# Patient Record
Sex: FEMALE | Race: WHITE | NOT HISPANIC OR LATINO | Employment: UNEMPLOYED | URBAN - METROPOLITAN AREA
[De-identification: names, ages, dates, MRNs, and addresses within clinical notes are randomized per-mention and may not be internally consistent; named-entity substitution may affect disease eponyms.]

---

## 2017-01-03 ENCOUNTER — PATIENT MESSAGE (OUTPATIENT)
Dept: FAMILY MEDICINE | Facility: CLINIC | Age: 72
End: 2017-01-03

## 2017-01-04 DIAGNOSIS — C50.919 MALIGNANT NEOPLASM OF BREAST: ICD-10-CM

## 2017-01-04 DIAGNOSIS — E03.9 ACQUIRED HYPOTHYROIDISM: ICD-10-CM

## 2017-01-04 RX ORDER — ANASTROZOLE 1 MG/1
TABLET ORAL
Qty: 90 TABLET | Refills: 5 | Status: SHIPPED | OUTPATIENT
Start: 2017-01-04 | End: 2018-04-07 | Stop reason: SDUPTHER

## 2017-01-04 RX ORDER — LEVOTHYROXINE SODIUM 75 UG/1
TABLET ORAL
Qty: 90 TABLET | Refills: 3 | Status: SHIPPED | OUTPATIENT
Start: 2017-01-04 | End: 2018-01-29 | Stop reason: SDUPTHER

## 2017-03-29 DIAGNOSIS — N32.81 OVERACTIVE BLADDER: ICD-10-CM

## 2017-03-29 RX ORDER — SOLIFENACIN SUCCINATE 5 MG/1
TABLET, FILM COATED ORAL
Qty: 90 TABLET | Refills: 2 | Status: SHIPPED | OUTPATIENT
Start: 2017-03-29 | End: 2017-11-28 | Stop reason: SDUPTHER

## 2017-04-07 ENCOUNTER — LAB VISIT (OUTPATIENT)
Dept: LAB | Facility: HOSPITAL | Age: 72
End: 2017-04-07
Attending: INTERNAL MEDICINE
Payer: MEDICARE

## 2017-04-07 DIAGNOSIS — D69.6 THROMBOCYTOPENIA: ICD-10-CM

## 2017-04-07 DIAGNOSIS — M81.0 OSTEOPOROSIS: ICD-10-CM

## 2017-04-07 LAB
ALBUMIN SERPL BCP-MCNC: 3.7 G/DL
ALP SERPL-CCNC: 43 U/L
ALT SERPL W/O P-5'-P-CCNC: 35 U/L
ANION GAP SERPL CALC-SCNC: 5 MMOL/L
AST SERPL-CCNC: 63 U/L
BASOPHILS # BLD AUTO: 0 K/UL
BASOPHILS NFR BLD: 0.6 %
BILIRUB SERPL-MCNC: 0.4 MG/DL
BUN SERPL-MCNC: 24 MG/DL
CALCIUM SERPL-MCNC: 9.3 MG/DL
CHLORIDE SERPL-SCNC: 101 MMOL/L
CO2 SERPL-SCNC: 32 MMOL/L
CREAT SERPL-MCNC: 0.9 MG/DL
DIFFERENTIAL METHOD: ABNORMAL
EOSINOPHIL # BLD AUTO: 0.4 K/UL
EOSINOPHIL NFR BLD: 9 %
ERYTHROCYTE [DISTWIDTH] IN BLOOD BY AUTOMATED COUNT: 14.6 %
EST. GFR  (AFRICAN AMERICAN): >60 ML/MIN/1.73 M^2
EST. GFR  (NON AFRICAN AMERICAN): >60 ML/MIN/1.73 M^2
GLUCOSE SERPL-MCNC: 77 MG/DL
HCT VFR BLD AUTO: 37.8 %
HGB BLD-MCNC: 12.7 G/DL
LYMPHOCYTES # BLD AUTO: 1.4 K/UL
LYMPHOCYTES NFR BLD: 33.2 %
MCH RBC QN AUTO: 31.5 PG
MCHC RBC AUTO-ENTMCNC: 33.7 %
MCV RBC AUTO: 93 FL
MONOCYTES # BLD AUTO: 0.4 K/UL
MONOCYTES NFR BLD: 9.5 %
NEUTROPHILS # BLD AUTO: 1.9 K/UL
NEUTROPHILS NFR BLD: 47.7 %
PLATELET # BLD AUTO: 126 K/UL
PMV BLD AUTO: 7.9 FL
POTASSIUM SERPL-SCNC: 4.8 MMOL/L
PROT SERPL-MCNC: 6.9 G/DL
RBC # BLD AUTO: 4.05 M/UL
SODIUM SERPL-SCNC: 138 MMOL/L
WBC # BLD AUTO: 4.1 K/UL

## 2017-04-07 PROCEDURE — 36415 COLL VENOUS BLD VENIPUNCTURE: CPT

## 2017-04-07 PROCEDURE — 85025 COMPLETE CBC W/AUTO DIFF WBC: CPT

## 2017-04-07 PROCEDURE — 80053 COMPREHEN METABOLIC PANEL: CPT

## 2017-04-10 ENCOUNTER — OFFICE VISIT (OUTPATIENT)
Dept: HEMATOLOGY/ONCOLOGY | Facility: CLINIC | Age: 72
End: 2017-04-10
Payer: MEDICARE

## 2017-04-10 VITALS
HEIGHT: 64 IN | SYSTOLIC BLOOD PRESSURE: 121 MMHG | OXYGEN SATURATION: 99 % | RESPIRATION RATE: 18 BRPM | BODY MASS INDEX: 24.5 KG/M2 | WEIGHT: 143.5 LBS | DIASTOLIC BLOOD PRESSURE: 58 MMHG | HEART RATE: 53 BPM | TEMPERATURE: 98 F

## 2017-04-10 DIAGNOSIS — T38.6X5A OSTEOPOROSIS DUE TO AROMATASE INHIBITOR: ICD-10-CM

## 2017-04-10 DIAGNOSIS — D69.6 THROMBOCYTOPENIA: ICD-10-CM

## 2017-04-10 DIAGNOSIS — C50.011 MALIGNANT NEOPLASM INVOLVING BOTH NIPPLE AND AREOLA OF RIGHT BREAST IN FEMALE: ICD-10-CM

## 2017-04-10 DIAGNOSIS — C50.011 MALIGNANT NEOPLASM INVOLVING BOTH NIPPLE AND AREOLA OF RIGHT BREAST IN FEMALE: Primary | ICD-10-CM

## 2017-04-10 DIAGNOSIS — M81.8 OSTEOPOROSIS DUE TO AROMATASE INHIBITOR: ICD-10-CM

## 2017-04-10 PROCEDURE — 99214 OFFICE O/P EST MOD 30 MIN: CPT | Mod: PBBFAC,PO | Performed by: INTERNAL MEDICINE

## 2017-04-10 PROCEDURE — 99214 OFFICE O/P EST MOD 30 MIN: CPT | Mod: S$PBB,,, | Performed by: INTERNAL MEDICINE

## 2017-04-10 PROCEDURE — 99999 PR PBB SHADOW E&M-EST. PATIENT-LVL IV: CPT | Mod: PBBFAC,,, | Performed by: INTERNAL MEDICINE

## 2017-04-10 RX ORDER — DORZOLAMIDE HYDROCHLORIDE AND TIMOLOL MALEATE 20; 5 MG/ML; MG/ML
1 SOLUTION/ DROPS OPHTHALMIC 2 TIMES DAILY
COMMUNITY
Start: 2017-03-31 | End: 2019-03-11 | Stop reason: ALTCHOICE

## 2017-04-10 RX ORDER — ZOLPIDEM TARTRATE 5 MG/1
5 TABLET ORAL NIGHTLY PRN
Qty: 30 TABLET | Refills: 0 | Status: SHIPPED | OUTPATIENT
Start: 2017-04-10 | End: 2018-01-24

## 2017-04-10 RX ORDER — TRAMADOL HYDROCHLORIDE 50 MG/1
50 TABLET ORAL EVERY 6 HOURS PRN
Qty: 90 TABLET | Refills: 0 | Status: SHIPPED | OUTPATIENT
Start: 2017-04-10 | End: 2017-05-01 | Stop reason: SDUPTHER

## 2017-04-10 RX ORDER — TRAMADOL HYDROCHLORIDE 50 MG/1
50 TABLET ORAL EVERY 6 HOURS PRN
Qty: 90 TABLET | Refills: 0 | Status: SHIPPED | OUTPATIENT
Start: 2017-04-10 | End: 2017-04-10 | Stop reason: SDUPTHER

## 2017-04-10 NOTE — MR AVS SNAPSHOT
[facility]  [address], [address]  [facility] [address]  Phone: [phone]                  Yuniel Gracia   4/10/2017 10:20 AM   Office Visit    Description:  Female : 1945   Provider:  Sherrie Almanza MD   Department:  [facility]           Reason for Visit     Follow-up           Diagnoses this Visit        Comments    Malignant neoplasm involving both nipple and areola of right breast in female    -  Primary            To Do List           Goals (5 Years of Data)     None       These Medications        Disp Refills Start End    tramadol (ULTRAM) 50 mg tablet 90 tablet 0 4/10/2017     Take 1 tablet (50 mg total) by mouth every 6 (six) hours as needed for Pain. - Oral    Pharmacy: [facility] [place] [address] Ph #: [phone]       zolpidem (AMBIEN) 5 MG Tab 30 tablet 0 4/10/2017 10/9/2017    Take 1 tablet (5 mg total) by mouth nightly as needed. - Oral    Pharmacy: [place] [id number] Ph #: [phone]         [facility]     [facility] On Call Nurse Care Line -  Assistance  Unless otherwise directed by your provider, please contact [facility], our nurse care line that is available for  assistance.     Registered nurses in the [facility] provide: appointment scheduling, clinical advisement, health education, and other advisory services.  Call: [phone] (toll free)               Medications           Message regarding Medications     Verify the changes and/or additions to your medication regime listed below are the same as discussed with your clinician today.  If any of these changes or additions are incorrect, please notify your healthcare provider.        START taking these NEW medications        Refills    zolpidem (AMBIEN) 5 MG Tab 0    Sig: Take 1 tablet (5 mg total) by mouth nightly as needed.    Class:  "Normal    Route: Oral      STOP taking these medications     latanoprost 0.005 % ophthalmic solution Place 1 drop into both eyes every evening.            Verify that the below list of medications is an accurate representation of the medications you are currently taking.  If none reported, the list may be blank. If incorrect, please contact your healthcare provider. Carry this list with you in case of emergency.           Current Medications     amlodipine (NORVASC) 2.5 MG tablet     anastrozole (ARIMIDEX) 1 mg Tab TAKE 1 TABLET DAILY    cholecalciferol, vitamin D3, (VITAMIN D-3) 400 unit Chew Take 1 mg by mouth once daily.     coenzyme Q10 100 mg capsule Take 100 mg by mouth once daily.    cyanocobalamin (VITAMIN B-12) 100 MCG tablet Take 100 mcg by mouth once daily.     dorzolamide-timolol 2-0.5% (COSOPT) 22.3-6.8 mg/mL ophthalmic solution     fish oil-omega-3 fatty acids 300-1,000 mg capsule Take 2 g by mouth once daily.     levothyroxine (SYNTHROID) 75 MCG tablet TAKE 1 TABLET DAILY    pravastatin (PRAVACHOL) 40 MG tablet Take 40 mg by mouth once daily.     sertraline (ZOLOFT) 100 MG tablet Take 1 tablet (100 mg total) by mouth once daily.    temazepam (RESTORIL) 15 mg Cap Take 1 capsule (15 mg total) by mouth nightly as needed (take 1-2 capsules by mouth 30-60 minutes prior to sleep as needed for insomnia).    tramadol (ULTRAM) 50 mg tablet Take 1 tablet (50 mg total) by mouth every 6 (six) hours as needed for Pain.    VESICARE 5 mg tablet TAKE 1 TABLET DAILY    zolpidem (AMBIEN) 5 MG Tab Take 1 tablet (5 mg total) by mouth nightly as needed.           Clinical Reference Information           Your Vitals Were     BP Pulse Temp Resp Height Weight    121/58 53 98.2 °F (36.8 °C) (Oral) 18 5' 4" (1.626 m) 65.1 kg (143 lb 8.3 oz)    Last Period SpO2 BMI          03/02/1998 99% 24.64 kg/m2        Blood Pressure          Most Recent Value    BP  (!)  121/58      Allergies as of 4/10/2017     Adhesive    Codeine    "   Immunizations Administered on Date of Encounter - 4/10/2017     None      Orders Placed During Today's Visit     Future Labs/Procedures Expected by Expires    CA 27.29  4/10/2017 6/9/2018    CBC w/ DIFF  4/10/2017 6/9/2018    CMP  4/10/2017 6/9/2018    MAMMOGRAM DX  4/10/2017 6/10/2018      Language Assistance Services     ATTENTION: Language assistance services are available, free of charge. Please call [phone].      ATENCIÓN: Si habla inocente, tiene a matta disposición servicios gratuitos de asistencia lingüística. Llame al [phone].     CHÚ Ý: N?u b?n nói Ti?ng Vi?t, có các d?ch v? h? tr? ngôn ng? mi?n phí dành cho b?n. G?i s? [phone].         [facility] complies with applicable Federal civil rights laws and does not discriminate on the basis of race, color, national origin, age, disability, or sex.

## 2017-04-10 NOTE — PROGRESS NOTES
Subjective:       Patient ID: Yuniel Gracia is a 71 y.o. female.    Chief Complaint: No chief complaint on file.    HPI:   Patient coming in for followup. She has a chronic ITP and hep C, history of    stage I breast cancer,rec score of 32 , so underwent TC  chemotherapy. She did notice a new mass in the rt breast that came back without issue, On arimidex which will complete 5 years in 9/2017/ Mild thrombocytopenia related to rx and hep c      REVIEW OF SYSTEMS:     CONSTITUTIONAL: The patient denies any weight change. There is no apparent    change in appetite, fever, night sweats, headaches, fatigue, dizziness, or    weakness.      SKIN: Denies rash, issues with nails, non-healing sores, bleeding, blotching    skin or abnormal bruising. Denies new moles or changes to existing moles.      BREASTS: There is no swelling around breasts or nipple discharge.    EYES: Denies eye pain, blurred vision, swelling, redness or discharge.      ENT AND MOUTH: Denies runny nose, stuffiness, sinus trouble or sores. Denies    nosebleeds. Denies, hoarseness, change in voice or swelling in front of the    neck.      CARDIOVASCULAR: Denies chest pain, discomfort or palpitations. Denies neck    swelling or episodes of passing out.      RESPIRATORY: Denies cough, sputum production, blood in sputum, and denies    shortness of breath.      GI: Denies trouble swallowing, indigestion, heartburn, abdominal pain, nausea,    vomiting, diarrhea, altered bowel habits, blood in stool, discoloration of    stools, change in nature of stool, bloating, increased abdominal girth.      GENITOURINARY: No discharge. No pelvic pain or lumps. No rash around groin or  lesions. No urinary frequency, hesitation, painful urination or blood in    urine. Denies incontinence. No problems with intercourse.      MUSCULOSKELETAL: Denies neck or back pain. Denies weakness in arms or legs,    joint problems or distended inflamed veins in legs. Denies swelling  or abnormal  glands.      NEUROLOGICAL: Denies tingling, numbness, altered mentation changes to nerve    function in the face, weakness to one or both of the body. Denies changes to    gait and denies multiple falls or accidents.      PSYCHIATRIC: Denies nervousness, anxiety, hallucinations, depression, suicidal    ideation, trouble sleeping or changes in behavior noticed by family.      The patient denies recent foreign travel or recent exposure to chemicals or    products of concern or infectious diseases.     PHYSICAL EXAM:     There were no vitals filed for this visit.    GENERAL: Comfortable looking patient. Patient is in no distress.  Awake, alert and oriented to time, person and place.  No anxiety, or agitation.      HEENT: Normal conjunctivae and eyelids. WNL.  PERRLA 3 to 4 mm. No icterus, no pallor, no congestion, and no discharge noted.     NECK:  Supple. Trachea is central.  No crepitus.  No JVD or masses.    RESPIRATORY:  No intercostal retractions.  No dullness to percussion.  Chest is clear to auscultation.  No rales, rhonchi or wheezes.  No crepitus.  Good air entry bilaterally.    CARDIOVASCULAR:  S1 and S2 are normally heard without murmurs or gallops.  All peripheral pulses are present.    ABDOMEN:  Normal abdomen.  No hepatosplenomegaly.  No free fluid.  Bowel sounds are present.  No hernia noted. No masses.  No rebound or tenderness.  No guarding or rigidity.  Umbilicus is midline.    LYMPHATICS:  No axillary, cervical, supraclavicular, submental, or inguinal lymphadenopathy.    SKIN/MUSCULOSKELETAL:  There is no evidence of excoriation marks or ecchmosis.  No rashes.  No cyanosis.  No clubbing.  No joint or skeletal deformities noted.  Normal range of motion.    NEUROLOGIC:  Higher functions are appropriate.  No cranial nerve deficits.  Normal ludmila.  Normal strength.  Motor and sensory functions are normal.  Deep tendon reflexes are normal.    GENITAL/RECTAL:  Exams are deferred.       Laboratory:     CBC:  Lab Results   Component Value Date    WBC 4.10 04/07/2017    RBC 4.05 04/07/2017    HGB 12.7 04/07/2017    HCT 37.8 04/07/2017    MCV 93 04/07/2017    MCH 31.5 (H) 04/07/2017    MCHC 33.7 04/07/2017    RDW 14.6 (H) 04/07/2017     (L) 04/07/2017    MPV 7.9 (L) 04/07/2017    GRAN 1.9 04/07/2017    GRAN 47.7 04/07/2017    LYMPH 1.4 04/07/2017    LYMPH 33.2 04/07/2017    MONO 0.4 04/07/2017    MONO 9.5 04/07/2017    EOS 0.4 04/07/2017    BASO 0.00 04/07/2017    EOSINOPHIL 9.0 (H) 04/07/2017    BASOPHIL 0.6 04/07/2017       BMP: BMP  Lab Results   Component Value Date     04/07/2017    K 4.8 04/07/2017     04/07/2017    CO2 32 (H) 04/07/2017    BUN 24 (H) 04/07/2017    CREATININE 0.9 04/07/2017    CALCIUM 9.3 04/07/2017    ANIONGAP 5 (L) 04/07/2017    ESTGFRAFRICA >60 04/07/2017    EGFRNONAA >60 04/07/2017       LFT:   Lab Results   Component Value Date    ALT 35 04/07/2017    AST 63 (H) 04/07/2017    ALKPHOS 43 (L) 04/07/2017    BILITOT 0.4 04/07/2017 9/2016 mammogram neg:   Most recent scan 2015 neg    Assessment/Plan:       Stage 1 breast ca  4. HTN, dyslipedemia, hypothyroidism, depression , contimue care with Dr. Dolan    Gave pt restoril rx for insomnia. generic  Breast ca: cont arimidex and prolia, calcium and dental precautons she completes 5 yrs this sept but we will cont for 10 based on new recs   thrombocytopenia cont to monitor,\   due for mammo in 9/2017

## 2017-04-11 DIAGNOSIS — C50.011 MALIGNANT NEOPLASM OF NIPPLE AND AREOLA OF FEMALE BREAST, RIGHT: Primary | ICD-10-CM

## 2017-04-17 DIAGNOSIS — M81.8 IDIOPATHIC OSTEOPOROSIS: Primary | ICD-10-CM

## 2017-04-17 DIAGNOSIS — M81.8 OSTEOPOROSIS DUE TO AROMATASE INHIBITOR: ICD-10-CM

## 2017-04-17 DIAGNOSIS — T38.6X5A OSTEOPOROSIS DUE TO AROMATASE INHIBITOR: ICD-10-CM

## 2017-05-01 DIAGNOSIS — C50.011 MALIGNANT NEOPLASM INVOLVING BOTH NIPPLE AND AREOLA OF RIGHT BREAST IN FEMALE: ICD-10-CM

## 2017-05-01 RX ORDER — TRAMADOL HYDROCHLORIDE 50 MG/1
50 TABLET ORAL EVERY 6 HOURS PRN
Qty: 60 TABLET | Refills: 0 | Status: ON HOLD | OUTPATIENT
Start: 2017-05-01 | End: 2018-02-06

## 2017-05-02 ENCOUNTER — DOCUMENTATION ONLY (OUTPATIENT)
Dept: HEMATOLOGY/ONCOLOGY | Facility: CLINIC | Age: 72
End: 2017-05-02

## 2017-05-08 ENCOUNTER — PATIENT MESSAGE (OUTPATIENT)
Dept: HEMATOLOGY/ONCOLOGY | Facility: CLINIC | Age: 72
End: 2017-05-08

## 2017-05-09 ENCOUNTER — TELEPHONE (OUTPATIENT)
Dept: GASTROENTEROLOGY | Facility: CLINIC | Age: 72
End: 2017-05-09

## 2017-05-09 ENCOUNTER — PATIENT MESSAGE (OUTPATIENT)
Dept: HEMATOLOGY/ONCOLOGY | Facility: CLINIC | Age: 72
End: 2017-05-09

## 2017-05-09 DIAGNOSIS — C50.011 MALIGNANT NEOPLASM OF NIPPLE AND AREOLA OF FEMALE BREAST, RIGHT: Primary | ICD-10-CM

## 2017-05-09 NOTE — TELEPHONE ENCOUNTER
----- Message from Amanda Diehl LPN sent at 5/9/2017  9:33 AM CDT -----  Hi.  We put a referral in for this pt.  She is requesting a colonoscopy.  Thanks, April

## 2017-05-16 ENCOUNTER — OFFICE VISIT (OUTPATIENT)
Dept: GASTROENTEROLOGY | Facility: CLINIC | Age: 72
End: 2017-05-16
Payer: MEDICARE

## 2017-05-16 VITALS
HEART RATE: 50 BPM | DIASTOLIC BLOOD PRESSURE: 61 MMHG | RESPIRATION RATE: 18 BRPM | HEIGHT: 65 IN | BODY MASS INDEX: 24.46 KG/M2 | SYSTOLIC BLOOD PRESSURE: 108 MMHG | WEIGHT: 146.81 LBS

## 2017-05-16 DIAGNOSIS — Z86.010 HISTORY OF COLON POLYPS: ICD-10-CM

## 2017-05-16 DIAGNOSIS — R19.4 CHANGE IN BOWEL HABITS: ICD-10-CM

## 2017-05-16 DIAGNOSIS — R00.1 BRADYCARDIA: ICD-10-CM

## 2017-05-16 DIAGNOSIS — K59.09 CHRONIC CONSTIPATION: Primary | ICD-10-CM

## 2017-05-16 DIAGNOSIS — Z86.19 HISTORY OF HEPATITIS: ICD-10-CM

## 2017-05-16 DIAGNOSIS — Z85.3 HISTORY OF BREAST CANCER: ICD-10-CM

## 2017-05-16 PROCEDURE — 99214 OFFICE O/P EST MOD 30 MIN: CPT | Mod: S$PBB,,, | Performed by: NURSE PRACTITIONER

## 2017-05-16 PROCEDURE — 99999 PR PBB SHADOW E&M-EST. PATIENT-LVL IV: CPT | Mod: PBBFAC,,, | Performed by: NURSE PRACTITIONER

## 2017-05-16 PROCEDURE — 99214 OFFICE O/P EST MOD 30 MIN: CPT | Mod: PBBFAC,PO | Performed by: NURSE PRACTITIONER

## 2017-05-16 NOTE — PATIENT INSTRUCTIONS
Constipation (Adult)  Constipation means that you have bowel movements that are less frequent than usual. Stools often become very hard and difficult to pass.  Constipation is very common. At some point in life it affects almost everyone. Since everyone's bowel habits are different, what is constipation to one person may not be to another. Your healthcare provider may do tests to diagnose constipation. It depends on what he or she finds when evaluating you.    Symptoms of constipation include:  · Abdominal pain  · Bloating  · Vomiting  · Painful bowel movements  · Itching, swelling, bleeding, or pain around the anus  Causes  Constipation can have many causes. These include:  · Diet low in fiber  · Too much dairy  · Not drinking enough liquids  · Lack of exercise or physical activity. This is especially true for older adults.  · Changes in lifestyle or daily routine, including pregnancy, aging, work, and travel  · Frequent use or misuse of laxatives  · Ignoring the urge to have a bowel movement or delaying it until later  · Medicines, such as certain prescription pain medicines, iron supplements, antacids, certain antidepressants, and calcium supplements  · Diseases like irritable bowel syndrome, bowel obstructions, stroke, diabetes, thyroid disease, Parkinson disease, hemorrhoids, and colon cancer  Complications  Potential complications of constipation can include:  · Hemorrhoids  · Rectal bleeding from hemorrhoids or anal fissures (skin tears)  · Hernias  · Dependency on laxatives  · Chronic constipation  · Fecal impaction  · Bowel obstruction or perforation  Home care  All treatment should be done after talking with your healthcare provider. This is especially true if you have another medical problems, are taking prescription medicines, or are an older adult. Treatment most often involves lifestyle changes. You may also need medicines. Your healthcare provider will tell you which will work best for you. Follow  the advice below to help avoid this problem in the future.  Lifestyle changes  These lifestyle changes can help prevent constipation:  · Diet. Eat a high-fiber diet, with fresh fruit and vegetables, and reduce dairy intake, meats, and processed foods  · Fluids. It's important to get enough fluids each day. Drink plenty of water when you eat more fiber. If you are on diet that limits the amount of fluid you can have, talk about this with your healthcare provider.  · Regular exercise. Check with your healthcare provider first.  Medications  Take any medicines as directed. Some laxatives are safe to use only every now and then. Others can be taken on a regular basis. Talk with your doctor or pharmacist if you have questions.  Prescription pain medicines can cause constipation. If you are taking this kind of medicine, ask your healthcare provider if you should also take a stool softener.  Medicines you may take to treat constipation include:  · Fiber supplements  · Stool softeners  · Laxatives  · Enemas  · Rectal suppositories  Follow-up care  Follow up with your healthcare provider if symptoms don't get better in the next few days. You may need to have more tests or see a specialist.  Call 911  Call 911 if any of these occur:  · Trouble breathing  · Stiff, rigid abdomen that is severely painful to touch  · Confusion  · Fainting or loss of consciousness  · Rapid heart rate  · Chest pain  When to seek medical advice  Call your healthcare provider right away if any of these occur:  · Fever over 100.4°F (38°C)  · Failure to resume normal bowel movements  · Pain in your abdomen or back gets worse  · Nausea or vomiting  · Swelling in your abdomen  · Blood in the stool  · Black, tarry stool  · Involuntary weight loss  · Weakness  Date Last Reviewed: 12/30/2015  © 3246-1496 [email or web address]. [address] [place] [address], [place] [address]. All rights reserved. This information is not intended as a substitute for  professional medical care. Always follow your healthcare professional's instructions.

## 2017-05-16 NOTE — PROGRESS NOTES
Subjective:       Patient ID: Yuniel Gracia is a 71 y.o. female Body mass index is 24.62 kg/(m^2).    Chief Complaint: Constipation    This patient is new to me.  Referring Provider:  Dr. Almanza    Constipation   This is a chronic problem. Episode onset: started after she started chemo which was 5 years ago. The problem has been gradually worsening (over the past month) since onset. Her stool frequency is 1 time per week or less (if not taking anything once a week; if taking a laxative/fiber has 2-3 times a week). The stool is described as pellet like (unless taking laxative/fiber, then soft pasty stool). The patient is not on a high fiber diet. She exercises regularly. There has been adequate water intake. Associated symptoms include back pain (not a new symptom, seeing another provider for this, symptom unchanged), bloating (occasional) and flatus (increased recently). Pertinent negatives include no abdominal pain, diarrhea, difficulty urinating, fecal incontinence, fever, hematochezia, hemorrhoids, melena, nausea, rectal pain, vomiting or weight loss. Associated symptoms comments: Straining with bowel movements. She has tried fiber (fiber supplement daily started 2 weeks ago; miralax once daily- no relief) for the symptoms. Her past medical history is significant for abdominal surgery (bladder sling in 2011), endocrine disease (on synthroid) and radiation treatment (history of breast cancer). There is no history of inflammatory bowel disease or irritable bowel syndrome.     Review of Systems   Constitutional: Negative for appetite change, chills, fatigue, fever, unexpected weight change and weight loss.   HENT: Negative for sore throat and trouble swallowing.    Eyes: Negative for visual disturbance.   Respiratory: Negative for cough, choking and shortness of breath.    Cardiovascular: Negative for chest pain and palpitations.        Reports history of low heart rate   Gastrointestinal: Positive for  "bloating (occasional), constipation and flatus (increased recently). Negative for abdominal pain, anal bleeding, blood in stool, diarrhea, hematochezia, hemorrhoids, melena, nausea, rectal pain and vomiting.   Genitourinary: Negative for difficulty urinating, dysuria, flank pain, frequency, pelvic pain and urgency.   Musculoskeletal: Positive for back pain (not a new symptom, seeing another provider for this, symptom unchanged).   Neurological: Negative for weakness and light-headedness.       Past Medical History:   Diagnosis Date    Anemia     Arthritis     Blood transfusion     Breast cancer     Cancer RIGHT BREAST    Chronic constipation     Clotting disorder     Colon polyp     Diverticulosis     Glaucoma     Hepatitis C 2000    pt states history of hepatits c-"cured by Dr. Lynch"; TREATED 2 YEARS - 2000   OK NOW    Hypothyroid     Insomnia     Osteoarthritis     Panic attacks     Ulcer     Vertigo      Past Surgical History:   Procedure Laterality Date    BLADDER SURGERY  2011    sling - Dr Giles  - [facility]    BRAIN SURGERY  2007    temporal neuralgia - [facility]    BREAST LUMPECTOMY  3/12    malignant - had chemo and radiotherapy    COLONOSCOPY  01/03/2011    Dr Lynch, in media section, diverticulosis, hemorrhoids, otherwise normal findings; normal findings on random biopsies    HYSTERECTOMY      removal of port a cath      right lymph node removed from breast area      TUNNELED VENOUS PORT PLACEMENT  04/2012    left chest    UPPER GASTROINTESTINAL ENDOSCOPY  prior to 2011    small hiatal hernia     Family History   Problem Relation Age of Onset    Cancer Mother      breast    Breast cancer Mother     Diabetes Father     Heart disease Father     Cancer Sister      breast    Breast cancer Sister     Diabetes Paternal Aunt     Cancer Other      breast    Breast cancer Other     Drug abuse Son     Obesity Son     Diabetes Maternal Aunt     Heart disease Maternal " Uncle     Tuberculosis Paternal Uncle      x2    Diabetes Paternal Uncle     Early death Maternal Grandmother      tonsils    Heart disease Maternal Grandfather     Alcohol abuse Maternal Grandfather     No Known Problems Paternal Grandmother     Tuberculosis Paternal Grandfather     Ovarian cancer Neg Hx     Colon cancer Neg Hx     Colon polyps Neg Hx     Crohn's disease Neg Hx     Ulcerative colitis Neg Hx      Wt Readings from Last 10 Encounters:   05/16/17 66.6 kg (146 lb 13.2 oz)   04/10/17 65.1 kg (143 lb 8.3 oz)   12/27/16 67.2 kg (148 lb 2.4 oz)   11/16/16 68.4 kg (150 lb 12.7 oz)   09/26/16 67.9 kg (149 lb 11.1 oz)   03/21/16 66.8 kg (147 lb 4.3 oz)   11/16/15 65 kg (143 lb 5.1 oz)   11/04/15 64.4 kg (141 lb 15.6 oz)   09/21/15 66.2 kg (146 lb)   07/20/15 66.9 kg (147 lb 8 oz)     Lab Results   Component Value Date    WBC 4.10 04/07/2017    HGB 12.7 04/07/2017    HCT 37.8 04/07/2017    MCV 93 04/07/2017     (L) 04/07/2017     CMP  Sodium   Date Value Ref Range Status   04/07/2017 138 136 - 145 mmol/L Final     Potassium   Date Value Ref Range Status   04/07/2017 4.8 3.5 - 5.1 mmol/L Final     Chloride   Date Value Ref Range Status   04/07/2017 101 95 - 110 mmol/L Final     CO2   Date Value Ref Range Status   04/07/2017 32 (H) 23 - 29 mmol/L Final     Glucose   Date Value Ref Range Status   04/07/2017 77 70 - 110 mg/dL Final     BUN, Bld   Date Value Ref Range Status   04/07/2017 24 (H) 8 - 23 mg/dL Final     Creatinine   Date Value Ref Range Status   04/07/2017 0.9 0.5 - 1.4 mg/dL Final   07/27/2012 0.8 0.2 - 1.4 mg/dl Final     Calcium   Date Value Ref Range Status   04/07/2017 9.3 8.7 - 10.5 mg/dL Final   07/27/2012 8.4 (L) 8.6 - 10.2 mg/dl Final     Total Protein   Date Value Ref Range Status   04/07/2017 6.9 6.0 - 8.4 g/dL Final     Albumin   Date Value Ref Range Status   04/07/2017 3.7 3.5 - 5.2 g/dL Final     Total Bilirubin   Date Value Ref Range Status   04/07/2017 0.4 0.1 - 1.0  mg/dL Final     Comment:     For infants and newborns, interpretation of results should be based  on gestational age, weight and in agreement with clinical  observations.  Premature Infant recommended reference ranges:  Up to 24 hours.............<8.0 mg/dL  Up to 48 hours............<12.0 mg/dL  3-5 days..................<15.0 mg/dL  6-29 days.................<15.0 mg/dL       Alkaline Phosphatase   Date Value Ref Range Status   04/07/2017 43 (L) 55 - 135 U/L Final   07/25/2012 64 23 - 119 UNIT/L Final     AST   Date Value Ref Range Status   04/07/2017 63 (H) 10 - 40 U/L Final   07/25/2012 19 10 - 30 UNIT/L Final     ALT   Date Value Ref Range Status   04/07/2017 35 10 - 44 U/L Final     Anion Gap   Date Value Ref Range Status   04/07/2017 5 (L) 8 - 16 mmol/L Final   07/27/2012 7 5 - 15 meq/L Final     eGFR if    Date Value Ref Range Status   04/07/2017 >60 >60 mL/min/1.73 m^2 Final     eGFR if non    Date Value Ref Range Status   04/07/2017 >60 >60 mL/min/1.73 m^2 Final     Comment:     Calculation used to obtain the estimated glomerular filtration  rate (eGFR) is the CKD-EPI equation. Since race is unknown   in our information system, the eGFR values for   -American and Non--American patients are given   for each creatinine result.       Lab Results   Component Value Date    TSH 1.567 12/30/2016     Reviewed prior medical records including radiology report of 10/3/16 ct abdomen pelvis chest & endoscopy history (see surgical history).    Objective:      Physical Exam   Constitutional: She is oriented to person, place, and time. She appears well-developed and well-nourished. No distress.   HENT:   Mouth/Throat: Oropharynx is clear and moist and mucous membranes are normal. No oral lesions. No oropharyngeal exudate.   Eyes: Conjunctivae are normal. Pupils are equal, round, and reactive to light. No scleral icterus.   Neck: Neck supple. No tracheal deviation present.    Pulmonary/Chest: Effort normal and breath sounds normal. No respiratory distress. She has no wheezes.   Abdominal: Soft. Normal appearance and bowel sounds are normal. She exhibits no distension, no abdominal bruit and no mass. There is no hepatosplenomegaly. There is no tenderness. There is no rigidity, no rebound, no guarding, no tenderness at McBurney's point and negative West's sign. No hernia.   Lymphadenopathy:     She has no cervical adenopathy.   Neurological: She is alert and oriented to person, place, and time.   Skin: Skin is warm and dry. No rash noted. She is not diaphoretic. No erythema. No pallor.   Non-jaundiced   Psychiatric: She has a normal mood and affect. Her behavior is normal.   Nursing note and vitals reviewed.      Assessment:       1. Chronic constipation    2. Change in bowel habits    3. History of colon polyps    4. History of hepatitis    5. History of breast cancer    6. Bradycardia        Plan:       Chronic constipation  -  START   linaclotide (LINZESS) 145 mcg Cap capsule; Take 1 capsule (145 mcg total) by mouth once daily. Take >30 minutes before 1st meal of the day.  Dispense: 30 capsule; Refill: 2, discussed medication with patient & that it can cause diarrhea for the first week or so, but once your body adjusts to the medication, the diarrhea should resolve; if severe diarrhea occurs or if it persist, please notify our office, patient verbalized understanding  -     Case request GI: COLONOSCOPY, - schedule Colonoscopy, discussed procedure with the patient, verbalized understanding  Recommended daily exercise as tolerated, adequate water intake (six 8-oz glasses of water daily), and high fiber diet. Can continue OTC fiber supplements as directed, separate from other oral medications by >2 hours.  - can continue OTC MiraLax once daily (17g PO) as directed PRN  -If still no improvement with these measures, call/follow-up    Change in bowel habits  -  START   linaclotide  (LINZESS) 145 mcg Cap capsule; Take 1 capsule (145 mcg total) by mouth once daily. Take >30 minutes before 1st meal of the day.  Dispense: 30 capsule; Refill: 2  -     Case request GI: COLONOSCOPY, - schedule Colonoscopy, discussed procedure with the patient, verbalized understanding    History of colon polyps  -     Case request GI: COLONOSCOPY, - schedule Colonoscopy, discussed procedure with the patient, verbalized understanding    History of hepatitis  - follow-up with hepatology for continued evaluation and management    History of breast cancer  - follow-up with provider who manages this condition for continued evaluation and management    Bradycardia  - instructed patient to monitor blood pressure and heart rate at home and follow-up with PCP &/or cardiologist  - patient denies headache, chest pain, shortness of breath, or blurred vision, educated patient that if blood pressure remains elevated or symptoms occur to go to ER.    Return in about 2 months (around 7/16/2017), or if symptoms worsen or fail to improve.    If no improvement in symptoms or symptoms worsen, call/follow-up at clinic or go to ER.

## 2017-05-16 NOTE — LETTER
May 16, 2017      Sherrie Almanza MD  [address] [facility] [address]  [facility] [address]           [facility]  [address]  [facility] [address]  Phone: [phone]          Patient: Yuniel Gracia   MR Number: [id number]   YOB: 1945   Date of Visit: 5/16/2017       Dear Dr. Sherrie Almanza:    Thank you for referring Yuniel Gracia to me for evaluation. Attached you will find relevant portions of my assessment and plan of care.    If you have questions, please do not hesitate to call me. I look forward to following Yuniel Gracia along with you.    Sincerely,    Cande Pulliam, [facility]    Enclosure  CC:  No Recipients    If you would like to receive this communication electronically, please contact [email or web address] or [phone] to request more information on [employer] Link access.    For providers and/or their staff who would like to refer a patient to [facility], please contact us through our one-stop-shop provider referral line, [facility], at [phone].    If you feel you have received this communication in error or would no longer like to receive these types of communications, please e-mail [email or web address]

## 2017-05-16 NOTE — MR AVS SNAPSHOT
[facility] - Gastroenter[facility]  [address]  [facility] [address]  Phone: [phone]                  Yuniel Gracia   2017 9:00 AM   Office Visit    Description:  Female : 1945   Provider:  ALAN De Luna   Department:  [facility] - G[facility]enterology           Reason for Visit     Constipation           Diagnoses this Visit        Comments    Chronic constipation    -  Primary     Change in bowel habits         History of colon polyps         History of hepatitis         History of breast cancer                To Do List           Future Appointments        Provider Department Dept Phone    10/25/2017 11:00 AM LAB, [facility] [phone]    10/25/2017 11:15 AM [facility] MAMMO1 [facility] [phone]    10/25/2017 1:45 PM [facility] US2 [facility] [phone]    2017 11:20 AM Sherrie Almanza MD [facility] - Hematology Oncology [phone]      Goals (5 Years of Data)     None      Follow-Up and Disposition     Return in about 2 months (around 2017), or if symptoms worsen or fail to improve.       These Medications        Disp Refills Start End    linaclotide (LINZESS) 145 mcg Cap capsule 30 capsule 2 2017     Take 1 capsule (145 mcg total) by mouth once daily. Take >30 minutes before 1st meal of the day. - Oral    Pharmacy: [facility] - [address] #: [phone]         [facility]     [facility] Nurse Care Line -  Assistance  Unless otherwise directed by your provider, please contact [facility], our nurse care line that is available for  assistance.     Registered nurses in the [facility] provide: appointment scheduling, clinical advisement, health education, and other advisory services.  Call: [phone] (toll free)               Medications           Message regarding Medications      Verify the changes and/or additions to your medication regime listed below are the same as discussed with your clinician today.  If any of these changes or additions are incorrect, please notify your healthcare provider.        START taking these NEW medications        Refills    linaclotide (LINZESS) 145 mcg Cap capsule 2    Sig: Take 1 capsule (145 mcg total) by mouth once daily. Take >30 minutes before 1st meal of the day.    Class: Normal    Route: Oral           Verify that the below list of medications is an accurate representation of the medications you are currently taking.  If none reported, the list may be blank. If incorrect, please contact your healthcare provider. Carry this list with you in case of emergency.           Current Medications     amlodipine (NORVASC) 2.5 MG tablet     anastrozole (ARIMIDEX) 1 mg Tab TAKE 1 TABLET DAILY    cholecalciferol, vitamin D3, (VITAMIN D-3) 400 unit Chew Take 1 mg by mouth once daily.     coenzyme Q10 100 mg capsule Take 100 mg by mouth once daily.    cyanocobalamin (VITAMIN B-12) 100 MCG tablet Take 100 mcg by mouth once daily.     dorzolamide-timolol 2-0.5% (COSOPT) 22.3-6.8 mg/mL ophthalmic solution     fish oil-omega-3 fatty acids 300-1,000 mg capsule Take 2 g by mouth once daily.     levothyroxine (SYNTHROID) 75 MCG tablet TAKE 1 TABLET DAILY    pravastatin (PRAVACHOL) 40 MG tablet Take 40 mg by mouth once daily.     sertraline (ZOLOFT) 100 MG tablet Take 1 tablet (100 mg total) by mouth once daily.    temazepam (RESTORIL) 15 mg Cap Take 1 capsule (15 mg total) by mouth nightly as needed (take 1-2 capsules by mouth 30-60 minutes prior to sleep as needed for insomnia).    tramadol (ULTRAM) 50 mg tablet Take 1 tablet (50 mg total) by mouth every 6 (six) hours as needed for Pain.    VESICARE 5 mg tablet TAKE 1 TABLET DAILY    zolpidem (AMBIEN) 5 MG Tab Take 1 tablet (5 mg total) by mouth nightly as needed.    linaclotide (LINZESS) 145 mcg Cap capsule Take 1  "capsule (145 mcg total) by mouth once daily. Take >30 minutes before 1st meal of the day.           Clinical Reference Information           Your Vitals Were     BP Pulse Resp Height Weight Last Period    108/61 50 18 5' 4.75" (1.645 m) 66.6 kg (146 lb 13.2 oz) 03/02/1998    BMI                24.62 kg/m2          Blood Pressure          Most Recent Value    BP  108/61      Allergies as of 5/16/2017     Adhesive    Codeine      Immunizations Administered on Date of Encounter - 5/16/2017     None      Instructions      Constipation (Adult)  Constipation means that you have bowel movements that are less frequent than usual. Stools often become very hard and difficult to pass.  Constipation is very common. At some point in life it affects almost everyone. Since everyone's bowel habits are different, what is constipation to one person may not be to another. Your healthcare provider may do tests to diagnose constipation. It depends on what he or she finds when evaluating you.    Symptoms of constipation include:  · Abdominal pain  · Bloating  · Vomiting  · Painful bowel movements  · Itching, swelling, bleeding, or pain around the anus  Causes  Constipation can have many causes. These include:  · Diet low in fiber  · Too much dairy  · Not drinking enough liquids  · Lack of exercise or physical activity. This is especially true for older adults.  · Changes in lifestyle or daily routine, including pregnancy, aging, work, and travel  · Frequent use or misuse of laxatives  · Ignoring the urge to have a bowel movement or delaying it until later  · Medicines, such as certain prescription pain medicines, iron supplements, antacids, certain antidepressants, and calcium supplements  · Diseases like irritable bowel syndrome, bowel obstructions, stroke, diabetes, thyroid disease, Parkinson disease, hemorrhoids, and colon cancer  Complications  Potential complications of constipation can include:  · Hemorrhoids  · Rectal bleeding " from hemorrhoids or anal fissures (skin tears)  · Hernias  · Dependency on laxatives  · Chronic constipation  · Fecal impaction  · Bowel obstruction or perforation  Home care  All treatment should be done after talking with your healthcare provider. This is especially true if you have another medical problems, are taking prescription medicines, or are an older adult. Treatment most often involves lifestyle changes. You may also need medicines. Your healthcare provider will tell you which will work best for you. Follow the advice below to help avoid this problem in the future.  Lifestyle changes  These lifestyle changes can help prevent constipation:  · Diet. Eat a high-fiber diet, with fresh fruit and vegetables, and reduce dairy intake, meats, and processed foods  · Fluids. It's important to get enough fluids each day. Drink plenty of water when you eat more fiber. If you are on diet that limits the amount of fluid you can have, talk about this with your healthcare provider.  · Regular exercise. Check with your healthcare provider first.  Medications  Take any medicines as directed. Some laxatives are safe to use only every now and then. Others can be taken on a regular basis. Talk with your doctor or pharmacist if you have questions.  Prescription pain medicines can cause constipation. If you are taking this kind of medicine, ask your healthcare provider if you should also take a stool softener.  Medicines you may take to treat constipation include:  · Fiber supplements  · Stool softeners  · Laxatives  · Enemas  · Rectal suppositories  Follow-up care  Follow up with your healthcare provider if symptoms don't get better in the next few days. You may need to have more tests or see a specialist.  Call 911  Call 911 if any of these occur:  · Trouble breathing  · Stiff, rigid abdomen that is severely painful to touch  · Confusion  · Fainting or loss of consciousness  · Rapid heart rate  · Chest pain  When to seek  medical advice  Call your healthcare provider right away if any of these occur:  · Fever over 100.4°F (38°C)  · Failure to resume normal bowel movements  · Pain in your abdomen or back gets worse  · Nausea or vomiting  · Swelling in your abdomen  · Blood in the stool  · Black, tarry stool  · Involuntary weight loss  · Weakness  Date Last Reviewed: 12/30/2015  © 2223-5361 [employer]. [address] [place] [address], [place] [address]. All rights reserved. This information is not intended as a substitute for professional medical care. Always follow your healthcare professional's instructions.             Language Assistance Services     ATTENTION: Language assistance services are available, free of charge. Please call [phone].      ATENCIÓN: Si habla inocente, tiene a matta disposición servicios gratuitos de asistencia lingüística. Llame al [phone].     ASHER Ý: N?u b?n nói Ti?ng Vi?t, có các d?ch v? h? tr? ngôn ng? mi?n phí dành cho b?n. G?i s? [phone].         [facility] - Gastro[facility] complies with applicable Federal civil rights laws and does not discriminate on the basis of race, color, national origin, age, disability, or sex.

## 2017-05-30 ENCOUNTER — PATIENT MESSAGE (OUTPATIENT)
Dept: ENDOSCOPY | Facility: HOSPITAL | Age: 72
End: 2017-05-30

## 2017-06-01 ENCOUNTER — ANESTHESIA EVENT (OUTPATIENT)
Dept: ENDOSCOPY | Facility: HOSPITAL | Age: 72
End: 2017-06-01
Payer: MEDICARE

## 2017-06-01 ENCOUNTER — HOSPITAL ENCOUNTER (OUTPATIENT)
Facility: HOSPITAL | Age: 72
Discharge: HOME | End: 2017-06-01
Attending: INTERNAL MEDICINE | Admitting: INTERNAL MEDICINE
Payer: MEDICARE

## 2017-06-01 ENCOUNTER — SURGERY (OUTPATIENT)
Age: 72
End: 2017-06-01

## 2017-06-01 ENCOUNTER — ANESTHESIA (OUTPATIENT)
Dept: ENDOSCOPY | Facility: HOSPITAL | Age: 72
End: 2017-06-01
Payer: MEDICARE

## 2017-06-01 ENCOUNTER — PATIENT MESSAGE (OUTPATIENT)
Dept: GASTROENTEROLOGY | Facility: CLINIC | Age: 72
End: 2017-06-01

## 2017-06-01 VITALS — RESPIRATION RATE: 18 BRPM

## 2017-06-01 DIAGNOSIS — R19.4 CHANGE IN BOWEL HABITS: ICD-10-CM

## 2017-06-01 DIAGNOSIS — K64.8 INTERNAL HEMORRHOIDS: ICD-10-CM

## 2017-06-01 DIAGNOSIS — K59.09 CHRONIC CONSTIPATION: ICD-10-CM

## 2017-06-01 DIAGNOSIS — K57.30 DIVERTICULOSIS OF LARGE INTESTINE WITHOUT HEMORRHAGE: ICD-10-CM

## 2017-06-01 DIAGNOSIS — K63.5 POLYP OF COLON, UNSPECIFIED PART OF COLON, UNSPECIFIED TYPE: Primary | ICD-10-CM

## 2017-06-01 PROCEDURE — 63600175 PHARM REV CODE 636 W HCPCS: Performed by: NURSE ANESTHETIST, CERTIFIED REGISTERED

## 2017-06-01 PROCEDURE — 37000009 HC ANESTHESIA EA ADD 15 MINS: Performed by: INTERNAL MEDICINE

## 2017-06-01 PROCEDURE — 27201089 HC SNARE, DISP (ANY): Performed by: INTERNAL MEDICINE

## 2017-06-01 PROCEDURE — 63600175 PHARM REV CODE 636 W HCPCS: Performed by: INTERNAL MEDICINE

## 2017-06-01 PROCEDURE — 45385 COLONOSCOPY W/LESION REMOVAL: CPT | Mod: ,,, | Performed by: INTERNAL MEDICINE

## 2017-06-01 PROCEDURE — 45385 COLONOSCOPY W/LESION REMOVAL: CPT | Performed by: INTERNAL MEDICINE

## 2017-06-01 PROCEDURE — 25000003 PHARM REV CODE 250: Performed by: INTERNAL MEDICINE

## 2017-06-01 PROCEDURE — 88305 TISSUE EXAM BY PATHOLOGIST: CPT | Mod: 59 | Performed by: PATHOLOGY

## 2017-06-01 PROCEDURE — D9220A PRA ANESTHESIA: Mod: ANES,,, | Performed by: ANESTHESIOLOGY

## 2017-06-01 PROCEDURE — 37000008 HC ANESTHESIA 1ST 15 MINUTES: Performed by: INTERNAL MEDICINE

## 2017-06-01 PROCEDURE — 27201012 HC FORCEPS, HOT/COLD, DISP: Performed by: INTERNAL MEDICINE

## 2017-06-01 PROCEDURE — 45380 COLONOSCOPY AND BIOPSY: CPT | Mod: 59,,, | Performed by: INTERNAL MEDICINE

## 2017-06-01 PROCEDURE — D9220A PRA ANESTHESIA: Mod: CRNA,,, | Performed by: NURSE ANESTHETIST, CERTIFIED REGISTERED

## 2017-06-01 PROCEDURE — 45380 COLONOSCOPY AND BIOPSY: CPT | Performed by: INTERNAL MEDICINE

## 2017-06-01 PROCEDURE — 25000003 PHARM REV CODE 250: Performed by: NURSE ANESTHETIST, CERTIFIED REGISTERED

## 2017-06-01 RX ORDER — PROPOFOL 10 MG/ML
VIAL (ML) INTRAVENOUS
Status: DISCONTINUED | OUTPATIENT
Start: 2017-06-01 | End: 2017-06-01

## 2017-06-01 RX ORDER — SODIUM CHLORIDE 9 MG/ML
INJECTION, SOLUTION INTRAVENOUS CONTINUOUS
Status: DISCONTINUED | OUTPATIENT
Start: 2017-06-01 | End: 2017-06-01 | Stop reason: HOSPADM

## 2017-06-01 RX ORDER — ONDANSETRON 2 MG/ML
INJECTION INTRAMUSCULAR; INTRAVENOUS
Status: DISCONTINUED
Start: 2017-06-01 | End: 2017-06-01 | Stop reason: HOSPADM

## 2017-06-01 RX ORDER — LIDOCAINE HYDROCHLORIDE 10 MG/ML
INJECTION, SOLUTION INTRAVENOUS
Status: DISCONTINUED | OUTPATIENT
Start: 2017-06-01 | End: 2017-06-01

## 2017-06-01 RX ORDER — ONDANSETRON 2 MG/ML
4 INJECTION INTRAMUSCULAR; INTRAVENOUS ONCE
Status: COMPLETED | OUTPATIENT
Start: 2017-06-01 | End: 2017-06-01

## 2017-06-01 RX ADMIN — PROPOFOL 10 MG: 10 INJECTION, EMULSION INTRAVENOUS at 11:06

## 2017-06-01 RX ADMIN — LIDOCAINE HYDROCHLORIDE 50 MG: 10 INJECTION, SOLUTION INTRAVENOUS at 11:06

## 2017-06-01 RX ADMIN — PROPOFOL 20 MG: 10 INJECTION, EMULSION INTRAVENOUS at 11:06

## 2017-06-01 RX ADMIN — ONDANSETRON 4 MG: 2 INJECTION INTRAMUSCULAR; INTRAVENOUS at 10:06

## 2017-06-01 RX ADMIN — SODIUM CHLORIDE 1000 ML: 0.9 INJECTION, SOLUTION INTRAVENOUS at 10:06

## 2017-06-01 RX ADMIN — PROPOFOL 100 MG: 10 INJECTION, EMULSION INTRAVENOUS at 11:06

## 2017-06-01 NOTE — OR NURSING
Have you had a colonoscopy LESS THAN 3 years ago?   * If YES, answer these questions*:   NO    1. Did patient have a prior colonic polyp in a previous surveillance/diagnostic colonoscopy and is 18 years or older on date of encounter?   yes    2. Documentation of < 3 year interval since the patients last colonoscopy due to medical reasons (eg., last colonoscopy incomplete, last colonoscopy had inadequate prep, piecemeal removal of adenomas, or last colonoscopy found > 10 adenomas) ?       Last colonoscopy 5 years ago

## 2017-06-01 NOTE — H&P (VIEW-ONLY)
Subjective:       Patient ID: Yuniel Gracia is a 71 y.o. female Body mass index is 24.62 kg/(m^2).    Chief Complaint: Constipation    This patient is new to me.  Referring Provider:  Dr. Almanza    Constipation   This is a chronic problem. Episode onset: started after she started chemo which was 5 years ago. The problem has been gradually worsening (over the past month) since onset. Her stool frequency is 1 time per week or less (if not taking anything once a week; if taking a laxative/fiber has 2-3 times a week). The stool is described as pellet like (unless taking laxative/fiber, then soft pasty stool). The patient is not on a high fiber diet. She exercises regularly. There has been adequate water intake. Associated symptoms include back pain (not a new symptom, seeing another provider for this, symptom unchanged), bloating (occasional) and flatus (increased recently). Pertinent negatives include no abdominal pain, diarrhea, difficulty urinating, fecal incontinence, fever, hematochezia, hemorrhoids, melena, nausea, rectal pain, vomiting or weight loss. Associated symptoms comments: Straining with bowel movements. She has tried fiber (fiber supplement daily started 2 weeks ago; miralax once daily- no relief) for the symptoms. Her past medical history is significant for abdominal surgery (bladder sling in 2011), endocrine disease (on synthroid) and radiation treatment (history of breast cancer). There is no history of inflammatory bowel disease or irritable bowel syndrome.     Review of Systems   Constitutional: Negative for appetite change, chills, fatigue, fever, unexpected weight change and weight loss.   HENT: Negative for sore throat and trouble swallowing.    Eyes: Negative for visual disturbance.   Respiratory: Negative for cough, choking and shortness of breath.    Cardiovascular: Negative for chest pain and palpitations.        Reports history of low heart rate   Gastrointestinal: Positive for  "bloating (occasional), constipation and flatus (increased recently). Negative for abdominal pain, anal bleeding, blood in stool, diarrhea, hematochezia, hemorrhoids, melena, nausea, rectal pain and vomiting.   Genitourinary: Negative for difficulty urinating, dysuria, flank pain, frequency, pelvic pain and urgency.   Musculoskeletal: Positive for back pain (not a new symptom, seeing another provider for this, symptom unchanged).   Neurological: Negative for weakness and light-headedness.       Past Medical History:   Diagnosis Date    Anemia     Arthritis     Blood transfusion     Breast cancer     Cancer RIGHT BREAST    Chronic constipation     Clotting disorder     Colon polyp     Diverticulosis     Glaucoma     Hepatitis C 2000    pt states history of hepatits c-"cured by Dr. Lynch"; TREATED 2 YEARS - 2000   OK NOW    Hypothyroid     Insomnia     Osteoarthritis     Panic attacks     Ulcer     Vertigo      Past Surgical History:   Procedure Laterality Date    BLADDER SURGERY  2011    sling - Dr Giles  - [facility]    BRAIN SURGERY  2007    temporal neuralgia - [place]    BREAST LUMPECTOMY  3/12    malignant - had chemo and radiotherapy    COLONOSCOPY  01/03/2011    Dr Lynch, in media section, diverticulosis, hemorrhoids, otherwise normal findings; normal findings on random biopsies    HYSTERECTOMY      removal of port a cath      right lymph node removed from breast area      TUNNELED VENOUS PORT PLACEMENT  04/2012    left chest    UPPER GASTROINTESTINAL ENDOSCOPY  prior to 2011    small hiatal hernia     Family History   Problem Relation Age of Onset    Cancer Mother      breast    Breast cancer Mother     Diabetes Father     Heart disease Father     Cancer Sister      breast    Breast cancer Sister     Diabetes Paternal Aunt     Cancer Other      breast    Breast cancer Other     Drug abuse Son     Obesity Son     Diabetes Maternal Aunt     Heart disease Maternal " Uncle     Tuberculosis Paternal Uncle      x2    Diabetes Paternal Uncle     Early death Maternal Grandmother      tonsils    Heart disease Maternal Grandfather     Alcohol abuse Maternal Grandfather     No Known Problems Paternal Grandmother     Tuberculosis Paternal Grandfather     Ovarian cancer Neg Hx     Colon cancer Neg Hx     Colon polyps Neg Hx     Crohn's disease Neg Hx     Ulcerative colitis Neg Hx      Wt Readings from Last 10 Encounters:   05/16/17 66.6 kg (146 lb 13.2 oz)   04/10/17 65.1 kg (143 lb 8.3 oz)   12/27/16 67.2 kg (148 lb 2.4 oz)   11/16/16 68.4 kg (150 lb 12.7 oz)   09/26/16 67.9 kg (149 lb 11.1 oz)   03/21/16 66.8 kg (147 lb 4.3 oz)   11/16/15 65 kg (143 lb 5.1 oz)   11/04/15 64.4 kg (141 lb 15.6 oz)   09/21/15 66.2 kg (146 lb)   07/20/15 66.9 kg (147 lb 8 oz)     Lab Results   Component Value Date    WBC 4.10 04/07/2017    HGB 12.7 04/07/2017    HCT 37.8 04/07/2017    MCV 93 04/07/2017     (L) 04/07/2017     CMP  Sodium   Date Value Ref Range Status   04/07/2017 138 136 - 145 mmol/L Final     Potassium   Date Value Ref Range Status   04/07/2017 4.8 3.5 - 5.1 mmol/L Final     Chloride   Date Value Ref Range Status   04/07/2017 101 95 - 110 mmol/L Final     CO2   Date Value Ref Range Status   04/07/2017 32 (H) 23 - 29 mmol/L Final     Glucose   Date Value Ref Range Status   04/07/2017 77 70 - 110 mg/dL Final     BUN, Bld   Date Value Ref Range Status   04/07/2017 24 (H) 8 - 23 mg/dL Final     Creatinine   Date Value Ref Range Status   04/07/2017 0.9 0.5 - 1.4 mg/dL Final   07/27/2012 0.8 0.2 - 1.4 mg/dl Final     Calcium   Date Value Ref Range Status   04/07/2017 9.3 8.7 - 10.5 mg/dL Final   07/27/2012 8.4 (L) 8.6 - 10.2 mg/dl Final     Total Protein   Date Value Ref Range Status   04/07/2017 6.9 6.0 - 8.4 g/dL Final     Albumin   Date Value Ref Range Status   04/07/2017 3.7 3.5 - 5.2 g/dL Final     Total Bilirubin   Date Value Ref Range Status   04/07/2017 0.4 0.1 - 1.0  mg/dL Final     Comment:     For infants and newborns, interpretation of results should be based  on gestational age, weight and in agreement with clinical  observations.  Premature Infant recommended reference ranges:  Up to 24 hours.............<8.0 mg/dL  Up to 48 hours............<12.0 mg/dL  3-5 days..................<15.0 mg/dL  6-29 days.................<15.0 mg/dL       Alkaline Phosphatase   Date Value Ref Range Status   04/07/2017 43 (L) 55 - 135 U/L Final   07/25/2012 64 23 - 119 UNIT/L Final     AST   Date Value Ref Range Status   04/07/2017 63 (H) 10 - 40 U/L Final   07/25/2012 19 10 - 30 UNIT/L Final     ALT   Date Value Ref Range Status   04/07/2017 35 10 - 44 U/L Final     Anion Gap   Date Value Ref Range Status   04/07/2017 5 (L) 8 - 16 mmol/L Final   07/27/2012 7 5 - 15 meq/L Final     eGFR if    Date Value Ref Range Status   04/07/2017 >60 >60 mL/min/1.73 m^2 Final     eGFR if non    Date Value Ref Range Status   04/07/2017 >60 >60 mL/min/1.73 m^2 Final     Comment:     Calculation used to obtain the estimated glomerular filtration  rate (eGFR) is the CKD-EPI equation. Since race is unknown   in our information system, the eGFR values for   -American and Non--American patients are given   for each creatinine result.       Lab Results   Component Value Date    TSH 1.567 12/30/2016     Reviewed prior medical records including radiology report of 10/3/16 ct abdomen pelvis chest & endoscopy history (see surgical history).    Objective:      Physical Exam   Constitutional: She is oriented to person, place, and time. She appears well-developed and well-nourished. No distress.   HENT:   Mouth/Throat: Oropharynx is clear and moist and mucous membranes are normal. No oral lesions. No oropharyngeal exudate.   Eyes: Conjunctivae are normal. Pupils are equal, round, and reactive to light. No scleral icterus.   Neck: Neck supple. No tracheal deviation present.    Pulmonary/Chest: Effort normal and breath sounds normal. No respiratory distress. She has no wheezes.   Abdominal: Soft. Normal appearance and bowel sounds are normal. She exhibits no distension, no abdominal bruit and no mass. There is no hepatosplenomegaly. There is no tenderness. There is no rigidity, no rebound, no guarding, no tenderness at McBurney's point and negative West's sign. No hernia.   Lymphadenopathy:     She has no cervical adenopathy.   Neurological: She is alert and oriented to person, place, and time.   Skin: Skin is warm and dry. No rash noted. She is not diaphoretic. No erythema. No pallor.   Non-jaundiced   Psychiatric: She has a normal mood and affect. Her behavior is normal.   Nursing note and vitals reviewed.      Assessment:       1. Chronic constipation    2. Change in bowel habits    3. History of colon polyps    4. History of hepatitis    5. History of breast cancer    6. Bradycardia        Plan:       Chronic constipation  -  START   linaclotide (LINZESS) 145 mcg Cap capsule; Take 1 capsule (145 mcg total) by mouth once daily. Take >30 minutes before 1st meal of the day.  Dispense: 30 capsule; Refill: 2, discussed medication with patient & that it can cause diarrhea for the first week or so, but once your body adjusts to the medication, the diarrhea should resolve; if severe diarrhea occurs or if it persist, please notify our office, patient verbalized understanding  -     Case request GI: COLONOSCOPY, - schedule Colonoscopy, discussed procedure with the patient, verbalized understanding  Recommended daily exercise as tolerated, adequate water intake (six 8-oz glasses of water daily), and high fiber diet. Can continue OTC fiber supplements as directed, separate from other oral medications by >2 hours.  - can continue OTC MiraLax once daily (17g PO) as directed PRN  -If still no improvement with these measures, call/follow-up    Change in bowel habits  -  START   linaclotide  (LINZESS) 145 mcg Cap capsule; Take 1 capsule (145 mcg total) by mouth once daily. Take >30 minutes before 1st meal of the day.  Dispense: 30 capsule; Refill: 2  -     Case request GI: COLONOSCOPY, - schedule Colonoscopy, discussed procedure with the patient, verbalized understanding    History of colon polyps  -     Case request GI: COLONOSCOPY, - schedule Colonoscopy, discussed procedure with the patient, verbalized understanding    History of hepatitis  - follow-up with hepatology for continued evaluation and management    History of breast cancer  - follow-up with provider who manages this condition for continued evaluation and management    Bradycardia  - instructed patient to monitor blood pressure and heart rate at home and follow-up with PCP &/or cardiologist  - patient denies headache, chest pain, shortness of breath, or blurred vision, educated patient that if blood pressure remains elevated or symptoms occur to go to ER.    Return in about 2 months (around 7/16/2017), or if symptoms worsen or fail to improve.    If no improvement in symptoms or symptoms worsen, call/follow-up at clinic or go to ER.

## 2017-06-01 NOTE — TRANSFER OF CARE
"Anesthesia Transfer of Care Note    Patient: Yuniel Gracia    Procedure(s) Performed: Procedure(s) (LRB):  COLONOSCOPY (N/A)    Patient location: PACU    Anesthesia Type: general    Transport from OR: Transported from OR on 2-3 L/min O2 by NC with adequate spontaneous ventilation    Post pain: adequate analgesia    Post assessment: no apparent anesthetic complications and tolerated procedure well    Post vital signs: stable    Level of consciousness: awake, alert and oriented    Nausea/Vomiting: no nausea/vomiting    Complications: none          Last vitals:   Visit Vitals  /61 (BP Location: Left arm, Patient Position: Lying, BP Method: Automatic)   Pulse (!) 59   Temp 36.1 °C (97 °F) (Oral)   Resp 20   Ht 5' 4" (1.626 m)   Wt 65.8 kg (145 lb)   LMP 03/02/1998   SpO2 99%   Breastfeeding? No   BMI 24.89 kg/m²     "

## 2017-06-01 NOTE — ANESTHESIA PREPROCEDURE EVALUATION
06/01/2017  Yuniel Gracia is a 71 y.o., female.    Anesthesia Evaluation    I have reviewed the Patient Summary Reports.    I have reviewed the Nursing Notes.   I have reviewed the Medications.     Review of Systems  Anesthesia Hx:  No problems with previous Anesthesia    Social:  Non-Smoker    Hematology/Oncology:  Hematology Normal       -- Cancer in past history:  Breast right   EENT/Dental:EENT/Dental Normal   Cardiovascular:  Cardiovascular Normal     Pulmonary:  Pulmonary Normal    Renal/:  Renal/ Normal     Hepatic/GI:   Liver Disease, Hepatitis, C    Musculoskeletal:   Arthritis     Neurological:  Neurology Normal    Endocrine:   Hypothyroidism    Dermatological:  Skin Normal    Psych:  Psychiatric Normal           Physical Exam  General:  Well nourished    Airway/Jaw/Neck:  Airway Findings: Mouth Opening: Normal Tongue: Normal  General Airway Assessment: Adult, Good        Eyes/Ears/Nose:  EYES/EARS/NOSE FINDINGS: Normal   Dental:  DENTAL FINDINGS: Normal   Chest/Lungs:  Chest/Lungs Findings: Clear to auscultation, Normal Respiratory Rate     Heart/Vascular:  Heart Findings: Rate: Normal  Rhythm: Regular Rhythm  Sounds: Normal  Heart murmur: negative Vascular Findings: Normal    Abdomen:  Abdomen Findings: Normal    Musculoskeletal:  Musculoskeletal Findings: Normal   Skin:  Skin Findings: Normal    Mental Status:  Mental Status Findings: Normal        Anesthesia Plan  Type of Anesthesia, risks & benefits discussed:  Anesthesia Type:  general  Patient's Preference:   Intra-op Monitoring Plan:   Intra-op Monitoring Plan Comments:   Post Op Pain Control Plan: per primary service following discharge from PACU  Post Op Pain Control Plan Comments:   Induction:   IV  Beta Blocker:  Patient is not currently on a Beta-Blocker (No further documentation required).       Informed Consent: Patient  understands risks and agrees with Anesthesia plan.  Questions answered. Anesthesia consent signed with patient.  ASA Score: 3     Day of Surgery Review of History & Physical:    H&P update referred to the provider.         Ready For Surgery From Anesthesia Perspective.

## 2017-06-01 NOTE — PLAN OF CARE
Patient awake, alert, and oriented.  No complaints of pain or discomfort at present time.  Patient refuses po fluids at present time.  Abdomen soft and nontender;    Dr. Vines spoke with patient prior to discharge;  Ambulates without difficulty;  All instructions given and reviewed with patient and family;  Stable for discharge to home accompanied by

## 2017-06-01 NOTE — DISCHARGE INSTRUCTIONS
Procedural Sedation (Adult)  You have been given medicine by vein to make you sleep during your surgery. This may have included both a pain medicine and sleeping medicine. Most of the effects have worn off. But you may still have some drowsiness for the next 6 to 8 hours.  Home care  Follow these guidelines when you get home:  · For the next 8 hours, you should be watched by a responsible adult. This person should make sure your condition is not getting worse.  · Don't take any medicine by mouth for pain or for sleep during the next 4 hours. These might react with the medicines you were given in the hospital. This could cause a much stronger response than usual.  · Don't drink any alcohol for the next 24 hours.  · Don't drive, operate dangerous machinery, or make important business or personal decisions during the next 24 hours.  Follow-up care  Follow up with your healthcare provider if you are not alert and back to your usual level of activity within 12 hours.  When to seek medical advice  Call your healthcare provider right away if any of these occur:  · Drowsiness gets worse  · Weakness or dizziness gets worse  · Repeated vomiting  · You cannot be awakened   Date Last Reviewed: 10/18/2016  © 2311-7404 [employer]. [address] [place] [address]. All rights reserved. This information is not intended as a substitute for professional medical care. Always follow your healthcare professional's instructions.        Discharge Instructions: After Your Surgery  Youve just had surgery. During surgery you were given medicine called anesthesia to keep you relaxed and free of pain. After surgery you may have some pain or nausea. This is common. Here are some tips for feeling better and getting well after surgery.     Stay on schedule with your medication.   Going home  Your doctor or nurse will show you how to take care of yourself when you go home. He or she will also answer your questions. Have  an adult family member or friend drive you home. For the first 24 hours after your surgery:  · Do not drive or use heavy equipment.  · Do not make important decisions or sign legal papers.  · Do not drink alcohol.  · Have someone stay with you, if needed. He or she can watch for problems and help keep you safe.  Be sure to go to all follow-up visits with your doctor. And rest after your surgery for as long as your doctor tells you to.  Coping with pain  If you have pain after surgery, pain medicine will help you feel better. Take it as told, before pain becomes severe. Also, ask your doctor or pharmacist about other ways to control pain. This might be with heat, ice, or relaxation. And follow any other instructions your surgeon or nurse gives you.  Tips for taking pain medicine  To get the best relief possible, remember these points:  · Pain medicines can upset your stomach. Taking them with a little food may help.  · Most pain relievers taken by mouth need at least 20 to 30 minutes to start to work.  · Taking medicine on a schedule can help you remember to take it. Try to time your medicine so that you can take it before starting an activity. This might be before you get dressed, go for a walk, or sit down for dinner.  · Constipation is a common side effect of pain medicines. Call your doctor before taking any medicines such as laxatives or stool softeners to help ease constipation. Also ask if you should skip any foods. Drinking lots of fluids and eating foods such as fruits and vegetables that are high in fiber can also help. Remember, do not take laxatives unless your surgeon has prescribed them.  · Drinking alcohol and taking pain medicine can cause dizziness and slow your breathing. It can even be deadly. Do not drink alcohol while taking pain medicine.  · Pain medicine can make you react more slowly to things. Do not drive or run machinery while taking pain medicine.  Your health care provider may tell you to  take acetaminophen to help ease your pain. Ask him or her how much you are supposed to take each day. Acetaminophen or other pain relievers may interact with your prescription medicines or other over-the-counter (OTC) drugs. Some prescription medicines have acetaminophen and other ingredients. Using both prescription and OTC acetaminophen for pain can cause you to overdose. Read the labels on your OTC medicines with care. This will help you to clearly know the list of ingredients, how much to take, and any warnings. It may also help you not take too much acetaminophen. If you have questions or do not understand the information, ask your pharmacist or health care provider to explain it to you before you take the OTC medicine.  Managing nausea  Some people have an upset stomach after surgery. This is often because of anesthesia, pain, or pain medicine, or the stress of surgery. These tips will help you handle nausea and eat healthy foods as you get better. If you were on a special food plan before surgery, ask your doctor if you should follow it while you get better. These tips may help:  · Do not push yourself to eat. Your body will tell you when to eat and how much.  · Start off with clear liquids and soup. They are easier to digest.  · Next try semi-solid foods, such as mashed potatoes, applesauce, and gelatin, as you feel ready.  · Slowly move to solid foods. Dont eat fatty, rich, or spicy foods at first.  · Do not force yourself to have 3 large meals a day. Instead eat smaller amounts more often.  · Take pain medicines with a small amount of solid food, such as crackers or toast, to avoid nausea.     Call your surgeon if  · You still have pain an hour after taking medicine. The medicine may not be strong enough.  · You feel too sleepy, dizzy, or groggy. The medicine may be too strong.  · You have side effects like nausea, vomiting, or skin changes, such as rash, itching, or hives.       If you have obstructive  sleep apnea  You were given anesthesia medicine during surgery to keep you comfortable and free of pain. After surgery, you may have more apnea spells because of this medicine and other medicines you were given. The spells may last longer than usual.   At home:  · Keep using the continuous positive airway pressure (CPAP) device when you sleep. Unless your health care provider tells you not to, use it when you sleep, day or night. CPAP is a common device used to treat obstructive sleep apnea.  · Talk with your provider before taking any pain medicine, muscle relaxants, or sedatives. Your provider will tell you about the possible dangers of taking these medicines.  Date Last Reviewed: 10/16/2014  © 3147-1991 [employer]. [address] [place] [address], [place] [address]. All rights reserved. This information is not intended as a substitute for professional medical care. Always follow your healthcare professional's instructions.        Eating a High-Fiber Diet  Fiber is what gives strength and structure to plants. Most grains, beans, vegetables, and fruits contain fiber. Foods rich in fiber are often low in calories and fat, and they fill you up more. They may also reduce your risks for certain health problems. To find out the amount of fiber in canned, packaged, or frozen foods, read the Nutrition Facts label. It tells you how much fiber is in a serving.    Types of fiber and their benefits  There are two types of fiber: insoluble and soluble. They both aid digestion and help you maintain a healthy weight.  · Insoluble fiber. This is found in whole grains, cereals, certain fruits and vegetables such as apple skin, corn, and carrots. Insoluble fiber may prevent constipation and reduce the risk for certain types of cancer.  · Soluble fiber. This type of fiber is in oats, beans, and certain fruits and vegetables such as strawberries and peas. Soluble fiber can reduce cholesterol, which may help lower the risk for  heart disease. It also helps control blood sugar levels.  Look for high-fiber foods  Try these foods to add fiber to your diet:  · Whole-grain breads and cereals. Try to eat 6 to 8 ounces a day. Include wheat and oat bran cereals, whole-wheat muffins or toast, and corn tortillas in your meals.  · Fruits. Try to eat 2 cups a day. Apples, oranges, strawberries, pears, and bananas are good sources. (Note: Fruit juice is low in fiber.)  · Vegetables. Try to eat at least 2.5 cups a day. Add asparagus, carrots, broccoli, peas, and corn to your meals.  · Beans. One cup of cooked lentils gives you over 15 grams of fiber. Try navy beans, lentils, and chickpeas.  · Seeds. A small handful of seeds gives you about 3 grams of fiber. Try sunflower seeds.  Keep track of your fiber  Keep track of how much fiber you eat. Start by reading food labels. Then eat a variety of foods high in fiber. As you begin to eat more fiber, ask your healthcare provider how much water you should be drinking to keep your digestive system working smoothly.  You should aim for a certain amount of fiber in your diet each day. If you are a woman, that amount is between 25 and 28 grams per day. Men should aim for 30 to 33 grams per day. After age 50, your daily fiber needs drop to 22 grams for women and 28 grams for men.  Before you reach for the fiber supplements, think about this. Fiber is found naturally in healthy whole foods. It gives you that feeling of fullness after you eat. Taking fiber supplements or eating fiber-enriched foods will not give you this full feeling.  Your fiber intake is a good measure for the quality of your overall diet. If you are missing out on your daily amount of fiber, you may be lacking other important nutrients as well.  Date Last Reviewed: 5/11/2015  © 8842-9310 [employer]. [address] [place] [address], [place] [address]. All rights reserved. This information is not intended as a substitute for professional  medical care. Always follow your healthcare professional's instructions.        Diverticulosis    Diverticulosis means that small pouches have formed in the wall of your large intestine (colon). Most often, this problem causes no symptoms and is common as people age. But the pouches in the colon are at risk of becoming infected. When this happens, the condition is called diverticulitis. Although most people with diverticulosis never develop diverticulitis, it is still not uncommon. Rectal bleeding can also occur and in less common situations, a type of colon inflammation called colitis.  While most people do not have symptoms, some people with diverticulosis may have:  · Abdominal cramps and pain  · Bloating  · Constipation  · Change in bowel habits  Causes  The exact cause of diverticulosis (and diverticulitis) has not been proved, but a few things are associated with the condition:  · Low-fiber diet  · Constipation  · Lack of exercise  Your healthcare provider will talk with you about how to manage your condition. Diet changes may be all that are needed to help control diverticulosis and prevent progression to diverticulitis. If you develop diverticulitis, you will likely need other treatments.  Home care  You may be told to take fiber supplements daily. Fiber adds bulk to the stool so that it passes through the colon more easily. Stool softeners may be recommended. You may also be given medications for pain relief. Be sure to take all medications as directed.  In the past, people were told to avoid corn, nuts, and seeds. This is no longer necessary.  Follow these guidelines when caring for yourself at home:  · Eat unprocessed foods that are high in fiber. Whole grains, fruits, and vegetables are good choices.  · Drink 6 to 8 glasses of water every day unless your healthcare provider has you limit how much fluid you should have.  · Watch for changes in your bowel movements. Tell your provider if you notice any  changes.  · Begin an exercise program. Ask your provider how to get started. Generally, walking is the best.  · Get plenty of rest and sleep.  Follow-up care  Follow up with your healthcare provider, or as advised. Regular visits may be needed to check on your health. Sometimes special procedures such as colonoscopy, are needed after an episode of diverticulitis or blooding. Be sure to keep all your appointments.  If a stool sample was taken, or cultures were done, you should be told if they are positive, or if your treatment needs to be changed. You can call as directed for the results.  If X-rays were done, a radiologist will look at them. You will be told if there is a change in your treatment.  If antibiotics were prescribed, be sure to finish them all.  When to seek medical advice  Call your healthcare provider right away if any of these occur:  · Fever of 100.4°F (38°C) or higher, or as directed by your healthcare provider  · Severe cramps in the lower left side of the abdomen or pain that is getting worse  · Tenderness in the lower left side of the abdomen or worsening pain throughout the abdomen  · Diarrhea or constipation that doesn't get better within 24 hours  · Nausea and vomiting  · Bleeding from the rectum  Call 911  Call emergency services if any of the following occur:  · Trouble breathing  · Confusion  · Very drowsy or trouble awakening  · Fainting or loss of consciousness  · Rapid heart rate  · Chest pain  Date Last Reviewed: 12/30/2015  © 3305-7237 [employer]. [address] [place] [address]. All rights reserved. This information is not intended as a substitute for professional medical care. Always follow your healthcare professional's instructions.        Understanding Colon and Rectal Polyps    The colon (also called the large intestine) is a muscular tube that forms the last part of the digestive tract. It absorbs water and stores food waste. The colon is about 4 to 6  feet long. The rectum is the last 6 inches of the colon. The colon and rectum have a smooth lining composed of millions of cells. Changes in these cells can lead to growths in the colon that can become cancerous and should be removed. Multiple tests are available to screen for colon cancer, but the colonoscopy is the most recommended test. During colonoscopy, these polyps can be removed. How often you need this test depends on many things including your condition, your family history, symptoms, and what the findings were at the previous colonoscopy.   When the colon lining changes  Changes that happen in the cells that line the colon or rectum can lead to growths called polyps. Over a period of years, polyps can turn cancerous. Removing polyps early may prevent cancer from ever forming.  Polyps  Polyps are fleshy clumps of tissue that form on the lining of the colon or rectum. Small polyps are usually benign (not cancerous). However, over time, cells in a polyp can change and become cancerous. Certain types of polyps known as adenomatous polyps are premalignant. The risk for invasive cancer increases with the size of the polyp and certain cell and gene features. This means that they can become cancerous if they're not removed. Hyperplastic polyps are benign. They can grow quite large and not turn cancerous.   Cancer  Almost all colorectal cancers start when polyp cells begin growing abnormally. As a cancerous tumor grows, it may involve more and more of the colon or rectum. In time, cancer can also grow beyond the colon or rectum and spread to nearby organs or to glands called lymph nodes. The cells can also travel to other parts of the body. This is known as metastasis. The earlier a cancerous tumor is removed, the better the chance of preventing its spread.    Date Last Reviewed: 8/1/2016  © 8502-1005 [employer]. [address] [place] [address], [place] [address]. All rights reserved. This information is not  intended as a substitute for professional medical care. Always follow your healthcare professional's instructions.

## 2017-06-01 NOTE — ANESTHESIA POSTPROCEDURE EVALUATION
"Anesthesia Post Evaluation    Patient: Yuniel Gracia    Procedure(s) Performed: Procedure(s) (LRB):  COLONOSCOPY (N/A)    Final Anesthesia Type: general  Patient location during evaluation: PACU  Patient participation: Yes- Able to Participate  Level of consciousness: awake and alert  Post-procedure vital signs: reviewed and stable  Pain management: adequate  Airway patency: patent  PONV status at discharge: No PONV  Anesthetic complications: no      Cardiovascular status: hemodynamically stable  Respiratory status: unassisted and room air  Hydration status: euvolemic  Follow-up not needed.        Visit Vitals  BP (!) 83/52   Pulse (!) 54   Temp 36.1 °C (97 °F) (Oral)   Resp 10   Ht 5' 4" (1.626 m)   Wt 65.8 kg (145 lb)   LMP 03/02/1998   SpO2 100%   Breastfeeding? No   BMI 24.89 kg/m²       Pain/Eduar Score: Pain Assessment Performed: Yes (6/1/2017 11:57 AM)      "

## 2017-06-02 VITALS
DIASTOLIC BLOOD PRESSURE: 57 MMHG | OXYGEN SATURATION: 100 % | RESPIRATION RATE: 18 BRPM | HEART RATE: 54 BPM | HEIGHT: 64 IN | WEIGHT: 145 LBS | TEMPERATURE: 98 F | BODY MASS INDEX: 24.75 KG/M2 | SYSTOLIC BLOOD PRESSURE: 116 MMHG

## 2017-06-06 ENCOUNTER — TELEPHONE (OUTPATIENT)
Dept: GASTROENTEROLOGY | Facility: CLINIC | Age: 72
End: 2017-06-06

## 2017-06-09 RX ORDER — SERTRALINE HYDROCHLORIDE 100 MG/1
TABLET, FILM COATED ORAL
Qty: 90 TABLET | Refills: 1 | Status: SHIPPED | OUTPATIENT
Start: 2017-06-09 | End: 2018-04-07 | Stop reason: SDUPTHER

## 2017-06-16 DIAGNOSIS — F19.982 DRUG INDUCED INSOMNIA: Primary | ICD-10-CM

## 2017-06-16 RX ORDER — TEMAZEPAM 15 MG/1
CAPSULE ORAL
Qty: 60 CAPSULE | Refills: 0 | Status: SHIPPED | OUTPATIENT
Start: 2017-06-16 | End: 2018-01-16 | Stop reason: SDUPTHER

## 2017-08-17 DIAGNOSIS — M25.562 PAIN IN LEFT KNEE: Primary | ICD-10-CM

## 2017-08-17 DIAGNOSIS — M17.12 UNILATERAL PRIMARY OSTEOARTHRITIS, LEFT KNEE: ICD-10-CM

## 2017-08-17 DIAGNOSIS — S83.272A COMPLEX TEAR OF LATERAL MENISCUS, CURRENT INJURY, LEFT KNEE, INITIAL ENCOUNTER: ICD-10-CM

## 2017-08-19 ENCOUNTER — HOSPITAL ENCOUNTER (OUTPATIENT)
Dept: RADIOLOGY | Facility: HOSPITAL | Age: 72
Discharge: HOME | End: 2017-08-19
Payer: MEDICARE

## 2017-08-19 DIAGNOSIS — M25.562 PAIN IN LEFT KNEE: ICD-10-CM

## 2017-08-19 DIAGNOSIS — S83.272A COMPLEX TEAR OF LATERAL MENISCUS, CURRENT INJURY, LEFT KNEE, INITIAL ENCOUNTER: ICD-10-CM

## 2017-08-19 DIAGNOSIS — M17.12 UNILATERAL PRIMARY OSTEOARTHRITIS, LEFT KNEE: ICD-10-CM

## 2017-08-19 PROCEDURE — 73721 MRI JNT OF LWR EXTRE W/O DYE: CPT | Mod: TC,LT

## 2017-08-19 PROCEDURE — 73721 MRI JNT OF LWR EXTRE W/O DYE: CPT | Mod: 26,LT,, | Performed by: RADIOLOGY

## 2017-08-31 NOTE — NURSING
Total Joint Replacement Questionnaire     Yuniel Gracia participated in Joint Class April 13th.    1. Have you ever had a Joint Replacement?   []Yes  [x] No    2. How many stairs/steps do you have to enter your home? Curb step  When going up, on what side is the railing?  [] Left  [] Right  [] Bilateral  [x] None    3. Do you own any Durable Medical Equipment?   [x] Rolling Walker  [] Standard Walker  [] Rollator  [] Cane  [] Crutches  [x] Bed Side Commode  [] Hip Kit  [] Tub Transfer Bench  [] Shower Chair     4. Have you used a Home Health Company before?   [] Yes  [x] No  If Yes, Name of Company: [employer]  Would you like to use this company again?   [] Yes  [] No  [x] Would you like to use your physician's preference?  [x] Yes  [] No    5. Have you arranged for someone to help you, for at least for 3 days, when you are discharged from the hospital?  [x] Yes  [] No  If Yes, Name(s) of person assisting: spouse,Lul Flowersannetta Pabon  8/31/2017

## 2017-09-15 ENCOUNTER — HOSPITAL ENCOUNTER (OUTPATIENT)
Dept: PREADMISSION TESTING | Facility: OTHER | Age: 72
Discharge: HOME | End: 2017-09-15
Attending: ORTHOPAEDIC SURGERY
Payer: MEDICARE

## 2017-09-15 ENCOUNTER — ANESTHESIA EVENT (OUTPATIENT)
Dept: SURGERY | Facility: OTHER | Age: 72
DRG: 470 | End: 2017-09-15
Payer: MEDICARE

## 2017-09-15 VITALS
SYSTOLIC BLOOD PRESSURE: 88 MMHG | WEIGHT: 150 LBS | HEIGHT: 64 IN | OXYGEN SATURATION: 98 % | BODY MASS INDEX: 25.61 KG/M2 | TEMPERATURE: 99 F | HEART RATE: 52 BPM | DIASTOLIC BLOOD PRESSURE: 47 MMHG

## 2017-09-15 DIAGNOSIS — M17.12 PRIMARY OSTEOARTHRITIS OF LEFT KNEE: Primary | ICD-10-CM

## 2017-09-15 DIAGNOSIS — M17.12 OSTEOARTHRITIS OF LEFT KNEE, UNSPECIFIED OSTEOARTHRITIS TYPE: ICD-10-CM

## 2017-09-15 DIAGNOSIS — Z01.818 PREOPERATIVE TESTING: ICD-10-CM

## 2017-09-15 PROCEDURE — 93010 ELECTROCARDIOGRAM REPORT: CPT | Mod: ,,, | Performed by: INTERNAL MEDICINE

## 2017-09-15 PROCEDURE — 36415 COLL VENOUS BLD VENIPUNCTURE: CPT

## 2017-09-15 PROCEDURE — 93005 ELECTROCARDIOGRAM TRACING: CPT

## 2017-09-15 RX ORDER — MIDAZOLAM HYDROCHLORIDE 5 MG/ML
4 INJECTION INTRAMUSCULAR; INTRAVENOUS
Status: CANCELLED | OUTPATIENT
Start: 2017-09-15

## 2017-09-15 RX ORDER — LIDOCAINE HYDROCHLORIDE 10 MG/ML
1 INJECTION, SOLUTION EPIDURAL; INFILTRATION; INTRACAUDAL; PERINEURAL ONCE
Status: CANCELLED | OUTPATIENT
Start: 2017-09-15 | End: 2017-09-15

## 2017-09-15 RX ORDER — SODIUM CHLORIDE, SODIUM LACTATE, POTASSIUM CHLORIDE, CALCIUM CHLORIDE 600; 310; 30; 20 MG/100ML; MG/100ML; MG/100ML; MG/100ML
INJECTION, SOLUTION INTRAVENOUS CONTINUOUS
Status: CANCELLED | OUTPATIENT
Start: 2017-09-15

## 2017-09-15 RX ORDER — OXYCODONE HYDROCHLORIDE 5 MG/1
5 TABLET ORAL ONCE AS NEEDED
Status: CANCELLED | OUTPATIENT
Start: 2017-09-15 | End: 2017-09-15

## 2017-09-15 RX ORDER — TRAZODONE HYDROCHLORIDE 50 MG/1
50 TABLET ORAL NIGHTLY
COMMUNITY
End: 2017-12-12 | Stop reason: ALTCHOICE

## 2017-09-15 RX ORDER — FAMOTIDINE 20 MG/1
20 TABLET, FILM COATED ORAL
Status: CANCELLED | OUTPATIENT
Start: 2017-09-15 | End: 2017-09-15

## 2017-09-15 NOTE — ANESTHESIA PREPROCEDURE EVALUATION
09/15/2017  Yuniel Gracia is a 72 y.o., female.    Anesthesia Evaluation    I have reviewed the Patient Summary Reports.    I have reviewed the Nursing Notes.   I have reviewed the Medications.     Review of Systems  Anesthesia Hx:  No problems with previous Anesthesia    Social:  Non-Smoker, Social Alcohol Use    Hematology/Oncology:  Hematology Normal       -- Cancer in past history:  Breast right axillary node dissection no lymphedema surgery    EENT/Dental:EENT/Dental Normal   Cardiovascular:  Cardiovascular Normal Exercise tolerance: good     Pulmonary:  Pulmonary Normal    Renal/:  Renal/ Normal     Hepatic/GI:   Liver Disease, Hepatitis, C    Musculoskeletal:   Arthritis     Neurological:  Neurology Normal    Endocrine:   Hypothyroidism    Dermatological:  Skin Normal    Psych:   anxiety Panic attacks.         Physical Exam  General:  Well nourished    Airway/Jaw/Neck:  Airway Findings: Mouth Opening: Normal Tongue: Normal  General Airway Assessment: Adult, Good  Mallampati: II  TM Distance: Normal, at least 6 cm        Eyes/Ears/Nose:  EYES/EARS/NOSE FINDINGS: Normal   Dental:  DENTAL FINDINGS: Normal   Chest/Lungs:  Chest/Lungs Findings: Clear to auscultation, Normal Respiratory Rate     Heart/Vascular:  Heart Findings: Rate: Normal  Rhythm: Regular Rhythm  Sounds: Normal  Heart murmur: negative Vascular Findings: Normal    Abdomen:  Abdomen Findings: Normal    Musculoskeletal:  Musculoskeletal Findings: Normal   Skin:  Skin Findings: Normal    Mental Status:  Mental Status Findings: Normal        Anesthesia Plan  Type of Anesthesia, risks & benefits discussed:  Anesthesia Type:  spinal  Patient's Preference:   Intra-op Monitoring Plan: standard ASA monitors  Intra-op Monitoring Plan Comments:   Post Op Pain Control Plan:   Post Op Pain Control Plan Comments:   Induction:    Beta Blocker:   Patient is not currently on a Beta-Blocker (No further documentation required).       Informed Consent: Patient understands risks and agrees with Anesthesia plan.  Questions answered. Anesthesia consent signed with patient.  ASA Score: 3     Day of Surgery Review of History & Physical:    H&P update referred to the provider.     Anesthesia Plan Notes: Cleared by . Labs ordered.        Ready For Surgery From Anesthesia Perspective.

## 2017-09-15 NOTE — DISCHARGE INSTRUCTIONS
PRE-ADMIT TESTING -  [phone]    [address]  [facility]        OUTPATIENT SURGERY UNIT - [phone]    Your surgery has been scheduled at [facility]. We are pleased to have the opportunity to serve you. For Further Information please call [phone].    On the day of surgery please report to the Information Desk on the 1st floor.    · CONTACT YOUR PHYSICIAN'S OFFICE THE DAY PRIOR TO YOUR SURGERY TO OBTAIN YOUR ARRIVAL TIME.     · The evening before surgery do not eat anything after 9 p.m. ( this includes hard candy, chewing gum and mints).  You may only have GATORADE, POWERADE AND WATER  from 9 p.m. until you leave your home.   DO NOT DRINK ANY LIQUIDS ON THE WAY TO THE HOSPITAL.      SPECIAL MEDICATION INSTRUCTIONS: TAKE medications checked off by the Anesthesiologist on your Medication List.    Angiogram Patients: Take medications as instructed by your physician, including aspirin.     Surgery Patients:    If you take ASPIRIN - Your PHYSICIAN/SURGEON will need to inform you IF/OR when you need to stop taking aspirin prior to your surgery.     Do Not take any medications containing IBUPROFEN.  Do Not Wear any make-up or dark nail polish   (especially eye make-up) to surgery. If you come to surgery with makeup on you will be required to remove the makeup or nail polish.    Do not shave your surgical area at least 5 days prior to your surgery. The surgical prep will be performed at the hospital according to Infection Control regulations.    Leave all valuables at home.   Do Not wear any jewelry or watches, including any metal in body piercings.  Contact Lens must be removed before surgery. Either do not wear the contact lens or bring a case and solution for storage.  Please bring a container for eyeglasses or dentures as required.  Bring any paperwork your physician has provided, such as consent forms,  history and physicals, doctor's orders, etc.   Bring comfortable clothes  that are loose fitting to wear upon discharge. Take into consideration the type of surgery being performed.  Maintain your diet as advised per your physician the day prior to surgery.      Adequate rest the night before surgery is advised.   Park in the Parking lot behind the hospital or in the [facility] across the street from the parking lot. Parking is complimentary.  If you will be discharged the same day as your procedure, please arrange for a responsible adult to drive you home or to accompany you if traveling by taxi.   YOU WILL NOT BE PERMITTED TO DRIVE OR TO LEAVE THE HOSPITAL ALONE AFTER SURGERY.   It is strongly recommended that you arrange for someone to remain with you for the first 24 hrs following your surgery.       Thank you for your cooperation.  The Staff of [facility].        Bathing Instructions                                                                 Please shower the evening before and morning of your procedure with    ANTIBACTERIAL SOAP. ( DIAL, etc )  Concentrate on the surgical area   for at least 3 minutes and rinse completely. Dry off as usual.   Do not use any deodorant, powder, body lotions, perfume, after shave or    cologne.

## 2017-09-18 ENCOUNTER — HOSPITAL ENCOUNTER (OUTPATIENT)
Dept: PREADMISSION TESTING | Facility: OTHER | Age: 72
Discharge: HOME | End: 2017-09-18
Attending: ORTHOPAEDIC SURGERY
Payer: MEDICARE

## 2017-09-18 DIAGNOSIS — M17.12 OSTEOARTHRITIS OF LEFT KNEE, UNSPECIFIED OSTEOARTHRITIS TYPE: ICD-10-CM

## 2017-09-18 LAB
ABO + RH BLD: NORMAL
ANION GAP SERPL CALC-SCNC: 7 MMOL/L
BACTERIA #/AREA URNS HPF: ABNORMAL /HPF
BILIRUB UR QL STRIP: NEGATIVE
BLD GP AB SCN CELLS X3 SERPL QL: NORMAL
BUN SERPL-MCNC: 20 MG/DL
CALCIUM SERPL-MCNC: 9.3 MG/DL
CHLORIDE SERPL-SCNC: 105 MMOL/L
CLARITY UR: CLEAR
CO2 SERPL-SCNC: 28 MMOL/L
COLOR UR: YELLOW
CREAT SERPL-MCNC: 0.8 MG/DL
EST. GFR  (AFRICAN AMERICAN): >60 ML/MIN/1.73 M^2
EST. GFR  (NON AFRICAN AMERICAN): >60 ML/MIN/1.73 M^2
GLUCOSE SERPL-MCNC: 97 MG/DL
GLUCOSE UR QL STRIP: NEGATIVE
HCT VFR BLD AUTO: 38.5 %
HGB BLD-MCNC: 12.9 G/DL
HGB UR QL STRIP: ABNORMAL
KETONES UR QL STRIP: NEGATIVE
LEUKOCYTE ESTERASE UR QL STRIP: ABNORMAL
MICROSCOPIC COMMENT: ABNORMAL
NITRITE UR QL STRIP: NEGATIVE
PH UR STRIP: 8 [PH] (ref 5–8)
POTASSIUM SERPL-SCNC: 4.1 MMOL/L
PROT UR QL STRIP: NEGATIVE
RBC #/AREA URNS HPF: 3 /HPF (ref 0–4)
SODIUM SERPL-SCNC: 140 MMOL/L
SP GR UR STRIP: 1.01 (ref 1–1.03)
SQUAMOUS #/AREA URNS HPF: 1 /HPF
URN SPEC COLLECT METH UR: ABNORMAL
UROBILINOGEN UR STRIP-ACNC: NEGATIVE EU/DL
WBC #/AREA URNS HPF: 37 /HPF (ref 0–5)
WBC CLUMPS URNS QL MICRO: ABNORMAL

## 2017-09-18 PROCEDURE — 36415 COLL VENOUS BLD VENIPUNCTURE: CPT

## 2017-09-18 PROCEDURE — 81000 URINALYSIS NONAUTO W/SCOPE: CPT

## 2017-09-18 PROCEDURE — 86900 BLOOD TYPING SEROLOGIC ABO: CPT

## 2017-09-18 PROCEDURE — 80048 BASIC METABOLIC PNL TOTAL CA: CPT

## 2017-09-18 PROCEDURE — 85014 HEMATOCRIT: CPT

## 2017-09-18 PROCEDURE — 87086 URINE CULTURE/COLONY COUNT: CPT

## 2017-09-18 PROCEDURE — 86901 BLOOD TYPING SEROLOGIC RH(D): CPT

## 2017-09-18 PROCEDURE — 85018 HEMOGLOBIN: CPT

## 2017-09-18 NOTE — PLAN OF CARE
09/18/17 1654   ED Admissions Case Approval   ED Admissions Case Approval CM Approved  (IP )

## 2017-09-19 LAB
BACTERIA UR CULT: NORMAL
BACTERIA UR CULT: NORMAL

## 2017-09-19 NOTE — PRE ADMISSION SCREENING
Message left for dr.millet naomi's office regarding u/a elevated urine wbc.,u/a culture report faxed to ,message left for naomi.

## 2017-09-25 ENCOUNTER — ANESTHESIA (OUTPATIENT)
Dept: SURGERY | Facility: OTHER | Age: 72
DRG: 470 | End: 2017-09-25
Payer: MEDICARE

## 2017-09-25 ENCOUNTER — HOSPITAL ENCOUNTER (INPATIENT)
Facility: OTHER | Age: 72
LOS: 1 days | Discharge: HOME HEALTH | DRG: 470 | End: 2017-09-26
Attending: ORTHOPAEDIC SURGERY | Admitting: ORTHOPAEDIC SURGERY
Payer: MEDICARE

## 2017-09-25 DIAGNOSIS — M17.12 PRIMARY OSTEOARTHRITIS OF LEFT KNEE: Primary | ICD-10-CM

## 2017-09-25 DIAGNOSIS — M17.12 OSTEOARTHRITIS OF LEFT KNEE, UNSPECIFIED OSTEOARTHRITIS TYPE: ICD-10-CM

## 2017-09-25 PROCEDURE — 63600175 PHARM REV CODE 636 W HCPCS

## 2017-09-25 PROCEDURE — 97161 PT EVAL LOW COMPLEX 20 MIN: CPT

## 2017-09-25 PROCEDURE — C9290 INJ, BUPIVACAINE LIPOSOME: HCPCS | Performed by: ORTHOPAEDIC SURGERY

## 2017-09-25 PROCEDURE — 94799 UNLISTED PULMONARY SVC/PX: CPT

## 2017-09-25 PROCEDURE — 63600175 PHARM REV CODE 636 W HCPCS: Performed by: ORTHOPAEDIC SURGERY

## 2017-09-25 PROCEDURE — 71000033 HC RECOVERY, INTIAL HOUR: Performed by: ORTHOPAEDIC SURGERY

## 2017-09-25 PROCEDURE — 25000003 PHARM REV CODE 250

## 2017-09-25 PROCEDURE — 27201423 OPTIME MED/SURG SUP & DEVICES STERILE SUPPLY: Performed by: ORTHOPAEDIC SURGERY

## 2017-09-25 PROCEDURE — 37000008 HC ANESTHESIA 1ST 15 MINUTES: Performed by: ORTHOPAEDIC SURGERY

## 2017-09-25 PROCEDURE — C1729 CATH, DRAINAGE: HCPCS | Performed by: ORTHOPAEDIC SURGERY

## 2017-09-25 PROCEDURE — 63600175 PHARM REV CODE 636 W HCPCS: Performed by: SPECIALIST

## 2017-09-25 PROCEDURE — 63600175 PHARM REV CODE 636 W HCPCS: Performed by: ANESTHESIOLOGY

## 2017-09-25 PROCEDURE — 36000711: Performed by: ORTHOPAEDIC SURGERY

## 2017-09-25 PROCEDURE — 25000003 PHARM REV CODE 250: Performed by: SPECIALIST

## 2017-09-25 PROCEDURE — 97116 GAIT TRAINING THERAPY: CPT

## 2017-09-25 PROCEDURE — 63600175 PHARM REV CODE 636 W HCPCS: Performed by: NURSE ANESTHETIST, CERTIFIED REGISTERED

## 2017-09-25 PROCEDURE — C1713 ANCHOR/SCREW BN/BN,TIS/BN: HCPCS | Performed by: ORTHOPAEDIC SURGERY

## 2017-09-25 PROCEDURE — C1776 JOINT DEVICE (IMPLANTABLE): HCPCS | Performed by: ORTHOPAEDIC SURGERY

## 2017-09-25 PROCEDURE — 25000003 PHARM REV CODE 250: Performed by: NURSE ANESTHETIST, CERTIFIED REGISTERED

## 2017-09-25 PROCEDURE — 36000710: Performed by: ORTHOPAEDIC SURGERY

## 2017-09-25 PROCEDURE — 37000009 HC ANESTHESIA EA ADD 15 MINS: Performed by: ORTHOPAEDIC SURGERY

## 2017-09-25 PROCEDURE — 97110 THERAPEUTIC EXERCISES: CPT

## 2017-09-25 PROCEDURE — 25000003 PHARM REV CODE 250: Performed by: NURSE PRACTITIONER

## 2017-09-25 PROCEDURE — 71000039 HC RECOVERY, EACH ADD'L HOUR: Performed by: ORTHOPAEDIC SURGERY

## 2017-09-25 PROCEDURE — 11000001 HC ACUTE MED/SURG PRIVATE ROOM

## 2017-09-25 PROCEDURE — 0SRD0J9 REPLACEMENT OF LEFT KNEE JOINT WITH SYNTHETIC SUBSTITUTE, CEMENTED, OPEN APPROACH: ICD-10-PCS | Performed by: ORTHOPAEDIC SURGERY

## 2017-09-25 PROCEDURE — 25000003 PHARM REV CODE 250: Performed by: ORTHOPAEDIC SURGERY

## 2017-09-25 DEVICE — CEMENT BONE RDPQ 40G PDR 20ML: Type: IMPLANTABLE DEVICE | Site: KNEE | Status: FUNCTIONAL

## 2017-09-25 DEVICE — IMPLANTABLE DEVICE: Type: IMPLANTABLE DEVICE | Site: KNEE | Status: FUNCTIONAL

## 2017-09-25 DEVICE — PATELLA XPE LARGE 35MM: Type: IMPLANTABLE DEVICE | Site: KNEE | Status: FUNCTIONAL

## 2017-09-25 DEVICE — TIBIAL BASEPLATE CEMENTED #3: Type: IMPLANTABLE DEVICE | Site: KNEE | Status: FUNCTIONAL

## 2017-09-25 RX ORDER — ONDANSETRON 2 MG/ML
4 INJECTION INTRAMUSCULAR; INTRAVENOUS EVERY 12 HOURS PRN
Status: DISCONTINUED | OUTPATIENT
Start: 2017-09-25 | End: 2017-09-26 | Stop reason: HOSPADM

## 2017-09-25 RX ORDER — ASPIRIN 325 MG
325 TABLET ORAL EVERY 12 HOURS
Status: DISCONTINUED | OUTPATIENT
Start: 2017-09-26 | End: 2017-09-26 | Stop reason: HOSPADM

## 2017-09-25 RX ORDER — DEXTROSE MONOHYDRATE AND SODIUM CHLORIDE 5; .9 G/100ML; G/100ML
INJECTION, SOLUTION INTRAVENOUS CONTINUOUS
Status: DISCONTINUED | OUTPATIENT
Start: 2017-09-25 | End: 2017-09-26 | Stop reason: HOSPADM

## 2017-09-25 RX ORDER — MIDAZOLAM HYDROCHLORIDE 5 MG/ML
4 INJECTION INTRAMUSCULAR; INTRAVENOUS
Status: DISCONTINUED | OUTPATIENT
Start: 2017-09-25 | End: 2017-09-25 | Stop reason: HOSPADM

## 2017-09-25 RX ORDER — SERTRALINE HYDROCHLORIDE 50 MG/1
100 TABLET, FILM COATED ORAL DAILY
Status: DISCONTINUED | OUTPATIENT
Start: 2017-09-26 | End: 2017-09-26 | Stop reason: HOSPADM

## 2017-09-25 RX ORDER — POLYETHYLENE GLYCOL 3350 17 G/17G
17 POWDER, FOR SOLUTION ORAL DAILY
Status: DISCONTINUED | OUTPATIENT
Start: 2017-09-26 | End: 2017-09-26 | Stop reason: HOSPADM

## 2017-09-25 RX ORDER — CEFAZOLIN SODIUM 2 G/50ML
2 SOLUTION INTRAVENOUS
Status: COMPLETED | OUTPATIENT
Start: 2017-09-25 | End: 2017-09-26

## 2017-09-25 RX ORDER — MUPIROCIN 20 MG/G
1 OINTMENT TOPICAL 2 TIMES DAILY
Status: DISCONTINUED | OUTPATIENT
Start: 2017-09-25 | End: 2017-09-26 | Stop reason: HOSPADM

## 2017-09-25 RX ORDER — ROPIVACAINE HYDROCHLORIDE 5 MG/ML
INJECTION, SOLUTION EPIDURAL; INFILTRATION; PERINEURAL
Status: DISCONTINUED | OUTPATIENT
Start: 2017-09-25 | End: 2017-09-25

## 2017-09-25 RX ORDER — DIPHENHYDRAMINE HYDROCHLORIDE 50 MG/ML
25 INJECTION INTRAMUSCULAR; INTRAVENOUS EVERY 8 HOURS PRN
Status: DISCONTINUED | OUTPATIENT
Start: 2017-09-25 | End: 2017-09-26 | Stop reason: HOSPADM

## 2017-09-25 RX ORDER — HYDROMORPHONE HYDROCHLORIDE 2 MG/ML
0.4 INJECTION, SOLUTION INTRAMUSCULAR; INTRAVENOUS; SUBCUTANEOUS EVERY 5 MIN PRN
Status: DISCONTINUED | OUTPATIENT
Start: 2017-09-25 | End: 2017-09-25 | Stop reason: HOSPADM

## 2017-09-25 RX ORDER — OXYCODONE HYDROCHLORIDE 5 MG/1
5 TABLET ORAL
Status: DISCONTINUED | OUTPATIENT
Start: 2017-09-25 | End: 2017-09-25 | Stop reason: HOSPADM

## 2017-09-25 RX ORDER — LIDOCAINE HCL/PF 100 MG/5ML
SYRINGE (ML) INTRAVENOUS
Status: DISCONTINUED | OUTPATIENT
Start: 2017-09-25 | End: 2017-09-25

## 2017-09-25 RX ORDER — GLYCOPYRROLATE 0.2 MG/ML
INJECTION INTRAMUSCULAR; INTRAVENOUS
Status: DISCONTINUED | OUTPATIENT
Start: 2017-09-25 | End: 2017-09-25

## 2017-09-25 RX ORDER — BUPIVACAINE HYDROCHLORIDE 2.5 MG/ML
INJECTION, SOLUTION EPIDURAL; INFILTRATION; INTRACAUDAL
Status: DISCONTINUED | OUTPATIENT
Start: 2017-09-25 | End: 2017-09-25 | Stop reason: HOSPADM

## 2017-09-25 RX ORDER — ANASTROZOLE 1 MG/1
1 TABLET ORAL DAILY
Status: DISCONTINUED | OUTPATIENT
Start: 2017-09-26 | End: 2017-09-26 | Stop reason: HOSPADM

## 2017-09-25 RX ORDER — DORZOLAMIDE HYDROCHLORIDE AND TIMOLOL MALEATE 20; 5 MG/ML; MG/ML
1 SOLUTION/ DROPS OPHTHALMIC 2 TIMES DAILY
Status: DISCONTINUED | OUTPATIENT
Start: 2017-09-25 | End: 2017-09-26 | Stop reason: HOSPADM

## 2017-09-25 RX ORDER — LIDOCAINE HYDROCHLORIDE 10 MG/ML
1 INJECTION, SOLUTION EPIDURAL; INFILTRATION; INTRACAUDAL; PERINEURAL ONCE
Status: DISCONTINUED | OUTPATIENT
Start: 2017-09-25 | End: 2017-09-25 | Stop reason: HOSPADM

## 2017-09-25 RX ORDER — OXYCODONE HYDROCHLORIDE 5 MG/1
5 TABLET ORAL EVERY 4 HOURS PRN
Status: DISCONTINUED | OUTPATIENT
Start: 2017-09-25 | End: 2017-09-26 | Stop reason: HOSPADM

## 2017-09-25 RX ORDER — OXYCODONE HYDROCHLORIDE 5 MG/1
10 TABLET ORAL EVERY 4 HOURS PRN
Status: DISCONTINUED | OUTPATIENT
Start: 2017-09-25 | End: 2017-09-26 | Stop reason: HOSPADM

## 2017-09-25 RX ORDER — FAMOTIDINE 20 MG/1
20 TABLET, FILM COATED ORAL 2 TIMES DAILY
Status: DISCONTINUED | OUTPATIENT
Start: 2017-09-25 | End: 2017-09-26 | Stop reason: HOSPADM

## 2017-09-25 RX ORDER — MEPERIDINE HYDROCHLORIDE 50 MG/ML
12.5 INJECTION INTRAMUSCULAR; INTRAVENOUS; SUBCUTANEOUS ONCE AS NEEDED
Status: DISCONTINUED | OUTPATIENT
Start: 2017-09-25 | End: 2017-09-25 | Stop reason: HOSPADM

## 2017-09-25 RX ORDER — KETOROLAC TROMETHAMINE 30 MG/ML
INJECTION, SOLUTION INTRAMUSCULAR; INTRAVENOUS
Status: DISCONTINUED | OUTPATIENT
Start: 2017-09-25 | End: 2017-09-25 | Stop reason: HOSPADM

## 2017-09-25 RX ORDER — PRAVASTATIN SODIUM 20 MG/1
40 TABLET ORAL DAILY
Status: DISCONTINUED | OUTPATIENT
Start: 2017-09-25 | End: 2017-09-26 | Stop reason: HOSPADM

## 2017-09-25 RX ORDER — OXYCODONE HYDROCHLORIDE 5 MG/1
5 TABLET ORAL ONCE AS NEEDED
Status: DISCONTINUED | OUTPATIENT
Start: 2017-09-25 | End: 2017-09-25 | Stop reason: SDUPTHER

## 2017-09-25 RX ORDER — ONDANSETRON 2 MG/ML
4 INJECTION INTRAMUSCULAR; INTRAVENOUS DAILY PRN
Status: DISCONTINUED | OUTPATIENT
Start: 2017-09-25 | End: 2017-09-25 | Stop reason: HOSPADM

## 2017-09-25 RX ORDER — ACETAMINOPHEN 10 MG/ML
1000 INJECTION, SOLUTION INTRAVENOUS EVERY 8 HOURS
Status: DISCONTINUED | OUTPATIENT
Start: 2017-09-25 | End: 2017-09-26 | Stop reason: HOSPADM

## 2017-09-25 RX ORDER — FAMOTIDINE 20 MG/1
20 TABLET, FILM COATED ORAL
Status: COMPLETED | OUTPATIENT
Start: 2017-09-25 | End: 2017-09-25

## 2017-09-25 RX ORDER — PROPOFOL 10 MG/ML
VIAL (ML) INTRAVENOUS CONTINUOUS PRN
Status: DISCONTINUED | OUTPATIENT
Start: 2017-09-25 | End: 2017-09-25

## 2017-09-25 RX ORDER — PROPOFOL 10 MG/ML
VIAL (ML) INTRAVENOUS
Status: DISCONTINUED | OUTPATIENT
Start: 2017-09-25 | End: 2017-09-25

## 2017-09-25 RX ORDER — CEFAZOLIN SODIUM 2 G/50ML
2 SOLUTION INTRAVENOUS
Status: COMPLETED | OUTPATIENT
Start: 2017-09-25 | End: 2017-09-25

## 2017-09-25 RX ORDER — FENTANYL CITRATE 50 UG/ML
25 INJECTION, SOLUTION INTRAMUSCULAR; INTRAVENOUS EVERY 5 MIN PRN
Status: DISCONTINUED | OUTPATIENT
Start: 2017-09-25 | End: 2017-09-25 | Stop reason: HOSPADM

## 2017-09-25 RX ORDER — HYDROMORPHONE HYDROCHLORIDE 1 MG/ML
0.5 INJECTION, SOLUTION INTRAMUSCULAR; INTRAVENOUS; SUBCUTANEOUS EVERY 4 HOURS PRN
Status: DISCONTINUED | OUTPATIENT
Start: 2017-09-25 | End: 2017-09-26 | Stop reason: HOSPADM

## 2017-09-25 RX ORDER — MIDAZOLAM HYDROCHLORIDE 1 MG/ML
INJECTION INTRAMUSCULAR; INTRAVENOUS
Status: DISCONTINUED | OUTPATIENT
Start: 2017-09-25 | End: 2017-09-25

## 2017-09-25 RX ORDER — TRAZODONE HYDROCHLORIDE 50 MG/1
50 TABLET ORAL NIGHTLY
Status: DISCONTINUED | OUTPATIENT
Start: 2017-09-25 | End: 2017-09-26 | Stop reason: HOSPADM

## 2017-09-25 RX ORDER — DOCUSATE SODIUM 100 MG/1
100 CAPSULE, LIQUID FILLED ORAL EVERY 12 HOURS
Status: DISCONTINUED | OUTPATIENT
Start: 2017-09-25 | End: 2017-09-26 | Stop reason: HOSPADM

## 2017-09-25 RX ORDER — BACITRACIN 50000 [IU]/1
INJECTION, POWDER, FOR SOLUTION INTRAMUSCULAR
Status: DISCONTINUED | OUTPATIENT
Start: 2017-09-25 | End: 2017-09-25 | Stop reason: HOSPADM

## 2017-09-25 RX ORDER — SOLIFENACIN SUCCINATE 5 MG/1
5 TABLET, FILM COATED ORAL DAILY
Status: DISCONTINUED | OUTPATIENT
Start: 2017-09-26 | End: 2017-09-26 | Stop reason: HOSPADM

## 2017-09-25 RX ORDER — ACETAMINOPHEN 10 MG/ML
1000 INJECTION, SOLUTION INTRAVENOUS
Status: COMPLETED | OUTPATIENT
Start: 2017-09-25 | End: 2017-09-25

## 2017-09-25 RX ORDER — TRANEXAMIC ACID 100 MG/ML
INJECTION, SOLUTION INTRAVENOUS
Status: DISCONTINUED | OUTPATIENT
Start: 2017-09-25 | End: 2017-09-25

## 2017-09-25 RX ORDER — SODIUM CHLORIDE, SODIUM LACTATE, POTASSIUM CHLORIDE, CALCIUM CHLORIDE 600; 310; 30; 20 MG/100ML; MG/100ML; MG/100ML; MG/100ML
INJECTION, SOLUTION INTRAVENOUS CONTINUOUS
Status: DISCONTINUED | OUTPATIENT
Start: 2017-09-25 | End: 2017-09-25

## 2017-09-25 RX ORDER — CELECOXIB 200 MG/1
200 CAPSULE ORAL 2 TIMES DAILY
Status: DISCONTINUED | OUTPATIENT
Start: 2017-09-25 | End: 2017-09-26 | Stop reason: HOSPADM

## 2017-09-25 RX ORDER — SODIUM CHLORIDE 0.9 % (FLUSH) 0.9 %
3 SYRINGE (ML) INJECTION
Status: DISCONTINUED | OUTPATIENT
Start: 2017-09-25 | End: 2017-09-26 | Stop reason: HOSPADM

## 2017-09-25 RX ADMIN — VANCOMYCIN HYDROCHLORIDE 1000 MG: 1 INJECTION, POWDER, LYOPHILIZED, FOR SOLUTION INTRAVENOUS at 08:09

## 2017-09-25 RX ADMIN — FAMOTIDINE 20 MG: 20 TABLET, FILM COATED ORAL at 07:09

## 2017-09-25 RX ADMIN — SODIUM CHLORIDE, SODIUM LACTATE, POTASSIUM CHLORIDE, AND CALCIUM CHLORIDE: 600; 310; 30; 20 INJECTION, SOLUTION INTRAVENOUS at 07:09

## 2017-09-25 RX ADMIN — TRAZODONE HYDROCHLORIDE 50 MG: 50 TABLET ORAL at 08:09

## 2017-09-25 RX ADMIN — PROPOFOL 50 MCG/KG/MIN: 10 INJECTION, EMULSION INTRAVENOUS at 09:09

## 2017-09-25 RX ADMIN — PROPOFOL 30 MG: 10 INJECTION, EMULSION INTRAVENOUS at 09:09

## 2017-09-25 RX ADMIN — DOCUSATE SODIUM 100 MG: 100 CAPSULE, LIQUID FILLED ORAL at 08:09

## 2017-09-25 RX ADMIN — ACETAMINOPHEN 1000 MG: 10 INJECTION, SOLUTION INTRAVENOUS at 02:09

## 2017-09-25 RX ADMIN — OXYCODONE HYDROCHLORIDE 5 MG: 5 TABLET ORAL at 02:09

## 2017-09-25 RX ADMIN — TRANEXAMIC ACID 700 MG: 100 INJECTION, SOLUTION INTRAVENOUS at 09:09

## 2017-09-25 RX ADMIN — MIDAZOLAM HYDROCHLORIDE 2 MG: 1 INJECTION, SOLUTION INTRAMUSCULAR; INTRAVENOUS at 08:09

## 2017-09-25 RX ADMIN — SODIUM CHLORIDE, SODIUM LACTATE, POTASSIUM CHLORIDE, AND CALCIUM CHLORIDE: 600; 310; 30; 20 INJECTION, SOLUTION INTRAVENOUS at 09:09

## 2017-09-25 RX ADMIN — GLYCOPYRROLATE 0.2 MG: 0.2 INJECTION, SOLUTION INTRAMUSCULAR; INTRAVENOUS at 09:09

## 2017-09-25 RX ADMIN — OXYCODONE HYDROCHLORIDE 5 MG: 5 TABLET ORAL at 08:09

## 2017-09-25 RX ADMIN — SODIUM CHLORIDE, SODIUM LACTATE, POTASSIUM CHLORIDE, AND CALCIUM CHLORIDE: 600; 310; 30; 20 INJECTION, SOLUTION INTRAVENOUS at 08:09

## 2017-09-25 RX ADMIN — ACETAMINOPHEN 1000 MG: 10 INJECTION, SOLUTION INTRAVENOUS at 08:09

## 2017-09-25 RX ADMIN — CEFAZOLIN SODIUM 2 G: 2 SOLUTION INTRAVENOUS at 08:09

## 2017-09-25 RX ADMIN — DEXTROSE MONOHYDRATE AND SODIUM CHLORIDE: 5; .9 INJECTION, SOLUTION INTRAVENOUS at 11:09

## 2017-09-25 RX ADMIN — FAMOTIDINE 20 MG: 20 TABLET, FILM COATED ORAL at 08:09

## 2017-09-25 RX ADMIN — MIDAZOLAM HYDROCHLORIDE 4 MG: 5 INJECTION, SOLUTION INTRAMUSCULAR; INTRAVENOUS at 07:09

## 2017-09-25 RX ADMIN — CEFAZOLIN SODIUM 2 G: 2 SOLUTION INTRAVENOUS at 04:09

## 2017-09-25 RX ADMIN — CELECOXIB 200 MG: 200 CAPSULE ORAL at 08:09

## 2017-09-25 RX ADMIN — VANCOMYCIN HYDROCHLORIDE 1000 MG: 1 INJECTION, POWDER, LYOPHILIZED, FOR SOLUTION INTRAVENOUS at 07:09

## 2017-09-25 RX ADMIN — EPHEDRINE SULFATE 5 MG: 50 INJECTION INTRAMUSCULAR; INTRAVENOUS; SUBCUTANEOUS at 09:09

## 2017-09-25 RX ADMIN — ACETAMINOPHEN 1000 MG: 10 INJECTION, SOLUTION INTRAVENOUS at 10:09

## 2017-09-25 RX ADMIN — ROPIVACAINE HYDROCHLORIDE 3 ML: 5 INJECTION, SOLUTION EPIDURAL; INFILTRATION; PERINEURAL at 08:09

## 2017-09-25 RX ADMIN — EPHEDRINE SULFATE 5 MG: 50 INJECTION INTRAMUSCULAR; INTRAVENOUS; SUBCUTANEOUS at 10:09

## 2017-09-25 RX ADMIN — MUPIROCIN 1 G: 20 OINTMENT TOPICAL at 08:09

## 2017-09-25 RX ADMIN — ONDANSETRON 4 MG: 2 INJECTION INTRAMUSCULAR; INTRAVENOUS at 02:09

## 2017-09-25 RX ADMIN — LIDOCAINE HYDROCHLORIDE 75 MG: 20 INJECTION, SOLUTION INTRAVENOUS at 09:09

## 2017-09-25 NOTE — PLAN OF CARE
Problem: Physical Therapy Goal  Goal: Physical Therapy Goal  Goals to be met by: 17     Patient will increase functional independence with mobility by performin. Supine to sit with supervision.   2. Sit to supine with supervision.   3. Sit<>stand transfer with supervision using rolling walker.   4. Gait > 150 feet with SBA using rolling walker.   5. Ascend/descend curb step with CGA with or without AD.    Outcome: Ongoing (interventions implemented as appropriate)  PT evaluation completed. Pt had fair tolerance for gait x 124 ft with rolling walker with c/o lightheadedness.     BID session: Gait x 300 ft with rolling walker and CGA. Will continue to follow and progress as tolerated. Please see progress note for detailed plan of care and recommendations.

## 2017-09-25 NOTE — CONSULTS
"Consult Note  Nephrology    Consult Requested By: Julián Mcneill MD  Reason for Consult: Lipids/hypothyroid/Raynaud/Breast Ca/anxiety    SUBJECTIVE:     History of Present Illness:  Patient is a 72 y.o. female presents with scheduled left knee repair.  Seen in PACU.  VSS.  Extensive medical hx as outlined below.  Pre-op/EPIC reviewed.  Denies CP/SOB/N/V/D/F/C.       Past Medical History:   Diagnosis Date    Anemia     Arthritis     Blood transfusion     Breast cancer     Cancer RIGHT BREAST    Chronic constipation     Clotting disorder     Colon polyp     Diverticulosis     Glaucoma     Hepatitis C 2000    pt states history of hepatits c-"cured by Dr. Lynch"; TREATED 2 YEARS - 2000   OK NOW    Hypothyroid     Insomnia     Memory loss     reports some memory loss since chemo    Osteoarthritis     Panic attacks     Raynaud's disease     takes amlodipine for this    Ulcer     Vertigo      Past Surgical History:   Procedure Laterality Date    BLADDER SURGERY  2011    sling - Dr Giles  - [facility]    BRAIN SURGERY  2007    [place] neural[place]    BREAST LUMPECTOMY  3/12    malignant - had chemo and radiotherapy    COLONOSCOPY  01/03/2011    Dr Lynch, in media section, diverticulosis, hemorrhoids, otherwise normal findings; normal findings on random biopsies    COLONOSCOPY N/A 6/1/2017    Procedure: COLONOSCOPY;  Surgeon: Nate Lamb MD;  Location: [facility];  Service: Endoscopy;  Laterality: N/A;    EYE SURGERY      cataracts bilateral    HYSTERECTOMY      removal of port a cath      right lymph node removed from breast area      TUNNELED VENOUS PORT PLACEMENT  04/2012    left chest    UPPER GASTROINTESTINAL ENDOSCOPY  prior to 2011    small hiatal hernia     Family History   Problem Relation Age of Onset    Cancer Mother      breast    Breast cancer Mother     Diabetes Father     Heart disease Father     Cancer Sister      breast    Breast cancer Sister     " Diabetes Paternal Aunt     Cancer Other      breast    Breast cancer Other     Drug abuse Son     Obesity Son     Diabetes Maternal Aunt     Heart disease Maternal Uncle     Tuberculosis Paternal Uncle      x2    Diabetes Paternal Uncle     Early death Maternal Grandmother      tonsils    Heart disease Maternal Grandfather     Alcohol abuse Maternal Grandfather     No Known Problems Paternal Grandmother     Tuberculosis Paternal Grandfather     Ovarian cancer Neg Hx     Colon cancer Neg Hx     Colon polyps Neg Hx     Crohn's disease Neg Hx     Ulcerative colitis Neg Hx      Social History   Substance Use Topics    Smoking status: Never Smoker    Smokeless tobacco: Never Used    Alcohol use 1.0 oz/week     2 Standard drinks or equivalent per week      Comment: 2 drinks per week       Review of patient's allergies indicates:   Allergen Reactions    Adhesive Dermatitis and Rash    Codeine Nausea Only        Review of Systems:  Constitutional: No fever or chills  Respiratory: No cough or shortness of breath  Cardiovascular: No chest pain or palpitations  Gastrointestinal: No nausea or vomiting  Neurological: No confusion or weakness    OBJECTIVE:     Vital Signs (Most Recent)  Temp: 97.6 °F (36.4 °C) (09/25/17 1036)  Pulse: (!) 52 (09/25/17 1055)  Resp: 16 (09/25/17 1055)  BP: (!) 106/51 (09/25/17 1055)  SpO2: 100 % (09/25/17 1055)    Vital Signs Range (Last 24H):  Temp:  [97.6 °F (36.4 °C)-97.9 °F (36.6 °C)]   Pulse:  [52-62]   Resp:  [16-18]   BP: (106-134)/(51-70)   SpO2:  [97 %-100 %]       Intake/Output Summary (Last 24 hours) at 09/25/17 1116  Last data filed at 09/25/17 1038   Gross per 24 hour   Intake             1400 ml   Output             1250 ml   Net              150 ml       Physical Exam:  General appearance: Well developed, well nourished  Eyes:  Conjunctivae/corneas clear. PERRL.  Lungs: Normal respiratory effort,   clear to auscultation bilaterally   Heart: Regular rate and  rhythm, S1, S2 normal, no murmur, rub or rosa.  Abdomen: Soft, non-tender non-distended; bowel sounds normal; no masses,  no organomegaly  Extremities: No cyanosis or clubbing. No edema.    Skin: Skin color, texture, turgor normal. No rashes or lesions  Neurologic: Normal strength and tone. No focal numbness or weakness  Left Knee:  CDI  Cheema      Laboratory:    reviewed    Diagnostic Results:      ASSESSMENT/PLAN:     1. S/P Left Knee (M17.12):  Per ortho/PT/OT  2. Breast Ca (C50.919):  Continue Arimidex  3. Lipids (E78.5):  Statin  4. Raynaud disease (I73.00):  Hold CCB for now.   5. Hypothyroid (E03.9):  Synthroid as home.   6. Anxiety/Panic (F41.0):  Zoloft/trazadone  7. OAB (N32.81):  vesicare as home.  8. DVT Prophy:  ASA BID      Thanks for consult  See above  Will follow.

## 2017-09-25 NOTE — DISCHARGE INSTRUCTIONS
Knee Athroscopy Discharge Instructions    1) Pain: After surgery your knee will be sore. The knee will likely have been injected with a numbing medicine (Exparel) prior to completion of surgery for pain control. This is indicated on a green bracelet that you will continue to wear for 4 days after surgery. You will   also get a prescription for pain control before you leave the hospital. Ice and elevation will assist with pain control.    a) Apply ice as much as possible for the first 72 hours. After 72 hours, apply ice for 20minutes after therapy,after exercising or whenever experiencing pain. Avoid direct skin contact with ice to prevent frostbit.          b) Elevate the affected leg with the pillow the length of the leg higher than your heart to assist with swelling and pain.  2) Incision Care:  a) Some drainage from the incision in the first 72 hours is normal. If drainage is excessive,remove bandage, clean with mild soap and water, pat dry, cover with sterile gauze and secure with tape. Notify physician about excessive drainage. Staples will be removed 14-21 days after surgery   3) Activity:  a) Perform exercises 2-3 x day.  b) You may shower 48 hours after surgery providing the dressing is waterproof. No tub or hot tub usage. Support help is mandatory during showering. If the dressing becomes wet, replace with a new dressing.   c) Wear thigh high reginald hose stockings for 3 weeks after surgery .You may remove stockings for 1- 2 hours during the day only.  d) If your physician orders the CPM machine you are to use it for 2 hours in the am and 2 hours in the pm.Increase the flexion 5 degrees each session if tolerated. This is not to replace your exercise program.  4) For lifetime after your replacement surgery, you may need antibiotic coverage before dental or minor surgical procedures.  5) Possible Complications: Call Surgeon  a) Infection: Report these signs and symptoms to your surgeon.  i) Unexpected redness  around incision   ii) Persistent drainage from wound after 72 hours.  iii) Temperature greater than 101 degrees F: Can be treated with Tylenol. Do not go to the emergency room or urgent care center, call your surgeon.   iv) Additional swelling  v) Pain not controlled with current pain medication  b) Blood Clot: Report theses signs and symptoms to your surgeon  i) Unusual pain  ii) Red or discolored skin  iii) Swelling in the leg  iv) Unusual warm skin

## 2017-09-25 NOTE — PROGRESS NOTES
SW acknowledged HH order and pt would like to be placed with [facility] and signed choice form.  SW sent HH order to [facility] via [facility].

## 2017-09-25 NOTE — PLAN OF CARE
[facility]       [address]       [place] [address]       [phone]               [facility] Orthopedic Discharge Orders    Home [place]           Expected Discharge Date: 9/26    Diagnoses:  Post-op  knee replacement    Patient is homebound due to:   Pt requires home health services due to taxing effort to leave the home as a result of immobility from Post-op knee replacement      Weight Bearing Status:   full weight bearing: left leg    CPM: for 3 weeks (2 hours in the morning, 2 hours in the evening); increase by 5 degrees each day until maximum amount then continue to use at the maximum degrees.    TKR:  CPM use should total at least 4-6 hours daily. Start CPM setting around the PROM measurement at Eval.  Progress 5 degrees daily as tolerated until max setting is achieved.  Continue CPM use for 3 weeks post-op then CPM provider [facility] will contact you for CPM pickup.     Physical Therapy   3 times a week for 2 weeks (Call if further orders are needed)  - Ambulate with a rolling walker  - Progress to cane  - Instruct on ROM and strengthening of knee    Aide to provide assistance with personal care and  ADLs  3 times a week    Wound Care:   If patient is discharged with aqua leonidas/silver dressing, leave on for 5 days unless saturated border to border, then follow instructions below:  Cleanse with wound cleanser or normal saline and apply Mepore Pro dressing.  If Mepore pro not available apply gauze and tegaderm.  Change 3 times a week or PRN if dry.  Teach patient to change daily if draining.        Contact:  Please contact the nurse practitionerCorrie at [phone] at Ext [phone]. with concerns.  She is in surgery M,W,F so if urgent and needs to be addressed prior to the end of the day call the  and they with contact her in the OR or Clinic.       BLOOD THINNER:    If sent home on Xarelto         -14 days post-op for TKR       -30 days post-op for THR     If sent home home on  ASA    325mg   BID x 4 weeks     Once finished with prescribed blood thinner, patients can return to pre-surgical ASA dosage if they took ASA before surgery.     Home Health Nurse for Wound Checks and to remove staples on POD #  14  PT/SN to remove staples 14 days Post-op and apply skin prep and steri-strips.    On dressing change, apply new dressing while knee in flexed position.    Pt may shower if incision dressing has waterproof dressing in place. Removal and replacement of dressing after shower only needed if incision is suspected to have gotten wet during shower.  Otherwise change as previously described depending on dressing/drainage    No soaking in the tub or hot tub use. Cold therapy/Ice encouraged at least 20 minutes 2-3 times daily or more if desired.  Incision must be kept waterproof while icing.      FWB unless otherwise indicated.  Progress to cane as able.  Set up for outpatient PT as soon as able after staple removal once patient is MOD I with cane.    Outpatient Therapy: [facility]    [address] [address]   or  [address] [place] [address]    Call [phone] to schedule appointment  Fax [phone]    If need orders: Call Maty at Ext [phone]      Wear TEDS Bilateral Thigh High Stockings for 3 weeks  Shay hose x 3 weeks. Ok to remove shay hose 1-2 hours/day max if desired.       DME:  - rolling Walker  - 3 in 1 commode  - tub bench / shower chair  - Per PT/OT recommendation          Corrie Spencer

## 2017-09-25 NOTE — PLAN OF CARE
Problem: Patient Care Overview  Goal: Plan of Care Review  NADN, Resp even and unlabored. VSS.  and family at bedside. Walked with PT/OT, tolerated well. Dressing noted to lle with cast padding and ace wrap intact, also has polar ice in use. Denies pain or discomfort. BETTY/SCD's on. Safety measures ongoing. Bed in lowest position, sr up x2, cb in reach. Will report to oncoming shift.

## 2017-09-25 NOTE — PT/OT/SLP PROGRESS
"Physical Therapy  Treatment    Yuniel Gracia   MRN: [id number]   Admitting Diagnosis: Pre-op Diagnosis: Osteoarthritis of left knee, unspecified osteoarthritis type [M17.12] s/p Procedure(s):  REPLACEMENT-KNEE WITH NAVIGATION       PT Received On: 09/25/17  PT Start Time: 1622     PT Stop Time: 1650    PT Total Time (min): 28 min       Billable Minutes:  Gait [id number] and Therapeutic Exercise 14    Treatment Type: Treatment  PT/PTA: PT     PTA Visit Number: 0       General Precautions: Standard,  (fall risk)  Orthopedic Precautions: Full weight bearing   Braces: N/A    Do you have any cultural, spiritual, Anabaptist conflicts, given your current situation?: none specified    Subjective:  Communicated with nurse prior to session.  Pt stated, "I'm tired."    Pain/Comfort  Pain Rating 1: 2/10  Location - Side 1: Left  Location - Orientation 1: generalized  Location 1: knee  Pain Addressed 1: Nurse notified  Pain Rating Post-Intervention 1: 2/10    Objective:    Pt found up in chair with polar ice, and peripheral iv, spouse present.     Functional Mobility:  Bed Mobility:   Supine to Sit: Contact Guard Assistance  Sit to Supine: Stand by Assistance    Transfers:  Sit <> Stand Assistance: Contact Guard Assistance  Sit <> Stand Assistive Device: Rolling Walker    Gait:   Gait Distance: x 300 feet of level floor with RW and verbal cues for appropriate foot clearance, upright posture and not pivoting on surgical leg.  Assistance 1: Contact Guard Assistance  Gait Assistive Device: Rolling walker  Gait Pattern: reciprocal  Gait Deviation(s): decreased velocity of limb motion, decreased step length, decreased stride length, decreased toe-to-floor clearance      Balance:   Static Sit: NORMAL: No deviations seen in posture held statically  Dynamic Sit: GOOD: Maintains balance through MODERATE excursions of active trunk movement  Static Stand: GOOD: Takes MODERATE challenges from all directions  Dynamic stand: FAIR: Needs " CONTACT GUARD during gait     Therapeutic Activities and Exercises:  Pt performed supine therapeutic exercises bilaterally including ankle pumps, quad sets, glute sets x 15 reps with verbal and tactile cues.     AM-PAC 6 CLICK MOBILITY  How much help from another person does this patient currently need?   1 = Unable, Total/Dependent Assistance  2 = A lot, Maximum/Moderate Assistance  3 = A little, Minimum/Contact Guard/Supervision  4 = None, Modified [place]/Independent    Turning over in bed (including adjusting bedclothes, sheets and blankets)?: 4  Sitting down on and standing up from a chair with arms (e.g., wheelchair, bedside commode, etc.): 3  Moving from lying on back to sitting on the side of the bed?: 3  Moving to and from a bed to a chair (including a wheelchair)?: 3  Need to walk in hospital room?: 3  Climbing 3-5 steps with a railing?: 3  Total Score: 19    AM-PAC Raw Score CMS G-Code Modifier Level of Impairment Assistance   6 % Total / Unable   7 - 9 CM 80 - 100% Maximal Assist   10 - 14 CL 60 - 80% Moderate Assist   15 - 19 CK 40 - 60% Moderate Assist   20 - 22 CJ 20 - 40% Minimal Assist   23 CI 1-20% SBA / CGA   24 CH 0% Independent/ Mod I     Patient left supine with call button in reach.    Assessment:  Yuniel Gracia is a 72 y.o. female with a medical diagnosis of Pre-op Diagnosis: Osteoarthritis of left knee, unspecified osteoarthritis type [M17.12] s/p Procedure(s):  REPLACEMENT-KNEE WITH NAVIGATION.  Pt is progressing towards goals well. Performed gait increased distance of 300 feet compared to earlier treatment today. PT recommends home health with PT/OT services and pt will have support at home from spouse. Pt would benefit from continued skilled PT to maximize independence and safety with functional mobility.    Rehab identified problem list/impairments: Rehab identified problem list/impairments: weakness, impaired self care skills, decreased ROM, pain, decreased lower  extremity function, gait instability, impaired balance, impaired functional mobilty    Rehab potential is excellent.    Activity tolerance: Good     Discharge recommendations: Discharge Facility/Level Of Care Needs: home with home health, home health PT, home health OT      Barriers to discharge: Barriers to Discharge: None     Equipment recommendations: Equipment Needed After Discharge: cane, straight (can borrow spouse's rolling walker and 3-1 commode)         GOALS:    Physical Therapy Goals        Problem: Physical Therapy Goal    Goal Priority Disciplines Outcome Goal Variances Interventions   Physical Therapy Goal     PT/OT, PT Ongoing (interventions implemented as appropriate)     Description:  Goals to be met by: 17     Patient will increase functional independence with mobility by performin. Supine to sit with supervision.   2. Sit to supine with supervision.   3. Sit<>stand transfer with supervision using rolling walker.   4. Gait > 150 feet with SBA using rolling walker.   5. Ascend/descend curb step with CGA with or without AD.                      PLAN:    Patient to be seen BID  to address the above listed problems via gait training, therapeutic activities, therapeutic exercises, neuromuscular re-education  Plan of Care expires: 10/25/17  Plan of Care reviewed with: patient, spouse         Josue Blunt, SPT  2017     I certify that I was present in the room directing the student in service delivery and guiding them using my skilled judgment. As the co-signing therapist I have reviewed the students documentation and am responsible for the treatment, assessment, and plan.     Lianet Sidhu, PT 2017

## 2017-09-25 NOTE — PLAN OF CARE
SW met with pt at bedside to complete discharge assessment, spouse, Lul present.  Pt has rolling walker, BSC and shower chair and will discharge with HH and straight cane.     09/25/17 7112   Discharge Assessment   Assessment Type Discharge Planning Assessment   Confirmed/corrected address and phone number on facesheet? Yes   Assessment information obtained from? Patient   Communicated expected length of stay with patient/caregiver no   Prior to hospitilization cognitive status: Alert/Oriented   Prior to hospitalization functional status: Independent   Current Functional Status: Assistive Equipment;Needs Assistance   Lives With spouse   Able to Return to Prior Arrangements yes   Is patient able to care for self after discharge? Unable to determine at this time (comments)   Who are your caregiver(s) and their phone number(s)? (Ray, spouse, [phone])   Readmission Within The Last 30 Days no previous admission in last 30 days   Patient currently being followed by outpatient case management? No   Patient currently receives any other outside agency services? No   Equipment Currently Used at Home bedside commode;walker, rolling   Do you have any problems affording any of your prescribed medications? No   Is the patient taking medications as prescribed? yes   Does the patient have transportation home? Yes   Transportation Available family or friend will provide   Does the patient receive services at the [facility]madin Clinic? No   Discharge Plan A Home Health   Patient/Family In Agreement With Plan yes

## 2017-09-25 NOTE — ANESTHESIA POSTPROCEDURE EVALUATION
"Anesthesia Post Evaluation    Patient: Yuniel Gracia    Procedure(s) Performed: Procedure(s) (LRB):  REPLACEMENT-KNEE WITH NAVIGATION (Left)    Final Anesthesia Type: spinal  Patient location during evaluation: PACU  Patient participation: Yes- Able to Participate  Level of consciousness: awake and alert  Post-procedure vital signs: reviewed and stable  Pain management: adequate  Airway patency: patent  PONV status at discharge: No PONV  Anesthetic complications: no      Cardiovascular status: blood pressure returned to baseline  Respiratory status: unassisted  Hydration status: euvolemic  Follow-up not needed.        Visit Vitals  BP (!) 112/58   Pulse (!) 59   Temp 36.4 °C (97.6 °F)   Resp 16   Ht 5' 4" (1.626 m)   Wt 68 kg (150 lb)   LMP 03/02/1998   SpO2 100%   BMI 25.75 kg/m²       Pain/Eduar Score: Pain Assessment Performed: Yes (9/25/2017 11:15 AM)  Presence of Pain: denies (9/25/2017 11:15 AM)  Eduar Score: 9 (9/25/2017 11:15 AM)      "

## 2017-09-25 NOTE — ANESTHESIA PROCEDURE NOTES
Spinal    Diagnosis: DJD left knee  Patient location during procedure: holding area  Timeout: 9/25/2017 8:25 AM  Staffing  Anesthesiologist: SANTA COLEMAN  Preanesthetic Checklist  Completed: patient identified, site marked, surgical consent, pre-op evaluation, timeout performed, IV checked, risks and benefits discussed and monitors and equipment checked  Spinal Block  Patient position: sitting  Prep: ChloraPrep  Patient monitoring: heart rate, cardiac monitor and continuous pulse ox  Location: L3-4  Injection technique: single shot  CSF Fluid: clear free-flowing CSF  Needle  Needle gauge: 25 G  Needle length: 3.5 in  Additional Documentation: incremental injection, negative aspiration for heme and no paresthesia on injection  Needle localization: anatomical landmarks  Assessment  Ease of block: easy  Patient's tolerance of the procedure: comfortable throughout block and no complaints  Medications:  Bolus administered: 3 mL of 0.5 ropivacaine  Epinephrine added: none

## 2017-09-25 NOTE — TRANSFER OF CARE
"Anesthesia Transfer of Care Note    Patient: Yuniel Gracia    Procedure(s) Performed: Procedure(s) (LRB):  REPLACEMENT-KNEE WITH NAVIGATION (Left)    Patient location: PACU    Anesthesia Type: general and spinal    Transport from OR: Transported from OR on room air with adequate spontaneous ventilation    Post pain: adequate analgesia    Post assessment: no apparent anesthetic complications    Post vital signs: stable    Level of consciousness: awake and alert    Nausea/Vomiting: no nausea/vomiting    Complications: none    Transfer of care protocol was followed      Last vitals:   Visit Vitals  /70 (BP Location: Left arm, Patient Position: Lying)   Pulse (!) 59   Temp 36.6 °C (97.9 °F) (Oral)   Resp 18   Ht 5' 4" (1.626 m)   Wt 68 kg (150 lb)   LMP 03/02/1998   SpO2 97%   BMI 25.75 kg/m²     "

## 2017-09-25 NOTE — OR NURSING
Awake and alert, denies pain, movement and sensation to feet. Waiting for transport to go to room.  updated.

## 2017-09-25 NOTE — CARE UPDATE
09/25/2017  01:35    Received from PACU, NADN. Resp even and unlabored on room air. AAOx4. Left knee with coban and ace wrap, with polar care. SCD's on, no numbness or tingling noted. Cheema patent and draining clear yellow urine. Started D5NS@75 CC/HR. Oriented to room and call system. Safety teaching done with patient and family at bedside.

## 2017-09-25 NOTE — PT/OT/SLP EVAL
"Physical Therapy  Evaluation and Treatment    Yuniel Gracia   MRN: [id number]   Admitting Diagnosis: <principal problem not specified>  Pre-op Diagnosis: Osteoarthritis of left knee, unspecified osteoarthritis type [M17.12] s/p Procedure(s):  REPLACEMENT-KNEE WITH NAVIGATION     PT Received On: 09/25/17  PT Start Time: 1440     PT Stop Time: 1516    PT Total Time (min): 36 min       Billable Minutes:  Evaluation 12, Gait Training 13 and Therapeutic Exercise 10\    Diagnosis: <principal problem not specified>  Pre-op Diagnosis: Osteoarthritis of left knee, unspecified osteoarthritis type [M17.12] s/p Procedure(s):  REPLACEMENT-KNEE WITH NAVIGATION       Past Medical History:   Diagnosis Date    Anemia     Arthritis     Blood transfusion     Breast cancer     Cancer RIGHT BREAST    Chronic constipation     Clotting disorder     Colon polyp     Diverticulosis     Glaucoma     Hepatitis C 2000    pt states history of hepatits c-"cured by Dr. Lynch"; TREATED 2 YEARS - 2000   OK NOW    Hypothyroid     Insomnia     Memory loss     reports some memory loss since chemo    Osteoarthritis     Panic attacks     Raynaud's disease     takes amlodipine for this    Ulcer     Vertigo       Past Surgical History:   Procedure Laterality Date    BLADDER SURGERY  2011    sling - Dr Giles  - [facility]    BRAIN SURGERY  2007    temporal neuralgia - [place]    BREAST LUMPECTOMY  3/12    malignant - had chemo and radiotherapy    COLONOSCOPY  01/03/2011    Dr Lynch, in media section, diverticulosis, hemorrhoids, otherwise normal findings; normal findings on random biopsies    COLONOSCOPY N/A 6/1/2017    Procedure: COLONOSCOPY;  Surgeon: Nate Lamb MD;  Location: [facility];  Service: Endoscopy;  Laterality: N/A;    EYE SURGERY      cataracts bilateral    HYSTERECTOMY      removal of port a cath      right lymph node removed from breast area      TUNNELED VENOUS PORT PLACEMENT  04/2012    left " "chest    UPPER GASTROINTESTINAL ENDOSCOPY  prior to 2011    small hiatal hernia       Referring physician: HUSSEIN Spencer  Date referred to PT: 9/25/17    General Precautions: Standard,  (fall risk)  Orthopedic Precautions: Full weight bearing   Braces: N/A       Do you have any cultural, spiritual, Anglican conflicts, given your current situation?: none specified    Patient History:  Living Environment Comment: Pt reports she lives with spouse in 2 story house with 4" step to enter. She has elevator access to the second story bedroom and bathroom with options for both a tub/shower with tub transfer bench or a walk-in shower. She was occasionally using a single point cane (now lost) prn for ambulation due to knee pain. She was able to cook and clean, but with difficulty. Spouse mainly drives.   Equipment Currently Used at Home: cane, straight (although now lost; can borrow spouse's 3-1 commode and rolling walker he used after his previous knee surgery, now fully recovered)  DME owned (not currently used): rolling walker and bedside commode    Previous Level of Function:  Ambulation Skills: independent  Transfer Skills: independent  ADL Skills: independent  Work/Leisure Activity: independent    Subjective:  Communicated with nurse prior to session.  Pt stated, "I hope it lasts" re: progressing with PT  Chief Complaint: pain, lightheadedness  Patient goals: walk    Pain/Comfort  Pain Rating 1: 2/10  Location - Side 1: Left  Location 1: knee  Pain Addressed 1: Pre-medicate for activity, Reposition, Cessation of Activity  Pain Rating Post-Intervention 1: 3/10      Objective:   Patient found with: Polar ice, peripheral IV supine in bed with HOB elevated, spouse present.    Cognitive Exam:  Oriented to: Person, Place, Time and Situation    Follows Commands/attention: Follows multistep  commands  Communication: able to express wants and needs  Safety awareness/insight to disability: intact    Physical Exam:  Postural " examination/scapula alignment: Rounded shoulder and protective guarding L LE    Skin integrity: L knee dressing clean and dry  Edema: Mild L LE    Sensation:   Denied paresthesias    Lower Extremity Range of Motion:  Right Lower Extremity: WFL  Left Lower Extremity: WFL except AROM knee flexion and extension with dressing and pain    Lower Extremity Strength:  Right Lower Extremity: WFL  Left Lower Extremity: WFL except 3-/5 knee flexion and extension     No coordination or tone impairments identified.    Supine blood pressure: 94/45 mmHg  Sittin/58 mmHg  (Pt reports this is normal blood pressure for her.)  Functional Mobility:  Bed Mobility:  Supine to Sit: Contact Guard Assistance    Transfers:  Sit <> Stand Assistance: Contact Guard Assistance (verbal cues for technique)  Sit <> Stand Assistive Device: Rolling Walker    Gait:   Gait Distance: x 124 ft on level tile, verbal cues for navigation, avoiding pivot turns, and upright posture  Assistance 1: Contact Guard Assistance (2nd person for chair follow due to c/o lightheadedness)  Gait Assistive Device: Rolling walker  Gait Pattern: reciprocal    Stairs:  Deferred    Balance:   Static Sit: NORMAL: No deviations seen in posture held statically  Dynamic Sit: NORMAL: No deviations seen in posture held dynamically  Static Stand: GOOD: Takes MODERATE challenges from all directions  Dynamic stand: FAIR+: Needs CGA only during gait and able to self right with moderate     Therapeutic Activities and Exercises:  Pt performed sitting therapeutic exercises L knee knee flexion, L long arc quads, bilateral ankle pumps x 15 reps with verbal and visual cues.       AM-PAC 6 CLICK MOBILITY  How much help from another person does this patient currently need?   1 = Unable, Total/Dependent Assistance  2 = A lot, Maximum/Moderate Assistance  3 = A little, Minimum/Contact Guard/Supervision  4 = None, Modified Wood/Independent    Turning over in bed (including adjusting  bedclothes, sheets and blankets)?: 4  Sitting down on and standing up from a chair with arms (e.g., wheelchair, bedside commode, etc.): 3  Moving from lying on back to sitting on the side of the bed?: 3  Moving to and from a bed to a chair (including a wheelchair)?: 3  Need to walk in hospital room?: 3  Climbing 3-5 steps with a railing?: 3  Total Score: 19     AM-PAC Raw Score CMS G-Code Modifier Level of Impairment Assistance   6 % Total / Unable   7 - 9 CM 80 - 100% Maximal Assist   10 - 14 CL 60 - 80% Moderate Assist   15 - 19 CK 40 - 60% Moderate Assist   20 - 22 CJ 20 - 40% Minimal Assist   23 CI 1-20% SBA / CGA   24 CH 0% Independent/ Mod I     Patient left up in chair with all lines intact, call button in reach, nurse notified and spouse present.    Assessment:   Yuniel Gracia is a 72 y.o. female with a medical diagnosis of <principal problem not specified> Pre-op Diagnosis: Osteoarthritis of left knee, unspecified osteoarthritis type [M17.12] s/p Procedure(s):  REPLACEMENT-KNEE WITH NAVIGATION   and presents with Rehab identified problem list/impairments: Rehab identified problem list/impairments: weakness, impaired self care skills, impaired functional mobilty, gait instability, impaired balance, pain, edema, impaired skin, decreased lower extremity function, decreased ROM. PT evaluation completed. Pt had fair tolerance for gait x 124 ft with rolling walker with c/o lightheadedness. Will continue to follow and progress as tolerated.    Rehab potential is excellent.    Activity tolerance: Good    Discharge recommendations: Discharge Facility/Level Of Care Needs: home with home health, home health PT, home health OT     Barriers to discharge: Barriers to Discharge: None    Equipment recommendations: Equipment Needed After Discharge: cane, straight (can borrow spouse's rolling walker and 3-1 commode)     GOALS:    Physical Therapy Goals        Problem: Physical Therapy Goal    Goal Priority  Disciplines Outcome Goal Variances Interventions   Physical Therapy Goal     PT/OT, PT Ongoing (interventions implemented as appropriate)     Description:  Goals to be met by: 17     Patient will increase functional independence with mobility by performin. Supine to sit with supervision.   2. Sit to supine with supervision.   3. Sit<>stand transfer with supervision using rolling walker.   4. Gait > 150 feet with SBA using rolling walker.   5. Ascend/descend curb step with CGA with or without AD.                      PLAN:    Patient to be seen BID to address the above listed problems via gait training, therapeutic activities, therapeutic exercises, neuromuscular re-education  Plan of Care expires: 10/25/17  Plan of Care reviewed with: patient, spouse          Lianet Thea, PT  2017

## 2017-09-25 NOTE — OP NOTE
[facility]  Operative Report    SUMMARY     Surgery Date: 9/25/2017     Surgeon(s) and Role:     * Julián Mcneill MD - Primary    Assistant: MILE Paredes FA    Pre-op Diagnosis:  Osteoarthritis of left knee, unspecified osteoarthritis type [M17.12]    Post-op Diagnosis:  Post-Op Diagnosis Codes:     * Osteoarthritis of left knee, unspecified osteoarthritis type [M17.12]    Procedure(s) (LRB):  REPLACEMENT-KNEE WITH NAVIGATION (Left) ([facility] )    Anesthesia: Spinal    Description of Procedure: The appropriate consent was signed. The patient understood and except all risks and complications. The patient was brought to the Operating Room after undergoing spinal anesthetic. Tourniquet was applied to the proximal operative leg. The lower extremity was then prepped and draped in a sterile manner. After exsanguination of Esmarch bandage, tourniquet was inflated to 300 mmHg. An anterior incision with a medial parapatellar arthrotomy was performed. The menisci, anterior cruciate ligament and patellar fat pad were excised. The patella was resurfaced and sized to a 35 mm patella.   Three holes were then drilled for the lugs and this was trialed. A hole was then  drilled in the distal femur, aditi was placed down the femoral canal and the   distal femur cut in 6 degrees of valgus. The tibia was then cut utilizing the   [employer] navigation system in 0 degree varus valgus with 5 degrees in posterior   slope. Extension block was placed and full extension was noted. The femur was   then sized to a #4 and #4 cutting block was placed and the anterior and   posterior cuts as well as chamfer cuts were performed. The tibia was sized to a  size 3 and a trial was performed.Trials were performed and a 11 mm UC spacer was felt to be appropriate.The tibia was then prepared with the drill and the punch, and trials were   removed and the knee washed out. Aquamantys was used to paint the posterior   capsule and  corners. Palacos cement with gentamicin was then mixed and   pressurized sequentially in tibia, femur and patella. Components were impacted   and excess cement was removed. A trial 11 mm UCspacer was placed and knee   brought out to full extension. Once the cement was allowed to harden, the 11 mm UC  spacer was felt to be appropriate and this was locked into the tibial   baseplate. Tourniquet was deflated and hemostasis was obtained. Good patellar   tracking was noted. Exparel cocktail was injected into the fascia and   subcutaneous tissue. The fascial closure was obtained with a running #2 Quill suture. Subcutaneous closure was obtained with #1 and 2-0 Vicryl. Skin was then closed with the staples and a sterile compressive dressing was applied. The patient was then brought to the Recovery Room in a good condition.        Estimated Blood Loss: 100cc         Specimens:   Specimen (12h ago through future)    None

## 2017-09-26 VITALS
TEMPERATURE: 98 F | BODY MASS INDEX: 25.67 KG/M2 | DIASTOLIC BLOOD PRESSURE: 57 MMHG | SYSTOLIC BLOOD PRESSURE: 106 MMHG | HEIGHT: 64 IN | OXYGEN SATURATION: 92 % | WEIGHT: 150.38 LBS | HEART RATE: 98 BPM | RESPIRATION RATE: 18 BRPM

## 2017-09-26 PROBLEM — M17.12 PRIMARY OSTEOARTHRITIS OF LEFT KNEE: Status: RESOLVED | Noted: 2017-09-25 | Resolved: 2017-09-26

## 2017-09-26 LAB
ERYTHROCYTE [DISTWIDTH] IN BLOOD BY AUTOMATED COUNT: 13.6 %
HCT VFR BLD AUTO: 32.4 %
HGB BLD-MCNC: 10.8 G/DL
MCH RBC QN AUTO: 32 PG
MCHC RBC AUTO-ENTMCNC: 33.3 G/DL
MCV RBC AUTO: 96 FL
PLATELET # BLD AUTO: 93 K/UL
PMV BLD AUTO: 10.2 FL
RBC # BLD AUTO: 3.38 M/UL
WBC # BLD AUTO: 4.65 K/UL

## 2017-09-26 PROCEDURE — 25000003 PHARM REV CODE 250

## 2017-09-26 PROCEDURE — 25000003 PHARM REV CODE 250: Performed by: NURSE PRACTITIONER

## 2017-09-26 PROCEDURE — 97116 GAIT TRAINING THERAPY: CPT

## 2017-09-26 PROCEDURE — 97165 OT EVAL LOW COMPLEX 30 MIN: CPT

## 2017-09-26 PROCEDURE — 85027 COMPLETE CBC AUTOMATED: CPT

## 2017-09-26 PROCEDURE — 97530 THERAPEUTIC ACTIVITIES: CPT

## 2017-09-26 PROCEDURE — 63600175 PHARM REV CODE 636 W HCPCS

## 2017-09-26 PROCEDURE — 36415 COLL VENOUS BLD VENIPUNCTURE: CPT

## 2017-09-26 PROCEDURE — 97110 THERAPEUTIC EXERCISES: CPT

## 2017-09-26 RX ORDER — ASPIRIN 325 MG
325 TABLET ORAL EVERY 12 HOURS
Refills: 0 | COMMUNITY
Start: 2017-09-26 | End: 2017-12-12

## 2017-09-26 RX ORDER — OXYCODONE AND ACETAMINOPHEN 5; 325 MG/1; MG/1
TABLET ORAL
Qty: 90 TABLET | Refills: 0 | Status: SHIPPED | OUTPATIENT
Start: 2017-09-26 | End: 2017-12-12 | Stop reason: ALTCHOICE

## 2017-09-26 RX ADMIN — CEFAZOLIN SODIUM 2 G: 2 SOLUTION INTRAVENOUS at 01:09

## 2017-09-26 RX ADMIN — ASPIRIN 325 MG ORAL TABLET 325 MG: 325 PILL ORAL at 10:09

## 2017-09-26 RX ADMIN — CELECOXIB 200 MG: 200 CAPSULE ORAL at 10:09

## 2017-09-26 RX ADMIN — SODIUM CHLORIDE 250 ML: 0.9 INJECTION, SOLUTION INTRAVENOUS at 10:09

## 2017-09-26 RX ADMIN — FAMOTIDINE 20 MG: 20 TABLET, FILM COATED ORAL at 10:09

## 2017-09-26 RX ADMIN — LEVOTHYROXINE SODIUM 75 MCG: 25 TABLET ORAL at 10:09

## 2017-09-26 RX ADMIN — MUPIROCIN 1 G: 20 OINTMENT TOPICAL at 10:09

## 2017-09-26 RX ADMIN — DOCUSATE SODIUM 100 MG: 100 CAPSULE, LIQUID FILLED ORAL at 10:09

## 2017-09-26 NOTE — PROGRESS NOTES
"Nephrology  Progress Note    Admit Date: 9/25/2017   LOS: 1 day     SUBJECTIVE:     Follow-up For:  Primary osteoarthritis of left knee    Interval History:     Uneventful night but having some weakness/hypotension this am.  Denies CP/SOB/Calf pain.  Discussed with ortho team.     Review of Systems:  Constitutional: No fever or chills  Respiratory: No cough or shortness of breath  Cardiovascular: No chest pain or palpitations  Gastrointestinal: No nausea or vomiting  Neurological: No confusion or weakness    OBJECTIVE:     Vital Signs Range (Last 24H):  BP (!) 100/50 (BP Location: Left arm, Patient Position: Lying)   Pulse (!) 56   Temp 97.6 °F (36.4 °C) (Oral)   Resp 16   Ht 5' 4" (1.626 m)   Wt 68.2 kg (150 lb 5.7 oz)   LMP 03/02/1998   SpO2 98%   Breastfeeding? No   BMI 25.81 kg/m²     Temp:  [97.6 °F (36.4 °C)-98.3 °F (36.8 °C)]   Pulse:  [50-65]   Resp:  [16]   BP: ()/(50-59)   SpO2:  [96 %-100 %]     I & O (Last 24H):  Intake/Output Summary (Last 24 hours) at 09/26/17 1001  Last data filed at 09/26/17 0955   Gross per 24 hour   Intake          2398.75 ml   Output             2625 ml   Net          -226.25 ml       Physical Exam:  General appearance: Well developed, well nourished  Eyes:  Conjunctivae/corneas clear. PERRL.  Lungs: Normal respiratory effort,   clear to auscultation bilaterally   Heart: Regular rate and rhythm, S1, S2 normal, no murmur, rub or rosa.  Abdomen: Soft, non-tender non-distended; bowel sounds normal; no masses,  no organomegaly  Extremities: No cyanosis or clubbing. No edema.    Skin: Skin color, texture, turgor normal. No rashes or lesions  Neurologic: Normal strength and tone. No focal numbness or weakness   Left knee:  CDI    Laboratory Data:    Recent Labs  Lab 09/26/17  0435   WBC 4.65   RBC 3.38*   HGB 10.8*   HCT 32.4*   PLT 93*   MCV 96   MCH 32.0*   MCHC 33.3       BMP: No results for input(s): GLU, NA, K, CL, CO2, BUN, CREATININE, CALCIUM, MG in the last 168 " hours.    Invalid input(s):  PHOS  Lab Results   Component Value Date    CALCIUM 9.3 09/18/2017    PHOS 3.6 07/14/2015               Medications:  Medication list was reviewed and changes noted under Assessment/Plan.    Diagnostic Results:        ASSESSMENT/PLAN:        1. S/P Left Knee (M17.12):  Per ortho/PT/OT  2. Breast Ca (C50.919):  Continue Arimidex  3. Lipids (E78.5):  Statin  4. Raynaud disease (I73.00):  Hold CCB for now.   5. Hypothyroid (E03.9):  Synthroid as home.   6. Anxiety/Panic (F41.0):  Zoloft/trazadone  7. OAB (N32.81):  vesicare as home.  8. DVT Prophy:  ASA BID  9. Hypotension (I95.9):  Saline bolus 250 cc and encourage oral intake.         See above  Cleared if BP improves later this am.

## 2017-09-26 NOTE — PROGRESS NOTES
"Progress Note  Orthopedics    Admit Date: 9/25/2017   Patient ID: Yuniel Gracia is a 72 y.o. female.  POD#1 L TKR.  Doing very well. Dressing dry, NV intact.  Amb 300 ft.  OK for Dc this pm.            Julián Mcneill      Vital Sign (recent):  BP (!) 100/50 (BP Location: Left arm, Patient Position: Lying)   Pulse (!) 56   Temp 97.6 °F (36.4 °C) (Oral)   Resp 16   Ht 5' 4" (1.626 m)   Wt 68.2 kg (150 lb 5.7 oz)   LMP 03/02/1998   SpO2 98%   Breastfeeding? No   BMI 25.81 kg/m²       Laboratory:    CBC:   Recent Labs  Lab 09/26/17  0435   WBC 4.65   RBC 3.38*   HGB 10.8*   HCT 32.4*   PLT 93*   MCV 96   MCH 32.0*   MCHC 33.3       CMP: No results for input(s): GLU, CALCIUM, ALBUMIN, PROT, NA, K, CO2, CL, BUN, CREATININE, ALKPHOS, ALT, AST, BILITOT in the last 168 hours.        Intake/Output Summary (Last 24 hours) at 09/26/17 0829  Last data filed at 09/26/17 0600   Gross per 24 hour   Intake          2678.75 ml   Output             3500 ml   Net          -821.25 ml         Current Medications:   acetaminophen  1,000 mg Intravenous Q8H    anastrozole  1 mg Oral Daily    aspirin  325 mg Oral Q12H    celecoxib  200 mg Oral BID    docusate sodium  100 mg Oral Q12H    dorzolamide-timolol 2-0.5%  1 drop Both Eyes BID    famotidine  20 mg Oral BID    levothyroxine  75 mcg Oral Daily    mupirocin  1 g Nasal BID    polyethylene glycol  17 g Oral Daily    pravastatin  40 mg Oral Daily    sertraline  100 mg Oral Daily    solifenacin  5 mg Oral Daily    trazodone  50 mg Oral QHS       Continuous Infusions:   dextrose 5 % and 0.9 % NaCl 75 mL/hr at 09/25/17 1145     PRN Meds:.diphenhydrAMINE, HYDROmorphone, ondansetron, oxycodone, oxycodone, promethazine (PHENERGAN) IVPB, sodium chloride 0.9%  "

## 2017-09-26 NOTE — PLAN OF CARE
DC Dispo:    Patient to D/C today, patient owns RW which is necessary for D/C. [facility] accepted. Writer sent straight cane order to [facility] who will assign DME company for delivery. Cm to remain supportive.    1018-Writer spoke with Shelley at [facility] who authorized straight cane to be pulled from depot.     09/26/17 1011   Final Note   Assessment Type Final Discharge Note   Discharge Disposition Home-Health  ([facility])   Hospital Follow Up  Appt(s) scheduled? Yes   Discharge plans and expectations educations in teach back method with documentation complete? Yes

## 2017-09-26 NOTE — PT/OT/SLP PROGRESS
"Physical Therapy  Treatment    Yuniel Gracia   MRN: [id number]   Admitting Diagnosis: Primary osteoarthritis of left knee    PT Received On: 09/26/17  PT Start Time: 0820     PT Stop Time: 0922    PT Total Time (min): 62 min       Billable Minutes:  Gait [id number], Therapeutic Activity 12 and Therapeutic Exercise 20    Treatment Type: Treatment  PT/PTA: PTA     PTA Visit Number: 1       General Precautions: Standard, fall  Orthopedic Precautions: Full weight bearing   Braces: N/A    Do you have any cultural, spiritual, Episcopal conflicts, given your current situation?: none specified    Subjective:  Communicated with ns prior to session.  Pt agreeable to tx., no c/o's dizziness now.     Pain/Comfort  Pain Rating 1: 3/10  Location - Side 1: Left  Location - Orientation 1: generalized  Location 1: knee  Pain Addressed 1: Pre-medicate for activity, Reposition, Distraction  Pain Rating Post-Intervention 1: 4/10  Pain Rating 2: 2/10  Location - Side 2: Right  Location - Orientation 2: generalized  Location 2: knee  Pain Addressed 2: Pre-medicate for activity, Reposition, Distraction  Pain Rating Post-Intervention 2: 2/10    Objective:   Patient found with: Polar ice, SCD, peripheral IV    Functional Mobility:  Bed Mobility:   Supine to Sit: Stand by Assistance    Transfers:  Sit <> Stand Assistance: Contact Guard Assistance, Stand By Assistance (CGA from commode in bathroom, SBA from EOB and chair)  Sit <> Stand Assistive Device: Rolling Walker    Gait:   Gait Distance: 200' and 150' w/RW and CGA/SBA  Assistance 1: Contact Guard Assistance, Stand by Assistance  Gait Assistive Device: Rolling walker  Gait Pattern: reciprocal  Gait Deviation(s): decreased ish, decreased step length, decreased stride length, decreased weight-shifting ability    Stairs:  Pt ascended/descend 4" curb step with Rolling Walker with no with Contact Guard Assistance.     Balance:   Static Sit: GOOD: Takes MODERATE challenges from all " directions  Dynamic Sit: GOOD: Maintains balance through MODERATE excursions of active trunk movement  Static Stand: FAIR+: Takes MINIMAL challenges from all directions  Dynamic stand: FAIR+: Needs CLOSE SUPERVISION during gait and is able to right self with minor LOB     Therapeutic Activities and Exercises:  Issued HEP and reviewed w/ pt and , pt perf'd supine  ex's of AP's, QS,GS, hip abd/add, SLR's, heelsides, SAQ's x 10 ea. Seated ex's of LAQ's and AAROM heelslides x 10 ea.  Pt perf'd commode t/f's w/ CGA and use of railing in bathroom, perf'd ADL's at sink w/ SBA.    AM-PAC 6 CLICK MOBILITY  How much help from another person does this patient currently need?   1 = Unable, Total/Dependent Assistance  2 = A lot, Maximum/Moderate Assistance  3 = A little, Minimum/Contact Guard/Supervision  4 = None, Modified White/Independent    Turning over in bed (including adjusting bedclothes, sheets and blankets)?: 4  Sitting down on and standing up from a chair with arms (e.g., wheelchair, bedside commode, etc.): 3  Moving from lying on back to sitting on the side of the bed?: 3  Moving to and from a bed to a chair (including a wheelchair)?: 3  Need to walk in hospital room?: 3  Climbing 3-5 steps with a railing?: 3  Total Score: 19    AM-PAC Raw Score CMS G-Code Modifier Level of Impairment Assistance   6 % Total / Unable   7 - 9 CM 80 - 100% Maximal Assist   10 - 14 CL 60 - 80% Moderate Assist   15 - 19 CK 40 - 60% Moderate Assist   20 - 22 CJ 20 - 40% Minimal Assist   23 CI 1-20% SBA / CGA   24 CH 0% Independent/ Mod I     Patient left up in chair with all lines intact, call button in reach, ns notified and  present.    Assessment:  Yuniel Gracia is a 72 y.o. female with a medical diagnosis of Primary osteoarthritis of left knee and presents with good mobility today, low BP earlier this AM, though fluids given and pt now 114/75 seated. NO LOB or SOB noted w/ amb., good ROM noted (~90*  flexion seated).    Rehab identified problem list/impairments: Rehab identified problem list/impairments: weakness, impaired self care skills, impaired functional mobilty, gait instability, impaired balance, decreased lower extremity function, pain, decreased ROM, impaired skin, edema    Rehab potential is excellent    Activity tolerance: Excellent    Discharge recommendations: Discharge Facility/Level Of Care Needs: home with home health, home health PT, home health OT     Barriers to discharge: Barriers to Discharge: None    Equipment recommendations: Equipment Needed After Discharge: none     GOALS:    Physical Therapy Goals        Problem: Physical Therapy Goal    Goal Priority Disciplines Outcome Goal Variances Interventions   Physical Therapy Goal     PT/OT, PT Ongoing (interventions implemented as appropriate)     Description:  Goals to be met by: 17     Patient will increase functional independence with mobility by performin. Supine to sit with supervision.   2. Sit to supine with supervision.   3. Sit<>stand transfer with supervision using rolling walker.   4. Gait > 150 feet with SBA using rolling walker.   5. Ascend/descend curb step with CGA with or without AD.                      PLAN:    Patient to be seen BID  to address the above listed problems via gait training, therapeutic activities, therapeutic exercises, neuromuscular re-education  Plan of Care expires: 10/25/17  Plan of Care reviewed with: patient, spouse         Lorri Alban, PTA  2017

## 2017-09-26 NOTE — PLAN OF CARE
6048-Writer faxed straight cane order to [facility]. No other DME needs for D/C. Writer to follow closely.

## 2017-09-26 NOTE — PLAN OF CARE
Problem: Occupational Therapy Goal  Goal: Occupational Therapy Goal  Initial OT evaluation completed.  Patient is at a SBA to Supervision level wit, using RW, for basic self care tasks and states spouse is able to assist at present level.  Will discharge from inpatient OT services.    Comments: ALEX Gregorio, 9/26/2017

## 2017-09-26 NOTE — PLAN OF CARE
Problem: Patient Care Overview  Goal: Plan of Care Review  Outcome: Ongoing (interventions implemented as appropriate)  Pt free of trauma, falls, and injury. Pt VSS and afebrile throughout shift. Pt free of skin breakdown. Pt neurovascular checks are intact. Pt dressing is clean, dry, and intact. Pt pain has been moderately controlled by PO/IV pain meds and tolerated well. Pt has been eating and voiding adequately throughout shift. Purposeful rounding done. Pt has call light in reach, bed alarm on, bed brakes on, side rails up x2, bed in low position, TEDs/SCDs/FCDs on, IS at bedside, and nonskid socks on. Pt lying in bed in no distress.

## 2017-09-26 NOTE — PT/OT/SLP EVAL
"Occupational Therapy  Evaluation/Discharge    Yuniel Gracia   MRN: [id number]   Admitting Diagnosis: Primary osteoarthritis of left knee, s/p (L) TKR    OT Date of Treatment: 09/26/17   OT Start Time: 0943  OT Stop Time: 1004  OT Total Time (min): 21 min    Billable Minutes:  Evaluation 21    Diagnosis: Primary osteoarthritis of left knee, s/p (L) TKR     Past Medical History:   Diagnosis Date    Anemia     Arthritis     Blood transfusion     Breast cancer     Cancer RIGHT BREAST    Chronic constipation     Clotting disorder     Colon polyp     Diverticulosis     Glaucoma     Hepatitis C 2000    pt states history of hepatits c-"cured by Dr. Lynch"; TREATED 2 YEARS - 2000   OK NOW    Hypothyroid     Insomnia     Memory loss     reports some memory loss since chemo    Osteoarthritis     Panic attacks     Raynaud's disease     takes amlodipine for this    Ulcer     Vertigo       Past Surgical History:   Procedure Laterality Date    BLADDER SURGERY  2011    sling - Dr Giles  - [place] [facility]    BRAIN SURGERY  2007    temporal neuralgia - [place]    BREAST LUMPECTOMY  3/12    malignant - had chemo and radiotherapy    COLONOSCOPY  01/03/2011    Dr Lynch, in media section, diverticulosis, hemorrhoids, otherwise normal findings; normal findings on random biopsies    COLONOSCOPY N/A 6/1/2017    Procedure: COLONOSCOPY;  Surgeon: Nate Lamb MD;  Location: [facility];  Service: Endoscopy;  Laterality: N/A;    EYE SURGERY      cataracts bilateral    HYSTERECTOMY      removal of port a cath      right lymph node removed from breast area      TUNNELED VENOUS PORT PLACEMENT  04/2012    left chest    UPPER GASTROINTESTINAL ENDOSCOPY  prior to 2011    small hiatal hernia       Referring physician: JULIAN Spencer (NP)  Date referred to OT: 09/26/17    General Precautions: Standard, fall  Orthopedic Precautions: Full weight bearing  Braces: N/A          Patient History:  Living " "Environment  Lives With: spouse  Living Arrangements: house  Home Accessibility: stairs to enter home (has elevator to second level, both tub shower and walk in shower)  Home Layout: Bathroom on 2nd floor, Bedroom on 2nd floor (elevator access)  Number of Stairs to Enter Home: 1  Stair Railings at Home: none  Transportation Available: family or friend will provide  Equipment Currently Used at Home:  (owns, not using, 3-in-1 and RW (from spouse's previous TKR surgeries))    Prior level of function:   Bed Mobility/Transfers: needs device (SPC, but has since been misplaced)  Grooming: independent  Bathing: independent  Upper Body Dressing: independent  Lower Body Dressing: independent  Toileting: independent  Home Management Skills: independent  Driving License: Yes  Mode of Transportation: Car  Occupation: Retired  Leisure and Hobbies: Playing cards, going out to eat, exercising 3 x week     Dominant hand: right    Subjective:  Communicated with nsg, PTA prior to session.  "I don't know why I'm so sleepy"  Chief Complaint: Fatigue  Patient/Family stated goals: Home    Pain/Comfort  Pain Rating 1: 2/10  Location - Side 1: Left  Location - Orientation 1: generalized  Location 1: knee  Pain Addressed 1: Reposition, Distraction  Pain Rating Post-Intervention 1: 4/10    Objective:  Patient found with: Polar ice, peripheral IV    Cognitive Exam:  Oriented to: Person, Place and Situation  Follows Commands/attention: Follows two-step commands  Communication: clear/fluent  Memory:  No Deficits noted  Safety awareness/insight to disability: intact  Coping skills/emotional control: Appropriate to situation    Visual/perceptual:  Intact    Physical Exam:  Postural examination/scapula alignment: Rounded shoulder and Head forward  Skin integrity: Visible skin intact  Edema: Moderate (L) LE    Sensation:   Intact  light/touch (B) UEs    Upper Extremity Range of Motion:  Right Upper Extremity: WFL  Left Upper Extremity: WFL    Upper " Extremity Strength:  Right Upper Extremity: WFL  Left Upper Extremity: WFL   Strength: Good    Fine motor coordination:   Intact    Gross motor coordination: WFL    Functional Mobility:  Bed Mobility:       Transfers:  Sit <> Stand Assistance: Stand By Assistance  Sit <> Stand Assistive Device: Rolling Walker  Bed <> Chair Technique: Stand Pivot  Bed <> Chair Transfer Assistance: Stand By Assistance  Bed <> Chair Assistive Device: Rolling Walker  Toilet Transfer Assistance: Stand By Assistance  Toilet Transfer Assistive Device: Rolling Walker    Functional Ambulation: SBA with RW within room    Activities of Daily Living:  Feeding Level of Assistance: Modified independent  UE Dressing Level of Assistance: Set-up Assistance  LE Dressing Level of Assistance: Stand by assistance (with RW)  Grooming Position: Standing at sink  Grooming Level of Assistance: Stand by assistance  Toileting Where Assessed: Toilet  Toileting Level of Assistance: Supervision     Balance:   Static Sit: GOOD: Takes MODERATE challenges from all directions  Dynamic Sit: GOOD: Maintains balance through MODERATE excursions of active trunk movement  Static Stand: FAIR+: Takes MINIMAL challenges from all directions  Dynamic stand: FAIR+: Needs CLOSE SUPERVISION during gait and is able to right self with minor LOB    Therapeutic Activities and Exercises:  Educ to role of OT, LBD technique, safe footwear, initiated home safety discussion to be continued with HH services.  Verbalized understanding.    AM-PAC 6 CLICK ADL  How much help from another person does this patient currently need?  1 = Unable, Total/Dependent Assistance  2 = A lot, Maximum/Moderate Assistance  3 = A little, Minimum/Contact Guard/Supervision  4 = None, Modified [place]/Independent    Putting on and taking off regular lower body clothing? : 3  Bathing (including washing, rinsing, drying)?: 3  Toileting, which includes using toilet, bedpan, or urinal? : 3  Putting on and  "taking off regular upper body clothing?: 3  Taking care of personal grooming such as brushing teeth?: 3  Eating meals?: 4  Total Score: 19    AM-PAC Raw Score CMS "G-Code Modifier Level of Impairment Assistance   6 % Total / Unable   7 - 9 CM 80 - 100% Maximal Assist   10-14 CL 60 - 80% Moderate Assist   15 - 19 CK 40 - 60% Moderate Assist   20 - 22 CJ 20 - 40% Minimal Assist   23 CI 1-20% SBA / CGA   24 CH 0% Independent/ Mod I       Patient left up in chair with all lines intact, call button in reach, nsg notified and spouse present    Assessment:  Yuniel Gracia is a 72 y.o. female with a medical diagnosis of Primary osteoarthritis of left knee and presents with weakness, impaired endurance, impaired self care skills, impaired functional mobilty, gait instability, impaired balance, decreased lower extremity function, pain, decreased ROM, decreased safety awareness, edema, orthopedic precautions. Patient is at a SBA to Supervision level wit, using RW, for basic self care tasks and states spouse is able to assist at present level.  Will discharge from inpatient OT services.     Rehab identified problem list/impairments: Rehab identified problem list/impairments: weakness, impaired endurance, impaired self care skills, impaired functional mobilty, gait instability, impaired balance, decreased lower extremity function, pain, decreased ROM, decreased safety awareness, edema, orthopedic precautions    Rehab potential is excellent.    Activity tolerance: Good    Discharge recommendations: Discharge Facility/Level Of Care Needs: home with home health, home health PT, home health OT     Barriers to discharge: Barriers to Discharge: None    Equipment recommendations: none (appears to own all appropriate DME)     GOALS:    Occupational Therapy Goals        Problem: Occupational Therapy Goal    Goal Priority Disciplines Outcome Interventions   Occupational Therapy Goal     OT, PT/OT                     PLAN:  " Patient to be seen  (Discharge from inpatient OT services) to address the above listed problems via    Plan of Care expires:    Plan of Care reviewed with: patient, spouse         ALEX Gregorio  09/26/2017

## 2017-09-26 NOTE — PT/OT/SLP PROGRESS
Occupational Therapy  Not Seen    Yuniel Gracia   MRN: [id number]     Patient not seen for Occupational Therapy today due to (Other (Comment)) departmental protocol for elective surgery patients.    Patient with Osteoarthritis of left knee, unspecified osteoarthritis type [M17.12], s/p Procedure(s):  REPLACEMENT-KNEE WITH NAVIGATION 9/25/2017 who will be seen for Occupational Therapy evaluation POD#1.    ALEX Gregorio   9/25/2017

## 2017-09-26 NOTE — PLAN OF CARE
"Problem: Physical Therapy Goal  Goal: Physical Therapy Goal  Goals to be met by: 17     Patient will increase functional independence with mobility by performin. Supine to sit with supervision.   2. Sit to supine with supervision.   3. Sit<>stand transfer with supervision using rolling walker.   4. Gait > 150 feet with SBA using rolling walker.   5. Ascend/descend curb step with CGA with or without AD.     Pt progressing towards goals, sup to sit SBA, sit to stand CGA/SBA, amb'd w/RW and CGA/SBA ~200', and 150'. Asc/desc 4" curb step w/RW and CGA. BP:114/75 seated      "

## 2017-09-26 NOTE — PLAN OF CARE
Problem: Patient Care Overview  Goal: Plan of Care Review  Outcome: Outcome(s) achieved Date Met: 09/26/17  AAOX3. Ambulated with assist to bathroom.Voided spontaneously clear yellow urine. Low BP noted; Bolus given per Billy NP. Otherwise stable on RA and afebrile. Positions self independently. Up in chair at this shift. Minimal pain reported. Neuro checks WDL. TEDs/SCDs maintained. Dressing to L knee, CDI. Polar care in place. CPM at the bedside. Tolerating ordered diet. IV site WNL. Plan of care reviewed with patient and all questions answered. Bed low, locked w/ bed alarm on. Call light within reach. Purposeful rounding performed. No other complaints at this time.    Eager & in agreement w/ DC. VU of DC instructions--paperwork & prescriptions passed & explained. IV removed w/ cath tip intact, WNL. Verified HH & DME set up via [facility] & walker has arrived to pt room. Polar care & CPM packed. To be DCd home w/ family-- will be escorted downstairs via  transport team once dressed, ready & ride arrives. Free from falls, injury, or skin breakdown this hospital admission.

## 2017-09-27 NOTE — PROGRESS NOTES
Physical Therapy Discharge Summary    Yuniel Gracia  MRN: [id number]   Primary osteoarthritis of left knee   Patient Discharged from acute Physical Therapy on 17.  Please refer to prior PT noted date on 17 for functional status.     Assessment:   Patient has not met goals.  GOALS:    Physical Therapy Goals        Problem: Physical Therapy Goal    Goal Priority Disciplines Outcome Goal Variances Interventions   Physical Therapy Goal     PT/OT, PT Ongoing (interventions implemented as appropriate)     Description:  Goals to be met by: 17     Patient will increase functional independence with mobility by performin. Supine to sit with supervision.   2. Sit to supine with supervision.   3. Sit<>stand transfer with supervision using rolling walker.   4. Gait > 150 feet with SBA using rolling walker.   5. Ascend/descend curb step with CGA with or without AD.                    Reasons for Discontinuation of Therapy Services  Transfer to alternate level of care.      Plan:  Patient Discharged to: Home with Home Health Service.  PT of record not available for documentation.

## 2017-10-02 NOTE — DISCHARGE SUMMARY
[facility]  Short Stay  Discharge Summary  TOTAL KNEE REPLACEMENT    Admit Date: 9/25/2017    Discharge Date and Time: 9/26/2017 11:45 AM      Discharge Attending Physician: Julián Mcneill MD    Hospital Course:  Yuinel Gracia,is a 72 y.o. female with severe osteoarthritis L knee, unrelieved with the conservative measures. The patient was admitted on  9/25/2017 and underwent L total knee replacement with computer-assisted navigation. Postoperatively, weightbearing as tolerated with a walker and CPM machine was initiated on postop day #1. Yuniel Gracia did quite well and was discharged on 9/26/2017  with home health physical therapy and nursing. The patient will be on Percocet for pain and aspirin 325 mg p.o. b.i.d. with meals x4 weeks.  A CPM machine will will be sent home with the patient. Postoperative follow up will be in the office in 4 weeks.       Final Diagnoses:    Principal Problem: Primary osteoarthritis of left knee   Secondary Diagnoses:   Active Hospital Problems    Diagnosis  POA   No active problems to display.      Resolved Hospital Problems    Diagnosis Date Resolved POA    *Primary osteoarthritis of left knee [M17.12] 09/26/2017 Yes       Discharged Condition: good    Disposition: [place]Health [place] [facility] [place]    Follow up/Patient Instructions:    Medications:  Reconciled Home Medications:   Discharge Medication List as of 9/26/2017 10:53 AM      START taking these medications    Details   aspirin 325 MG tablet Take 1 tablet (325 mg total) by mouth every 12 (twelve) hours. For 30 days post-op, Starting Tue 9/26/2017, Until Thu 10/26/2017, OTC      oxycodone-acetaminophen (PERCOCET) 5-325 mg per tablet 1 tab every 4 hours PRN pain or 2 tablets every 6 hours PRN pain, Print         CONTINUE these medications which have NOT CHANGED    Details   amlodipine (NORVASC) 2.5 MG tablet Starting 12/1/2015, Until Discontinued, Historical Med      anastrozole (ARIMIDEX) 1 mg Tab TAKE 1 TABLET  "DAILY, Normal      dorzolamide-timolol 2-0.5% (COSOPT) 22.3-6.8 mg/mL ophthalmic solution Starting 3/31/2017, Until Discontinued, Historical Med      levothyroxine (SYNTHROID) 75 MCG tablet TAKE 1 TABLET DAILY, Normal      POT GLUCONATE/POTASSIUM CIT (POT CIT-POT GLUCONATE ORAL) Take by mouth., Historical Med      pravastatin (PRAVACHOL) 40 MG tablet Take 40 mg by mouth once daily. , Starting 10/11/2015, Until Discontinued, Historical Med      sertraline (ZOLOFT) 100 MG tablet TAKE 1 TABLET DAILY, Normal      temazepam (RESTORIL) 15 mg Cap TAKE 1-2 CAPSULES BY MOUTH 30-60 MINUTES PRIOR TO SLEEP AS NEEDED FOR INSOMNIA, Normal      tramadol (ULTRAM) 50 mg tablet Take 1 tablet (50 mg total) by mouth every 6 (six) hours as needed for Pain., Starting 5/1/2017, Until Discontinued, Print      trazodone (DESYREL) 50 MG tablet Take 50 mg by mouth every evening., Historical Med      UNABLE TO FIND prevagen extra strength, Historical Med      VESICARE 5 mg tablet TAKE 1 TABLET DAILY, Normal      zolpidem (AMBIEN) 5 MG Tab Take 1 tablet (5 mg total) by mouth nightly as needed., Starting 4/10/2017, Until Mon 10/9/17, Normal         STOP taking these medications       cholecalciferol, vitamin D3, (VITAMIN D-3) 400 unit Chew Comments:   Reason for Stopping:         coenzyme Q10 100 mg capsule Comments:   Reason for Stopping:         fish oil-omega-3 fatty acids 300-1,000 mg capsule Comments:   Reason for Stopping:               Discharge Procedure Orders  WALKER FOR HOME USE   Order Specific Question Answer Comments   Type of Walker: Adult (5'4"-6'6")    With wheels? Yes    Height: 5' 4" (1.626 m)    Weight: 68.2 kg (150 lb 5.7 oz)    Does patient have medical equipment at home? bedside commode    Does patient have medical equipment at home? walker, rolling    Length of need (1-99 months): 99      3 IN 1 COMMODE FOR HOME USE   Order Specific Question Answer Comments   Type: Standard    Height: 5' 4" (1.626 m)    Weight: 68.2 kg " "(150 lb 5.7 oz)    Does patient have medical equipment at home? bedside commode    Does patient have medical equipment at home? walker, rolling    Length of need (1-99 months): 99      CANE FOR HOME USE   Order Specific Question Answer Comments   Type of Cane: Straight    Height: 5' 4" (1.626 m)    Weight: 68.2 kg (150 lb 5.7 oz)    Does patient have medical equipment at home? bedside commode    Does patient have medical equipment at home? walker, rolling    Length of need (1-99 months): 99      Diet general     Weight bearing restrictions (specify)       Follow-up Information     [facility].    Specialties:  DME Provider, Home Health Services  Why:  Home Health  Contact information:  [address] [place] [address]  [phone]             Julián Mcneill MD In 4 weeks.    Specialty:  Orthopedic Surgery  Contact information:  [address]  [facility]  [facility] [address]  [phone]             [facility].    Specialties:  Home Health Services, DME Provider  Why:  RN Eval to follow  Contact information:  [address] [facility] [address]  [phone]                     "

## 2017-11-03 ENCOUNTER — HOSPITAL ENCOUNTER (OUTPATIENT)
Facility: HOSPITAL | Age: 72
Discharge: HOME | End: 2017-11-04
Attending: EMERGENCY MEDICINE | Admitting: FAMILY MEDICINE
Payer: MEDICARE

## 2017-11-03 ENCOUNTER — PATIENT MESSAGE (OUTPATIENT)
Dept: HEMATOLOGY/ONCOLOGY | Facility: CLINIC | Age: 72
End: 2017-11-03

## 2017-11-03 DIAGNOSIS — L03.116 CELLULITIS OF LEFT KNEE: Primary | ICD-10-CM

## 2017-11-03 PROBLEM — N30.00 ACUTE CYSTITIS: Status: ACTIVE | Noted: 2017-11-03

## 2017-11-03 LAB
ALBUMIN SERPL BCP-MCNC: 3.7 G/DL
ALP SERPL-CCNC: 62 U/L
ALT SERPL W/O P-5'-P-CCNC: 18 U/L
ANION GAP SERPL CALC-SCNC: 10 MMOL/L
APTT BLDCRRT: 24.6 SEC
AST SERPL-CCNC: 32 U/L
BACTERIA #/AREA URNS HPF: ABNORMAL /HPF
BASOPHILS # BLD AUTO: 0 K/UL
BASOPHILS NFR BLD: 0.3 %
BILIRUB SERPL-MCNC: 0.4 MG/DL
BILIRUB UR QL STRIP: NEGATIVE
BUN SERPL-MCNC: 20 MG/DL
CALCIUM SERPL-MCNC: 9.3 MG/DL
CHLORIDE SERPL-SCNC: 103 MMOL/L
CLARITY UR: ABNORMAL
CO2 SERPL-SCNC: 25 MMOL/L
COLOR UR: YELLOW
CREAT SERPL-MCNC: 0.8 MG/DL
CRP SERPL-MCNC: 3 MG/L
DIFFERENTIAL METHOD: ABNORMAL
EOSINOPHIL # BLD AUTO: 0.2 K/UL
EOSINOPHIL NFR BLD: 4 %
ERYTHROCYTE [DISTWIDTH] IN BLOOD BY AUTOMATED COUNT: 15 %
ERYTHROCYTE [SEDIMENTATION RATE] IN BLOOD BY WESTERGREN METHOD: 24 MM/HR
EST. GFR  (AFRICAN AMERICAN): >60 ML/MIN/1.73 M^2
EST. GFR  (NON AFRICAN AMERICAN): >60 ML/MIN/1.73 M^2
GLUCOSE SERPL-MCNC: 85 MG/DL
GLUCOSE UR QL STRIP: NEGATIVE
HCT VFR BLD AUTO: 34.8 %
HGB BLD-MCNC: 11.7 G/DL
HGB UR QL STRIP: ABNORMAL
KETONES UR QL STRIP: NEGATIVE
LACTATE SERPL-SCNC: 0.4 MMOL/L
LACTATE SERPL-SCNC: 0.4 MMOL/L
LACTATE SERPL-SCNC: 0.9 MMOL/L
LEUKOCYTE ESTERASE UR QL STRIP: ABNORMAL
LYMPHOCYTES # BLD AUTO: 1.3 K/UL
LYMPHOCYTES NFR BLD: 21.1 %
MCH RBC QN AUTO: 31.1 PG
MCHC RBC AUTO-ENTMCNC: 33.6 G/DL
MCV RBC AUTO: 93 FL
MICROSCOPIC COMMENT: ABNORMAL
MONOCYTES # BLD AUTO: 0.4 K/UL
MONOCYTES NFR BLD: 6.1 %
NEUTROPHILS # BLD AUTO: 4.2 K/UL
NEUTROPHILS NFR BLD: 68.5 %
NITRITE UR QL STRIP: POSITIVE
PH UR STRIP: 7 [PH] (ref 5–8)
PLATELET # BLD AUTO: 153 K/UL
PMV BLD AUTO: 7.8 FL
POTASSIUM SERPL-SCNC: 3.9 MMOL/L
PROT SERPL-MCNC: 7.2 G/DL
PROT UR QL STRIP: NEGATIVE
RBC # BLD AUTO: 3.76 M/UL
RBC #/AREA URNS HPF: 1 /HPF (ref 0–4)
SODIUM SERPL-SCNC: 138 MMOL/L
SP GR UR STRIP: 1.01 (ref 1–1.03)
SQUAMOUS #/AREA URNS HPF: 4 /HPF
TROPONIN I SERPL DL<=0.01 NG/ML-MCNC: 0.01 NG/ML
TSH SERPL DL<=0.005 MIU/L-ACNC: 2.5 UIU/ML
URN SPEC COLLECT METH UR: ABNORMAL
UROBILINOGEN UR STRIP-ACNC: NEGATIVE EU/DL
WBC # BLD AUTO: 6.2 K/UL
WBC #/AREA URNS HPF: 6 /HPF (ref 0–5)

## 2017-11-03 PROCEDURE — G0378 HOSPITAL OBSERVATION PER HR: HCPCS

## 2017-11-03 PROCEDURE — 87186 SC STD MICRODIL/AGAR DIL: CPT

## 2017-11-03 PROCEDURE — 84443 ASSAY THYROID STIM HORMONE: CPT

## 2017-11-03 PROCEDURE — 96365 THER/PROPH/DIAG IV INF INIT: CPT

## 2017-11-03 PROCEDURE — 96361 HYDRATE IV INFUSION ADD-ON: CPT

## 2017-11-03 PROCEDURE — 86140 C-REACTIVE PROTEIN: CPT

## 2017-11-03 PROCEDURE — 25000003 PHARM REV CODE 250: Performed by: NURSE PRACTITIONER

## 2017-11-03 PROCEDURE — 93005 ELECTROCARDIOGRAM TRACING: CPT

## 2017-11-03 PROCEDURE — 87077 CULTURE AEROBIC IDENTIFY: CPT

## 2017-11-03 PROCEDURE — 81000 URINALYSIS NONAUTO W/SCOPE: CPT

## 2017-11-03 PROCEDURE — 96368 THER/DIAG CONCURRENT INF: CPT

## 2017-11-03 PROCEDURE — 85025 COMPLETE CBC W/AUTO DIFF WBC: CPT

## 2017-11-03 PROCEDURE — 96366 THER/PROPH/DIAG IV INF ADDON: CPT

## 2017-11-03 PROCEDURE — 87086 URINE CULTURE/COLONY COUNT: CPT

## 2017-11-03 PROCEDURE — 63600175 PHARM REV CODE 636 W HCPCS: Performed by: NURSE PRACTITIONER

## 2017-11-03 PROCEDURE — 83605 ASSAY OF LACTIC ACID: CPT

## 2017-11-03 PROCEDURE — S0077 INJECTION, CLINDAMYCIN PHOSP: HCPCS | Performed by: NURSE PRACTITIONER

## 2017-11-03 PROCEDURE — 93010 ELECTROCARDIOGRAM REPORT: CPT | Mod: ,,, | Performed by: INTERNAL MEDICINE

## 2017-11-03 PROCEDURE — 80053 COMPREHEN METABOLIC PANEL: CPT

## 2017-11-03 PROCEDURE — 96360 HYDRATION IV INFUSION INIT: CPT

## 2017-11-03 PROCEDURE — 87088 URINE BACTERIA CULTURE: CPT

## 2017-11-03 PROCEDURE — 87040 BLOOD CULTURE FOR BACTERIA: CPT

## 2017-11-03 PROCEDURE — 85730 THROMBOPLASTIN TIME PARTIAL: CPT

## 2017-11-03 PROCEDURE — 96367 TX/PROPH/DG ADDL SEQ IV INF: CPT

## 2017-11-03 PROCEDURE — 84484 ASSAY OF TROPONIN QUANT: CPT

## 2017-11-03 PROCEDURE — 36415 COLL VENOUS BLD VENIPUNCTURE: CPT

## 2017-11-03 PROCEDURE — 85651 RBC SED RATE NONAUTOMATED: CPT

## 2017-11-03 PROCEDURE — 99284 EMERGENCY DEPT VISIT MOD MDM: CPT | Mod: 25

## 2017-11-03 RX ORDER — PRAVASTATIN SODIUM 40 MG/1
40 TABLET ORAL DAILY
Status: DISCONTINUED | OUTPATIENT
Start: 2017-11-04 | End: 2017-11-04 | Stop reason: HOSPADM

## 2017-11-03 RX ORDER — ONDANSETRON 2 MG/ML
4 INJECTION INTRAMUSCULAR; INTRAVENOUS EVERY 6 HOURS PRN
Status: DISCONTINUED | OUTPATIENT
Start: 2017-11-03 | End: 2017-11-04 | Stop reason: HOSPADM

## 2017-11-03 RX ORDER — TRAZODONE HYDROCHLORIDE 50 MG/1
50 TABLET ORAL NIGHTLY PRN
Status: DISCONTINUED | OUTPATIENT
Start: 2017-11-03 | End: 2017-11-04 | Stop reason: HOSPADM

## 2017-11-03 RX ORDER — TRAMADOL HYDROCHLORIDE 50 MG/1
50 TABLET ORAL
Status: COMPLETED | OUTPATIENT
Start: 2017-11-03 | End: 2017-11-03

## 2017-11-03 RX ORDER — SERTRALINE HYDROCHLORIDE 50 MG/1
100 TABLET, FILM COATED ORAL DAILY
Status: DISCONTINUED | OUTPATIENT
Start: 2017-11-04 | End: 2017-11-04 | Stop reason: HOSPADM

## 2017-11-03 RX ORDER — SODIUM CHLORIDE 0.9 % (FLUSH) 0.9 %
5 SYRINGE (ML) INJECTION
Status: DISCONTINUED | OUTPATIENT
Start: 2017-11-03 | End: 2017-11-04 | Stop reason: HOSPADM

## 2017-11-03 RX ORDER — TRAMADOL HYDROCHLORIDE 50 MG/1
50 TABLET ORAL EVERY 6 HOURS PRN
Status: DISCONTINUED | OUTPATIENT
Start: 2017-11-03 | End: 2017-11-04 | Stop reason: HOSPADM

## 2017-11-03 RX ORDER — OXYCODONE AND ACETAMINOPHEN 5; 325 MG/1; MG/1
1 TABLET ORAL EVERY 6 HOURS PRN
Status: DISCONTINUED | OUTPATIENT
Start: 2017-11-03 | End: 2017-11-03

## 2017-11-03 RX ORDER — ZOLPIDEM TARTRATE 5 MG/1
5 TABLET ORAL NIGHTLY PRN
Status: DISCONTINUED | OUTPATIENT
Start: 2017-11-03 | End: 2017-11-04 | Stop reason: HOSPADM

## 2017-11-03 RX ORDER — ENOXAPARIN SODIUM 100 MG/ML
40 INJECTION SUBCUTANEOUS EVERY 24 HOURS
Status: DISCONTINUED | OUTPATIENT
Start: 2017-11-04 | End: 2017-11-04 | Stop reason: HOSPADM

## 2017-11-03 RX ORDER — SODIUM CHLORIDE 9 MG/ML
INJECTION, SOLUTION INTRAVENOUS CONTINUOUS
Status: DISCONTINUED | OUTPATIENT
Start: 2017-11-03 | End: 2017-11-04 | Stop reason: HOSPADM

## 2017-11-03 RX ORDER — TRAZODONE HYDROCHLORIDE 50 MG/1
50 TABLET ORAL NIGHTLY
Status: DISCONTINUED | OUTPATIENT
Start: 2017-11-03 | End: 2017-11-03

## 2017-11-03 RX ORDER — SENNOSIDES 8.6 MG/1
8.6 TABLET ORAL DAILY
Status: DISCONTINUED | OUTPATIENT
Start: 2017-11-04 | End: 2017-11-04 | Stop reason: HOSPADM

## 2017-11-03 RX ORDER — ANASTROZOLE 1 MG/1
1 TABLET ORAL DAILY
Status: DISCONTINUED | OUTPATIENT
Start: 2017-11-04 | End: 2017-11-03

## 2017-11-03 RX ORDER — CLINDAMYCIN PHOSPHATE 600 MG/50ML
600 INJECTION, SOLUTION INTRAVENOUS
Status: DISCONTINUED | OUTPATIENT
Start: 2017-11-03 | End: 2017-11-04

## 2017-11-03 RX ORDER — ACETAMINOPHEN 500 MG
1000 TABLET ORAL EVERY 8 HOURS PRN
Status: DISCONTINUED | OUTPATIENT
Start: 2017-11-03 | End: 2017-11-04 | Stop reason: HOSPADM

## 2017-11-03 RX ORDER — SODIUM CHLORIDE 9 MG/ML
1000 INJECTION, SOLUTION INTRAVENOUS
Status: COMPLETED | OUTPATIENT
Start: 2017-11-03 | End: 2017-11-03

## 2017-11-03 RX ADMIN — ZOLPIDEM TARTRATE 5 MG: 5 TABLET, FILM COATED ORAL at 10:11

## 2017-11-03 RX ADMIN — VANCOMYCIN HYDROCHLORIDE 1750 MG: 1 INJECTION, POWDER, LYOPHILIZED, FOR SOLUTION INTRAVENOUS at 06:11

## 2017-11-03 RX ADMIN — CEFTRIAXONE SODIUM 1 G: 1 INJECTION, POWDER, FOR SOLUTION INTRAMUSCULAR; INTRAVENOUS at 10:11

## 2017-11-03 RX ADMIN — TRAMADOL HYDROCHLORIDE 50 MG: 50 TABLET, FILM COATED ORAL at 06:11

## 2017-11-03 RX ADMIN — CLINDAMYCIN IN 5 PERCENT DEXTROSE 600 MG: 12 INJECTION, SOLUTION INTRAVENOUS at 10:11

## 2017-11-03 RX ADMIN — SODIUM CHLORIDE: 0.9 INJECTION, SOLUTION INTRAVENOUS at 10:11

## 2017-11-03 RX ADMIN — SODIUM CHLORIDE 1000 ML: 0.9 INJECTION, SOLUTION INTRAVENOUS at 06:11

## 2017-11-03 NOTE — ED PROVIDER NOTES
"Encounter Date: 11/3/2017    SCRIBE #1 NOTE: I, Melissa Cohn , am scribing for, and in the presence of,  Elizabeth Barnett NP. I have scribed the entire note.       History     Chief Complaint   Patient presents with    Leg Pain     L leg pain x 2 days. L knee replacement 9/25/17.     11/03/2017  5:01 PM     Chief Complaint: L knee pain        The patient is a 72 y.o. female who is presenting for evaluation of L knee erythema x 2 days s/p L knee replacement on 9/25/2017 by Dr. Mcneill at [facility]. The pt denies pain; she does state she has has had swelling in the left knee since surgery that is no worse than usual but the erythema and warmth is new for 2 days. No exacerbating or mitigating factors. . The pt denies recent fevers, nausea, or vomiting. Pertinent PMHx includes anemia, breast CA, Hep C, chronic UTI's and osteoarthritis. The pt reports that is currently in remission and is no longer undergoing treatment for her breast CA; her prolia injections have been on hold since her surgery.  No other pertinent past surgical hx.               The history is provided by the patient, the spouse and medical records.     Review of patient's allergies indicates:   Allergen Reactions    Adhesive Dermatitis and Rash    Codeine Nausea Only     Past Medical History:   Diagnosis Date    Anemia     Arthritis     Blood transfusion     Breast cancer     Cancer RIGHT BREAST    Chronic constipation     Clotting disorder     Colon polyp     Diverticulosis     Glaucoma     Hepatitis C 2000    pt states history of hepatits c-"cured by Dr. Lynch"; TREATED 2 YEARS - 2000   OK NOW    Hypothyroid     Insomnia     Memory loss     reports some memory loss since chemo    Osteoarthritis     Panic attacks     Raynaud's disease     takes amlodipine for this    Ulcer     Vertigo      Past Surgical History:   Procedure Laterality Date    BLADDER SURGERY  2011    sling - Dr Giles  - [facility]    BRAIN " SURGERY  2007    temporal neuralgia - [place]    BREAST LUMPECTOMY  3/12    malignant - had chemo and radiotherapy    COLONOSCOPY  01/03/2011    Dr Lynch, in media section, diverticulosis, hemorrhoids, otherwise normal findings; normal findings on random biopsies    COLONOSCOPY N/A 6/1/2017    Procedure: COLONOSCOPY;  Surgeon: Nate Lamb MD;  Location: [facility];  Service: Endoscopy;  Laterality: N/A;    EYE SURGERY      cataracts bilateral    HYSTERECTOMY      removal of port a cath      right lymph node removed from breast area      TUNNELED VENOUS PORT PLACEMENT  04/2012    left chest    UPPER GASTROINTESTINAL ENDOSCOPY  prior to 2011    small hiatal hernia     Family History   Problem Relation Age of Onset    Cancer Mother      breast    Breast cancer Mother     Diabetes Father     Heart disease Father     Cancer Sister      breast    Breast cancer Sister     Diabetes Paternal Aunt     Cancer Other      breast    Breast cancer Other     Drug abuse Son     Obesity Son     Diabetes Maternal Aunt     Heart disease Maternal Uncle     Tuberculosis Paternal Uncle      x2    Diabetes Paternal Uncle     Early death Maternal Grandmother      tonsils    Heart disease Maternal Grandfather     Alcohol abuse Maternal Grandfather     No Known Problems Paternal Grandmother     Tuberculosis Paternal Grandfather     Ovarian cancer Neg Hx     Colon cancer Neg Hx     Colon polyps Neg Hx     Crohn's disease Neg Hx     Ulcerative colitis Neg Hx      Social History   Substance Use Topics    Smoking status: Never Smoker    Smokeless tobacco: Never Used    Alcohol use 1.0 oz/week     2 Standard drinks or equivalent per week      Comment: 2 drinks per week     Review of Systems   Constitutional: Negative for activity change, appetite change, chills and fever.   Respiratory: Negative for shortness of breath.    Cardiovascular: Negative for chest pain.   Gastrointestinal: Negative for nausea  and vomiting.   Genitourinary: Negative for difficulty urinating, dysuria and hematuria.   Musculoskeletal: Positive for arthralgias (L knee ) and joint swelling. Negative for back pain and gait problem.   Skin: Positive for color change and wound. Negative for rash.        Swelling and redness L knee    Allergic/Immunologic: Negative for immunocompromised state.   Neurological: Negative for syncope and weakness.   Hematological: Does not bruise/bleed easily.       Physical Exam     Initial Vitals [11/03/17 1644]   BP Pulse Resp Temp SpO2   130/61 65 12 98.7 °F (37.1 °C) 100 %      MAP       84         Physical Exam    Nursing note and vitals reviewed.  Constitutional: Vital signs are normal. She appears well-developed and well-nourished.   HENT:   Head: Normocephalic and atraumatic.   Mouth/Throat: Oropharynx is clear and moist.   Eyes: Pupils are equal, round, and reactive to light.   Neck: Normal range of motion. Neck supple.   Cardiovascular: Normal rate, regular rhythm, normal heart sounds and intact distal pulses.   No murmur heard.  Pulmonary/Chest: Breath sounds normal. She exhibits no tenderness.   Abdominal: Soft. Normal appearance and bowel sounds are normal.   Musculoskeletal: Normal range of motion. She exhibits edema and tenderness.        Left knee: She exhibits swelling and erythema. She exhibits normal range of motion, no effusion, no ecchymosis, no deformity, no laceration, normal alignment, no LCL laxity, normal patellar mobility, no bony tenderness, normal meniscus and no MCL laxity. No tenderness found.   Neurological: She is alert and oriented to person, place, and time. She has normal strength.   Skin: Skin is warm, dry and intact. Capillary refill takes less than 2 seconds. There is erythema.        Psychiatric: She has a normal mood and affect. Her speech is normal and behavior is normal.         ED Course   Procedures  Labs Reviewed   CULTURE, BLOOD   CULTURE, BLOOD   CULTURE, URINE    LACTIC ACID, PLASMA   CBC W/ AUTO DIFFERENTIAL   COMPREHENSIVE METABOLIC PANEL   LACTIC ACID, PLASMA   TROPONIN I   TSH   URINALYSIS   APTT   LACTIC ACID, PLASMA             Medical Decision Making:   Differential Diagnosis:   Cellulitis  Septic joint/sepsis  Fracture  dislocation         APC / Resident Notes:   Patient is a 72 y.o. female who presents to the ED 11/03/2017 with a chief complaint of left knee erythema s/p replacement. Patient has no palpable effusion; she states the swelling is no worse than it has been since surgery; she denies worsening pain or decreased ROM. She tolerated PT today without difficulty.  She is able to bare weight on her LLE. She is afebrile. No leukocytosis on CBC, CRP normal; sed rate slightly elevated likely secondary to recent surgery; lactate normal; patient normotensive. Doubt sepsis; no effusion; doubt septic joint/sepsis. Discussed case with orthopedics at [facility] who recommends PO antibiotics and outpatient follow up; refuses transfer x 2 attempts. Discussed with orthopedic here Dr. Cloud who does not recommend joint aspiration or admission here.  Due to patient's history of breast CA and recent treatment and recent joint replacement; will admit patient for observation for IV antibiotics for cellulitis.  Discussed case with Elizabeth Juan who is accepting of this admission on behalf of hospitals medicine.             Scribe Attestation:   Scribe #1: I performed the above scribed service and the documentation accurately describes the services I performed. I attest to the accuracy of the note.    Attending Attestation:           Physician Attestation for Scribe:  Physician Attestation Statement for Scribe #1: I, Elizabeth Barnett, reviewed documentation, as scribed by in my presence, and it is both accurate and complete.     Comments: IElizabeth, NP-C, personally performed the services described in this documentation. All medical record entries made by the scribe were  at my direction and in my presence.  I have reviewed the chart and agree that the record reflects my personal performance and is accurate and complete. ISABEL Ludwig.  6:09 PM 11/03/2017              ED Course      Clinical Impression:   There were no encounter diagnoses.                           Elizabeth Barnett NP  11/04/17 0011

## 2017-11-04 VITALS
DIASTOLIC BLOOD PRESSURE: 50 MMHG | BODY MASS INDEX: 25.67 KG/M2 | TEMPERATURE: 98 F | WEIGHT: 150.38 LBS | HEIGHT: 64 IN | SYSTOLIC BLOOD PRESSURE: 101 MMHG | HEART RATE: 61 BPM | RESPIRATION RATE: 18 BRPM | OXYGEN SATURATION: 98 %

## 2017-11-04 LAB
ALBUMIN SERPL BCP-MCNC: 3.5 G/DL
ALP SERPL-CCNC: 56 U/L
ALT SERPL W/O P-5'-P-CCNC: 17 U/L
ANION GAP SERPL CALC-SCNC: 10 MMOL/L
AST SERPL-CCNC: 28 U/L
BASOPHILS # BLD AUTO: 0 K/UL
BASOPHILS NFR BLD: 0.3 %
BILIRUB SERPL-MCNC: 0.6 MG/DL
BUN SERPL-MCNC: 12 MG/DL
CALCIUM SERPL-MCNC: 9 MG/DL
CHLORIDE SERPL-SCNC: 105 MMOL/L
CO2 SERPL-SCNC: 25 MMOL/L
CREAT SERPL-MCNC: 0.7 MG/DL
DIFFERENTIAL METHOD: ABNORMAL
EOSINOPHIL # BLD AUTO: 0.4 K/UL
EOSINOPHIL NFR BLD: 6.8 %
ERYTHROCYTE [DISTWIDTH] IN BLOOD BY AUTOMATED COUNT: 14.6 %
EST. GFR  (AFRICAN AMERICAN): >60 ML/MIN/1.73 M^2
EST. GFR  (NON AFRICAN AMERICAN): >60 ML/MIN/1.73 M^2
GLUCOSE SERPL-MCNC: 101 MG/DL
HCT VFR BLD AUTO: 35.9 %
HGB BLD-MCNC: 12.1 G/DL
LYMPHOCYTES # BLD AUTO: 1.1 K/UL
LYMPHOCYTES NFR BLD: 20.1 %
MAGNESIUM SERPL-MCNC: 1.9 MG/DL
MCH RBC QN AUTO: 31.2 PG
MCHC RBC AUTO-ENTMCNC: 33.8 G/DL
MCV RBC AUTO: 92 FL
MONOCYTES # BLD AUTO: 0.3 K/UL
MONOCYTES NFR BLD: 6.3 %
NEUTROPHILS # BLD AUTO: 3.6 K/UL
NEUTROPHILS NFR BLD: 66.5 %
PLATELET # BLD AUTO: 129 K/UL
PMV BLD AUTO: 7.9 FL
POTASSIUM SERPL-SCNC: 4.7 MMOL/L
PROT SERPL-MCNC: 7 G/DL
RBC # BLD AUTO: 3.89 M/UL
SODIUM SERPL-SCNC: 140 MMOL/L
WBC # BLD AUTO: 5.4 K/UL

## 2017-11-04 PROCEDURE — 83735 ASSAY OF MAGNESIUM: CPT

## 2017-11-04 PROCEDURE — S0077 INJECTION, CLINDAMYCIN PHOSP: HCPCS | Performed by: NURSE PRACTITIONER

## 2017-11-04 PROCEDURE — G0378 HOSPITAL OBSERVATION PER HR: HCPCS

## 2017-11-04 PROCEDURE — 96365 THER/PROPH/DIAG IV INF INIT: CPT

## 2017-11-04 PROCEDURE — 96360 HYDRATION IV INFUSION INIT: CPT

## 2017-11-04 PROCEDURE — 94760 N-INVAS EAR/PLS OXIMETRY 1: CPT

## 2017-11-04 PROCEDURE — 36415 COLL VENOUS BLD VENIPUNCTURE: CPT

## 2017-11-04 PROCEDURE — 80053 COMPREHEN METABOLIC PANEL: CPT

## 2017-11-04 PROCEDURE — 96361 HYDRATE IV INFUSION ADD-ON: CPT

## 2017-11-04 PROCEDURE — 96368 THER/DIAG CONCURRENT INF: CPT

## 2017-11-04 PROCEDURE — 25000003 PHARM REV CODE 250: Performed by: NURSE PRACTITIONER

## 2017-11-04 PROCEDURE — 94761 N-INVAS EAR/PLS OXIMETRY MLT: CPT

## 2017-11-04 PROCEDURE — 85025 COMPLETE CBC W/AUTO DIFF WBC: CPT

## 2017-11-04 PROCEDURE — 96367 TX/PROPH/DG ADDL SEQ IV INF: CPT

## 2017-11-04 RX ORDER — SULFAMETHOXAZOLE AND TRIMETHOPRIM 800; 160 MG/1; MG/1
1 TABLET ORAL 2 TIMES DAILY
Qty: 20 TABLET | Refills: 0 | Status: SHIPPED | OUTPATIENT
Start: 2017-11-04 | End: 2017-12-12 | Stop reason: ALTCHOICE

## 2017-11-04 RX ORDER — SULFAMETHOXAZOLE AND TRIMETHOPRIM 800; 160 MG/1; MG/1
1 TABLET ORAL 2 TIMES DAILY
Status: DISCONTINUED | OUTPATIENT
Start: 2017-11-04 | End: 2017-11-04 | Stop reason: HOSPADM

## 2017-11-04 RX ORDER — DIPHENHYDRAMINE HCL 25 MG
25 CAPSULE ORAL EVERY 6 HOURS PRN
Status: DISCONTINUED | OUTPATIENT
Start: 2017-11-04 | End: 2017-11-04 | Stop reason: HOSPADM

## 2017-11-04 RX ADMIN — DIPHENHYDRAMINE HYDROCHLORIDE 25 MG: 25 CAPSULE ORAL at 10:11

## 2017-11-04 RX ADMIN — TRAMADOL HYDROCHLORIDE 50 MG: 50 TABLET, FILM COATED ORAL at 08:11

## 2017-11-04 RX ADMIN — LEVOTHYROXINE SODIUM 75 MCG: 50 TABLET ORAL at 05:11

## 2017-11-04 RX ADMIN — CLINDAMYCIN IN 5 PERCENT DEXTROSE 600 MG: 12 INJECTION, SOLUTION INTRAVENOUS at 05:11

## 2017-11-04 RX ADMIN — DIPHENHYDRAMINE HYDROCHLORIDE 25 MG: 25 CAPSULE ORAL at 04:11

## 2017-11-04 NOTE — ASSESSMENT & PLAN NOTE
Superficial cellulitis, low suspicion for joint involvement with normal inflammatory markers.  Good response to IV Vanc; cellulitis is mild-- will continue IV abx with clindamycin for now.  Can likely D/C home tomorrow with oral abx and outpatient f/u with orthopedist.  No evidence of sepsis, non-toxic appearance.

## 2017-11-04 NOTE — NURSING
Pt arrived to unit via wheelchair. NAD noted.  at bedside. Pt ambulated to BR with minimal assist. Will continue to monitor.

## 2017-11-04 NOTE — HPI
"Yuniel Gracia is a 71 yo female with PMHx significant for arthritis s/p LEFT TKA ~ 1 mo ago, hypothyroidism, breast CA, and constipation.  She was admitted to the service of hospital medicine for observation with cellulitis of the LEFT knee.  She reported to the ED at the recommendation of her physical therapist with c/o LEFT knee redness and warmth x 2 days.  She denies any increase in pain but does endorse some "stiffness" to the joint.  Able to bear weight without issue.  Denies any drainage from incision and prior to 2 days ago no wound complications.  No fever, chills, nausea/vomiting.  Her surgery was done at [facility] [place] with Dr. Mcneill.  Orthopedist on call was contacted in ED-- did not feel their was concern for septic joint and recommended d/c home with oral abx therapy and f/u with Dr. Mcneill next week.  ED providers felt cellulitis was significant enough to warrant IV antibiotic therapy.  She was admitted after receiving 1 dose of vancomycin.  She reports significant improvement since that time.  "

## 2017-11-04 NOTE — PLAN OF CARE
11/04/17 1322   Final Note   Assessment Type Final Discharge Note   Discharge Disposition Home

## 2017-11-04 NOTE — SUBJECTIVE & OBJECTIVE
"Past Medical History:   Diagnosis Date    Anemia     Arthritis     Blood transfusion     Breast cancer     Cancer RIGHT BREAST    Chronic constipation     Clotting disorder     Colon polyp     Diverticulosis     Glaucoma     Hepatitis C 2000    pt states history of hepatits c-"cured by Dr. Lynch"; TREATED 2 YEARS - 2000   OK NOW    Hypothyroid     Insomnia     Memory loss     reports some memory loss since chemo    Osteoarthritis     Panic attacks     Raynaud's disease     takes amlodipine for this    Ulcer     Vertigo        Past Surgical History:   Procedure Laterality Date    BLADDER SURGERY  2011    sling - Dr Giles  - [facility]    BRAIN SURGERY  2007    temporal neuralgia - [place]    BREAST LUMPECTOMY  3/12    malignant - had chemo and radiotherapy    COLONOSCOPY  01/03/2011    Dr Lynch, in media section, diverticulosis, hemorrhoids, otherwise normal findings; normal findings on random biopsies    COLONOSCOPY N/A 6/1/2017    Procedure: COLONOSCOPY;  Surgeon: Nate Lamb MD;  Location: [facility];  Service: Endoscopy;  Laterality: N/A;    EYE SURGERY      cataracts bilateral    HYSTERECTOMY      removal of port a cath      right lymph node removed from breast area      TUNNELED VENOUS PORT PLACEMENT  04/2012    left chest    UPPER GASTROINTESTINAL ENDOSCOPY  prior to 2011    small hiatal hernia       Review of patient's allergies indicates:   Allergen Reactions    Adhesive Dermatitis and Rash    Codeine Nausea Only       No current facility-administered medications on file prior to encounter.      Current Outpatient Prescriptions on File Prior to Encounter   Medication Sig    amlodipine (NORVASC) 2.5 MG tablet     anastrozole (ARIMIDEX) 1 mg Tab TAKE 1 TABLET DAILY    dorzolamide-timolol 2-0.5% (COSOPT) 22.3-6.8 mg/mL ophthalmic solution     levothyroxine (SYNTHROID) 75 MCG tablet TAKE 1 TABLET DAILY    POT GLUCONATE/POTASSIUM CIT (POT CIT-POT GLUCONATE ORAL) " Take by mouth.    pravastatin (PRAVACHOL) 40 MG tablet Take 40 mg by mouth once daily.     sertraline (ZOLOFT) 100 MG tablet TAKE 1 TABLET DAILY    temazepam (RESTORIL) 15 mg Cap TAKE 1-2 CAPSULES BY MOUTH 30-60 MINUTES PRIOR TO SLEEP AS NEEDED FOR INSOMNIA    tramadol (ULTRAM) 50 mg tablet Take 1 tablet (50 mg total) by mouth every 6 (six) hours as needed for Pain.    trazodone (DESYREL) 50 MG tablet Take 50 mg by mouth every evening.    VESICARE 5 mg tablet TAKE 1 TABLET DAILY    zolpidem (AMBIEN) 5 MG Tab Take 1 tablet (5 mg total) by mouth nightly as needed.    aspirin 325 MG tablet Take 1 tablet (325 mg total) by mouth every 12 (twelve) hours. For 30 days post-op    oxycodone-acetaminophen (PERCOCET) 5-325 mg per tablet 1 tab every 4 hours PRN pain or 2 tablets every 6 hours PRN pain    UNABLE TO FIND prevagen extra strength     Family History     Problem Relation (Age of Onset)    Alcohol abuse Maternal Grandfather    Breast cancer Mother, Sister, Other    Cancer Mother, Sister, Other    Diabetes Father, Paternal Aunt, Maternal Aunt, Paternal Uncle    Drug abuse Son    Early death Maternal Grandmother    Heart disease Father, Maternal Uncle, Maternal Grandfather    No Known Problems Paternal Grandmother    Obesity Son    Tuberculosis Paternal Uncle, Paternal Grandfather        Social History Main Topics    Smoking status: Never Smoker    Smokeless tobacco: Never Used    Alcohol use 1.0 oz/week     2 Standard drinks or equivalent per week      Comment: 2 drinks per week    Drug use: No    Sexual activity: Yes     Partners: Male     Birth control/ protection: None     Review of Systems   Constitutional: Negative for activity change, appetite change, chills and fever.   HENT: Negative for congestion, postnasal drip, sore throat and trouble swallowing.    Eyes: Negative for photophobia and visual disturbance.   Respiratory: Negative for cough, shortness of breath and wheezing.    Cardiovascular:  Negative for chest pain, palpitations and leg swelling.   Gastrointestinal: Negative for abdominal pain, blood in stool, constipation, diarrhea, nausea and vomiting.   Genitourinary: Negative for dysuria, hematuria and urgency.   Musculoskeletal: Positive for arthralgias (not increased from baseline). Negative for gait problem, joint swelling and myalgias.   Skin: Positive for color change.   Neurological: Negative for dizziness, weakness, light-headedness and headaches.   Psychiatric/Behavioral: Negative for confusion. The patient is not nervous/anxious.      Objective:     Vital Signs (Most Recent):  Temp: 97.3 °F (36.3 °C) (11/03/17 2002)  Pulse: (!) 52 (11/03/17 2002)  Resp: 16 (11/03/17 2002)  BP: 122/61 (11/03/17 2002)  SpO2: 100 % (11/03/17 2002) Vital Signs (24h Range):  Temp:  [97.3 °F (36.3 °C)-98.7 °F (37.1 °C)] 97.3 °F (36.3 °C)  Pulse:  [52-65] 52  Resp:  [12-16] 16  SpO2:  [96 %-100 %] 100 %  BP: (120-130)/(57-61) 122/61     Weight: 68.2 kg (150 lb 5.7 oz)  Body mass index is 25.81 kg/m².    Physical Exam   Constitutional: She is oriented to person, place, and time. She appears well-developed and well-nourished. No distress.   HENT:   Head: Normocephalic and atraumatic.   Eyes: Conjunctivae and EOM are normal. Pupils are equal, round, and reactive to light.   Neck: Normal range of motion. Neck supple. No thyromegaly present.   Cardiovascular: Normal rate, regular rhythm, normal heart sounds and intact distal pulses.    Pulmonary/Chest: Effort normal and breath sounds normal. No respiratory distress.   Abdominal: Soft. Bowel sounds are normal. She exhibits no distension. There is no tenderness.   Musculoskeletal: Normal range of motion. She exhibits edema (1+ non-pitting edema to LLE (reportedly consistent with baseline since surgery)). She exhibits no tenderness.   Neurological: She is alert and oriented to person, place, and time. No cranial nerve deficit.   Skin: Skin is warm and dry. Capillary  refill takes less than 2 seconds. There is erythema (slight erythema surrounding LEFT knee incision, warmt to touch).   No area of fluctuance.   Psychiatric: She has a normal mood and affect. Her behavior is normal. Judgment and thought content normal.    (On presentation to ED)         Significant Labs:   CBC:   Recent Labs  Lab 11/03/17  1748   WBC 6.20   HGB 11.7*   HCT 34.8*        CMP:   Recent Labs  Lab 11/03/17  1748      K 3.9      CO2 25   GLU 85   BUN 20   CREATININE 0.8   CALCIUM 9.3   PROT 7.2   ALBUMIN 3.7   BILITOT 0.4   ALKPHOS 62   AST 32   ALT 18   ANIONGAP 10   EGFRNONAA >60     Coagulation:   Recent Labs  Lab 11/03/17  1748   APTT 24.6     Lactic Acid:   Recent Labs  Lab 11/03/17  1748   LACTATE 0.4*  0.4*     Urine Studies:   Recent Labs  Lab 11/03/17  1733   COLORU Yellow   APPEARANCEUA Hazy*   PHUR 7.0   SPECGRAV 1.010   PROTEINUA Negative   GLUCUA Negative   KETONESU Negative   BILIRUBINUA Negative   OCCULTUA Trace*   NITRITE Positive*   UROBILINOGEN Negative   LEUKOCYTESUR 2+*   RBCUA 1   WBCUA 6*   BACTERIA Many*   SQUAMEPITHEL 4     CRP: 3  Sed Rate: 24

## 2017-11-04 NOTE — PLAN OF CARE
11/04/17 0752   Patient Assessment/Suction   Level of Consciousness (AVPU) alert   Respiratory Effort Normal;Unlabored   Expansion/Accessory Muscles/Retractions no retractions;no use of accessory muscles   All Lung Fields Breath Sounds clear   Rhythm/Pattern, Respiratory depth regular;pattern regular;unlabored   Cough Frequency infrequent   Cough Type good;nonproductive   PRE-TX-O2-ETCO2   O2 Device (Oxygen Therapy) room air   SpO2 98 %   Pulse Oximetry Type Intermittent   $ Pulse Oximetry - Multiple Charge Pulse Oximetry - Multiple

## 2017-11-04 NOTE — PLAN OF CARE
CM met patient at bedside, verified insurance information, address and instructed patient on discharge planning folder and left folder in room. CM wrote name and contact number on whiteboard. Patient lives with spouse, Lul- [phone], denies POA, living will or HH. Patient reports independence at home, has a cane but does not use or need it. PCP is Dr. Dolan. [facility]acy [facility]. Patient plans to dc home with no needs.      11/04/17 1000   Discharge Assessment   Assessment Type Discharge Planning Assessment   Confirmed/corrected address and phone number on facesheet? Yes   Assessment information obtained from? Patient;Caregiver   Communicated expected length of stay with patient/caregiver yes   Prior to hospitilization cognitive status: Alert/Oriented   Prior to hospitalization functional status: Independent   Current cognitive status: Alert/Oriented   Current Functional Status: Independent   Lives With spouse   Able to Return to Prior Arrangements yes   Is patient able to care for self after discharge? Yes   Patient's perception of discharge disposition home or selfcare   Readmission Within The Last 30 Days no previous admission in last 30 days   Patient currently being followed by outpatient case management? No   Patient currently receives any other outside agency services? No   Equipment Currently Used at Home none   Do you have any problems affording any of your prescribed medications? No   Is the patient taking medications as prescribed? yes   Does the patient have transportation home? Yes   Transportation Available family or friend will provide;car   Does the patient receive services at the [facility]? No   Discharge Plan A Home   Patient/Family In Agreement With Plan yes   Does the patient have transportation to healthcare appointments? Yes   Readmission Questionnaire   Have you felt down, depressed, or hopeless? 1

## 2017-11-04 NOTE — UM SECONDARY REVIEW
Physician Advisor External    Level of Care Issue    Per Dr. Underwood at [facility], pt is OP appropriate for 11/3/2017

## 2017-11-04 NOTE — PLAN OF CARE
Problem: Patient Care Overview  Goal: Plan of Care Review  Outcome: Ongoing (interventions implemented as appropriate)  Alert and oriented 4. NAD noted. VSS. Abx infused as ordered. NS @ 75ml/hr. Spouse at bedside during admission. Denies sob, pain. Up with 1 person assist to BR, tolerated well. IV site CDI, no redness or swelling. Hourly rounding performed. Free of fall or injury this shift. Bed in lowest position, wheels locked. Call light in reach. Will continue to monitor.

## 2017-11-04 NOTE — ASSESSMENT & PLAN NOTE
In remission x 5 years.  Will continue oral antineoplastic agent as per home dosing upon discharge.

## 2017-11-04 NOTE — H&P
"[facility] Hospital Medicine  History & Physical    Patient Name: Yuniel Gracia  MRN: [id number]  Admission Date: 11/3/2017  Attending Physician: Carolyn Berrios MD   Primary Care Provider: Jae Dolan MD         Patient information was obtained from patient and ER records.     Subjective:     Principal Problem:Cellulitis of left knee    Chief Complaint:   Chief Complaint   Patient presents with    Leg Pain     L leg pain x 2 days. L knee replacement 9/25/17.        HPI: Yuniel Gracia is a 73 yo female with PMHx significant for arthritis s/p LEFT TKA ~ 1 mo ago, hypothyroidism, breast CA, and constipation.  She was admitted to the service of hospital medicine for observation with cellulitis of the LEFT knee.  She reported to the ED at the recommendation of her physical therapist with c/o LEFT knee redness and warmth x 2 days.  She denies any increase in pain but does endorse some "stiffness" to the joint.  Able to bear weight without issue.  Denies any drainage from incision and prior to 2 days ago no wound complications.  No fever, chills, nausea/vomiting.  Her surgery was done at [facility] with Dr. Mcneill.  Orthopedist on call was contacted in ED-- did not feel their was concern for septic joint and recommended d/c home with oral abx therapy and f/u with Dr. Mcneill next week.  ED providers felt cellulitis was significant enough to warrant IV antibiotic therapy.  She was admitted after receiving 1 dose of vancomycin.  She reports significant improvement since that time. Other pertinent medical history as below:    Past Medical History:   Diagnosis Date    Anemia     Arthritis     Blood transfusion     Breast cancer     Cancer RIGHT BREAST    Chronic constipation     Clotting disorder     Colon polyp     Diverticulosis     Glaucoma     Hepatitis C 2000    pt states history of hepatits c-"cured by Dr. Lynch"; TREATED 2 YEARS - 2000   OK NOW    Hypothyroid  "    Insomnia     Memory loss     reports some memory loss since chemo    Osteoarthritis     Panic attacks     Raynaud's disease     takes amlodipine for this    Ulcer     Vertigo        Past Surgical History:   Procedure Laterality Date    BLADDER SURGERY  2011    sling - Dr Giles  - [facility]    BRAIN SURGERY  2007    temporal neuralgia - [facility]    BREAST LUMPECTOMY  3/12    malignant - had chemo and radiotherapy    COLONOSCOPY  01/03/2011    Dr Lynch, in media section, diverticulosis, hemorrhoids, otherwise normal findings; normal findings on random biopsies    COLONOSCOPY N/A 6/1/2017    Procedure: COLONOSCOPY;  Surgeon: Nate Lamb MD;  Location: [facility];  Service: Endoscopy;  Laterality: N/A;    EYE SURGERY      cataracts bilateral    HYSTERECTOMY      removal of port a cath      right lymph node removed from breast area      TUNNELED VENOUS PORT PLACEMENT  04/2012    left chest    UPPER GASTROINTESTINAL ENDOSCOPY  prior to 2011    small hiatal hernia       Review of patient's allergies indicates:   Allergen Reactions    Adhesive Dermatitis and Rash    Codeine Nausea Only       No current facility-administered medications on file prior to encounter.      Current Outpatient Prescriptions on File Prior to Encounter   Medication Sig    amlodipine (NORVASC) 2.5 MG tablet     anastrozole (ARIMIDEX) 1 mg Tab TAKE 1 TABLET DAILY    dorzolamide-timolol 2-0.5% (COSOPT) 22.3-6.8 mg/mL ophthalmic solution     levothyroxine (SYNTHROID) 75 MCG tablet TAKE 1 TABLET DAILY    POT GLUCONATE/POTASSIUM CIT (POT CIT-POT GLUCONATE ORAL) Take by mouth.    pravastatin (PRAVACHOL) 40 MG tablet Take 40 mg by mouth once daily.     sertraline (ZOLOFT) 100 MG tablet TAKE 1 TABLET DAILY    temazepam (RESTORIL) 15 mg Cap TAKE 1-2 CAPSULES BY MOUTH 30-60 MINUTES PRIOR TO SLEEP AS NEEDED FOR INSOMNIA    tramadol (ULTRAM) 50 mg tablet Take 1 tablet (50 mg total) by mouth every 6 (six) hours as needed  for Pain.    trazodone (DESYREL) 50 MG tablet Take 50 mg by mouth every evening.    VESICARE 5 mg tablet TAKE 1 TABLET DAILY    zolpidem (AMBIEN) 5 MG Tab Take 1 tablet (5 mg total) by mouth nightly as needed.    aspirin 325 MG tablet Take 1 tablet (325 mg total) by mouth every 12 (twelve) hours. For 30 days post-op    oxycodone-acetaminophen (PERCOCET) 5-325 mg per tablet 1 tab every 4 hours PRN pain or 2 tablets every 6 hours PRN pain    UNABLE TO FIND prevagen extra strength     Family History     Problem Relation (Age of Onset)    Alcohol abuse Maternal Grandfather    Breast cancer Mother, Sister, Other    Cancer Mother, Sister, Other    Diabetes Father, Paternal Aunt, Maternal Aunt, Paternal Uncle    Drug abuse Son    Early death Maternal Grandmother    Heart disease Father, Maternal Uncle, Maternal Grandfather    No Known Problems Paternal Grandmother    Obesity Son    Tuberculosis Paternal Uncle, Paternal Grandfather        Social History Main Topics    Smoking status: Never Smoker    Smokeless tobacco: Never Used    Alcohol use 1.0 oz/week     2 Standard drinks or equivalent per week      Comment: 2 drinks per week    Drug use: No    Sexual activity: Yes     Partners: Male     Birth control/ protection: None     Review of Systems   Constitutional: Negative for activity change, appetite change, chills and fever.   HENT: Negative for congestion, postnasal drip, sore throat and trouble swallowing.    Eyes: Negative for photophobia and visual disturbance.   Respiratory: Negative for cough, shortness of breath and wheezing.    Cardiovascular: Negative for chest pain, palpitations and leg swelling.   Gastrointestinal: Negative for abdominal pain, blood in stool, constipation, diarrhea, nausea and vomiting.   Genitourinary: Negative for dysuria, hematuria and urgency.   Musculoskeletal: Positive for arthralgias (not increased from baseline). Negative for gait problem, joint swelling and myalgias.   Skin:  Positive for color change.   Neurological: Negative for dizziness, weakness, light-headedness and headaches.   Psychiatric/Behavioral: Negative for confusion. The patient is not nervous/anxious.      Objective:     Vital Signs (Most Recent):  Temp: 97.3 °F (36.3 °C) (11/03/17 2002)  Pulse: (!) 52 (11/03/17 2002)  Resp: 16 (11/03/17 2002)  BP: 122/61 (11/03/17 2002)  SpO2: 100 % (11/03/17 2002) Vital Signs (24h Range):  Temp:  [97.3 °F (36.3 °C)-98.7 °F (37.1 °C)] 97.3 °F (36.3 °C)  Pulse:  [52-65] 52  Resp:  [12-16] 16  SpO2:  [96 %-100 %] 100 %  BP: (120-130)/(57-61) 122/61     Weight: 68.2 kg (150 lb 5.7 oz)  Body mass index is 25.81 kg/m².    Physical Exam   Constitutional: She is oriented to person, place, and time. She appears well-developed and well-nourished. No distress.   HENT:   Head: Normocephalic and atraumatic.   Eyes: Conjunctivae and EOM are normal. Pupils are equal, round, and reactive to light.   Neck: Normal range of motion. Neck supple. No thyromegaly present.   Cardiovascular: Normal rate, regular rhythm, normal heart sounds and intact distal pulses.    Pulmonary/Chest: Effort normal and breath sounds normal. No respiratory distress.   Abdominal: Soft. Bowel sounds are normal. She exhibits no distension. There is no tenderness.   Musculoskeletal: Normal range of motion. She exhibits edema (1+ non-pitting edema to LLE (reportedly consistent with baseline since surgery)). She exhibits no tenderness.   Neurological: She is alert and oriented to person, place, and time. No cranial nerve deficit.   Skin: Skin is warm and dry. Capillary refill takes less than 2 seconds. There is erythema (slight erythema surrounding LEFT knee incision, warmt to touch).   No area of fluctuance.   Psychiatric: She has a normal mood and affect. Her behavior is normal. Judgment and thought content normal.    (On presentation to ED)             Significant Labs:   CBC:   Recent Labs  Lab 11/03/17  1748   WBC 6.20   HGB  11.7*   HCT 34.8*        CMP:   Recent Labs  Lab 11/03/17  1748      K 3.9      CO2 25   GLU 85   BUN 20   CREATININE 0.8   CALCIUM 9.3   PROT 7.2   ALBUMIN 3.7   BILITOT 0.4   ALKPHOS 62   AST 32   ALT 18   ANIONGAP 10   EGFRNONAA >60     Coagulation:   Recent Labs  Lab 11/03/17  1748   APTT 24.6     Lactic Acid:   Recent Labs  Lab 11/03/17  1748   LACTATE 0.4*  0.4*     Urine Studies:   Recent Labs  Lab 11/03/17  1733   COLORU Yellow   APPEARANCEUA Hazy*   PHUR 7.0   SPECGRAV 1.010   PROTEINUA Negative   GLUCUA Negative   KETONESU Negative   BILIRUBINUA Negative   OCCULTUA Trace*   NITRITE Positive*   UROBILINOGEN Negative   LEUKOCYTESUR 2+*   RBCUA 1   WBCUA 6*   BACTERIA Many*   SQUAMEPITHEL 4     CRP: 3  Sed Rate: 24      Assessment/Plan:     * Cellulitis of left knee    Superficial cellulitis, low suspicion for joint involvement with normal inflammatory markers.  Good response to IV Vanc; cellulitis is mild-- will continue IV abx with clindamycin for now.  Can likely D/C home tomorrow with oral abx and outpatient f/u with orthopedist.  No evidence of sepsis, non-toxic appearance.          Acute cystitis    Patient reports recurrent UTIs s/p bladder sling.  IV rocephin.  Follow urine culture.          Insomnia    Utilize PRN sleep agents.          Hypothyroidism    Chronic, reviewed TSH-- euthyroid.  Continue levothyroxine as per home dosing.          Chronic constipation    Add daily senna.  Monitor clinically.          Malignant neoplasm of breast    In remission x 5 years.  Will continue oral antineoplastic agent as per home dosing upon discharge.            VTE Risk Mitigation         Ordered     enoxaparin injection 40 mg  Daily     Route:  Subcutaneous        11/03/17 2234     Medium Risk of VTE  Once      11/03/17 2234             Elizabeth Juan NP  Department of Hospital Medicine   [facility]

## 2017-11-04 NOTE — PROGRESS NOTES
"Pt cleared for d/c.  IV and tele removed.  Education provided to pt and spouse regarding home medication regimen, follow up appts, when to seek medical attention, Bactrim side effects and correct antibiotic use.  Verbalized understanding.  Pt currently gathering belongings.  Will call when ready for w/c transport.  Pt complimentary of care received.  "Thank you for everything.  Y'all have taken such great care of me."    "

## 2017-11-05 LAB — BACTERIA UR CULT: NORMAL

## 2017-11-07 ENCOUNTER — DOCUMENTATION ONLY (OUTPATIENT)
Dept: FAMILY MEDICINE | Facility: CLINIC | Age: 72
End: 2017-11-07

## 2017-11-07 NOTE — HOSPITAL COURSE
She received Vancomycin in the ED for the left leg cellulitis. Following the administration she developed a rash on her back and Vancomycin is now listed as an allergy for the patient. Her cellulitis improved significantly after the administration of Vancomycin. She had no active drainage, no skin breakdown or leucocytosis over evidence of severe sepsis. The case was discussed with the physician covering for Dr Mcneill and he recommended discharge with outpatient follow up with Dr Mcneill in 2 days. The patient was seen and examined on the day of discharge and was deemed safe for discharge. She was given oral Bactrim and no other changes were made to the regimen. The plan was discussed in detail with the patient and her spouse and they were in agreement.

## 2017-11-07 NOTE — DISCHARGE SUMMARY
"[facility]  Hospital Medicine  Discharge Summary    Patient Name: Yuniel Gracia  MRN: [id number]  Admission Date: 11/3/2017  Hospital Length of Stay: 0 days  Discharge Date and Time: 11/4/2017  1:47 PM  Attending Physician: No att. providers found   Discharging Provider: Josiah Unger MD  Primary Care Provider: Jae Dolan MD    HPI:   Yuniel Gracia is a 71 yo female with PMHx significant for arthritis s/p LEFT TKA ~ 1 mo ago, hypothyroidism, breast CA, and constipation.  She was admitted to the service of hospital medicine for observation with cellulitis of the LEFT knee.  She reported to the ED at the recommendation of her physical therapist with c/o LEFT knee redness and warmth x 2 days.  She denies any increase in pain but does endorse some "stiffness" to the joint.  Able to bear weight without issue.  Denies any drainage from incision and prior to 2 days ago no wound complications.  No fever, chills, nausea/vomiting.  Her surgery was done at [facility] with Dr. Mcneill.  Orthopedist on call was contacted in ED-- did not feel their was concern for septic joint and recommended d/c home with oral abx therapy and f/u with Dr. Mcneill next week.  ED providers felt cellulitis was significant enough to warrant IV antibiotic therapy.  She was admitted after receiving 1 dose of vancomycin.  She reports significant improvement since that time.    * No surgery found *      Hospital Course:   She received Vancomycin in the ED for the left leg cellulitis. Following the administration she developed a rash on her back and Vancomycin is now listed as an allergy for the patient. Her cellulitis improved significantly after the administration of Vancomycin. She had no active drainage, no skin breakdown or leucocytosis over evidence of severe sepsis. The case was discussed with the physician covering for Dr Mcneill and he recommended discharge with outpatient follow up with Dr Mcneill in 2 days. " The patient was seen and examined on the day of discharge and was deemed safe for discharge. She was given oral Bactrim and no other changes were made to the regimen. The plan was discussed in detail with the patient and her spouse and they were in agreement.     Consults:     No new Assessment & Plan notes have been filed under this hospital service since the last note was generated.  Service: Hospital Medicine    Final Active Diagnoses:    Diagnosis Date Noted POA    PRINCIPAL PROBLEM:  Cellulitis of left knee [L03.116] 11/03/2017 Yes    Acute cystitis [N30.00] 11/03/2017 Yes    Chronic constipation [K59.09] 07/30/2012 Yes    Insomnia [G47.00] 07/23/2012 Yes    Hypothyroidism [E03.9] 07/23/2012 Yes    Malignant neoplasm of breast [C50.919] 04/12/2012 Yes      Problems Resolved During this Admission:    Diagnosis Date Noted Date Resolved POA       Discharged Condition: good    Disposition: Home or Self Care    Follow Up:  Follow-up Information     Jae Dolan MD In 1 week.    Specialty:  Family Medicine  Contact information:  [address] [place] [address] [phone]             Julián Mcneill MD In 2 days.    Specialty:  Orthopedic Surgery  Why:  Please schedule appointment at [place] office for 11/7/17  Contact information:  [address]  [facility]  [facility] [address] [phone]                 Patient Instructions:     Diet general     Activity as tolerated     Call MD for:  temperature >100.4     Call MD for:  severe uncontrolled pain     Call MD for:  redness, tenderness, or signs of infection (pain, swelling, redness, odor or green/yellow discharge around incision site)     Call MD for:  severe persistent headache     Call MD for:  worsening rash     Call MD for:  persistent dizziness, light-headedness, or visual disturbances     Call MD for:  increased confusion or weakness         Significant Diagnostic Studies: Labs:   BMP: No results for input(s): GLU,  NA, K, CL, CO2, BUN, CREATININE, CALCIUM, MG in the last 48 hours., CMP No results for input(s): NA, K, CL, CO2, GLU, BUN, CREATININE, CALCIUM, PROT, ALBUMIN, BILITOT, ALKPHOS, AST, ALT, ANIONGAP, ESTGFRAFRICA, EGFRNONAA in the last 48 hours., CBC No results for input(s): WBC, HGB, HCT, PLT in the last 48 hours., INR No results found for: INR, PROTIME, Lipid Panel   Lab Results   Component Value Date    CHOL 182 12/30/2016    HDL 62 12/30/2016    LDLCALC 104.6 12/30/2016    TRIG 77 12/30/2016    CHOLHDL 34.1 12/30/2016    and Troponin   Recent Labs  Lab 11/03/17  1748   TROPONINI 0.015     Microbiology:   Blood Culture   Lab Results   Component Value Date    LABBLOO No Growth to date 11/03/2017    LABBLOO No Growth to date 11/03/2017    LABBLOO No Growth to date 11/03/2017    LABBLOO No Growth to date 11/03/2017     Radiology: X-Ray: CXR: X-Ray Chest 1 View (CXR): No results found for this visit on 11/03/17.    Pending Diagnostic Studies:     None         Medications:  Reconciled Home Medications:   Discharge Medication List as of 11/4/2017  1:21 PM      START taking these medications    Details   sulfamethoxazole-trimethoprim 800-160mg (BACTRIM DS) 800-160 mg Tab Take 1 tablet by mouth 2 (two) times daily., Starting Sat 11/4/2017, Normal         CONTINUE these medications which have NOT CHANGED    Details   amlodipine (NORVASC) 2.5 MG tablet Starting 12/1/2015, Until Discontinued, Historical Med      anastrozole (ARIMIDEX) 1 mg Tab TAKE 1 TABLET DAILY, Normal      dorzolamide-timolol 2-0.5% (COSOPT) 22.3-6.8 mg/mL ophthalmic solution Starting 3/31/2017, Until Discontinued, Historical Med      levothyroxine (SYNTHROID) 75 MCG tablet TAKE 1 TABLET DAILY, Normal      POT GLUCONATE/POTASSIUM CIT (POT CIT-POT GLUCONATE ORAL) Take by mouth., Historical Med      pravastatin (PRAVACHOL) 40 MG tablet Take 40 mg by mouth once daily. , Starting 10/11/2015, Until Discontinued, Historical Med      sertraline (ZOLOFT) 100 MG  tablet TAKE 1 TABLET DAILY, Normal      temazepam (RESTORIL) 15 mg Cap TAKE 1-2 CAPSULES BY MOUTH 30-60 MINUTES PRIOR TO SLEEP AS NEEDED FOR INSOMNIA, Normal      tramadol (ULTRAM) 50 mg tablet Take 1 tablet (50 mg total) by mouth every 6 (six) hours as needed for Pain., Starting 5/1/2017, Until Discontinued, Print      trazodone (DESYREL) 50 MG tablet Take 50 mg by mouth every evening., Historical Med      VESICARE 5 mg tablet TAKE 1 TABLET DAILY, Normal      zolpidem (AMBIEN) 5 MG Tab Take 1 tablet (5 mg total) by mouth nightly as needed., Starting Mon 4/10/2017, Until Fri 11/3/2017, Normal      aspirin 325 MG tablet Take 1 tablet (325 mg total) by mouth every 12 (twelve) hours. For 30 days post-op, Starting Tue 9/26/2017, Until Thu 10/26/2017, OTC      oxycodone-acetaminophen (PERCOCET) 5-325 mg per tablet 1 tab every 4 hours PRN pain or 2 tablets every 6 hours PRN pain, Print      UNABLE TO FIND prevagen extra strength, Historical Med             Indwelling Lines/Drains at time of discharge:   Lines/Drains/Airways          No matching active lines, drains, or airways          Time spent on the discharge of patient: 35 minutes  Patient was seen and examined on the date of discharge and determined to be suitable for discharge.    Josiah Unger MD  Department of Hospital Medicine  [facility]

## 2017-11-07 NOTE — PROGRESS NOTES
Pre-Visit Chart Review  For Appointment Scheduled on 11-14-17    Health Maintenance Due   Topic Date Due    TETANUS VACCINE  08/03/1963    Influenza Vaccine  08/01/2017

## 2017-11-08 LAB
BACTERIA BLD CULT: NORMAL
BACTERIA BLD CULT: NORMAL

## 2017-11-10 ENCOUNTER — TELEPHONE (OUTPATIENT)
Dept: HEMATOLOGY/ONCOLOGY | Facility: CLINIC | Age: 72
End: 2017-11-10

## 2017-11-14 ENCOUNTER — DOCUMENTATION ONLY (OUTPATIENT)
Dept: FAMILY MEDICINE | Facility: CLINIC | Age: 72
End: 2017-11-14

## 2017-11-14 NOTE — PROGRESS NOTES
Pre-Visit Chart Review  For Appointment Scheduled on 11-21-17    Health Maintenance Due   Topic Date Due    TETANUS VACCINE  08[birth year]03/1963    Influenza Vaccine  08/01/2017

## 2017-11-20 ENCOUNTER — DOCUMENTATION ONLY (OUTPATIENT)
Dept: FAMILY MEDICINE | Facility: CLINIC | Age: 72
End: 2017-11-20

## 2017-11-20 NOTE — PROGRESS NOTES
Pre-Visit Chart Review  For Appointment Scheduled on 12-4-17    Health Maintenance Due   Topic Date Due    TETANUS VACCINE  08[birth year]03/1963    Influenza Vaccine  08/01/2017

## 2017-11-28 DIAGNOSIS — N32.81 OVERACTIVE BLADDER: ICD-10-CM

## 2017-11-28 RX ORDER — PRAVASTATIN SODIUM 40 MG/1
TABLET ORAL
Qty: 90 TABLET | Refills: 0 | Status: SHIPPED | OUTPATIENT
Start: 2017-11-28 | End: 2018-04-07 | Stop reason: SDUPTHER

## 2017-11-28 RX ORDER — SOLIFENACIN SUCCINATE 5 MG/1
TABLET, FILM COATED ORAL
Qty: 90 TABLET | Refills: 2 | Status: SHIPPED | OUTPATIENT
Start: 2017-11-28 | End: 2018-09-13 | Stop reason: SDUPTHER

## 2017-12-04 ENCOUNTER — PATIENT MESSAGE (OUTPATIENT)
Dept: FAMILY MEDICINE | Facility: CLINIC | Age: 72
End: 2017-12-04

## 2017-12-06 ENCOUNTER — DOCUMENTATION ONLY (OUTPATIENT)
Dept: FAMILY MEDICINE | Facility: CLINIC | Age: 72
End: 2017-12-06

## 2017-12-06 NOTE — PROGRESS NOTES
Pre-Visit Chart Review  For Appointment Scheduled on 12-12-17    Health Maintenance Due   Topic Date Due    TETANUS VACCINE  08/03/1963    Influenza Vaccine  08/01/2017

## 2017-12-12 ENCOUNTER — OFFICE VISIT (OUTPATIENT)
Dept: FAMILY MEDICINE | Facility: CLINIC | Age: 72
End: 2017-12-12
Attending: FAMILY MEDICINE
Payer: MEDICARE

## 2017-12-12 ENCOUNTER — TELEPHONE (OUTPATIENT)
Dept: FAMILY MEDICINE | Facility: CLINIC | Age: 72
End: 2017-12-12

## 2017-12-12 VITALS
HEART RATE: 55 BPM | TEMPERATURE: 98 F | RESPIRATION RATE: 12 BRPM | SYSTOLIC BLOOD PRESSURE: 130 MMHG | WEIGHT: 145.5 LBS | OXYGEN SATURATION: 98 % | HEIGHT: 64 IN | BODY MASS INDEX: 24.84 KG/M2 | DIASTOLIC BLOOD PRESSURE: 80 MMHG

## 2017-12-12 DIAGNOSIS — N39.0 E. COLI UTI: ICD-10-CM

## 2017-12-12 DIAGNOSIS — Z96.652 S/P TOTAL KNEE ARTHROPLASTY, LEFT: ICD-10-CM

## 2017-12-12 DIAGNOSIS — B96.20 E. COLI UTI: ICD-10-CM

## 2017-12-12 DIAGNOSIS — L03.116 CELLULITIS OF LEFT KNEE: Primary | ICD-10-CM

## 2017-12-12 DIAGNOSIS — G47.00 INSOMNIA, UNSPECIFIED TYPE: ICD-10-CM

## 2017-12-12 PROCEDURE — 99214 OFFICE O/P EST MOD 30 MIN: CPT | Mod: S$PBB,,, | Performed by: FAMILY MEDICINE

## 2017-12-12 PROCEDURE — 99214 OFFICE O/P EST MOD 30 MIN: CPT | Mod: PBBFAC,PO | Performed by: FAMILY MEDICINE

## 2017-12-12 PROCEDURE — 99999 PR PBB SHADOW E&M-EST. PATIENT-LVL IV: CPT | Mod: PBBFAC,,, | Performed by: FAMILY MEDICINE

## 2017-12-12 RX ORDER — LATANOPROST 50 UG/ML
1 SOLUTION/ DROPS OPHTHALMIC 2 TIMES DAILY
COMMUNITY
End: 2018-01-24

## 2017-12-12 RX ORDER — TRAZODONE HYDROCHLORIDE 50 MG/1
50 TABLET ORAL NIGHTLY PRN
Qty: 90 TABLET | Refills: 1 | Status: SHIPPED | OUTPATIENT
Start: 2017-12-12 | End: 2017-12-12 | Stop reason: SDUPTHER

## 2017-12-12 RX ORDER — TRAZODONE HYDROCHLORIDE 50 MG/1
50 TABLET ORAL NIGHTLY PRN
Qty: 30 TABLET | Refills: 1 | Status: SHIPPED | OUTPATIENT
Start: 2017-12-12 | End: 2019-03-11 | Stop reason: ALTCHOICE

## 2017-12-12 RX ORDER — GLUCOSAM/CHONDRO/HERB 149/HYAL 750-100 MG
1 TABLET ORAL DAILY
Status: ON HOLD | COMMUNITY
End: 2018-02-06 | Stop reason: HOSPADM

## 2017-12-12 NOTE — PROGRESS NOTES
Subjective:       Patient ID: Yuniel Gracia is a 72 y.o. female.    Chief Complaint: Hospital Follow Up    72-year-old female who underwent left knee arthroplasty by Dr. Veronica September 25 went to the emergency room at [facility] on November 3 at the urging of her physical therapist who had noted to her left knee becoming more red and warm.  She was felt to have cellulitis and was given 1 dose of vancomycin in the emergency room which resulted in a rash.  She was subsequently admitted after blood and urine cultures were taken and after initial lab work was done in the ER.  The following day on consult with her orthopedist she was discharged on Bactrim and saw Dr. Ko a few days later.  He diagnosed her as normal healing with no sign of cellulitis.  Her blood cultures were negative after 5 days but she did grow an Escherichia coli in her urine which was sensitive to the Bactrim she had been discharged on.  She completed that course of antibiotics.  She is doing fine with the knee with no problems therapy is going well she has a little stiffness secondary to residual swelling that is amazingly free of pain.  She wants a refill on her trazodone to help her sleep.  Otherwise she is taking no pain medications.  She is looking forward to a repeat performance with the right knee after the first of the year.  The left was done under spinal anesthesia and she expects to do the same on the right.  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  Her last colonoscopy was done by Dr. Long earlier this year with a single adenomatous polyp removed along with some nonbleeding hemorrhoids and diverticular disease.  A repeat in 5 years is recommended.    Past Medical History:  No date: Anemia  No date: Arthritis  No date: Blood transfusion  No date:  "Breast cancer  RIGHT BREAST: Cancer  No date: Chronic constipation  No date: Clotting disorder  No date: Colon polyp  No date: Diverticulosis  No date: Glaucoma  2000: Hepatitis C      Comment: pt states history of hepatits c-"cured by Dr. Lynch"; TREATED 2 YEARS - 2000   OK NOW  No date: Hypothyroid  No date: Insomnia  No date: Memory loss      Comment: reports some memory loss since chemo  No date: Osteoarthritis  No date: Panic attacks  No date: Raynaud's disease      Comment: takes amlodipine for this  No date: Ulcer  No date: Vertigo    Past Surgical History:  2011: BLADDER SURGERY      Comment: sling - Dr Giles  - [facility]  2007: BRAIN SURGERY      Comment: temporal neuralgia - [place]  3/12: BREAST LUMPECTOMY      Comment: malignant - had chemo and radiotherapy  01/03/2011: COLONOSCOPY      Comment: Dr Lynch, in media section, diverticulosis,               hemorrhoids, otherwise normal findings; normal                findings on random biopsies  6/1/2017: COLONOSCOPY N/A      Comment: Procedure: COLONOSCOPY;  Surgeon: Nate Lamb MD;  Location: [facility];  Service:                Endoscopy;  Laterality: N/A;  06/01/2017: COLONOSCOPY W/ POLYPECTOMY      Comment: Dr. Lamb, 1 adenomatous polyp, recheck 5                years  No date: EYE SURGERY      Comment: cataracts bilateral  No date: HYSTERECTOMY  09/25/2017: JOINT REPLACEMENT      [facility]   No date: removal of port a cath  No date: right lymph node removed from breast area  04/2012: TUNNELED VENOUS PORT PLACEMENT      Comment: left chest  prior to 2011: UPPER GASTROINTESTINAL ENDOSCOPY      Comment: small hiatal hernia    Current Outpatient Prescriptions:     amlodipine (NORVASC) 2.5 MG tablet, Take 2.5 mg by mouth once daily. , Disp: , Rfl:     anastrozole (ARIMIDEX) 1 mg Tab, TAKE 1 TABLET DAILY, Disp: 90 tablet, Rfl: 5    CALCIUM CARBONATE (CALCIUM 600 ORAL), Take " 1 tablet by mouth once daily., Disp: , Rfl:     CALCIUM CARBONATE/VITAMIN D3 (VITAMIN D-3 ORAL), Take 400 Units by mouth daily as needed., Disp: , Rfl:     latanoprost 0.005 % ophthalmic solution, Place 1 drop into both eyes 2 (two) times daily., Disp: , Rfl:     levothyroxine (SYNTHROID) 75 MCG tablet, TAKE 1 TABLET DAILY, Disp: 90 tablet, Rfl: 3    multivitamin (THERAGRAN) tablet, Take 1 tablet by mouth once daily., Disp: , Rfl:     omega 3-dha-epa-fish oil (FISH OIL) 1,000 mg (120 mg-180 mg) Cap, Take 1 capsule by mouth once daily., Disp: , Rfl:     pravastatin (PRAVACHOL) 40 MG tablet, TAKE 1 TABLET AT BEDTIME, Disp: 90 tablet, Rfl: 0    sertraline (ZOLOFT) 100 MG tablet, TAKE 1 TABLET DAILY (Patient taking differently: TAKE 1/2 TABLET DAILY), Disp: 90 tablet, Rfl: 1    temazepam (RESTORIL) 15 mg Cap, TAKE 1-2 CAPSULES BY MOUTH 30-60 MINUTES PRIOR TO SLEEP AS NEEDED FOR INSOMNIA, Disp: 60 capsule, Rfl: 0    UBIDECARENONE/VITAMIN E MIXED (COQ10  ORAL), Take 1 capsule by mouth once daily., Disp: , Rfl:     UNABLE TO FIND, Take 1 capsule by mouth once daily. prevagen extra strength , Disp: , Rfl:     VESICARE 5 mg tablet, TAKE 1 TABLET DAILY, Disp: 90 tablet, Rfl: 2    dorzolamide-timolol 2-0.5% (COSOPT) 22.3-6.8 mg/mL ophthalmic solution, , Disp: , Rfl:     tramadol (ULTRAM) 50 mg tablet, Take 1 tablet (50 mg total) by mouth every 6 (six) hours as needed for Pain., Disp: 60 tablet, Rfl: 0    traZODone (DESYREL) 50 MG tablet, Take 1 tablet (50 mg total) by mouth nightly as needed for Insomnia., Disp: 30 tablet, Rfl: 1    zolpidem (AMBIEN) 5 MG Tab, Take 1 tablet (5 mg total) by mouth nightly as needed., Disp: 30 tablet, Rfl: 0        Review of Systems   Constitutional: Negative for activity change and unexpected weight change.   HENT: Negative for hearing loss, rhinorrhea and trouble swallowing.    Eyes: Negative for discharge and visual disturbance.   Respiratory: Negative for wheezing.     Cardiovascular: Negative for chest pain and palpitations.   Gastrointestinal: Negative for blood in stool, constipation, diarrhea and vomiting.   Endocrine: Negative for polydipsia and polyuria.   Genitourinary: Negative for difficulty urinating, dysuria, hematuria and menstrual problem.   Musculoskeletal: Positive for arthralgias. Negative for joint swelling and neck pain.   Neurological: Negative for weakness and headaches.   Psychiatric/Behavioral: Negative for confusion and dysphoric mood.       Objective:      Physical Exam   Constitutional: She is oriented to person, place, and time. She appears well-developed and well-nourished. No distress.   Good blood pressure control  Normal weight with BMI 24.9.  She is up 3.5 pounds from her last visit with me November 4, 2015   Musculoskeletal:        Left knee: She exhibits decreased range of motion, swelling and effusion. She exhibits no bony tenderness. No tenderness found.   Left knee mild to moderately warm and mildly erythematous   Neurological: She is alert and oriented to person, place, and time.   Skin: She is not diaphoretic.   Psychiatric: She has a normal mood and affect. Her behavior is normal. Judgment and thought content normal.   Nursing note and vitals reviewed.      Assessment:       1. Cellulitis of left knee    2. S/P total knee arthroplasty, left    3. Insomnia, unspecified type        Plan:       1. Cellulitis of left knee  No sign of infection.  Blood cultures negative.  White blood cell count normal on CBC    2. S/P total knee arthroplasty, left  Doing well.  Expecting to do the right knee after the first of the year    3. E. coli UTI  Sensitive to the Bactrim with which she was treated    4. Insomnia, unspecified type  - traZODone (DESYREL) 50 MG tablet; Take 1 tablet (50 mg total) by mouth nightly as needed for Insomnia.  Dispense: 30 tablet; Refill: 1

## 2017-12-14 ENCOUNTER — TELEPHONE (OUTPATIENT)
Dept: HEMATOLOGY/ONCOLOGY | Facility: CLINIC | Age: 72
End: 2017-12-14

## 2017-12-14 NOTE — TELEPHONE ENCOUNTER
Called pt and she wanted to schedule labs, mammo, and Dr. Almanza appt prior to her upcoming knee surgery.  Explained to pt that I would schedule and call her right back with appt dates/times.  Scheduled appts and called pt with no answer.  Left her a message that her mammo is scheduled for 12/26/17 @ 0945 and labs for 12/26/17 @ 1100 with a Dr. Almanza appt for 1/3/18 @ 0920.  Also, explained in my message that her prolia will have to wait 3 months after her knee replacement.  Asked pt to please return my call at [phone].

## 2017-12-14 NOTE — TELEPHONE ENCOUNTER
----- Message from Sherrie Almanza MD sent at 12/13/2017  1:22 PM CST -----  Please call her she has some questions about knee sx and prolia

## 2017-12-26 ENCOUNTER — HOSPITAL ENCOUNTER (OUTPATIENT)
Dept: RADIOLOGY | Facility: HOSPITAL | Age: 72
Discharge: HOME | End: 2017-12-26
Attending: INTERNAL MEDICINE
Payer: MEDICARE

## 2017-12-26 DIAGNOSIS — C50.011 MALIGNANT NEOPLASM OF NIPPLE AND AREOLA OF FEMALE BREAST, RIGHT: ICD-10-CM

## 2017-12-26 PROCEDURE — 77062 BREAST TOMOSYNTHESIS BI: CPT | Mod: 26,,, | Performed by: RADIOLOGY

## 2017-12-26 PROCEDURE — 77066 DX MAMMO INCL CAD BI: CPT | Mod: TC

## 2017-12-26 PROCEDURE — 77066 DX MAMMO INCL CAD BI: CPT | Mod: 26,,, | Performed by: RADIOLOGY

## 2018-01-03 ENCOUNTER — TELEPHONE (OUTPATIENT)
Dept: HEMATOLOGY/ONCOLOGY | Facility: CLINIC | Age: 73
End: 2018-01-03

## 2018-01-03 ENCOUNTER — OFFICE VISIT (OUTPATIENT)
Dept: HEMATOLOGY/ONCOLOGY | Facility: CLINIC | Age: 73
End: 2018-01-03
Payer: MEDICARE

## 2018-01-03 VITALS
WEIGHT: 146.19 LBS | BODY MASS INDEX: 24.96 KG/M2 | SYSTOLIC BLOOD PRESSURE: 119 MMHG | HEART RATE: 54 BPM | RESPIRATION RATE: 20 BRPM | TEMPERATURE: 98 F | HEIGHT: 64 IN | DIASTOLIC BLOOD PRESSURE: 58 MMHG

## 2018-01-03 DIAGNOSIS — C50.011 MALIGNANT NEOPLASM OF NIPPLE OF RIGHT BREAST IN FEMALE, UNSPECIFIED ESTROGEN RECEPTOR STATUS: Primary | ICD-10-CM

## 2018-01-03 DIAGNOSIS — D69.6 THROMBOCYTOPENIA: ICD-10-CM

## 2018-01-03 DIAGNOSIS — M81.8 OSTEOPOROSIS DUE TO AROMATASE INHIBITOR: ICD-10-CM

## 2018-01-03 DIAGNOSIS — T38.6X5A OSTEOPOROSIS DUE TO AROMATASE INHIBITOR: ICD-10-CM

## 2018-01-03 PROCEDURE — 99213 OFFICE O/P EST LOW 20 MIN: CPT | Mod: PBBFAC,PO | Performed by: INTERNAL MEDICINE

## 2018-01-03 PROCEDURE — 99214 OFFICE O/P EST MOD 30 MIN: CPT | Mod: S$PBB,,, | Performed by: INTERNAL MEDICINE

## 2018-01-03 PROCEDURE — 99999 PR PBB SHADOW E&M-EST. PATIENT-LVL III: CPT | Mod: PBBFAC,,, | Performed by: INTERNAL MEDICINE

## 2018-01-03 NOTE — PROGRESS NOTES
Subjective:       Patient ID: Yuniel Gracia is a 72 y.o. female.    Chief Complaint: Follow-up    HPI:   Patient coming in for followup. She has a chronic ITP and hep C, history of    stage I breast cancer,rec score of 32 , so underwent TC  chemotherapy. She did notice a new mass in the rt breast that came back without issue, On arimidex which will complete 5 years in 9/2017/ Mild thrombocytopenia related to rx and hep c      REVIEW OF SYSTEMS:     CONSTITUTIONAL: The patient denies any weight change. There is no apparent    change in appetite, fever, night sweats, headaches, fatigue, dizziness, or    weakness.      SKIN: Denies rash, issues with nails, non-healing sores, bleeding, blotching    skin or abnormal bruising. Denies new moles or changes to existing moles.      BREASTS: There is no swelling around breasts or nipple discharge.    EYES: Denies eye pain, blurred vision, swelling, redness or discharge.      ENT AND MOUTH: Denies runny nose, stuffiness, sinus trouble or sores. Denies    nosebleeds. Denies, hoarseness, change in voice or swelling in front of the    neck.      CARDIOVASCULAR: Denies chest pain, discomfort or palpitations. Denies neck    swelling or episodes of passing out.      RESPIRATORY: Denies cough, sputum production, blood in sputum, and denies    shortness of breath.      GI: Denies trouble swallowing, indigestion, heartburn, abdominal pain, nausea,    vomiting, diarrhea, altered bowel habits, blood in stool, discoloration of    stools, change in nature of stool, bloating, increased abdominal girth.      GENITOURINARY: No discharge. No pelvic pain or lumps. No rash around groin or  lesions. No urinary frequency, hesitation, painful urination or blood in    urine. Denies incontinence. No problems with intercourse.      MUSCULOSKELETAL: Denies neck or back pain. Denies weakness in arms or legs,    joint problems or distended inflamed veins in legs. Denies swelling or  abnormal  glands.      NEUROLOGICAL: Denies tingling, numbness, altered mentation changes to nerve    function in the face, weakness to one or both of the body. Denies changes to    gait and denies multiple falls or accidents.      PSYCHIATRIC: Denies nervousness, anxiety, hallucinations, depression, suicidal    ideation, trouble sleeping or changes in behavior noticed by family.      The patient denies recent foreign travel or recent exposure to chemicals or    products of concern or infectious diseases.     PHYSICAL EXAM:     Vitals:    01/03/18 1000   BP: (!) 119/58   Pulse: (!) 54   Resp: 20   Temp: 97.7 °F (36.5 °C)       GENERAL: Comfortable looking patient. Patient is in no distress.  Awake, alert and oriented to time, person and place.  No anxiety, or agitation.      HEENT: Normal conjunctivae and eyelids. WNL.  PERRLA 3 to 4 mm. No icterus, no pallor, no congestion, and no discharge noted.     NECK:  Supple. Trachea is central.  No crepitus.  No JVD or masses.    RESPIRATORY:  No intercostal retractions.  No dullness to percussion.  Chest is clear to auscultation.  No rales, rhonchi or wheezes.  No crepitus.  Good air entry bilaterally.    CARDIOVASCULAR:  S1 and S2 are normally heard without murmurs or gallops.  All peripheral pulses are present.    ABDOMEN:  Normal abdomen.  No hepatosplenomegaly.  No free fluid.  Bowel sounds are present.  No hernia noted. No masses.  No rebound or tenderness.  No guarding or rigidity.  Umbilicus is midline.    LYMPHATICS:  No axillary, cervical, supraclavicular, submental, or inguinal lymphadenopathy.    SKIN/MUSCULOSKELETAL:  There is no evidence of excoriation marks or ecchmosis.  No rashes.  No cyanosis.  No clubbing.  No joint or skeletal deformities noted.  Normal range of motion.    NEUROLOGIC:  Higher functions are appropriate.  No cranial nerve deficits.  Normal ludmila.  Normal strength.  Motor and sensory functions are normal.  Deep tendon reflexes are  normal.    GENITAL/RECTAL:  Exams are deferred.      Laboratory:     CBC:  Lab Results   Component Value Date    WBC 3.50 (L) 12/26/2017    RBC 4.10 12/26/2017    HGB 12.5 12/26/2017    HCT 36.4 (L) 12/26/2017    MCV 89 12/26/2017    MCH 30.4 12/26/2017    MCHC 34.3 12/26/2017    RDW 14.9 (H) 12/26/2017     (L) 12/26/2017    MPV 7.5 (L) 12/26/2017    GRAN 1.7 (L) 12/26/2017    GRAN 47.8 12/26/2017    LYMPH 1.1 12/26/2017    LYMPH 32.6 12/26/2017    MONO 0.3 12/26/2017    MONO 10.0 12/26/2017    EOS 0.3 12/26/2017    BASO 0.00 12/26/2017    EOSINOPHIL 9.1 (H) 12/26/2017    BASOPHIL 0.5 12/26/2017       BMP: BMP  Lab Results   Component Value Date     12/26/2017    K 4.0 12/26/2017     12/26/2017    CO2 29 12/26/2017    BUN 14 12/26/2017    CREATININE 0.8 12/26/2017    CALCIUM 10.0 12/26/2017    ANIONGAP 9 12/26/2017    ESTGFRAFRICA >60 12/26/2017    EGFRNONAA >60 12/26/2017       LFT:   Lab Results   Component Value Date    ALT 14 12/26/2017    AST 25 12/26/2017    ALKPHOS 67 12/26/2017    BILITOT 0.5 12/26/2017 12/2017mammogram neg:   Most recent scan 2015 neg    Assessment/Plan:       Stage 1 breast ca  4. HTN, dyslipedemia, hypothyroidism, depression , contimue care with Dr. Dolan    Gave pt restoril rx for insomnia. generic  Breast ca: cont arimidex and   prolia,has been on hold due to knee sx. One done one is to be done  she completes 10 years of arimidex in 9/2022   pt will let us know about 2 months after the knee sx to resume prolia   thrombocytopenia cont to monitor,\   due for mammo in 12/18

## 2018-01-03 NOTE — TELEPHONE ENCOUNTER
Pt here seeing Dr. Almanza.  Went to check pt out but pt politely declined stating she will call to schedule.  She is to RTC in 1 year with CBC, CMP, CA27.29, and mammogram.  Pt also to call and schedule her prolia, but needs to see Dr. Almanza first with labs to be cleared.

## 2018-01-16 DIAGNOSIS — F19.982 DRUG INDUCED INSOMNIA: ICD-10-CM

## 2018-01-16 RX ORDER — TEMAZEPAM 15 MG/1
CAPSULE ORAL
Qty: 60 CAPSULE | Refills: 0 | Status: CANCELLED | OUTPATIENT
Start: 2018-01-16

## 2018-01-16 RX ORDER — TEMAZEPAM 15 MG/1
15 CAPSULE ORAL NIGHTLY
Qty: 60 CAPSULE | Refills: 0 | Status: SHIPPED | OUTPATIENT
Start: 2018-01-16 | End: 2018-04-09 | Stop reason: SDUPTHER

## 2018-01-24 ENCOUNTER — ANESTHESIA EVENT (OUTPATIENT)
Dept: SURGERY | Facility: OTHER | Age: 73
DRG: 470 | End: 2018-01-24
Payer: MEDICARE

## 2018-01-24 ENCOUNTER — HOSPITAL ENCOUNTER (OUTPATIENT)
Dept: PREADMISSION TESTING | Facility: OTHER | Age: 73
Discharge: HOME | End: 2018-01-24
Attending: ORTHOPAEDIC SURGERY
Payer: MEDICARE

## 2018-01-24 VITALS
HEART RATE: 56 BPM | OXYGEN SATURATION: 99 % | DIASTOLIC BLOOD PRESSURE: 53 MMHG | HEIGHT: 65 IN | TEMPERATURE: 98 F | SYSTOLIC BLOOD PRESSURE: 129 MMHG | BODY MASS INDEX: 24.32 KG/M2 | WEIGHT: 146 LBS

## 2018-01-24 DIAGNOSIS — M17.11 PRIMARY OSTEOARTHRITIS OF RIGHT KNEE: Primary | ICD-10-CM

## 2018-01-24 LAB
ABO + RH BLD: NORMAL
ALBUMIN SERPL BCP-MCNC: 4 G/DL
ALP SERPL-CCNC: 68 U/L
ALT SERPL W/O P-5'-P-CCNC: 18 U/L
ANION GAP SERPL CALC-SCNC: 8 MMOL/L
AST SERPL-CCNC: 28 U/L
BASOPHILS # BLD AUTO: 0.02 K/UL
BASOPHILS NFR BLD: 0.4 %
BILIRUB SERPL-MCNC: 0.6 MG/DL
BILIRUB UR QL STRIP: NEGATIVE
BLD GP AB SCN CELLS X3 SERPL QL: NORMAL
BUN SERPL-MCNC: 14 MG/DL
CALCIUM SERPL-MCNC: 10.5 MG/DL
CHLORIDE SERPL-SCNC: 102 MMOL/L
CLARITY UR: CLEAR
CO2 SERPL-SCNC: 30 MMOL/L
COLOR UR: YELLOW
CREAT SERPL-MCNC: 0.8 MG/DL
DIFFERENTIAL METHOD: ABNORMAL
EOSINOPHIL # BLD AUTO: 0.2 K/UL
EOSINOPHIL NFR BLD: 4.5 %
ERYTHROCYTE [DISTWIDTH] IN BLOOD BY AUTOMATED COUNT: 14.9 %
EST. GFR  (AFRICAN AMERICAN): >60 ML/MIN/1.73 M^2
EST. GFR  (NON AFRICAN AMERICAN): >60 ML/MIN/1.73 M^2
GLUCOSE SERPL-MCNC: 101 MG/DL
GLUCOSE UR QL STRIP: NEGATIVE
HCT VFR BLD AUTO: 39.1 %
HGB BLD-MCNC: 12.8 G/DL
HGB UR QL STRIP: NEGATIVE
KETONES UR QL STRIP: NEGATIVE
LEUKOCYTE ESTERASE UR QL STRIP: NEGATIVE
LYMPHOCYTES # BLD AUTO: 1.4 K/UL
LYMPHOCYTES NFR BLD: 30.4 %
MCH RBC QN AUTO: 29.7 PG
MCHC RBC AUTO-ENTMCNC: 32.7 G/DL
MCV RBC AUTO: 91 FL
MONOCYTES # BLD AUTO: 0.3 K/UL
MONOCYTES NFR BLD: 6.6 %
NEUTROPHILS # BLD AUTO: 2.7 K/UL
NEUTROPHILS NFR BLD: 57.5 %
NITRITE UR QL STRIP: NEGATIVE
PH UR STRIP: 8 [PH] (ref 5–8)
PLATELET # BLD AUTO: 145 K/UL
PMV BLD AUTO: 10.7 FL
POTASSIUM SERPL-SCNC: 3.7 MMOL/L
PROT SERPL-MCNC: 7.9 G/DL
PROT UR QL STRIP: NEGATIVE
RBC # BLD AUTO: 4.31 M/UL
SODIUM SERPL-SCNC: 140 MMOL/L
SP GR UR STRIP: <=1.005 (ref 1–1.03)
URN SPEC COLLECT METH UR: ABNORMAL
UROBILINOGEN UR STRIP-ACNC: NEGATIVE EU/DL
WBC # BLD AUTO: 4.67 K/UL

## 2018-01-24 PROCEDURE — 81003 URINALYSIS AUTO W/O SCOPE: CPT

## 2018-01-24 PROCEDURE — 85025 COMPLETE CBC W/AUTO DIFF WBC: CPT

## 2018-01-24 PROCEDURE — 36415 COLL VENOUS BLD VENIPUNCTURE: CPT

## 2018-01-24 PROCEDURE — 87086 URINE CULTURE/COLONY COUNT: CPT

## 2018-01-24 PROCEDURE — 80053 COMPREHEN METABOLIC PANEL: CPT

## 2018-01-24 PROCEDURE — 86850 RBC ANTIBODY SCREEN: CPT

## 2018-01-24 RX ORDER — SODIUM CHLORIDE, SODIUM LACTATE, POTASSIUM CHLORIDE, CALCIUM CHLORIDE 600; 310; 30; 20 MG/100ML; MG/100ML; MG/100ML; MG/100ML
INJECTION, SOLUTION INTRAVENOUS CONTINUOUS
Status: CANCELLED | OUTPATIENT
Start: 2018-01-24

## 2018-01-24 RX ORDER — LINACLOTIDE 145 UG/1
145 CAPSULE, GELATIN COATED ORAL DAILY
COMMUNITY
End: 2018-05-08

## 2018-01-24 RX ORDER — FAMOTIDINE 20 MG/1
20 TABLET, FILM COATED ORAL
Status: CANCELLED | OUTPATIENT
Start: 2018-01-24 | End: 2018-01-24

## 2018-01-24 RX ORDER — TETRACYCLINE HCL 500 MG
1 CAPSULE ORAL DAILY
Status: ON HOLD | COMMUNITY
End: 2018-02-06 | Stop reason: HOSPADM

## 2018-01-24 RX ORDER — LIDOCAINE HYDROCHLORIDE 10 MG/ML
0.5 INJECTION, SOLUTION EPIDURAL; INFILTRATION; INTRACAUDAL; PERINEURAL ONCE
Status: CANCELLED | OUTPATIENT
Start: 2018-01-24 | End: 2018-01-24

## 2018-01-24 NOTE — DISCHARGE INSTRUCTIONS
PRE-ADMIT TESTING -  [phone]    [address]  [place] [facility]          Your surgery has been scheduled at [facility]. We are pleased to have the opportunity to serve you. For Further Information please call [phone].    On the day of surgery please report to the Information Desk on the 1st floor.    · CONTACT YOUR PHYSICIAN'S OFFICE THE DAY PRIOR TO YOUR SURGERY TO OBTAIN YOUR ARRIVAL TIME.     · The evening before surgery do not eat anything after 9 p.m. ( this includes hard candy, chewing gum and mints).  You may only have GATORADE, POWERADE AND WATER  from 9 p.m. until you leave your home.   DO NOT DRINK ANY LIQUIDS ON THE WAY TO THE HOSPITAL.      SPECIAL MEDICATION INSTRUCTIONS: TAKE medications checked off by the Anesthesiologist on your Medication List.    Angiogram Patients: Take medications as instructed by your physician, including aspirin.     Surgery Patients:    If you take ASPIRIN - Your PHYSICIAN/SURGEON will need to inform you IF/OR when you need to stop taking aspirin prior to your surgery.     Do Not take any medications containing IBUPROFEN.  Do Not Wear any make-up or dark nail polish   (especially eye make-up) to surgery. If you come to surgery with makeup on you will be required to remove the makeup or nail polish.    Do not shave your surgical area at least 5 days prior to your surgery. The surgical prep will be performed at the hospital according to Infection Control regulations.    Leave all valuables at home.   Do Not wear any jewelry or watches, including any metal in body piercings.  Contact Lens must be removed before surgery. Either do not wear the contact lens or bring a case and solution for storage.  Please bring a container for eyeglasses or dentures as required.  Bring any paperwork your physician has provided, such as consent forms,  history and physicals, doctor's orders, etc.   Bring comfortable clothes that are loose fitting to wear upon  discharge. Take into consideration the type of surgery being performed.  Maintain your diet as advised per your physician the day prior to surgery.      Adequate rest the night before surgery is advised.   Park in the Parking lot behind the hospital or in the [facility] across the street from the parking lot. Parking is complimentary.  If you will be discharged the same day as your procedure, please arrange for a responsible adult to drive you home or to accompany you if traveling by taxi.   YOU WILL NOT BE PERMITTED TO DRIVE OR TO LEAVE THE HOSPITAL ALONE AFTER SURGERY.   It is strongly recommended that you arrange for someone to remain with you for the first 24 hrs following your surgery.       Thank you for your cooperation.  The Staff of [facility].        Bathing Instructions                                                                 Please shower the evening before and morning of your procedure with    ANTIBACTERIAL SOAP. ( DIAL, etc )  Concentrate on the surgical area   for at least 3 minutes and rinse completely. Dry off as usual.   Do not use any deodorant, powder, body lotions, perfume, after shave or    cologne.

## 2018-01-24 NOTE — ANESTHESIA PREPROCEDURE EVALUATION
01/24/2018  Yuniel Gracia is a 72 y.o., female.    Anesthesia Evaluation    I have reviewed the Patient Summary Reports.    I have reviewed the Nursing Notes.   I have reviewed the Medications.     Review of Systems  Anesthesia Hx:  No problems with previous Anesthesia  History of prior surgery of interest to airway management or planning: Previous anesthesia: Spinal  9/17 TKA with spinal.    Social:  Non-Smoker, Social Alcohol Use    Hematology/Oncology:  Hematology Normal       -- Cancer in past history:  Breast right axillary node dissection no lymphedema surgery    EENT/Dental:EENT/Dental Normal   Cardiovascular:  Cardiovascular Normal Exercise tolerance: good     Pulmonary:  Pulmonary Normal    Renal/:  Renal/ Normal     Hepatic/GI:   Liver Disease, Hepatitis, C    Musculoskeletal:   Arthritis     Neurological:  Neurology Normal    Endocrine:   Hypothyroidism    Dermatological:  Skin Normal    Psych:   anxiety Panic attacks.         Physical Exam  General:  Well nourished    Airway/Jaw/Neck:  Airway Findings: Mouth Opening: Normal Tongue: Normal  General Airway Assessment: Adult, Good  Mallampati: II  TM Distance: Normal, at least 6 cm         Dental:  DENTAL FINDINGS: Normal       Skin:  Skin Findings: Normal    Mental Status:  Mental Status Findings:  Cooperative, Alert and Oriented         Anesthesia Plan  Type of Anesthesia, risks & benefits discussed:  Anesthesia Type:  spinal  Patient's Preference:   Intra-op Monitoring Plan: standard ASA monitors  Intra-op Monitoring Plan Comments:   Post Op Pain Control Plan:   Post Op Pain Control Plan Comments:   Induction:   IV  Beta Blocker:  Patient is not currently on a Beta-Blocker (No further documentation required).       Informed Consent: Patient understands risks and agrees with Anesthesia plan.  Questions answered. Anesthesia consent  signed with patient.  ASA Score: 3     Day of Surgery Review of History & Physical:    H&P update referred to the surgeon.     Anesthesia Plan Notes: Cleared by  for last TKA, pt went back last week, clearance pending    Labs in [facility], reviewed WNL        Ready For Surgery From Anesthesia Perspective.

## 2018-01-25 LAB — BACTERIA UR CULT: NORMAL

## 2018-01-29 DIAGNOSIS — E03.9 ACQUIRED HYPOTHYROIDISM: ICD-10-CM

## 2018-01-29 RX ORDER — LEVOTHYROXINE SODIUM 75 UG/1
TABLET ORAL
Qty: 90 TABLET | Refills: 2 | Status: SHIPPED | OUTPATIENT
Start: 2018-01-29 | End: 2018-09-13 | Stop reason: SDUPTHER

## 2018-02-05 ENCOUNTER — ANESTHESIA (OUTPATIENT)
Dept: SURGERY | Facility: OTHER | Age: 73
DRG: 470 | End: 2018-02-05
Payer: MEDICARE

## 2018-02-05 ENCOUNTER — HOSPITAL ENCOUNTER (INPATIENT)
Facility: OTHER | Age: 73
LOS: 1 days | Discharge: HOME HEALTH | DRG: 470 | End: 2018-02-06
Attending: ORTHOPAEDIC SURGERY | Admitting: ORTHOPAEDIC SURGERY
Payer: MEDICARE

## 2018-02-05 DIAGNOSIS — C50.011 MALIGNANT NEOPLASM INVOLVING BOTH NIPPLE AND AREOLA OF RIGHT BREAST IN FEMALE: ICD-10-CM

## 2018-02-05 DIAGNOSIS — M17.11 PRIMARY OSTEOARTHRITIS OF RIGHT KNEE: Primary | ICD-10-CM

## 2018-02-05 PROCEDURE — 25000003 PHARM REV CODE 250: Performed by: ORTHOPAEDIC SURGERY

## 2018-02-05 PROCEDURE — 71000039 HC RECOVERY, EACH ADD'L HOUR: Performed by: ORTHOPAEDIC SURGERY

## 2018-02-05 PROCEDURE — 63600175 PHARM REV CODE 636 W HCPCS: Performed by: ORTHOPAEDIC SURGERY

## 2018-02-05 PROCEDURE — 25000003 PHARM REV CODE 250: Performed by: ANESTHESIOLOGY

## 2018-02-05 PROCEDURE — 94799 UNLISTED PULMONARY SVC/PX: CPT

## 2018-02-05 PROCEDURE — 63600175 PHARM REV CODE 636 W HCPCS: Performed by: SPECIALIST

## 2018-02-05 PROCEDURE — 97116 GAIT TRAINING THERAPY: CPT

## 2018-02-05 PROCEDURE — 27201423 OPTIME MED/SURG SUP & DEVICES STERILE SUPPLY: Performed by: ORTHOPAEDIC SURGERY

## 2018-02-05 PROCEDURE — 37000009 HC ANESTHESIA EA ADD 15 MINS: Performed by: ORTHOPAEDIC SURGERY

## 2018-02-05 PROCEDURE — 71000033 HC RECOVERY, INTIAL HOUR: Performed by: ORTHOPAEDIC SURGERY

## 2018-02-05 PROCEDURE — 11000001 HC ACUTE MED/SURG PRIVATE ROOM

## 2018-02-05 PROCEDURE — 37000008 HC ANESTHESIA 1ST 15 MINUTES: Performed by: ORTHOPAEDIC SURGERY

## 2018-02-05 PROCEDURE — G8980 MOBILITY D/C STATUS: HCPCS | Mod: CK

## 2018-02-05 PROCEDURE — 8E0YXBZ COMPUTER ASSISTED PROCEDURE OF LOWER EXTREMITY: ICD-10-PCS | Performed by: ORTHOPAEDIC SURGERY

## 2018-02-05 PROCEDURE — 36000711: Performed by: ORTHOPAEDIC SURGERY

## 2018-02-05 PROCEDURE — C1776 JOINT DEVICE (IMPLANTABLE): HCPCS | Performed by: ORTHOPAEDIC SURGERY

## 2018-02-05 PROCEDURE — G8979 MOBILITY GOAL STATUS: HCPCS | Mod: CJ

## 2018-02-05 PROCEDURE — 97161 PT EVAL LOW COMPLEX 20 MIN: CPT

## 2018-02-05 PROCEDURE — 25000003 PHARM REV CODE 250: Performed by: NURSE PRACTITIONER

## 2018-02-05 PROCEDURE — 25000003 PHARM REV CODE 250: Performed by: NURSE ANESTHETIST, CERTIFIED REGISTERED

## 2018-02-05 PROCEDURE — 25000003 PHARM REV CODE 250: Performed by: INTERNAL MEDICINE

## 2018-02-05 PROCEDURE — C1729 CATH, DRAINAGE: HCPCS | Performed by: ORTHOPAEDIC SURGERY

## 2018-02-05 PROCEDURE — G8978 MOBILITY CURRENT STATUS: HCPCS | Mod: CK

## 2018-02-05 PROCEDURE — C1713 ANCHOR/SCREW BN/BN,TIS/BN: HCPCS | Performed by: ORTHOPAEDIC SURGERY

## 2018-02-05 PROCEDURE — 94761 N-INVAS EAR/PLS OXIMETRY MLT: CPT

## 2018-02-05 PROCEDURE — 0SRC0J9 REPLACEMENT OF RIGHT KNEE JOINT WITH SYNTHETIC SUBSTITUTE, CEMENTED, OPEN APPROACH: ICD-10-PCS | Performed by: ORTHOPAEDIC SURGERY

## 2018-02-05 PROCEDURE — 63600175 PHARM REV CODE 636 W HCPCS

## 2018-02-05 PROCEDURE — 25000003 PHARM REV CODE 250

## 2018-02-05 PROCEDURE — 63600175 PHARM REV CODE 636 W HCPCS: Performed by: NURSE ANESTHETIST, CERTIFIED REGISTERED

## 2018-02-05 PROCEDURE — 36000710: Performed by: ORTHOPAEDIC SURGERY

## 2018-02-05 PROCEDURE — C9290 INJ, BUPIVACAINE LIPOSOME: HCPCS | Performed by: ORTHOPAEDIC SURGERY

## 2018-02-05 PROCEDURE — 63600175 PHARM REV CODE 636 W HCPCS: Performed by: INTERNAL MEDICINE

## 2018-02-05 DEVICE — CEMENT BONE RDPQ 40G PDR 20ML: Type: IMPLANTABLE DEVICE | Site: KNEE | Status: FUNCTIONAL

## 2018-02-05 DEVICE — TIBIAL BASEPLATE CEMENTED #4: Type: IMPLANTABLE DEVICE | Site: KNEE | Status: FUNCTIONAL

## 2018-02-05 DEVICE — IMPLANTABLE DEVICE: Type: IMPLANTABLE DEVICE | Site: KNEE | Status: FUNCTIONAL

## 2018-02-05 DEVICE — PATELLA XPE LARGE 35MM: Type: IMPLANTABLE DEVICE | Site: KNEE | Status: FUNCTIONAL

## 2018-02-05 RX ORDER — MIDAZOLAM HYDROCHLORIDE 1 MG/ML
INJECTION INTRAMUSCULAR; INTRAVENOUS
Status: DISCONTINUED | OUTPATIENT
Start: 2018-02-05 | End: 2018-02-05

## 2018-02-05 RX ORDER — SODIUM CHLORIDE, SODIUM LACTATE, POTASSIUM CHLORIDE, CALCIUM CHLORIDE 600; 310; 30; 20 MG/100ML; MG/100ML; MG/100ML; MG/100ML
INJECTION, SOLUTION INTRAVENOUS CONTINUOUS
Status: DISCONTINUED | OUTPATIENT
Start: 2018-02-05 | End: 2018-02-05

## 2018-02-05 RX ORDER — FENTANYL CITRATE 50 UG/ML
25 INJECTION, SOLUTION INTRAMUSCULAR; INTRAVENOUS EVERY 5 MIN PRN
Status: DISCONTINUED | OUTPATIENT
Start: 2018-02-05 | End: 2018-02-05 | Stop reason: HOSPADM

## 2018-02-05 RX ORDER — ANASTROZOLE 1 MG/1
1 TABLET ORAL DAILY
Status: DISCONTINUED | OUTPATIENT
Start: 2018-02-05 | End: 2018-02-06 | Stop reason: HOSPADM

## 2018-02-05 RX ORDER — LEVOTHYROXINE SODIUM 75 UG/1
75 TABLET ORAL
Status: DISCONTINUED | OUTPATIENT
Start: 2018-02-05 | End: 2018-02-06 | Stop reason: HOSPADM

## 2018-02-05 RX ORDER — CELECOXIB 200 MG/1
200 CAPSULE ORAL 2 TIMES DAILY
Status: DISCONTINUED | OUTPATIENT
Start: 2018-02-05 | End: 2018-02-06 | Stop reason: HOSPADM

## 2018-02-05 RX ORDER — DOCUSATE SODIUM 100 MG/1
100 CAPSULE, LIQUID FILLED ORAL EVERY 12 HOURS
Status: DISCONTINUED | OUTPATIENT
Start: 2018-02-05 | End: 2018-02-06 | Stop reason: HOSPADM

## 2018-02-05 RX ORDER — TRANEXAMIC ACID 100 MG/ML
INJECTION, SOLUTION INTRAVENOUS
Status: DISCONTINUED | OUTPATIENT
Start: 2018-02-05 | End: 2018-02-05

## 2018-02-05 RX ORDER — BACITRACIN 50000 [IU]/1
INJECTION, POWDER, FOR SOLUTION INTRAMUSCULAR
Status: DISCONTINUED | OUTPATIENT
Start: 2018-02-05 | End: 2018-02-05 | Stop reason: HOSPADM

## 2018-02-05 RX ORDER — DIPHENHYDRAMINE HYDROCHLORIDE 50 MG/ML
25 INJECTION INTRAMUSCULAR; INTRAVENOUS EVERY 6 HOURS PRN
Status: DISCONTINUED | OUTPATIENT
Start: 2018-02-05 | End: 2018-02-05 | Stop reason: HOSPADM

## 2018-02-05 RX ORDER — HYDROMORPHONE HYDROCHLORIDE 2 MG/ML
0.4 INJECTION, SOLUTION INTRAMUSCULAR; INTRAVENOUS; SUBCUTANEOUS EVERY 5 MIN PRN
Status: DISCONTINUED | OUTPATIENT
Start: 2018-02-05 | End: 2018-02-05 | Stop reason: HOSPADM

## 2018-02-05 RX ORDER — OXYCODONE HYDROCHLORIDE 5 MG/1
5 TABLET ORAL EVERY 4 HOURS PRN
Status: DISCONTINUED | OUTPATIENT
Start: 2018-02-05 | End: 2018-02-05

## 2018-02-05 RX ORDER — FAMOTIDINE 20 MG/1
20 TABLET, FILM COATED ORAL
Status: COMPLETED | OUTPATIENT
Start: 2018-02-05 | End: 2018-02-05

## 2018-02-05 RX ORDER — SODIUM CHLORIDE 0.9 % (FLUSH) 0.9 %
3 SYRINGE (ML) INJECTION
Status: DISCONTINUED | OUTPATIENT
Start: 2018-02-05 | End: 2018-02-06 | Stop reason: HOSPADM

## 2018-02-05 RX ORDER — KETOROLAC TROMETHAMINE 30 MG/ML
INJECTION, SOLUTION INTRAMUSCULAR; INTRAVENOUS
Status: DISCONTINUED | OUTPATIENT
Start: 2018-02-05 | End: 2018-02-05 | Stop reason: HOSPADM

## 2018-02-05 RX ORDER — MEPERIDINE HYDROCHLORIDE 50 MG/ML
12.5 INJECTION INTRAMUSCULAR; INTRAVENOUS; SUBCUTANEOUS ONCE AS NEEDED
Status: COMPLETED | OUTPATIENT
Start: 2018-02-05 | End: 2018-02-05

## 2018-02-05 RX ORDER — PROPOFOL 10 MG/ML
VIAL (ML) INTRAVENOUS
Status: DISCONTINUED | OUTPATIENT
Start: 2018-02-05 | End: 2018-02-05

## 2018-02-05 RX ORDER — ONDANSETRON 2 MG/ML
4 INJECTION INTRAMUSCULAR; INTRAVENOUS EVERY 12 HOURS PRN
Status: DISCONTINUED | OUTPATIENT
Start: 2018-02-05 | End: 2018-02-06 | Stop reason: HOSPADM

## 2018-02-05 RX ORDER — ACETAMINOPHEN 10 MG/ML
1000 INJECTION, SOLUTION INTRAVENOUS
Status: COMPLETED | OUTPATIENT
Start: 2018-02-05 | End: 2018-02-05

## 2018-02-05 RX ORDER — DIPHENHYDRAMINE HYDROCHLORIDE 50 MG/ML
25 INJECTION INTRAMUSCULAR; INTRAVENOUS EVERY 8 HOURS PRN
Status: DISCONTINUED | OUTPATIENT
Start: 2018-02-05 | End: 2018-02-06 | Stop reason: HOSPADM

## 2018-02-05 RX ORDER — FENTANYL CITRATE 50 UG/ML
INJECTION, SOLUTION INTRAMUSCULAR; INTRAVENOUS
Status: DISCONTINUED | OUTPATIENT
Start: 2018-02-05 | End: 2018-02-05

## 2018-02-05 RX ORDER — ASPIRIN 325 MG
325 TABLET ORAL EVERY 12 HOURS
Status: DISCONTINUED | OUTPATIENT
Start: 2018-02-06 | End: 2018-02-06 | Stop reason: HOSPADM

## 2018-02-05 RX ORDER — DIPHENHYDRAMINE HYDROCHLORIDE 50 MG/ML
INJECTION INTRAMUSCULAR; INTRAVENOUS
Status: DISCONTINUED | OUTPATIENT
Start: 2018-02-05 | End: 2018-02-05

## 2018-02-05 RX ORDER — ROPIVACAINE HYDROCHLORIDE 5 MG/ML
INJECTION, SOLUTION EPIDURAL; INFILTRATION; PERINEURAL
Status: DISCONTINUED | OUTPATIENT
Start: 2018-02-05 | End: 2018-02-05

## 2018-02-05 RX ORDER — POLYETHYLENE GLYCOL 3350 17 G/17G
17 POWDER, FOR SOLUTION ORAL DAILY
Status: DISCONTINUED | OUTPATIENT
Start: 2018-02-06 | End: 2018-02-06 | Stop reason: HOSPADM

## 2018-02-05 RX ORDER — TRAMADOL HYDROCHLORIDE 50 MG/1
100 TABLET ORAL EVERY 6 HOURS PRN
Status: DISCONTINUED | OUTPATIENT
Start: 2018-02-05 | End: 2018-02-06 | Stop reason: HOSPADM

## 2018-02-05 RX ORDER — CEFAZOLIN SODIUM 2 G/50ML
2 SOLUTION INTRAVENOUS
Status: COMPLETED | OUTPATIENT
Start: 2018-02-05 | End: 2018-02-06

## 2018-02-05 RX ORDER — CEFAZOLIN SODIUM 2 G/50ML
2 SOLUTION INTRAVENOUS
Status: COMPLETED | OUTPATIENT
Start: 2018-02-05 | End: 2018-02-05

## 2018-02-05 RX ORDER — MUPIROCIN 20 MG/G
1 OINTMENT TOPICAL 2 TIMES DAILY
Status: DISCONTINUED | OUTPATIENT
Start: 2018-02-05 | End: 2018-02-06 | Stop reason: HOSPADM

## 2018-02-05 RX ORDER — TRAMADOL HYDROCHLORIDE 50 MG/1
50 TABLET ORAL EVERY 6 HOURS PRN
Status: DISCONTINUED | OUTPATIENT
Start: 2018-02-05 | End: 2018-02-06 | Stop reason: HOSPADM

## 2018-02-05 RX ORDER — ACETAMINOPHEN 10 MG/ML
1000 INJECTION, SOLUTION INTRAVENOUS EVERY 8 HOURS
Status: DISCONTINUED | OUTPATIENT
Start: 2018-02-05 | End: 2018-02-06 | Stop reason: HOSPADM

## 2018-02-05 RX ORDER — PROPOFOL 10 MG/ML
VIAL (ML) INTRAVENOUS CONTINUOUS PRN
Status: DISCONTINUED | OUTPATIENT
Start: 2018-02-05 | End: 2018-02-05

## 2018-02-05 RX ORDER — SOLIFENACIN SUCCINATE 5 MG/1
5 TABLET, FILM COATED ORAL DAILY
Status: DISCONTINUED | OUTPATIENT
Start: 2018-02-05 | End: 2018-02-06 | Stop reason: HOSPADM

## 2018-02-05 RX ORDER — BUPIVACAINE HCL/EPINEPHRINE 0.25-.0005
VIAL (ML) INJECTION
Status: DISCONTINUED | OUTPATIENT
Start: 2018-02-05 | End: 2018-02-05 | Stop reason: HOSPADM

## 2018-02-05 RX ORDER — SERTRALINE HYDROCHLORIDE 50 MG/1
50 TABLET, FILM COATED ORAL DAILY
Status: DISCONTINUED | OUTPATIENT
Start: 2018-02-05 | End: 2018-02-06 | Stop reason: HOSPADM

## 2018-02-05 RX ORDER — LIDOCAINE HYDROCHLORIDE 10 MG/ML
0.5 INJECTION, SOLUTION EPIDURAL; INFILTRATION; INTRACAUDAL; PERINEURAL ONCE
Status: DISCONTINUED | OUTPATIENT
Start: 2018-02-05 | End: 2018-02-05 | Stop reason: HOSPADM

## 2018-02-05 RX ORDER — FAMOTIDINE 20 MG/1
20 TABLET, FILM COATED ORAL 2 TIMES DAILY
Status: DISCONTINUED | OUTPATIENT
Start: 2018-02-05 | End: 2018-02-06 | Stop reason: HOSPADM

## 2018-02-05 RX ORDER — TRAMADOL HYDROCHLORIDE 50 MG/1
50 TABLET ORAL EVERY 6 HOURS PRN
Status: DISCONTINUED | OUTPATIENT
Start: 2018-02-05 | End: 2018-02-05 | Stop reason: SDUPTHER

## 2018-02-05 RX ORDER — OXYCODONE HYDROCHLORIDE 5 MG/1
5 TABLET ORAL
Status: DISCONTINUED | OUTPATIENT
Start: 2018-02-05 | End: 2018-02-05 | Stop reason: HOSPADM

## 2018-02-05 RX ORDER — DEXTROSE MONOHYDRATE AND SODIUM CHLORIDE 5; .9 G/100ML; G/100ML
INJECTION, SOLUTION INTRAVENOUS CONTINUOUS
Status: DISCONTINUED | OUTPATIENT
Start: 2018-02-05 | End: 2018-02-05

## 2018-02-05 RX ORDER — HYDROMORPHONE HYDROCHLORIDE 1 MG/ML
0.5 INJECTION, SOLUTION INTRAMUSCULAR; INTRAVENOUS; SUBCUTANEOUS EVERY 4 HOURS PRN
Status: DISCONTINUED | OUTPATIENT
Start: 2018-02-05 | End: 2018-02-05

## 2018-02-05 RX ORDER — AMLODIPINE BESYLATE 2.5 MG/1
2.5 TABLET ORAL DAILY
Status: DISCONTINUED | OUTPATIENT
Start: 2018-02-05 | End: 2018-02-06 | Stop reason: HOSPADM

## 2018-02-05 RX ORDER — SODIUM CHLORIDE 0.9 % (FLUSH) 0.9 %
5 SYRINGE (ML) INJECTION
Status: DISCONTINUED | OUTPATIENT
Start: 2018-02-05 | End: 2018-02-06 | Stop reason: HOSPADM

## 2018-02-05 RX ORDER — DEXTROSE MONOHYDRATE AND SODIUM CHLORIDE 5; .9 G/100ML; G/100ML
INJECTION, SOLUTION INTRAVENOUS CONTINUOUS
Status: DISCONTINUED | OUTPATIENT
Start: 2018-02-05 | End: 2018-02-06 | Stop reason: HOSPADM

## 2018-02-05 RX ORDER — PRAVASTATIN SODIUM 20 MG/1
40 TABLET ORAL NIGHTLY
Status: DISCONTINUED | OUTPATIENT
Start: 2018-02-05 | End: 2018-02-06 | Stop reason: HOSPADM

## 2018-02-05 RX ORDER — ONDANSETRON 2 MG/ML
4 INJECTION INTRAMUSCULAR; INTRAVENOUS DAILY PRN
Status: DISCONTINUED | OUTPATIENT
Start: 2018-02-05 | End: 2018-02-05 | Stop reason: HOSPADM

## 2018-02-05 RX ORDER — OXYCODONE HYDROCHLORIDE 5 MG/1
10 TABLET ORAL EVERY 4 HOURS PRN
Status: DISCONTINUED | OUTPATIENT
Start: 2018-02-05 | End: 2018-02-05

## 2018-02-05 RX ADMIN — PROPOFOL 40 MG: 10 INJECTION, EMULSION INTRAVENOUS at 10:02

## 2018-02-05 RX ADMIN — MEPERIDINE HYDROCHLORIDE 12.5 MG: 50 INJECTION INTRAMUSCULAR; INTRAVENOUS; SUBCUTANEOUS at 11:02

## 2018-02-05 RX ADMIN — DEXTROSE AND SODIUM CHLORIDE 250 ML: 5; .9 INJECTION, SOLUTION INTRAVENOUS at 09:02

## 2018-02-05 RX ADMIN — PROPOFOL 50 MG/KG/MIN: 10 INJECTION, EMULSION INTRAVENOUS at 10:02

## 2018-02-05 RX ADMIN — MIDAZOLAM HYDROCHLORIDE 2 MG: 1 INJECTION, SOLUTION INTRAMUSCULAR; INTRAVENOUS at 09:02

## 2018-02-05 RX ADMIN — FAMOTIDINE 20 MG: 20 TABLET, FILM COATED ORAL at 10:02

## 2018-02-05 RX ADMIN — TRAMADOL HYDROCHLORIDE 100 MG: 50 TABLET, FILM COATED ORAL at 10:02

## 2018-02-05 RX ADMIN — ROPIVACAINE HYDROCHLORIDE 3 ML: 5 INJECTION, SOLUTION EPIDURAL; INFILTRATION; PERINEURAL at 09:02

## 2018-02-05 RX ADMIN — FENTANYL CITRATE 100 MCG: 50 INJECTION, SOLUTION INTRAMUSCULAR; INTRAVENOUS at 10:02

## 2018-02-05 RX ADMIN — DIPHENHYDRAMINE HYDROCHLORIDE 50 MG: 50 INJECTION, SOLUTION INTRAMUSCULAR; INTRAVENOUS at 10:02

## 2018-02-05 RX ADMIN — EPHEDRINE SULFATE 15 MG: 50 INJECTION INTRAMUSCULAR; INTRAVENOUS; SUBCUTANEOUS at 10:02

## 2018-02-05 RX ADMIN — MUPIROCIN 1 G: 20 OINTMENT TOPICAL at 10:02

## 2018-02-05 RX ADMIN — SODIUM CHLORIDE, SODIUM LACTATE, POTASSIUM CHLORIDE, AND CALCIUM CHLORIDE: 600; 310; 30; 20 INJECTION, SOLUTION INTRAVENOUS at 09:02

## 2018-02-05 RX ADMIN — EPHEDRINE SULFATE 10 MG: 50 INJECTION INTRAMUSCULAR; INTRAVENOUS; SUBCUTANEOUS at 10:02

## 2018-02-05 RX ADMIN — CEFAZOLIN SODIUM 2 G: 2 SOLUTION INTRAVENOUS at 10:02

## 2018-02-05 RX ADMIN — CEFAZOLIN SODIUM 2 G: 2 SOLUTION INTRAVENOUS at 06:02

## 2018-02-05 RX ADMIN — ACETAMINOPHEN 1000 MG: 10 INJECTION, SOLUTION INTRAVENOUS at 06:02

## 2018-02-05 RX ADMIN — PRAVASTATIN SODIUM 40 MG: 20 TABLET ORAL at 10:02

## 2018-02-05 RX ADMIN — TRANEXAMIC ACID 665 MG: 100 INJECTION, SOLUTION INTRAVENOUS at 10:02

## 2018-02-05 RX ADMIN — FAMOTIDINE 20 MG: 20 TABLET, FILM COATED ORAL at 08:02

## 2018-02-05 RX ADMIN — EPHEDRINE SULFATE 5 MG: 50 INJECTION INTRAMUSCULAR; INTRAVENOUS; SUBCUTANEOUS at 10:02

## 2018-02-05 RX ADMIN — ACETAMINOPHEN 1000 MG: 10 INJECTION, SOLUTION INTRAVENOUS at 10:02

## 2018-02-05 RX ADMIN — CELECOXIB 200 MG: 200 CAPSULE ORAL at 10:02

## 2018-02-05 RX ADMIN — TRAMADOL HYDROCHLORIDE 50 MG: 50 TABLET, FILM COATED ORAL at 04:02

## 2018-02-05 RX ADMIN — SODIUM CHLORIDE, SODIUM LACTATE, POTASSIUM CHLORIDE, AND CALCIUM CHLORIDE: 600; 310; 30; 20 INJECTION, SOLUTION INTRAVENOUS at 10:02

## 2018-02-05 RX ADMIN — DOCUSATE SODIUM 100 MG: 100 CAPSULE, LIQUID FILLED ORAL at 10:02

## 2018-02-05 RX ADMIN — VANCOMYCIN HYDROCHLORIDE 1 G: 1 INJECTION, POWDER, LYOPHILIZED, FOR SOLUTION INTRAVENOUS at 09:02

## 2018-02-05 RX ADMIN — DEXTROSE AND SODIUM CHLORIDE: 5; .9 INJECTION, SOLUTION INTRAVENOUS at 10:02

## 2018-02-05 RX ADMIN — DEXTROSE AND SODIUM CHLORIDE: 5; .9 INJECTION, SOLUTION INTRAVENOUS at 01:02

## 2018-02-05 NOTE — ANESTHESIA POSTPROCEDURE EVALUATION
"Anesthesia Post Evaluation    Patient: Yuniel Gracia    Procedure(s) Performed: Procedure(s) (LRB):  REPLACEMENT-KNEE WITH NAVIGATION-SIGHT ASSISTED (Right)    Final Anesthesia Type: spinal  Patient location during evaluation: PACU  Patient participation: Yes- Able to Participate  Level of consciousness: awake and alert and oriented  Post-procedure vital signs: reviewed and stable  Pain management: adequate  Airway patency: patent  PONV status at discharge: No PONV  Anesthetic complications: no      Cardiovascular status: blood pressure returned to baseline and hemodynamically stable  Respiratory status: unassisted, spontaneous ventilation and nasal cannula  Hydration status: euvolemic  Follow-up not needed.        Visit Vitals  BP 99/63 (Patient Position: Lying)   Pulse 61   Temp 36.2 °C (97.1 °F) (Oral)   Resp 18   Ht 5' 5" (1.651 m)   Wt 69 kg (152 lb 1.9 oz)   LMP 03/02/1998   SpO2 100%   Breastfeeding? No   BMI 25.31 kg/m²       Pain/Eduar Score: Pain Assessment Performed: Yes (2/5/2018  1:00 PM)  Presence of Pain: denies (2/5/2018  1:00 PM)  Eduar Score: 10 (2/5/2018  1:00 PM)  Modified Eduar Score: 20 (2/5/2018  1:00 PM)      "

## 2018-02-05 NOTE — PT/OT/SLP PROGRESS
Occupational Therapy  Not Seen    Yuniel Gracia   MRN: [id number]     Patient not seen for Occupational Therapy today due to ( ) departmental protocol for elective surgery patients.    Patient with Primary osteoarthritis of right knee [M17.11], s/p Procedure(s):  REPLACEMENT-KNEE WITH NAVIGATION-SIGHT ASSISTED 2/5/2018 who will be seen for Occupational Therapy evaluation POD#1.    Gabe Abbasi, OT   2/5/2018

## 2018-02-05 NOTE — PLAN OF CARE
"Problem: Physical Therapy Goal  Goal: Physical Therapy Goal  Goals to be met by: 18     Patient will increase functional independence with mobility by performin. Supine to sit with supervision.   2. Sit to supine with supervision.   3. Sit<>stand transfer with supervision using rolling walker.   4. Gait > 150 feet with SBA using rolling walker.   5. Ascend/descend 6" curb step with RW and SBA  Outcome: Ongoing (interventions implemented as appropriate)  PT evaluation completed. Please see progress note for details, POC, and recommendations.       "

## 2018-02-05 NOTE — OR NURSING
Pt states she is not allergic to vancomycin.  Also states she does not remember anyone telling her she is allergic to vanco.  Dr Mcneill aware of this and wants her to have the drug

## 2018-02-05 NOTE — PLAN OF CARE
02/05/18 1421   Final Note   Assessment Type Final Discharge Note   Discharge Disposition Home-Health   What phone number can be called within the next 1-3 days to see how you are doing after discharge? [phone]   Discharge plans and expectations educations in teach back method with documentation complete? Yes   Right Care Referral Info   Post Acute Recommendation Home-care   Facility Name [facility]

## 2018-02-05 NOTE — DISCHARGE INSTRUCTIONS
Knee Athroscopy Discharge Instructions    1) Pain: After surgery your knee will be sore. The knee will likely have been injected with a numbing medicine (Exparel) prior to completion of surgery for pain control. This is indicated on a green bracelet that you will continue to wear for 4 days after surgery. You will   also get a prescription for pain control before you leave the hospital. Ice and elevation will assist with pain control.    a) Apply ice as much as possible for the first 72 hours. After 72 hours, apply ice for 20minutes after therapy,after exercising or whenever experiencing pain. Avoid direct skin contact with ice to prevent frostbit.          b) Elevate the affected leg with the pillow the length of the leg higher than your heart to assist with swelling and pain.  2) Incision Care:  a) Some drainage from the incision in the first 72 hours is normal. If drainage is excessive,remove bandage, clean with mild soap and water, pat dry, cover with sterile gauze and secure with tape. Notify physician about excessive drainage. Staples will be removed 14-21 days after surgery   3) Activity:  a) Perform exercises 2-3 x day.  b) You may shower 48 hours after surgery providing the dressing is waterproof. No tub or hot tub usage. Support help is mandatory during showering. If the dressing becomes wet, replace with a new dressing.   c) Wear thigh high reginald hose stockings for 3 weeks after surgery .You may remove stockings for 1- 2 hours during the day only.  d) If your physician orders the CPM machine you are to use it for 2 hours in the am and 2 hours in the pm.Increase the flexion 5 degrees each session if tolerated. This is not to replace your exercise program.  4) For lifetime after your replacement surgery, you may need antibiotic coverage before dental or minor surgical procedures.  5) Possible Complications: Call Surgeon  a) Infection: Report these signs and symptoms to your surgeon.  i) Unexpected redness  around incision   ii) Persistent drainage from wound after 72 hours.  iii) Temperature ,can be treated with Tylenol. Do not go to the emergency room or urgent care center, call your surgeon.   iv) Additional swelling  v) Pain not controlled with current pain medication  b) Blood Clot: Report theses signs and symptoms to your surgeon  i) Unusual pain  ii) Red or discolored skin  iii) Swelling in the leg  iv) Unusual warm skin

## 2018-02-05 NOTE — PLAN OF CARE
[facility]       [address]       [place] [address]       [phone]               [facility] Orthopedic Discharge Orders    Home [place]           Expected Discharge Date: 2/6    Diagnoses:  Post-op  knee replacement    Patient is homebound due to:   Pt requires home health services due to taxing effort to leave the home as a result of immobility from Post-op knee replacement      Weight Bearing Status:   full weight bearing: right leg    CPM: for 3 weeks (2 hours in the morning, 2 hours in the evening); increase by 5 degrees each day until maximum amount then continue to use at the maximum degrees.    TKR:  CPM use should total at least 4-6 hours daily. Start CPM setting around the PROM measurement at Eval.  Progress 5 degrees daily as tolerated until max setting is achieved.  Continue CPM use for 3 weeks post-op then CPM provider [facility] will contact you for CPM pickup.     Physical Therapy   3 times a week   - Ambulate with a rolling walker  - Progress to cane  - Instruct on ROM and strengthening of knee    Aide to provide assistance with personal care and  ADLs  3 times a week    Wound Care:   If patient is discharged with aqua leonidas/silver dressing, leave on for 5 days unless saturated border to border, then follow instructions below:  Cleanse with wound cleanser or normal saline and apply Mepore Pro dressing.  If Mepore pro not available apply gauze and tegaderm.  Change 3 times a week or PRN if dry.  Teach patient to change daily if draining.        Contact:  Please contact the nurse practitionerCorrie at [phone] at Ext [phone]. with concerns.  She is in surgery M,W,F so if urgent and needs to be addressed prior to the end of the day call the  and they with contact her in the OR or Clinic.         BLOOD THINNER:    If sent home on Xarelto         -14 days post-op for TKR       -30 days post-op for THR     If sent home home on ASA    325mg   BID x 4 weeks     Once  finished with prescribed blood thinner, patients can return to pre-surgical ASA dosage if they took ASA before surgery.     Home Health Nurse for Wound Checks and to remove staples on POD #  14  PT/SN to remove staples 14 days Post-op and apply skin prep and steri-strips.    On dressing change, apply new dressing while knee in flexed position.    Pt may shower if incision dressing has waterproof dressing in place. Removal and replacement of dressing after shower only needed if incision is suspected to have gotten wet during shower.  Otherwise change as previously described depending on dressing/drainage    No soaking in the tub or hot tub use. Cold therapy/Ice encouraged at least 20 minutes 2-3 times daily or more if desired.  Incision must be kept waterproof while icing.      FWB unless otherwise indicated.  Progress to cane as able.  Set up for outpatient PT as soon as able after staple removal once patient is MOD I with cane.    Outpatient Therapy: [facility]    [address] [address]   or  [address]  [place] [address]    Call [phone] to schedule appointment  Fax [phone]    If need orders: Call Maty at Ext [phone]      Wear TEDS Bilateral Thigh High Stockings for 3 weeks  Shay hose x 3 weeks. Ok to remove shay hose 1-2 hours/day max if desired.       DME:  - rolling Walker  - 3 in 1 commode  - tub bench / shower chair  - Per PT/OT recommendation          Corrie Spencer

## 2018-02-05 NOTE — PT/OT/SLP PROGRESS
Physical Therapy Treatment    Patient Name:  Yuniel Gracia   MRN:  [id number]    Recommendations:     Discharge Recommendations:  home with home health, home health PT, home health OT   Discharge Equipment Recommendations: none   Barriers to discharge: Inaccessible home and Decreased caregiver support    Assessment:     Yuniel Gracia is a 72 y.o. female admitted with a medical diagnosis of <principal problem not specified>.  She presents with the following impairments/functional limitations:  weakness, impaired functional mobilty, pain, decreased safety awareness, decreased lower extremity function, impaired balance, gait instability, decreased ROM. Pt tolerated BID session well.    Rehab Prognosis:  Good; patient would benefit from acute skilled PT services to address these deficits and reach maximum level of function.      Recent Surgery: Procedure(s) (LRB):  REPLACEMENT-KNEE WITH NAVIGATION-SIGHT ASSISTED (Right) Day of Surgery    Plan:     During this hospitalization, patient to be seen BID to address the above listed problems via gait training, therapeutic activities, therapeutic exercises, neuromuscular re-education  · Plan of Care Expires:  03/07/18   Plan of Care Reviewed with: patient, spouse    Subjective     Communicated with RN prior to session.  Patient found Supine upon PT entry to room, agreeable to treatment.      Chief Complaint: increasing knee pain  Patient comments/goals: To return home  Pain/Comfort:  · Pain Rating 1: 3/10  · Location - Side 1: Right  · Location - Orientation 1: generalized  · Location 1: knee  · Pain Addressed 1: Nurse notified  · Pain Rating Post-Intervention 1: 4/10    Patients cultural, spiritual, Mormon conflicts given the current situation: Voodoo    Objective:     Patient found with: peripheral IV, mccurdy catheter     General Precautions: Standard, fall   Orthopedic Precautions:Full weight bearing   Braces: N/A     Functional Mobility:  · Bed Mobility:    "  · Supine to Sit: stand by assistance  · Sit to Supine: stand by assistance  · Transfers:     · Sit to Stand:  stand by assistance with rolling walker  · Gait: x 250' with RW and SBA. pt required intermittent verbal cues for increased step length and heel-toe gait pattern    AM-PAC 6 CLICK MOBILITY  Turning over in bed (including adjusting bedclothes, sheets and blankets)?: 4  Sitting down on and standing up from a chair with arms (e.g., wheelchair, bedside commode, etc.): 3  Moving from lying on back to sitting on the side of the bed?: 3  Moving to and from a bed to a chair (including a wheelchair)?: 3  Need to walk in hospital room?: 3  Climbing 3-5 steps with a railing?: 3  Total Score: 19     Therapeutic Activities and Exercises:   Pt performed 1 set of 10 reps of RLE  1. Glut sets  2. Quad sets  3. Ankle pumps  5. SLR  6. Knee flexion (heel slides)  Pt required verbal cues, tactile cues and visual cues for technique in order to complete.     Patient left supine with all lines intact, call button in reach, RN notified and  present..    GOALS:    Physical Therapy Goals        Problem: Physical Therapy Goal    Goal Priority Disciplines Outcome Goal Variances Interventions   Physical Therapy Goal     PT/OT, PT Ongoing (interventions implemented as appropriate)     Description:  Goals to be met by: 18     Patient will increase functional independence with mobility by performin. Supine to sit with supervision.   2. Sit to supine with supervision.   3. Sit<>stand transfer with supervision using rolling walker.   4. Gait > 150 feet with SBA using rolling walker.   5. Ascend/descend 6" curb step with RW and SBA                    Time Tracking:     PT Received On: 18  PT Start Time: 1557     PT Stop Time: 1614  PT Total Time (min): 17 min     Billable Minutes: Gait Training 17    Treatment Type: Treatment  PT/PTA: PT         Chaya Edwards, PT  2018  "

## 2018-02-05 NOTE — OP NOTE
[facility]  Operative Report    SUMMARY     Surgery Date: 2/5/2018     Surgeon(s) and Role:     * Julián Mcneill MD - Primary    Assistant: MILE Paredes FA    Pre-op Diagnosis:  Primary osteoarthritis of right knee [M17.11]    Post-op Diagnosis:  Post-Op Diagnosis Codes:     * Primary osteoarthritis of right knee [M17.11]    Procedure(s) (LRB):  REPLACEMENT-KNEE WITH NAVIGATION-SIGHT ASSISTED (Right) ([facility])    Anesthesia: Spinal    Description of Procedure: The appropriate consent was signed. The patient understood and except all risks and complications. The patient was brought to the Operating Room after undergoing spinal anesthetic. Tourniquet was applied to the proximal operative leg. The lower extremity was then prepped and draped in a sterile manner. After exsanguination of Esmarch bandage, tourniquet was inflated to 300 mmHg. An anterior incision with a medial parapatellar arthrotomy was performed. The menisci, anterior cruciate ligament and patellar fat pad were excised. The patella was resurfaced and sized to a 35 mm patella.   Three holes were then drilled for the lugs and this was trialed. A hole was then  drilled in the distal femur, aditi was placed down the femoral canal and the   distal femur cut in 6 degrees of valgus. The tibia was then cut utilizing the   Qwilr navigation system in 0 degree varus valgus with 5 degrees in posterior   slope. Extension block was placed and full extension was noted. The femur was   then sized to a #4 and #4 cutting block was placed and the anterior and   posterior cuts as well as chamfer cuts were performed. The tibia was sized to a  size 4 and a trial was performed.Trials were performed and a 9 mm UC spacer was felt to be appropriate.The tibia was then prepared with the drill and the punch, and trials were   removed and the knee washed out. Aquamantys was used to paint the posterior   capsule and corners. Palacos cement with gentamicin  was then mixed and   pressurized sequentially in tibia, femur and patella. Components were impacted   and excess cement was removed. A trial 9 mm UC spacer was placed and knee   brought out to full extension. Once the cement was allowed to harden, the 9 mm UC  spacer was felt to be appropriate and this was locked into the tibial   baseplate. Tourniquet was deflated and hemostasis was obtained. Good patellar   tracking was noted. Exparel cocktail was injected into the fascia and   subcutaneous tissue. The fascial closure was obtained with a running #2 Quill suture. Subcutaneous closure was obtained with #1 and 2-0 Vicryl. Skin was then closed with the staples and a sterile compressive dressing was applied. The patient was then brought to the Recovery Room in a good condition.        Estimated Blood Loss: 100cc         Specimens:   Specimen (12h ago through future)    None

## 2018-02-05 NOTE — PLAN OF CARE
Met with patient at bedside to complete discharge planning assessment. Patient lives at home with her  & before today's surgery was independent of ADLs.  will be available to assist as needed during recovery. Patient has all the necessary DME at home from previous surgeries. Patient has used [facility] in the past & would like to return to their care. Referral sent via [facility].      02/05/18 5150   Discharge Assessment   Assessment Type Discharge Planning Assessment   Confirmed/corrected address and phone number on facesheet? Yes   Assessment information obtained from? Patient;Caregiver   Expected Length of Stay (days) 1   Communicated expected length of stay with patient/caregiver yes   Prior to hospitilization cognitive status: Alert/Oriented   Prior to hospitalization functional status: Independent   Current cognitive status: Alert/Oriented   Current Functional Status: Needs Assistance;Assistive Equipment   Lives With spouse   Able to Return to Prior Arrangements yes   Is patient able to care for self after discharge? Yes   Patient's perception of discharge disposition home health   Readmission Within The Last 30 Days no previous admission in last 30 days   Patient currently being followed by outpatient case management? No   Patient currently receives any other outside agency services? No   Equipment Currently Used at Home walker, rolling;bedside commode;shower chair;bath bench   Do you have any problems affording any of your prescribed medications? No   Is the patient taking medications as prescribed? yes   Does the patient have transportation home? Yes   Transportation Available family or friend will provide   Does the patient receive services at the [facility]madin Clinic? No   Discharge Plan A Home Health   Patient/Family In Agreement With Plan yes

## 2018-02-05 NOTE — ANESTHESIA PROCEDURE NOTES
Spinal    Diagnosis: OA R KNEE  Start time: 2/5/2018 9:28 AM  Timeout: 2/5/2018 9:28 AM  End time: 2/5/2018 9:40 AM  Staffing  Anesthesiologist: CHLOÉ ARZO  Performed: anesthesiologist   Preanesthetic Checklist  Completed: patient identified, site marked, surgical consent, pre-op evaluation, timeout performed, IV checked, risks and benefits discussed and monitors and equipment checked  Spinal Block  Patient position: sitting  Prep: ChloraPrep  Patient monitoring: heart rate, cardiac monitor and continuous pulse ox  Approach: right paramedian  Location: L4-5  Injection technique: single shot  CSF Fluid: clear free-flowing CSF  Needle  Needle type: pencil-tip   Needle gauge: 25 G  Needle length: 3.5 in  Additional Documentation: negative aspiration for heme, no paresthesia on injection and incremental injection  Needle localization: anatomical landmarks  Assessment  Sensory level: T5   Dermatomal levels determined by alcohol wipe and pinch or prick  Ease of block: difficult  Patient's tolerance of the procedure: comfortable throughout block and no complaints  Medications:  Bolus administered: 3 mL of 0.5 ropivacaine  Epinephrine added: none  Additional Notes  Unable to place at L3-4 from left paramedian approach. Placed easily at L4-5 from the midline.

## 2018-02-05 NOTE — TRANSFER OF CARE
"Anesthesia Transfer of Care Note    Patient: Yuniel Gracia    Procedure(s) Performed: Procedure(s) (LRB):  REPLACEMENT-KNEE WITH NAVIGATION-SIGHT ASSISTED (Right)    Patient location: PACU    Anesthesia Type: spinal    Transport from OR: Transported from OR on room air with adequate spontaneous ventilation    Post pain: adequate analgesia    Post assessment: no apparent anesthetic complications    Post vital signs: stable    Level of consciousness: awake    Nausea/Vomiting: no nausea/vomiting    Complications: none    Transfer of care protocol was followed      Last vitals:   Visit Vitals  /62 (BP Location: Left arm, Patient Position: Lying)   Pulse (!) 50   Temp 36.4 °C (97.6 °F) (Oral)   Resp 18   Ht 5' 4.75" (1.645 m)   Wt 66.2 kg (146 lb)   LMP 03/02/1998   SpO2 100%   Breastfeeding? No   BMI 24.48 kg/m²     "

## 2018-02-05 NOTE — CONSULTS
"Consult Note  Nephrology    Consult Requested By: Julián Mcneill MD    Reason for Consult:     SUBJECTIVE:     History of Present Illness: 82 y/o s/p right total knee. No complains. Ambulating in hallway.    Past Medical History:   Diagnosis Date    Anemia     Arthritis     Blood transfusion     Breast cancer     Cancer RIGHT BREAST    Chronic constipation     Clotting disorder     Colon polyp     Diverticulosis     Glaucoma     Hepatitis C 2000    pt states history of hepatits c-"cured by Dr. Lynch"; TREATED 2 YEARS - 2000   OK NOW    Hypothyroid     Insomnia     Memory loss     reports some memory loss since chemo    Osteoarthritis     Panic attacks     Raynaud's disease     takes amlodipine for this    Ulcer     Vertigo      Past Surgical History:   Procedure Laterality Date    BLADDER SURGERY  2011    sling - Dr Giles  [facility]    BRAIN SURGERY  2007    temporal neuralgia - [place]    BREAST BIOPSY      BREAST LUMPECTOMY  3/12    malignant - had chemo and radiotherapy    COLONOSCOPY  01/03/2011    Dr Lynch, in media section, diverticulosis, hemorrhoids, otherwise normal findings; normal findings on random biopsies    COLONOSCOPY N/A 6/1/2017    Procedure: COLONOSCOPY;  Surgeon: Nate Lamb MD;  Location: [facility];  Service: Endoscopy;  Laterality: N/A;    COLONOSCOPY W/ POLYPECTOMY  06/01/2017    Dr. Lamb, 1 adenomatous polyp, recheck 5 years    EYE SURGERY      cataracts bilateral    HYSTERECTOMY      JOINT REPLACEMENT  09/25/2017    left Knee [facility]     removal of port a cath      right lymph node removed from breast area      TUNNELED VENOUS PORT PLACEMENT  04/2012    left chest    UPPER GASTROINTESTINAL ENDOSCOPY  prior to 2011    small hiatal hernia     Family History   Problem Relation Age of Onset    Cancer Mother      breast    Breast cancer Mother     Diabetes Father     Heart disease Father     Cancer Sister      breast "    Breast cancer Sister     Diabetes Paternal Aunt     Cancer Other      breast    Breast cancer Other     Drug abuse Son     Obesity Son     Diabetes Maternal Aunt     Heart disease Maternal Uncle     Tuberculosis Paternal Uncle      x2    Diabetes Paternal Uncle     Early death Maternal Grandmother      tonsils    Heart disease Maternal Grandfather     Alcohol abuse Maternal Grandfather     Tuberculosis Paternal Grandfather     Cancer Sister      breast    Breast cancer Sister     Ovarian cancer Neg Hx     Colon cancer Neg Hx     Colon polyps Neg Hx     Crohn's disease Neg Hx     Ulcerative colitis Neg Hx      Social History   Substance Use Topics    Smoking status: Never Smoker    Smokeless tobacco: Never Used    Alcohol use 4.8 oz/week     2 Standard drinks or equivalent, 6 Glasses of wine per week      Comment: wine most days - 1 glass       Review of patient's allergies indicates:   Allergen Reactions    Adhesive Dermatitis and Rash    Codeine Nausea Only        Review of Systems:  General ROS: negative for - fever or night sweats  Psychological ROS: negative for - behavioral disorder or depression  ENT ROS: negative for - headaches or visual changes  Hematological and Lymphatic ROS: negative for - bleeding problems or bruising  Endocrine ROS: negative for - temperature intolerance or unexpected weight changes  Respiratory ROS: no cough, shortness of breath, or wheezing  Cardiovascular ROS: no chest pain or dyspnea on exertion  Gastrointestinal ROS: no abdominal pain, change in bowel habits, or black or bloody stools  Genito-Urinary ROS: no dysuria, trouble voiding, or hematuria  Musculoskeletal ROS: negative for - joint pain or joint swelling  Neurological ROS: no TIA or stroke symptoms  Dermatological ROS: negative for rash and skin lesion changes    OBJECTIVE:     Vital Signs Range (Last 24H):  Temp:  [97.1 °F (36.2 °C)-97.7 °F (36.5 °C)]   Pulse:  [50-66]   Resp:  [16-18]   BP:  ()/(51-81)   SpO2:  [100 %]     Physical Exam:  General- Patient alert and oriented x3 in NAD  HEENT- PERRLA, EOMI, OP clear, MMM  Neck- No JVD, Lymphadenopathy, Thyromegaly  CV- Regular rate and rhythm, No Murmur/rosa/rubs  Resp- Lungs CTA Bilaterally, No increased WOB  GI- Non tender/non-distended, BS normoactive x4 quads, no HSM  Extrem- No cyanosis, clubbing, edema. Pulses 2+ and symmetric      Body mass index is 25.31 kg/m².    Laboratory:  Reviewed    Diagnostic Results:  Reviewed      ASSESSMENT/PLAN:     Active Hospital Problems    Diagnosis  POA    Primary osteoarthritis of right knee [M17.11]  Yes      Resolved Hospital Problems    Diagnosis Date Resolved POA   No resolved problems to display.     HTN  -continue home meds with hold parameters.    Chronic constipation  -take linzess with meal at home for best results        Thank you for allowing us to participate in the care of your patient. We will follow the patient and provide recommendations as needed.      Time spent seeing patient( greater than 1/2 spent in direct contact) : 30

## 2018-02-05 NOTE — PT/OT/SLP EVAL
"Physical Therapy Evaluation and Treatment    Patient Name:  Yuniel Gracia   MRN:  [id number]    Recommendations:     Discharge Recommendations:  home with home health, home health PT, home health OT   Discharge Equipment Recommendations: none   Barriers to discharge: None    Assessment:     Yuniel Gracia is a 72 y.o. female admitted with a medical diagnosis of <principal problem not specified>.  She presents with the following impairments/functional limitations:  weakness, impaired functional mobilty, pain, decreased safety awareness, decreased lower extremity function, impaired balance, gait instability, decreased ROM. Pt tolerated initial evaluation well. Mildly impulsive and quick with movements.     Rehab Prognosis:  Good; patient would benefit from acute skilled PT services to address these deficits and reach maximum level of function.      Recent Surgery: Procedure(s) (LRB):  REPLACEMENT-KNEE WITH NAVIGATION-SIGHT ASSISTED (Right) Day of Surgery    Plan:     During this hospitalization, patient to be seen BID to address the above listed problems via gait training, therapeutic activities, therapeutic exercises, neuromuscular re-education  · Plan of Care Expires:  03/07/18   Plan of Care Reviewed with: patient, spouse    Subjective     Communicated with RN prior to session.  Patient found Supine upon PT entry to room, agreeable to evaluation.      Chief Complaint: None stated  Patient comments/goals: To return to PLOF  Pain/Comfort:  · Pain Rating 1: 0/10  · Location - Side 1: Right  · Location - Orientation 1: generalized  · Location 1: knee  · Pain Addressed 1: Reposition, Distraction, Cessation of Activity, Nurse notified  · Pain Rating Post-Intervention 1: 2/10    Patients cultural, spiritual, Zoroastrian conflicts given the current situation: Rastafari    Living Environment:  Living Environment Comment: Pt reports she lives with spouse in 2 story house with 4" step to enter. She has elevator " access to the second story bedroom and bathroom with options for both a tub/shower with tub transfer bench or a walk-in shower. She was occasionally using a single point cane (now lost) prn for ambulation due to knee pain. She was able to cook and clean, but with difficulty. Spouse mainly drives.   Equipment Currently Used at Home: cane, straight (although now lost; can borrow spouse's 3-1 commode and rolling walker he used after his previous knee surgery, now fully recovered)  DME owned (not currently used): rolling walker and bedside commode    Objective:     Patient found with: peripheral IV, mccurdy catheter     General Precautions: Standard, fall   Orthopedic Precautions:Full weight bearing   Braces: N/A     Exams:  · Cognition: Patient is oriented to Person, Place, Time and Situation and follows approximately 100% of one step simple commands.    · Posture:    · -       Rounded shoulders  · -       Forward head  · -       Kyphosis  · Sensation: Intact to light touch bilateral LEs.   · Skin Integrity: Visible skin intact  · Edema: None noted   · Coordination: No coordination impairments identified with functional mobility. No formal testing performed.   · LE ROM/Strength: RLE: ankle DF; 4, good quad contraction     Functional Mobility:  · Bed Mobility:     · Supine to Sit: stand by assistance with HOB elevated  · Transfers:     · Sit to Stand:  stand by assistance with rolling walker x 1 from EOB, pt required verbal cues for safety  · Gait: 1 x 200' with RW and CGA. pt mildly impulsive with turning requiring verbal cues for safety and technique to correct    AM-PAC 6 CLICK MOBILITY  Total Score:19     Patient left up in chair with all lines intact, call button in reach, RN notified and  present.    GOALS:    Physical Therapy Goals        Problem: Physical Therapy Goal    Goal Priority Disciplines Outcome Goal Variances Interventions   Physical Therapy Goal     PT/OT, PT Ongoing (interventions implemented as  "appropriate)     Description:  Goals to be met by: 18     Patient will increase functional independence with mobility by performin. Supine to sit with supervision.   2. Sit to supine with supervision.   3. Sit<>stand transfer with supervision using rolling walker.   4. Gait > 150 feet with SBA using rolling walker.   5. Ascend/descend 6" curb step with RW and SBA                    History:     Past Medical History:   Diagnosis Date    Anemia     Arthritis     Blood transfusion     Breast cancer     Cancer RIGHT BREAST    Chronic constipation     Clotting disorder     Colon polyp     Diverticulosis     Glaucoma     Hepatitis C     pt states history of hepatits c-"cured by Dr. Lynch"; TREATED 2 YEARS -    OK NOW    Hypothyroid     Insomnia     Memory loss     reports some memory loss since chemo    Osteoarthritis     Panic attacks     Raynaud's disease     takes amlodipine for this    Ulcer     Vertigo        Past Surgical History:   Procedure Laterality Date    BLADDER SURGERY      sling - Dr Giles  [facility]    BRAIN SURGERY      temporal neuralgia - [place]    BREAST BIOPSY      BREAST LUMPECTOMY  3/12    malignant - had chemo and radiotherapy    COLONOSCOPY  2011    Dr Lynch, in media section, diverticulosis, hemorrhoids, otherwise normal findings; normal findings on random biopsies    COLONOSCOPY N/A 2017    Procedure: COLONOSCOPY;  Surgeon: Nate Lamb MD;  Location: [facility];  Service: Endoscopy;  Laterality: N/A;    COLONOSCOPY W/ POLYPECTOMY  2017    Dr. Lamb, 1 adenomatous polyp, recheck 5 years    EYE SURGERY      cataracts bilateral    HYSTERECTOMY      JOINT REPLACEMENT  2017    left Knee [facility]     removal of port a cath      right lymph node removed from breast area      TUNNELED VENOUS PORT PLACEMENT  2012    left chest    UPPER GASTROINTESTINAL ENDOSCOPY  prior to     " small hiatal hernia       Clinical Decision Making:     History  Co-morbidities and personal factors that may impact the plan of care Examination  Body Structures and Functions, activity limitations and participation restrictions that may impact the plan of care Clinical Presentation   Decision Making/ Complexity Score   Co-morbidities:   [] Time since onset of injury / illness / exacerbation  [] Status of current condition  []Patient's cognitive status and safety concerns    [] Multiple Medical Problems (see med hx)  Personal Factors:   [] Patient's age  [] Prior Level of function   [] Patient's home situation (environment and family support)  [] Patient's level of motivation  [] Expected progression of patient      HISTORY:(criteria)    [] 93697 - no personal factors/history    [] 38863 - has 1-2 personal factor/comorbidity     [] 44672 - has >3 personal factor/comorbidity     Body Regions:  [] Objective examination findings  [] Head     []  Neck  [] Trunk   [] Upper Extremity  [] Lower Extremity    Body Systems:  [] For communication ability, affect, cognition, language, and learning style: the assessment of the ability to make needs known, consciousness, orientation (person, place, and time), expected emotional /behavioral responses, and learning preferences (eg, learning barriers, education  needs)  [] For the neuromuscular system: a general assessment of gross coordinated movement (eg, balance, gait, locomotion, transfers, and transitions) and motor function  (motor control and motor learning)  [] For the musculoskeletal system: the assessment of gross symmetry, gross range of motion, gross strength, height, and weight  [] For the integumentary system: the assessment of pliability(texture), presence of scar formation, skin color, and skin integrity  [] For cardiovascular/pulmonary system: the assessment of heart rate, respiratory rate, blood pressure, and edema     Activity limitations:    [] Patient's  cognitive status and saf ety concerns          [] Status of current condition      [] Weight bearing restriction  [] Cardiopulmunary Restriction    Participation Restrictions:   [] Goals and goal agreement with the patient     [] Rehab potential (prognosis) and probable outcome      Examination of Body System: (criteria)    [] 87502 - addressing 1-2 elements    [] 48947 - addressing a total of 3 or more elements     [] 77267 -  Addressing a total of 4 or more elements         Clinical Presentation: (criteria)  Choose one     On examination of body system using standardized tests and measures patient presents with (CHOOSE ONE) elements from any of the following: body structures and functions, activity limitations, and/or participation restrictions.  Leading to a clinical presentation that is considered (CHOOSE ONE)                              Clinical Decision Making  (Eval Complexity):  Choose One     Time Tracking:     PT Received On: 02/05/18  PT Start Time: 1401     PT Stop Time: 1419  PT Total Time (min): 18 min     Billable Minutes: Evaluation 10 and Gait Training 8    Chaya Edwards, PT, DPT  02/05/2018

## 2018-02-06 VITALS
HEART RATE: 61 BPM | RESPIRATION RATE: 18 BRPM | OXYGEN SATURATION: 95 % | HEIGHT: 65 IN | DIASTOLIC BLOOD PRESSURE: 58 MMHG | BODY MASS INDEX: 25.34 KG/M2 | TEMPERATURE: 98 F | SYSTOLIC BLOOD PRESSURE: 106 MMHG | WEIGHT: 152.13 LBS

## 2018-02-06 PROBLEM — M17.11 PRIMARY OSTEOARTHRITIS OF RIGHT KNEE: Status: RESOLVED | Noted: 2018-02-05 | Resolved: 2018-02-06

## 2018-02-06 LAB
ERYTHROCYTE [DISTWIDTH] IN BLOOD BY AUTOMATED COUNT: 15.4 %
HCT VFR BLD AUTO: 30.6 %
HGB BLD-MCNC: 10.1 G/DL
MCH RBC QN AUTO: 30.1 PG
MCHC RBC AUTO-ENTMCNC: 33 G/DL
MCV RBC AUTO: 91 FL
PLATELET # BLD AUTO: 92 K/UL
PMV BLD AUTO: 10.4 FL
RBC # BLD AUTO: 3.35 M/UL
WBC # BLD AUTO: 4.32 K/UL

## 2018-02-06 PROCEDURE — 85027 COMPLETE CBC AUTOMATED: CPT

## 2018-02-06 PROCEDURE — 25000003 PHARM REV CODE 250: Performed by: NURSE PRACTITIONER

## 2018-02-06 PROCEDURE — 97110 THERAPEUTIC EXERCISES: CPT

## 2018-02-06 PROCEDURE — G8987 SELF CARE CURRENT STATUS: HCPCS | Mod: CJ

## 2018-02-06 PROCEDURE — G8989 SELF CARE D/C STATUS: HCPCS | Mod: CJ

## 2018-02-06 PROCEDURE — 36415 COLL VENOUS BLD VENIPUNCTURE: CPT

## 2018-02-06 PROCEDURE — G8988 SELF CARE GOAL STATUS: HCPCS | Mod: CJ

## 2018-02-06 PROCEDURE — 63600175 PHARM REV CODE 636 W HCPCS

## 2018-02-06 PROCEDURE — 25000003 PHARM REV CODE 250: Performed by: INTERNAL MEDICINE

## 2018-02-06 PROCEDURE — 97165 OT EVAL LOW COMPLEX 30 MIN: CPT

## 2018-02-06 PROCEDURE — 97116 GAIT TRAINING THERAPY: CPT

## 2018-02-06 PROCEDURE — G8979 MOBILITY GOAL STATUS: HCPCS | Mod: CJ

## 2018-02-06 PROCEDURE — G8980 MOBILITY D/C STATUS: HCPCS | Mod: CK

## 2018-02-06 RX ORDER — TRAMADOL HYDROCHLORIDE 50 MG/1
TABLET ORAL
Qty: 60 TABLET | Refills: 2 | Status: ON HOLD | OUTPATIENT
Start: 2018-02-06 | End: 2018-04-22

## 2018-02-06 RX ORDER — ASPIRIN 325 MG
325 TABLET ORAL EVERY 12 HOURS
Refills: 0 | COMMUNITY
Start: 2018-02-06 | End: 2018-05-08

## 2018-02-06 RX ADMIN — TRAMADOL HYDROCHLORIDE 50 MG: 50 TABLET, FILM COATED ORAL at 06:02

## 2018-02-06 RX ADMIN — ACETAMINOPHEN 1000 MG: 10 INJECTION, SOLUTION INTRAVENOUS at 02:02

## 2018-02-06 RX ADMIN — DEXTROSE AND SODIUM CHLORIDE: 5; .9 INJECTION, SOLUTION INTRAVENOUS at 06:02

## 2018-02-06 RX ADMIN — LEVOTHYROXINE SODIUM 75 MCG: 75 TABLET ORAL at 06:02

## 2018-02-06 RX ADMIN — VANCOMYCIN HYDROCHLORIDE 1000 MG: 1 INJECTION, POWDER, LYOPHILIZED, FOR SOLUTION INTRAVENOUS at 01:02

## 2018-02-06 RX ADMIN — CEFAZOLIN SODIUM 2 G: 2 SOLUTION INTRAVENOUS at 03:02

## 2018-02-06 RX ADMIN — ONDANSETRON 4 MG: 2 INJECTION INTRAMUSCULAR; INTRAVENOUS at 06:02

## 2018-02-06 NOTE — PLAN OF CARE
Problem: Patient Care Overview  Goal: Plan of Care Review  Outcome: Ongoing (interventions implemented as appropriate)  PLAN OF CARE REVIEWED WITH PT AND PT'S SPOUSE.  PT'S SPOUSE AT BEDSIDE.  PT AA+OX4.  ABLE TO MAKE NEEDS KNOWN.  DOES NOT APPEAR TO BE IN ANY DISTRESS.  C/O PAIN.  PAIN MEDICATION GIVEN AS ORDERED.  REQUIRES MODERATE ASSIST WITH  ADLS.  GANT CATHTER INTACT AND PATENT DRAINING CLEAR YELLOW URINE TO GRAVITY.  CATHETER CARE PROVIDED.  ABT THERAPY GIVEN AS ORDERED.  IV FLUIDS INFUSING AS ORDERED.  PT REMAINS FREE FROM FALLS, INJURY, AND TRAUMA.  FALL PRECAUTIONS IN PLACE.  BED IN LOWEST POSITION WITH WHEELS LOCKED.  CALL LIGHT WITHIN REACH.  WILL CONTINUE TO MONITOR.

## 2018-02-06 NOTE — PLAN OF CARE
Problem: Patient Care Overview  Goal: Plan of Care Review  Outcome: Ongoing (interventions implemented as appropriate)  Patient on RA. Sats 98 %. IS done. Pt. in no distress, will continue to monitor.

## 2018-02-06 NOTE — NURSING
GANT CATHETER REMOVED BY THIS NURSE (RM) AS ORDERED BY PT'S PHYSICIAN WITH GANT CATHETER TIP INTACT.  PT TOLERATED GANT CATHETER REMOVAL WELL.  WILL CONTINUE TO MONITOR.

## 2018-02-06 NOTE — PLAN OF CARE
Problem: Patient Care Overview  Goal: Plan of Care Review  Outcome: Ongoing (interventions implemented as appropriate)  AAOX4.VSS. Pt free of trauma, falls, injury and skin breakdown.  Pt denies pain at this time.Pt has been eating adequately throughout shift. Cheema to gravaity; draining clear yellow urine.Pt bilateral lower extremities pulses intact. Dressing to R knee; CDI.Fluids/IS encouraged throughout shift. Pt encouraged to reposition self frequently while in bed. PT this shift (see note). Treated with IV abx.Purposeful hourly rounding.Pt has call light in reach, side rails up X2, bed in low position, TEDs/SCDs, polar care,  and nonskid socks on. Pt lying in bed in no distress with spouse at the bedside.

## 2018-02-06 NOTE — PT/OT/SLP EVAL
"Occupational Therapy   Evaluation and Discharge Note    Name: Yuniel Gracia  MRN: [id number]  Admitting Diagnosis:  Primary osteoarthritis of right knee 1 Day Post-Op    Recommendations:     Discharge Recommendations: home with home health, home health PT  Discharge Equipment Recommendations:  none  Barriers to discharge:  None    History:     Occupational Profile:  Living Environment: Resides with spouse in 2 story home with one step to enter and elevator access to second level.  Tub/shower with TTB or walk in shower available.  Previous level of function: Independent  Roles and Routines: Wife; cooks, cleans, spouse primarily drives; enjoys playing cards.  Equipment Owned:   (owns 3-in-1, RW, TTB)  Assistance upon Discharge: Spouse    Past Medical History:   Diagnosis Date    Anemia     Arthritis     Blood transfusion     Breast cancer     Cancer RIGHT BREAST    Chronic constipation     Clotting disorder     Colon polyp     Diverticulosis     Glaucoma     Hepatitis C 2000    pt states history of hepatits c-"cured by Dr. Lynch"; TREATED 2 YEARS - 2000   OK NOW    Hypothyroid     Insomnia     Memory loss     reports some memory loss since chemo    Osteoarthritis     Panic attacks     Raynaud's disease     takes amlodipine for this    Ulcer     Vertigo        Past Surgical History:   Procedure Laterality Date    BLADDER SURGERY  2011    [facility]ling - Dr Giles  [facility]    BRAIN SURGERY  2007    temporal neuralgia - [place]    BREAST BIOPSY      BREAST LUMPECTOMY  3/12    malignant - had chemo and radiotherapy    COLONOSCOPY  01/03/2011    Dr Lynch, in media section, diverticulosis, hemorrhoids, otherwise normal findings; normal findings on random biopsies    COLONOSCOPY N/A 6/1/2017    Procedure: COLONOSCOPY;  Surgeon: Nate Lamb MD;  [facility];  Service: Endoscopy;  Laterality: N/A;    COLONOSCOPY W/ POLYPECTOMY  06/01/2017    Dr. Lamb, 1 adenomatous polyp, " recheck 5 years    EYE SURGERY      cataracts bilateral    HYSTERECTOMY      JOINT REPLACEMENT  09/25/2017    left Knee [facility]     removal of port a cath      right lymph node removed from breast area      TUNNELED VENOUS PORT PLACEMENT  04/2012    left chest    UPPER GASTROINTESTINAL ENDOSCOPY  prior to 2011    small hiatal hernia       Subjective     Chief Complaint: Pain in (R) lateral thigh  Patient/Family stated goals: Home  Communicated with: RN prior to session.  Pain/Comfort:  · Pain Rating 1: 3/10  · Location - Side 1: Right  · Location - Orientation 1: lateral  · Location 1: thigh  · Pain Addressed 1: Reposition, Distraction  · Pain Rating Post-Intervention 1: 3/10  · Pain Rating 2: 3/10    Patients cultural, spiritual, Druze conflicts given the current situation:      Objective:     Patient found with: peripheral IV    General Precautions: Standard, fall   Orthopedic Precautions:Full weight bearing   Braces: N/A     Occupational Performance:    Bed Mobility:    · NT, found [facility]    Functional Mobility/Transfers:  · Patient completed Sit <> Stand Transfer with stand by assistance  with  rolling walker   · Patient completed Bed <> Chair Transfer using Stand Pivot technique with stand by assistance with rolling walker  · Patient completed Toilet Transfer Stand Pivot technique with supervision with  bedside commode and rolling walker  · Functional Mobility: Supervision with RW within room    Activities of Daily Living:  · Grooming: supervision in standing at sink washing hands, with RW  · UB Dressing: modified independence , including retrieval  · LB Dressing: stand by assistance with RW  · Toileting: modified independence with 3-in-1 over toilet, with RW    Cognitive/Visual Perceptual:  Cognitive/Psychosocial Skills:     -       Oriented to: Person, Place, Time and Situation   -       Follows Commands/attention:Follows two-step commands  -       Communication: clear/fluent  -      "  Memory: No Deficits noted  -       Safety awareness/insight to disability: intact   -       Mood/Affect/Coping skills/emotional control: Appropriate to situation  Visual/Perceptual:      -Intact    Physical Exam:  Postural examination/scapula alignment:    -       Rounded shoulders  Edema:  Moderate (R) LE  Sensation:    -       Intact  light/touch (B) UEs  Upper Extremity Range of Motion:     -       Right Upper Extremity: WFL  -       Left Upper Extremity: WFL  Upper Extremity Strength:    -       Right Upper Extremity: WFL  -       Left Upper Extremity: WFL   Strength:    -       Right Upper Extremity: WFL  -       Left Upper Extremity: WFL  Gross motor coordination:      Patient left sitting EOB with all lines intact, call button in reach, RN notified and spouse present    [facility] 6 Click:  [facility] Total Score: 22    Treatment & Education:  Re-educated to role of OT, with patient and spouse demonstrating understanding.  Education:    Assessment:     Yuniel Gracia is a 72 y.o. female with a medical diagnosis of Primary osteoarthritis of right knee. At this time, patient is functioning at SBA to modified independent with DME (spouse able to assist at present level) and does not require further acute OT services.     Clinical Decision Makin.  OT Low:  "Pt evaluation falls under low complexity for evaluation coding due to performance deficits noted in 1-3 areas as stated above and 0 co-morbities affecting current functional status. Data obtained from problem focused assessments. No modifications or assistance was required for completion of evaluation. Only brief occupational profile and history review completed."     Plan:     During this hospitalization, patient does not require further acute OT services.  Please re-consult if situation changes.    · Plan of Care Reviewed with: patient, spouse    This Plan of care has been discussed with the patient who was involved in its development and " understands and is in agreement with the identified goals and treatment plan    GOALS:    Occupational Therapy Goals     Not on file          Multidisciplinary Problems (Resolved)        Problem: Occupational Therapy Goal    Goal Priority Disciplines Outcome Interventions   Occupational Therapy Goal   (Resolved)     OT, PT/OT Outcome(s) achieved                    Time Tracking:     OT Date of Treatment: 02/06/18  OT Start Time: 0920  OT Stop Time: 0942  OT Total Time (min): 22 min    Billable Minutes:Evaluation 22    ALEX Gregorio  2/6/2018

## 2018-02-06 NOTE — PROGRESS NOTES
"Internal Medicine  Progress Note    Admit Date: 2/5/2018   LOS: 1 day     SUBJECTIVE:     Follow-up For:  Primary osteoarthritis of right knee    Interval History:     POD #1, right knee repair. Patient in bed for eval,  at bedside. Denies CP,SOB,F,C or calf tenderness. Had episode of hypotension that was relieved by a small fluid bolus. Patient states that BP is low at baseline. Good PO intake, due to void.    Review of Systems:  Constitutional: No fever or chills  Respiratory: No cough or shortness of breath  Cardiovascular: No chest pain or palpitations  Gastrointestinal: No nausea or vomiting  Neurological: No confusion or weakness    OBJECTIVE:     Vital Signs Range (Last 24H):  BP (!) 106/58 (BP Location: Left arm, Patient Position: Lying)   Pulse 61   Temp 98.4 °F (36.9 °C) (Oral)   Resp 18   Ht 5' 5" (1.651 m)   Wt 69 kg (152 lb 1.9 oz)   LMP 03/02/1998   SpO2 95%   Breastfeeding? No   BMI 25.31 kg/m²     Temp:  [97.1 °F (36.2 °C)-98.4 °F (36.9 °C)]   Pulse:  [56-66]   Resp:  [16-18]   BP: ()/(44-81)   SpO2:  [95 %-100 %]     I & O (Last 24H):  Intake/Output Summary (Last 24 hours) at 02/06/18 0947  Last data filed at 02/06/18 0820   Gross per 24 hour   Intake             4184 ml   Output             4300 ml   Net             -116 ml       Physical Exam:  General appearance: Well developed, well nourished  Eyes:  Conjunctivae/corneas clear. PERRL.  Lungs: Normal respiratory effort,   clear to auscultation bilaterally   Heart: Regular rate and rhythm, S1, S2 normal, no murmur, rub or rosa.  Abdomen: Soft, non-tender non-distended; bowel sounds normal; no masses,  no organomegaly  Extremities: No cyanosis or clubbing. No edema.  ACE wrap to right knee CDI.  Skin: Skin color, texture, turgor normal. No rashes or lesions  Neurologic: Normal strength and tone. No focal numbness or weakness     Laboratory Data:    Recent Labs  Lab 02/06/18  0420   WBC 4.32   RBC 3.35*   HGB 10.1*   HCT " 30.6*   PLT 92*   MCV 91   MCH 30.1   MCHC 33.0       BMP: No results for input(s): GLU, NA, K, CL, CO2, BUN, CREATININE, CALCIUM, MG in the last 168 hours.    Invalid input(s):  PHOS  Lab Results   Component Value Date    CALCIUM 10.5 01/24/2018    PHOS 3.6 07/14/2015               Medications:  Medication list was reviewed and changes noted under Assessment/Plan.    Diagnostic Results:    Reviewed      ASSESSMENT/PLAN:     1. Right knee repair (M17.11): POD #1 per therapy and ortho teams  2. Hypotension (I95.9): relieved by fluid bolus.  3. Chronic constipation (K59.09): as now  4. Anemia (D64.9):Dilutional vs expected surgical loss, asymptomatic      Medically stable for discharge at this time

## 2018-02-06 NOTE — PT/OT/SLP PROGRESS
Physical Therapy Treatment    Patient Name:  Yuniel Gracia   MRN:  [id number]    Recommendations:     Discharge Recommendations:     HHPT   Discharge Equipment Recommendations:    NONE already owned  Barriers to discharge: None    Assessment:     Yuniel Gracia is a 72 y.o. female admitted with a medical diagnosis of Primary osteoarthritis of right knee.  She presents with the following impairments/functional limitations:    .patient tolerated treatment session without difficulty and highly motivated. Patient will cont. To benefit from skilled PT services to increase strength, endurance and functional mobility.     Rehab Prognosis:  good; patient would benefit from acute skilled PT services to address these deficits and reach maximum level of function.      Recent Surgery: Procedure(s) (LRB):  REPLACEMENT-KNEE WITH NAVIGATION-SIGHT ASSISTED (Right) 1 Day Post-Op    Plan:     During this hospitalization, patient to be seen BID to address the above listed problems via gait training, therapeutic activities, therapeutic exercises, neuromuscular re-education  · Plan of Care Expires:  03/07/18   Plan of Care Reviewed with: patient, spouse    Subjective     Communicated with nurse prior to session.  Patient found supine upon PT entry to room, agreeable to treatment.      Chief Complaint: Patient reported I have been moving pretty good  Patient comments/goals: We are going home   Pain/Comfort:  · Pain Rating 1: 2/10  · Location - Side 1: Right  · Location - Orientation 1: lateral  · Location 1: knee  · Pain Addressed 1: Pre-medicate for activity  · Pain Rating Post-Intervention 1: 3/10    Patients cultural, spiritual, Oriental orthodox conflicts given the current situation: Pentecostal    Objective:     Patient found with:    Supine in bed with spouse and niece present     General Precautions: Standard, fall   Orthopedic Precautions:Full weight bearing     Functional Mobility:  · Supine to sit Modified Independent   · Sit  "to stand from bed with Rolling walker with Supervision   · Patient gait trained 250 feet with rolling walker with stand by assistance patient demo decrease step length, reciprocal gait pattern. Verbal cues for upright posture.     AM-PAC 6 CLICK MOBILITY  Turning over in bed (including adjusting bedclothes, sheets and blankets)?: 4  Sitting down on and standing up from a chair with arms (e.g., wheelchair, bedside commode, etc.): 3  Moving from lying on back to sitting on the side of the bed?: 3  Moving to and from a bed to a chair (including a wheelchair)?: 3  Need to walk in hospital room?: 3  Climbing 3-5 steps with a railing?: 3  Total Score: 19       Therapeutic Activities and Exercises:  Patient performed therex X 15 reps R LE per TKA protocol AROM     Patient left up in chair with all lines intact, call button in reach, nurse notified and spouse and niece present..    GOALS:    Physical Therapy Goals        Problem: Physical Therapy Goal    Goal Priority Disciplines Outcome Goal Variances Interventions   Physical Therapy Goal     PT/OT, PT Ongoing (interventions implemented as appropriate)     Description:  Goals to be met by: 18     Patient will increase functional independence with mobility by performin. Supine to sit with supervision. MET 18  2. Sit to supine with supervision.   3. Sit<>stand transfer with supervision using rolling walker. MET 18  4. Gait > 150 feet with SBA using rolling walker.  MET 18  5. Ascend/descend 6" curb step with RW and SBA                      Time Tracking:     PT Received On: 18  PT Start Time:     9:26  PT Stop Time:      9:46  PT Total Time (min):    20 Minutes     Billable Minutes: Gait Training 10 and Therapeutic Exercise 10    Treatment Type: Treatment  PT/PTA: PTA     PTA Visit Number: 1     Agustina Bryant, PTA  2018  "

## 2018-02-06 NOTE — PLAN OF CARE
"Problem: Physical Therapy Goal  Goal: Physical Therapy Goal  Goals to be met by: 18     Patient will increase functional independence with mobility by performin. Supine to sit with supervision. MET 18  2. Sit to supine with supervision.   3. Sit<>stand transfer with supervision using rolling walker. MET 18  4. Gait > 150 feet with SBA using rolling walker.  MET 18  5. Ascend/descend 6" curb step with RW and SBA    Outcome: Ongoing (interventions implemented as appropriate)    Progressing well towards goals      "

## 2018-02-06 NOTE — NURSING
ON CALL PHYSICIAN (FC) NOTIFIED BY THIS NURSE (RM) OF PT'S BP 95/49 HR 65.  250 ML BOLUS OF D5W NS AND AFTER BOLUS IS COMPLETED INCREASE FLUIDS  ML/HR.  WILL CONTINUE TO MONITOR.

## 2018-02-06 NOTE — PLAN OF CARE
Problem: Occupational Therapy Goal  Goal: Occupational Therapy Goal  Outcome: Outcome(s) achieved Date Met: 02/06/18  Initial OT evaluation completed.  Patient is SBA to modified independent with basic ADL tasks and functional transfers with RW and 3-in-1.  Spouse able to assist at present level.  No further acute OT required.    Comments: ALEX Gregorio, 2/6/2018

## 2018-02-07 NOTE — PT/OT/SLP DISCHARGE
"Physical Therapy Discharge Summary    Name: Yuniel Gracia  MRN: [id number]   Principal Problem: Primary osteoarthritis of right knee     Patient Discharged from acute Physical Therapy on 18.  Please refer to prior PT noted date on 18 for functional status.     Assessment:     Goals partially met.    Objective:     GOALS:    Physical Therapy Goals        Problem: Physical Therapy Goal    Goal Priority Disciplines Outcome Goal Variances Interventions   Physical Therapy Goal     PT/OT, PT Ongoing (interventions implemented as appropriate)     Description:  Goals to be met by: 18     Patient will increase functional independence with mobility by performin. Supine to sit with supervision. MET 18  2. Sit to supine with supervision.   3. Sit<>stand transfer with supervision using rolling walker. MET 18  4. Gait > 150 feet with SBA using rolling walker.  MET 18  5. Ascend/descend 6" curb step with RW and SBA                      Reasons for Discontinuation of Therapy Services  Transfer to alternate level of care.      Plan:     Patient Discharged to: Home with Home Health Service.    Chaya Edwards, PT, DPT  2018  "

## 2018-02-09 NOTE — DISCHARGE SUMMARY
[facility]  Short Stay  Discharge Summary  TOTAL KNEE REPLACEMENT    Admit Date: 2/5/2018    Discharge Date and Time: 2/6/2018 11:43 AM      Discharge Attending Physician: Julián Mcneill MD    Hospital Course:  Yuniel Gracia,is a 72 y.o. female with severe osteoarthritis R knee, unrelieved with the conservative measures. The patient was admitted on  2/5/2018 and underwent R total knee replacement with computer-assisted navigation. Postoperatively, weightbearing as tolerated with a walker and CPM machine was initiated on postop day #1. Yuniel Gracia did quite well and was discharged on 2/6/2018  with home health physical therapy and nursing. The patient will be on Percocet for pain and aspirin 325 mg p.o. b.i.d. with meals x4 weeks.  A CPM machine will will be sent home with the patient. Postoperative follow up will be in the office in 4 weeks.       Final Diagnoses:    Principal Problem: Primary osteoarthritis of right knee   Secondary Diagnoses:   Active Hospital Problems    Diagnosis  POA   No active problems to display.      Resolved Hospital Problems    Diagnosis Date Resolved POA    *Primary osteoarthritis of right knee [M17.11] 02/06/2018 Yes       Discharged Condition: good    Disposition: Home-Health Care [facility]    Follow up/Patient Instructions:    Medications:  Reconciled Home Medications:   Discharge Medication List as of 2/6/2018 10:38 AM      START taking these medications    Details   aspirin 325 MG tablet Take 1 tablet (325 mg total) by mouth every 12 (twelve) hours. For 4 weeks post op, Starting Tue 2/6/2018, Until Thu 3/8/2018, OTC         CONTINUE these medications which have CHANGED    Details   traMADol (ULTRAM) 50 mg tablet 1-2 tablets every 8 hours as needed for pain, Print         CONTINUE these medications which have NOT CHANGED    Details   amlodipine (NORVASC) 2.5 MG tablet Take 2.5 mg by mouth once daily. , Starting Tue 12/1/2015, Historical Med      anastrozole  "(ARIMIDEX) 1 mg Tab TAKE 1 TABLET DAILY, Normal      CALCIUM CARBONATE (CALCIUM 600 ORAL) Take 1 tablet by mouth once daily., Historical Med      CALCIUM CARBONATE/VITAMIN D3 (VITAMIN D-3 ORAL) Take 400 Units by mouth daily as needed., Historical Med      dorzolamide-timolol 2-0.5% (COSOPT) 22.3-6.8 mg/mL ophthalmic solution Place 1 drop into both eyes 2 (two) times daily. , Starting Fri 3/31/2017, Historical Med      levothyroxine (SYNTHROID) 75 MCG tablet TAKE 1 TABLET DAILY, Normal      linaclotide (LINZESS) 145 mcg Cap capsule Take 145 mcg by mouth once daily., Historical Med      pravastatin (PRAVACHOL) 40 MG tablet TAKE 1 TABLET AT BEDTIME, Normal      sertraline (ZOLOFT) 100 MG tablet TAKE 1 TABLET DAILY, Normal      temazepam (RESTORIL) 15 mg Cap Take 1 capsule (15 mg total) by mouth every evening., Starting Tue 1/16/2018, Normal      traZODone (DESYREL) 50 MG tablet Take 1 tablet (50 mg total) by mouth nightly as needed for Insomnia., Starting Tue 12/12/2017, Print      UNABLE TO FIND Take 1 capsule by mouth once daily. prevagen extra strength , Historical Med      VESICARE 5 mg tablet TAKE 1 TABLET DAILY, Normal         STOP taking these medications       apple cider vinegar 500 mg Tab Comments:   Reason for Stopping:         multivitamin (THERAGRAN) tablet Comments:   Reason for Stopping:         omega 3-dha-epa-fish oil (FISH OIL) 1,000 mg (120 mg-180 mg) Cap Comments:   Reason for Stopping:         UBIDECARENONE/VITAMIN E MIXED (COQ10  ORAL) Comments:   Reason for Stopping:               Discharge Procedure Orders  WALKER FOR HOME USE   Order Specific Question Answer Comments   Type of Walker: Adult (5'4"-6'6")    With wheels? Yes    Height: 5' 5" (1.651 m)    Weight: 69 kg (152 lb 1.9 oz)    Does patient have medical equipment at home? walker, rolling    Does patient have medical equipment at home? bedside commode    Does patient have medical equipment at home? shower chair    Does patient have " "medical equipment at home? bath bench    Length of need (1-99 months): 99      3 IN 1 COMMODE FOR HOME USE   Order Specific Question Answer Comments   Type: Standard    Height: 5' 5" (1.651 m)    Weight: 69 kg (152 lb 1.9 oz)    Does patient have medical equipment at home? walker, rolling    Does patient have medical equipment at home? bedside commode    Does patient have medical equipment at home? shower chair    Does patient have medical equipment at home? bath bench    Length of need (1-99 months): 99      CANE FOR HOME USE   Order Specific Question Answer Comments   Type of Cane: Straight    Height: 5' 5" (1.651 m)    Weight: 69 kg (152 lb 1.9 oz)    Does patient have medical equipment at home? walker, rolling    Does patient have medical equipment at home? bedside commode    Does patient have medical equipment at home? shower chair    Does patient have medical equipment at home? bath bench    Length of need (1-99 months): 99        Follow-up Information     [facility].    Specialties:  DME Provider, Home Health Services  Why:  Home Health  Contact information:  [address] [address]  [phone]             Julián Mcneill MD In 4 weeks.    Specialty:  Orthopedic Surgery  Contact information:  [address]  [facility]  [facility] [address]  [phone]                     "

## 2018-02-15 ENCOUNTER — HOSPITAL ENCOUNTER (OUTPATIENT)
Dept: RADIOLOGY | Facility: OTHER | Age: 73
Discharge: HOME | End: 2018-02-15
Payer: MEDICARE

## 2018-02-15 DIAGNOSIS — M25.561 RIGHT KNEE PAIN: ICD-10-CM

## 2018-02-15 DIAGNOSIS — Z96.651 PRESENCE OF RIGHT ARTIFICIAL KNEE JOINT: ICD-10-CM

## 2018-02-15 DIAGNOSIS — M79.661 PAIN OF RIGHT LOWER LEG: ICD-10-CM

## 2018-02-15 DIAGNOSIS — M79.661 PAIN OF RIGHT LOWER LEG: Primary | ICD-10-CM

## 2018-02-15 DIAGNOSIS — M17.11 OSTEOARTHRITIS OF RIGHT KNEE: ICD-10-CM

## 2018-02-15 PROCEDURE — 93971 EXTREMITY STUDY: CPT | Mod: 26,,, | Performed by: RADIOLOGY

## 2018-02-15 PROCEDURE — 93971 EXTREMITY STUDY: CPT | Mod: TC

## 2018-03-28 ENCOUNTER — PATIENT MESSAGE (OUTPATIENT)
Dept: ADMINISTRATIVE | Facility: OTHER | Age: 73
End: 2018-03-28

## 2018-04-07 DIAGNOSIS — C50.919 MALIGNANT NEOPLASM OF BREAST: ICD-10-CM

## 2018-04-09 DIAGNOSIS — F19.982 DRUG INDUCED INSOMNIA: ICD-10-CM

## 2018-04-09 RX ORDER — SERTRALINE HYDROCHLORIDE 100 MG/1
TABLET, FILM COATED ORAL
Qty: 90 TABLET | Refills: 1 | Status: SHIPPED | OUTPATIENT
Start: 2018-04-09 | End: 2018-09-13 | Stop reason: SDUPTHER

## 2018-04-09 RX ORDER — ANASTROZOLE 1 MG/1
TABLET ORAL
Qty: 90 TABLET | Refills: 5 | Status: SHIPPED | OUTPATIENT
Start: 2018-04-09 | End: 2018-06-20 | Stop reason: SDUPTHER

## 2018-04-09 RX ORDER — TEMAZEPAM 15 MG/1
15 CAPSULE ORAL NIGHTLY
Qty: 60 CAPSULE | Refills: 0 | Status: SHIPPED | OUTPATIENT
Start: 2018-04-09 | End: 2018-06-28 | Stop reason: SDUPTHER

## 2018-04-09 RX ORDER — PRAVASTATIN SODIUM 40 MG/1
TABLET ORAL
Qty: 90 TABLET | Refills: 3 | Status: SHIPPED | OUTPATIENT
Start: 2018-04-09 | End: 2019-02-21 | Stop reason: SDUPTHER

## 2018-04-13 ENCOUNTER — PATIENT MESSAGE (OUTPATIENT)
Dept: HEMATOLOGY/ONCOLOGY | Facility: CLINIC | Age: 73
End: 2018-04-13

## 2018-04-16 ENCOUNTER — LAB VISIT (OUTPATIENT)
Dept: LAB | Facility: HOSPITAL | Age: 73
End: 2018-04-16
Attending: INTERNAL MEDICINE
Payer: MEDICARE

## 2018-04-16 DIAGNOSIS — C50.011 MALIGNANT NEOPLASM OF NIPPLE OF RIGHT BREAST IN FEMALE, UNSPECIFIED ESTROGEN RECEPTOR STATUS: ICD-10-CM

## 2018-04-16 LAB
ALBUMIN SERPL BCP-MCNC: 3.4 G/DL
ALP SERPL-CCNC: 84 U/L
ALT SERPL W/O P-5'-P-CCNC: 9 U/L
ANION GAP SERPL CALC-SCNC: 9 MMOL/L
AST SERPL-CCNC: 20 U/L
BASOPHILS # BLD AUTO: 0 K/UL
BASOPHILS NFR BLD: 0.5 %
BILIRUB SERPL-MCNC: 0.4 MG/DL
BUN SERPL-MCNC: 14 MG/DL
CALCIUM SERPL-MCNC: 10 MG/DL
CHLORIDE SERPL-SCNC: 106 MMOL/L
CO2 SERPL-SCNC: 28 MMOL/L
CREAT SERPL-MCNC: 0.9 MG/DL
DIFFERENTIAL METHOD: ABNORMAL
EOSINOPHIL # BLD AUTO: 0.2 K/UL
EOSINOPHIL NFR BLD: 4.2 %
ERYTHROCYTE [DISTWIDTH] IN BLOOD BY AUTOMATED COUNT: 15.9 %
EST. GFR  (AFRICAN AMERICAN): >60 ML/MIN/1.73 M^2
EST. GFR  (NON AFRICAN AMERICAN): >60 ML/MIN/1.73 M^2
GLUCOSE SERPL-MCNC: 104 MG/DL
HCT VFR BLD AUTO: 34 %
HGB BLD-MCNC: 11.2 G/DL
LYMPHOCYTES # BLD AUTO: 1.1 K/UL
LYMPHOCYTES NFR BLD: 27.2 %
MCH RBC QN AUTO: 28.8 PG
MCHC RBC AUTO-ENTMCNC: 32.9 G/DL
MCV RBC AUTO: 88 FL
MONOCYTES # BLD AUTO: 0.4 K/UL
MONOCYTES NFR BLD: 8.8 %
NEUTROPHILS # BLD AUTO: 2.5 K/UL
NEUTROPHILS NFR BLD: 59.3 %
PLATELET # BLD AUTO: 172 K/UL
PMV BLD AUTO: 8.3 FL
POTASSIUM SERPL-SCNC: 3.8 MMOL/L
PROT SERPL-MCNC: 7.4 G/DL
RBC # BLD AUTO: 3.88 M/UL
SODIUM SERPL-SCNC: 143 MMOL/L
WBC # BLD AUTO: 4.1 K/UL

## 2018-04-16 PROCEDURE — 36415 COLL VENOUS BLD VENIPUNCTURE: CPT

## 2018-04-16 PROCEDURE — 85025 COMPLETE CBC W/AUTO DIFF WBC: CPT

## 2018-04-16 PROCEDURE — 80053 COMPREHEN METABOLIC PANEL: CPT

## 2018-04-17 LAB — CANCER AG27-29 SERPL-ACNC: 26.2 U/ML

## 2018-04-18 ENCOUNTER — OFFICE VISIT (OUTPATIENT)
Dept: HEMATOLOGY/ONCOLOGY | Facility: CLINIC | Age: 73
End: 2018-04-18
Payer: MEDICARE

## 2018-04-18 VITALS
DIASTOLIC BLOOD PRESSURE: 60 MMHG | TEMPERATURE: 99 F | WEIGHT: 141.13 LBS | BODY MASS INDEX: 23.52 KG/M2 | SYSTOLIC BLOOD PRESSURE: 131 MMHG | HEIGHT: 65 IN | HEART RATE: 57 BPM | RESPIRATION RATE: 20 BRPM

## 2018-04-18 DIAGNOSIS — T38.6X5A OSTEOPOROSIS DUE TO AROMATASE INHIBITOR: ICD-10-CM

## 2018-04-18 DIAGNOSIS — Z85.3 HISTORY OF BREAST CANCER: ICD-10-CM

## 2018-04-18 DIAGNOSIS — M81.8 OSTEOPOROSIS DUE TO AROMATASE INHIBITOR: ICD-10-CM

## 2018-04-18 DIAGNOSIS — M85.811 OSTEOPENIA OF RIGHT SHOULDER: Primary | ICD-10-CM

## 2018-04-18 DIAGNOSIS — D69.3 CHRONIC ITP (IDIOPATHIC THROMBOCYTOPENIA): Primary | ICD-10-CM

## 2018-04-18 DIAGNOSIS — M81.0 OSTEOPOROSIS, UNSPECIFIED OSTEOPOROSIS TYPE, UNSPECIFIED PATHOLOGICAL FRACTURE PRESENCE: ICD-10-CM

## 2018-04-18 PROCEDURE — 99214 OFFICE O/P EST MOD 30 MIN: CPT | Mod: S$PBB,,, | Performed by: INTERNAL MEDICINE

## 2018-04-18 PROCEDURE — 99213 OFFICE O/P EST LOW 20 MIN: CPT | Mod: PBBFAC,PO | Performed by: INTERNAL MEDICINE

## 2018-04-18 PROCEDURE — 99999 PR PBB SHADOW E&M-EST. PATIENT-LVL III: CPT | Mod: PBBFAC,,, | Performed by: INTERNAL MEDICINE

## 2018-04-18 NOTE — PROGRESS NOTES
Subjective:       Patient ID: Yuniel Gracia is a 72 y.o. female.    Chief Complaint: Follow-up    HPI:   Patient coming in for followup. She has a chronic ITP and hep C, history of    stage I breast cancer,rec score of 32 , so underwent TC  chemotherapy. She did notice a new mass in the rt breast that came back without issue, On arimidex which will complete 5 years in 9/2017/ Mild thrombocytopenia related to rx and hep c   doesn't want prolia has been off for about 1 year now , doesn't want it anymore, worried about side effects      REVIEW OF SYSTEMS:     CONSTITUTIONAL: The patient denies any weight change. There is no apparent    change in appetite, fever, night sweats, headaches, fatigue, dizziness, or    weakness.      SKIN: Denies rash, issues with nails, non-healing sores, bleeding, blotching    skin or abnormal bruising. Denies new moles or changes to existing moles.      BREASTS: There is no swelling around breasts or nipple discharge.    EYES: Denies eye pain, blurred vision, swelling, redness or discharge.      ENT AND MOUTH: Denies runny nose, stuffiness, sinus trouble or sores. Denies    nosebleeds. Denies, hoarseness, change in voice or swelling in front of the    neck.      CARDIOVASCULAR: Denies chest pain, discomfort or palpitations. Denies neck    swelling or episodes of passing out.      RESPIRATORY: Denies cough, sputum production, blood in sputum, and denies    shortness of breath.      GI: Denies trouble swallowing, indigestion, heartburn, abdominal pain, nausea,    vomiting, diarrhea, altered bowel habits, blood in stool, discoloration of    stools, change in nature of stool, bloating, increased abdominal girth.      GENITOURINARY: No discharge. No pelvic pain or lumps. No rash around groin or  lesions. No urinary frequency, hesitation, painful urination or blood in    urine. Denies incontinence. No problems with intercourse.      MUSCULOSKELETAL: Denies neck or back pain. Denies  weakness in arms or legs,    joint problems or distended inflamed veins in legs. Denies swelling or abnormal  glands.      NEUROLOGICAL: Denies tingling, numbness, altered mentation changes to nerve    function in the face, weakness to one or both of the body. Denies changes to    gait and denies multiple falls or accidents.      PSYCHIATRIC: Denies nervousness, anxiety, hallucinations, depression, suicidal    ideation, trouble sleeping or changes in behavior noticed by family.      The patient denies recent foreign travel or recent exposure to chemicals or    products of concern or infectious diseases.     PHYSICAL EXAM:     Vitals:    04/18/18 1428   BP: 131/60   Pulse: (!) 57   Resp: 20   Temp: 98.5 °F (36.9 °C)       GENERAL: Comfortable looking patient. Patient is in no distress.  Awake, alert and oriented to time, person and place.  No anxiety, or agitation.      HEENT: Normal conjunctivae and eyelids. WNL.  PERRLA 3 to 4 mm. No icterus, no pallor, no congestion, and no discharge noted.     NECK:  Supple. Trachea is central.  No crepitus.  No JVD or masses.    RESPIRATORY:  No intercostal retractions.  No dullness to percussion.  Chest is clear to auscultation.  No rales, rhonchi or wheezes.  No crepitus.  Good air entry bilaterally.    CARDIOVASCULAR:  S1 and S2 are normally heard without murmurs or gallops.  All peripheral pulses are present.    ABDOMEN:  Normal abdomen.  No hepatosplenomegaly.  No free fluid.  Bowel sounds are present.  No hernia noted. No masses.  No rebound or tenderness.  No guarding or rigidity.  Umbilicus is midline.    LYMPHATICS:  No axillary, cervical, supraclavicular, submental, or inguinal lymphadenopathy.    SKIN/MUSCULOSKELETAL:  There is no evidence of excoriation marks or ecchmosis.  No rashes.  No cyanosis.  No clubbing.  No joint or skeletal deformities noted.  Normal range of motion.    NEUROLOGIC:  Higher functions are appropriate.  No cranial nerve deficits.  Normal ludmila.   Normal strength.  Motor and sensory functions are normal.  Deep tendon reflexes are normal.    GENITAL/RECTAL:  Exams are deferred.      Laboratory:     CBC:  Lab Results   Component Value Date    WBC 4.10 04/16/2018    RBC 3.88 (L) 04/16/2018    HGB 11.2 (L) 04/16/2018    HCT 34.0 (L) 04/16/2018    MCV 88 04/16/2018    MCH 28.8 04/16/2018    MCHC 32.9 04/16/2018    RDW 15.9 (H) 04/16/2018     04/16/2018    MPV 8.3 (L) 04/16/2018    GRAN 2.5 04/16/2018    GRAN 59.3 04/16/2018    LYMPH 1.1 04/16/2018    LYMPH 27.2 04/16/2018    MONO 0.4 04/16/2018    MONO 8.8 04/16/2018    EOS 0.2 04/16/2018    BASO 0.00 04/16/2018    EOSINOPHIL 4.2 04/16/2018    BASOPHIL 0.5 04/16/2018       BMP: BMP  Lab Results   Component Value Date     04/16/2018    K 3.8 04/16/2018     04/16/2018    CO2 28 04/16/2018    BUN 14 04/16/2018    CREATININE 0.9 04/16/2018    CALCIUM 10.0 04/16/2018    ANIONGAP 9 04/16/2018    ESTGFRAFRICA >60 04/16/2018    EGFRNONAA >60 04/16/2018       LFT:   Lab Results   Component Value Date    ALT 9 (L) 04/16/2018    AST 20 04/16/2018    ALKPHOS 84 04/16/2018    BILITOT 0.4 04/16/2018 12/2017mammogram neg:   Most recent scan 2015 neg    Assessment/Plan:       Stage 1 breast ca  4. HTN, dyslipedemia, hypothyroidism, depression , contimue care with Dr. Dolan    Gave pt restoril rx for insomnia. generic  Breast ca: cont arimidex and   prolia,has been on hold due to knee sx.  Now she would like to dc prolia, will get bone density and discuss  she completes 10 years of arimidex in 9/2022     thrombocytopenia cont to monitor,   due for mammo in 12/18

## 2018-04-21 ENCOUNTER — SURGERY (OUTPATIENT)
Age: 73
End: 2018-04-21

## 2018-04-21 ENCOUNTER — HOSPITAL ENCOUNTER (OUTPATIENT)
Facility: HOSPITAL | Age: 73
Discharge: HOME | End: 2018-04-22
Attending: EMERGENCY MEDICINE | Admitting: INTERNAL MEDICINE
Payer: MEDICARE

## 2018-04-21 ENCOUNTER — ANESTHESIA EVENT (OUTPATIENT)
Dept: SURGERY | Facility: HOSPITAL | Age: 73
End: 2018-04-21
Payer: MEDICARE

## 2018-04-21 ENCOUNTER — ANESTHESIA (OUTPATIENT)
Dept: SURGERY | Facility: HOSPITAL | Age: 73
End: 2018-04-21
Payer: MEDICARE

## 2018-04-21 DIAGNOSIS — N39.0 ACUTE UTI: ICD-10-CM

## 2018-04-21 DIAGNOSIS — K35.80 ACUTE APPENDICITIS: Primary | ICD-10-CM

## 2018-04-21 DIAGNOSIS — Z01.818 PREOPERATIVE TESTING: ICD-10-CM

## 2018-04-21 DIAGNOSIS — K35.30 ACUTE APPENDICITIS WITH LOCALIZED PERITONITIS: ICD-10-CM

## 2018-04-21 DIAGNOSIS — C50.011 MALIGNANT NEOPLASM INVOLVING BOTH NIPPLE AND AREOLA OF RIGHT BREAST IN FEMALE: ICD-10-CM

## 2018-04-21 LAB
ALBUMIN SERPL BCP-MCNC: 3.8 G/DL
ALP SERPL-CCNC: 97 U/L
ALT SERPL W/O P-5'-P-CCNC: 13 U/L
ANION GAP SERPL CALC-SCNC: 13 MMOL/L
AST SERPL-CCNC: 29 U/L
BACTERIA #/AREA URNS HPF: ABNORMAL /HPF
BASOPHILS # BLD AUTO: 0 K/UL
BASOPHILS NFR BLD: 0.1 %
BILIRUB SERPL-MCNC: 0.5 MG/DL
BILIRUB UR QL STRIP: NEGATIVE
BUN SERPL-MCNC: 16 MG/DL
CALCIUM SERPL-MCNC: 10.6 MG/DL
CHLORIDE SERPL-SCNC: 100 MMOL/L
CLARITY UR: ABNORMAL
CO2 SERPL-SCNC: 26 MMOL/L
COLOR UR: YELLOW
CREAT SERPL-MCNC: 0.8 MG/DL
CRP SERPL-MCNC: 5.2 MG/L
DIFFERENTIAL METHOD: ABNORMAL
EOSINOPHIL # BLD AUTO: 0 K/UL
EOSINOPHIL NFR BLD: 0.7 %
ERYTHROCYTE [DISTWIDTH] IN BLOOD BY AUTOMATED COUNT: 15.8 %
EST. GFR  (AFRICAN AMERICAN): >60 ML/MIN/1.73 M^2
EST. GFR  (NON AFRICAN AMERICAN): >60 ML/MIN/1.73 M^2
GLUCOSE SERPL-MCNC: 87 MG/DL
GLUCOSE UR QL STRIP: NEGATIVE
HCT VFR BLD AUTO: 38.3 %
HGB BLD-MCNC: 12.4 G/DL
HGB UR QL STRIP: ABNORMAL
KETONES UR QL STRIP: NEGATIVE
LEUKOCYTE ESTERASE UR QL STRIP: ABNORMAL
LIPASE SERPL-CCNC: 6 U/L
LYMPHOCYTES # BLD AUTO: 1 K/UL
LYMPHOCYTES NFR BLD: 13.5 %
MCH RBC QN AUTO: 28.2 PG
MCHC RBC AUTO-ENTMCNC: 32.3 G/DL
MCV RBC AUTO: 87 FL
MICROSCOPIC COMMENT: ABNORMAL
MONOCYTES # BLD AUTO: 0.4 K/UL
MONOCYTES NFR BLD: 4.9 %
NEUTROPHILS # BLD AUTO: 6 K/UL
NEUTROPHILS NFR BLD: 80.8 %
NITRITE UR QL STRIP: POSITIVE
PH UR STRIP: 8 [PH] (ref 5–8)
PLATELET # BLD AUTO: 182 K/UL
PMV BLD AUTO: 8 FL
POTASSIUM SERPL-SCNC: 3.8 MMOL/L
PROT SERPL-MCNC: 8.2 G/DL
PROT UR QL STRIP: NEGATIVE
RBC # BLD AUTO: 4.4 M/UL
RBC #/AREA URNS HPF: 2 /HPF (ref 0–4)
SODIUM SERPL-SCNC: 139 MMOL/L
SP GR UR STRIP: 1.01 (ref 1–1.03)
SQUAMOUS #/AREA URNS HPF: 12 /HPF
URN SPEC COLLECT METH UR: ABNORMAL
UROBILINOGEN UR STRIP-ACNC: NEGATIVE EU/DL
WBC # BLD AUTO: 7.5 K/UL
WBC #/AREA URNS HPF: >100 /HPF (ref 0–5)

## 2018-04-21 PROCEDURE — 86140 C-REACTIVE PROTEIN: CPT

## 2018-04-21 PROCEDURE — 80053 COMPREHEN METABOLIC PANEL: CPT

## 2018-04-21 PROCEDURE — 36000708 HC OR TIME LEV III 1ST 15 MIN: Performed by: SURGERY

## 2018-04-21 PROCEDURE — 83690 ASSAY OF LIPASE: CPT

## 2018-04-21 PROCEDURE — 63600175 PHARM REV CODE 636 W HCPCS: Performed by: NURSE ANESTHETIST, CERTIFIED REGISTERED

## 2018-04-21 PROCEDURE — 63600175 PHARM REV CODE 636 W HCPCS: Performed by: EMERGENCY MEDICINE

## 2018-04-21 PROCEDURE — 71000033 HC RECOVERY, INTIAL HOUR: Performed by: SURGERY

## 2018-04-21 PROCEDURE — 96365 THER/PROPH/DIAG IV INF INIT: CPT | Mod: 59

## 2018-04-21 PROCEDURE — 85025 COMPLETE CBC W/AUTO DIFF WBC: CPT

## 2018-04-21 PROCEDURE — G0378 HOSPITAL OBSERVATION PER HR: HCPCS

## 2018-04-21 PROCEDURE — 25500020 PHARM REV CODE 255

## 2018-04-21 PROCEDURE — 37000008 HC ANESTHESIA 1ST 15 MINUTES: Performed by: SURGERY

## 2018-04-21 PROCEDURE — 99285 EMERGENCY DEPT VISIT HI MDM: CPT | Mod: 25

## 2018-04-21 PROCEDURE — 71000039 HC RECOVERY, EACH ADD'L HOUR: Performed by: SURGERY

## 2018-04-21 PROCEDURE — 87086 URINE CULTURE/COLONY COUNT: CPT

## 2018-04-21 PROCEDURE — 37000009 HC ANESTHESIA EA ADD 15 MINS: Performed by: SURGERY

## 2018-04-21 PROCEDURE — D9220A PRA ANESTHESIA: Mod: CRNA,,, | Performed by: NURSE ANESTHETIST, CERTIFIED REGISTERED

## 2018-04-21 PROCEDURE — 36000709 HC OR TIME LEV III EA ADD 15 MIN: Performed by: SURGERY

## 2018-04-21 PROCEDURE — 87077 CULTURE AEROBIC IDENTIFY: CPT

## 2018-04-21 PROCEDURE — 99214 OFFICE O/P EST MOD 30 MIN: CPT | Mod: 57,,, | Performed by: SURGERY

## 2018-04-21 PROCEDURE — D9220A PRA ANESTHESIA: Mod: ANES,,, | Performed by: ANESTHESIOLOGY

## 2018-04-21 PROCEDURE — 27201423 OPTIME MED/SURG SUP & DEVICES STERILE SUPPLY: Performed by: SURGERY

## 2018-04-21 PROCEDURE — 87186 SC STD MICRODIL/AGAR DIL: CPT

## 2018-04-21 PROCEDURE — 87088 URINE BACTERIA CULTURE: CPT

## 2018-04-21 PROCEDURE — 81000 URINALYSIS NONAUTO W/SCOPE: CPT

## 2018-04-21 PROCEDURE — 96375 TX/PRO/DX INJ NEW DRUG ADDON: CPT

## 2018-04-21 RX ORDER — CEFAZOLIN SODIUM 1 G/3ML
INJECTION, POWDER, FOR SOLUTION INTRAMUSCULAR; INTRAVENOUS
Status: DISCONTINUED | OUTPATIENT
Start: 2018-04-21 | End: 2018-04-22

## 2018-04-21 RX ORDER — LIDOCAINE HCL/PF 100 MG/5ML
SYRINGE (ML) INTRAVENOUS
Status: DISCONTINUED | OUTPATIENT
Start: 2018-04-21 | End: 2018-04-22

## 2018-04-21 RX ORDER — PROPOFOL 10 MG/ML
VIAL (ML) INTRAVENOUS
Status: DISCONTINUED | OUTPATIENT
Start: 2018-04-21 | End: 2018-04-22

## 2018-04-21 RX ORDER — MIDAZOLAM HYDROCHLORIDE 1 MG/ML
INJECTION, SOLUTION INTRAMUSCULAR; INTRAVENOUS
Status: DISCONTINUED | OUTPATIENT
Start: 2018-04-21 | End: 2018-04-22

## 2018-04-21 RX ORDER — SODIUM CHLORIDE 9 MG/ML
INJECTION, SOLUTION INTRAVENOUS
Status: DISPENSED
Start: 2018-04-21 | End: 2018-04-22

## 2018-04-21 RX ORDER — GLYCOPYRROLATE 0.2 MG/ML
INJECTION INTRAMUSCULAR; INTRAVENOUS
Status: DISCONTINUED | OUTPATIENT
Start: 2018-04-21 | End: 2018-04-22

## 2018-04-21 RX ORDER — FENTANYL CITRATE 50 UG/ML
INJECTION, SOLUTION INTRAMUSCULAR; INTRAVENOUS
Status: DISCONTINUED | OUTPATIENT
Start: 2018-04-21 | End: 2018-04-22

## 2018-04-21 RX ORDER — PHENYLEPHRINE HYDROCHLORIDE 10 MG/ML
INJECTION INTRAVENOUS
Status: DISCONTINUED | OUTPATIENT
Start: 2018-04-21 | End: 2018-04-22

## 2018-04-21 RX ORDER — SUCCINYLCHOLINE CHLORIDE 20 MG/ML
INJECTION INTRAMUSCULAR; INTRAVENOUS
Status: DISCONTINUED | OUTPATIENT
Start: 2018-04-21 | End: 2018-04-22

## 2018-04-21 RX ORDER — ROCURONIUM BROMIDE 10 MG/ML
INJECTION, SOLUTION INTRAVENOUS
Status: DISCONTINUED | OUTPATIENT
Start: 2018-04-21 | End: 2018-04-22

## 2018-04-21 RX ADMIN — CEFAZOLIN 2 G: 1 INJECTION, POWDER, FOR SOLUTION INTRAVENOUS at 11:04

## 2018-04-21 RX ADMIN — IOHEXOL 75 ML: 350 INJECTION, SOLUTION INTRAVENOUS at 08:04

## 2018-04-21 RX ADMIN — ROCURONIUM BROMIDE 5 MG: 10 INJECTION, SOLUTION INTRAVENOUS at 11:04

## 2018-04-21 RX ADMIN — ROCURONIUM BROMIDE 15 MG: 10 INJECTION, SOLUTION INTRAVENOUS at 11:04

## 2018-04-21 RX ADMIN — FENTANYL CITRATE 50 MCG: 50 INJECTION, SOLUTION INTRAMUSCULAR; INTRAVENOUS at 11:04

## 2018-04-21 RX ADMIN — PROPOFOL 150 MG: 10 INJECTION, EMULSION INTRAVENOUS at 11:04

## 2018-04-21 RX ADMIN — GLYCOPYRROLATE 0.2 MG: 0.2 INJECTION, SOLUTION INTRAMUSCULAR; INTRAVENOUS at 11:04

## 2018-04-21 RX ADMIN — PIPERACILLIN AND TAZOBACTAM 3.38 G: 3; .375 INJECTION, POWDER, LYOPHILIZED, FOR SOLUTION INTRAVENOUS; PARENTERAL at 10:04

## 2018-04-21 RX ADMIN — MIDAZOLAM 2 MG: 1 INJECTION INTRAMUSCULAR; INTRAVENOUS at 11:04

## 2018-04-21 RX ADMIN — PHENYLEPHRINE HYDROCHLORIDE 200 MCG: 10 INJECTION INTRAVENOUS at 11:04

## 2018-04-21 RX ADMIN — LIDOCAINE HYDROCHLORIDE 75 MG: 20 INJECTION, SOLUTION INTRAVENOUS at 11:04

## 2018-04-21 RX ADMIN — SODIUM CHLORIDE, SODIUM GLUCONATE, SODIUM ACETATE, POTASSIUM CHLORIDE, MAGNESIUM CHLORIDE, SODIUM PHOSPHATE, DIBASIC, AND POTASSIUM PHOSPHATE: .53; .5; .37; .037; .03; .012; .00082 INJECTION, SOLUTION INTRAVENOUS at 11:04

## 2018-04-21 RX ADMIN — SUCCINYLCHOLINE CHLORIDE 150 MG: 20 INJECTION, SOLUTION INTRAMUSCULAR; INTRAVENOUS at 11:04

## 2018-04-22 VITALS
TEMPERATURE: 98 F | OXYGEN SATURATION: 98 % | HEART RATE: 61 BPM | BODY MASS INDEX: 23.36 KG/M2 | HEIGHT: 65 IN | WEIGHT: 140.19 LBS | RESPIRATION RATE: 18 BRPM | DIASTOLIC BLOOD PRESSURE: 50 MMHG | SYSTOLIC BLOOD PRESSURE: 98 MMHG

## 2018-04-22 PROBLEM — N39.0 ACUTE UTI: Status: ACTIVE | Noted: 2018-04-22

## 2018-04-22 LAB
ALBUMIN SERPL BCP-MCNC: 3.1 G/DL
ALP SERPL-CCNC: 80 U/L
ALT SERPL W/O P-5'-P-CCNC: 11 U/L
ANION GAP SERPL CALC-SCNC: 12 MMOL/L
AST SERPL-CCNC: 21 U/L
BASOPHILS # BLD AUTO: 0 K/UL
BASOPHILS NFR BLD: 0 %
BILIRUB SERPL-MCNC: 0.4 MG/DL
BUN SERPL-MCNC: 12 MG/DL
CALCIUM SERPL-MCNC: 9.2 MG/DL
CHLORIDE SERPL-SCNC: 102 MMOL/L
CO2 SERPL-SCNC: 23 MMOL/L
CREAT SERPL-MCNC: 0.8 MG/DL
DIFFERENTIAL METHOD: ABNORMAL
EOSINOPHIL # BLD AUTO: 0 K/UL
EOSINOPHIL NFR BLD: 0.5 %
ERYTHROCYTE [DISTWIDTH] IN BLOOD BY AUTOMATED COUNT: 16 %
EST. GFR  (AFRICAN AMERICAN): >60 ML/MIN/1.73 M^2
EST. GFR  (NON AFRICAN AMERICAN): >60 ML/MIN/1.73 M^2
GLUCOSE SERPL-MCNC: 110 MG/DL
HCT VFR BLD AUTO: 35.3 %
HGB BLD-MCNC: 11.4 G/DL
INR PPP: 1.1
LYMPHOCYTES # BLD AUTO: 0.9 K/UL
LYMPHOCYTES NFR BLD: 11.7 %
MAGNESIUM SERPL-MCNC: 1.8 MG/DL
MCH RBC QN AUTO: 28.4 PG
MCHC RBC AUTO-ENTMCNC: 32.3 G/DL
MCV RBC AUTO: 88 FL
MONOCYTES # BLD AUTO: 0.4 K/UL
MONOCYTES NFR BLD: 5.6 %
NEUTROPHILS # BLD AUTO: 6.4 K/UL
NEUTROPHILS NFR BLD: 82.2 %
PHOSPHATE SERPL-MCNC: 4.2 MG/DL
PLATELET # BLD AUTO: 134 K/UL
PMV BLD AUTO: 7.7 FL
POTASSIUM SERPL-SCNC: 3.8 MMOL/L
PROT SERPL-MCNC: 6.5 G/DL
PROTHROMBIN TIME: 10.8 SEC
RBC # BLD AUTO: 4.01 M/UL
SODIUM SERPL-SCNC: 137 MMOL/L
TSH SERPL DL<=0.005 MIU/L-ACNC: 1.86 UIU/ML
WBC # BLD AUTO: 7.8 K/UL

## 2018-04-22 PROCEDURE — C9290 INJ, BUPIVACAINE LIPOSOME: HCPCS | Performed by: SURGERY

## 2018-04-22 PROCEDURE — 25000003 PHARM REV CODE 250: Performed by: ANESTHESIOLOGY

## 2018-04-22 PROCEDURE — 25000003 PHARM REV CODE 250: Performed by: NURSE PRACTITIONER

## 2018-04-22 PROCEDURE — G0378 HOSPITAL OBSERVATION PER HR: HCPCS

## 2018-04-22 PROCEDURE — 25000003 PHARM REV CODE 250: Performed by: NURSE ANESTHETIST, CERTIFIED REGISTERED

## 2018-04-22 PROCEDURE — 96374 THER/PROPH/DIAG INJ IV PUSH: CPT

## 2018-04-22 PROCEDURE — 96361 HYDRATE IV INFUSION ADD-ON: CPT

## 2018-04-22 PROCEDURE — 63600175 PHARM REV CODE 636 W HCPCS: Performed by: NURSE PRACTITIONER

## 2018-04-22 PROCEDURE — 63600175 PHARM REV CODE 636 W HCPCS: Performed by: SURGERY

## 2018-04-22 PROCEDURE — 80053 COMPREHEN METABOLIC PANEL: CPT

## 2018-04-22 PROCEDURE — 63600175 PHARM REV CODE 636 W HCPCS: Performed by: NURSE ANESTHETIST, CERTIFIED REGISTERED

## 2018-04-22 PROCEDURE — 63600175 PHARM REV CODE 636 W HCPCS: Performed by: ANESTHESIOLOGY

## 2018-04-22 PROCEDURE — 88304 TISSUE EXAM BY PATHOLOGIST: CPT | Performed by: PATHOLOGY

## 2018-04-22 PROCEDURE — 85025 COMPLETE CBC W/AUTO DIFF WBC: CPT

## 2018-04-22 PROCEDURE — 63600175 PHARM REV CODE 636 W HCPCS: Performed by: INTERNAL MEDICINE

## 2018-04-22 PROCEDURE — 83735 ASSAY OF MAGNESIUM: CPT

## 2018-04-22 PROCEDURE — 96375 TX/PRO/DX INJ NEW DRUG ADDON: CPT

## 2018-04-22 PROCEDURE — 84443 ASSAY THYROID STIM HORMONE: CPT

## 2018-04-22 PROCEDURE — 96360 HYDRATION IV INFUSION INIT: CPT

## 2018-04-22 PROCEDURE — 36415 COLL VENOUS BLD VENIPUNCTURE: CPT

## 2018-04-22 PROCEDURE — 84100 ASSAY OF PHOSPHORUS: CPT

## 2018-04-22 PROCEDURE — 44970 LAPAROSCOPY APPENDECTOMY: CPT | Mod: ,,, | Performed by: SURGERY

## 2018-04-22 PROCEDURE — 25000003 PHARM REV CODE 250: Performed by: INTERNAL MEDICINE

## 2018-04-22 PROCEDURE — 85610 PROTHROMBIN TIME: CPT

## 2018-04-22 PROCEDURE — 96376 TX/PRO/DX INJ SAME DRUG ADON: CPT

## 2018-04-22 RX ORDER — MEPERIDINE HYDROCHLORIDE 25 MG/ML
12.5 INJECTION INTRAMUSCULAR; INTRAVENOUS; SUBCUTANEOUS ONCE AS NEEDED
Status: DISCONTINUED | OUTPATIENT
Start: 2018-04-22 | End: 2018-04-22 | Stop reason: HOSPADM

## 2018-04-22 RX ORDER — ACETAMINOPHEN 325 MG/1
650 TABLET ORAL EVERY 4 HOURS PRN
Status: DISCONTINUED | OUTPATIENT
Start: 2018-04-22 | End: 2018-04-22 | Stop reason: HOSPADM

## 2018-04-22 RX ORDER — AMOXICILLIN AND CLAVULANATE POTASSIUM 875; 125 MG/1; MG/1
1 TABLET, FILM COATED ORAL EVERY 12 HOURS
Qty: 14 TABLET | Refills: 0 | Status: SHIPPED | OUTPATIENT
Start: 2018-04-22 | End: 2018-05-08

## 2018-04-22 RX ORDER — ONDANSETRON HYDROCHLORIDE 2 MG/ML
INJECTION, SOLUTION INTRAMUSCULAR; INTRAVENOUS
Status: DISCONTINUED | OUTPATIENT
Start: 2018-04-22 | End: 2018-04-22

## 2018-04-22 RX ORDER — IBUPROFEN 200 MG
16 TABLET ORAL
Status: DISCONTINUED | OUTPATIENT
Start: 2018-04-22 | End: 2018-04-22 | Stop reason: HOSPADM

## 2018-04-22 RX ORDER — POTASSIUM CHLORIDE 20 MEQ/15ML
60 SOLUTION ORAL
Status: DISCONTINUED | OUTPATIENT
Start: 2018-04-22 | End: 2018-04-22 | Stop reason: HOSPADM

## 2018-04-22 RX ORDER — AMOXICILLIN 250 MG
1 CAPSULE ORAL 2 TIMES DAILY
Status: DISCONTINUED | OUTPATIENT
Start: 2018-04-22 | End: 2018-04-22 | Stop reason: HOSPADM

## 2018-04-22 RX ORDER — HYDROCODONE BITARTRATE AND ACETAMINOPHEN 5; 325 MG/1; MG/1
1 TABLET ORAL EVERY 6 HOURS PRN
Status: DISCONTINUED | OUTPATIENT
Start: 2018-04-22 | End: 2018-04-22

## 2018-04-22 RX ORDER — HYDROMORPHONE HYDROCHLORIDE 2 MG/ML
0.2 INJECTION, SOLUTION INTRAMUSCULAR; INTRAVENOUS; SUBCUTANEOUS EVERY 5 MIN PRN
Status: DISCONTINUED | OUTPATIENT
Start: 2018-04-22 | End: 2018-04-22 | Stop reason: HOSPADM

## 2018-04-22 RX ORDER — ONDANSETRON 2 MG/ML
8 INJECTION INTRAMUSCULAR; INTRAVENOUS EVERY 8 HOURS PRN
Status: DISCONTINUED | OUTPATIENT
Start: 2018-04-22 | End: 2018-04-22 | Stop reason: HOSPADM

## 2018-04-22 RX ORDER — IBUPROFEN 200 MG
24 TABLET ORAL
Status: DISCONTINUED | OUTPATIENT
Start: 2018-04-22 | End: 2018-04-22 | Stop reason: HOSPADM

## 2018-04-22 RX ORDER — SERTRALINE HYDROCHLORIDE 50 MG/1
100 TABLET, FILM COATED ORAL DAILY
Status: DISCONTINUED | OUTPATIENT
Start: 2018-04-22 | End: 2018-04-22 | Stop reason: HOSPADM

## 2018-04-22 RX ORDER — SODIUM CHLORIDE 0.9 % (FLUSH) 0.9 %
3 SYRINGE (ML) INJECTION
Status: DISCONTINUED | OUTPATIENT
Start: 2018-04-23 | End: 2018-04-22 | Stop reason: HOSPADM

## 2018-04-22 RX ORDER — ANASTROZOLE 1 MG/1
1 TABLET ORAL DAILY
Status: DISCONTINUED | OUTPATIENT
Start: 2018-04-22 | End: 2018-04-22 | Stop reason: HOSPADM

## 2018-04-22 RX ORDER — SODIUM CHLORIDE 9 MG/ML
INJECTION, SOLUTION INTRAVENOUS CONTINUOUS
Status: DISCONTINUED | OUTPATIENT
Start: 2018-04-22 | End: 2018-04-22 | Stop reason: HOSPADM

## 2018-04-22 RX ORDER — FENTANYL CITRATE 50 UG/ML
25 INJECTION, SOLUTION INTRAMUSCULAR; INTRAVENOUS EVERY 5 MIN PRN
Status: COMPLETED | OUTPATIENT
Start: 2018-04-22 | End: 2018-04-22

## 2018-04-22 RX ORDER — SODIUM CHLORIDE 0.9 % (FLUSH) 0.9 %
5 SYRINGE (ML) INJECTION
Status: DISCONTINUED | OUTPATIENT
Start: 2018-04-22 | End: 2018-04-22 | Stop reason: HOSPADM

## 2018-04-22 RX ORDER — KETOROLAC TROMETHAMINE 30 MG/ML
15 INJECTION, SOLUTION INTRAMUSCULAR; INTRAVENOUS ONCE
Status: COMPLETED | OUTPATIENT
Start: 2018-04-22 | End: 2018-04-22

## 2018-04-22 RX ORDER — TRAZODONE HYDROCHLORIDE 50 MG/1
50 TABLET ORAL NIGHTLY PRN
Status: DISCONTINUED | OUTPATIENT
Start: 2018-04-22 | End: 2018-04-22 | Stop reason: HOSPADM

## 2018-04-22 RX ORDER — HYDROCODONE BITARTRATE AND ACETAMINOPHEN 10; 325 MG/1; MG/1
1 TABLET ORAL EVERY 6 HOURS PRN
Status: DISCONTINUED | OUTPATIENT
Start: 2018-04-22 | End: 2018-04-22

## 2018-04-22 RX ORDER — GLUCAGON 1 MG
1 KIT INJECTION
Status: DISCONTINUED | OUTPATIENT
Start: 2018-04-22 | End: 2018-04-22 | Stop reason: HOSPADM

## 2018-04-22 RX ORDER — POTASSIUM CHLORIDE 20 MEQ/15ML
40 SOLUTION ORAL
Status: DISCONTINUED | OUTPATIENT
Start: 2018-04-22 | End: 2018-04-22 | Stop reason: HOSPADM

## 2018-04-22 RX ORDER — RAMELTEON 8 MG/1
8 TABLET ORAL NIGHTLY PRN
Status: DISCONTINUED | OUTPATIENT
Start: 2018-04-22 | End: 2018-04-22 | Stop reason: HOSPADM

## 2018-04-22 RX ORDER — DIPHENHYDRAMINE HYDROCHLORIDE 50 MG/ML
25 INJECTION INTRAMUSCULAR; INTRAVENOUS EVERY 6 HOURS PRN
Status: DISCONTINUED | OUTPATIENT
Start: 2018-04-22 | End: 2018-04-22 | Stop reason: HOSPADM

## 2018-04-22 RX ORDER — LANOLIN ALCOHOL/MO/W.PET/CERES
800 CREAM (GRAM) TOPICAL
Status: DISCONTINUED | OUTPATIENT
Start: 2018-04-22 | End: 2018-04-22 | Stop reason: HOSPADM

## 2018-04-22 RX ORDER — ONDANSETRON 2 MG/ML
4 INJECTION INTRAMUSCULAR; INTRAVENOUS DAILY PRN
Status: DISCONTINUED | OUTPATIENT
Start: 2018-04-22 | End: 2018-04-22 | Stop reason: HOSPADM

## 2018-04-22 RX ORDER — PRAVASTATIN SODIUM 40 MG/1
40 TABLET ORAL NIGHTLY
Status: DISCONTINUED | OUTPATIENT
Start: 2018-04-22 | End: 2018-04-22 | Stop reason: HOSPADM

## 2018-04-22 RX ORDER — TRAMADOL HYDROCHLORIDE 50 MG/1
TABLET ORAL
Qty: 60 TABLET | Refills: 2 | Status: SHIPPED | OUTPATIENT
Start: 2018-04-22 | End: 2019-03-11 | Stop reason: ALTCHOICE

## 2018-04-22 RX ORDER — NEOSTIGMINE METHYLSULFATE 1 MG/ML
INJECTION, SOLUTION INTRAVENOUS
Status: DISCONTINUED | OUTPATIENT
Start: 2018-04-22 | End: 2018-04-22

## 2018-04-22 RX ORDER — OXYCODONE HYDROCHLORIDE 5 MG/1
5 TABLET ORAL
Status: DISCONTINUED | OUTPATIENT
Start: 2018-04-22 | End: 2018-04-22 | Stop reason: HOSPADM

## 2018-04-22 RX ORDER — SODIUM CHLORIDE 0.9 % (FLUSH) 0.9 %
3 SYRINGE (ML) INJECTION EVERY 8 HOURS
Status: DISCONTINUED | OUTPATIENT
Start: 2018-04-22 | End: 2018-04-22 | Stop reason: HOSPADM

## 2018-04-22 RX ADMIN — FENTANYL CITRATE 25 MCG: 50 INJECTION, SOLUTION INTRAMUSCULAR; INTRAVENOUS at 01:04

## 2018-04-22 RX ADMIN — SODIUM CHLORIDE: 0.9 INJECTION, SOLUTION INTRAVENOUS at 02:04

## 2018-04-22 RX ADMIN — FENTANYL CITRATE 100 MCG: 50 INJECTION, SOLUTION INTRAMUSCULAR; INTRAVENOUS at 01:04

## 2018-04-22 RX ADMIN — LEVOTHYROXINE SODIUM 75 MCG: 25 TABLET ORAL at 05:04

## 2018-04-22 RX ADMIN — ROCURONIUM BROMIDE 5 MG: 10 INJECTION, SOLUTION INTRAVENOUS at 12:04

## 2018-04-22 RX ADMIN — HYDROMORPHONE HYDROCHLORIDE 0.2 MG: 2 INJECTION INTRAMUSCULAR; INTRAVENOUS; SUBCUTANEOUS at 01:04

## 2018-04-22 RX ADMIN — PRAVASTATIN SODIUM 40 MG: 40 TABLET ORAL at 02:04

## 2018-04-22 RX ADMIN — KETOROLAC TROMETHAMINE 15 MG: 30 INJECTION, SOLUTION INTRAMUSCULAR at 10:04

## 2018-04-22 RX ADMIN — BUPIVACAINE 20 ML: 13.3 INJECTION, SUSPENSION, LIPOSOMAL INFILTRATION at 12:04

## 2018-04-22 RX ADMIN — OXYCODONE HYDROCHLORIDE 5 MG: 5 TABLET ORAL at 01:04

## 2018-04-22 RX ADMIN — PIPERACILLIN SODIUM AND TAZOBACTAM SODIUM 4.5 G: 4; .5 INJECTION, POWDER, FOR SOLUTION INTRAVENOUS at 05:04

## 2018-04-22 RX ADMIN — PHENYLEPHRINE HYDROCHLORIDE 100 MCG: 10 INJECTION INTRAVENOUS at 12:04

## 2018-04-22 RX ADMIN — ACETAMINOPHEN 650 MG: 325 TABLET ORAL at 09:04

## 2018-04-22 RX ADMIN — GLYCOPYRROLATE 0.4 MG: 0.2 INJECTION, SOLUTION INTRAMUSCULAR; INTRAVENOUS at 12:04

## 2018-04-22 RX ADMIN — SODIUM CHLORIDE, SODIUM GLUCONATE, SODIUM ACETATE, POTASSIUM CHLORIDE, MAGNESIUM CHLORIDE, SODIUM PHOSPHATE, DIBASIC, AND POTASSIUM PHOSPHATE: .53; .5; .37; .037; .03; .012; .00082 INJECTION, SOLUTION INTRAVENOUS at 12:04

## 2018-04-22 RX ADMIN — STANDARDIZED SENNA CONCENTRATE AND DOCUSATE SODIUM 1 TABLET: 8.6; 5 TABLET, FILM COATED ORAL at 09:04

## 2018-04-22 RX ADMIN — ONDANSETRON 4 MG: 2 INJECTION, SOLUTION INTRAMUSCULAR; INTRAVENOUS at 12:04

## 2018-04-22 RX ADMIN — NEOSTIGMINE METHYLSULFATE 4 MG: 1 INJECTION INTRAVENOUS at 12:04

## 2018-04-22 NOTE — TRANSFER OF CARE
"Anesthesia Transfer of Care Note    Patient: Yuniel Gracia    Procedure(s) Performed: Procedure(s) (LRB):  APPENDECTOMY-LAPAROSCOPIC (Right)    Patient location: PACU    Anesthesia Type: general    Transport from OR: Transported from OR on 2-3 L/min O2 by NC with adequate spontaneous ventilation    Post pain: adequate analgesia    Post assessment: no apparent anesthetic complications    Post vital signs: stable    Level of consciousness: awake    Nausea/Vomiting: no nausea/vomiting    Complications: none    Transfer of care protocol was followed      Last vitals:   Visit Vitals  /68   Pulse 61   Temp 36.5 °C (97.7 °F) (Oral)   Resp 16   Ht 5' 5" (1.651 m)   Wt 64 kg (141 lb)   LMP 03/02/1998   SpO2 100%   BMI 23.46 kg/m²     "

## 2018-04-22 NOTE — NURSING
Discharge instructions went over with pt and pt's . Both verbalize understanding and have no new questions at this time. VSS. IV removed, catheter intact. AVS printed and given to pt. Pt left via wheelchair with staff.

## 2018-04-22 NOTE — ASSESSMENT & PLAN NOTE
Chronic-- reviewed TSH from 11/2017-- euthyroid.  Continue levothyroxine and check TSH in AM.  Adjust therapy as clinically appropriate.

## 2018-04-22 NOTE — PLAN OF CARE
SW met w/ pt and spouse for assessment and MIRAMONTES.  Pt lives w/ , denies current HH (has used [facility] previously), uses DME as needed.  Pt can drive (minimal), has support from spouse and adult son.  Pt does not anticipate d/c needs.       04/22/18 1123   Discharge Assessment   Assessment Type Discharge Planning Assessment   Confirmed/corrected address and phone number on facesheet? Yes   Assessment information obtained from? Patient   Prior to hospitilization cognitive status: Alert/Oriented   Prior to hospitalization functional status: Independent   Current cognitive status: Alert/Oriented   Current Functional Status: Independent;Assistive Equipment   Facility Arrived From: home   Lives With spouse   Able to Return to Prior Arrangements yes   Is patient able to care for self after discharge? No   Who are your caregiver(s) and their phone number(s)? spouse:  Lul Gracia  [phone]   Patient's perception of discharge disposition home or selfcare   Readmission Within The Last 30 Days no previous admission in last 30 days   Patient currently being followed by outpatient case management? No   Patient currently receives any other outside agency services? No   Equipment Currently Used at Home cane, straight;rollator;bedside commode   Do you have any problems affording any of your prescribed medications? No   Is the patient taking medications as prescribed? yes   Does the patient have transportation home? Yes   Transportation Available family or friend will provide   Does the patient receive services at the [facility]? No   Discharge Plan A Home with family   Discharge Plan B Home with family   Patient/Family In Agreement With Plan yes

## 2018-04-22 NOTE — ANESTHESIA PREPROCEDURE EVALUATION
04/21/2018  Yuniel Gracia is a 72 y.o., female.    Anesthesia Evaluation    I have reviewed the Patient Summary Reports.    I have reviewed the Nursing Notes.   I have reviewed the Medications.     Review of Systems  Anesthesia Hx:  No problems with previous Anesthesia    Social:  Non-Smoker    Hematology/Oncology:         -- Anemia: Hematology Comments: Clotting Disorder  --  Cancer in past history:  Breast right   Cardiovascular:  Cardiovascular Normal  Reynaud's Dz   Pulmonary:  Pulmonary Normal    Renal/:  Renal/ Normal     Hepatic/GI:   Liver Disease, Hepatitis, C    Musculoskeletal:   Arthritis     Neurological:  Neurology Normal    Endocrine:   Hypothyroidism    Psych:   anxiety (Panic Attacks)          Physical Exam  General:  Well nourished    Airway/Jaw/Neck:  Airway Findings: Mouth Opening: Normal Tongue: Normal  General Airway Assessment: Adult  Oropharynx Findings:  Mallampati: II  TM Distance: Normal, at least 6 cm  Jaw/Neck Findings:  Neck ROM: Normal ROM     Eyes/Ears/Nose:  Eyes/Ears/Nose Findings:    Dental:  Dental Findings: In tact   Chest/Lungs:  Chest/Lungs Findings: Normal Respiratory Rate     Heart/Vascular:  Heart Findings: Rate: Normal  Rhythm: Regular Rhythm        Mental Status:  Mental Status Findings:  Cooperative, Alert and Oriented         Anesthesia Plan  Type of Anesthesia, risks & benefits discussed:  Anesthesia Type:  general  Patient's Preference:   Intra-op Monitoring Plan:   Intra-op Monitoring Plan Comments:   Post Op Pain Control Plan: multimodal analgesia  Post Op Pain Control Plan Comments:   Induction:   IV  Beta Blocker:  Patient is on a Beta-Blocker and has received one dose within the past 24 hours (No further documentation required).       Informed Consent: Patient understands risks and agrees with Anesthesia plan.  Questions answered. Anesthesia  consent signed with patient.  ASA Score: 3  emergent   Day of Surgery Review of History & Physical:  There are no significant changes.   H&P completed by Anesthesiologist.       Ready For Surgery From Anesthesia Perspective.

## 2018-04-22 NOTE — NURSING
Pt transferred to the floor at 0220 from ED for post appendix removal surgery. Alert and oriented. Able to make needs known. Denies pain at this time. Spouse at bedside. SCDs and reginald hose noted. Bed lowered and call light in reach. Will monitor

## 2018-04-22 NOTE — ED PROVIDER NOTES
"Encounter Date: 4/21/2018    SCRIBE #1 NOTE: I, Dillon Leigh, am scribing for, and in the presence of, Dr. Ho.       History     Chief Complaint   Patient presents with    Abdominal Pain     started 1330 / large BM today , 1 hour after pain started        04/21/2018 7:29 PM     Chief complaint: Abdominal Pain      Yuniel Gracia is a 72 y.o. Female with a past medical history of constipation and UTI presenting to the ED with a sudden onset of acute abdominal pain beginning 6 hrs ago. The pt noted current symptoms are worst than previous constipation episodes. She stated her pain is limited to her lower abdomen. The pt described her stool as large and solid and her last bowel movement was 9 hrs ago with no initial pain. Associated symptom of constipation. She denied abdominal distension, fever, dysuria, nausea, and vomiting. The pt noted tramadol did not alleviate current symptoms. The pt denied history of diverticulitis.        The history is provided by the patient.     Review of patient's allergies indicates:   Allergen Reactions    Adhesive Dermatitis and Rash    Codeine Nausea Only     Past Medical History:   Diagnosis Date    Anemia     Arthritis     Blood transfusion     Breast cancer     Cancer RIGHT BREAST    Chronic constipation     Clotting disorder     Colon polyp     Diverticulosis     Glaucoma     Hepatitis C 2000    pt states history of hepatits c-"cured by Dr. Lynch"; TREATED 2 YEARS - 2000   OK NOW    Hypothyroid     Insomnia     Memory loss     reports some memory loss since chemo    Osteoarthritis     Panic attacks     Raynaud's disease     takes amlodipine for this    Ulcer     Vertigo      Past Surgical History:   Procedure Laterality Date    BLADDER SURGERY  2011    sling [facility] Dr Giles  [facility]    BRAIN SURGERY  2007    temporal neuralgia - [place]    BREAST BIOPSY      BREAST LUMPECTOMY  3/12    malignant - had chemo and radiotherapy    COLONOSCOPY  " 01/03/2011    Dr Lynch, in media section, diverticulosis, hemorrhoids, otherwise normal findings; normal findings on random biopsies    COLONOSCOPY N/A 6/1/2017    Procedure: COLONOSCOPY;  Surgeon: Nate Lamb MD;  Location: [facility];  Service: Endoscopy;  Laterality: N/A;    COLONOSCOPY W/ POLYPECTOMY  06/01/2017    Dr. Lamb, 1 adenomatous polyp, recheck 5 years    EYE SURGERY      cataracts bilateral    HYSTERECTOMY      JOINT REPLACEMENT  09/25/2017    left [facility]nee [facility]     removal of port a cath      right lymph node removed from breast area      TUNNELED VENOUS PORT PLACEMENT  04/2012    left chest    UPPER GASTROINTESTINAL ENDOSCOPY  prior to 2011    small hiatal hernia     Family History   Problem Relation Age of Onset    Cancer Mother      breast    Breast cancer Mother     Diabetes Father     Heart disease Father     Cancer Sister      breast    Breast cancer Sister     Diabetes Paternal Aunt     Cancer Other      breast    Breast cancer Other     Drug abuse Son     Obesity Son     Diabetes Maternal Aunt     Heart disease Maternal Uncle     Tuberculosis Paternal Uncle      x2    Diabetes Paternal Uncle     Early death Maternal Grandmother      tonsils    Heart disease Maternal Grandfather     Alcohol abuse Maternal Grandfather     Tuberculosis Paternal Grandfather     Cancer Sister      breast    Breast cancer Sister     Ovarian cancer Neg Hx     Colon cancer Neg Hx     Colon polyps Neg Hx     Crohn's disease Neg Hx     Ulcerative colitis Neg Hx      Social History   Substance Use Topics    Smoking status: Never Smoker    Smokeless tobacco: Never Used    Alcohol use 4.8 oz/week     2 Standard drinks or equivalent, 6 Glasses of wine per week      Comment: wine most days - 1 glass     Review of Systems   Constitutional: Negative for fever.   HENT: Negative for congestion.    Eyes: Negative for visual disturbance.   Respiratory: Negative for  wheezing.    Cardiovascular: Negative for chest pain.   Gastrointestinal: Positive for abdominal pain (lower) and constipation. Negative for abdominal distention, nausea and vomiting.   Genitourinary: Negative for dysuria.   Musculoskeletal: Negative for back pain and joint swelling.   Skin: Negative for rash.   Neurological: Negative for syncope.   Hematological: Does not bruise/bleed easily.   Psychiatric/Behavioral: Negative for confusion.       Physical Exam     Initial Vitals [04/21/18 1834]   BP Pulse Resp Temp SpO2   134/79 67 16 97.7 °F (36.5 °C) 98 %      MAP       97.33         Physical Exam    Nursing note and vitals reviewed.  Constitutional: She appears well-nourished.   HENT:   Head: Normocephalic and atraumatic.   Eyes: Conjunctivae and EOM are normal.   Neck: Normal range of motion. Neck supple. No thyroid mass present.   Cardiovascular: Normal rate, regular rhythm and normal heart sounds. Exam reveals no gallop and no friction rub.    No murmur heard.  Pulmonary/Chest: Breath sounds normal. She has no wheezes. She has no rhonchi. She has no rales.   Abdominal: Soft. Normal appearance. She exhibits no distension. Bowel sounds are increased. There is tenderness in the left lower quadrant. There is no rigidity.   Neurological: She is alert and oriented to person, place, and time. She has normal strength. No cranial nerve deficit or sensory deficit.   Skin: Skin is warm and dry. No rash noted. No erythema.   Psychiatric: She has a normal mood and affect. Her speech is normal. Cognition and memory are normal.         ED Course   Procedures  Labs Reviewed - No data to display  EKG Readings: (Independently Interpreted)   Normal sinus rhythm, 61 beats were minute, normal axis, normal intervals, no acute ischemic wave segments, no STEMI          Medical Decision Making:   History:   Old Medical Records: I decided to obtain old medical records.  Initial Assessment:   Patient was interviewed and examined  emergently.  Initial vital signs are stable.  Workup significant for evidence of a nitrite positive urinary tract infection.  Additionally the patient has a pelvic appendix which appears inflamed without overt evidence of phlegmon or perforation.  Patient's current labs are not concerning for progressing sepsis, septic shock.  The on-call general surgeon, Dr. Ruano, was contacted regarding these findings and he requested to take the patient to surgery tonight.  He presented to evaluate the patient in the emergency department and she was transferred to the OR in guarded condition after receiving Zosyn.  Urine culture pending.  Clinical Tests:   Lab Tests: Ordered and Reviewed  Radiological Study: Ordered and Reviewed  Medical Tests: Ordered and Reviewed    Imaging Results          CT Abdomen Pelvis With Contrast (In process)                       Scribe Attestation:   Scribe #1: I performed the above scribed service and the documentation accurately describes the services I performed. I attest to the accuracy of the note.    I, Dr. Gamaliel Ho, personally performed the services described in this documentation. All medical record entries made by the scribe were at my direction and in my presence.  I have reviewed the chart and agree that the record reflects my personal performance and is accurate and complete. Gamaliel Ho MD.  4:42 AM 04/22/2018             Clinical Impression:   The primary encounter diagnosis was Acute UTI. Diagnoses of Acute appendicitis, Preoperative testing, and Acute appendicitis with localized peritonitis were also pertinent to this visit.    Disposition:   Disposition: Placed in Observation  Condition: Glen Ho MD  04/22/18 0442

## 2018-04-22 NOTE — DISCHARGE SUMMARY
[facility]  Hospital Medicine  Discharge Summary      Patient Name: Yuniel Gracia  MRN: [id number]  Admission Date: 4/21/2018  Hospital Length of Stay: 0 days  Discharge Date and Time: 4/22/2018 12:21 PM  Attending Physician: No att. providers found   Discharging Provider: Jessica Hubbard NP  Primary Care Provider: Jae Dolan MD      HPI:   Yuniel Gracia is a 73 yo female with PMHx significant for hypothyroidism, breast CA (in remission), and frequent UTIs.  She was admitted to the service of hospital medicine with acute appendicitis.  She presented to the ED with sudden onset lower abdominal pain at ~ 130 pm.  She states the pain started ~ 3 hours after very large BM (she suffers with chronic constipation).  The pain was intermittent most of the afternoon.  She states she eventually took a tramadol and tried to lay down but pain intensified to 8/10 prompting ED visit.  Associated symptoms include nausea.  She denies any fever, chills, emesis, chest pain, SOB, or changes in urination.  She reports multiple surgical procedures in the past without complication.  Other pertinent medical history as below:    Procedure(s) (LRB):  APPENDECTOMY-LAPAROSCOPIC (Right)      Hospital Course:   General surgery consulted. Patient underwent laparoscopic appendectomy. She was monitored overnight and received pain control and IV fluids. She was noted to have an UTI and initiated on IV zosyn (pre and post op abx). Urine culture obtained. Diet was advance. She cleared for DC from surgical standpoint.     Reviewed previous urine cx +ecoli sensitive to Augmentin. Will Dc home on Augmentin.   PE; abd: surgical site looks good +bowel sounds.     Consults:     No new Assessment & Plan notes have been filed under this hospital service since the last note was generated.  Service: Hospital Medicine    Final Active Diagnoses:    Diagnosis Date Noted POA    PRINCIPAL PROBLEM:  Acute appendicitis  [K35.80] 04/21/2018 Yes    Acute UTI [N39.0] 04/22/2018 Yes    Acute cystitis [N30.00] 11/03/2017 Yes    Chronic constipation [K59.09] 07/30/2012 Yes    Hypothyroidism [E03.9] 07/23/2012 Yes      Problems Resolved During this Admission:    Diagnosis Date Noted Date Resolved POA       Discharged Condition: stable    Disposition: Home or Self Care    Follow Up:  Follow-up Information     Demario Ruano MD. Call in 2 weeks.    Specialties:  General Surgery, Bariatrics, Surgery  Contact information:  [address]  [address]  [place] [id number] [phone]             Jae Dolan MD In 1 week.    Specialty:  Family Medicine  Why:  hospital follow up  Contact information:  [address]  [place] [address] [phone]                 Patient Instructions:     Diet Cardiac     Activity as tolerated     Notify your health care provider if you experience any of the following:  severe uncontrolled pain     Notify your health care provider if you experience any of the following:  redness, tenderness, or signs of infection (pain, swelling, redness, odor or green/yellow discharge around incision site)     Notify your health care provider if you experience any of the following:  persistent dizziness, light-headedness, or visual disturbances     Notify your health care provider if you experience any of the following:  worsening rash         Significant Diagnostic Studies: Labs:   BMP:   Recent Labs  Lab 04/21/18 1945 04/22/18  0451   GLU 87 110    137   K 3.8 3.8    102   CO2 26 23   BUN 16 12   CREATININE 0.8 0.8   CALCIUM 10.6* 9.2   MG  --  1.8    and CMP   Recent Labs  Lab 04/21/18 1945 04/22/18  0451    137   K 3.8 3.8    102   CO2 26 23   GLU 87 110   BUN 16 12   CREATININE 0.8 0.8   CALCIUM 10.6* 9.2   PROT 8.2 6.5   ALBUMIN 3.8 3.1*   BILITOT 0.5 0.4   ALKPHOS 97 80   AST 29 21   ALT 13 11   ANIONGAP 13 12   ESTGFRAFRICA >60 >60   EGFRNONAA >60 >60     Radiology: CT scan: CT  ABDOMEN PELVIS WITH CONTRAST:   Results for orders placed or performed during the hospital encounter of 04/21/18   CT Abdomen Pelvis With Contrast    Narrative    EXAMINATION:  CT ABDOMEN PELVIS WITH CONTRAST    CLINICAL HISTORY:  LLQ pain, suspect diverticulitis;    TECHNIQUE:  Low dose axial images, sagittal and coronal reformations were obtained from the lung bases to the pubic symphysis following the IV administration of 75 mL of Omnipaque 350 .  Oral contrast was not given.    COMPARISON:  10/03/2016    FINDINGS:  There is a subcentimeter hypodensity of the right hepatic lobe too small to characterize the favoring a small cyst.  The pancreas, spleen, adrenal glands, and left kidney are unremarkable.    There is a nonspecific 10 mm hypodensity of the lower pole of the right kidney demonstrating no significant change.    The cecum is located within the low right hemipelvis.  The appendix it extends cephalad and rightward within the right hemipelvis and is enlarged to 9 mm and exhibits moderate periappendiceal fat stranding, compatible with acute appendicitis.  No periappendiceal fluid collection to indicate abscess formation is seen.  There is no free air to indicate perforation.  Large stool volume noted.  The small bowel is normal caliber.    There has been hysterectomy.  There is moderate calcification of the aorta without aneurysm.  There is moderate dextroscoliosis of the lumbar spine accompanied by moderate multilevel degenerative spondylosis.      Impression    Acute appendicitis without evidence for perforation or abscess.    Large stool volume suggesting constipation.    10 mm nonspecific hypodensity lower pole right kidney however unchanged since 2016 and likely hyperdense cyst.    The preliminary and final reports are concordant.      Electronically signed by: Jcarlos Harry MD  Date:    04/22/2018  Time:    10:26    and CT ABDOMEN PELVIS WITHOUT CONTRAST: No results found for this visit on  04/21/18.    Pending Diagnostic Studies:     None         Medications:  Reconciled Home Medications:      Medication List      START taking these medications    amoxicillin-clavulanate 875-125mg 875-125 mg per tablet  Commonly known as:  AUGMENTIN  Take 1 tablet by mouth every 12 (twelve) hours.        CONTINUE taking these medications    amLODIPine 2.5 MG tablet  Commonly known as:  NORVASC  Take 2.5 mg by mouth once daily.     anastrozole 1 mg Tab  Commonly known as:  ARIMIDEX  TAKE 1 TABLET DAILY     aspirin 325 MG tablet  Take 1 tablet (325 mg total) by mouth every 12 (twelve) hours. For 4 weeks post op     CALCIUM 600 ORAL  Take 1 tablet by mouth once daily.     dorzolamide-timolol 2-0.5% 22.3-6.8 mg/mL ophthalmic solution  Commonly known as:  COSOPT  Place 1 drop into both eyes 2 (two) times daily.     levothyroxine 75 MCG tablet  Commonly known as:  SYNTHROID  TAKE 1 TABLET DAILY     LINZESS 145 mcg Cap capsule  Generic drug:  linaclotide  Take 145 mcg by mouth once daily.     pravastatin 40 MG tablet  Commonly known as:  PRAVACHOL  TAKE 1 TABLET AT BEDTIME     sertraline 100 MG tablet  Commonly known as:  ZOLOFT  TAKE 1 TABLET DAILY     temazepam 15 mg Cap  Commonly known as:  RESTORIL  Take 1 capsule (15 mg total) by mouth every evening.     traMADol 50 mg tablet  Commonly known as:  ULTRAM  1-2 tablets every 8 hours as needed for pain     traZODone 50 MG tablet  Commonly known as:  DESYREL  Take 1 tablet (50 mg total) by mouth nightly as needed for Insomnia.     UNABLE TO FIND  Take 1 capsule by mouth once daily. prevagen extra strength     VESICARE 5 MG tablet  Generic drug:  solifenacin  TAKE 1 TABLET DAILY     VITAMIN D-3 ORAL  Take 400 Units by mouth daily as needed.            Indwelling Lines/Drains at time of discharge:   Lines/Drains/Airways          No matching active lines, drains, or airways          Time spent on the discharge of patient: 35 minutes  Patient was seen and examined on the date of  discharge and determined to be suitable for discharge.    Discussed with DR. Alden Hubbard, NP  Department of Hospital Medicine  [facility]

## 2018-04-22 NOTE — PLAN OF CARE
04/22/18 1127   Final Note   Assessment Type Final Discharge Note   Discharge Disposition Home

## 2018-04-22 NOTE — ED NOTES
Physician at bedside discussing plan of care, verbalizes understanding. Pt reported last drink of water was at 5:30 and last time she ate was this morning around 8 am.

## 2018-04-22 NOTE — ASSESSMENT & PLAN NOTE
NPO.  IV zosyn. IV hydration. Pain control.  Surgeon contacted by ED physician-- Dr. Ruano to see and take to OR tonight.  Pre-op eval in progress without overt contraindications to surgery noted thus far.

## 2018-04-22 NOTE — UM SECONDARY REVIEW
Physician Advisor External    Level of Care Issue    Approved Observation for admit 4/21/18 per ehr review, dr polo

## 2018-04-22 NOTE — HOSPITAL COURSE
General surgery consulted. Patient underwent laparoscopic appendectomy. She was monitored overnight and received pain control and IV fluids. She was noted to have an UTI and initiated on IV zosyn (pre and post op abx). Urine culture obtained. Diet was advance. She cleared for DC from surgical standpoint.     Reviewed previous urine cx +ecoli sensitive to Augmentin. Will Dc home on Augmentin.   PE; abd: surgical site looks good +bowel sounds.

## 2018-04-22 NOTE — SUBJECTIVE & OBJECTIVE
Interval History: Doing well, pain controlled    Medications:  Continuous Infusions:   sodium chloride 0.9% 125 mL/hr at 04/22/18 0250     Scheduled Meds:   anastrozole  1 mg Oral Daily    levothyroxine  75 mcg Oral Before breakfast    piperacillin-tazobactam 4.5 g in dextrose 5 % 100 mL IVPB (ready to mix system)  4.5 g Intravenous Q8H    pravastatin  40 mg Oral Nightly    senna-docusate 8.6-50 mg  1 tablet Oral BID    sertraline  100 mg Oral Daily     PRN Meds:acetaminophen, dextrose 50%, dextrose 50%, glucagon (human recombinant), glucose, glucose, hydrocodone-acetaminophen 10-325mg, hydrocodone-acetaminophen 5-325mg, magnesium oxide, magnesium oxide, ondansetron, potassium chloride 10%, potassium chloride 10%, promethazine (PHENERGAN) IVPB, ramelteon, sodium chloride 0.9%, traZODone     Review of patient's allergies indicates:   Allergen Reactions    Adhesive Dermatitis and Rash    Codeine Nausea Only     Objective:     Vital Signs (Most Recent):  Temp: 98.1 °F (36.7 °C) (04/22/18 0801)  Pulse: 61 (04/22/18 0801)  Resp: 18 (04/22/18 0801)  BP: (!) 92/51 (04/22/18 0801)  SpO2: 98 % (04/22/18 0801) Vital Signs (24h Range):  Temp:  [97.7 °F (36.5 °C)-98.2 °F (36.8 °C)] 98.1 °F (36.7 °C)  Pulse:  [59-68] 61  Resp:  [12-18] 18  SpO2:  [98 %-100 %] 98 %  BP: ()/(51-79) 92/51     Weight: 63.6 kg (140 lb 3.4 oz)  Body mass index is 23.33 kg/m².    Intake/Output - Last 3 Shifts       04/20 0700 - 04/21 0659 04/21 0700 - 04/22 0659 04/22 0700 - 04/23 0659    P.O.  240     I.V. (mL/kg)  1683.3 (26.5)     IV Piggyback  100     Total Intake(mL/kg)  2023.3 (31.8)     Net   +2023.3             Urine Occurrence  1 x           Physical Exam   Constitutional: She is oriented to person, place, and time. She appears well-developed and well-nourished.   HENT:   Head: Normocephalic and atraumatic.   Eyes: EOM are normal. Right eye exhibits no discharge. No scleral icterus.   Neck: Normal range of motion. Neck supple.  No JVD present.   Cardiovascular: Normal rate and regular rhythm.    Pulmonary/Chest: Effort normal and breath sounds normal.   Abdominal: Soft. She exhibits no distension. There is no tenderness. There is no guarding.   Bandage cdi   Musculoskeletal: Normal range of motion. She exhibits no edema or deformity.   Neurological: She is alert and oriented to person, place, and time.   Skin: Skin is warm and dry. Capillary refill takes less than 2 seconds. She is not diaphoretic.       Significant Labs:  CBC:   Recent Labs  Lab 04/22/18  0400   WBC 7.80   RBC 4.01   HGB 11.4*   HCT 35.3*   *   MCV 88   MCH 28.4   MCHC 32.3     BMP:   Recent Labs  Lab 04/22/18  0451         K 3.8      CO2 23   BUN 12   CREATININE 0.8   CALCIUM 9.2   MG 1.8       Significant Diagnostics:  none

## 2018-04-22 NOTE — ASSESSMENT & PLAN NOTE
Recurrent issue- IV zosyn. Follow urine cultures and adjust abx as clinically appropriate.  Patient not septic.

## 2018-04-22 NOTE — CONSULTS
[facility]  General Surgery  Consult Note    Patient Name: Yuniel Gracia  MRN: [id number]  Code Status: Prior  Admission Date: 4/21/2018  Hospital Length of Stay: 0 days  Attending Physician: Gamaliel Ho MD  Primary Care Provider: Jae Dolan MD    Patient information was obtained from patient and ER records.     Consults  Subjective:     Principal Problem: Acute appendicitis    History of Present Illness: 73 y/o with 12 hours of lower abdominal pain started on left and now stays midline.  Associated with nausea and anorexia.  10/10.  NO prior episodes.  Denies changes to bowel habits.    No current facility-administered medications on file prior to encounter.      Current Outpatient Prescriptions on File Prior to Encounter   Medication Sig    amlodipine (NORVASC) 2.5 MG tablet Take 2.5 mg by mouth once daily.     anastrozole (ARIMIDEX) 1 mg Tab TAKE 1 TABLET DAILY    aspirin 325 MG tablet Take 1 tablet (325 mg total) by mouth every 12 (twelve) hours. For 4 weeks post op    CALCIUM CARBONATE (CALCIUM 600 ORAL) Take 1 tablet by mouth once daily.    CALCIUM CARBONATE/VITAMIN D3 (VITAMIN D-3 ORAL) Take 400 Units by mouth daily as needed.    dorzolamide-timolol 2-0.5% (COSOPT) 22.3-6.8 mg/mL ophthalmic solution Place 1 drop into both eyes 2 (two) times daily.     levothyroxine (SYNTHROID) 75 MCG tablet TAKE 1 TABLET DAILY    linaclotide (LINZESS) 145 mcg Cap capsule Take 145 mcg by mouth once daily.    pravastatin (PRAVACHOL) 40 MG tablet TAKE 1 TABLET AT BEDTIME    sertraline (ZOLOFT) 100 MG tablet TAKE 1 TABLET DAILY    temazepam (RESTORIL) 15 mg Cap Take 1 capsule (15 mg total) by mouth every evening.    traMADol (ULTRAM) 50 mg tablet 1-2 tablets every 8 hours as needed for pain    traZODone (DESYREL) 50 MG tablet Take 1 tablet (50 mg total) by mouth nightly as needed for Insomnia.    UNABLE TO FIND Take 1 capsule by mouth once daily. prevagen extra strength      "VESICARE 5 mg tablet TAKE 1 TABLET DAILY       Review of patient's allergies indicates:   Allergen Reactions    Adhesive Dermatitis and Rash    Codeine Nausea Only       Past Medical History:   Diagnosis Date    Anemia     Arthritis     Blood transfusion     Breast cancer     Cancer RIGHT BREAST    Chronic constipation     Clotting disorder     Colon polyp     Diverticulosis     Glaucoma     Hepatitis C 2000    pt states history of hepatits c-"cured by Dr. Lynch"; TREATED 2 YEARS - 2000   OK NOW    Hypothyroid     Insomnia     Memory loss     reports some memory loss since chemo    Osteoarthritis     Panic attacks     Raynaud's disease     takes amlodipine for this    Ulcer     Vertigo      Past Surgical History:   Procedure Laterality Date    BLADDER SURGERY  2011    sling - Dr Giles  [facility]    BRAIN SURGERY  2007    temporal neuralgia - [place]    BREAST BIOPSY      BREAST LUMPECTOMY  3/12    malignant - had chemo and radiotherapy    COLONOSCOPY  01/03/2011    Dr Lynch, in media section, diverticulosis, hemorrhoids, otherwise normal findings; normal findings on random biopsies    COLONOSCOPY N/A 6/1/2017    Procedure: COLONOSCOPY;  Surgeon: Nate Lamb MD;  [facility]: [facility];  Service: Endoscopy;  Laterality: N/A;    COLONOSCOPY W/ POLYPECTOMY  06/01/2017    Dr. Lamb, 1 adenomatous polyp, recheck 5 years    EYE SURGERY      cataracts bilateral    HYSTERECTOMY      JOINT REPLACEMENT  09/25/2017    left [facility]nee [facility]     removal of port a cath      right lymph node removed from breast area      TUNNELED VENOUS PORT PLACEMENT  04/2012    left chest    UPPER GASTROINTESTINAL ENDOSCOPY  prior to 2011    small hiatal hernia     Family History     Problem Relation (Age of Onset)    Alcohol abuse Maternal Grandfather    Breast cancer Mother, Sister, Other, Sister    Cancer Mother, Sister, Other, Sister    Diabetes Father, Paternal Aunt, Maternal " Aunt, Paternal Uncle    Drug abuse Son    Early death Maternal Grandmother    Heart disease Father, Maternal Uncle, Maternal Grandfather    Obesity Son    Tuberculosis Paternal Uncle, Paternal Grandfather        Social History Main Topics    Smoking status: Never Smoker    Smokeless tobacco: Never Used    Alcohol use 4.8 oz/week     2 Standard drinks or equivalent, 6 Glasses of wine per week      Comment: wine most days - 1 glass    Drug use: No    Sexual activity: Yes     Partners: Male     Birth control/ protection: None     Review of Systems   Constitutional: Positive for appetite change. Negative for chills and fever.   Respiratory: Negative for cough, choking, chest tightness and shortness of breath.    Cardiovascular: Negative for chest pain, palpitations and leg swelling.   Gastrointestinal: Positive for abdominal pain and nausea. Negative for abdominal distention, anal bleeding, blood in stool, constipation, diarrhea, rectal pain and vomiting.   Genitourinary: Negative for difficulty urinating and dysuria.   Musculoskeletal: Positive for arthralgias and back pain.   Neurological: Negative for light-headedness and headaches.   Hematological: Negative for adenopathy. Does not bruise/bleed easily.     Objective:     Vital Signs (Most Recent):  Temp: 97.7 °F (36.5 °C) (04/21/18 1834)  Pulse: 61 (04/21/18 2239)  Resp: 16 (04/21/18 1834)  BP: 136/68 (04/21/18 2205)  SpO2: 100 % (04/21/18 2239) Vital Signs (24h Range):  Temp:  [97.7 °F (36.5 °C)] 97.7 °F (36.5 °C)  Pulse:  [61-68] 61  Resp:  [16] 16  SpO2:  [98 %-100 %] 100 %  BP: (134-136)/(68-79) 136/68     Weight: 64 kg (141 lb)  Body mass index is 23.46 kg/m².    Physical Exam   Constitutional: She is oriented to person, place, and time. She appears well-developed and well-nourished. No distress.   HENT:   Head: Normocephalic and atraumatic.   Eyes: EOM are normal. Right eye exhibits no discharge. Left eye exhibits no discharge.   Neck: Normal range of  motion. Neck supple. No JVD present.   Cardiovascular: Normal rate, regular rhythm and intact distal pulses.  Exam reveals no gallop and no friction rub.    No murmur heard.  Pulmonary/Chest: Effort normal and breath sounds normal. No respiratory distress. She has no wheezes.   Abdominal: Soft. Bowel sounds are normal. She exhibits no distension. There is tenderness (throughout lower abdomen worse on right). There is guarding (rlq). There is no rebound.   Musculoskeletal: Normal range of motion. She exhibits no edema or deformity.   Neurological: She is alert and oriented to person, place, and time. No cranial nerve deficit.   Skin: Skin is warm and dry. Capillary refill takes less than 2 seconds. She is not diaphoretic.   Psychiatric: She has a normal mood and affect.       Significant Labs:  CBC:   Recent Labs  Lab 04/21/18 1945   WBC 7.50   RBC 4.40   HGB 12.4   HCT 38.3      MCV 87   MCH 28.2   MCHC 32.3     BMP:   Recent Labs  Lab 04/21/18 1945   GLU 87      K 3.8      CO2 26   BUN 16   CREATININE 0.8   CALCIUM 10.6*       Significant Diagnostics:  CT: I have reviewed all pertinent results/findings within the past 24 hours and my personal findings are:  acute appendicitis- as per radiologist read, not obvious to me    Assessment/Plan:     * Acute appendicitis    Lap appy today   antibiotics          VTE Risk Mitigation     None          Thank you for your consult. I will follow-up with patient. Please contact us if you have any additional questions.    Demario Ruano MD  General Surgery  [facility]

## 2018-04-22 NOTE — PROGRESS NOTES
[facility]  General Surgery  Progress Note    Subjective:     History of Present Illness:  71 y/o with 12 hours of lower abdominal pain started on left and now stays midline.  Associated with nausea and anorexia.  10/10.  NO prior episodes.  Denies changes to bowel habits.    Post-Op Info:  Procedure(s) (LRB):  APPENDECTOMY-LAPAROSCOPIC (Right)   1 Day Post-Op     Interval History: Doing well, pain controlled    Medications:  Continuous Infusions:   sodium chloride 0.9% 125 mL/hr at 04/22/18 0250     Scheduled Meds:   anastrozole  1 mg Oral Daily    levothyroxine  75 mcg Oral Before breakfast    piperacillin-tazobactam 4.5 g in dextrose 5 % 100 mL IVPB (ready to mix system)  4.5 g Intravenous Q8H    pravastatin  40 mg Oral Nightly    senna-docusate 8.6-50 mg  1 tablet Oral BID    sertraline  100 mg Oral Daily     PRN Meds:acetaminophen, dextrose 50%, dextrose 50%, glucagon (human recombinant), glucose, glucose, hydrocodone-acetaminophen 10-325mg, hydrocodone-acetaminophen 5-325mg, magnesium oxide, magnesium oxide, ondansetron, potassium chloride 10%, potassium chloride 10%, promethazine (PHENERGAN) IVPB, ramelteon, sodium chloride 0.9%, traZODone     Review of patient's allergies indicates:   Allergen Reactions    Adhesive Dermatitis and Rash    Codeine Nausea Only     Objective:     Vital Signs (Most Recent):  Temp: 98.1 °F (36.7 °C) (04/22/18 0801)  Pulse: 61 (04/22/18 0801)  Resp: 18 (04/22/18 0801)  BP: (!) 92/51 (04/22/18 0801)  SpO2: 98 % (04/22/18 0801) Vital Signs (24h Range):  Temp:  [97.7 °F (36.5 °C)-98.2 °F (36.8 °C)] 98.1 °F (36.7 °C)  Pulse:  [59-68] 61  Resp:  [12-18] 18  SpO2:  [98 %-100 %] 98 %  BP: ()/(51-79) 92/51     Weight: 63.6 kg (140 lb 3.4 oz)  Body mass index is 23.33 kg/m².    Intake/Output - Last 3 Shifts       04/20 0700 - 04/21 0659 04/21 0700 - 04/22 0659 04/22 0700 - 04/23 0659    P.O.  240     I.V. (mL/kg)  1683.3 (26.5)     IV Piggyback  100      Total Intake(mL/kg)  2023.3 (31.8)     Net   +2023.3             Urine Occurrence  1 x           Physical Exam   Constitutional: She is oriented to person, place, and time. She appears well-developed and well-nourished.   HENT:   Head: Normocephalic and atraumatic.   Eyes: EOM are normal. Right eye exhibits no discharge. No scleral icterus.   Neck: Normal range of motion. Neck supple. No JVD present.   Cardiovascular: Normal rate and regular rhythm.    Pulmonary/Chest: Effort normal and breath sounds normal.   Abdominal: Soft. She exhibits no distension. There is no tenderness. There is no guarding.   Bandage cdi   Musculoskeletal: Normal range of motion. She exhibits no edema or deformity.   Neurological: She is alert and oriented to person, place, and time.   Skin: Skin is warm and dry. Capillary refill takes less than 2 seconds. She is not diaphoretic.       Significant Labs:  CBC:   Recent Labs  Lab 04/22/18  0400   WBC 7.80   RBC 4.01   HGB 11.4*   HCT 35.3*   *   MCV 88   MCH 28.4   MCHC 32.3     BMP:   Recent Labs  Lab 04/22/18  0451         K 3.8      CO2 23   BUN 12   CREATININE 0.8   CALCIUM 9.2   MG 1.8       Significant Diagnostics:  none    Assessment/Plan:     * Acute appendicitis    POD 1 appy  Ok to discharge when tolerates diet  Follow up and instructions on wrap up  Antibiotics for 1 week            Demario Ruano MD  General Surgery  [facility]

## 2018-04-22 NOTE — HPI
Yuniel Gracia is a 71 yo female with PMHx significant for hypothyroidism, breast CA (in remission), and frequent UTIs.  She was admitted to the service of hospital medicine with acute appendicitis.  She presented to the ED with sudden onset lower abdominal pain at ~ 130 pm.  She states the pain started ~ 3 hours after very large BM (she suffers with chronic constipation).  The pain was intermittent most of the afternoon.  She states she eventually took a tramadol and tried to lay down but pain intensified to 8/10 prompting ED visit.  Associated symptoms include nausea.  She denies any fever, chills, emesis, chest pain, SOB, or changes in urination.  She reports multiple surgical procedures in the past without complication.  Other pertinent medical history as below:

## 2018-04-22 NOTE — ASSESSMENT & PLAN NOTE
POD 1 appy  Ok to discharge when tolerates diet  Follow up and instructions on wrap up  Antibiotics for 1 week

## 2018-04-22 NOTE — DISCHARGE INSTRUCTIONS
Remove band aids tomorrow  Keep white strips until they fall off  Shower over incisions daily with soap and water  Do not bath or get into a pool  Use Incentive Spirometer at Home

## 2018-04-22 NOTE — HPI
73 y/o with 12 hours of lower abdominal pain started on left and now stays midline.  Associated with nausea and anorexia.  10/10.  NO prior episodes.  Denies changes to bowel habits.

## 2018-04-22 NOTE — OP NOTE
[facility]  Appendectomy, Laparoscopic  Procedure Note    SUMMARY     Date of Procedure: 4/21/2018    Procedure: Procedure(s) (LRB):  APPENDECTOMY-LAPAROSCOPIC (Right)    Surgeon(s) and Role:     * Demario Ruano MD - Primary    Assisting Surgeon: None    Indications: The patient presented with a history of right-sided abdominal pain. A ct revealed findings consistent with acute appendicitis.    Pre-Operative Diagnosis: Acute appendicitis without mention of peritonitis    Post-Operative Diagnosis: Same    Anesthesia: General    Technical Procedures Used: lap    Description of the Findings of the Procedure:    The patient was seen again in the Holding Room. The risks, benefits, complications, treatment options, and expected outcomes were discussed with the patient and/or family. The possibilities of reaction to medication, pulmonary aspiration, perforation of viscus, bleeding, recurrent infection, finding a normal appendix, the need for additional procedures, failure to diagnose a condition, and creating a complication requiring transfusion or operation were discussed. There was concurrence with the proposed plan and informed consent was obtained. The site of surgery was properly noted/marked. The patient was taken to Operating Room, identified as Yuniel Gracia and the procedure verified as Appendectomy. A Time Out was held and the above information confirmed.    The patient was placed in the supine position and general anesthesia was induced, along with placement of orogastric tube, Venodyne boots, and a Cheema catheter. The abdomen was prepped and draped in a sterile fashion. A one centimeter infraumbilical incision was made and the peritoneal cavity was accessed using the Gee technique.  A 12 mm Versaport was placed through the umbilical incision.  The pneumoperitoneum was then established to steady pressure of 12 mmHg. Additional 5 mm cannulas then placed in the left lower  quadrant of the abdomen and half way between the umbilicus and pubic symphysis under direct vision. A careful evaluation of the entire abdomen was carried out. The patient was placed in Trendelenburg and left lateral decubitus position. The small intestines were retracted in the cephalad and left lateral direction away from the pelvis and right lower quadrant.   The patient was found to have an enlarged and inflamed appendix that was extending into the pelvis. There was no evidence of perforation.    The appendix was carefully dissected. A window was made in the mesoappendix at the base of the appendix. A harmonic was fired across the mesoappendix. The appendix was divided at its base using an 3 endo loops. Minimal appendiceal stump was left in place. There was no evidence of bleeding, leakage, or complication after division of the appendix. Irrigation was also performed and irrigate suctioned from the abdomen as well.    The umbilical port site was closed using Polysorb sutures in a figure eight fashion at the level of the fascia. The trocar site skin wounds were closed using skin staples.    Instrument, sponge, and needle counts were correct at the conclusion of the case.     The appendix was found to be inflamed. There were not signs of necrosis.  There was not perforation. There was not abscess formation.    Significant surgical tasks conducted by the assistant(s), if applicable: none     Complications: None; patient tolerated the procedure well.           Total IV Fluids: 1000 mL           Estimated Blood Loss (EBL): 10.0 cc    Drains: none    Implants: none    Specimens: none           Condition: stable    Disposition: PACU - hemodynamically stable.    Attestation: I was present and scrubbed for the entire procedure.

## 2018-04-22 NOTE — BRIEF OP NOTE
[facility]  Brief Operative Note    SUMMARY     Surgery Date: 4/21/2018     Surgeon(s) and Role:     * Demario Ruano MD - Primary    Assisting Surgeon: None    Pre-op Diagnosis:  Acute appendicitis [K35.80]    Post-op Diagnosis:  Post-Op Diagnosis Codes:     * Acute appendicitis [K35.80]    Procedure(s) (LRB):  APPENDECTOMY-LAPAROSCOPIC (Right)    Anesthesia: General    Description of Procedure: lap appy    Description of the findings of the procedure: inflamed appendix    Estimated Blood Loss: 10.0 cc       Specimens:   Specimen (12h ago through future)    Start     Ordered    04/22/18 0013  Specimen to Pathology - Surgery  Once     Comments:  Pre-op Diagnosis: Acute appendicitis [K35.80]Post-op Diagnosis: SAMEProcedure(s):APPENDECTOMY-LAPAROSCOPIC Number of specimens: 1Name of specimens: 1. APPENDIX      04/22/18 0024

## 2018-04-22 NOTE — SUBJECTIVE & OBJECTIVE
"Past Medical History:   Diagnosis Date    Anemia     Arthritis     Blood transfusion     Breast cancer     Cancer RIGHT BREAST    Chronic constipation     Clotting disorder     Colon polyp     Diverticulosis     Glaucoma     Hepatitis C 2000    pt states history of hepatits c-"cured by Dr. Lynch"; TREATED 2 YEARS - 2000   OK NOW    Hypothyroid     Insomnia     Memory loss     reports some memory loss since chemo    Osteoarthritis     Panic attacks     Raynaud's disease     takes amlodipine for this    Ulcer     Vertigo        Past Surgical History:   Procedure Laterality Date    BLADDER SURGERY  2011    sling - Dr Giles  - [facility]    BRAIN SURGERY  2007    temporal neuralgia - [place]    BREAST BIOPSY      BREAST LUMPECTOMY  3/12    malignant - had chemo and radiotherapy    COLONOSCOPY  01/03/2011    Dr Lynch, in media section, diverticulosis, hemorrhoids, otherwise normal findings; normal findings on random biopsies    COLONOSCOPY N/A 6/1/2017    Procedure: COLONOSCOPY;  Surgeon: Nate Lamb MD;  Location: [facility];  Service: Endoscopy;  Laterality: N/A;    COLONOSCOPY W/ POLYPECTOMY  06/01/2017    Dr. Lamb, 1 adenomatous polyp, recheck 5 years    EYE SURGERY      cataracts bilateral    HYSTERECTOMY      JOINT REPLACEMENT  09/25/2017    [facility]     removal of port a cath      right lymph node removed from breast area      TUNNELED VENOUS PORT PLACEMENT  04/2012    left chest    UPPER GASTROINTESTINAL ENDOSCOPY  prior to 2011    small hiatal hernia       Review of patient's allergies indicates:   Allergen Reactions    Adhesive Dermatitis and Rash    Codeine Nausea Only       No current facility-administered medications on file prior to encounter.      Current Outpatient Prescriptions on File Prior to Encounter   Medication Sig    amlodipine (NORVASC) 2.5 MG tablet Take 2.5 mg by mouth once daily.     anastrozole (ARIMIDEX) 1 mg " Tab TAKE 1 TABLET DAILY    aspirin 325 MG tablet Take 1 tablet (325 mg total) by mouth every 12 (twelve) hours. For 4 weeks post op    CALCIUM CARBONATE (CALCIUM 600 ORAL) Take 1 tablet by mouth once daily.    CALCIUM CARBONATE/VITAMIN D3 (VITAMIN D-3 ORAL) Take 400 Units by mouth daily as needed.    dorzolamide-timolol 2-0.5% (COSOPT) 22.3-6.8 mg/mL ophthalmic solution Place 1 drop into both eyes 2 (two) times daily.     levothyroxine (SYNTHROID) 75 MCG tablet TAKE 1 TABLET DAILY    linaclotide (LINZESS) 145 mcg Cap capsule Take 145 mcg by mouth once daily.    pravastatin (PRAVACHOL) 40 MG tablet TAKE 1 TABLET AT BEDTIME    sertraline (ZOLOFT) 100 MG tablet TAKE 1 TABLET DAILY    temazepam (RESTORIL) 15 mg Cap Take 1 capsule (15 mg total) by mouth every evening.    traMADol (ULTRAM) 50 mg tablet 1-2 tablets every 8 hours as needed for pain    traZODone (DESYREL) 50 MG tablet Take 1 tablet (50 mg total) by mouth nightly as needed for Insomnia.    UNABLE TO FIND Take 1 capsule by mouth once daily. prevagen extra strength     VESICARE 5 mg tablet TAKE 1 TABLET DAILY     Family History     Problem Relation (Age of Onset)    Alcohol abuse Maternal Grandfather    Breast cancer Mother, Sister, Other, Sister    Cancer Mother, Sister, Other, Sister    Diabetes Father, Paternal Aunt, Maternal Aunt, Paternal Uncle    Drug abuse Son    Early death Maternal Grandmother    Heart disease Father, Maternal Uncle, Maternal Grandfather    Obesity Son    Tuberculosis Paternal Uncle, Paternal Grandfather        Social History Main Topics    Smoking status: Never Smoker    Smokeless tobacco: Never Used    Alcohol use 4.8 oz/week     2 Standard drinks or equivalent, 6 Glasses of wine per week      Comment: wine most days - 1 glass    Drug use: No    Sexual activity: Yes     Partners: Male     Birth control/ protection: None     Review of Systems   Constitutional: Negative for activity change, appetite change, chills and  fever.   HENT: Negative for congestion, postnasal drip, sore throat and trouble swallowing.    Eyes: Negative for photophobia and visual disturbance.   Respiratory: Negative for cough, chest tightness, shortness of breath and wheezing.    Cardiovascular: Positive for leg swelling (chronic). Negative for chest pain and palpitations.   Gastrointestinal: Positive for abdominal pain, constipation (chronic) and nausea. Negative for abdominal distention, blood in stool, diarrhea and vomiting.   Genitourinary: Positive for urgency (chronic). Negative for difficulty urinating, dysuria, flank pain and hematuria.   Musculoskeletal: Negative for arthralgias and myalgias.   Skin: Negative for color change.   Neurological: Negative for dizziness, weakness, light-headedness and headaches.   Psychiatric/Behavioral: Negative for confusion. The patient is not nervous/anxious.      Objective:     Vital Signs (Most Recent):  Temp: 97.7 °F (36.5 °C) (04/21/18 1834)  Pulse: 68 (04/21/18 2207)  Resp: 16 (04/21/18 1834)  BP: 136/68 (04/21/18 2205)  SpO2: 99 % (04/21/18 2207) Vital Signs (24h Range):  Temp:  [97.7 °F (36.5 °C)] 97.7 °F (36.5 °C)  Pulse:  [67-68] 68  Resp:  [16] 16  SpO2:  [98 %-99 %] 99 %  BP: (134-136)/(68-79) 136/68     Weight: 64 kg (141 lb)  Body mass index is 23.46 kg/m².    Physical Exam   Constitutional: She is oriented to person, place, and time. She appears well-developed and well-nourished. No distress.   HENT:   Head: Normocephalic and atraumatic.   Eyes: Conjunctivae and EOM are normal. Pupils are equal, round, and reactive to light.   Neck: Normal range of motion. Neck supple. No thyromegaly present.   Cardiovascular: Normal rate, regular rhythm, normal heart sounds and intact distal pulses.    No murmur heard.  Pulmonary/Chest: Effort normal and breath sounds normal. No respiratory distress.   Abdominal: Soft. She exhibits no distension and no mass. There is tenderness (mid- lower abd near umbilicus). There  is guarding. There is no rebound.   Musculoskeletal: Normal range of motion. She exhibits edema (2+  non-pitting). She exhibits no tenderness.   Neurological: She is alert and oriented to person, place, and time. No cranial nerve deficit.   Skin: Skin is warm and dry. Capillary refill takes less than 2 seconds. No erythema.   Psychiatric: She has a normal mood and affect. Her behavior is normal. Judgment and thought content normal.         CRANIAL NERVES     CN III, IV, VI   Pupils are equal, round, and reactive to light.  Extraocular motions are normal.        Significant Labs:   CBC:   Recent Labs  Lab 04/21/18 1945   WBC 7.50   HGB 12.4   HCT 38.3        CMP:   Recent Labs  Lab 04/21/18 1945      K 3.8      CO2 26   GLU 87   BUN 16   CREATININE 0.8   CALCIUM 10.6*   PROT 8.2   ALBUMIN 3.8   BILITOT 0.5   ALKPHOS 97   AST 29   ALT 13   ANIONGAP 13   EGFRNONAA >60     Urine Studies:   Recent Labs  Lab 04/21/18  1950   COLORU Yellow   APPEARANCEUA Cloudy*   PHUR 8.0   SPECGRAV 1.010   PROTEINUA Negative   GLUCUA Negative   KETONESU Negative   BILIRUBINUA Negative   OCCULTUA Trace*   NITRITE Positive*   UROBILINOGEN Negative   LEUKOCYTESUR 2+*   RBCUA 2   WBCUA >100*   BACTERIA Many*   SQUAMEPITHEL 12       Significant Imaging:  CT A/P: Appendicitis.  No organized fluid collection or pneumoperitoneum seen.  The cecum and appendix are both located within the right posterior pelvis. (VRAD)

## 2018-04-22 NOTE — SUBJECTIVE & OBJECTIVE
"No current facility-administered medications on file prior to encounter.      Current Outpatient Prescriptions on File Prior to Encounter   Medication Sig    amlodipine (NORVASC) 2.5 MG tablet Take 2.5 mg by mouth once daily.     anastrozole (ARIMIDEX) 1 mg Tab TAKE 1 TABLET DAILY    aspirin 325 MG tablet Take 1 tablet (325 mg total) by mouth every 12 (twelve) hours. For 4 weeks post op    CALCIUM CARBONATE (CALCIUM 600 ORAL) Take 1 tablet by mouth once daily.    CALCIUM CARBONATE/VITAMIN D3 (VITAMIN D-3 ORAL) Take 400 Units by mouth daily as needed.    dorzolamide-timolol 2-0.5% (COSOPT) 22.3-6.8 mg/mL ophthalmic solution Place 1 drop into both eyes 2 (two) times daily.     levothyroxine (SYNTHROID) 75 MCG tablet TAKE 1 TABLET DAILY    linaclotide (LINZESS) 145 mcg Cap capsule Take 145 mcg by mouth once daily.    pravastatin (PRAVACHOL) 40 MG tablet TAKE 1 TABLET AT BEDTIME    sertraline (ZOLOFT) 100 MG tablet TAKE 1 TABLET DAILY    temazepam (RESTORIL) 15 mg Cap Take 1 capsule (15 mg total) by mouth every evening.    traMADol (ULTRAM) 50 mg tablet 1-2 tablets every 8 hours as needed for pain    traZODone (DESYREL) 50 MG tablet Take 1 tablet (50 mg total) by mouth nightly as needed for Insomnia.    UNABLE TO FIND Take 1 capsule by mouth once daily. prevagen extra strength     VESICARE 5 mg tablet TAKE 1 TABLET DAILY       Review of patient's allergies indicates:   Allergen Reactions    Adhesive Dermatitis and Rash    Codeine Nausea Only       Past Medical History:   Diagnosis Date    Anemia     Arthritis     Blood transfusion     Breast cancer     Cancer RIGHT BREAST    Chronic constipation     Clotting disorder     Colon polyp     Diverticulosis     Glaucoma     Hepatitis C 2000    pt states history of hepatits c-"cured by Dr. Lynch"; TREATED 2 YEARS - 2000   OK NOW    Hypothyroid     Insomnia     Memory loss     reports some memory loss since chemo    Osteoarthritis     Panic " attacks     Raynaud's disease     takes amlodipine for this    Ulcer     Vertigo      Past Surgical History:   Procedure Laterality Date    BLADDER [facility]GERY  2011    [facility]iles  [facility]    BRAIN SURGERY  2007    temporal neuralgia - [place]    BREAST BIOPSY      BREAST LUMPECTOMY  3/12    malignant - had chemo and radiotherapy    COLONOSCOPY  01/03/2011    Dr Lynch, in media section, diverticulosis, hemorrhoids, otherwise normal findings; normal findings on random biopsies    COLONOSCOPY N/A 6/1/2017    Procedure: COLONOSCOPY;  Surgeon: Nate Lamb MD;  Location: [facility];  Service: Endoscopy;  Laterality: N/A;    COLONOSCOPY W/ POLYPECTOMY  06/01/2017    Dr. Lamb, 1 adenomatous polyp, recheck 5 years    EYE SURGERY      cataracts bilateral    HYSTERECTOMY      JOINT REPLACEMENT  09/25/2017    left [facility]nee [facility]     removal of port a cath      right lymph node removed from breast area      TUNNELED VENOUS PORT PLACEMENT  04/2012    left chest    UPPER GASTROINTESTINAL ENDOSCOPY  prior to 2011    small hiatal hernia     Family History     Problem Relation (Age of Onset)    Alcohol abuse Maternal Grandfather    Breast cancer Mother, Sister, Other, Sister    Cancer Mother, Sister, Other, Sister    Diabetes Father, Paternal Aunt, Maternal Aunt, Paternal Uncle    Drug abuse Son    Early death Maternal Grandmother    Heart disease Father, Maternal Uncle, Maternal Grandfather    Obesity Son    Tuberculosis Paternal Uncle, Paternal Grandfather        Social History Main Topics    Smoking status: Never Smoker    Smokeless tobacco: Never Used    Alcohol use 4.8 oz/week     2 Standard drinks or equivalent, 6 Glasses of wine per week      Comment: wine most days - 1 glass    Drug use: No    Sexual activity: Yes     Partners: Male     Birth control/ protection: None     Review of Systems   Constitutional: Positive for appetite change. Negative for chills and fever.    Respiratory: Negative for cough, choking, chest tightness and shortness of breath.    Cardiovascular: Negative for chest pain, palpitations and leg swelling.   Gastrointestinal: Positive for abdominal pain and nausea. Negative for abdominal distention, anal bleeding, blood in stool, constipation, diarrhea, rectal pain and vomiting.   Genitourinary: Negative for difficulty urinating and dysuria.   Musculoskeletal: Positive for arthralgias and back pain.   Neurological: Negative for light-headedness and headaches.   Hematological: Negative for adenopathy. Does not bruise/bleed easily.     Objective:     Vital Signs (Most Recent):  Temp: 97.7 °F (36.5 °C) (04/21/18 1834)  Pulse: 61 (04/21/18 2239)  Resp: 16 (04/21/18 1834)  BP: 136/68 (04/21/18 2205)  SpO2: 100 % (04/21/18 2239) Vital Signs (24h Range):  Temp:  [97.7 °F (36.5 °C)] 97.7 °F (36.5 °C)  Pulse:  [61-68] 61  Resp:  [16] 16  SpO2:  [98 %-100 %] 100 %  BP: (134-136)/(68-79) 136/68     Weight: 64 kg (141 lb)  Body mass index is 23.46 kg/m².    Physical Exam   Constitutional: She is oriented to person, place, and time. She appears well-developed and well-nourished. No distress.   HENT:   Head: Normocephalic and atraumatic.   Eyes: EOM are normal. Right eye exhibits no discharge. Left eye exhibits no discharge.   Neck: Normal range of motion. Neck supple. No JVD present.   Cardiovascular: Normal rate, regular rhythm and intact distal pulses.  Exam reveals no gallop and no friction rub.    No murmur heard.  Pulmonary/Chest: Effort normal and breath sounds normal. No respiratory distress. She has no wheezes.   Abdominal: Soft. Bowel sounds are normal. She exhibits no distension. There is tenderness (throughout lower abdomen worse on right). There is guarding (rlq). There is no rebound.   Musculoskeletal: Normal range of motion. She exhibits no edema or deformity.   Neurological: She is alert and oriented to person, place, and time. No cranial nerve deficit.    Skin: Skin is warm and dry. Capillary refill takes less than 2 seconds. She is not diaphoretic.   Psychiatric: She has a normal mood and affect.       Significant Labs:  CBC:   Recent Labs  Lab 04/21/18 1945   WBC 7.50   RBC 4.40   HGB 12.4   HCT 38.3      MCV 87   MCH 28.2   MCHC 32.3     BMP:   Recent Labs  Lab 04/21/18 1945   GLU 87      K 3.8      CO2 26   BUN 16   CREATININE 0.8   CALCIUM 10.6*       Significant Diagnostics:  CT: I have reviewed all pertinent results/findings within the past 24 hours and my personal findings are:  acute appendicitis

## 2018-04-22 NOTE — PLAN OF CARE
Report to Prudencio. Family present in room. VS stable, no complaint of discomfort at this time. Resting comfortably

## 2018-04-22 NOTE — H&P
"[facility] Hospital Medicine  History & Physical    Patient Name: Yuniel Gracia  MRN: [id number]  Admission Date: 4/21/2018  Attending Physician: Gamaliel Ho MD   Primary Care Provider: Jae Dolan MD         Patient information was obtained from patient, relative(s) and ER records.     Subjective:     Principal Problem:Acute appendicitis    Chief Complaint:   Chief Complaint   Patient presents with    Abdominal Pain     started 1330 / large BM today , 1 hour after pain started         HPI: Yuniel Gracia is a 71 yo female with PMHx significant for hypothyroidism, breast CA (in remission), and frequent UTIs.  She was admitted to the service of hospital medicine with acute appendicitis.  She presented to the ED with sudden onset lower abdominal pain at ~ 130 pm.  She states the pain started ~ 3 hours after very large BM (she suffers with chronic constipation).  The pain was intermittent most of the afternoon.  She states she eventually took a tramadol and tried to lay down but pain intensified to 8/10 prompting ED visit.  Associated symptoms include nausea.  She denies any fever, chills, emesis, chest pain, SOB, or changes in urination.  She reports multiple surgical procedures in the past without complication.  Other pertinent medical history as below:    Past Medical History:   Diagnosis Date    Anemia     Arthritis     Blood transfusion     Breast cancer     Cancer RIGHT BREAST    Chronic constipation     Clotting disorder     Colon polyp     Diverticulosis     Glaucoma     Hepatitis C 2000    pt states history of hepatits c-"cured by Dr. Lynch"; TREATED 2 YEARS - 2000   OK NOW    Hypothyroid     Insomnia     Memory loss     reports some memory loss since chemo    Osteoarthritis     Panic attacks     Raynaud's disease     takes amlodipine for this    Ulcer     Vertigo        Past Surgical History:   Procedure Laterality Date    BLADDER SURGERY  2011 "    [facility] - Dr Giles  - [facility]    BRAIN SURGERY  2007    temporal neuralgia - [place]    BREAST BIOPSY      BREAST LUMPECTOMY  3/12    malignant - had chemo and radiotherapy    COLONOSCOPY  01/03/2011    Dr Lynch, in media section, diverticulosis, hemorrhoids, otherwise normal findings; normal findings on random biopsies    COLONOSCOPY N/A 6/1/2017    Procedure: COLONOSCOPY;  Surgeon: Nate Lamb MD;  Location: [facility];  Service: Endoscopy;  Laterality: N/A;    COLONOSCOPY W/ POLYPECTOMY  06/01/2017    Dr. Lamb, 1 adenomatous polyp, recheck 5 years    EYE SURGERY      cataracts bilateral    HYSTERECTOMY      JOINT REPLACEMENT  09/25/2017    [facility]nee [facility]     removal of port a cath      right lymph node removed from breast area      TUNNELED VENOUS PORT PLACEMENT  04/2012    left chest    UPPER GASTROINTESTINAL ENDOSCOPY  prior to 2011    small hiatal hernia       Review of patient's allergies indicates:   Allergen Reactions    Adhesive Dermatitis and Rash    Codeine Nausea Only       No current facility-administered medications on file prior to encounter.      Current Outpatient Prescriptions on File Prior to Encounter   Medication Sig    amlodipine (NORVASC) 2.5 MG tablet Take 2.5 mg by mouth once daily.     anastrozole (ARIMIDEX) 1 mg Tab TAKE 1 TABLET DAILY    aspirin 325 MG tablet Take 1 tablet (325 mg total) by mouth every 12 (twelve) hours. For 4 weeks post op    CALCIUM CARBONATE (CALCIUM 600 ORAL) Take 1 tablet by mouth once daily.    CALCIUM CARBONATE/VITAMIN D3 (VITAMIN D-3 ORAL) Take 400 Units by mouth daily as needed.    dorzolamide-timolol 2-0.5% (COSOPT) 22.3-6.8 mg/mL ophthalmic solution Place 1 drop into both eyes 2 (two) times daily.     levothyroxine (SYNTHROID) 75 MCG tablet TAKE 1 TABLET DAILY    linaclotide (LINZESS) 145 mcg Cap capsule Take 145 mcg by mouth once daily.    pravastatin (PRAVACHOL) 40 MG tablet TAKE 1 TABLET AT  BEDTIME    sertraline (ZOLOFT) 100 MG tablet TAKE 1 TABLET DAILY    temazepam (RESTORIL) 15 mg Cap Take 1 capsule (15 mg total) by mouth every evening.    traMADol (ULTRAM) 50 mg tablet 1-2 tablets every 8 hours as needed for pain    traZODone (DESYREL) 50 MG tablet Take 1 tablet (50 mg total) by mouth nightly as needed for Insomnia.    UNABLE TO FIND Take 1 capsule by mouth once daily. prevagen extra strength     VESICARE 5 mg tablet TAKE 1 TABLET DAILY     Family History     Problem Relation (Age of Onset)    Alcohol abuse Maternal Grandfather    Breast cancer Mother, Sister, Other, Sister    Cancer Mother, Sister, Other, Sister    Diabetes Father, Paternal Aunt, Maternal Aunt, Paternal Uncle    Drug abuse Son    Early death Maternal Grandmother    Heart disease Father, Maternal Uncle, Maternal Grandfather    Obesity Son    Tuberculosis Paternal Uncle, Paternal Grandfather        Social History Main Topics    Smoking status: Never Smoker    Smokeless tobacco: Never Used    Alcohol use 4.8 oz/week     2 Standard drinks or equivalent, 6 Glasses of wine per week      Comment: wine most days - 1 glass    Drug use: No    Sexual activity: Yes     Partners: Male     Birth control/ protection: None     Review of Systems   Constitutional: Negative for activity change, appetite change, chills and fever.   HENT: Negative for congestion, postnasal drip, sore throat and trouble swallowing.    Eyes: Negative for photophobia and visual disturbance.   Respiratory: Negative for cough, chest tightness, shortness of breath and wheezing.    Cardiovascular: Positive for leg swelling (chronic). Negative for chest pain and palpitations.   Gastrointestinal: Positive for abdominal pain, constipation (chronic) and nausea. Negative for abdominal distention, blood in stool, diarrhea and vomiting.   Genitourinary: Positive for urgency (chronic). Negative for difficulty urinating, dysuria, flank pain and hematuria.    Musculoskeletal: Negative for arthralgias and myalgias.   Skin: Negative for color change.   Neurological: Negative for dizziness, weakness, light-headedness and headaches.   Psychiatric/Behavioral: Negative for confusion. The patient is not nervous/anxious.      Objective:     Vital Signs (Most Recent):  Temp: 97.7 °F (36.5 °C) (04/21/18 1834)  Pulse: 68 (04/21/18 2207)  Resp: 16 (04/21/18 1834)  BP: 136/68 (04/21/18 2205)  SpO2: 99 % (04/21/18 2207) Vital Signs (24h Range):  Temp:  [97.7 °F (36.5 °C)] 97.7 °F (36.5 °C)  Pulse:  [67-68] 68  Resp:  [16] 16  SpO2:  [98 %-99 %] 99 %  BP: (134-136)/(68-79) 136/68     Weight: 64 kg (141 lb)  Body mass index is 23.46 kg/m².    Physical Exam   Constitutional: She is oriented to person, place, and time. She appears well-developed and well-nourished. No distress.   HENT:   Head: Normocephalic and atraumatic.   Eyes: Conjunctivae and EOM are normal. Pupils are equal, round, and reactive to light.   Neck: Normal range of motion. Neck supple. No thyromegaly present.   Cardiovascular: Normal rate, regular rhythm, normal heart sounds and intact distal pulses.    No murmur heard.  Pulmonary/Chest: Effort normal and breath sounds normal. No respiratory distress.   Abdominal: Soft. She exhibits no distension and no mass. There is tenderness (mid- lower abd near umbilicus). There is guarding. There is no rebound.   Musculoskeletal: Normal range of motion. She exhibits edema (2+  non-pitting). She exhibits no tenderness.   Neurological: She is alert and oriented to person, place, and time. No cranial nerve deficit.   Skin: Skin is warm and dry. Capillary refill takes less than 2 seconds. No erythema.   Psychiatric: She has a normal mood and affect. Her behavior is normal. Judgment and thought content normal.         CRANIAL NERVES     CN III, IV, VI   Pupils are equal, round, and reactive to light.  Extraocular motions are normal.        Significant Labs:   CBC:   Recent Labs  Lab  04/21/18 1945   WBC 7.50   HGB 12.4   HCT 38.3        CMP:   Recent Labs  Lab 04/21/18 1945      K 3.8      CO2 26   GLU 87   BUN 16   CREATININE 0.8   CALCIUM 10.6*   PROT 8.2   ALBUMIN 3.8   BILITOT 0.5   ALKPHOS 97   AST 29   ALT 13   ANIONGAP 13   EGFRNONAA >60     Urine Studies:   Recent Labs  Lab 04/21/18 1950   COLORU Yellow   APPEARANCEUA Cloudy*   PHUR 8.0   SPECGRAV 1.010   PROTEINUA Negative   GLUCUA Negative   KETONESU Negative   BILIRUBINUA Negative   OCCULTUA Trace*   NITRITE Positive*   UROBILINOGEN Negative   LEUKOCYTESUR 2+*   RBCUA 2   WBCUA >100*   BACTERIA Many*   SQUAMEPITHEL 12       Significant Imaging:  CT A/P: Appendicitis.  No organized fluid collection or pneumoperitoneum seen.  The cecum and appendix are both located within the right posterior pelvis. (VRAD)    Assessment/Plan:     * Acute appendicitis    NPO.  IV zosyn. IV hydration. Pain control.  Surgeon contacted by ED physician-- Dr. Ruano to see and take to OR tonight.  Pre-op eval in progress without overt contraindications to surgery noted thus far.          Acute cystitis    Recurrent issue- IV zosyn. Follow urine cultures and adjust abx as clinically appropriate.  Patient not septic.          Hypothyroidism    Chronic-- reviewed TSH from 11/2017-- euthyroid.  Continue levothyroxine and check TSH in AM.  Adjust therapy as clinically appropriate.        Chronic constipation    Scheduled stool softeners.  PRN miralax.          VTE Risk Mitigation     Low-- SCD/BETTY             Elizabeth Juan NP  Department of Hospital Medicine   [facility]

## 2018-04-22 NOTE — ED NOTES
Pt presents to ER for evaluation of LLQ pain that started around 2 pm today a couple hours after pt reported a very large, hard BM. Pain is localized to kayla lower quadrant but mostly and worsened on left lower quadrant. Pt states she has also had intermittent episodes of nausea since pain started but no vomiting. Pt is in bed, locked, lowest position, side rails up, will continue to monitor.

## 2018-04-22 NOTE — NURSING
Pt bp 85/43 HR 55; rechecked bp 98/50 HR 61. Pt asymptomatic and states her bp normally runs low. Notified HUSSEIN Hubbard of above; pt okay to discharge as long as pt knows to drink plenty of fluids.

## 2018-04-23 ENCOUNTER — TELEPHONE (OUTPATIENT)
Dept: MEDSURG UNIT | Facility: HOSPITAL | Age: 73
End: 2018-04-23

## 2018-04-23 NOTE — ANESTHESIA POSTPROCEDURE EVALUATION
"Anesthesia Post Evaluation    Patient: Yuniel Gracia    Procedure(s) Performed: Procedure(s) (LRB):  APPENDECTOMY-LAPAROSCOPIC (Right)    Final Anesthesia Type: general  Patient location during evaluation: PACU  Patient participation: Yes- Able to Participate  Level of consciousness: awake and alert and oriented  Post-procedure vital signs: reviewed and stable  Pain management: adequate  Airway patency: patent  PONV status at discharge: No PONV  Anesthetic complications: no      Cardiovascular status: blood pressure returned to baseline and stable  Respiratory status: unassisted and spontaneous ventilation  Hydration status: euvolemic  Follow-up not needed.        Visit Vitals  BP (!) 98/50   Pulse 61   Temp 36.9 °C (98.4 °F) (Oral)   Resp 18   Ht 5' 5" (1.651 m)   Wt 63.6 kg (140 lb 3.4 oz)   LMP 03/02/1998   SpO2 98%   Breastfeeding? No   BMI 23.33 kg/m²       Pain/Eduar Score: Pain Assessment Performed: Yes (4/22/2018 10:00 AM)  Presence of Pain: complains of pain/discomfort (4/22/2018 10:00 AM)  Pain Rating Prior to Med Admin: 6 (4/22/2018 10:30 AM)  Pain Rating Post Med Admin: 2 (4/22/2018 11:00 AM)  Eduar Score: 10 (4/22/2018  1:55 AM)      "

## 2018-04-24 ENCOUNTER — TELEPHONE (OUTPATIENT)
Dept: BARIATRICS | Facility: CLINIC | Age: 73
End: 2018-04-24

## 2018-04-24 ENCOUNTER — OUTPATIENT CASE MANAGEMENT (OUTPATIENT)
Dept: ADMINISTRATIVE | Facility: OTHER | Age: 73
End: 2018-04-24

## 2018-04-24 LAB — BACTERIA UR CULT: NORMAL

## 2018-04-24 NOTE — TELEPHONE ENCOUNTER
----- Message from Ritu GARCIA Nicolas sent at 4/24/2018  4:23 PM CDT -----  Contact: PT Portal Request  Appointment Request From: Yuniel Gracia    With Provider: Other - (see comments)    Would Accept With:Only the person I've selected    Preferred Date Range: From 5/7/2018 To 5/11/2018    Preferred Times: Any    Reason for visit: Request an Appt    Comments:  Dr. Ruano preformed an Appendicitis on me  Sunday and requested a follow-up visit in 2 weeks.     If possible I would prefer an appointment in the afternoon.    Yuniel Gracia  [phone]

## 2018-04-24 NOTE — PROGRESS NOTES
The following patient has been assigned to Magali Valenzuela RN with Outpatient Complex Care Management for high risk screening.    Reason: High Risk: : Recently discharged    Please contact [facility] at [phone] with any questions.    Thank you,    Amanda Rose, [facility]  Outpatient Complex Care Mgmt  Ext [phone]

## 2018-04-26 ENCOUNTER — OUTPATIENT CASE MANAGEMENT (OUTPATIENT)
Dept: ADMINISTRATIVE | Facility: OTHER | Age: 73
End: 2018-04-26

## 2018-04-26 NOTE — PROGRESS NOTES
04/26/18-SUMMARY-Patient was admitted to the hospital on 4/21/22 for a lap appendectomy. Patient took the nu aids off her abdomen with no signs or symptoms of infection. Went over hospital discharge instructions with patient. Patient lives with her  who is assisting her at home. Patient was just getting over knee replacements when this happened. Patient is ambulating independently. Patient is taking medication for a UTI that was discovered when admitted to the hospital. Patient has her post op appointment with Dr. Ruano on 05/08/18 at 11:30 am.     INTERVENTIONS-  Mail senior resource guide      PLAN-  1. UTI-Mail education material and continue education.  2. S/P appendectomy- Mail education material and continue education.   3. Follow up in two weeks.

## 2018-05-08 ENCOUNTER — OFFICE VISIT (OUTPATIENT)
Dept: BARIATRICS | Facility: CLINIC | Age: 73
End: 2018-05-08
Payer: MEDICARE

## 2018-05-08 VITALS
TEMPERATURE: 98 F | SYSTOLIC BLOOD PRESSURE: 116 MMHG | BODY MASS INDEX: 23.47 KG/M2 | RESPIRATION RATE: 16 BRPM | WEIGHT: 140.88 LBS | HEIGHT: 65 IN | DIASTOLIC BLOOD PRESSURE: 54 MMHG | HEART RATE: 58 BPM

## 2018-05-08 DIAGNOSIS — K35.30 ACUTE APPENDICITIS WITH LOCALIZED PERITONITIS: Primary | ICD-10-CM

## 2018-05-08 PROBLEM — K35.80 ACUTE APPENDICITIS: Status: RESOLVED | Noted: 2018-04-21 | Resolved: 2018-05-08

## 2018-05-08 PROCEDURE — 99024 POSTOP FOLLOW-UP VISIT: CPT | Mod: POP,,, | Performed by: SURGERY

## 2018-05-08 PROCEDURE — 99999 PR PBB SHADOW E&M-EST. PATIENT-LVL IV: CPT | Mod: PBBFAC,,, | Performed by: SURGERY

## 2018-05-08 PROCEDURE — 99214 OFFICE O/P EST MOD 30 MIN: CPT | Mod: PBBFAC,PN | Performed by: SURGERY

## 2018-05-08 RX ORDER — AMOXICILLIN AND CLAVULANATE POTASSIUM 875; 125 MG/1; MG/1
1 TABLET, FILM COATED ORAL 2 TIMES DAILY
Qty: 10 TABLET | Refills: 0 | Status: SHIPPED | OUTPATIENT
Start: 2018-05-08 | End: 2018-05-13

## 2018-05-08 NOTE — PROGRESS NOTES
Follow up- General Surgery    Subjective: dysuria, otherwise doing well    Objective:    Vitals:    05/08/18 1119   BP: (!) 116/54   Pulse: (!) 58   Resp: 16   Temp: 98.2 °F (36.8 °C)       NAD  RRR  CTA B    Abdomen: well healed incisions    Extremities:    A/P    S/p appendectomy    Path:acute appendicitis    Dysuria- antibiotics  Resume regular activity

## 2018-05-10 ENCOUNTER — OUTPATIENT CASE MANAGEMENT (OUTPATIENT)
Dept: ADMINISTRATIVE | Facility: OTHER | Age: 73
End: 2018-05-10

## 2018-05-10 NOTE — PROGRESS NOTES
05/10/18-Attempt to follow up patient for outpatient case management. No answer. Left message requesting call back.         I

## 2018-05-11 ENCOUNTER — OUTPATIENT CASE MANAGEMENT (OUTPATIENT)
Dept: ADMINISTRATIVE | Facility: OTHER | Age: 73
End: 2018-05-11

## 2018-05-11 ENCOUNTER — TELEPHONE (OUTPATIENT)
Dept: FAMILY MEDICINE | Facility: CLINIC | Age: 73
End: 2018-05-11

## 2018-05-11 DIAGNOSIS — R82.90 CLOUDY URINE: Primary | ICD-10-CM

## 2018-05-11 NOTE — TELEPHONE ENCOUNTER
----- Message from Magali Valenzuela RN sent at 5/11/2018 11:17 AM CDT -----  Contact: Magali Valenzuela RN [facility] EXT. [phone]  Patient with f/u appt with Dr. Ruano on 05/08/18. Dr. Ruano gave patient Augmentin for a UTI but did not get a u/a on patient. Patient reports her urine being cloudy yellow with no odor and no burning with urination. Patient has not started taking the Augmentin because she thinks she needs a u/a before starting this second round of antibiotics. Please call and advise patient.     Thank you for your assistance,   Magali Valenzuela RN [facility]

## 2018-05-11 NOTE — PROGRESS NOTES
05/11/18-Summary-  Patient called back after receiving message. Patient had her follow up appointment with Dr. Ruano on 05/08/18 and her incision from her appendectomy is healed. Dr. Ruano gave patient another RX for a UTI. Patient was given the RX augmentin but has not started taking it. Patient thinks that she needs a u/a before she starts taking the antibiotic. Patient's urine is cloudy with no odor and no burning upon urination. Patient is asking for a message to be sent to Dr. Dolan. Patient will come for an office visit if needed. Patient received all the educational material mailed to her. Went over signs and symptoms of UTI with patient.       INTERVENTIONS-  Message to Dr. Dolan's office        PLAN-  1. UTI-continue education.  2. Follow up in two weeks.

## 2018-05-14 ENCOUNTER — LAB VISIT (OUTPATIENT)
Dept: LAB | Facility: HOSPITAL | Age: 73
End: 2018-05-14
Attending: FAMILY MEDICINE
Payer: MEDICARE

## 2018-05-14 DIAGNOSIS — R82.90 CLOUDY URINE: ICD-10-CM

## 2018-05-14 LAB
BILIRUB UR QL STRIP: NEGATIVE
CLARITY UR: CLEAR
COLOR UR: YELLOW
GLUCOSE UR QL STRIP: NEGATIVE
HGB UR QL STRIP: ABNORMAL
KETONES UR QL STRIP: NEGATIVE
LEUKOCYTE ESTERASE UR QL STRIP: ABNORMAL
MICROSCOPIC COMMENT: NORMAL
NITRITE UR QL STRIP: NEGATIVE
PH UR STRIP: 7 [PH] (ref 5–8)
PROT UR QL STRIP: NEGATIVE
RBC #/AREA URNS AUTO: 1 /HPF (ref 0–4)
SP GR UR STRIP: 1.01 (ref 1–1.03)
URN SPEC COLLECT METH UR: ABNORMAL
UROBILINOGEN UR STRIP-ACNC: NEGATIVE EU/DL
WBC #/AREA URNS AUTO: 0 /HPF (ref 0–5)

## 2018-05-14 PROCEDURE — 87086 URINE CULTURE/COLONY COUNT: CPT

## 2018-05-14 PROCEDURE — 81000 URINALYSIS NONAUTO W/SCOPE: CPT | Mod: PO

## 2018-05-15 LAB — BACTERIA UR CULT: NO GROWTH

## 2018-05-15 NOTE — TELEPHONE ENCOUNTER
Dip ua was weak, microscopic is a definite negative/normal.  No sign of uti.  Culture will take a couple of days but it does not look like she needs antibiotics at this time

## 2018-05-22 ENCOUNTER — OUTPATIENT CASE MANAGEMENT (OUTPATIENT)
Dept: ADMINISTRATIVE | Facility: OTHER | Age: 73
End: 2018-05-22

## 2018-05-22 NOTE — PROGRESS NOTES
05/22/18-Attempt to follow up with  patient for outpatient case management. No answer. Left message requesting call back.             -

## 2018-05-29 ENCOUNTER — OUTPATIENT CASE MANAGEMENT (OUTPATIENT)
Dept: ADMINISTRATIVE | Facility: OTHER | Age: 73
End: 2018-05-29

## 2018-05-29 NOTE — PROGRESS NOTES
05/29/18-Called and spoke to patient. Patient has a preventive medicine for her UTI. Patient knows the signs and symptoms of a UTI and when to call the doctor if needed. Will close patient and dis-enroll from [facility].

## 2018-06-18 ENCOUNTER — HOSPITAL ENCOUNTER (OUTPATIENT)
Dept: RADIOLOGY | Facility: HOSPITAL | Age: 73
Discharge: HOME | End: 2018-06-18
Attending: INTERNAL MEDICINE
Payer: MEDICARE

## 2018-06-18 DIAGNOSIS — Z85.3 HISTORY OF BREAST CANCER: ICD-10-CM

## 2018-06-18 DIAGNOSIS — D69.3 CHRONIC ITP (IDIOPATHIC THROMBOCYTOPENIA): ICD-10-CM

## 2018-06-18 DIAGNOSIS — M81.8 OSTEOPOROSIS DUE TO AROMATASE INHIBITOR: ICD-10-CM

## 2018-06-18 DIAGNOSIS — T38.6X5A OSTEOPOROSIS DUE TO AROMATASE INHIBITOR: ICD-10-CM

## 2018-06-18 PROCEDURE — 77080 DXA BONE DENSITY AXIAL: CPT | Mod: 26,,, | Performed by: RADIOLOGY

## 2018-06-18 PROCEDURE — 77080 DXA BONE DENSITY AXIAL: CPT | Mod: TC

## 2018-06-20 ENCOUNTER — OFFICE VISIT (OUTPATIENT)
Dept: HEMATOLOGY/ONCOLOGY | Facility: CLINIC | Age: 73
End: 2018-06-20
Payer: MEDICARE

## 2018-06-20 VITALS
RESPIRATION RATE: 24 BRPM | WEIGHT: 141.56 LBS | BODY MASS INDEX: 23.58 KG/M2 | TEMPERATURE: 98 F | SYSTOLIC BLOOD PRESSURE: 100 MMHG | DIASTOLIC BLOOD PRESSURE: 49 MMHG | HEART RATE: 54 BPM | HEIGHT: 65 IN

## 2018-06-20 DIAGNOSIS — E02 SUBCLINICAL IODINE-DEFICIENCY HYPOTHYROIDISM: ICD-10-CM

## 2018-06-20 DIAGNOSIS — D69.6 THROMBOCYTOPENIA: ICD-10-CM

## 2018-06-20 DIAGNOSIS — M85.80 OSTEOPENIA, UNSPECIFIED LOCATION: ICD-10-CM

## 2018-06-20 DIAGNOSIS — C50.011 MALIGNANT NEOPLASM OF NIPPLE OF RIGHT BREAST IN FEMALE, UNSPECIFIED ESTROGEN RECEPTOR STATUS: Primary | ICD-10-CM

## 2018-06-20 PROCEDURE — 99214 OFFICE O/P EST MOD 30 MIN: CPT | Mod: S$PBB,,, | Performed by: INTERNAL MEDICINE

## 2018-06-20 PROCEDURE — 99999 PR PBB SHADOW E&M-EST. PATIENT-LVL IV: CPT | Mod: PBBFAC,,, | Performed by: INTERNAL MEDICINE

## 2018-06-20 PROCEDURE — 99214 OFFICE O/P EST MOD 30 MIN: CPT | Mod: PBBFAC,PO | Performed by: INTERNAL MEDICINE

## 2018-06-20 RX ORDER — ANASTROZOLE 1 MG/1
1 TABLET ORAL DAILY
Qty: 90 TABLET | Refills: 5 | Status: SHIPPED | OUTPATIENT
Start: 2018-06-20 | End: 2019-09-02 | Stop reason: SDUPTHER

## 2018-06-20 RX ORDER — IBANDRONATE SODIUM 150 MG/1
150 TABLET, FILM COATED ORAL
Qty: 1 TABLET | Refills: 11 | Status: SHIPPED | OUTPATIENT
Start: 2018-06-20 | End: 2018-06-28 | Stop reason: SDUPTHER

## 2018-06-20 NOTE — PROGRESS NOTES
Subjective:       Patient ID: Yuniel Gracia is a 72 y.o. female.    Chief Complaint:breast ca  HPI:   Patient coming in for followup. She has a chronic ITP and hep C, history of    stage I breast cancer,rec score of 32 , so underwent TC  chemotherapy. She did notice a new mass in the rt breast that came back without issue, On arimidex which will complete 5 years in 9/2017/ Mild thrombocytopenia related to rx and hep c   doesn't want prolia has been off for about 1 year now , doesn't want it anymore, worried about side effects      REVIEW OF SYSTEMS:     CONSTITUTIONAL: The patient denies any weight change. There is no apparent    change in appetite, fever, night sweats, headaches, fatigue, dizziness, or    weakness.      SKIN: Denies rash, issues with nails, non-healing sores, bleeding, blotching    skin or abnormal bruising. Denies new moles or changes to existing moles.      BREASTS: There is no swelling around breasts or nipple discharge.    EYES: Denies eye pain, blurred vision, swelling, redness or discharge.      ENT AND MOUTH: Denies runny nose, stuffiness, sinus trouble or sores. Denies    nosebleeds. Denies, hoarseness, change in voice or swelling in front of the    neck.      CARDIOVASCULAR: Denies chest pain, discomfort or palpitations. Denies neck    swelling or episodes of passing out.      RESPIRATORY: Denies cough, sputum production, blood in sputum, and denies    shortness of breath.      GI: Denies trouble swallowing, indigestion, heartburn, abdominal pain, nausea,    vomiting, diarrhea, altered bowel habits, blood in stool, discoloration of    stools, change in nature of stool, bloating, increased abdominal girth.      GENITOURINARY: No discharge. No pelvic pain or lumps. No rash around groin or  lesions. No urinary frequency, hesitation, painful urination or blood in    urine. Denies incontinence. No problems with intercourse.      MUSCULOSKELETAL: Denies neck or back pain. Denies weakness  in arms or legs,    joint problems or distended inflamed veins in legs. Denies swelling or abnormal  glands.      NEUROLOGICAL: Denies tingling, numbness, altered mentation changes to nerve    function in the face, weakness to one or both of the body. Denies changes to    gait and denies multiple falls or accidents.      PSYCHIATRIC: Denies nervousness, anxiety, hallucinations, depression, suicidal    ideation, trouble sleeping or changes in behavior noticed by family.      The patient denies recent foreign travel or recent exposure to chemicals or    products of concern or infectious diseases.     PHYSICAL EXAM:     Wt Readings from Last 3 Encounters:   06/20/18 64.2 kg (141 lb 8.6 oz)   05/08/18 63.9 kg (140 lb 14 oz)   04/22/18 63.6 kg (140 lb 3.4 oz)     Temp Readings from Last 3 Encounters:   06/20/18 97.8 °F (36.6 °C)   05/08/18 98.2 °F (36.8 °C) (Oral)   04/22/18 98.4 °F (36.9 °C) (Oral)     BP Readings from Last 3 Encounters:   06/20/18 (!) 100/49   05/08/18 (!) 116/54   04/22/18 (!) 98/50     Pulse Readings from Last 3 Encounters:   06/20/18 (!) 54   05/08/18 (!) 58   04/22/18 61       GENERAL: Comfortable looking patient. Patient is in no distress.  Awake, alert and oriented to time, person and place.  No anxiety, or agitation.      HEENT: Normal conjunctivae and eyelids. WNL.  PERRLA 3 to 4 mm. No icterus, no pallor, no congestion, and no discharge noted.     NECK:  Supple. Trachea is central.  No crepitus.  No JVD or masses.    RESPIRATORY:  No intercostal retractions.  No dullness to percussion.  Chest is clear to auscultation.  No rales, rhonchi or wheezes.  No crepitus.  Good air entry bilaterally.    CARDIOVASCULAR:  S1 and S2 are normally heard without murmurs or gallops.  All peripheral pulses are present.    ABDOMEN:  Normal abdomen.  No hepatosplenomegaly.  No free fluid.  Bowel sounds are present.  No hernia noted. No masses.  No rebound or tenderness.  No guarding or rigidity.  Umbilicus is  midline.    LYMPHATICS:  No axillary, cervical, supraclavicular, submental, or inguinal lymphadenopathy.    SKIN/MUSCULOSKELETAL:  There is no evidence of excoriation marks or ecchmosis.  No rashes.  No cyanosis.  No clubbing.  No joint or skeletal deformities noted.  Normal range of motion.    NEUROLOGIC:  Higher functions are appropriate.  No cranial nerve deficits.  Normal ludmila.  Normal strength.  Motor and sensory functions are normal.  Deep tendon reflexes are normal.    GENITAL/RECTAL:  Exams are deferred.      Laboratory:     CBC:  Lab Results   Component Value Date    WBC 7.80 04/22/2018    RBC 4.01 04/22/2018    HGB 11.4 (L) 04/22/2018    HCT 35.3 (L) 04/22/2018    MCV 88 04/22/2018    MCH 28.4 04/22/2018    MCHC 32.3 04/22/2018    RDW 16.0 (H) 04/22/2018     (L) 04/22/2018    MPV 7.7 (L) 04/22/2018    GRAN 6.4 04/22/2018    GRAN 82.2 (H) 04/22/2018    LYMPH 0.9 (L) 04/22/2018    LYMPH 11.7 (L) 04/22/2018    MONO 0.4 04/22/2018    MONO 5.6 04/22/2018    EOS 0.0 04/22/2018    BASO 0.00 04/22/2018    EOSINOPHIL 0.5 04/22/2018    BASOPHIL 0.0 04/22/2018       BMP: BMP  Lab Results   Component Value Date     04/22/2018    K 3.8 04/22/2018     04/22/2018    CO2 23 04/22/2018    BUN 12 04/22/2018    CREATININE 0.8 04/22/2018    CALCIUM 9.2 04/22/2018    ANIONGAP 12 04/22/2018    ESTGFRAFRICA >60 04/22/2018    EGFRNONAA >60 04/22/2018       LFT:   Lab Results   Component Value Date    ALT 11 04/22/2018    AST 21 04/22/2018    ALKPHOS 80 04/22/2018    BILITOT 0.4 04/22/2018 12/2017mammogram neg:   Most recent scan 2015 neg    Assessment/Plan:       Stage 1 breast ca  4. HTN, dyslipedemia, hypothyroidism, depression , contimue care with Dr. Dolan  cxr  For staging , had appendicitis and had emergency sx  Gave pt restoril rx for insomnia. generic  Breast ca: cont arimidex  For a total of 10 years  6 months with cbc, cmpshe completes 10 years of arimidex in 9/2022   stop prolia, start  boniva     thrombocytopenia cont to monitor,   due for mammo in 12/18

## 2018-06-22 DIAGNOSIS — C50.919 MALIGNANT NEOPLASM OF FEMALE BREAST, UNSPECIFIED ESTROGEN RECEPTOR STATUS, UNSPECIFIED LATERALITY, UNSPECIFIED SITE OF BREAST: Primary | ICD-10-CM

## 2018-06-28 DIAGNOSIS — M85.80 OSTEOPENIA, UNSPECIFIED LOCATION: ICD-10-CM

## 2018-06-28 DIAGNOSIS — F19.982 DRUG INDUCED INSOMNIA: ICD-10-CM

## 2018-06-28 RX ORDER — TEMAZEPAM 15 MG/1
15 CAPSULE ORAL NIGHTLY
Qty: 60 CAPSULE | Refills: 0 | Status: SHIPPED | OUTPATIENT
Start: 2018-06-28 | End: 2018-08-28 | Stop reason: SDUPTHER

## 2018-06-28 RX ORDER — IBANDRONATE SODIUM 150 MG/1
150 TABLET, FILM COATED ORAL
Qty: 3 TABLET | Refills: 3 | Status: SHIPPED | OUTPATIENT
Start: 2018-06-28 | End: 2018-11-12

## 2018-07-10 ENCOUNTER — PATIENT MESSAGE (OUTPATIENT)
Dept: UROLOGY | Facility: CLINIC | Age: 73
End: 2018-07-10

## 2018-07-10 NOTE — TELEPHONE ENCOUNTER
Spoke to pt and booked her an apt for next week. Pt has urinary incontinence and is on vacation right now.

## 2018-07-18 ENCOUNTER — OFFICE VISIT (OUTPATIENT)
Dept: UROLOGY | Facility: CLINIC | Age: 73
End: 2018-07-18
Payer: MEDICARE

## 2018-07-18 VITALS
HEIGHT: 65 IN | BODY MASS INDEX: 23.88 KG/M2 | WEIGHT: 143.31 LBS | DIASTOLIC BLOOD PRESSURE: 69 MMHG | SYSTOLIC BLOOD PRESSURE: 120 MMHG | HEART RATE: 51 BPM

## 2018-07-18 DIAGNOSIS — N30.00 ACUTE CYSTITIS WITHOUT HEMATURIA: ICD-10-CM

## 2018-07-18 DIAGNOSIS — R32 URINARY INCONTINENCE, UNSPECIFIED TYPE: Primary | ICD-10-CM

## 2018-07-18 LAB
BILIRUB SERPL-MCNC: NORMAL MG/DL
BLOOD URINE, POC: NORMAL
COLOR, POC UA: NORMAL
GLUCOSE UR QL STRIP: NORMAL
KETONES UR QL STRIP: NORMAL
LEUKOCYTE ESTERASE URINE, POC: NORMAL
NITRITE, POC UA: NORMAL
PH, POC UA: 8.5
PROTEIN, POC: NORMAL
SPECIFIC GRAVITY, POC UA: 1.01
UROBILINOGEN, POC UA: NORMAL

## 2018-07-18 PROCEDURE — 99213 OFFICE O/P EST LOW 20 MIN: CPT | Mod: PBBFAC,PO | Performed by: UROLOGY

## 2018-07-18 PROCEDURE — 87186 SC STD MICRODIL/AGAR DIL: CPT

## 2018-07-18 PROCEDURE — 87077 CULTURE AEROBIC IDENTIFY: CPT

## 2018-07-18 PROCEDURE — 99999 PR PBB SHADOW E&M-EST. PATIENT-LVL III: CPT | Mod: PBBFAC,,, | Performed by: UROLOGY

## 2018-07-18 PROCEDURE — 87088 URINE BACTERIA CULTURE: CPT

## 2018-07-18 PROCEDURE — 99204 OFFICE O/P NEW MOD 45 MIN: CPT | Mod: S$PBB,,, | Performed by: UROLOGY

## 2018-07-18 PROCEDURE — 87086 URINE CULTURE/COLONY COUNT: CPT

## 2018-07-18 PROCEDURE — 81002 URINALYSIS NONAUTO W/O SCOPE: CPT | Mod: PBBFAC,PO | Performed by: UROLOGY

## 2018-07-18 RX ORDER — AMOXICILLIN AND CLAVULANATE POTASSIUM 875; 125 MG/1; MG/1
1 TABLET, FILM COATED ORAL 2 TIMES DAILY
Qty: 10 TABLET | Refills: 0 | Status: SHIPPED | OUTPATIENT
Start: 2018-07-18 | End: 2018-07-23

## 2018-07-18 NOTE — PROGRESS NOTES
UROLOGY [place]  7 18 18    Cc urinary incontinence    Age 73, comes in today with concern that she started 3 weeks ago with sudden-onset urgency with urge incontinence. Having at the time dysuria, urinary frequency, no fever. Because pt was on a vacation, she did not seek medical care where she was. Comes here to be seen for these problems, but overall they have started improving on their own.     Pt has hx of overactive bladder, and uses vesicare 5 qd x 4 years and it helped a lot.         PAST MEDICAL HISTORY:    SURGICAL: Hysterectomy, brain surgery for temporal neurologia done in [place] in 2007, breast lumpectomy last year for breast cancer and had chemotherapy and radiotherapy, bladder sling by Dr. Giles 5 years ago for stress incontinence.    MEDICAL: Hepatitis C, peptic ulcer, osteoarthritis, chronic constipation,   insomnia, hypothyroidism, osteoarthritis.    FAMILIAL: No family history of urinary malignancy, mother with kidney stones.    SOCIAL: The patient is ,  is here. They live in [place].    ALLERGIES: Codeine.    Current Outpatient Prescriptions on File Prior to Visit   Medication Sig Dispense Refill    amlodipine (NORVASC) 2.5 MG tablet Take 2.5 mg by mouth once daily.       anastrozole (ARIMIDEX) 1 mg Tab Take 1 tablet (1 mg total) by mouth  90 tablet 5    CALCIUM CARBONATE (CALCIUM 600 ORAL) Take 1 tablet by mouth once daily.      CALCIUM CARBONATE/VITAMIN D3 (VITAMIN D-3 ORAL) Take 400 Units by mouth daily as needed.      dorzolamide-timolol 2-0.5% (COSOPT) 22.3-6.8 mg/mL ophthalmic solution Place 1 drop into both eyes 2 (two) times daily.       ibandronate (BONIVA) 150 mg tablet Take 1 tablet (150 mg total) by m 3 tablet 3    levothyroxine (SYNTHROID)  TAKE 1 TABLET  90 tablet 2    pravastatin (PRAVACHOL) TAKE 1 TABLET  90 tablet 3    sertraline (ZOLOFT) 100 TAKE 1 TA 90 tablet 1    temazepam (RESTORIL) 15 mg Cap Take 1 capsule (15 mg total) by mouth  60 capsule 0     traMADol (ULTRAM) 50 mg tablet 1-2 tablets every 8 hours as needed for pain 60 tablet 2    traZODone (DESYREL) 50 MG tablet Take 1 tablet (50 mg total) 30 tablet 1    UNABLE TO FIND Take 1 c      VESICARE 5 mg tablet TAKE 1 TABLET DAILY 90 tablet 2     REVIEW OF SYSTEMS  GENERAL: No headaches or dizzy spells.   HEENT: vision preserved. Sinuses: No complaints.   CARDIOPULMONARY: No swelling of the legs; no chest pain. No shortness of breath, no wheezing.   GASTROINTESTINAL: No heartburn. Denies diarrhea; has severe constipation, no blood or mucus in stools.   GENITOURINARY: having urgency and incontinence. Denies dysuria, bleeding.   MUSCULOSKELETAL: minimal arthritic complaints such as pain or stiffness.   PSYCHIATRIC: No history of depression or anxiety.   ENDOCRINOLOGIC: No reports of sweating, cold or heat intolerance. No polyuria or polydipsia.   NEUROLOGICAL: No headache, dizziness, syncope, paralysis  LYMPHATICS: No enlarged nodes. No history of splenectomy.  ==     PHYSICAL EXAMINATION:  ABDOMEN: Flat. No masses. CVAs negative. No organomegaly or tenderness.     IMPRESSION:  Sudden onset bladder irritation symptoms, with urinary frequency and dysuria. Probably uti. I am sending a urine culture to the lab. The sediment appeared microscopically to have cocci in great numbers.  I am treating her with augmentin empirically    overactive bladder, can stay on vesicare as needed.      RTC as needed

## 2018-07-19 ENCOUNTER — PATIENT MESSAGE (OUTPATIENT)
Dept: HEMATOLOGY/ONCOLOGY | Facility: CLINIC | Age: 73
End: 2018-07-19

## 2018-07-19 ENCOUNTER — TELEPHONE (OUTPATIENT)
Dept: HEMATOLOGY/ONCOLOGY | Facility: CLINIC | Age: 73
End: 2018-07-19

## 2018-07-19 NOTE — TELEPHONE ENCOUNTER
----- Message from Sherrie Almanza MD sent at 7/19/2018  9:01 AM CDT -----  She can start prolia at her next visit so she can keep 6 month followups at the same time . Order is in if she wants to start sooner but that may end up with more visits. She will need cmp for it

## 2018-07-21 LAB — BACTERIA UR CULT: NORMAL

## 2018-07-22 PROCEDURE — 96376 TX/PRO/DX INJ SAME DRUG ADON: CPT

## 2018-07-23 ENCOUNTER — TELEPHONE (OUTPATIENT)
Dept: SURGERY | Facility: HOSPITAL | Age: 73
End: 2018-07-23

## 2018-07-23 DIAGNOSIS — N30.00 ACUTE CYSTITIS WITHOUT HEMATURIA: Primary | ICD-10-CM

## 2018-07-23 RX ORDER — CIPROFLOXACIN 500 MG/1
500 TABLET ORAL 2 TIMES DAILY
Qty: 14 TABLET | Refills: 0 | Status: SHIPPED | OUTPATIENT
Start: 2018-07-23 | End: 2018-07-30

## 2018-07-23 NOTE — TELEPHONE ENCOUNTER
I am putting her on cipro for one week, since our choice of antibiotic on her last visit turned out not to be ideal

## 2018-07-24 DIAGNOSIS — Z79.811 USE OF AROMATASE INHIBITORS: ICD-10-CM

## 2018-08-05 ENCOUNTER — LAB VISIT (OUTPATIENT)
Dept: LAB | Facility: HOSPITAL | Age: 73
End: 2018-08-05
Attending: INTERNAL MEDICINE
Payer: MEDICARE

## 2018-08-05 DIAGNOSIS — C50.919 MALIGNANT NEOPLASM OF FEMALE BREAST, UNSPECIFIED ESTROGEN RECEPTOR STATUS, UNSPECIFIED LATERALITY, UNSPECIFIED SITE OF BREAST: ICD-10-CM

## 2018-08-05 LAB
ALBUMIN SERPL BCP-MCNC: 3.6 G/DL
ALP SERPL-CCNC: 98 U/L
ALT SERPL W/O P-5'-P-CCNC: 35 U/L
ANION GAP SERPL CALC-SCNC: 7 MMOL/L
AST SERPL-CCNC: 58 U/L
BASOPHILS # BLD AUTO: 0 K/UL
BASOPHILS NFR BLD: 0.6 %
BILIRUB SERPL-MCNC: 0.5 MG/DL
BUN SERPL-MCNC: 15 MG/DL
CALCIUM SERPL-MCNC: 9.6 MG/DL
CHLORIDE SERPL-SCNC: 102 MMOL/L
CO2 SERPL-SCNC: 29 MMOL/L
CREAT SERPL-MCNC: 0.8 MG/DL
DIFFERENTIAL METHOD: ABNORMAL
EOSINOPHIL # BLD AUTO: 0.4 K/UL
EOSINOPHIL NFR BLD: 8.4 %
ERYTHROCYTE [DISTWIDTH] IN BLOOD BY AUTOMATED COUNT: 18.3 %
EST. GFR  (AFRICAN AMERICAN): >60 ML/MIN/1.73 M^2
EST. GFR  (NON AFRICAN AMERICAN): >60 ML/MIN/1.73 M^2
GLUCOSE SERPL-MCNC: 107 MG/DL
HCT VFR BLD AUTO: 38.4 %
HGB BLD-MCNC: 12.5 G/DL
LYMPHOCYTES # BLD AUTO: 1.4 K/UL
LYMPHOCYTES NFR BLD: 29.2 %
MCH RBC QN AUTO: 28.6 PG
MCHC RBC AUTO-ENTMCNC: 32.5 G/DL
MCV RBC AUTO: 88 FL
MONOCYTES # BLD AUTO: 0.4 K/UL
MONOCYTES NFR BLD: 9.1 %
NEUTROPHILS # BLD AUTO: 2.5 K/UL
NEUTROPHILS NFR BLD: 52.7 %
PLATELET # BLD AUTO: 165 K/UL
PMV BLD AUTO: 7.3 FL
POTASSIUM SERPL-SCNC: 4.4 MMOL/L
PROT SERPL-MCNC: 7 G/DL
RBC # BLD AUTO: 4.37 M/UL
SODIUM SERPL-SCNC: 138 MMOL/L
WBC # BLD AUTO: 4.7 K/UL

## 2018-08-05 PROCEDURE — 36415 COLL VENOUS BLD VENIPUNCTURE: CPT

## 2018-08-05 PROCEDURE — 80053 COMPREHEN METABOLIC PANEL: CPT

## 2018-08-05 PROCEDURE — 85025 COMPLETE CBC W/AUTO DIFF WBC: CPT

## 2018-08-06 ENCOUNTER — OFFICE VISIT (OUTPATIENT)
Dept: HEMATOLOGY/ONCOLOGY | Facility: CLINIC | Age: 73
End: 2018-08-06
Payer: MEDICARE

## 2018-08-06 VITALS
WEIGHT: 145.06 LBS | DIASTOLIC BLOOD PRESSURE: 51 MMHG | BODY MASS INDEX: 24.17 KG/M2 | SYSTOLIC BLOOD PRESSURE: 108 MMHG | TEMPERATURE: 98 F | RESPIRATION RATE: 18 BRPM | HEIGHT: 65 IN | HEART RATE: 49 BPM

## 2018-08-06 DIAGNOSIS — C50.011 MALIGNANT NEOPLASM OF NIPPLE OF RIGHT BREAST IN FEMALE, UNSPECIFIED ESTROGEN RECEPTOR STATUS: Primary | ICD-10-CM

## 2018-08-06 DIAGNOSIS — Z79.811 USE OF AROMATASE INHIBITORS: ICD-10-CM

## 2018-08-06 DIAGNOSIS — T38.6X5A OSTEOPOROSIS DUE TO AROMATASE INHIBITOR: ICD-10-CM

## 2018-08-06 DIAGNOSIS — M81.8 OSTEOPOROSIS DUE TO AROMATASE INHIBITOR: ICD-10-CM

## 2018-08-06 PROCEDURE — 99214 OFFICE O/P EST MOD 30 MIN: CPT | Mod: PBBFAC,PO | Performed by: INTERNAL MEDICINE

## 2018-08-06 PROCEDURE — 99214 OFFICE O/P EST MOD 30 MIN: CPT | Mod: S$PBB,,, | Performed by: INTERNAL MEDICINE

## 2018-08-06 PROCEDURE — 99999 PR PBB SHADOW E&M-EST. PATIENT-LVL IV: CPT | Mod: PBBFAC,,, | Performed by: INTERNAL MEDICINE

## 2018-08-06 NOTE — PROGRESS NOTES
Subjective:       Patient ID: Yuniel Gracia is a 73 y.o. female.    Chief Complaint:breast ca  HPI:   Patient coming in for followup. She has a chronic ITP and hep C, history of    stage I breast cancer,rec score of 32 , so underwent TC  chemotherapy. She did notice a new mass in the rt breast that came back without issue, On arimidex which will complete 5 years in 9/2017/ Mild thrombocytopenia related to rx and hep c   has decidedc to get with prolia and she is here to start    REVIEW OF SYSTEMS:     CONSTITUTIONAL: The patient denies any weight change. There is no apparent    change in appetite, fever, night sweats, headaches, fatigue, dizziness, or    weakness.      SKIN: Denies rash, issues with nails, non-healing sores, bleeding, blotching    skin or abnormal bruising. Denies new moles or changes to existing moles.      BREASTS: There is no swelling around breasts or nipple discharge.    EYES: Denies eye pain, blurred vision, swelling, redness or discharge.      ENT AND MOUTH: Denies runny nose, stuffiness, sinus trouble or sores. Denies    nosebleeds. Denies, hoarseness, change in voice or swelling in front of the    neck.      CARDIOVASCULAR: Denies chest pain, discomfort or palpitations. Denies neck    swelling or episodes of passing out.      RESPIRATORY: Denies cough, sputum production, blood in sputum, and denies    shortness of breath.      GI: Denies trouble swallowing, indigestion, heartburn, abdominal pain, nausea,    vomiting, diarrhea, altered bowel habits, blood in stool, discoloration of    stools, change in nature of stool, bloating, increased abdominal girth.      GENITOURINARY: No discharge. No pelvic pain or lumps. No rash around groin or  lesions. No urinary frequency, hesitation, painful urination or blood in    urine. Denies incontinence. No problems with intercourse.      MUSCULOSKELETAL: Denies neck or back pain. Denies weakness in arms or legs,    joint problems or distended  inflamed veins in legs. Denies swelling or abnormal  glands.      NEUROLOGICAL: Denies tingling, numbness, altered mentation changes to nerve    function in the face, weakness to one or both of the body. Denies changes to    gait and denies multiple falls or accidents.      PSYCHIATRIC: Denies nervousness, anxiety, hallucinations, depression, suicidal    ideation, trouble sleeping or changes in behavior noticed by family.      The patient denies recent foreign travel or recent exposure to chemicals or    products of concern or infectious diseases.     PHYSICAL EXAM:     Wt Readings from Last 3 Encounters:   08/06/18 65.8 kg (145 lb 1 oz)   07/18/18 65 kg (143 lb 4.8 oz)   06/20/18 64.2 kg (141 lb 8.6 oz)     Temp Readings from Last 3 Encounters:   08/06/18 97.7 °F (36.5 °C)   06/20/18 97.8 °F (36.6 °C)   05/08/18 98.2 °F (36.8 °C) (Oral)     BP Readings from Last 3 Encounters:   08/06/18 (!) 108/51   07/18/18 120/69   06/20/18 (!) 100/49     Pulse Readings from Last 3 Encounters:   08/06/18 (!) 49   07/18/18 (!) 51   06/20/18 (!) 54       GENERAL: Comfortable looking patient. Patient is in no distress.  Awake, alert and oriented to time, person and place.  No anxiety, or agitation.      HEENT: Normal conjunctivae and eyelids. WNL.  PERRLA 3 to 4 mm. No icterus, no pallor, no congestion, and no discharge noted.     NECK:  Supple. Trachea is central.  No crepitus.  No JVD or masses.    RESPIRATORY:  No intercostal retractions.  No dullness to percussion.  Chest is clear to auscultation.  No rales, rhonchi or wheezes.  No crepitus.  Good air entry bilaterally.    CARDIOVASCULAR:  S1 and S2 are normally heard without murmurs or gallops.  All peripheral pulses are present.    ABDOMEN:  Normal abdomen.  No hepatosplenomegaly.  No free fluid.  Bowel sounds are present.  No hernia noted. No masses.  No rebound or tenderness.  No guarding or rigidity.  Umbilicus is midline.    LYMPHATICS:  No axillary, cervical,  supraclavicular, submental, or inguinal lymphadenopathy.    SKIN/MUSCULOSKELETAL:  There is no evidence of excoriation marks or ecchmosis.  No rashes.  No cyanosis.  No clubbing.  No joint or skeletal deformities noted.  Normal range of motion.    NEUROLOGIC:  Higher functions are appropriate.  No cranial nerve deficits.  Normal ludmila.  Normal strength.  Motor and sensory functions are normal.  Deep tendon reflexes are normal.    GENITAL/RECTAL:  Exams are deferred.      Laboratory:     CBC:  Lab Results   Component Value Date    WBC 4.70 08/05/2018    RBC 4.37 08/05/2018    HGB 12.5 08/05/2018    HCT 38.4 08/05/2018    MCV 88 08/05/2018    MCH 28.6 08/05/2018    MCHC 32.5 08/05/2018    RDW 18.3 (H) 08/05/2018     08/05/2018    MPV 7.3 (L) 08/05/2018    GRAN 2.5 08/05/2018    GRAN 52.7 08/05/2018    LYMPH 1.4 08/05/2018    LYMPH 29.2 08/05/2018    MONO 0.4 08/05/2018    MONO 9.1 08/05/2018    EOS 0.4 08/05/2018    BASO 0.00 08/05/2018    EOSINOPHIL 8.4 (H) 08/05/2018    BASOPHIL 0.6 08/05/2018       BMP: BMP  Lab Results   Component Value Date     08/05/2018    K 4.4 08/05/2018     08/05/2018    CO2 29 08/05/2018    BUN 15 08/05/2018    CREATININE 0.8 08/05/2018    CALCIUM 9.6 08/05/2018    ANIONGAP 7 (L) 08/05/2018    ESTGFRAFRICA >60 08/05/2018    EGFRNONAA >60 08/05/2018       LFT:   Lab Results   Component Value Date    ALT 35 08/05/2018    AST 58 (H) 08/05/2018    ALKPHOS 98 08/05/2018    BILITOT 0.5 08/05/2018 12/2017mammogram neg:   Most recent scan 2015 neg    Assessment/Plan:       Stage 1 breast ca  4. HTN, dyslipedemia, hypothyroidism, depression , contimue care with Dr. Dolan  cxr  For staging , had appendicitis and had emergency sx  Gave pt restoril rx for insomnia. generic  Breast ca: cont arimidex  For a total of 10 years  6 months with cbc, cmpshe completes 10 years of arimidex in 9/2022       thrombocytopenia cont to monitor,   due for mammo in 12/18   resume prolia today

## 2018-08-28 DIAGNOSIS — F19.982 DRUG INDUCED INSOMNIA: ICD-10-CM

## 2018-08-28 RX ORDER — TEMAZEPAM 15 MG/1
15 CAPSULE ORAL NIGHTLY
Qty: 60 CAPSULE | Refills: 0 | Status: SHIPPED | OUTPATIENT
Start: 2018-08-28 | End: 2018-12-11 | Stop reason: SDUPTHER

## 2018-09-04 ENCOUNTER — LAB VISIT (OUTPATIENT)
Dept: LAB | Facility: HOSPITAL | Age: 73
End: 2018-09-04
Attending: INTERNAL MEDICINE
Payer: MEDICARE

## 2018-09-04 DIAGNOSIS — Z79.899 NEED FOR PROPHYLACTIC CHEMOTHERAPY: ICD-10-CM

## 2018-09-04 DIAGNOSIS — E78.5 HYPERLIPEMIA: Primary | ICD-10-CM

## 2018-09-04 LAB
ALBUMIN SERPL BCP-MCNC: 3.5 G/DL
ALP SERPL-CCNC: 85 U/L
ALT SERPL W/O P-5'-P-CCNC: 26 U/L
ANION GAP SERPL CALC-SCNC: 6 MMOL/L
AST SERPL-CCNC: 42 U/L
BILIRUB SERPL-MCNC: 0.5 MG/DL
BUN SERPL-MCNC: 14 MG/DL
CALCIUM SERPL-MCNC: 8.7 MG/DL
CHLORIDE SERPL-SCNC: 107 MMOL/L
CHOLEST SERPL-MCNC: 173 MG/DL
CHOLEST/HDLC SERPL: 3.7 {RATIO}
CO2 SERPL-SCNC: 28 MMOL/L
CREAT SERPL-MCNC: 0.8 MG/DL
EST. GFR  (AFRICAN AMERICAN): >60 ML/MIN/1.73 M^2
EST. GFR  (NON AFRICAN AMERICAN): >60 ML/MIN/1.73 M^2
GLUCOSE SERPL-MCNC: 106 MG/DL
HDLC SERPL-MCNC: 47 MG/DL
HDLC SERPL: 27.2 %
LDLC SERPL CALC-MCNC: 103.2 MG/DL
NONHDLC SERPL-MCNC: 126 MG/DL
POTASSIUM SERPL-SCNC: 4.5 MMOL/L
PROT SERPL-MCNC: 7 G/DL
SODIUM SERPL-SCNC: 141 MMOL/L
TRIGL SERPL-MCNC: 114 MG/DL

## 2018-09-04 PROCEDURE — 36415 COLL VENOUS BLD VENIPUNCTURE: CPT | Mod: PO

## 2018-09-04 PROCEDURE — 80061 LIPID PANEL: CPT

## 2018-09-04 PROCEDURE — 80053 COMPREHEN METABOLIC PANEL: CPT

## 2018-09-13 DIAGNOSIS — E03.9 ACQUIRED HYPOTHYROIDISM: ICD-10-CM

## 2018-09-13 DIAGNOSIS — N32.81 OVERACTIVE BLADDER: ICD-10-CM

## 2018-09-13 RX ORDER — SERTRALINE HYDROCHLORIDE 100 MG/1
TABLET, FILM COATED ORAL
Qty: 90 TABLET | Refills: 1 | Status: SHIPPED | OUTPATIENT
Start: 2018-09-13 | End: 2019-02-21 | Stop reason: SDUPTHER

## 2018-09-13 RX ORDER — LEVOTHYROXINE SODIUM 75 UG/1
TABLET ORAL
Qty: 90 TABLET | Refills: 2 | Status: SHIPPED | OUTPATIENT
Start: 2018-09-13 | End: 2019-07-05 | Stop reason: SDUPTHER

## 2018-09-13 RX ORDER — SOLIFENACIN SUCCINATE 5 MG/1
5 TABLET, FILM COATED ORAL DAILY
Qty: 90 TABLET | Refills: 3 | Status: SHIPPED | OUTPATIENT
Start: 2018-09-13 | End: 2018-12-12

## 2018-10-31 ENCOUNTER — EXTERNAL CHRONIC CARE MANAGEMENT (OUTPATIENT)
Dept: PRIMARY CARE CLINIC | Facility: CLINIC | Age: 73
End: 2018-10-31
Payer: MEDICARE

## 2018-10-31 PROCEDURE — 99490 CHRNC CARE MGMT STAFF 1ST 20: CPT | Mod: PBBFAC | Performed by: PSYCHIATRY & NEUROLOGY

## 2018-10-31 PROCEDURE — 99490 CHRNC CARE MGMT STAFF 1ST 20: CPT | Mod: S$PBB,,, | Performed by: PSYCHIATRY & NEUROLOGY

## 2018-11-12 ENCOUNTER — OFFICE VISIT (OUTPATIENT)
Dept: DERMATOLOGY | Facility: CLINIC | Age: 73
End: 2018-11-12
Payer: MEDICARE

## 2018-11-12 DIAGNOSIS — L82.1 SEBORRHEIC KERATOSES: ICD-10-CM

## 2018-11-12 DIAGNOSIS — Z87.2 HISTORY OF ROSACEA: ICD-10-CM

## 2018-11-12 DIAGNOSIS — I78.1 TELANGIECTASIA: ICD-10-CM

## 2018-11-12 DIAGNOSIS — L72.0 MILIA: Primary | ICD-10-CM

## 2018-11-12 DIAGNOSIS — D23.9 DILATED PORE OF WINER: ICD-10-CM

## 2018-11-12 PROCEDURE — 99202 OFFICE O/P NEW SF 15 MIN: CPT | Mod: S$PBB,,, | Performed by: DERMATOLOGY

## 2018-11-12 PROCEDURE — 99999 PR PBB SHADOW E&M-EST. PATIENT-LVL II: CPT | Mod: PBBFAC,,, | Performed by: DERMATOLOGY

## 2018-11-12 PROCEDURE — 99212 OFFICE O/P EST SF 10 MIN: CPT | Mod: PBBFAC,PO | Performed by: DERMATOLOGY

## 2018-11-12 RX ORDER — IVERMECTIN 10 MG/G
CREAM TOPICAL
COMMUNITY
End: 2018-11-12 | Stop reason: SDUPTHER

## 2018-11-12 RX ORDER — IVERMECTIN 10 MG/G
CREAM TOPICAL
Qty: 45 G | Refills: 1 | Status: SHIPPED | OUTPATIENT
Start: 2018-11-12 | End: 2019-04-23

## 2018-11-12 NOTE — PROGRESS NOTES
"  Subjective:       Patient ID:  Yuniel Gracia is a 73 y.o. female who presents for   Chief Complaint   Patient presents with    Rosacea     follow up     Initial visit with me  Last seen by Dr Mccarty 6/2015, prescribed soolantra, did not use until 3 months ago  Now using regularly, "it does not do anything"  No burning sensation, no photosensitivity   C/o "white bumps" on chin    washes with   Moisturizes with revitalist    H/o breast CA, 2013, lumpectomy, XRT, chemo, on arimidex      Current Outpatient Medications:     amlodipine (NORVASC) 2.5 MG tablet, Take 2.5 mg by mouth once daily. , Disp: , Rfl:     anastrozole (ARIMIDEX) 1 mg Tab, Take 1 tablet (1 mg total) by mouth once daily., Disp: 90 tablet, Rfl: 5    CALCIUM CARBONATE (CALCIUM 600 ORAL), Take 1 tablet by mouth once daily., Disp: , Rfl:     CALCIUM CARBONATE/VITAMIN D3 (VITAMIN D-3 ORAL), Take 400 Units by mouth daily as needed., Disp: , Rfl:     dorzolamide-timolol 2-0.5% (COSOPT) 22.3-6.8 mg/mL ophthalmic solution, Place 1 drop into both eyes 2 (two) times daily. , Disp: , Rfl:     ivermectin (SOOLANTRA) 1 % Crea, Apply topically., Disp: , Rfl:     levothyroxine (SYNTHROID) 75 MCG tablet, TAKE 1 TABLET DAILY, Disp: 90 tablet, Rfl: 2    pravastatin (PRAVACHOL) 40 MG tablet, TAKE 1 TABLET AT BEDTIME, Disp: 90 tablet, Rfl: 3    sertraline (ZOLOFT) 100 MG tablet, TAKE 1 TABLET DAILY, Disp: 90 tablet, Rfl: 1    solifenacin (VESICARE) 5 MG tablet, Take 1 tablet (5 mg total) by mouth once daily., Disp: 90 tablet, Rfl: 3    temazepam (RESTORIL) 15 mg Cap, Take 1 capsule (15 mg total) by mouth every evening., Disp: 60 capsule, Rfl: 0    traMADol (ULTRAM) 50 mg tablet, 1-2 tablets every 8 hours as needed for pain, Disp: 60 tablet, Rfl: 2    traZODone (DESYREL) 50 MG tablet, Take 1 tablet (50 mg total) by mouth nightly as needed for Insomnia., Disp: 30 tablet, Rfl: 1    UNABLE TO FIND, Take 1 capsule by mouth once daily. prevagen extra " strength , Disp: , Rfl:         Rosacea  - Follow-up  Diagnosis: Rosacea.  Symptom course: unchanged  Currently using: soolantra cream.  Affected locations: face  Signs / symptoms: redness  Severity: mild        Review of Systems   Constitutional: Negative for fever, chills and fatigue.   Skin: Positive for daily sunscreen use, activity-related sunscreen use and wears hat.        Objective:    Physical Exam   Constitutional: She appears well-developed and well-nourished.   Neurological: She is alert and oriented to person, place, and time.   Psychiatric: She has a normal mood and affect.   Skin:   Areas Examined (abnormalities noted in diagram):   Head / Face Inspection Performed  Neck Inspection Performed  Chest / Axilla Inspection Performed  Abdomen Inspection Performed  Back Inspection Performed                   Diagram Legend     Erythematous scaling macule/papule c/w actinic keratosis       Vascular papule c/w angioma      Pigmented verrucoid papule/plaque c/w seborrheic keratosis      Yellow umbilicated papule c/w sebaceous hyperplasia      Irregularly shaped tan macule c/w lentigo     1-2 mm smooth white papules consistent with Milia      Movable subcutaneous cyst with punctum c/w epidermal inclusion cyst      Subcutaneous movable cyst c/w pilar cyst      Firm pink to brown papule c/w dermatofibroma      Pedunculated fleshy papule(s) c/w skin tag(s)      Evenly pigmented macule c/w junctional nevus     Mildly variegated pigmented, slightly irregular-bordered macule c/w mildly atypical nevus      Flesh colored to evenly pigmented papule c/w intradermal nevus       Pink pearly papule/plaque c/w basal cell carcinoma      Erythematous hyperkeratotic cursted plaque c/w SCC      Surgical scar with no sign of skin cancer recurrence      Open and closed comedones      Inflammatory papules and pustules      Verrucoid papule consistent consistent with wart     Erythematous eczematous patches and plaques     Dystrophic  onycholytic nail with subungual debris c/w onychomycosis     Umbilicated papule    Erythematous-base heme-crusted tan verrucoid plaque consistent with inflamed seborrheic keratosis     Erythematous Silvery Scaling Plaque c/w Psoriasis     See annotation      Assessment / Plan:        Milia  Reassurance given to patient. No treatment is necessary.   Treatment of benign, asymptomatic lesions may be considered cosmetic.    Telangiectasia  Perialar, reassurance  Cream will not resolve, PDT laser treatment    History of rosacea  No evidence of rosacea today, no barrier dysfunction, no central face erythema  May continue soolantra     Seborrheic keratoses  These are benign inherited growths without a malignant potential. Reassurance given to patient. No treatment is necessary.     Dilated pore of Lalo  Reassurance    Patient instructed in importance in daily sun protection of at least spf 30. Mineral sunscreen ingredients preferred (Zinc +/- Titanium).   Recommend Elta MD for daily use on face and neck.  Patient encouraged to wear hat for all outdoor exposure.   Also discussed sun avoidance and use of protective clothing.           Follow-up if symptoms worsen or fail to improve.

## 2018-11-30 ENCOUNTER — EXTERNAL CHRONIC CARE MANAGEMENT (OUTPATIENT)
Dept: PRIMARY CARE CLINIC | Facility: CLINIC | Age: 73
End: 2018-11-30
Payer: MEDICARE

## 2018-11-30 PROCEDURE — 99490 CHRNC CARE MGMT STAFF 1ST 20: CPT | Mod: S$PBB,,, | Performed by: PSYCHIATRY & NEUROLOGY

## 2018-11-30 PROCEDURE — 99490 CHRNC CARE MGMT STAFF 1ST 20: CPT | Mod: PBBFAC | Performed by: PSYCHIATRY & NEUROLOGY

## 2018-12-05 ENCOUNTER — HOSPITAL ENCOUNTER (OUTPATIENT)
Dept: RADIOLOGY | Facility: HOSPITAL | Age: 73
Discharge: HOME | End: 2018-12-05
Attending: INTERNAL MEDICINE
Payer: MEDICARE

## 2018-12-05 DIAGNOSIS — E02 SUBCLINICAL IODINE-DEFICIENCY HYPOTHYROIDISM: ICD-10-CM

## 2018-12-05 DIAGNOSIS — M85.80 OSTEOPENIA, UNSPECIFIED LOCATION: ICD-10-CM

## 2018-12-05 DIAGNOSIS — C50.011 MALIGNANT NEOPLASM OF NIPPLE OF RIGHT BREAST IN FEMALE, UNSPECIFIED ESTROGEN RECEPTOR STATUS: ICD-10-CM

## 2018-12-05 DIAGNOSIS — C50.919 BREAST CA: ICD-10-CM

## 2018-12-05 DIAGNOSIS — D69.6 THROMBOCYTOPENIA: ICD-10-CM

## 2018-12-05 PROCEDURE — 77062 BREAST TOMOSYNTHESIS BI: CPT | Mod: 26,,, | Performed by: RADIOLOGY

## 2018-12-05 PROCEDURE — 77066 DX MAMMO INCL CAD BI: CPT | Mod: TC

## 2018-12-05 PROCEDURE — 77066 DX MAMMO INCL CAD BI: CPT | Mod: 26,,, | Performed by: RADIOLOGY

## 2018-12-07 ENCOUNTER — HOSPITAL ENCOUNTER (OUTPATIENT)
Dept: RADIOLOGY | Facility: HOSPITAL | Age: 73
Discharge: HOME | End: 2018-12-07
Attending: INTERNAL MEDICINE
Payer: MEDICARE

## 2018-12-07 DIAGNOSIS — M85.80 OSTEOPENIA, UNSPECIFIED LOCATION: ICD-10-CM

## 2018-12-07 DIAGNOSIS — E02 SUBCLINICAL IODINE-DEFICIENCY HYPOTHYROIDISM: ICD-10-CM

## 2018-12-07 DIAGNOSIS — D69.6 THROMBOCYTOPENIA: ICD-10-CM

## 2018-12-07 DIAGNOSIS — C50.011 MALIGNANT NEOPLASM OF NIPPLE OF RIGHT BREAST IN FEMALE, UNSPECIFIED ESTROGEN RECEPTOR STATUS: ICD-10-CM

## 2018-12-07 PROCEDURE — 71046 X-RAY EXAM CHEST 2 VIEWS: CPT | Mod: TC,FY

## 2018-12-07 PROCEDURE — 71046 X-RAY EXAM CHEST 2 VIEWS: CPT | Mod: 26,,, | Performed by: RADIOLOGY

## 2018-12-10 ENCOUNTER — OFFICE VISIT (OUTPATIENT)
Dept: HEMATOLOGY/ONCOLOGY | Facility: CLINIC | Age: 73
End: 2018-12-10
Payer: MEDICARE

## 2018-12-10 VITALS
BODY MASS INDEX: 24.1 KG/M2 | HEIGHT: 65 IN | SYSTOLIC BLOOD PRESSURE: 132 MMHG | TEMPERATURE: 98 F | DIASTOLIC BLOOD PRESSURE: 69 MMHG | HEART RATE: 62 BPM | RESPIRATION RATE: 16 BRPM | WEIGHT: 144.63 LBS

## 2018-12-10 DIAGNOSIS — E02 SUBCLINICAL IODINE-DEFICIENCY HYPOTHYROIDISM: ICD-10-CM

## 2018-12-10 DIAGNOSIS — T38.6X5A OSTEOPOROSIS DUE TO AROMATASE INHIBITOR: ICD-10-CM

## 2018-12-10 DIAGNOSIS — M81.8 OSTEOPOROSIS DUE TO AROMATASE INHIBITOR: ICD-10-CM

## 2018-12-10 DIAGNOSIS — K75.9 HEPATITIS: ICD-10-CM

## 2018-12-10 DIAGNOSIS — C50.011 MALIGNANT NEOPLASM OF NIPPLE OF RIGHT BREAST IN FEMALE, UNSPECIFIED ESTROGEN RECEPTOR STATUS: Primary | ICD-10-CM

## 2018-12-10 PROCEDURE — 99214 OFFICE O/P EST MOD 30 MIN: CPT | Mod: S$PBB,,, | Performed by: INTERNAL MEDICINE

## 2018-12-10 PROCEDURE — 99999 PR PBB SHADOW E&M-EST. PATIENT-LVL IV: CPT | Mod: PBBFAC,,, | Performed by: INTERNAL MEDICINE

## 2018-12-10 PROCEDURE — 99214 OFFICE O/P EST MOD 30 MIN: CPT | Mod: PBBFAC,PO | Performed by: INTERNAL MEDICINE

## 2018-12-10 NOTE — PROGRESS NOTES
Subjective:       Patient ID: Yuniel Gracia is a 73 y.o. female.    Chief Complaint:breast ca  HPI:   Patient coming in for followup. She has a chronic ITP and hep C, history of    stage I breast cancer,rec score of 32 , so underwent TC  chemotherapy. She did notice a new mass in the rt breast that came back without issue, On arimidex which will complete 5 years in 9/2017/ Mild thrombocytopenia related to rx and hep c   has decidedc to get with prolia and she is here to start    REVIEW OF SYSTEMS:     CONSTITUTIONAL: The patient denies any weight change. There is no apparent    change in appetite, fever, night sweats, headaches, fatigue, dizziness, or    weakness.      SKIN: Denies rash, issues with nails, non-healing sores, bleeding, blotching    skin or abnormal bruising. Denies new moles or changes to existing moles.      BREASTS: There is no swelling around breasts or nipple discharge.    EYES: Denies eye pain, blurred vision, swelling, redness or discharge.      ENT AND MOUTH: Denies runny nose, stuffiness, sinus trouble or sores. Denies    nosebleeds. Denies, hoarseness, change in voice or swelling in front of the    neck.      CARDIOVASCULAR: Denies chest pain, discomfort or palpitations. Denies neck    swelling or episodes of passing out.      RESPIRATORY: Denies cough, sputum production, blood in sputum, and denies    shortness of breath.      GI: Denies trouble swallowing, indigestion, heartburn, abdominal pain, nausea,    vomiting, diarrhea, altered bowel habits, blood in stool, discoloration of    stools, change in nature of stool, bloating, increased abdominal girth.      GENITOURINARY: No discharge. No pelvic pain or lumps. No rash around groin or  lesions. No urinary frequency, hesitation, painful urination or blood in    urine. Denies incontinence. No problems with intercourse.      MUSCULOSKELETAL: Denies neck or back pain. Denies weakness in arms or legs,    joint problems or distended  inflamed veins in legs. Denies swelling or abnormal  glands.      NEUROLOGICAL: Denies tingling, numbness, altered mentation changes to nerve    function in the face, weakness to one or both of the body. Denies changes to    gait and denies multiple falls or accidents.      PSYCHIATRIC: Denies nervousness, anxiety, hallucinations, depression, suicidal    ideation, trouble sleeping or changes in behavior noticed by family.      The patient denies recent foreign travel or recent exposure to chemicals or    products of concern or infectious diseases.     PHYSICAL EXAM:     Wt Readings from Last 3 Encounters:   08/06/18 65.8 kg (145 lb 1 oz)   07/18/18 65 kg (143 lb 4.8 oz)   06/20/18 64.2 kg (141 lb 8.6 oz)     Temp Readings from Last 3 Encounters:   08/06/18 97.7 °F (36.5 °C)   06/20/18 97.8 °F (36.6 °C)   05/08/18 98.2 °F (36.8 °C) (Oral)     BP Readings from Last 3 Encounters:   08/06/18 (!) 108/51   07/18/18 120/69   06/20/18 (!) 100/49     Pulse Readings from Last 3 Encounters:   08/06/18 (!) 49   07/18/18 (!) 51   06/20/18 (!) 54       GENERAL: Comfortable looking patient. Patient is in no distress.  Awake, alert and oriented to time, person and place.  No anxiety, or agitation.      HEENT: Normal conjunctivae and eyelids. WNL.  PERRLA 3 to 4 mm. No icterus, no pallor, no congestion, and no discharge noted.     NECK:  Supple. Trachea is central.  No crepitus.  No JVD or masses.    RESPIRATORY:  No intercostal retractions.  No dullness to percussion.  Chest is clear to auscultation.  No rales, rhonchi or wheezes.  No crepitus.  Good air entry bilaterally.    CARDIOVASCULAR:  S1 and S2 are normally heard without murmurs or gallops.  All peripheral pulses are present.    ABDOMEN:  Normal abdomen.  No hepatosplenomegaly.  No free fluid.  Bowel sounds are present.  No hernia noted. No masses.  No rebound or tenderness.  No guarding or rigidity.  Umbilicus is midline.    LYMPHATICS:  No axillary, cervical,  supraclavicular, submental, or inguinal lymphadenopathy.    SKIN/MUSCULOSKELETAL:  There is no evidence of excoriation marks or ecchmosis.  No rashes.  No cyanosis.  No clubbing.  No joint or skeletal deformities noted.  Normal range of motion.    NEUROLOGIC:  Higher functions are appropriate.  No cranial nerve deficits.  Normal ludmila.  Normal strength.  Motor and sensory functions are normal.  Deep tendon reflexes are normal.    GENITAL/RECTAL:  Exams are deferred.      Laboratory:     CBC:  Lab Results   Component Value Date    WBC 4.00 12/07/2018    RBC 4.18 12/07/2018    HGB 12.8 12/07/2018    HCT 38.6 12/07/2018    MCV 93 12/07/2018    MCH 30.6 12/07/2018    MCHC 33.1 12/07/2018    RDW 15.7 (H) 12/07/2018     12/07/2018    MPV 8.2 (L) 12/07/2018    GRAN 2.1 12/07/2018    GRAN 53.0 12/07/2018    LYMPH 1.2 12/07/2018    LYMPH 30.0 12/07/2018    MONO 0.3 12/07/2018    MONO 8.4 12/07/2018    EOS 0.3 12/07/2018    BASO 0.00 12/07/2018    EOSINOPHIL 7.9 12/07/2018    BASOPHIL 0.7 12/07/2018       BMP: BMP  Lab Results   Component Value Date     12/07/2018    K 4.1 12/07/2018     12/07/2018    CO2 28 12/07/2018    BUN 12 12/07/2018    CREATININE 0.7 12/07/2018    CALCIUM 9.8 12/07/2018    ANIONGAP 7 (L) 12/07/2018    ESTGFRAFRICA >60 12/07/2018    EGFRNONAA >60 12/07/2018       LFT:   Lab Results   Component Value Date    ALT 24 12/07/2018    AST 40 12/07/2018    ALKPHOS 51 (L) 12/07/2018    BILITOT 0.6 12/07/2018 12/2018mammogram neg:   Most recent scan 2015 neg  Tumor marker 28.1 December 2018   cxr . 12/2018 wnl  Assessment/Plan:       Stage 1 breast ca  4. HTN, dyslipedemia, hypothyroidism, depression , contimue care with Dr. Dolan  cxr  For staging , had appendicitis and had emergency sx  Gave pt restoril rx for insomnia. generic  Breast ca: cont arimidex  For a total of 10 years dx in 2012  6 months with cbc, cmpshe completes 10 years of arimidex in 9/2022       thrombocytopenia cont to  monitor,   due for mammo in 12/18   resume prolia today

## 2018-12-11 DIAGNOSIS — F19.982 DRUG INDUCED INSOMNIA: ICD-10-CM

## 2018-12-11 DIAGNOSIS — C50.919 MALIGNANT NEOPLASM OF FEMALE BREAST, UNSPECIFIED ESTROGEN RECEPTOR STATUS, UNSPECIFIED LATERALITY, UNSPECIFIED SITE OF BREAST: Primary | ICD-10-CM

## 2018-12-11 RX ORDER — TEMAZEPAM 15 MG/1
15 CAPSULE ORAL NIGHTLY
Qty: 60 CAPSULE | Refills: 0 | Status: SHIPPED | OUTPATIENT
Start: 2018-12-11 | End: 2018-12-21

## 2018-12-21 DIAGNOSIS — F19.982 DRUG INDUCED INSOMNIA: ICD-10-CM

## 2018-12-21 RX ORDER — TEMAZEPAM 15 MG/1
15 CAPSULE ORAL NIGHTLY
Qty: 90 CAPSULE | Refills: 0 | Status: SHIPPED | OUTPATIENT
Start: 2018-12-21 | End: 2022-05-31

## 2019-02-08 ENCOUNTER — LAB VISIT (OUTPATIENT)
Dept: LAB | Facility: HOSPITAL | Age: 74
End: 2019-02-08
Attending: INTERNAL MEDICINE
Payer: MEDICARE

## 2019-02-08 DIAGNOSIS — C50.011 MALIGNANT NEOPLASM OF NIPPLE OF RIGHT BREAST IN FEMALE, UNSPECIFIED ESTROGEN RECEPTOR STATUS: ICD-10-CM

## 2019-02-08 LAB
ALBUMIN SERPL BCP-MCNC: 3.7 G/DL
ALP SERPL-CCNC: 56 U/L
ALT SERPL W/O P-5'-P-CCNC: 21 U/L
ANION GAP SERPL CALC-SCNC: 8 MMOL/L
AST SERPL-CCNC: 30 U/L
BILIRUB SERPL-MCNC: 0.8 MG/DL
BUN SERPL-MCNC: 14 MG/DL
CALCIUM SERPL-MCNC: 9.6 MG/DL
CHLORIDE SERPL-SCNC: 104 MMOL/L
CO2 SERPL-SCNC: 29 MMOL/L
CREAT SERPL-MCNC: 0.8 MG/DL
EST. GFR  (AFRICAN AMERICAN): >60 ML/MIN/1.73 M^2
EST. GFR  (NON AFRICAN AMERICAN): >60 ML/MIN/1.73 M^2
GLUCOSE SERPL-MCNC: 122 MG/DL
POTASSIUM SERPL-SCNC: 4.4 MMOL/L
PROT SERPL-MCNC: 7.1 G/DL
SODIUM SERPL-SCNC: 141 MMOL/L

## 2019-02-08 PROCEDURE — 36415 COLL VENOUS BLD VENIPUNCTURE: CPT

## 2019-02-08 PROCEDURE — 80053 COMPREHEN METABOLIC PANEL: CPT

## 2019-02-21 RX ORDER — SERTRALINE HYDROCHLORIDE 100 MG/1
TABLET, FILM COATED ORAL
Qty: 90 TABLET | Refills: 0 | Status: SHIPPED | OUTPATIENT
Start: 2019-02-21 | End: 2019-03-11 | Stop reason: SDUPTHER

## 2019-02-26 RX ORDER — PRAVASTATIN SODIUM 40 MG/1
TABLET ORAL
Qty: 90 TABLET | Refills: 3 | Status: SHIPPED | OUTPATIENT
Start: 2019-02-26 | End: 2020-03-09

## 2019-02-28 ENCOUNTER — EXTERNAL CHRONIC CARE MANAGEMENT (OUTPATIENT)
Dept: PRIMARY CARE CLINIC | Facility: CLINIC | Age: 74
End: 2019-02-28
Payer: MEDICARE

## 2019-02-28 PROCEDURE — 99490 CHRNC CARE MGMT STAFF 1ST 20: CPT | Mod: PBBFAC | Performed by: PSYCHIATRY & NEUROLOGY

## 2019-02-28 PROCEDURE — 99490 PR CHRONIC CARE MGMT, 1ST 20 MIN: ICD-10-PCS | Mod: S$PBB,,, | Performed by: PSYCHIATRY & NEUROLOGY

## 2019-02-28 PROCEDURE — 99490 CHRNC CARE MGMT STAFF 1ST 20: CPT | Mod: S$PBB,,, | Performed by: PSYCHIATRY & NEUROLOGY

## 2019-03-11 ENCOUNTER — OFFICE VISIT (OUTPATIENT)
Dept: FAMILY MEDICINE | Facility: CLINIC | Age: 74
End: 2019-03-11
Payer: MEDICARE

## 2019-03-11 ENCOUNTER — LAB VISIT (OUTPATIENT)
Dept: LAB | Facility: HOSPITAL | Age: 74
End: 2019-03-11
Attending: NURSE PRACTITIONER
Payer: MEDICARE

## 2019-03-11 VITALS
SYSTOLIC BLOOD PRESSURE: 112 MMHG | WEIGHT: 146 LBS | DIASTOLIC BLOOD PRESSURE: 74 MMHG | BODY MASS INDEX: 24.32 KG/M2 | HEART RATE: 61 BPM | HEIGHT: 65 IN | TEMPERATURE: 99 F

## 2019-03-11 DIAGNOSIS — I73.00 RAYNAUD'S PHENOMENON WITHOUT GANGRENE: ICD-10-CM

## 2019-03-11 DIAGNOSIS — D69.6 THROMBOCYTOPENIA: ICD-10-CM

## 2019-03-11 DIAGNOSIS — E03.9 ACQUIRED HYPOTHYROIDISM: Primary | ICD-10-CM

## 2019-03-11 DIAGNOSIS — Z17.0 MALIGNANT NEOPLASM OF NIPPLE OF RIGHT BREAST IN FEMALE, ESTROGEN RECEPTOR POSITIVE: ICD-10-CM

## 2019-03-11 DIAGNOSIS — F41.8 DEPRESSION WITH ANXIETY: ICD-10-CM

## 2019-03-11 DIAGNOSIS — C50.011 MALIGNANT NEOPLASM OF NIPPLE OF RIGHT BREAST IN FEMALE, ESTROGEN RECEPTOR POSITIVE: ICD-10-CM

## 2019-03-11 DIAGNOSIS — R73.03 PREDIABETES: ICD-10-CM

## 2019-03-11 DIAGNOSIS — Z79.811 USE OF AROMATASE INHIBITORS: ICD-10-CM

## 2019-03-11 DIAGNOSIS — N32.81 OVERACTIVE BLADDER: ICD-10-CM

## 2019-03-11 DIAGNOSIS — E78.2 MIXED HYPERLIPIDEMIA: ICD-10-CM

## 2019-03-11 DIAGNOSIS — E03.9 ACQUIRED HYPOTHYROIDISM: ICD-10-CM

## 2019-03-11 DIAGNOSIS — Z86.19 HEPATITIS C VIRUS INFECTION CURED AFTER ANTIVIRAL DRUG THERAPY: ICD-10-CM

## 2019-03-11 DIAGNOSIS — M85.852 OSTEOPENIA OF LEFT HIP: ICD-10-CM

## 2019-03-11 DIAGNOSIS — G47.09 OTHER INSOMNIA: ICD-10-CM

## 2019-03-11 PROBLEM — L03.116 CELLULITIS OF LEFT KNEE: Status: RESOLVED | Noted: 2017-11-03 | Resolved: 2019-03-11

## 2019-03-11 PROBLEM — N39.0 ACUTE UTI: Status: RESOLVED | Noted: 2018-04-22 | Resolved: 2019-03-11

## 2019-03-11 PROBLEM — N30.00 ACUTE CYSTITIS: Status: RESOLVED | Noted: 2017-11-03 | Resolved: 2019-03-11

## 2019-03-11 PROBLEM — R19.4 CHANGE IN BOWEL HABITS: Status: RESOLVED | Noted: 2017-06-01 | Resolved: 2019-03-11

## 2019-03-11 PROCEDURE — 99999 PR PBB SHADOW E&M-EST. PATIENT-LVL V: CPT | Mod: PBBFAC,,, | Performed by: NURSE PRACTITIONER

## 2019-03-11 PROCEDURE — 99999 PR PBB SHADOW E&M-EST. PATIENT-LVL V: ICD-10-PCS | Mod: PBBFAC,,, | Performed by: NURSE PRACTITIONER

## 2019-03-11 PROCEDURE — 36415 COLL VENOUS BLD VENIPUNCTURE: CPT | Mod: PO

## 2019-03-11 PROCEDURE — 99214 PR OFFICE/OUTPT VISIT, EST, LEVL IV, 30-39 MIN: ICD-10-PCS | Mod: S$PBB,,, | Performed by: NURSE PRACTITIONER

## 2019-03-11 PROCEDURE — 84439 ASSAY OF FREE THYROXINE: CPT

## 2019-03-11 PROCEDURE — 99214 OFFICE O/P EST MOD 30 MIN: CPT | Mod: S$PBB,,, | Performed by: NURSE PRACTITIONER

## 2019-03-11 PROCEDURE — 99215 OFFICE O/P EST HI 40 MIN: CPT | Mod: PBBFAC,PO | Performed by: NURSE PRACTITIONER

## 2019-03-11 PROCEDURE — 83036 HEMOGLOBIN GLYCOSYLATED A1C: CPT

## 2019-03-11 PROCEDURE — 84443 ASSAY THYROID STIM HORMONE: CPT

## 2019-03-11 RX ORDER — SOLIFENACIN SUCCINATE 5 MG/1
5 TABLET, FILM COATED ORAL DAILY
COMMUNITY
Start: 2019-02-21 | End: 2019-12-07 | Stop reason: SDUPTHER

## 2019-03-11 RX ORDER — AMOXICILLIN 500 MG
CAPSULE ORAL DAILY
COMMUNITY
End: 2023-03-12 | Stop reason: ALTCHOICE

## 2019-03-11 RX ORDER — SERTRALINE HYDROCHLORIDE 50 MG/1
50 TABLET, FILM COATED ORAL DAILY
Qty: 90 TABLET | Refills: 3 | Status: SHIPPED | OUTPATIENT
Start: 2019-03-11 | End: 2020-08-12 | Stop reason: SDUPTHER

## 2019-03-11 RX ORDER — UBIDECARENONE 30 MG
30 CAPSULE ORAL DAILY
COMMUNITY
End: 2023-12-07 | Stop reason: CLARIF

## 2019-03-11 NOTE — PROGRESS NOTES
"Subjective:       Patient ID: Yuniel Gracia is a 73 y.o. female.    Chief Complaint: Follow-up    Chief Complaint  Chief Complaint   Patient presents with    Follow-up       HPI  Yuniel Gracia is a 73 y.o. female with medical diagnoses as listed in the medical history and problem list that presents for regularly scheduled follow-up.  Patient is regularly treated for Raynaud's phenomonon, hypothyroidism, overactive bladder, insomnia, depression/anxiety, hyperlipidemia,  thrombocytopenia, and right breast cancer with lumpectomy, chemotherapy and radiation and is taking Arimidex. Patient has been treated for hepatitis C with cure.  Patient regularly follows up with Hematology Oncology Dr. Almanza.  Blood pressure today is 112/74.  BMI is 24.30 and the patient has lost 4 lb since her last visit on 12/12/2017.         PAST MEDICAL HISTORY:  Past Medical History:   Diagnosis Date    Anemia     Arthritis     Blood transfusion     Breast cancer     Cancer RIGHT BREAST    Chronic constipation     Clotting disorder     Colon polyp     Diverticulosis     Glaucoma     Hepatitis C 2000    pt states history of hepatits c-"cured by Dr. Lynch"; TREATED 2 YEARS - 2000   OK NOW    Hypothyroid     Insomnia     Memory loss     reports some memory loss since chemo    Osteoarthritis     Panic attacks     Raynaud's disease     takes amlodipine for this    Ulcer     Vertigo        PAST SURGICAL HISTORY:  Past Surgical History:   Procedure Laterality Date    APPENDECTOMY-LAPAROSCOPIC Right 4/21/2018    [facility]    BIOPSY, LYMPH NODE, SENTINEL Right 3/7/2012    [facility]illermo Cortez [facility] at [facility] OR    BLADDER SURGERY  2011    sling - Dr Giles  - [facility]    BRAIN SURGERY  2007    temporal neuralgia - [facility]    BREAST BIOPSY      BREAST LUMPECTOMY  3/12    malignant - had chemo and radiotherapy    COLONOSCOPY  01/03/2011    Dr Lynch, in media section, " diverticulosis, hemorrhoids, otherwise normal findings; normal findings on random biopsies    COLONOSCOPY N/A 6/1/2017    Performed [facility] Nate Lamb [facility] at [facility] ENDO    COLONOSCOPY W/ POLYPECTOMY  06/01/2017    Dr. Lamb, 1 adenomatous polyp, recheck 5 years    EYE SURGERY      cataracts bilateral    HYSTERECTOMY      INSERTION, PORT-A-CATH N/A 4/23/2012    [facility] at [facility] OR    JOINT REPLACEMENT  09/25/2017    left Knee [facility]     LUMPECTOMY Right 3/7/2012    [facility] at [facility] OR    LUMPECTOMY-BREAST Right 3/29/2012    [facility] at [facility] OR    removal of port a cath      REMOVAL, CATHETER, CENTRAL VENOUS, TUNNELED, WITH PORT Right 7/26/2012    [facility] at [facility] OR    REPLACEMENT-KNEE WITH NAVIGATION Left 9/25/2017    [facility]    REPLACEMENT-KNEE WITH NAVIGATION-SIGHT ASSISTED Right 2/5/2018    [facility]ill [facility] at [facility] OR    right lymph node removed from breast area      TUNNELED VENOUS PORT PLACEMENT  04/2012    left chest    UPPER GASTROINTESTINAL ENDOSCOPY  prior to 2011    small hiatal hernia       SOCIAL HISTORY:  Social History     Socioeconomic History    Marital status:      Spouse name: Not on file    Number of children: Not on file    Years of education: Not on file    Highest education level: Not on file   Social Needs    Financial resource strain: Not on file    Food insecurity - worry: Not on file    Food insecurity - inability: Not on file    Transportation needs - medical: Not on file    Transportation needs - non-medical: Not on file   Occupational History    Not on file   Tobacco Use    Smoking status: Never Smoker    Smokeless tobacco: Never Used   Substance and Sexual Activity    Alcohol use: Yes     Alcohol/week: 4.8 oz     Types: 6 Glasses of wine, 2 Standard drinks or equivalent per week     Comment: wine  most days - 1 glass    Drug use: No    Sexual activity: Yes     Partners: Male     Birth control/protection: None   Other Topics Concern    Are you pregnant or think you may be? Not Asked    Breast-feeding Not Asked   Social History Narrative    Not on file       FAMILY HISTORY:  Family History   Problem Relation Age of Onset    Cancer Mother         breast    Breast cancer Mother     Diabetes Father     Heart disease Father     Cancer Sister         breast    Breast cancer Sister     Diabetes Paternal Aunt     Cancer Other         breast    Breast cancer Other     Drug abuse Son     Obesity Son     Diabetes Maternal Aunt     Heart disease Maternal Uncle     Tuberculosis Paternal Uncle         x2    Diabetes Paternal Uncle     Early death Maternal Grandmother         tonsils    Heart disease Maternal Grandfather     Alcohol abuse Maternal Grandfather     Tuberculosis Paternal Grandfather     Cancer Sister         breast    Breast cancer Sister     Ovarian cancer Neg Hx     Colon cancer Neg Hx     Colon polyps Neg Hx     Crohn's disease Neg Hx     Ulcerative colitis Neg Hx     Melanoma Neg Hx     Lupus Neg Hx     Psoriasis Neg Hx     Eczema Neg Hx        ALLERGIES AND MEDICATIONS: updated and reviewed.  Review of patient's allergies indicates:   Allergen Reactions    Adhesive Dermatitis and Rash    Codeine Nausea Only     Current Outpatient Medications   Medication Sig Dispense Refill    amlodipine (NORVASC) 2.5 MG tablet Take 2.5 mg by mouth once daily.       anastrozole (ARIMIDEX) 1 mg Tab Take 1 tablet (1 mg total) by mouth once daily. 90 tablet 5    CALCIUM CARBONATE (CALCIUM 600 ORAL) Take 1 tablet by mouth once daily.      CALCIUM CARBONATE/VITAMIN D3 (VITAMIN D-3 ORAL) Take 400 Units by mouth daily as needed.      co-enzyme Q-10 30 mg capsule Take 30 mg by mouth once daily.      fish oil-omega-3 fatty acids 300-1,000 mg capsule Take by mouth once daily.       ivermectin (SOOLANTRA) 1 % Crea Thin film to AA on face nightly 45 g 1    levothyroxine (SYNTHROID) 75 MCG tablet TAKE 1 TABLET DAILY 90 tablet 2    pravastatin (PRAVACHOL) 40 MG tablet TAKE 1 TABLET AT BEDTIME 90 tablet 3    sertraline (ZOLOFT) 50 MG tablet Take 1 tablet (50 mg total) by mouth once daily. 90 tablet 3    temazepam (RESTORIL) 15 mg Cap Take 1 capsule (15 mg total) by mouth every evening. 90 capsule 0    UNABLE TO FIND Take 1 capsule by mouth once daily. prevagen extra strength       UNABLE TO FIND Homeostasis: Pure Cannabidiol CBC Oil      VESICARE 5 mg tablet Take 5 mg by mouth once daily.       No current facility-administered medications for this visit.        I have reviewed the patient's medical history in detail and updated the computerized patient record.    Review of Systems   Constitutional: Negative for activity change, appetite change, chills, fatigue, fever and unexpected weight change.   HENT: Negative for congestion, hearing loss, rhinorrhea, sinus pressure, sinus pain, sore throat and trouble swallowing.    Eyes: Negative for discharge and visual disturbance.        Patient has had cataract surgery, reading glasses only   Respiratory: Positive for shortness of breath. Negative for cough, chest tightness and wheezing.         Slight shortness of breath with exertion.    Cardiovascular: Negative for chest pain, palpitations and leg swelling.   Gastrointestinal: Positive for nausea. Negative for abdominal pain, blood in stool, constipation, diarrhea and vomiting.        Occasional nausea, no abdominal pain, no heartburn   Endocrine: Negative for polydipsia and polyuria.   Genitourinary: Positive for urgency. Negative for difficulty urinating, dysuria, hematuria and menstrual problem.        Urge incontinence, improved with vesicare   Musculoskeletal: Positive for myalgias. Negative for arthralgias, joint swelling and neck pain.        Some muscle pains off and on   Skin: Negative  "for rash and wound.   Neurological: Negative for dizziness, weakness, numbness and headaches.        Has had a few falls at home due to balance issues on uneven ground, feet don't move like she thinks they should.    Hematological: Bruises/bleeds easily.        Bruises easily.   Psychiatric/Behavioral: Positive for confusion. Negative for dysphoric mood and sleep disturbance. The patient is not nervous/anxious.         States has 'chemo brain', has difficulty remembering names sometimes. Sleeps well, takes temazepam as needed with effect.          Objective:      Vitals:    03/11/19 1512   BP: 112/74   BP Location: Left arm   Patient Position: Sitting   BP Method: Medium (Manual)   Pulse: 61   Temp: 98.5 °F (36.9 °C)   TempSrc: Oral   Weight: 66.2 kg (146 lb)   Height: 5' 5" (1.651 m)     Physical Exam   Constitutional: She is oriented to person, place, and time. Vital signs are normal. She appears well-developed and well-nourished. No distress.   Blood pressure 112/74   HENT:   Head: Normocephalic and atraumatic.   Right Ear: Hearing, tympanic membrane, external ear and ear canal normal.   Left Ear: Hearing, tympanic membrane, external ear and ear canal normal.   Nose: Nose normal. No mucosal edema.   Mouth/Throat: Oropharynx is clear and moist and mucous membranes are normal. No oropharyngeal exudate.   Eyes: Conjunctivae, EOM and lids are normal. Pupils are equal, round, and reactive to light. Right eye exhibits no discharge. Left eye exhibits no discharge. Right conjunctiva is not injected. Left conjunctiva is not injected.   Neck: Normal range of motion. Neck supple. Normal carotid pulses present. Carotid bruit is not present. Normal range of motion present. No thyromegaly present.   Cardiovascular: Normal rate, regular rhythm, S1 normal, S2 normal, normal heart sounds, intact distal pulses and normal pulses.   No murmur heard.  Pulses:       Carotid pulses are 2+ on the right side, and 2+ on the left side.     "   Radial pulses are 2+ on the right side, and 2+ on the left side.        Posterior tibial pulses are 2+ on the right side, and 2+ on the left side.   No edema   Pulmonary/Chest: Effort normal and breath sounds normal. No respiratory distress. She has no decreased breath sounds. She has no wheezes. She has no rhonchi. She has no rales.   Abdominal: Soft. Normal appearance, normal aorta and bowel sounds are normal. She exhibits no distension, no abdominal bruit, no pulsatile midline mass and no mass. There is no hepatosplenomegaly. There is no tenderness. No hernia.   Musculoskeletal: Normal range of motion. She exhibits no edema, tenderness or deformity.   Lymphadenopathy:        Head (right side): No submental, no submandibular, no tonsillar, no preauricular, no posterior auricular and no occipital adenopathy present.        Head (left side): No submental, no submandibular, no tonsillar, no preauricular, no posterior auricular and no occipital adenopathy present.     She has no cervical adenopathy.        Right: No supraclavicular adenopathy present.        Left: No supraclavicular adenopathy present.   Neurological: She is alert and oriented to person, place, and time. She has normal strength.   Skin: Skin is warm, dry and intact. No rash noted. She is not diaphoretic. No erythema. No pallor.   Psychiatric: She has a normal mood and affect. Her speech is normal and behavior is normal. Judgment and thought content normal. Her mood appears not anxious. Cognition and memory are normal. She does not exhibit a depressed mood.   Nursing note and vitals reviewed.        Assessment:       1. Acquired hypothyroidism    2. Thrombocytopenia    3. Malignant neoplasm of nipple of right breast in female, estrogen receptor positive    4. Hepatitis C virus infection cured after antiviral drug therapy    5. Osteopenia of left hip    6. Use of aromatase inhibitors    7. Other insomnia    8. Raynaud's phenomenon without gangrene     9. Depression with anxiety    10. Overactive bladder    11. Mixed hyperlipidemia    12. Prediabetes    13. BMI 24.0-24.9, adult          Plan:       Yuniel was seen today for follow-up.  Discussed healthy diet, regular exercise, necessary labs, age appropriate cancer screening, and routine vaccinations.  Tetanus immunization recommended to patient, needs to receive at pharmacy.  Educational handouts provided.  Prevention guidelines women age 65 and over green  Diagnoses and all orders for this visit:    Acquired hypothyroidism  -     TSH; Future  -   Lab Results   Component Value Date    TSH 1.857 04/22/2018   - continue current dose of levothyroxine, dose will be adjusted is needed after new TSH results are available    Thrombocytopenia     Lab Results   Component Value Date    WBC 4.00 12/07/2018    HGB 12.8 12/07/2018    HCT 38.6 12/07/2018    MCV 93 12/07/2018     12/07/2018    platelet count normal with most recent blood count   Continue follow-up with Hematology oncology, Dr. Almanza  Malignant neoplasm of nipple of right breast in female, estrogen receptor positive   Continue Arimidex as prescribed, continue follow-up with Hematology Oncology, Dr. Almanza  Hepatitis C virus infection cured after antiviral drug therapy   No treatment needed, will continue to monitor liver enzymes, additional testing if indicated  Osteopenia of left hip   Dexa bone density scan 6/18/18:    The L1 to L4 vertebral bone mineral density is equal to 1.062 g/cm squared with a T score of 0.1.  There has been a mild increase from 1.042 grams/centimeters square relative to the prior study.   The left femoral neck bone mineral density is equal to 0.702 g/cm squared with a T score of -1.3.  There has been  a decrease in bone mineral density from 0.734 grams/centimeter square relative to the prior study.   There is a 10% risk of a major osteoporotic fracture and a 1.6% risk of hip fracture in the next 10 years (FRAX).   Patient states  that she is currently receiving Prolia injections every six months, medication is not current on med list  Use of aromatase inhibitors   Continue Arimidex as prescribed  Other insomnia   Continue Temazepam 15 mg at HS as needed, prescribed by Dr. Almanza  Raynaud's phenomenon without gangrene   Continue amlodipine 2.5 mg daily as prescribed by Cardiology Dr. Anne  Depression with anxiety  -     sertraline (ZOLOFT) 50 MG tablet; Take 1 tablet (50 mg total) by mouth once daily.  - dose decreased today, patient has been taking 1/2 tablet of the 100 mg tablets, 50 mg effective in treating symptoms of depression and anxiety    Overactive bladder   Continue VESIcare as prescribed, advised to discuss persistent symptoms of urge incontinence with Urology, may possibly need an increased dose of the VESIcare  Mixed hyperlipidemia     Lab Results   Component Value Date    CHOL 173 09/04/2018    CHOL 187 12/26/2017    CHOL 182 12/30/2016     Lab Results   Component Value Date    HDL 47 09/04/2018    HDL 50 12/26/2017    HDL 62 12/30/2016     Lab Results   Component Value Date    LDLCALC 103.2 09/04/2018    LDLCALC 111.6 12/26/2017    LDLCALC 104.6 12/30/2016     Lab Results   Component Value Date    TRIG 114 09/04/2018    TRIG 127 12/26/2017    TRIG 77 12/30/2016     Lab Results   Component Value Date    CHOLHDL 27.2 09/04/2018    CHOLHDL 26.7 12/26/2017    CHOLHDL 34.1 12/30/2016    continue pravastatin 40 mg daily  Prediabetes  -     Hemoglobin A1c; Future  - patient's glucose level was 122 on 2/8/2019 fasting, strong family history of diabetes  - At this time lifestyle and diet changes are recommended.  Increase exercise and lose weight by eating a portion controlled well-balanced diet.  Avoid concentrated sweets like cookies, cakes, and candy.  Do not drink sugar sweetened soft drinks.  Eat fewer white carbohydrates like potatoes, rice, bread, and pasta.  Choose sweet potatoes, brown rice, whole wheat bread and wheat  sandeep.      BMI 24.0-24.9, adult   BMI today is 24.30 and the patient has lost 4 lb since her last visit on 12/12/2017   Maintain healthy weight with heathy diet and exercise/active lifestyle    Follow-up in about 1 year (around 3/11/2020) for annual, Dr. Dolan, Labs today.

## 2019-03-11 NOTE — PATIENT INSTRUCTIONS
Prevention Guidelines, Women Ages 65 and Older  Screening tests and vaccines are an important part of managing your health. Health counseling is essential, too. Below are guidelines for these, for women ages 65 and older. Talk with your healthcare provider to make sure youre up to date on what you need.  Screening Who needs it How often   Type 2 diabetes or prediabetes All adults beginning at age 45 and adults without symptoms at any age who are overweight or obese and have 1 or more additional risk factors for diabetes At least every 3 years   Alcohol misuse All women in this age group At routine exams   Blood pressure All women in this age group Every 2 years if your blood pressure is less than 120/80 mm Hg; yearly if your systolic blood pressure is 120 to 139 mm Hg, or your diastolic blood pressure reading is 80 to 89 mm Hg   Breast cancer All women in this age group Yearly mammogram and clinical breast exam1   Cervical cancer Only women who had abnormal screening results before age 65 Talk with your healthcare provider   Chlamydia Women at increased risk for infection At routine exams   Colorectal cancer All women in this age group1 Flexible sigmoidoscopy every 5 years, or colonoscopy every 10 years, or double-contrast barium enema every 5 years; yearly fecal occult blood test or fecal immunochemical test; or a stool DNA test as often as your healthcare provider advises; talk with your healthcare provider about which tests are best for you   Depression All women in this age group At routine exams   Gonorrhea Sexually active women at increased risk for infection At routine exams   Hepatitis C Anyone at increased risk; 1 time for those born between 1945 and 1965 At routine exams   High cholesterol or triglycerides All women in this age group who are at risk for coronary artery disease At least every 5 years   HIV Women at increased risk for infection - talk with your healthcare provider At routine exams   Lung  cancer Adults age 55 to 80 who have smoked Yearly screening in smokers with 30 pack-year history of smoking or who quit within 15 years   Obesity All women in this age group At routine exams   Osteoporosis All women in this age group Bone density test at age 65, then follow-up as advised by your healthcare provider   Syphilis Women at increased risk for infection - talk with your healthcare provider At routine exams   Thyroid-Stimulating Hormone (TSH) All women in this age group Every 5 years   Tuberculosis Women at increased risk for infection - talk with your healthcare provider Ask your healthcare provider   Vision All women in this age group Every 1 to 2 years; if you have a chronic health condition, ask your healthcare provider if you need exams more often   Vaccine Who needs it How often   Chickenpox (varicella) All women in this age group who have no record of this infection or vaccine 2 doses; second dose should be given at least 4 weeks after the first dose   Hepatitis A Women at increased risk for infection - talk with your healthcare provider 2 doses given 6 months apart   Hepatitis B Women at increased risk for infection - talk with your healthcare provider 3 doses over 6 months; second dose should be given 1 month after the first dose; the third dose should be given at least 2 months after the second dose and at least 4 months after the first dose   Haemophilus influenza Type B (HIB) Women at increased risk for infection - talk with your healthcare provider 1 to 3 doses   Influenza (flu) All women in this age group Once a year   Pneumococcal conjugate vaccine (PCV13) and pneumococcal polysaccharide vaccine (PPSV23) All women in this age group 1 dose of each vaccine   Tetanus/diphtheria/pertussis (Td/Tdap) booster All women in this age group Td every 10 years, or a one-time dose of Tdap instead of a Td booster after age 18, then Td every 10 years   Zoster All women in this age group 1 dose   Counseling  Who needs it How often   Diet and exercise Women who are overweight or obese When diagnosed, and then at routine exams   Fall prevention (exercise and vitamin D supplements) All women in this age group At routine exams   Sexually transmitted infection prevention Women at increased risk for infection - talk with your healthcare provider At routine exams   Use of daily aspirin Women ages 55 and up in this age group who are at risk for cardiovascular health problems such as stroke When your risk is known   Use of tobacco and the health effects it can cause All women in this age group Every exam   1[employer]  Date Last Reviewed: 8/9/2015  © 8379-1722 [employer]. [address] [place] [address], [place] [address]. All rights reserved. This information is not intended as a substitute for professional medical care. Always follow your healthcare professional's instructions.

## 2019-03-12 DIAGNOSIS — E03.9 ACQUIRED HYPOTHYROIDISM: Primary | ICD-10-CM

## 2019-03-12 LAB
ESTIMATED AVG GLUCOSE: 111 MG/DL
HBA1C MFR BLD HPLC: 5.5 %
T4 FREE SERPL-MCNC: 1.17 NG/DL
TSH SERPL DL<=0.005 MIU/L-ACNC: 0.33 UIU/ML

## 2019-03-13 ENCOUNTER — LAB VISIT (OUTPATIENT)
Dept: LAB | Facility: HOSPITAL | Age: 74
End: 2019-03-13
Attending: INTERNAL MEDICINE
Payer: MEDICARE

## 2019-03-13 DIAGNOSIS — E78.5 HYPERLIPEMIA: Primary | ICD-10-CM

## 2019-03-13 DIAGNOSIS — Z79.899 NEED FOR PROPHYLACTIC CHEMOTHERAPY: ICD-10-CM

## 2019-03-13 LAB
ALBUMIN SERPL BCP-MCNC: 3.7 G/DL
ALP SERPL-CCNC: 52 U/L
ALT SERPL W/O P-5'-P-CCNC: 18 U/L
AST SERPL-CCNC: 31 U/L
BILIRUB DIRECT SERPL-MCNC: 0.3 MG/DL
BILIRUB SERPL-MCNC: 0.6 MG/DL
CHOLEST SERPL-MCNC: 173 MG/DL
CHOLEST/HDLC SERPL: 3.3 {RATIO}
HDLC SERPL-MCNC: 52 MG/DL
HDLC SERPL: 30.1 %
LDLC SERPL CALC-MCNC: 105.4 MG/DL
NONHDLC SERPL-MCNC: 121 MG/DL
PROT SERPL-MCNC: 7 G/DL
TRIGL SERPL-MCNC: 78 MG/DL

## 2019-03-13 PROCEDURE — 80076 HEPATIC FUNCTION PANEL: CPT

## 2019-03-13 PROCEDURE — 80061 LIPID PANEL: CPT

## 2019-03-13 PROCEDURE — 36415 COLL VENOUS BLD VENIPUNCTURE: CPT | Mod: PO

## 2019-03-31 ENCOUNTER — EXTERNAL CHRONIC CARE MANAGEMENT (OUTPATIENT)
Dept: PRIMARY CARE CLINIC | Facility: CLINIC | Age: 74
End: 2019-03-31
Payer: MEDICARE

## 2019-03-31 PROCEDURE — 99490 CHRNC CARE MGMT STAFF 1ST 20: CPT | Mod: PBBFAC | Performed by: PSYCHIATRY & NEUROLOGY

## 2019-03-31 PROCEDURE — 99490 CHRNC CARE MGMT STAFF 1ST 20: CPT | Mod: S$PBB,,, | Performed by: PSYCHIATRY & NEUROLOGY

## 2019-03-31 PROCEDURE — 99490 PR CHRONIC CARE MGMT, 1ST 20 MIN: ICD-10-PCS | Mod: S$PBB,,, | Performed by: PSYCHIATRY & NEUROLOGY

## 2019-04-23 ENCOUNTER — OFFICE VISIT (OUTPATIENT)
Dept: UROLOGY | Facility: CLINIC | Age: 74
End: 2019-04-23
Payer: MEDICARE

## 2019-04-23 ENCOUNTER — APPOINTMENT (OUTPATIENT)
Dept: LAB | Facility: HOSPITAL | Age: 74
End: 2019-04-23
Attending: NURSE PRACTITIONER
Payer: MEDICARE

## 2019-04-23 VITALS
TEMPERATURE: 98 F | RESPIRATION RATE: 18 BRPM | DIASTOLIC BLOOD PRESSURE: 65 MMHG | HEART RATE: 54 BPM | WEIGHT: 144.19 LBS | BODY MASS INDEX: 24.02 KG/M2 | HEIGHT: 65 IN | SYSTOLIC BLOOD PRESSURE: 113 MMHG

## 2019-04-23 DIAGNOSIS — N30.00 ACUTE CYSTITIS WITHOUT HEMATURIA: Primary | ICD-10-CM

## 2019-04-23 DIAGNOSIS — Z87.440 HISTORY OF URINARY TRACT INFECTION: ICD-10-CM

## 2019-04-23 LAB
BACTERIA #/AREA URNS HPF: ABNORMAL /HPF
BILIRUB SERPL-MCNC: ABNORMAL MG/DL
BILIRUB UR QL STRIP: NEGATIVE
BLOOD URINE, POC: ABNORMAL
CLARITY UR: ABNORMAL
COLOR UR: YELLOW
COLOR, POC UA: YELLOW
GLUCOSE UR QL STRIP: ABNORMAL
GLUCOSE UR QL STRIP: NEGATIVE
HGB UR QL STRIP: NEGATIVE
KETONES UR QL STRIP: ABNORMAL
KETONES UR QL STRIP: NEGATIVE
LEUKOCYTE ESTERASE UR QL STRIP: ABNORMAL
LEUKOCYTE ESTERASE URINE, POC: ABNORMAL
MICROSCOPIC COMMENT: ABNORMAL
NITRITE UR QL STRIP: POSITIVE
NITRITE, POC UA: POSITIVE
PH UR STRIP: 7 [PH] (ref 5–8)
PH, POC UA: 7.5
PROT UR QL STRIP: NEGATIVE
PROTEIN, POC: ABNORMAL
RBC #/AREA URNS HPF: 2 /HPF (ref 0–4)
SP GR UR STRIP: <=1.005 (ref 1–1.03)
SPECIFIC GRAVITY, POC UA: 1.02
SQUAMOUS #/AREA URNS HPF: 1 /HPF
URN SPEC COLLECT METH UR: ABNORMAL
UROBILINOGEN UR STRIP-ACNC: NEGATIVE EU/DL
UROBILINOGEN, POC UA: 0.2
WBC #/AREA URNS HPF: 75 /HPF (ref 0–5)

## 2019-04-23 PROCEDURE — 87088 URINE BACTERIA CULTURE: CPT

## 2019-04-23 PROCEDURE — 99214 OFFICE O/P EST MOD 30 MIN: CPT | Mod: S$PBB,,, | Performed by: NURSE PRACTITIONER

## 2019-04-23 PROCEDURE — 99215 OFFICE O/P EST HI 40 MIN: CPT | Mod: PBBFAC,PN | Performed by: NURSE PRACTITIONER

## 2019-04-23 PROCEDURE — 87077 CULTURE AEROBIC IDENTIFY: CPT

## 2019-04-23 PROCEDURE — 87186 SC STD MICRODIL/AGAR DIL: CPT

## 2019-04-23 PROCEDURE — 81002 URINALYSIS NONAUTO W/O SCOPE: CPT | Mod: PBBFAC,PN | Performed by: NURSE PRACTITIONER

## 2019-04-23 PROCEDURE — 87184 SC STD DISK METHOD PER PLATE: CPT

## 2019-04-23 PROCEDURE — 99999 PR PBB SHADOW E&M-EST. PATIENT-LVL V: ICD-10-PCS | Mod: PBBFAC,,, | Performed by: NURSE PRACTITIONER

## 2019-04-23 PROCEDURE — 99214 PR OFFICE/OUTPT VISIT, EST, LEVL IV, 30-39 MIN: ICD-10-PCS | Mod: S$PBB,,, | Performed by: NURSE PRACTITIONER

## 2019-04-23 PROCEDURE — 81000 URINALYSIS NONAUTO W/SCOPE: CPT

## 2019-04-23 PROCEDURE — 87086 URINE CULTURE/COLONY COUNT: CPT

## 2019-04-23 PROCEDURE — 99999 PR PBB SHADOW E&M-EST. PATIENT-LVL V: CPT | Mod: PBBFAC,,, | Performed by: NURSE PRACTITIONER

## 2019-04-23 RX ORDER — CIPROFLOXACIN 500 MG/1
500 TABLET ORAL 2 TIMES DAILY
Qty: 10 TABLET | Refills: 0 | Status: SHIPPED | OUTPATIENT
Start: 2019-04-23 | End: 2019-04-28

## 2019-04-23 NOTE — PATIENT INSTRUCTIONS
Understanding Urinary Tract Infections (UTIs)  Most UTIs are caused by bacteria, although they may also be caused by viruses or fungi. Bacteria from the bowel are the most common source of infection. The infection may start because of any of the following:  · Sexual activity. During sex, bacteria can travel from the penis, vagina, or rectum into the urethra.   · Bacteria on the skin outside the rectum may travel into the urethra. This is more common in women since the rectum and urethra are closer to each other than in men. Wiping from front to back after using the toilet and keeping the area clean can help prevent germs from getting to the urethra.  · Blockage of urine flow through the urinary tract. If urine sits too long, germs may start to grow out of control.      Parts of the urinary tract  The infection can occur in any part of the urinary tract.  · The kidneys collect and store urine.  · The ureters carry urine from the kidneys to the bladder.  · The bladder holds urine until you are ready to let it out.  · The urethra carries urine from the bladder out of the body. It is shorter in women, so bacteria can move through it more easily. The urethra is longer in men, so a UTI is less likely to reach the bladder or kidneys in men.  Date Last Reviewed: 1/1/2017  © 5243-6996 [employer]. [address] [place] [address], [place] [address]. All rights reserved. This information is not intended as a substitute for professional medical care. Always follow your healthcare professional's instructions.

## 2019-04-23 NOTE — PROGRESS NOTES
"[facility] Note  Staff: ALAN Rey-C    PCP: Dr. Jae Dolan     Chief Complaint: Urinary tract infection    Subjective:        HPI: Yuniel Gracia is a 73 y.o. female presents with dysuria, urinary urgency, frequency and feeling dripping around meatus x 2 months.  Pt feels as though she has a UTI as of today's office visit therefore here for further evaluation.    Pt denies gross hematuria at this time.  The pt was last seen by Dr. Landaverde on 07/18/2018 for urinary incontinence.    Current  meds:  Vesicare 5 mg one tablet daily and she is tolerating this medication with no problems.    PAST MEDICAL HISTORY:   SURGICAL: Hysterectomy, brain surgery for temporal neurologia done in [place] in 2007, breast lumpectomy last year for breast cancer and had chemotherapy and radiotherapy, bladder sling by Dr. Giles 5 years ago for stress incontinence.     MEDICAL: Hepatitis C, peptic ulcer, osteoarthritis, chronic constipation,   insomnia, hypothyroidism, osteoarthritis.     FAMILIAL: No family history of urinary malignancy, mother with kidney stones.     SOCIAL: The patient is ,  is here. They live in [place].     REVIEW OF SYSTEMS:  A comprehensive 10 system review was performed and is negative except as noted above in HPI    PMHx:  Past Medical History:   Diagnosis Date    Anemia     Arthritis     Blood transfusion     Breast cancer     Cancer RIGHT BREAST    Chronic constipation     Clotting disorder     Colon polyp     Diverticulosis     Glaucoma     Hepatitis C 2000    pt states history of hepatits c-"cured by Dr. Lynch"; TREATED 2 YEARS - 2000   OK NOW    Hypothyroid     Insomnia     Memory loss     reports some memory loss since chemo    Osteoarthritis     Panic attacks     Raynaud's disease     takes amlodipine for this    Ulcer     Vertigo      PSHx:  Past Surgical History:   Procedure Laterality Date    APPENDECTOMY-LAPAROSCOPIC " Right 4/21/2018    [facility]    BIOPSY, LYMPH NODE, SENTINEL Right 3/7/2012    [facility] OR    BLADDER SURGERY  2011    sling - Dr Giles  [facility]    BRAIN SURGERY  2007    [place] neural[place]    BREAST BIOPSY      BREAST LUMPECTOMY  3/12    malignant - had chemo and radiotherapy    COLONOSCOPY  01/03/2011    Dr Lynch, in media section, diverticulosis, hemorrhoids, otherwise normal findings; normal findings on random biopsies    COLONOSCOPY N/A 6/1/2017    [facility]formed [facility] Nate Lamb [facility] at [facility] ENDO    COLONOSCOPY W/ POLYPECTOMY  06/01/2017    Dr. Lamb, 1 adenomatous polyp, recheck 5 years    EYE SURGERY      cataracts bilateral    HYSTERECTOMY      INSERTION, PORT-A-CATH N/A 4/23/2012    [facility]    JOINT REPLACEMENT  09/25/2017    left [facility]nee [facility]     LUMPECTOMY Right 3/7/2012    [facility]    LUMPECTOMY-BREAST Right 3/29/2012    [facility]    removal of port a cath      REMOVAL, CATHETER, CENTRAL VENOUS, TUNNELED, WITH PORT Right 7/26/2012    [facility]    REPLACEMENT-KNEE WITH NAVIGATION Left 9/25/2017    [facility] OR    REPLACEMENT-KNEE WITH NAVIGATION-SIGHT ASSISTED Right 2/5/2018    Per[facility] by [facility] at [facility] OR    right lymph node removed from breast area      TUNNELED VENOUS PORT PLACEMENT  04/2012    left chest    UPPER GASTROINTESTINAL ENDOSCOPY  prior to 2011    small hiatal hernia     Allergies:  Adhesive and Codeine    Medications: reviewed   Anticoagulation: No    Objective:     Vitals:    04/23/19 1135   BP: 113/65   Pulse: (!) 54   Resp: 18   Temp: 97.8 °F (36.6 °C)     Physical Exam   Vitals reviewed.  Constitutional: She is oriented to person, place, and time. She appears well-developed and well-nourished.    HENT:   Head: Normocephalic and atraumatic.   Eyes: Conjunctivae and EOM are normal. Pupils are equal, round, and reactive to light.   Neck: Normal range of motion. Neck supple.   Cardiovascular: Normal rate, regular rhythm, normal heart sounds and intact distal pulses.    Pulmonary/Chest: Effort normal and breath sounds normal.   Abdominal: Soft. Bowel sounds are normal.   Musculoskeletal: Normal range of motion.   Neurological: She is alert and oriented to person, place, and time. She has normal reflexes.   Skin: Skin is warm and dry.     Psychiatric: She has a normal mood and affect. Her behavior is normal. Judgment and thought content normal.     LABS REVIEW:  UA today: ABNORMAL  Cr:   Lab Results   Component Value Date    CREATININE 0.8 02/08/2019     Assessment:       1. Acute cystitis without hematuria    2. History of urinary tract infection          Plan:   Dysuria, +UTI:    UA and Urine culture to be performed.  In meantime, we will prescribe Cipro 500 mg BID x 5 days for possible infection.    F/u with Dr. Landaverde if symptoms worsen.    [facility]: PT IS ACTIVE    YESIKA Mcrae

## 2019-04-27 LAB — BACTERIA UR CULT: NORMAL

## 2019-04-29 RX ORDER — AMOXICILLIN AND CLAVULANATE POTASSIUM 875; 125 MG/1; MG/1
1 TABLET, FILM COATED ORAL 2 TIMES DAILY
Qty: 10 TABLET | Refills: 0 | Status: SHIPPED | OUTPATIENT
Start: 2019-04-29 | End: 2019-05-04

## 2019-04-29 RX ORDER — FLUCONAZOLE 150 MG/1
150 TABLET ORAL DAILY
Qty: 1 TABLET | Refills: 0 | Status: SHIPPED | OUTPATIENT
Start: 2019-04-29 | End: 2019-04-30

## 2019-04-30 ENCOUNTER — EXTERNAL CHRONIC CARE MANAGEMENT (OUTPATIENT)
Dept: PRIMARY CARE CLINIC | Facility: CLINIC | Age: 74
End: 2019-04-30
Payer: MEDICARE

## 2019-04-30 PROCEDURE — 99490 CHRNC CARE MGMT STAFF 1ST 20: CPT | Mod: S$PBB,,, | Performed by: PSYCHIATRY & NEUROLOGY

## 2019-04-30 PROCEDURE — 99490 CHRNC CARE MGMT STAFF 1ST 20: CPT | Mod: PBBFAC | Performed by: PSYCHIATRY & NEUROLOGY

## 2019-04-30 PROCEDURE — 99490 PR CHRONIC CARE MGMT, 1ST 20 MIN: ICD-10-PCS | Mod: S$PBB,,, | Performed by: PSYCHIATRY & NEUROLOGY

## 2019-05-22 ENCOUNTER — CLINICAL SUPPORT (OUTPATIENT)
Dept: UROLOGY | Facility: CLINIC | Age: 74
End: 2019-05-22
Payer: MEDICARE

## 2019-05-22 ENCOUNTER — APPOINTMENT (OUTPATIENT)
Dept: LAB | Facility: HOSPITAL | Age: 74
End: 2019-05-22
Attending: NURSE PRACTITIONER
Payer: MEDICARE

## 2019-05-22 DIAGNOSIS — N30.00 ACUTE CYSTITIS WITHOUT HEMATURIA: Primary | ICD-10-CM

## 2019-05-22 LAB
BILIRUB UR QL STRIP: NEGATIVE
CLARITY UR: CLEAR
COLOR UR: YELLOW
GLUCOSE UR QL STRIP: NEGATIVE
HGB UR QL STRIP: NEGATIVE
KETONES UR QL STRIP: NEGATIVE
LEUKOCYTE ESTERASE UR QL STRIP: NEGATIVE
NITRITE UR QL STRIP: NEGATIVE
PH UR STRIP: 7 [PH] (ref 5–8)
PROT UR QL STRIP: NEGATIVE
SP GR UR STRIP: <=1.005 (ref 1–1.03)
URN SPEC COLLECT METH UR: ABNORMAL
UROBILINOGEN UR STRIP-ACNC: NEGATIVE EU/DL

## 2019-05-22 PROCEDURE — 81003 URINALYSIS AUTO W/O SCOPE: CPT

## 2019-05-22 PROCEDURE — 87086 URINE CULTURE/COLONY COUNT: CPT

## 2019-05-22 NOTE — PROGRESS NOTES
Patient arrived to office today to drop off a urine sample for retest.  Sample collected, labeled, and sent to lab.

## 2019-05-23 LAB
BACTERIA UR CULT: NORMAL
BACTERIA UR CULT: NORMAL

## 2019-05-31 ENCOUNTER — EXTERNAL CHRONIC CARE MANAGEMENT (OUTPATIENT)
Dept: PRIMARY CARE CLINIC | Facility: CLINIC | Age: 74
End: 2019-05-31
Payer: MEDICARE

## 2019-05-31 PROCEDURE — 99490 CHRNC CARE MGMT STAFF 1ST 20: CPT | Mod: PBBFAC | Performed by: PSYCHIATRY & NEUROLOGY

## 2019-05-31 PROCEDURE — 99490 CHRNC CARE MGMT STAFF 1ST 20: CPT | Mod: S$PBB,,, | Performed by: PSYCHIATRY & NEUROLOGY

## 2019-05-31 PROCEDURE — 99490 PR CHRONIC CARE MGMT, 1ST 20 MIN: ICD-10-PCS | Mod: S$PBB,,, | Performed by: PSYCHIATRY & NEUROLOGY

## 2019-06-10 ENCOUNTER — CLINICAL SUPPORT (OUTPATIENT)
Dept: UROLOGY | Facility: CLINIC | Age: 74
End: 2019-06-10
Payer: MEDICARE

## 2019-06-10 DIAGNOSIS — R30.0 DYSURIA: Primary | ICD-10-CM

## 2019-06-10 DIAGNOSIS — Z87.440 HISTORY OF URINARY TRACT INFECTION: ICD-10-CM

## 2019-06-10 PROCEDURE — 87086 URINE CULTURE/COLONY COUNT: CPT

## 2019-06-10 NOTE — PROGRESS NOTES
Patient arrived to give urine sample for culture.  Patient draped and prepped in sterile manner.   10fr cath inserted and urine collected which patient tolerated well.  Specimen labeled and sent to lab for culture.

## 2019-06-11 LAB — BACTERIA UR CULT: NO GROWTH

## 2019-06-28 DIAGNOSIS — E03.9 ACQUIRED HYPOTHYROIDISM: ICD-10-CM

## 2019-06-28 NOTE — TELEPHONE ENCOUNTER
She canceled the repeat TSH level that Jenna eaton ordered back in March.  We need that lab to reorder the thyroid

## 2019-06-30 ENCOUNTER — EXTERNAL CHRONIC CARE MANAGEMENT (OUTPATIENT)
Dept: PRIMARY CARE CLINIC | Facility: CLINIC | Age: 74
End: 2019-06-30
Payer: MEDICARE

## 2019-06-30 PROCEDURE — 99490 CHRNC CARE MGMT STAFF 1ST 20: CPT | Mod: S$PBB,,, | Performed by: PSYCHIATRY & NEUROLOGY

## 2019-06-30 PROCEDURE — 99490 PR CHRONIC CARE MGMT, 1ST 20 MIN: ICD-10-PCS | Mod: S$PBB,,, | Performed by: PSYCHIATRY & NEUROLOGY

## 2019-06-30 PROCEDURE — 99490 CHRNC CARE MGMT STAFF 1ST 20: CPT | Mod: PBBFAC | Performed by: PSYCHIATRY & NEUROLOGY

## 2019-07-02 ENCOUNTER — LAB VISIT (OUTPATIENT)
Dept: LAB | Facility: HOSPITAL | Age: 74
End: 2019-07-02
Attending: NURSE PRACTITIONER
Payer: MEDICARE

## 2019-07-02 DIAGNOSIS — E03.9 ACQUIRED HYPOTHYROIDISM: ICD-10-CM

## 2019-07-02 LAB — TSH SERPL DL<=0.005 MIU/L-ACNC: 0.95 UIU/ML (ref 0.4–4)

## 2019-07-02 PROCEDURE — 36415 COLL VENOUS BLD VENIPUNCTURE: CPT | Mod: PO

## 2019-07-02 PROCEDURE — 84443 ASSAY THYROID STIM HORMONE: CPT

## 2019-07-05 DIAGNOSIS — E03.9 ACQUIRED HYPOTHYROIDISM: ICD-10-CM

## 2019-07-05 RX ORDER — LEVOTHYROXINE SODIUM 75 UG/1
75 TABLET ORAL DAILY
Qty: 90 TABLET | Refills: 3 | Status: SHIPPED | OUTPATIENT
Start: 2019-07-05 | End: 2020-08-17

## 2019-07-08 RX ORDER — LEVOTHYROXINE SODIUM 75 UG/1
TABLET ORAL
Qty: 90 TABLET | Refills: 2 | OUTPATIENT
Start: 2019-07-08

## 2019-07-09 ENCOUNTER — TELEPHONE (OUTPATIENT)
Dept: UROLOGY | Facility: CLINIC | Age: 74
End: 2019-07-09

## 2019-07-09 NOTE — TELEPHONE ENCOUNTER
----- Message from Val Jiménez sent at 7/8/2019  4:22 PM CDT -----  Contact: self   Type:  Sooner Apoointment Request    Caller is requesting a sooner appointment.  Caller declined first available appointment listed below.  Caller will not accept being placed on the waitlist and is requesting a message be sent to doctor.    Name of Caller: patient   When is the first available appointment?  August   Symptoms:  Urinary incontinence, bladder infection   Best Call Back Number: [phone] (home)   Additional Information:  Patient need to bee seen this week

## 2019-07-10 ENCOUNTER — OFFICE VISIT (OUTPATIENT)
Dept: UROLOGY | Facility: CLINIC | Age: 74
End: 2019-07-10
Payer: MEDICARE

## 2019-07-10 VITALS — BODY MASS INDEX: 24.21 KG/M2 | HEIGHT: 65 IN | WEIGHT: 145.31 LBS

## 2019-07-10 DIAGNOSIS — N30.00 ACUTE CYSTITIS WITHOUT HEMATURIA: Primary | ICD-10-CM

## 2019-07-10 LAB
BILIRUB SERPL-MCNC: NORMAL MG/DL
BLOOD URINE, POC: NORMAL
COLOR, POC UA: YELLOW
GLUCOSE UR QL STRIP: NORMAL
KETONES UR QL STRIP: NORMAL
LEUKOCYTE ESTERASE URINE, POC: NORMAL
NITRITE, POC UA: NORMAL
PH, POC UA: 8
PROTEIN, POC: NORMAL
SPECIFIC GRAVITY, POC UA: 1.01
UROBILINOGEN, POC UA: NORMAL

## 2019-07-10 PROCEDURE — 87088 URINE BACTERIA CULTURE: CPT

## 2019-07-10 PROCEDURE — 87186 SC STD MICRODIL/AGAR DIL: CPT

## 2019-07-10 PROCEDURE — 99999 PR PBB SHADOW E&M-EST. PATIENT-LVL III: CPT | Mod: PBBFAC,,, | Performed by: UROLOGY

## 2019-07-10 PROCEDURE — 99213 OFFICE O/P EST LOW 20 MIN: CPT | Mod: S$PBB,,, | Performed by: UROLOGY

## 2019-07-10 PROCEDURE — 99213 PR OFFICE/OUTPT VISIT, EST, LEVL III, 20-29 MIN: ICD-10-PCS | Mod: S$PBB,,, | Performed by: UROLOGY

## 2019-07-10 PROCEDURE — 87086 URINE CULTURE/COLONY COUNT: CPT

## 2019-07-10 PROCEDURE — 99999 PR PBB SHADOW E&M-EST. PATIENT-LVL III: ICD-10-PCS | Mod: PBBFAC,,, | Performed by: UROLOGY

## 2019-07-10 PROCEDURE — 87077 CULTURE AEROBIC IDENTIFY: CPT

## 2019-07-10 PROCEDURE — 99213 OFFICE O/P EST LOW 20 MIN: CPT | Mod: PBBFAC,PO | Performed by: UROLOGY

## 2019-07-10 PROCEDURE — 81002 URINALYSIS NONAUTO W/O SCOPE: CPT | Mod: PBBFAC,PO | Performed by: UROLOGY

## 2019-07-10 NOTE — PROGRESS NOTES
UROLOGY [place]  7 10 19    Cc urinary incontinence    Age 74. Here for incontinence. Has urgency and urge incontinence. If coughs or sneezes does not leak urine. She cannot make the bathroom in time and the urine loss in unexpected. Has had this situation for about one month or more. Had some dysuria off and on and was diagnosed a uti a few weeks ago and was treated with antibiotics and did well. She is not sure if she is cured or not of the uti.    She is on vesicare 5 mg daily     On exam, abd soft and nontender  cva's neg      IMP  Possible uti, sending a urine for culture  If uninfected, may need to change vesicare, possibly a stronger option, perhaps myrbetriq 50 daily

## 2019-07-13 ENCOUNTER — TELEPHONE (OUTPATIENT)
Dept: UROLOGY | Facility: CLINIC | Age: 74
End: 2019-07-13

## 2019-07-13 DIAGNOSIS — N30.00 ACUTE CYSTITIS WITHOUT HEMATURIA: Primary | ICD-10-CM

## 2019-07-13 LAB — BACTERIA UR CULT: ABNORMAL

## 2019-07-13 RX ORDER — AMPICILLIN 500 MG/1
500 CAPSULE ORAL 3 TIMES DAILY
Qty: 21 CAPSULE | Refills: 0 | Status: SHIPPED | OUTPATIENT
Start: 2019-07-13 | End: 2019-07-19 | Stop reason: SDUPTHER

## 2019-07-13 NOTE — TELEPHONE ENCOUNTER
I called pt to let her know that the culture results called for ampicillin (not augmentin amoxicillin, like we gave her), I have called it in to the pharmacy

## 2019-07-19 ENCOUNTER — PATIENT MESSAGE (OUTPATIENT)
Dept: UROLOGY | Facility: CLINIC | Age: 74
End: 2019-07-19

## 2019-07-19 DIAGNOSIS — N30.00 ACUTE CYSTITIS WITHOUT HEMATURIA: ICD-10-CM

## 2019-07-19 RX ORDER — AMPICILLIN 500 MG/1
500 CAPSULE ORAL 3 TIMES DAILY
Qty: 6 CAPSULE | Refills: 0 | Status: SHIPPED | OUTPATIENT
Start: 2019-07-19 | End: 2019-07-21

## 2019-07-19 NOTE — TELEPHONE ENCOUNTER
----- Message from Fatou Camacho sent at 7/19/2019  8:05 AM CDT -----  Contact: Patient  Type: Needs Medical Advice    Who Called:  Patient    [facility] - [place], [place] - [address] [address]  [address] [address]  Phone: [phone] Fax: [phone]    Best Call Back Number: [phone]  Additional Information: Calling to request a refill for the ampicillin (PRINCIPEN) 500 MG capsule, Take 1 capsule (500 mg total) by mouth 3 (three) times daily. for 7 days - Oral, 21 capsules... She lost her last 5 pills that were for her bladder infection.

## 2019-07-26 ENCOUNTER — CLINICAL SUPPORT (OUTPATIENT)
Dept: URGENT CARE | Facility: CLINIC | Age: 74
End: 2019-07-26
Payer: MEDICARE

## 2019-07-26 ENCOUNTER — TELEPHONE (OUTPATIENT)
Dept: FAMILY MEDICINE | Facility: CLINIC | Age: 74
End: 2019-07-26

## 2019-07-26 DIAGNOSIS — Z23 IMMUNIZATION DUE: Primary | ICD-10-CM

## 2019-07-26 PROCEDURE — 90715 TDAP VACCINE GREATER THAN OR EQUAL TO 7YO IM: ICD-10-PCS | Mod: S$GLB,,, | Performed by: FAMILY MEDICINE

## 2019-07-26 PROCEDURE — 90471 IMMUNIZATION ADMIN: CPT | Mod: S$GLB,,, | Performed by: FAMILY MEDICINE

## 2019-07-26 PROCEDURE — 90715 TDAP VACCINE 7 YRS/> IM: CPT | Mod: S$GLB,,, | Performed by: FAMILY MEDICINE

## 2019-07-26 PROCEDURE — 90471 TDAP VACCINE GREATER THAN OR EQUAL TO 7YO IM: ICD-10-PCS | Mod: S$GLB,,, | Performed by: FAMILY MEDICINE

## 2019-07-26 NOTE — TELEPHONE ENCOUNTER
----- Message from Fatoumata Puri sent at 7/26/2019 11:31 AM CDT -----  Contact: Akila simon/ [facility]  Patient would like to be scheduled for whooping cough shot.  Please call pt at [phone]

## 2019-07-26 NOTE — TELEPHONE ENCOUNTER
Left voicemail for patient that tdap not covered and will be more cost effective for her to get it at her pharmacy.

## 2019-07-26 NOTE — TELEPHONE ENCOUNTER
I cannot order it.  Epic will not let me sign the order even when I printed the waiver.  She will have to get it at a pharmacy and she will be responsible for the cost.  The per tests is vaccine is not covered by Medicare.

## 2019-07-31 ENCOUNTER — EXTERNAL CHRONIC CARE MANAGEMENT (OUTPATIENT)
Dept: PRIMARY CARE CLINIC | Facility: CLINIC | Age: 74
End: 2019-07-31
Payer: MEDICARE

## 2019-07-31 PROCEDURE — 99490 PR CHRONIC CARE MGMT, 1ST 20 MIN: ICD-10-PCS | Mod: S$PBB,,, | Performed by: PSYCHIATRY & NEUROLOGY

## 2019-07-31 PROCEDURE — 99490 CHRNC CARE MGMT STAFF 1ST 20: CPT | Mod: PBBFAC | Performed by: PSYCHIATRY & NEUROLOGY

## 2019-07-31 PROCEDURE — 99490 CHRNC CARE MGMT STAFF 1ST 20: CPT | Mod: S$PBB,,, | Performed by: PSYCHIATRY & NEUROLOGY

## 2019-08-09 ENCOUNTER — HOSPITAL ENCOUNTER (OUTPATIENT)
Dept: RADIOLOGY | Facility: HOSPITAL | Age: 74
Discharge: HOME | End: 2019-08-09
Attending: INTERNAL MEDICINE
Payer: MEDICARE

## 2019-08-09 DIAGNOSIS — C50.011 MALIGNANT NEOPLASM OF NIPPLE OF RIGHT BREAST IN FEMALE, UNSPECIFIED ESTROGEN RECEPTOR STATUS: ICD-10-CM

## 2019-08-09 PROCEDURE — 71046 X-RAY EXAM CHEST 2 VIEWS: CPT | Mod: 26,,, | Performed by: RADIOLOGY

## 2019-08-09 PROCEDURE — 71046 X-RAY EXAM CHEST 2 VIEWS: CPT | Mod: TC,FY

## 2019-08-09 PROCEDURE — 71046 XR CHEST PA AND LATERAL: ICD-10-PCS | Mod: 26,,, | Performed by: RADIOLOGY

## 2019-08-12 ENCOUNTER — OFFICE VISIT (OUTPATIENT)
Dept: HEMATOLOGY/ONCOLOGY | Facility: CLINIC | Age: 74
End: 2019-08-12
Payer: MEDICARE

## 2019-08-12 ENCOUNTER — INFUSION (OUTPATIENT)
Dept: INFUSION THERAPY | Facility: HOSPITAL | Age: 74
End: 2019-08-12
Attending: INTERNAL MEDICINE
Payer: MEDICARE

## 2019-08-12 VITALS
HEART RATE: 57 BPM | RESPIRATION RATE: 18 BRPM | WEIGHT: 145.94 LBS | TEMPERATURE: 98 F | BODY MASS INDEX: 24.92 KG/M2 | DIASTOLIC BLOOD PRESSURE: 71 MMHG | HEIGHT: 64 IN | SYSTOLIC BLOOD PRESSURE: 133 MMHG

## 2019-08-12 VITALS
DIASTOLIC BLOOD PRESSURE: 58 MMHG | OXYGEN SATURATION: 95 % | RESPIRATION RATE: 20 BRPM | BODY MASS INDEX: 25.11 KG/M2 | HEART RATE: 58 BPM | WEIGHT: 147.06 LBS | HEIGHT: 64 IN | SYSTOLIC BLOOD PRESSURE: 127 MMHG | TEMPERATURE: 98 F

## 2019-08-12 DIAGNOSIS — E78.2 MIXED HYPERLIPIDEMIA: ICD-10-CM

## 2019-08-12 DIAGNOSIS — Z79.811 USE OF AROMATASE INHIBITORS: Primary | ICD-10-CM

## 2019-08-12 DIAGNOSIS — D69.6 THROMBOCYTOPENIA: ICD-10-CM

## 2019-08-12 DIAGNOSIS — C50.011 MALIGNANT NEOPLASM OF NIPPLE OF RIGHT BREAST IN FEMALE, UNSPECIFIED ESTROGEN RECEPTOR STATUS: ICD-10-CM

## 2019-08-12 DIAGNOSIS — M85.852 OSTEOPENIA OF LEFT HIP: ICD-10-CM

## 2019-08-12 DIAGNOSIS — Z79.811 USE OF AROMATASE INHIBITORS: ICD-10-CM

## 2019-08-12 DIAGNOSIS — Z86.19 HEPATITIS C VIRUS INFECTION CURED AFTER ANTIVIRAL DRUG THERAPY: ICD-10-CM

## 2019-08-12 DIAGNOSIS — C50.011 MALIGNANT NEOPLASM OF NIPPLE OF RIGHT BREAST IN FEMALE, UNSPECIFIED ESTROGEN RECEPTOR STATUS: Primary | ICD-10-CM

## 2019-08-12 PROCEDURE — 99214 OFFICE O/P EST MOD 30 MIN: CPT | Mod: S$PBB,,, | Performed by: INTERNAL MEDICINE

## 2019-08-12 PROCEDURE — 99214 OFFICE O/P EST MOD 30 MIN: CPT | Mod: PBBFAC,PO | Performed by: INTERNAL MEDICINE

## 2019-08-12 PROCEDURE — 99999 PR PBB SHADOW E&M-EST. PATIENT-LVL IV: ICD-10-PCS | Mod: PBBFAC,,, | Performed by: INTERNAL MEDICINE

## 2019-08-12 PROCEDURE — 99497 ADVNCD CARE PLAN 30 MIN: CPT | Mod: PBBFAC,PO | Performed by: INTERNAL MEDICINE

## 2019-08-12 PROCEDURE — 99497 ADVNCD CARE PLAN 30 MIN: CPT | Mod: S$PBB,,, | Performed by: INTERNAL MEDICINE

## 2019-08-12 PROCEDURE — 99497 PR ADVNCD CARE PLAN 30 MIN: ICD-10-PCS | Mod: S$PBB,,, | Performed by: INTERNAL MEDICINE

## 2019-08-12 PROCEDURE — 99999 PR PBB SHADOW E&M-EST. PATIENT-LVL IV: CPT | Mod: PBBFAC,,, | Performed by: INTERNAL MEDICINE

## 2019-08-12 PROCEDURE — 63600175 PHARM REV CODE 636 W HCPCS: Mod: JG | Performed by: INTERNAL MEDICINE

## 2019-08-12 PROCEDURE — 96372 THER/PROPH/DIAG INJ SC/IM: CPT

## 2019-08-12 PROCEDURE — 99214 PR OFFICE/OUTPT VISIT, EST, LEVL IV, 30-39 MIN: ICD-10-PCS | Mod: S$PBB,,, | Performed by: INTERNAL MEDICINE

## 2019-08-12 RX ADMIN — DENOSUMAB 60 MG: 60 INJECTION SUBCUTANEOUS at 02:08

## 2019-08-12 NOTE — PROGRESS NOTES
Subjective:       Patient ID: Yuniel Gracia is a 74 y.o. female.    Chief Complaint:breast ca  HPI:   Patient coming in for followup. She has a chronic ITP and hep C, history of    stage I breast cancer,rec score of 32 , so underwent TC  chemotherapy. She did notice a new mass in the rt breast that came back without issue, On arimidex which will complete 5 years in 9/2017/ Mild thrombocytopenia related to rx and hep c   has decidedc to get with prolia and she is here to start  Going on vacation again  REVIEW OF SYSTEMS:     CONSTITUTIONAL: The patient denies any weight change. There is no apparent    change in appetite, fever, night sweats, headaches, fatigue, dizziness, or    weakness.      SKIN: Denies rash, issues with nails, non-healing sores, bleeding, blotching    skin or abnormal bruising. Denies new moles or changes to existing moles.      BREASTS: There is no swelling around breasts or nipple discharge.    EYES: Denies eye pain, blurred vision, swelling, redness or discharge.      ENT AND MOUTH: Denies runny nose, stuffiness, sinus trouble or sores. Denies    nosebleeds. Denies, hoarseness, change in voice or swelling in front of the    neck.      CARDIOVASCULAR: Denies chest pain, discomfort or palpitations. Denies neck    swelling or episodes of passing out.      RESPIRATORY: Denies cough, sputum production, blood in sputum, and denies    shortness of breath.      GI: Denies trouble swallowing, indigestion, heartburn, abdominal pain, nausea,    vomiting, diarrhea, altered bowel habits, blood in stool, discoloration of    stools, change in nature of stool, bloating, increased abdominal girth.      GENITOURINARY: No discharge. No pelvic pain or lumps. No rash around groin or  lesions. No urinary frequency, hesitation, painful urination or blood in    urine. Denies incontinence. No problems with intercourse.      MUSCULOSKELETAL: Denies neck or back pain. Denies weakness in arms or legs,    joint  problems or distended inflamed veins in legs. Denies swelling or abnormal  glands.      NEUROLOGICAL: Denies tingling, numbness, altered mentation changes to nerve    function in the face, weakness to one or both of the body. Denies changes to    gait and denies multiple falls or accidents.      PSYCHIATRIC: Denies nervousness, anxiety, hallucinations, depression, suicidal    ideation, trouble sleeping or changes in behavior noticed by family.          PHYSICAL EXAM:     Wt Readings from Last 3 Encounters:   08/12/19 66.7 kg (147 lb 0.8 oz)   07/10/19 65.9 kg (145 lb 4.5 oz)   04/23/19 65.4 kg (144 lb 2.9 oz)     Temp Readings from Last 3 Encounters:   08/12/19 98.1 °F (36.7 °C)   04/23/19 97.8 °F (36.6 °C) (Oral)   03/11/19 98.5 °F (36.9 °C) (Oral)     BP Readings from Last 3 Encounters:   08/12/19 (!) 127/58   04/23/19 113/65   03/11/19 112/74     Pulse Readings from Last 3 Encounters:   08/12/19 (!) 58   04/23/19 (!) 54   03/11/19 61       GENERAL: Comfortable looking patient. Patient is in no distress.  Awake, alert and oriented to time, person and place.  No anxiety, or agitation.      HEENT: Normal conjunctivae and eyelids. WNL.  PERRLA 3 to 4 mm. No icterus, no pallor, no congestion, and no discharge noted.     NECK:  Supple. Trachea is central.  No crepitus.  No JVD or masses.    RESPIRATORY:  No intercostal retractions.  No dullness to percussion.  Chest is clear to auscultation.  No rales, rhonchi or wheezes.  No crepitus.  Good air entry bilaterally.    CARDIOVASCULAR:  S1 and S2 are normally heard without murmurs or gallops.  All peripheral pulses are present.    ABDOMEN:  Normal abdomen.  No hepatosplenomegaly.  No free fluid.  Bowel sounds are present.  No hernia noted. No masses.  No rebound or tenderness.  No guarding or rigidity.  Umbilicus is midline.    LYMPHATICS:  No axillary, cervical, supraclavicular, submental, or inguinal lymphadenopathy.    SKIN/MUSCULOSKELETAL:  There is no evidence of  excoriation marks or ecchmosis.  No rashes.  No cyanosis.  No clubbing.  No joint or skeletal deformities noted.  Normal range of motion.    NEUROLOGIC:  Higher functions are appropriate.  No cranial nerve deficits.  Normal ludmila.  Normal strength.  Motor and sensory functions are normal.  Deep tendon reflexes are normal.    GENITAL/RECTAL:  Exams are deferred.      Laboratory:     CBC:  Lab Results   Component Value Date    WBC 4.28 08/09/2019    RBC 4.27 08/09/2019    HGB 13.0 08/09/2019    HCT 39.8 08/09/2019    MCV 93 08/09/2019    MCH 30.4 08/09/2019    MCHC 32.7 08/09/2019    RDW 14.0 08/09/2019     (L) 08/09/2019    MPV 9.8 08/09/2019    GRAN 2.2 08/09/2019    GRAN 51.9 08/09/2019    LYMPH 1.5 08/09/2019    LYMPH 35.3 08/09/2019    MONO 0.3 08/09/2019    MONO 7.0 08/09/2019    EOS 0.2 08/09/2019    BASO 0.03 08/09/2019    EOSINOPHIL 4.9 08/09/2019    BASOPHIL 0.7 08/09/2019       BMP: BMP  Lab Results   Component Value Date     08/09/2019    K 4.1 08/09/2019     08/09/2019    CO2 29 08/09/2019    BUN 15 08/09/2019    CREATININE 0.9 08/09/2019    CALCIUM 9.4 08/09/2019    ANIONGAP 7 (L) 08/09/2019    ESTGFRAFRICA >60 08/09/2019    EGFRNONAA >60 08/09/2019       LFT:   Lab Results   Component Value Date    ALT 22 08/09/2019    AST 32 08/09/2019    ALKPHOS 54 (L) 08/09/2019    BILITOT 0.8 08/09/2019 12/2018mammogram neg:   Most recent scan 2015 neg  Tumor marker 29..4 ju;ly 2019 cxr . 7/2019wnl  Assessment/Plan:       Stage 1 breast ca  4. HTN, dyslipedemia, hypothyroidism, depression , contimue care with Dr. Dolan  cxr  For staging , had appendicitis and had emergency sx  Gave pt restoril rx for insomnia. generic  Breast ca: cont arimidex  For a total of 10 years dx in 2012  6 months with cbc, cmp she completes 10 years of arimidex in 9/2022   cont prolia  For osteo ok for infusion       thrombocytopenia cont to monitor,   due for mammo in 12/19   resume prolia today      Living  Will  During this visit, I engaged the patient in the advance care planning process.  The patient and I reviewed the role for advance directives and their purpose in directing future healthcare if the patient's unable to speak for him/herself.  At this point in time, the patient does have full decision-making capacity.  We discussed different extreme health states that she could experience, and reviewed what kind of medical care she would want in those situations.  The patient communicated that if she were comatose and had little chance of a meaningful recovery, she would not want machines/life-sustaining treatments used.  The patient completed a living will to reflect these preferences.  The patient   Has not already designated a healthcare power of  to make decisions on her behalf.   I spent a total of 25 minutes engaging the patient in this advance care planning discussion.

## 2019-08-31 ENCOUNTER — EXTERNAL CHRONIC CARE MANAGEMENT (OUTPATIENT)
Dept: PRIMARY CARE CLINIC | Facility: CLINIC | Age: 74
End: 2019-08-31
Payer: MEDICARE

## 2019-08-31 PROCEDURE — 99490 PR CHRONIC CARE MGMT, 1ST 20 MIN: ICD-10-PCS | Mod: S$PBB,,, | Performed by: PSYCHIATRY & NEUROLOGY

## 2019-08-31 PROCEDURE — 99490 CHRNC CARE MGMT STAFF 1ST 20: CPT | Mod: S$PBB,,, | Performed by: PSYCHIATRY & NEUROLOGY

## 2019-08-31 PROCEDURE — 99490 CHRNC CARE MGMT STAFF 1ST 20: CPT | Mod: PBBFAC | Performed by: PSYCHIATRY & NEUROLOGY

## 2019-09-02 DIAGNOSIS — C50.011 MALIGNANT NEOPLASM OF NIPPLE OF RIGHT BREAST IN FEMALE, UNSPECIFIED ESTROGEN RECEPTOR STATUS: ICD-10-CM

## 2019-09-03 RX ORDER — ANASTROZOLE 1 MG/1
TABLET ORAL
Qty: 90 TABLET | Refills: 4 | Status: SHIPPED | OUTPATIENT
Start: 2019-09-03 | End: 2020-12-14

## 2019-10-31 ENCOUNTER — EXTERNAL CHRONIC CARE MANAGEMENT (OUTPATIENT)
Dept: PRIMARY CARE CLINIC | Facility: CLINIC | Age: 74
End: 2019-10-31
Payer: MEDICARE

## 2019-10-31 PROCEDURE — 99490 CHRNC CARE MGMT STAFF 1ST 20: CPT | Mod: S$PBB,,, | Performed by: PSYCHIATRY & NEUROLOGY

## 2019-10-31 PROCEDURE — 99490 CHRNC CARE MGMT STAFF 1ST 20: CPT | Mod: PBBFAC | Performed by: PSYCHIATRY & NEUROLOGY

## 2019-10-31 PROCEDURE — 99490 PR CHRONIC CARE MGMT, 1ST 20 MIN: ICD-10-PCS | Mod: S$PBB,,, | Performed by: PSYCHIATRY & NEUROLOGY

## 2019-12-03 PROBLEM — I70.0 AORTIC CALCIFICATION: Status: ACTIVE | Noted: 2019-12-03

## 2019-12-04 ENCOUNTER — OFFICE VISIT (OUTPATIENT)
Dept: HOME HEALTH SERVICES | Facility: CLINIC | Age: 74
End: 2019-12-04
Payer: MEDICARE

## 2019-12-04 VITALS
WEIGHT: 151 LBS | HEIGHT: 64 IN | SYSTOLIC BLOOD PRESSURE: 101 MMHG | HEART RATE: 64 BPM | BODY MASS INDEX: 25.78 KG/M2 | DIASTOLIC BLOOD PRESSURE: 77 MMHG

## 2019-12-04 DIAGNOSIS — E03.9 ACQUIRED HYPOTHYROIDISM: ICD-10-CM

## 2019-12-04 DIAGNOSIS — Z00.00 ENCOUNTER FOR PREVENTIVE HEALTH EXAMINATION: Primary | ICD-10-CM

## 2019-12-04 DIAGNOSIS — D69.6 THROMBOCYTOPENIA: ICD-10-CM

## 2019-12-04 DIAGNOSIS — F41.8 DEPRESSION WITH ANXIETY: ICD-10-CM

## 2019-12-04 DIAGNOSIS — I70.0 AORTIC CALCIFICATION: ICD-10-CM

## 2019-12-04 DIAGNOSIS — Z91.81 RISK FOR FALLS: ICD-10-CM

## 2019-12-04 DIAGNOSIS — E78.2 MIXED HYPERLIPIDEMIA: ICD-10-CM

## 2019-12-04 DIAGNOSIS — I73.00 RAYNAUD'S PHENOMENON WITHOUT GANGRENE: ICD-10-CM

## 2019-12-04 DIAGNOSIS — N32.81 OVERACTIVE BLADDER: ICD-10-CM

## 2019-12-04 DIAGNOSIS — C50.011 MALIGNANT NEOPLASM OF NIPPLE OF RIGHT BREAST IN FEMALE, UNSPECIFIED ESTROGEN RECEPTOR STATUS: ICD-10-CM

## 2019-12-04 PROCEDURE — G0439 PR MEDICARE ANNUAL WELLNESS SUBSEQUENT VISIT: ICD-10-PCS | Mod: S$GLB,,, | Performed by: NURSE PRACTITIONER

## 2019-12-04 PROCEDURE — G0439 PPPS, SUBSEQ VISIT: HCPCS | Mod: S$GLB,,, | Performed by: NURSE PRACTITIONER

## 2019-12-04 NOTE — PROGRESS NOTES
"Yuniel Gracia presented for a  Medicare AWV and comprehensive Health Risk Assessment today. The following components were reviewed and updated:    · Medical history  · Family History  · Social history  · Allergies and Current Medications  · Health Risk Assessment  · Health Maintenance  · Care Team     ** See Completed Assessments for Annual Wellness Visit within the encounter summary.**       The following assessments were completed:  · Living Situation  · CAGE  · Depression Screening  · Timed Get Up and Go  · Whisper Test  · Cognitive Function Screening  ·   · Nutrition Screening  · ADL Screening  · PAQ Screening    Vitals:    12/04/19 1420   BP: 101/77   Pulse: 64   Weight: 68.5 kg (151 lb)   Height: 5' 4" (1.626 m)     Body mass index is 25.92 kg/m².  Physical Exam   Constitutional: She is oriented to person, place, and time. She appears well-developed and well-nourished.   HENT:   Head: Normocephalic.   Eyes: Pupils are equal, round, and reactive to light.   Neck: Normal range of motion.   Cardiovascular: Normal rate, regular rhythm and intact distal pulses.   Pulmonary/Chest: Effort normal and breath sounds normal.   Abdominal: Soft. Bowel sounds are normal. She exhibits no distension. There is no tenderness.   Musculoskeletal: Normal range of motion. She exhibits no edema.   Unsteady gait  Cane present   Neurological: She is alert and oriented to person, place, and time.   Skin: Skin is warm and dry.   Psychiatric: She has a normal mood and affect. Her behavior is normal.   Vitals reviewed.        Diagnoses and health risks identified today and associated recommendations/orders:    1. Encounter for preventive health examination  AWV Completed.  - Ambulatory referral to Physical Therapy    2. Aortic calcification  Stable, followed by PCP.    3. Thrombocytopenia  Stable, followed by PCP.    4. Acquired hypothyroidism  Stable, followed by PCP.    5. Malignant neoplasm of nipple of right breast in female, " unspecified estrogen receptor status  Stable, on Arimidex, followed by Oncology.    6. Raynaud's phenomenon without gangrene  Stable, followed by PCP.    7. Depression with anxiety  Stable, followed by PCP.    8. Mixed hyperlipidemia  Stable, followed by PCP.    9. Overactive bladder  Stable, followed by Urology.    10. Risk for falls  - Ambulatory referral to Physical Therapy      Provided Yuniel with a 5-10 year written screening schedule and personal prevention plan. Recommendations were developed using the USPSTF age appropriate recommendations. Education, counseling, and referrals were provided as needed. After Visit Summary printed and given to patient which includes a list of additional screenings\tests needed.    No follow-ups on file.    Chacha Sellers NP  I offered to discuss end of life issues, including information on how to make advance directives that the patient could use to name someone who would make medical decisions on their behalf if they became too ill to make themselves.    ___Patient declined  _X_Patient is interested, I provided paper work and offered to discuss.

## 2019-12-04 NOTE — PATIENT INSTRUCTIONS
Counseling and Referral of Other Preventative  (Italic type indicates deductible and co-insurance are waived)    Patient Name: Yuniel Gracia  Today's Date: 12/4/2019    Health Maintenance       Date Due Completion Date    Lipid Panel 03/13/2020 3/13/2019    High Dose Statin 12/04/2020 12/4/2019    DEXA SCAN 06/18/2021 6/18/2018    Colonoscopy 06/01/2022 6/1/2017    Override on 11/20/2011: Done (Dr Vinh craig 6 years )    Override on 1/3/2011: Done (Dr Lynch )    TETANUS VACCINE 07/26/2029 7/26/2019        Orders Placed This Encounter   Procedures    Ambulatory referral to Physical Therapy     The following information is provided to all patients.  This information is to help you find resources for any of the problems found today that may be affecting your health:                Living healthy guide: www.[facility].[place].gov      Understanding Diabetes: www.diabetes.org      Eating healthy: www.cdc.gov/healthyweight      CDC home safety checklist: www.cdc.gov/steadi/patient.html      Agency on Aging: www.goea.[place].[facility]      Alcoholics anonymous (AA): www.aa.org      Physical Activity: www.fran.nih.gov/ea7rzsr      Tobacco use: www.quitwithusla.org

## 2019-12-09 RX ORDER — SOLIFENACIN SUCCINATE 5 MG/1
TABLET, FILM COATED ORAL
Qty: 90 TABLET | Refills: 4 | Status: SHIPPED | OUTPATIENT
Start: 2019-12-09 | End: 2021-03-09

## 2019-12-11 ENCOUNTER — CLINICAL SUPPORT (OUTPATIENT)
Dept: REHABILITATION | Facility: HOSPITAL | Age: 74
End: 2019-12-11
Payer: MEDICARE

## 2019-12-11 DIAGNOSIS — Z91.81 RISK FOR FALLS: Primary | ICD-10-CM

## 2019-12-11 PROBLEM — R26.81 GAIT INSTABILITY: Status: ACTIVE | Noted: 2019-12-11

## 2019-12-11 PROBLEM — R29.898 LEG WEAKNESS: Status: ACTIVE | Noted: 2019-12-11

## 2019-12-11 PROBLEM — R26.89 BALANCE DISORDER: Status: ACTIVE | Noted: 2019-12-11

## 2019-12-11 PROCEDURE — 97112 NEUROMUSCULAR REEDUCATION: CPT | Mod: PN

## 2019-12-11 PROCEDURE — 97161 PT EVAL LOW COMPLEX 20 MIN: CPT | Mod: PN

## 2019-12-11 NOTE — PLAN OF CARE
"[facility]  Physical Therapy Initial Evaluation    Name: Yuniel Gracia  Clinic Number: [id number]    Therapy Diagnosis:   Encounter Diagnosis   Name Primary?    Risk for falls Yes     Physician: Chacha Sellers NP    Physician Orders: PT Eval and Treat   Medical Diagnosis from Referral: risk for falls  Evaluation Date: 12/11/2019  Authorization Period Expiration: 12/03/20  Plan of Care Expiration: 01/25/20  Visit # / Visits authorized: 1/ 1    Time In: 1505  Time Out: 1550  Total Billable Time: 15 minutes    Precautions: Fall       Subjective     Date of onset: 12/04/19  History of current condition - Yuniel reports: hx of frequent falls.  The last fall occurred several months ago while at [employer] - states that she stubbed her toe and stumbled (no injury).     Medical History:   Past Medical History:   Diagnosis Date    Anemia     Arthritis     Blood transfusion     Breast cancer     Cancer RIGHT BREAST    Chronic constipation     Clotting disorder     Colon polyp     Diverticulosis     Glaucoma     Hepatitis C 2000    pt states history of hepatits c-"cured by Dr. Lynch"; TREATED 2 YEARS - 2000   OK NOW    Hypothyroid     Insomnia     Malignant neoplasm of breast 4/12/2012    Memory loss     reports some memory loss since chemo    Osteoarthritis     Panic attacks     Raynaud's disease     takes amlodipine for this    Ulcer     Vertigo        Surgical History:   Yuniel Gracia  has a past surgical history that includes Hysterectomy; Brain surgery (2007); Tunneled venous port placement (04/2012); right lymph node removed from breast area; Bladder surgery (2011); removal of port a cath; Colonoscopy (01/03/2011); Upper gastrointestinal endoscopy (prior to 2011); Colonoscopy (N/A, 6/1/2017); Breast lumpectomy (3/12); Eye surgery; Joint replacement (09/25/2017); Colonoscopy w/ polypectomy (06/01/2017); Breast biopsy; and Appendectomy.    Medications:   Yuniel" has a current medication list which includes the following prescription(s): amlodipine, anastrozole, calcium carbonate, calcium carbonate/vitamin d3, co-enzyme q-10, fish oil-omega-3 fatty acids, levothyroxine, pravastatin, sertraline, solifenacin, temazepam, and UNABLE TO FIND.    Allergies:   Review of patient's allergies indicates:   Allergen Reactions    Adhesive Dermatitis and Rash    Codeine Nausea Only        Imaging: none    Prior Therapy: none  Social History: lives with her spouse in a 2-story home (flight of stairs and elevator)  Occupation: retired  Prior Level of Function: independent  Current Level of Function: supervision needed during general mobility    Pain:  Current 0/10, worst 8/10, best 0/10   Location: bilateral low back   Description: Aching and Dull  Aggravating Factors: Standing and Walking  Easing Factors: rest    Pts goals: improve stability during general mobility       Objective     Posture: WFL  Palpation: n/a  Sensation: intact  DTRs:  Edema:  Left: absent  Right: absent    Range of Motion/Strength:     Hip  Right   Left  Pain/Dysfunction with Movement    AROM PROM MMT AROM PROM MMT    Flexion   WFL    NT  3-/5   WFL    NT  3+/5    Extension   WFL    NT   NT   WFL    NT   NT    Abduction   WFL    NT   NT   WFL    NT   NT    Adduction   WFL    NT   NT   WFL    NT   NT    Internal rotation   WFL    NT   NT   WFL    NT   NT    External rotation   WFL    NT   NT   WFL    NT   NT        Knee  Right   Left  Pain/Dysfunction with Movement    AROM PROM MMT AROM PROM MMT    Flexion   WFL    NT   NT   WFL    NT   NT    Extension   WFL    NT  4-/5   WFL      NT  4-/5        Ankle  Right   Left  Pain/Dysfunction with Movement    AROM PROM MMT AROM PROM MMT    Plantarflexion   WFL    NT   NT   WFL    NT   NT    Dorsiflexion   WFL    NT   4/5   WFL    NT   4/5    Inversion   WFL    NT   NT   WFL    NT    NT    Eversion   WFL    NT   NT   WFL    NT   NT       Gait: Without AD  Analysis: unequal  step lengths; discontinuity between steps; mild path deviation  Falls: fell at [employer] several months ago  Bed Mobility: NT  Transfers: Assistance - supervision  Special Tests: Tinetti = 19/28    CMS Impairment/Limitation/Restriction for FOTO Upper Leg Survey    Therapist reviewed FOTO scores for Yuniel Gracia on 12/11/2019.     Limitation Score: 64%         TREATMENT     Treatment Time In: 1505  Treatment Time Out: 1550  Total Treatment time separate from Evaluation: 15 minutes    Yuniel participated in neuromuscular re-education activities to improve: Balance for 15 minutes. The following activities were included:     X 20 ea AirEx mini sq and HR/TR   X 40 ft zig zag through cones   X 25 ft ea stepping over obstacles = forwards/sideways      Home Exercises and Patient Education Provided    Education provided:   - n/a    Written Home Exercises Provided: no.      Assessment     Yuniel is a 74 y.o. female referred to outpatient Physical Therapy with a medical diagnosis of risk for falls. Pt presents with strength/balance/mobility deficits.    Pt prognosis is Good.   Pt will benefit from skilled outpatient Physical Therapy to address the deficits stated above and in the chart below, provide pt/family education, and to maximize pt's level of independence.     Plan of care discussed with patient: Yes  Pt's spiritual, cultural and educational needs considered and patient is agreeable to the plan of care and goals as stated below:     Anticipated Barriers for therapy: pain; imbalance    Medical Necessity is demonstrated by the following  History  Co-morbidities and personal factors that may impact the plan of care Co-morbidities:   history of cancer and prior abdominal surgery    Personal Factors:   no deficits     low   Examination  Body Structures and Functions, activity limitations and participation restrictions that may impact the plan of care Body Regions:   lower extremities    Body Systems:     strength  balance  gait    Participation Restrictions:   Fall precaution    Activity limitations:   Learning and applying knowledge  no deficits    General Tasks and Commands  no deficits    Communication  no deficits    Mobility  walking    Self care  no deficits    Domestic Life  shopping  cooking  doing house work (cleaning house, washing dishes, laundry)    Interactions/Relationships  no deficits    Life Areas  no deficits    Community and Social Life  no deficits         low   Clinical Presentation stable and uncomplicated low   Decision Making/ Complexity Score: low     Goals:    Short Term Goals (3 Weeks):   1. Patient to tolerate x 40 ft of heel-toe gait to improve her walking path to straight without a walking aid.  2. Patient to ascend/descend 60 ft ramp to demo safe mobility on outdoor obstacles.  3. Patient to tolerate x 30 ft of high stepping through a floor ladder to improve step symmetry to equal.    Long Term Goals (6 Weeks):   1. Patient to demo comp w/ HEP to maintain therapeutic gains.  2. Patient to improve rt hip MMT 1/2 grade to demo strength gains from therapeutic intervention.  3. Patient to ambulate community distances with normal ish and symmetry.  4. Patient to ascend/descend a flight of stairs to demo safe mobility in her home.      Plan     Plan of care Certification: 12/11/2019 to 01/25/2020.    Outpatient Physical Therapy eval, plus 2 times weekly for 6 weeks to include the following interventions (starting wk of 12/15/19): Gait Training, Manual Therapy, Moist Heat/ Ice, Neuromuscular Re-ed, Orthotic Management and Training, Patient Education, Therapeutic Activites, Therapeutic Exercise and HEP.     Osmel Mancuso, PT

## 2019-12-13 ENCOUNTER — HOSPITAL ENCOUNTER (OUTPATIENT)
Dept: RADIOLOGY | Facility: HOSPITAL | Age: 74
Discharge: HOME | End: 2019-12-13
Attending: INTERNAL MEDICINE
Payer: MEDICARE

## 2019-12-13 DIAGNOSIS — E78.2 MIXED HYPERLIPIDEMIA: ICD-10-CM

## 2019-12-13 DIAGNOSIS — D69.6 THROMBOCYTOPENIA: ICD-10-CM

## 2019-12-13 DIAGNOSIS — N63.0 LUMP, BREAST: ICD-10-CM

## 2019-12-13 DIAGNOSIS — C50.011 MALIGNANT NEOPLASM OF NIPPLE OF RIGHT BREAST IN FEMALE, UNSPECIFIED ESTROGEN RECEPTOR STATUS: ICD-10-CM

## 2019-12-13 DIAGNOSIS — Z86.19 HEPATITIS C VIRUS INFECTION CURED AFTER ANTIVIRAL DRUG THERAPY: ICD-10-CM

## 2019-12-13 DIAGNOSIS — Z79.811 USE OF AROMATASE INHIBITORS: ICD-10-CM

## 2019-12-13 PROCEDURE — 77062 BREAST TOMOSYNTHESIS BI: CPT | Mod: TC

## 2019-12-13 PROCEDURE — 77066 DX MAMMO INCL CAD BI: CPT | Mod: 26,,, | Performed by: RADIOLOGY

## 2019-12-13 PROCEDURE — 77066 MAMMO DIGITAL DIAGNOSTIC BILAT WITH TOMOSYNTHESIS_CAD: ICD-10-PCS | Mod: 26,,, | Performed by: RADIOLOGY

## 2019-12-13 PROCEDURE — 77062 BREAST TOMOSYNTHESIS BI: CPT | Mod: 26,,, | Performed by: RADIOLOGY

## 2019-12-13 PROCEDURE — 77062 MAMMO DIGITAL DIAGNOSTIC BILAT WITH TOMOSYNTHESIS_CAD: ICD-10-PCS | Mod: 26,,, | Performed by: RADIOLOGY

## 2019-12-16 ENCOUNTER — CLINICAL SUPPORT (OUTPATIENT)
Dept: REHABILITATION | Facility: HOSPITAL | Age: 74
End: 2019-12-16
Payer: MEDICARE

## 2019-12-16 DIAGNOSIS — R26.89 BALANCE DISORDER: ICD-10-CM

## 2019-12-16 DIAGNOSIS — R26.81 GAIT INSTABILITY: ICD-10-CM

## 2019-12-16 DIAGNOSIS — R29.898 WEAKNESS OF BOTH LOWER EXTREMITIES: ICD-10-CM

## 2019-12-16 PROCEDURE — 97110 THERAPEUTIC EXERCISES: CPT | Mod: PN

## 2019-12-16 NOTE — PROGRESS NOTES
"  Physical Therapy Daily Treatment Note     Name: Yuniel SOLOMON [facility] Number: [id number]    Therapy Diagnosis:   Encounter Diagnoses   Name Primary?    Gait instability     Balance disorder     Weakness of both lower extremities      Physician: Chacha Sellers NP    Visit Date: 12/16/2019    Physician Orders: PT Eval and Treat   Medical Diagnosis from Referral: risk for falls  Evaluation Date: 12/11/2019  Authorization Period Expiration: 12/03/20  Plan of Care Expiration: 01/25/20  Visit # / Visits authorized: 2/12    Time In: 1400  Time Out: 1458  Total Billable Time: 58 minutes    Precautions: Fall    Subjective     Pt reports: Having "barely any pain" today.  She hasn't received  home exercise program.  Response to previous treatment: No problems  Functional change: Improved heel strike    Pain: 1/10  Location: right low back      Objective     Yuniel received therapeutic exercises to develop strength, endurance, flexibility, core stabilization and balance for 58 minutes including:    NuStep Lv3 10'  // bar Airex: HR/TR x20    Mini squats x20    SLS 3x30" R/L    March x20 alt R/L    Hip abd x20 alt R/L    Step Up 5" box x20 R/L    Side step Up/Over x20 R<>L  Hamstring stretch 3x30" sit with stool R/L  Sit<>Stand x10 hands on knees  Piriformis stretch 3x30" sit R/L    Home Exercises Provided and Patient Education Provided     Education provided:   - Prolonged static gentle stretch without movement    Written Home Exercises Provided: yes.  Exercises were reviewed and Yuniel was able to demonstrate them prior to the end of the session.  Yuniel demonstrated good  understanding of the education provided.     See EMR under Media for exercises provided 12/16/2019.    Assessment     Patient tolerated activities with rest breaks.    Yuniel is progressing well towards her goals.     Pt will continue to benefit from skilled outpatient physical therapy to address the deficits listed in the problem list box on initial " evaluation, provide pt/family education and to maximize pt's level of independence in the home and community environment.     Pt's spiritual, cultural and educational needs considered and pt agreeable to plan of care and goals.     Goals: Progressing towards    Plan     Cont with POC to increase activities as patient tolerates.    Omaira Dyson, PTA

## 2019-12-18 ENCOUNTER — CLINICAL SUPPORT (OUTPATIENT)
Dept: REHABILITATION | Facility: HOSPITAL | Age: 74
End: 2019-12-18
Attending: NURSE PRACTITIONER
Payer: MEDICARE

## 2019-12-18 DIAGNOSIS — R26.81 GAIT INSTABILITY: ICD-10-CM

## 2019-12-18 DIAGNOSIS — R26.89 BALANCE DISORDER: ICD-10-CM

## 2019-12-18 DIAGNOSIS — R29.898 WEAKNESS OF BOTH LOWER EXTREMITIES: ICD-10-CM

## 2019-12-18 PROCEDURE — 97110 THERAPEUTIC EXERCISES: CPT | Mod: PN

## 2019-12-18 NOTE — PROGRESS NOTES
"  Physical Therapy Daily Treatment Note     Name: Yuniel SOLOMON [facility] Number: [id number]    Therapy Diagnosis:   Encounter Diagnoses   Name Primary?    Gait instability     Balance disorder     Weakness of both lower extremities      Physician: Chacha Sellers NP    Visit Date: 12/18/2019    Physician Orders: PT Eval and Treat   Medical Diagnosis from Referral: risk for falls  Evaluation Date: 12/11/2019  Authorization Period Expiration: 12/03/20  Plan of Care Expiration: 01/25/20  Visit # / Visits authorized: 3/12    Time In: 1255  Time Out: 1350  Total Billable Time: 55 minutes    Precautions: Fall    Subjective     Pt reports: Back pain most of the day.  She was compliant with home exercise program.  Response to previous treatment: No problems  Functional change: Improved balance per patient    Pain: 1/10  Location: left lower back      Objective     Yuniel received therapeutic exercises to develop strength, endurance and balance for 55 minutes including:    NuStep Lv3 10'  // bar Airex:      HR/TR Airex x20                          Mini squats Airex x20                          SLS Airex 3x30" R/L                          Step Up 6" box 2x10 R/L                          Side step Up/Over 6" x20 R<>L  Ladder on floor 100' CGA/MAITE  Tandem Gait 80' CGA/Maite    Home Exercises Provided and Patient Education Provided     Education provided:   - Cues to improve upright    Written Home Exercises Provided: Patient instructed to cont prior HEP.  Exercises were reviewed and Yuniel was able to demonstrate them prior to the end of the session.  Yuniel demonstrated good  understanding of the education provided.     See EMR under Patient Instructions for exercises provided prior visit.    Assessment     Patient tolerated activities with rest breaks.    Yuniel is progressing well towards her goals.     Pt will continue to benefit from skilled outpatient physical therapy to address the deficits listed in the problem list " box on initial evaluation, provide pt/family education and to maximize pt's level of independence in the home and community environment.     Pt's spiritual, cultural and educational needs considered and pt agreeable to plan of care and goals.     Goals: progressing towards    Plan     Continue with POC to increase activities as patient tolerates.    Omaira Dyson, PTA

## 2020-01-03 ENCOUNTER — CLINICAL SUPPORT (OUTPATIENT)
Dept: REHABILITATION | Facility: HOSPITAL | Age: 75
End: 2020-01-03
Attending: NURSE PRACTITIONER
Payer: MEDICARE

## 2020-01-03 DIAGNOSIS — R29.898 WEAKNESS OF BOTH LOWER EXTREMITIES: ICD-10-CM

## 2020-01-03 DIAGNOSIS — R26.89 BALANCE DISORDER: ICD-10-CM

## 2020-01-03 DIAGNOSIS — R26.81 GAIT INSTABILITY: ICD-10-CM

## 2020-01-03 PROCEDURE — 97110 THERAPEUTIC EXERCISES: CPT | Mod: PN

## 2020-01-03 NOTE — PROGRESS NOTES
"  Physical Therapy Daily Treatment Note     Name: Yuniel SOLOMON [facility] Number: [id number]    Therapy Diagnosis:   Encounter Diagnoses   Name Primary?    Gait instability     Balance disorder     Weakness of both lower extremities      Physician: Chacha Sellers NP    Visit Date: 1/3/2020    Physician Orders: PT Eval and Treat   Medical Diagnosis from Referral: risk for falls  Evaluation Date: 12/11/2019  Authorization Period Expiration: 12/03/20  Plan of Care Expiration: 01/25/20  Visit # / Visits authorized: 4/12    Time In: 1400  Time Out: 1445  Total Billable Time: 45 minutes    Precautions: Fall    Subjective     Pt reports: "I feel really good, I am sorry I missed so much during the holidays, I have been so busy"  She was compliant with home exercise program, but not consistently thru the holidays.  Response to previous treatment: No problems  Functional change: Improved balance per patient    Pain: 0/10  Location: None      Objective     Yuniel received therapeutic exercises to develop strength, endurance and balance for 45 minutes including:    NuStep Lv3 15'  // bar Airex:      HR/TR Airex x20                          Mini squats Airex x20                          SLS Airex 3x30" R/L    Hip abd on airex x 20 R/L                          Step Up 6" box 2x10 R/L                          Side step Up/Over 6" x20 R<>L  Weaving thru the cones 40' CGA/CARLIE  Rectangle side stepping leading L/R, walking fwd/bwd    Home Exercises Provided and Patient Education Provided     Education provided:   - Cues to improve upright  - Confirmed time and date of next appt    Written Home Exercises Provided: Patient instructed to cont prior HEP.  Exercises were reviewed and Yuniel was able to demonstrate them prior to the end of the session.  Yuniel demonstrated good  understanding of the education provided.     See EMR under Patient Instructions for exercises provided prior visit.    Assessment   Patient with decreased " endurance, requiring several seated rests. SLS remains difficult for patient, difficulty with follow thru of instructions for therex.    Yuniel is progressing well towards her goals.     Pt will continue to benefit from skilled outpatient physical therapy to address the deficits listed in the problem list box on initial evaluation, provide pt/family education and to maximize pt's level of independence in the home and community environment.     Pt's spiritual, cultural and educational needs considered and pt agreeable to plan of care and goals.     Goals: progressing towards    Plan     Continue with POC to increase activities as patient tolerates.    Jessica Lemus, PTA

## 2020-01-06 ENCOUNTER — CLINICAL SUPPORT (OUTPATIENT)
Dept: REHABILITATION | Facility: HOSPITAL | Age: 75
End: 2020-01-06
Attending: NURSE PRACTITIONER
Payer: MEDICARE

## 2020-01-06 DIAGNOSIS — R29.898 WEAKNESS OF BOTH LOWER EXTREMITIES: ICD-10-CM

## 2020-01-06 DIAGNOSIS — R26.81 GAIT INSTABILITY: ICD-10-CM

## 2020-01-06 DIAGNOSIS — R26.89 BALANCE DISORDER: ICD-10-CM

## 2020-01-06 PROCEDURE — 97110 THERAPEUTIC EXERCISES: CPT | Mod: PN

## 2020-01-06 NOTE — PROGRESS NOTES
"  Physical Therapy Daily Treatment Note     Name: Yuniel SOLOMON [facility] Number: [id number]    Therapy Diagnosis:   Encounter Diagnoses   Name Primary?    Gait instability     Balance disorder     Weakness of both lower extremities      Physician: Chacha Sellers NP    Visit Date: 1/6/2020    Physician Orders: PT Eval and Treat   Medical Diagnosis from Referral: risk for falls  Evaluation Date: 12/11/2019  Authorization Period Expiration: 12/03/20  Plan of Care Expiration: 01/25/20  Visit # / Visits authorized: 5/12    Time In: 1355  Time Out: 1455  Total Billable Time: 60 minutes    Precautions: Fall    Subjective     Pt reports: Doing well.  She was compliant with home exercise program.  Response to previous treatment: no problems  Functional change: improved function/balance    Pain: 0/10    Objective     Yuniel received therapeutic exercises to develop strength, endurance, flexibility, posture and balance for 60 minutes including:    Bike Lv3 15'  Hamstring stretch 3x30" sit with stool R/L  Piriformis stretch 3x30" sit R/L  Gastroc stretch 3x30" 1/2 roll B  HR/TR Airex x20 B  Mini squats x20 B  SLS Airex 3x30" R/L with mirror to correct posture  Tandem gait fwd/back 3x12 // bars  // bar step ups 6" box x20 R/L  // bar Side step up/over 6" box x10 R<>L    Home Exercises Provided and Patient Education Provided     Education provided:   - improve posture with all stand activities    Written Home Exercises Provided: Patient instructed to cont prior HEP.  Exercises were reviewed and Yuniel was able to demonstrate them prior to the end of the session.  Yuniel demonstrated good  understanding of the education provided.     See EMR under Patient Instructions for exercises provided prior visit.    Assessment     Patient tolerated activities with cues for direction and proper form.    Yuniel is progressing well towards her goals.     Pt will continue to benefit from skilled outpatient physical therapy to address the " deficits listed in the problem list box on initial evaluation, provide pt/family education and to maximize pt's level of independence in the home and community environment.     Pt's spiritual, cultural and educational needs considered and pt agreeable to plan of care and goals.     Goals: progressing towards    Plan     Continue with POC to increase activities as patient tolerates.    Omaira Dyson, PTA

## 2020-01-08 ENCOUNTER — CLINICAL SUPPORT (OUTPATIENT)
Dept: REHABILITATION | Facility: HOSPITAL | Age: 75
End: 2020-01-08
Attending: NURSE PRACTITIONER
Payer: MEDICARE

## 2020-01-08 DIAGNOSIS — R26.89 BALANCE DISORDER: ICD-10-CM

## 2020-01-08 DIAGNOSIS — R29.898 WEAKNESS OF BOTH LOWER EXTREMITIES: ICD-10-CM

## 2020-01-08 DIAGNOSIS — R26.81 GAIT INSTABILITY: ICD-10-CM

## 2020-01-08 PROCEDURE — 97110 THERAPEUTIC EXERCISES: CPT | Mod: PN,CQ

## 2020-01-08 NOTE — PROGRESS NOTES
"  Physical Therapy Daily Treatment Note     Name: Yuniel SOLOMON [facility] Number: [id number]    Therapy Diagnosis:   Encounter Diagnoses   Name Primary?    Gait instability     Balance disorder     Weakness of both lower extremities      Physician: Chacha Sellers NP    Visit Date: 1/8/2020    Physician Orders: PT Eval and Treat   Medical Diagnosis from Referral: risk for falls  Evaluation Date: 12/11/2019  Authorization Period Expiration: 12/03/20  Plan of Care Expiration: 01/25/20  Visit # / Visits authorized: 6/12    Time In: 1400  Time Out: 1455  Total Billable Time: 55 minutes    Precautions: Fall    Subjective     Pt reports: no falls. No pain. 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   She was compliant with home exercise program.  Response to previous treatment: no problems  Functional change: improved function/balance    Pain: 0/10    Objective     Yuniel received therapeutic exercises to develop strength, endurance, flexibility, posture and balance for 55 minutes including:    NuStep L4 LE's only 10'  Hamstring stretch 3x30" sit with stool R/L  Piriformis stretch 3x30" sit R/L  Gastroc stretch 3x30" 1/2 roll B  HR/TR Airex x20 B  Mini squats x20 B level tile using airex in front of knees for proper weight shifting  SLS Airex 3x30" R/L   Tandem gait fwd/back 3x12 // bars  Outdoor ramp 60' up/down SBA   Stairs with L rail to simulate home stairs x 21 steps with SBA    Home Exercises Provided and Patient Education Provided     Education provided:   - improve posture with all stand activities    Written Home Exercises Provided: Patient instructed to cont prior HEP.  Exercises were reviewed and Yuniel was able to demonstrate them prior to the end of the session.  Yuniel demonstrated good  understanding of the education provided.     See EMR under Patient Instructions for exercises provided prior visit.    Assessment     Patient tolerated " activities with cues for direction and proper form.    Yuniel is progressing well towards her goals.     Pt will continue to benefit from skilled outpatient physical therapy to address the deficits listed in the problem list box on initial evaluation, provide pt/family education and to maximize pt's level of independence in the home and community environment.     Pt's spiritual, cultural and educational needs considered and pt agreeable to plan of care and goals.     Goals:     Short Term Goals (3 Weeks):   1. Patient to tolerate x 40 ft of heel-toe gait to improve her walking path to straight without a walking aid.  2. Patient to ascend/descend 60 ft ramp to demo safe mobility on outdoor obstacles.  3. Patient to tolerate x 30 ft of high stepping through a floor ladder to improve step symmetry to equal.     Long Term Goals (6 Weeks):   1. Patient to demo comp w/ HEP to maintain therapeutic gains.  2. Patient to improve rt hip MMT 1/2 grade to demo strength gains from therapeutic intervention.  3. Patient to ambulate community distances with normal ish and symmetry.  4. Patient to ascend/descend a flight of stairs to demo safe mobility in her home.       Plan     Continue with POC to increase activities as patient tolerates.    Diana Moran, PTA

## 2020-01-13 ENCOUNTER — CLINICAL SUPPORT (OUTPATIENT)
Dept: REHABILITATION | Facility: HOSPITAL | Age: 75
End: 2020-01-13
Attending: NURSE PRACTITIONER
Payer: MEDICARE

## 2020-01-13 DIAGNOSIS — R26.89 BALANCE DISORDER: ICD-10-CM

## 2020-01-13 DIAGNOSIS — Z91.81 RISK FOR FALLS: Primary | ICD-10-CM

## 2020-01-13 DIAGNOSIS — R26.81 GAIT INSTABILITY: ICD-10-CM

## 2020-01-13 PROCEDURE — 97110 THERAPEUTIC EXERCISES: CPT | Mod: PN

## 2020-01-13 PROCEDURE — 97112 NEUROMUSCULAR REEDUCATION: CPT | Mod: PN

## 2020-01-13 NOTE — PROGRESS NOTES
Outpatient Physical Therapy Progress Note     Visit Date: 1/13/2020    Name: Yuniel Gracia  Clinic Number: [id number]    Therapy Diagnosis:   Encounter Diagnoses   Name Primary?    Risk for falls Yes    Gait instability     Balance disorder      Physician: Chacha Sellers NP    Physician Orders: PT Eval and Treat   Medical Diagnosis from Referral: risk for falls  Evaluation Date: 12/11/2019  Authorization Period Expiration: 01/25/2020  Plan of Care Expiration: 01/25/2020  Visit # / Visits authorized: 7/ 12    Time In: 1400  Time Out: 1445  Total Billable Time: 45 minutes    Precautions: Fall  Functional Level Prior to Evaluation:  supervision needed      Subjective     Pt reports: no real c/o pain.  She was compliant with home exercise program.  Response to previous treatment: no changes  Functional change: none    Pain: 0/10  Location: bilateral low back        Objective     Range of Motion/Strength:      Hip   Right     Left   Pain/Dysfunction with Movement     AROM PROM MMT AROM PROM MMT     Flexion   WFL    NT  3-/5   WFL    NT  3+/5     Extension   WFL    NT   NT   WFL    NT   NT     Abduction   WFL    NT   NT   WFL    NT   NT     Adduction   WFL    NT   NT   WFL    NT   NT     Internal rotation   WFL    NT   NT   WFL    NT   NT     External rotation   WFL    NT   NT   WFL    NT   NT           Knee   Right     Left   Pain/Dysfunction with Movement     AROM PROM MMT AROM PROM MMT     Flexion   WFL    NT   NT   WFL    NT   NT     Extension   WFL    NT  4-/5   WFL      NT  4-/5           Ankle   Right     Left   Pain/Dysfunction with Movement     AROM PROM MMT AROM PROM MMT     Plantarflexion   WFL    NT   NT   WFL    NT   NT     Dorsiflexion   WFL    NT   4/5   WFL    NT   4/5     Inversion   WFL    NT   NT   WFL    NT    NT     Eversion   WFL    NT   NT   WFL    NT   NT         Gait: Without AD  Analysis: unequal step lengths; discontinuity between steps; mild path deviation    Bed Mobility:  NT  Transfers: Assistance - supervision    Special Tests: n/a    Treatment:    Yuniel participated in neuromuscular re-education activities to improve: Balance for 30 minutes. The following activities were included:                 X 20 ea AirEx mini sq and HR/TR              X 60 ft ea zig zag through cones = forwards/sideways   X 40 ft ea balance training in // bars = side stepping, backwards gt, heel-toe   X 20 touch top of cones (alternating)                  Yuniel received therapeutic exercises to develop strength and endurance for 15 minutes including:     X 10 min SciFit (L4)   X 20 seated kayla. LE there ex = marching (2#), LAQ (2#), ball sq, hip ABD (RTB)      Home Exercises Provided and Patient Education Provided     Education provided:   - n/a    Written Home Exercises Provided: Patient instructed to cont prior HEP.      Assessment     Patient was able to tolerate treatment session with mild LOB.    Yuniel is progressing fairly well towards her goals.   Pt prognosis is Good.     Pt will continue to benefit from skilled outpatient physical therapy to address the deficits listed in the problem list box on initial evaluation, provide pt/family education and to maximize pt's level of independence in the home and community environment.     Pt's spiritual, cultural and educational needs considered and pt agreeable to plan of care and goals.     Anticipated barriers to physical therapy: imbalance    Goals:     Short Term Goals (3 Weeks):   1. Patient to tolerate x 40 ft of heel-toe gait to improve her walking path to straight without a walking aid. (PART MET)  2. Patient to ascend/descend 60 ft ramp to demo safe mobility on outdoor obstacles. (MET)  3. Patient to tolerate x 30 ft of high stepping through a floor ladder to improve step symmetry to equal. (NOT MET)     Long Term Goals (6 Weeks):   1. Patient to demo comp w/ HEP to maintain therapeutic gains. (NOT MET)  2. Patient to improve rt hip MMT 1/2 grade to  demo strength gains from therapeutic intervention. (NOT MET)  3. Patient to ambulate community distances with normal ish and symmetry. (NOT MET)  4.   Patient to ascend/descend a flight of stairs to demo safe mobility in her home. (NOT MET)      Plan     Continue to progress strength/balance/mobility training to patient's tolerance.      Osmel Mancuso, PT  1/13/2020

## 2020-01-14 NOTE — PROGRESS NOTES
PT/PTA met face to face to discuss pt's treatment plan and progress towards established goals. Pt will be seen by a physical therapist minimally every 6th visit or every 30 days.      Osmel Mancuso, PT

## 2020-01-15 ENCOUNTER — CLINICAL SUPPORT (OUTPATIENT)
Dept: REHABILITATION | Facility: HOSPITAL | Age: 75
End: 2020-01-15
Attending: NURSE PRACTITIONER
Payer: MEDICARE

## 2020-01-15 DIAGNOSIS — R29.898 WEAKNESS OF LOWER EXTREMITY, UNSPECIFIED LATERALITY: ICD-10-CM

## 2020-01-15 DIAGNOSIS — R26.81 GAIT INSTABILITY: ICD-10-CM

## 2020-01-15 DIAGNOSIS — R26.89 BALANCE DISORDER: ICD-10-CM

## 2020-01-15 PROCEDURE — 97112 NEUROMUSCULAR REEDUCATION: CPT | Mod: PN,CQ

## 2020-01-15 PROCEDURE — 97110 THERAPEUTIC EXERCISES: CPT | Mod: PN,CQ

## 2020-01-15 NOTE — PROGRESS NOTES
"  Outpatient Physical Therapy Progress Note     Visit Date: 1/15/2020    Name: Yuniel SOLOMON [facility] Number: [id number]    Therapy Diagnosis:   Encounter Diagnoses   Name Primary?    Gait instability     Balance disorder     Weakness of lower extremity, unspecified laterality      Physician: Chacha Sellers NP    Physician Orders: PT Eval and Treat   Medical Diagnosis from Referral: risk for falls  Evaluation Date: 12/11/2019  Authorization Period Expiration: 01/25/2020  Plan of Care Expiration: 01/25/2020  Visit # / Visits authorized: 8/ 12    Time In: 1257  Time Out: 1345  Total Billable Time: 48 minutes    Precautions: Fall  Functional Level Prior to Evaluation:  supervision needed      Subjective     Pt reports: "I am not hurting anywhere"  She was compliant with home exercise program.  Response to previous treatment: no changes  Functional change: "My legs have gotten stronger and my back didn't hurt last time I cleaned, I do think my stability has improved"    Pain: 0/10, anywhere        Objective   Yuniel participated in neuromuscular re-education activities to improve: Balance for 23 minutes. The following activities were included:  X 20 ea AirEx mini sq and HR/TR  X 60 ft ea zig zag through cones = forwards/sideways  X 60 ft ea balance training in // bars = side stepping, backwards gt, heel-toe  X 20 touch top of cones (alternating)                  Yuniel received therapeutic exercises to develop strength and endurance for 25 minutes including:  X 12 min SciFit (L4)  X 20 seated kayla. LE there ex = marching (2#), LAQ (2#), ball sq, hip ABD (GTB)      Home Exercises Provided and Patient Education Provided     Education provided:   - Date and time of next appt confirmed by patient 1/20/2020 2:00 PM    Written Home Exercises Provided: Patient instructed to cont prior HEP.      Assessment     Patient with confusion with balance activities and performing correctly, requiring VC's for technique.  Patient " also lacking endurance with balance activities.    Yuniel is progressing fairly well towards her goals.   Pt prognosis is Good.     Pt will continue to benefit from skilled outpatient physical therapy to address the deficits listed in the problem list box on initial evaluation, provide pt/family education and to maximize pt's level of independence in the home and community environment.     Pt's spiritual, cultural and educational needs considered and pt agreeable to plan of care and goals.     Anticipated barriers to physical therapy: imbalance    Goals:     Short Term Goals (3 Weeks):   1. Patient to tolerate x 40 ft of heel-toe gait to improve her walking path to straight without a walking aid. (PART MET)  2. Patient to ascend/descend 60 ft ramp to demo safe mobility on outdoor obstacles. (MET)  3. Patient to tolerate x 30 ft of high stepping through a floor ladder to improve step symmetry to equal. (NOT MET)     Long Term Goals (6 Weeks):   1. Patient to demo comp w/ HEP to maintain therapeutic gains. (NOT MET)  2. Patient to improve rt hip MMT 1/2 grade to demo strength gains from therapeutic intervention. (NOT MET)  3. Patient to ambulate community distances with normal ish and symmetry. (NOT MET)  4.   Patient to ascend/descend a flight of stairs to demo safe mobility in her home. (NOT MET)      Plan   Continue with therex for strengthening and continue to progress balance activities as able.      Jessica Lemus, PTA  1/15/2020

## 2020-01-20 ENCOUNTER — CLINICAL SUPPORT (OUTPATIENT)
Dept: REHABILITATION | Facility: HOSPITAL | Age: 75
End: 2020-01-20
Attending: NURSE PRACTITIONER
Payer: MEDICARE

## 2020-01-20 DIAGNOSIS — Z91.81 RISK FOR FALLS: Primary | ICD-10-CM

## 2020-01-20 DIAGNOSIS — R26.89 BALANCE DISORDER: ICD-10-CM

## 2020-01-20 DIAGNOSIS — R26.81 GAIT INSTABILITY: ICD-10-CM

## 2020-01-20 PROCEDURE — 97110 THERAPEUTIC EXERCISES: CPT | Mod: PN

## 2020-01-20 PROCEDURE — 97116 GAIT TRAINING THERAPY: CPT | Mod: PN

## 2020-01-20 PROCEDURE — 97112 NEUROMUSCULAR REEDUCATION: CPT | Mod: PN

## 2020-01-20 NOTE — PROGRESS NOTES
Outpatient Therapy Discharge Visit     Name: Yuniel SOLOMON [facility]  Clinic Number: [id number]    Therapy Diagnosis:   Encounter Diagnoses   Name Primary?    Risk for falls Yes    Gait instability     Balance disorder      Physician: Chacha Sellers NP    Physician Orders: PT Eval and Treat   Medical Diagnosis from Referral: risk for falls  Evaluation Date: 12/11/2019    Time In: 1400  Time Out: 1445  Total Billable Time: 45 minutes    Date of Last visit: 01/20/2020  Total Visits Received: 9  Cancelled Visits: 4  No Show Visits: 0    Subjective      Pt reports: no real c/o pain.  She was compliant with home exercise program.  Response to previous treatment: no changes  Functional change: none     Pain: 0/10  Location: bilateral low back          Objective      Range of Motion/Strength:      Hip   Right     Left   Pain/Dysfunction with Movement     AROM PROM MMT AROM PROM MMT     Flexion   WFL    NT  3+/5   WFL    NT  4-/5     Extension   WFL    NT   NT   WFL    NT   NT     Abduction   WFL    NT   NT   WFL    NT   NT     Adduction   WFL    NT   NT   WFL    NT   NT     Internal rotation   WFL    NT   NT   WFL    NT   NT     External rotation   WFL    NT   NT   WFL    NT   NT           Knee   Right     Left   Pain/Dysfunction with Movement     AROM PROM MMT AROM PROM MMT     Flexion   WFL    NT   NT   WFL    NT   NT     Extension   WFL    NT   4/5   WFL      NT   4/5           Ankle   Right     Left   Pain/Dysfunction with Movement     AROM PROM MMT AROM PROM MMT     Plantarflexion   WFL    NT   NT   WFL    NT   NT     Dorsiflexion   WFL    NT   4/5   WFL    NT   4/5     Inversion   WFL    NT   NT   WFL    NT    NT     Eversion   WFL    NT   NT   WFL    NT   NT         Gait: Without AD  Analysis: WFL     Bed Mobility: NT  Transfers: Assistance - supervision     Special Tests: Tinetti = 26/28     Treatment:     Yuniel participated in neuromuscular re-education activities to improve: Balance for 15 minutes. The following  activities were included:                 X 40 ft ea balance training in // bars = side stepping, backwards gt, heel-toe              X 30 ft ea floor ladder high stepping = forwards/sideways   Reviewed HEP        Yuniel received therapeutic exercises to develop strength and endurance for 10 minutes including:                 X 10 min SciFit (L4)       Yuniel participated in gait training to improve functional mobility and safety for 15 minutes, including:     Amb. 200 ft w/ supervision   Up/down 60 ft ramp w/ supervision   Up/down 21 steps w/ supervision       Home Exercises Provided and Patient Education Provided      Education provided:   - n/a     Written Home Exercises Provided: Patient instructed to cont prior HEP.       Assessment      Patient was able to tolerate treatment session with mild LOB.     Pt's spiritual, cultural and educational needs considered and pt agreeable to plan of care and goals.     Goals:      Short Term Goals (3 Weeks):   1. Patient to tolerate x 40 ft of heel-toe gait to improve her walking path to straight without a walking aid. (MET)  2. Patient to ascend/descend 60 ft ramp to demo safe mobility on outdoor obstacles. (MET)  3. Patient to tolerate x 30 ft of high stepping through a floor ladder to improve step symmetry to equal. (MET)     Long Term Goals (6 Weeks):   1. Patient to demo comp w/ HEP to maintain therapeutic gains. (MET)  2. Patient to improve rt hip MMT 1/2 grade to demo strength gains from therapeutic intervention. (MET)  3. Patient to ambulate community distances with normal ish and symmetry. (MET)  4.   Patient to ascend/descend a flight of stairs to demo safe mobility in her home. (MET)    Discharge reason: Patient has met all of her goals.      Plan     This patient is discharged from Physical Therapy.      Osmel Mancuso, PT  01/20/2020

## 2020-01-28 ENCOUNTER — PATIENT MESSAGE (OUTPATIENT)
Dept: HEMATOLOGY/ONCOLOGY | Facility: CLINIC | Age: 75
End: 2020-01-28

## 2020-01-29 ENCOUNTER — OFFICE VISIT (OUTPATIENT)
Dept: HEMATOLOGY/ONCOLOGY | Facility: CLINIC | Age: 75
End: 2020-01-29
Payer: MEDICARE

## 2020-01-29 VITALS
RESPIRATION RATE: 18 BRPM | BODY MASS INDEX: 24.88 KG/M2 | DIASTOLIC BLOOD PRESSURE: 60 MMHG | SYSTOLIC BLOOD PRESSURE: 128 MMHG | OXYGEN SATURATION: 98 % | TEMPERATURE: 98 F | HEART RATE: 62 BPM | HEIGHT: 64 IN | WEIGHT: 145.75 LBS

## 2020-01-29 DIAGNOSIS — D69.6 THROMBOCYTOPENIA: ICD-10-CM

## 2020-01-29 DIAGNOSIS — C50.011 MALIGNANT NEOPLASM OF NIPPLE OF RIGHT BREAST IN FEMALE, UNSPECIFIED ESTROGEN RECEPTOR STATUS: Primary | ICD-10-CM

## 2020-01-29 DIAGNOSIS — M85.852 OSTEOPENIA OF LEFT HIP: ICD-10-CM

## 2020-01-29 DIAGNOSIS — K12.1 ORAL ULCER: ICD-10-CM

## 2020-01-29 DIAGNOSIS — Z86.19 HEPATITIS C VIRUS INFECTION CURED AFTER ANTIVIRAL DRUG THERAPY: ICD-10-CM

## 2020-01-29 DIAGNOSIS — Z79.811 USE OF AROMATASE INHIBITORS: ICD-10-CM

## 2020-01-29 PROCEDURE — 1125F PR PAIN SEVERITY QUANTIFIED, PAIN PRESENT: ICD-10-PCS | Mod: ,,, | Performed by: INTERNAL MEDICINE

## 2020-01-29 PROCEDURE — 99999 PR PBB SHADOW E&M-EST. PATIENT-LVL IV: ICD-10-PCS | Mod: PBBFAC,,, | Performed by: INTERNAL MEDICINE

## 2020-01-29 PROCEDURE — 99999 PR PBB SHADOW E&M-EST. PATIENT-LVL IV: CPT | Mod: PBBFAC,,, | Performed by: INTERNAL MEDICINE

## 2020-01-29 PROCEDURE — 99214 PR OFFICE/OUTPT VISIT, EST, LEVL IV, 30-39 MIN: ICD-10-PCS | Mod: S$PBB,,, | Performed by: INTERNAL MEDICINE

## 2020-01-29 PROCEDURE — 1159F PR MEDICATION LIST DOCUMENTED IN MEDICAL RECORD: ICD-10-PCS | Mod: ,,, | Performed by: INTERNAL MEDICINE

## 2020-01-29 PROCEDURE — 1159F MED LIST DOCD IN RCRD: CPT | Mod: ,,, | Performed by: INTERNAL MEDICINE

## 2020-01-29 PROCEDURE — 1125F AMNT PAIN NOTED PAIN PRSNT: CPT | Mod: ,,, | Performed by: INTERNAL MEDICINE

## 2020-01-29 PROCEDURE — 99214 OFFICE O/P EST MOD 30 MIN: CPT | Mod: PBBFAC,PO | Performed by: INTERNAL MEDICINE

## 2020-01-29 PROCEDURE — 99214 OFFICE O/P EST MOD 30 MIN: CPT | Mod: S$PBB,,, | Performed by: INTERNAL MEDICINE

## 2020-01-29 NOTE — PROGRESS NOTES
Subjective:       Patient ID: Yuniel Gracia is a 74 y.o. female.    Chief Complaint:breast ca  Making an urgent visit today because she has got some mouth sores she used to get these as a child as well.  since last month these have been recurrent  HPI:    She has a chronic ITP and hep C, history of    stage I breast cancer,rec score of 32 , so underwent TC  chemotherapy. She did notice a new mass in the rt breast that came back without issue, On arimidex  5 years in 9/2017/ Mild thrombocytopenia related to rx and hep c   has received prolia next follow-up appointment would have been next month she is making an urgent visit today    REVIEW OF SYSTEMS:     CONSTITUTIONAL: The patient denies any weight change. There is no apparent    change in appetite, fever, night sweats, headaches, fatigue, dizziness, or    weakness.      SKIN: Denies rash, issues with nails, non-healing sores, bleeding, blotching    skin or abnormal bruising. Denies new moles or changes to existing moles.      BREASTS: There is no swelling around breasts or nipple discharge.    EYES: Denies eye pain, blurred vision, swelling, redness or discharge.      ENT AND MOUTH:  3 sores around gum line.  And buccal mucosa  CARDIOVASCULAR: Denies chest pain, discomfort or palpitations. Denies neck    swelling or episodes of passing out.      RESPIRATORY: Denies cough, sputum production, blood in sputum, and denies    shortness of breath.      GI: Denies trouble swallowing, indigestion, heartburn, abdominal pain, nausea,    vomiting, diarrhea, altered bowel habits, blood in stool, discoloration of    stools, change in nature of stool, bloating, increased abdominal girth.      GENITOURINARY: No discharge. No pelvic pain or lumps. No rash around groin or  lesions. No urinary frequency, hesitation, painful urination or blood in    urine. Denies incontinence. No problems with intercourse.      MUSCULOSKELETAL: Denies neck or back pain. Denies weakness in  arms or legs,    joint problems or distended inflamed veins in legs. Denies swelling or abnormal  glands.      NEUROLOGICAL: Denies tingling, numbness, altered mentation changes to nerve    function in the face, weakness to one or both of the body. Denies changes to    gait and denies multiple falls or accidents.      PSYCHIATRIC: Denies nervousness, anxiety, hallucinations, depression, suicidal    ideation, trouble sleeping or changes in behavior noticed by family.          PHYSICAL EXAM:     Wt Readings from Last 3 Encounters:   12/04/19 68.5 kg (151 lb)   08/12/19 66.2 kg (145 lb 15.1 oz)   08/12/19 66.7 kg (147 lb 0.8 oz)     Temp Readings from Last 3 Encounters:   11/11/19 97.3 °F (36.3 °C)   08/12/19 98.2 °F (36.8 °C)   08/12/19 98.1 °F (36.7 °C)     BP Readings from Last 3 Encounters:   12/04/19 101/77   11/11/19 (!) 147/108   08/12/19 133/71     Pulse Readings from Last 3 Encounters:   12/04/19 64   11/11/19 77   08/12/19 (!) 57       GENERAL: Comfortable looking patient. Patient is in no distress.  Awake, alert and oriented to time, person and place.  No anxiety, or agitation.      HEENT: Normal conjunctivae and eyelids. WNL.  PERRLA 3 to 4 mm. No icterus, no pallor, no congestion, and no discharge noted. 3 small aphthous ulcers noted in oral cavity    NECK:  Supple. Trachea is central.  No crepitus.  No JVD or masses.    RESPIRATORY:  No intercostal retractions.  No dullness to percussion.  Chest is clear to auscultation.  No rales, rhonchi or wheezes.  No crepitus.  Good air entry bilaterally.    CARDIOVASCULAR:  S1 and S2 are normally heard without murmurs or gallops.  All peripheral pulses are present.    ABDOMEN:  Normal abdomen.  No hepatosplenomegaly.  No free fluid.  Bowel sounds are present.  No hernia noted. No masses.  No rebound or tenderness.  No guarding or rigidity.  Umbilicus is midline.    LYMPHATICS:  No axillary, cervical, supraclavicular, submental, or inguinal  lymphadenopathy.    SKIN/MUSCULOSKELETAL:  There is no evidence of excoriation marks or ecchmosis.  No rashes.  No cyanosis.  No clubbing.  No joint or skeletal deformities noted.  Normal range of motion.    NEUROLOGIC:  Higher functions are appropriate.  No cranial nerve deficits.  Normal ludmila.  Normal strength.  Motor and sensory functions are normal.  Deep tendon reflexes are normal.    GENITAL/RECTAL:  Exams are deferred.      Laboratory:     CBC:  Lab Results   Component Value Date    WBC 4.28 08/09/2019    RBC 4.27 08/09/2019    HGB 13.0 08/09/2019    HCT 39.8 08/09/2019    MCV 93 08/09/2019    MCH 30.4 08/09/2019    MCHC 32.7 08/09/2019    RDW 14.0 08/09/2019     (L) 08/09/2019    MPV 9.8 08/09/2019    GRAN 2.2 08/09/2019    GRAN 51.9 08/09/2019    LYMPH 1.5 08/09/2019    LYMPH 35.3 08/09/2019    MONO 0.3 08/09/2019    MONO 7.0 08/09/2019    EOS 0.2 08/09/2019    BASO 0.03 08/09/2019    EOSINOPHIL 4.9 08/09/2019    BASOPHIL 0.7 08/09/2019       BMP: BMP  Lab Results   Component Value Date     08/09/2019    K 4.1 08/09/2019     08/09/2019    CO2 29 08/09/2019    BUN 15 08/09/2019    CREATININE 0.9 08/09/2019    CALCIUM 9.4 08/09/2019    ANIONGAP 7 (L) 08/09/2019    ESTGFRAFRICA >60 08/09/2019    EGFRNONAA >60 08/09/2019       LFT:   Lab Results   Component Value Date    ALT 22 08/09/2019    AST 32 08/09/2019    ALKPHOS 54 (L) 08/09/2019    BILITOT 0.8 08/09/2019 12/2018mammogram neg:   Most recent scan 2015 neg  Tumor marker 29..4 ju;ly 2019 cxr . 7/2019wnl  Assessment/Plan:     three small ulcers none looking malignant patient is very anxious refer to ENT  Keep scheduled appointment with me for follow-up  Stage 1 breast ca  4. HTN, dyslipedemia, hypothyroidism, depression , contimue care with Dr. Dolan  cxr  For staging , had appendicitis and had emergency sx  Gave pt restoril rx for insomnia. generic  Breast ca: cont arimidex  For a total of 10 years dx in 2012  Keep follow-up with  cbc, cmp she completes 10 years of arimidex in 9/2022   cont prolia  For osteo ok for infusion       thrombocytopenia cont to monitor,   due for mammo in 12/19   resume prolia today      Living Will  Discussed and documented previous visit

## 2020-01-30 DIAGNOSIS — K12.1 ORAL ULCER: Primary | ICD-10-CM

## 2020-02-06 NOTE — PLAN OF CARE
START ON PATHWAY REGIMEN - Breast    [id number]        Doxorubicin (Adriamycin(R))       Cyclophosphamide (Cytoxan(R))       [id number]     **Always confirm dose/schedule in your pharmacy ordering system**        Paclitaxel (Taxol(R))     **Always confirm dose/schedule in your pharmacy ordering system**    Patient Characteristics:  Postoperative without Neoadjuvant Therapy (Pathologic Staging), Invasive   Disease, Adjuvant Therapy, HER2 Negative/Unknown/Equivocal, ER Positive, Node   Negative, pT1a-c, pN0/N1mi or pT2 or Higher, pN0, Oncotype High Risk (? 26)  Therapeutic Status: Postoperative without Neoadjuvant Therapy (Pathologic   Staging)  AJCC Grade: G1  AJCC N Category: pN0(mol+)  AJCC M Category: cM0  ER Status: Positive (+)  AJCC 8 Stage Grouping: IA  HER2 Status: Negative (-)  Oncotype Dx Recurrence Score: 32  AJCC T Category: pT1  WI Status: Positive (+)  Has this patient completed genomic testing<= Yes - Oncotype DX(R)  Intent of Therapy:  Curative Intent, Discussed with Patient

## 2020-02-10 ENCOUNTER — LAB VISIT (OUTPATIENT)
Dept: LAB | Facility: HOSPITAL | Age: 75
End: 2020-02-10
Attending: INTERNAL MEDICINE
Payer: MEDICARE

## 2020-02-10 DIAGNOSIS — Z86.19 HEPATITIS C VIRUS INFECTION CURED AFTER ANTIVIRAL DRUG THERAPY: ICD-10-CM

## 2020-02-10 DIAGNOSIS — D69.6 THROMBOCYTOPENIA: ICD-10-CM

## 2020-02-10 DIAGNOSIS — C50.011 MALIGNANT NEOPLASM OF NIPPLE OF RIGHT BREAST IN FEMALE, UNSPECIFIED ESTROGEN RECEPTOR STATUS: ICD-10-CM

## 2020-02-10 DIAGNOSIS — Z79.811 USE OF AROMATASE INHIBITORS: ICD-10-CM

## 2020-02-10 DIAGNOSIS — E78.2 MIXED HYPERLIPIDEMIA: ICD-10-CM

## 2020-02-10 LAB
ALBUMIN SERPL BCP-MCNC: 4 G/DL (ref 3.5–5.2)
ALP SERPL-CCNC: 47 U/L (ref 55–135)
ALT SERPL W/O P-5'-P-CCNC: 19 U/L (ref 10–44)
ANION GAP SERPL CALC-SCNC: 8 MMOL/L (ref 8–16)
AST SERPL-CCNC: 34 U/L (ref 10–40)
BASOPHILS # BLD AUTO: 0.02 K/UL (ref 0–0.2)
BASOPHILS NFR BLD: 0.5 % (ref 0–1.9)
BILIRUB SERPL-MCNC: 1 MG/DL (ref 0.1–1)
BUN SERPL-MCNC: 11 MG/DL (ref 8–23)
CALCIUM SERPL-MCNC: 9.6 MG/DL (ref 8.7–10.5)
CHLORIDE SERPL-SCNC: 106 MMOL/L (ref 95–110)
CO2 SERPL-SCNC: 26 MMOL/L (ref 23–29)
CREAT SERPL-MCNC: 1 MG/DL (ref 0.5–1.4)
DIFFERENTIAL METHOD: ABNORMAL
EOSINOPHIL # BLD AUTO: 0.2 K/UL (ref 0–0.5)
EOSINOPHIL NFR BLD: 5 % (ref 0–8)
ERYTHROCYTE [DISTWIDTH] IN BLOOD BY AUTOMATED COUNT: 13.9 % (ref 11.5–14.5)
EST. GFR  (AFRICAN AMERICAN): >60 ML/MIN/1.73 M^2
EST. GFR  (NON AFRICAN AMERICAN): 55 ML/MIN/1.73 M^2
GLUCOSE SERPL-MCNC: 97 MG/DL (ref 70–110)
HCT VFR BLD AUTO: 40.6 % (ref 37–48.5)
HGB BLD-MCNC: 13.3 G/DL (ref 12–16)
IMM GRANULOCYTES # BLD AUTO: 0.01 K/UL (ref 0–0.04)
LYMPHOCYTES # BLD AUTO: 1.2 K/UL (ref 1–4.8)
LYMPHOCYTES NFR BLD: 31.5 % (ref 18–48)
MCH RBC QN AUTO: 31 PG (ref 27–31)
MCHC RBC AUTO-ENTMCNC: 32.8 G/DL (ref 32–36)
MCV RBC AUTO: 95 FL (ref 82–98)
MONOCYTES # BLD AUTO: 0.4 K/UL (ref 0.3–1)
MONOCYTES NFR BLD: 9.4 % (ref 4–15)
NEUTROPHILS # BLD AUTO: 2 K/UL (ref 1.8–7.7)
NEUTROPHILS NFR BLD: 53.3 % (ref 38–73)
NRBC BLD-RTO: 0 /100 WBC
PLATELET # BLD AUTO: 138 K/UL (ref 150–350)
PMV BLD AUTO: 10.4 FL (ref 9.2–12.9)
POTASSIUM SERPL-SCNC: 4.6 MMOL/L (ref 3.5–5.1)
PROT SERPL-MCNC: 7.2 G/DL (ref 6–8.4)
RBC # BLD AUTO: 4.29 M/UL (ref 4–5.4)
SODIUM SERPL-SCNC: 140 MMOL/L (ref 136–145)
WBC # BLD AUTO: 3.81 K/UL (ref 3.9–12.7)

## 2020-02-10 PROCEDURE — 36415 COLL VENOUS BLD VENIPUNCTURE: CPT

## 2020-02-10 PROCEDURE — 85025 COMPLETE CBC W/AUTO DIFF WBC: CPT

## 2020-02-10 PROCEDURE — 80053 COMPREHEN METABOLIC PANEL: CPT

## 2020-02-11 LAB — CANCER AG27-29 SERPL-ACNC: 23.6 U/ML

## 2020-02-17 ENCOUNTER — OFFICE VISIT (OUTPATIENT)
Dept: HEMATOLOGY/ONCOLOGY | Facility: CLINIC | Age: 75
End: 2020-02-17
Payer: MEDICARE

## 2020-02-17 ENCOUNTER — INFUSION (OUTPATIENT)
Dept: INFUSION THERAPY | Facility: HOSPITAL | Age: 75
End: 2020-02-17
Attending: INTERNAL MEDICINE
Payer: MEDICARE

## 2020-02-17 VITALS
WEIGHT: 146.81 LBS | BODY MASS INDEX: 25.06 KG/M2 | RESPIRATION RATE: 16 BRPM | HEART RATE: 55 BPM | OXYGEN SATURATION: 96 % | DIASTOLIC BLOOD PRESSURE: 56 MMHG | TEMPERATURE: 98 F | HEIGHT: 64 IN | SYSTOLIC BLOOD PRESSURE: 128 MMHG

## 2020-02-17 VITALS
OXYGEN SATURATION: 98 % | DIASTOLIC BLOOD PRESSURE: 63 MMHG | HEART RATE: 54 BPM | RESPIRATION RATE: 16 BRPM | BODY MASS INDEX: 24.89 KG/M2 | SYSTOLIC BLOOD PRESSURE: 121 MMHG | TEMPERATURE: 98 F | WEIGHT: 145 LBS

## 2020-02-17 DIAGNOSIS — Z79.811 USE OF AROMATASE INHIBITORS: Primary | ICD-10-CM

## 2020-02-17 DIAGNOSIS — E02 SUBCLINICAL IODINE-DEFICIENCY HYPOTHYROIDISM: ICD-10-CM

## 2020-02-17 DIAGNOSIS — C50.011 MALIGNANT NEOPLASM OF NIPPLE OF RIGHT BREAST IN FEMALE, UNSPECIFIED ESTROGEN RECEPTOR STATUS: ICD-10-CM

## 2020-02-17 DIAGNOSIS — M85.852 OSTEOPENIA OF LEFT HIP: ICD-10-CM

## 2020-02-17 DIAGNOSIS — C50.011 MALIGNANT NEOPLASM OF NIPPLE OF RIGHT BREAST IN FEMALE, UNSPECIFIED ESTROGEN RECEPTOR STATUS: Primary | ICD-10-CM

## 2020-02-17 PROCEDURE — 63600175 PHARM REV CODE 636 W HCPCS: Mod: JG | Performed by: INTERNAL MEDICINE

## 2020-02-17 PROCEDURE — 99214 OFFICE O/P EST MOD 30 MIN: CPT | Mod: S$PBB,,, | Performed by: INTERNAL MEDICINE

## 2020-02-17 PROCEDURE — 99214 OFFICE O/P EST MOD 30 MIN: CPT | Mod: PBBFAC,PO | Performed by: INTERNAL MEDICINE

## 2020-02-17 PROCEDURE — 99999 PR PBB SHADOW E&M-EST. PATIENT-LVL IV: ICD-10-PCS | Mod: PBBFAC,,, | Performed by: INTERNAL MEDICINE

## 2020-02-17 PROCEDURE — 99214 PR OFFICE/OUTPT VISIT, EST, LEVL IV, 30-39 MIN: ICD-10-PCS | Mod: S$PBB,,, | Performed by: INTERNAL MEDICINE

## 2020-02-17 PROCEDURE — 96372 THER/PROPH/DIAG INJ SC/IM: CPT

## 2020-02-17 PROCEDURE — 99999 PR PBB SHADOW E&M-EST. PATIENT-LVL IV: CPT | Mod: PBBFAC,,, | Performed by: INTERNAL MEDICINE

## 2020-02-17 RX ADMIN — DENOSUMAB 60 MG: 60 INJECTION SUBCUTANEOUS at 03:02

## 2020-02-17 NOTE — PLAN OF CARE
Problem: Fatigue  Goal: Improved Activity Tolerance  Outcome: Ongoing, Progressing  Intervention: Promote Energy Conservation  Flowsheets (Taken 2/17/2020 1506)  Fatigue Management: frequent rest breaks encouraged  Sleep/Rest Enhancement: regular sleep/rest pattern promoted  Activity Management: activity encouraged

## 2020-02-17 NOTE — PROGRESS NOTES
Subjective:       Patient ID: Yuniel Gracia is a 74 y.o. female.    Chief Complaint:breast ca  Making an urgent visit today because she has got some mouth sores she used to get these as a child as well.  since last month these have been recurrent  HPI:    She has a chronic ITP and hep C, history of    stage I breast cancer,rec score of 32 , so underwent TC  chemotherapy. She did notice a new mass in the rt breast that came back without issue, On arimidex  5 years in 9/2017/ Mild thrombocytopenia related to rx and hep c   has received prolia next follow-up appointment would have been next month she is making an urgent visit today  Fell at a parade watching her grandson play the trombone   recently saw me  For aphthous ulcers few weeks ago  REVIEW OF SYSTEMS:     CONSTITUTIONAL: The patient denies any weight change. There is no apparent    change in appetite, fever, night sweats, headaches, fatigue, dizziness, or    weakness.      SKIN: Denies rash, issues with nails, non-healing sores, bleeding, blotching    skin or abnormal bruising. Denies new moles or changes to existing moles.      BREASTS: There is no swelling around breasts or nipple discharge.    EYES: Denies eye pain, blurred vision, swelling, redness or discharge.      ENT AND MOUTH:  3 sores around gum line.  And buccal mucosa  CARDIOVASCULAR: Denies chest pain, discomfort or palpitations. Denies neck    swelling or episodes of passing out.      RESPIRATORY: Denies cough, sputum production, blood in sputum, and denies    shortness of breath.      GI: Denies trouble swallowing, indigestion, heartburn, abdominal pain, nausea,    vomiting, diarrhea, altered bowel habits, blood in stool, discoloration of    stools, change in nature of stool, bloating, increased abdominal girth.      GENITOURINARY: No discharge. No pelvic pain or lumps. No rash around groin or  lesions. No urinary frequency, hesitation, painful urination or blood in    urine. Denies  incontinence. No problems with intercourse.      MUSCULOSKELETAL: Denies neck or back pain. Denies weakness in arms or legs,    joint problems or distended inflamed veins in legs. Denies swelling or abnormal  glands.      NEUROLOGICAL: Denies tingling, numbness, altered mentation changes to nerve    function in the face, weakness to one or both of the body. Denies changes to    gait and denies multiple falls or accidents.      PSYCHIATRIC: Denies nervousness, anxiety, hallucinations, depression, suicidal    ideation, trouble sleeping or changes in behavior noticed by family.          PHYSICAL EXAM:     Wt Readings from Last 3 Encounters:   02/17/20 66.6 kg (146 lb 13.2 oz)   02/15/20 67 kg (147 lb 11.3 oz)   01/29/20 66.1 kg (145 lb 11.6 oz)     Temp Readings from Last 3 Encounters:   02/17/20 98.2 °F (36.8 °C) (Oral)   02/15/20 98 °F (36.7 °C)   01/29/20 97.5 °F (36.4 °C)     BP Readings from Last 3 Encounters:   02/17/20 (!) 128/56   02/15/20 110/72   01/29/20 128/60     Pulse Readings from Last 3 Encounters:   02/17/20 (!) 55   02/15/20 67   01/29/20 62       GENERAL: Comfortable looking patient. Patient is in no distress.  Awake, alert and oriented to time, person and place.  No anxiety, or agitation.    brusies to chin and after fall+    HEENT: Normal conjunctivae and eyelids. WNL.  PERRLA 3 to 4 mm. No icterus, no pallor, no congestion, and no discharge noted. 3 small aphthous ulcers noted in oral cavity    NECK:  Supple. Trachea is central.  No crepitus.  No JVD or masses.    RESPIRATORY:  No intercostal retractions.  No dullness to percussion.  Chest is clear to auscultation.  No rales, rhonchi or wheezes.  No crepitus.  Good air entry bilaterally.    CARDIOVASCULAR:  S1 and S2 are normally heard without murmurs or gallops.  All peripheral pulses are present.    ABDOMEN:  Normal abdomen.  No hepatosplenomegaly.  No free fluid.  Bowel sounds are present.  No hernia noted. No masses.  No rebound or tenderness.   No guarding or rigidity.  Umbilicus is midline.    LYMPHATICS:  No axillary, cervical, supraclavicular, submental, or inguinal lymphadenopathy.    SKIN/MUSCULOSKELETAL:  There is no evidence of excoriation marks or ecchmosis.  No rashes.  No cyanosis.  No clubbing.  No joint or skeletal deformities noted.  Normal range of motion.    NEUROLOGIC:  Higher functions are appropriate.  No cranial nerve deficits.  Normal ludmila.  Normal strength.  Motor and sensory functions are normal.  Deep tendon reflexes are normal.    GENITAL/RECTAL:  Exams are deferred.      Laboratory:     CBC:  Lab Results   Component Value Date    WBC 3.81 (L) 02/10/2020    RBC 4.29 02/10/2020    HGB 13.3 02/10/2020    HCT 40.6 02/10/2020    MCV 95 02/10/2020    MCH 31.0 02/10/2020    MCHC 32.8 02/10/2020    RDW 13.9 02/10/2020     (L) 02/10/2020    MPV 10.4 02/10/2020    GRAN 2.0 02/10/2020    GRAN 53.3 02/10/2020    LYMPH 1.2 02/10/2020    LYMPH 31.5 02/10/2020    MONO 0.4 02/10/2020    MONO 9.4 02/10/2020    EOS 0.2 02/10/2020    BASO 0.02 02/10/2020    EOSINOPHIL 5.0 02/10/2020    BASOPHIL 0.5 02/10/2020       BMP: BMP  Lab Results   Component Value Date     02/10/2020    K 4.6 02/10/2020     02/10/2020    CO2 26 02/10/2020    BUN 11 02/10/2020    CREATININE 1.0 02/10/2020    CALCIUM 9.6 02/10/2020    ANIONGAP 8 02/10/2020    ESTGFRAFRICA >60 02/10/2020    EGFRNONAA 55 (A) 02/10/2020       LFT:   Lab Results   Component Value Date    ALT 19 02/10/2020    AST 34 02/10/2020    ALKPHOS 47 (L) 02/10/2020    BILITOT 1.0 02/10/2020     12/2019 mammogram neg:   Most recent scan 2015 neg  Tumor marker  wnl 23.6 2/2020  Assessment/Plan:     three small ulcers none looking malignant patient is very anxious refer to ENT  Keep scheduled appointment with me for follow-up  Stage 1 breast ca  4. HTN, dyslipedemia, hypothyroidism, depression , contimue care with Dr. Dolan  cxr  For staging , had appendicitis and had emergency sx  Gave pt  restoril rx for insomnia. Generic   Advance Care Planning      has  Adv directives established already      Breast ca: cont arimidex  For a total of 10 years dx in 2012  Keep follow-up with cbc, cmp she completes 10 years of arimidex in 9/2022   cont prolia  For osteo ok for infusion       thrombocytopenia cont to monitor,   due for mammo in 12/20   resume prolia today      Living Will  Discussed and documented previous visit

## 2020-03-03 ENCOUNTER — PATIENT MESSAGE (OUTPATIENT)
Dept: UROLOGY | Facility: CLINIC | Age: 75
End: 2020-03-03

## 2020-03-03 ENCOUNTER — TELEPHONE (OUTPATIENT)
Dept: UROLOGY | Facility: CLINIC | Age: 75
End: 2020-03-03

## 2020-03-03 ENCOUNTER — LAB VISIT (OUTPATIENT)
Dept: LAB | Facility: HOSPITAL | Age: 75
End: 2020-03-03
Attending: UROLOGY
Payer: MEDICARE

## 2020-03-03 DIAGNOSIS — N30.00 ACUTE CYSTITIS WITHOUT HEMATURIA: ICD-10-CM

## 2020-03-03 DIAGNOSIS — N30.00 ACUTE CYSTITIS WITHOUT HEMATURIA: Primary | ICD-10-CM

## 2020-03-03 LAB
BACTERIA #/AREA URNS AUTO: ABNORMAL /HPF
BILIRUB UR QL STRIP: NEGATIVE
CLARITY UR REFRACT.AUTO: ABNORMAL
COLOR UR AUTO: YELLOW
GLUCOSE UR QL STRIP: NEGATIVE
HGB UR QL STRIP: ABNORMAL
KETONES UR QL STRIP: NEGATIVE
LEUKOCYTE ESTERASE UR QL STRIP: ABNORMAL
MICROSCOPIC COMMENT: ABNORMAL
NITRITE UR QL STRIP: NEGATIVE
NON-SQ EPI CELLS #/AREA URNS AUTO: <1 /HPF
PH UR STRIP: 6 [PH] (ref 5–8)
PROT UR QL STRIP: NEGATIVE
RBC #/AREA URNS AUTO: 44 /HPF (ref 0–4)
SP GR UR STRIP: 1.01 (ref 1–1.03)
URN SPEC COLLECT METH UR: ABNORMAL
WBC #/AREA URNS AUTO: >100 /HPF (ref 0–5)
WBC CLUMPS UR QL AUTO: ABNORMAL

## 2020-03-03 PROCEDURE — 87077 CULTURE AEROBIC IDENTIFY: CPT

## 2020-03-03 PROCEDURE — 81001 URINALYSIS AUTO W/SCOPE: CPT

## 2020-03-03 PROCEDURE — 87088 URINE BACTERIA CULTURE: CPT

## 2020-03-03 PROCEDURE — 87086 URINE CULTURE/COLONY COUNT: CPT

## 2020-03-03 PROCEDURE — 87186 SC STD MICRODIL/AGAR DIL: CPT

## 2020-03-03 NOTE — TELEPHONE ENCOUNTER
----- Message from Amy Sabillon sent at 3/3/2020  2:21 PM CST -----  Type: Needs Medical Advice    Who Called:  Patient  Best Call Back Number: [phone]  Additional Information: Patient needs to speak with nurse (Dwight)concerning order for lab  (urine)needs to know if she can come or not (no order in system)please call back to advise.

## 2020-03-05 ENCOUNTER — PATIENT MESSAGE (OUTPATIENT)
Dept: UROLOGY | Facility: CLINIC | Age: 75
End: 2020-03-05

## 2020-03-05 DIAGNOSIS — Z87.440 HISTORY OF URINARY TRACT INFECTION: ICD-10-CM

## 2020-03-05 DIAGNOSIS — N30.00 ACUTE CYSTITIS WITHOUT HEMATURIA: Primary | ICD-10-CM

## 2020-03-05 LAB — BACTERIA UR CULT: ABNORMAL

## 2020-03-06 RX ORDER — NITROFURANTOIN 25; 75 MG/1; MG/1
100 CAPSULE ORAL 2 TIMES DAILY
Qty: 20 CAPSULE | Refills: 0 | Status: SHIPPED | OUTPATIENT
Start: 2020-03-06 | End: 2020-08-19 | Stop reason: ALTCHOICE

## 2020-03-09 RX ORDER — PRAVASTATIN SODIUM 40 MG/1
TABLET ORAL
Qty: 90 TABLET | Refills: 3 | Status: SHIPPED | OUTPATIENT
Start: 2020-03-09 | End: 2021-05-04

## 2020-04-14 ENCOUNTER — NURSE TRIAGE (OUTPATIENT)
Dept: ADMINISTRATIVE | Facility: CLINIC | Age: 75
End: 2020-04-14

## 2020-04-14 ENCOUNTER — PATIENT MESSAGE (OUTPATIENT)
Dept: FAMILY MEDICINE | Facility: CLINIC | Age: 75
End: 2020-04-14

## 2020-04-14 ENCOUNTER — HOSPITAL ENCOUNTER (EMERGENCY)
Facility: HOSPITAL | Age: 75
Discharge: HOME | End: 2020-04-14
Attending: EMERGENCY MEDICINE
Payer: MEDICARE

## 2020-04-14 ENCOUNTER — PATIENT MESSAGE (OUTPATIENT)
Dept: ADMINISTRATIVE | Facility: HOSPITAL | Age: 75
End: 2020-04-14

## 2020-04-14 VITALS
SYSTOLIC BLOOD PRESSURE: 117 MMHG | OXYGEN SATURATION: 100 % | DIASTOLIC BLOOD PRESSURE: 56 MMHG | TEMPERATURE: 97 F | RESPIRATION RATE: 16 BRPM | HEART RATE: 69 BPM

## 2020-04-14 DIAGNOSIS — T14.8XXA MUSCLE STRAIN: ICD-10-CM

## 2020-04-14 DIAGNOSIS — Z20.822 COVID-19 VIRUS NOT DETECTED: ICD-10-CM

## 2020-04-14 DIAGNOSIS — B34.9 VIRAL SYNDROME: Primary | ICD-10-CM

## 2020-04-14 LAB — SARS-COV-2 RDRP RESP QL NAA+PROBE: NEGATIVE

## 2020-04-14 PROCEDURE — 99283 EMERGENCY DEPT VISIT LOW MDM: CPT

## 2020-04-14 PROCEDURE — U0002 COVID-19 LAB TEST NON-CDC: HCPCS

## 2020-04-14 NOTE — TELEPHONE ENCOUNTER
"Per my chart message:    "I started not feeling well last night about 6 pm and when to bed early about 7:30.    I felt feverish and my back and neck are arcing.  This evening I took my temperature and it was 100.4 at 2:30 pm.    I am not having any breathing problems or congestion, just feel fever and back arcing at neck and wing area.    Please advise as what I should be doing.  I have not been anywhere for 3 weeks.  I have people bring me supplies and we stay 6 feet apart.  My  is feeling well and has no symptoms or fever.    Yuniel Gracia  [phone]  [email or web address]"    Please advise.  "

## 2020-04-14 NOTE — TELEPHONE ENCOUNTER
The symptoms are compatible with corona virus but also with flu.  I would recommend a trip to the COVID clinic located to the left of the emergency room at [facility] in [place].  This is the old labor and delivery unit.  It is a walk-in clinic and testing is available for both flu and corona virus.

## 2020-04-14 NOTE — ED NOTES
Pt presents to the ED with complaints of fever for the past several days. She also reports body aches as well as neck and back pain. Denies recent travel or known exposure to positive COVID-19 person.

## 2020-04-14 NOTE — DISCHARGE INSTRUCTIONS
Take tylenol as needed for pain.  Heat to the area will also help.   If you develop worsening fever or headache you need to return to the ER for further evaluation.  Follow up closely with your primary care provider.

## 2020-04-14 NOTE — TELEPHONE ENCOUNTER
Pt's  states pt has fever of 100.4-101 with upper back pain and neck pain x 3 days. Pt c/o difficulty putting chin to chest. Pt denies GARCIA.  has questions regarding coronavirus. Pt has been isolating and has not had contact with anyone confirmed or suspected to have coronavirus. Pt advised per protocol to ED now and pt verbalizes understanding.     Reason for Disposition   Stiff neck (can't touch chin to chest) and fever    Additional Information   Negative: Shock suspected (e.g., cold/pale/clammy skin, too weak to stand, low BP, rapid pulse)   Negative: Similar pain previously and it was from 'heart attack'   Negative: Similar pain previously from 'angina' and not relieved by nitroglycerin   Negative: Difficult to awaken or acting confused (e.g., disoriented, slurred speech)   Negative: Sounds like a life-threatening emergency to the triager   Negative: Difficulty breathing or unusual sweating (e.g., sweating without exertion)   Negative: Chest pain lasting longer than 5 minutes   Negative: Stiff neck (can't touch chin to chest) and has headache    Protocols used: NECK PAIN OR [id number]

## 2020-04-14 NOTE — ED PROVIDER NOTES
"Encounter Date: 4/14/2020    SCRIBE #1 NOTE: IIfrah, am scribing for, and in the presence of, Anai Whipple PA-C.       History     Chief Complaint   Patient presents with    COVID-19 Concerns     Time seen by provider: 10:28 AM on 04/14/2020    Yuniel Gracia is a 74 y.o. female who presents to the ED with an onset of neck pain, back pain, and fever as high as 100.4 for 4 days. No known sick contact. Patient has been taking pain medication with little relief. She states pain is worse with movement and palpation. The patient denies nausea, vomiting, diarrhea, HA, or any other symptoms at this time. Pertinent PMHx includes arthritis, anemia, hypothyroid, Raynaud's disease, memory loss, and breast cancer. No pertinent PSHx. Known drug allergies include codeine.      The history is provided by the patient.     Review of patient's allergies indicates:   Allergen Reactions    Adhesive Dermatitis and Rash    Codeine Nausea Only     Past Medical History:   Diagnosis Date    Anemia     Arthritis     Blood transfusion     Breast cancer     Cancer RIGHT BREAST    Chronic constipation     Clotting disorder     Colon polyp     Diverticulosis     Glaucoma     Hepatitis C 2000    pt states history of hepatits c-"cured by Dr. Lynch"; TREATED 2 YEARS - 2000   OK NOW    Hypothyroid     Insomnia     Malignant neoplasm of breast 4/12/2012    Memory loss     reports some memory loss since chemo    Osteoarthritis     Panic attacks     Raynaud's disease     takes amlodipine for this    Ulcer     Vertigo      Past Surgical History:   Procedure Laterality Date    APPENDECTOMY      BLADDER SURGERY  2011    sling - Dr Giles  - [facility]    BRAIN SURGERY  2007    temporal neuralgia - [place]    BREAST BIOPSY      BREAST LUMPECTOMY  3/12    malignant - had chemo and radiotherapy    COLONOSCOPY  01/03/2011    Dr Lynch, in media section, diverticulosis, hemorrhoids, otherwise normal " findings; normal findings on random biopsies    COLONOSCOPY N/A 6/1/2017    Procedure: COLONOSCOPY;  Surgeon: Nate Lamb MD;  Location: [facility];  Service: Endoscopy;  Laterality: N/A;    COLONOSCOPY W/ POLYPECTOMY  06/01/2017    Dr. Lamb, 1 adenomatous polyp, recheck 5 years    EYE SURGERY      cataracts bilateral    HYSTERECTOMY      JOINT REPLACEMENT  09/25/2017    left Knee [facility]     removal of port a cath      right lymph node removed from breast area      TUNNELED VENOUS PORT PLACEMENT  04/2012    left chest    UPPER GASTROINTESTINAL ENDOSCOPY  prior to 2011    small hiatal hernia     Family History   Problem Relation Age of Onset    Cancer Mother         breast    Breast cancer Mother     Diabetes Father     Heart disease Father     Cancer Sister         breast    Breast cancer Sister     Diabetes Paternal Aunt     Cancer Other         breast    Breast cancer Other     Drug abuse Son     Obesity Son     Diabetes Maternal Aunt     Heart disease Maternal Uncle     Tuberculosis Paternal Uncle         x2    Diabetes Paternal Uncle     Early death Maternal Grandmother         tonsils    Heart disease Maternal Grandfather     Alcohol abuse Maternal Grandfather     Tuberculosis Paternal Grandfather     Cancer Sister         breast    Breast cancer Sister     Ovarian cancer Neg Hx     Colon cancer Neg Hx     Colon polyps Neg Hx     Crohn's disease Neg Hx     Ulcerative colitis Neg Hx     Melanoma Neg Hx     Lupus Neg Hx     Psoriasis Neg Hx     Eczema Neg Hx      Social History     Tobacco Use    Smoking status: Never Smoker    Smokeless tobacco: Never Used   Substance Use Topics    Alcohol use: Yes     Alcohol/week: 7.0 standard drinks     Types: 4 Glasses of wine, 1 Shots of liquor, 2 Standard drinks or equivalent per week     Comment: wine most days - 1 glass    Drug use: No     Review of Systems   Constitutional: Positive for fever.  Negative for activity change, appetite change and chills.   HENT: Negative for congestion, rhinorrhea and sore throat.    Eyes: Negative for redness and visual disturbance.   Respiratory: Negative for cough, chest tightness and shortness of breath.    Cardiovascular: Negative for chest pain.   Gastrointestinal: Negative for abdominal pain, diarrhea, nausea and vomiting.   Genitourinary: Negative for dysuria and frequency.   Musculoskeletal: Positive for back pain and neck pain. Negative for neck stiffness.   Skin: Negative for rash.   Neurological: Negative for dizziness, syncope, numbness and headaches.       Physical Exam     Initial Vitals [04/14/20 1033]   BP Pulse Resp Temp SpO2   (!) 117/56 69 16 97.2 °F (36.2 °C) 100 %      MAP       --         Physical Exam    Nursing note and vitals reviewed.  Constitutional: She appears well-developed and well-nourished. She is cooperative.  Non-toxic appearance. She does not have a sickly appearance.   HENT:   Head: Normocephalic and atraumatic.   Right Ear: External ear normal.   Left Ear: External ear normal.   Nose: Nose normal.   Mouth/Throat: Uvula is midline and oropharynx is clear and moist.   Eyes: Conjunctivae and lids are normal.   Neck: Neck supple. Muscular tenderness present.   Muscular tenderness to palpation. No midline cervical spine tenderness.    Cardiovascular: Normal rate, regular rhythm and normal heart sounds. Exam reveals no gallop and no friction rub.    No murmur heard.  Pulmonary/Chest: Breath sounds normal. She has no wheezes. She has no rhonchi. She has no rales.   Abdominal: Normal appearance.   Musculoskeletal:   Limited ROM in neck secondary to pain. No tenderness in neck or back.   Neurological: She is alert.   Skin: Skin is warm, dry and intact. No rash noted.         ED Course   Procedures  Labs Reviewed   SARS-COV-2 RNA AMPLIFICATION, QUAL    Narrative:     What symptom criteria does the patient meet?->Fever          Imaging Results     None          Medical Decision Making:   History:   Old Medical Records: I decided to obtain old medical records.  Clinical Tests:   Lab Tests: Ordered and Reviewed       APC / Resident Notes:   Urgent evaluation of a well appearing 74 year old female who presents with neck pain and fever of 100.4F.  No abdominal pain, nausea or vomiting.  Vital signs are stable.  Patient is afebrile.  She has muscular tenderness to palpation and decreased ROM to the neck secondary to pain. she denied headache. I doubt meningitis. No tonsillar swelling or exudate noted.  Uvula is midline.  No concern for ludwigs angina.  Workup is negative.  Suspect symptoms are secondary to viral illness.  Symptomatic treatment. Discussed results with patient. Return precautions givens specifically regarding meningitis such as headache or worsening fever. Patient is to follow up with their primary care provider.          Scribe Attestation:   Scribe #1: I performed the above scribed service and the documentation accurately describes the services I performed. I attest to the accuracy of the note.    I, Anai Whipple PA-C, personally performed the services described in this documentation. All medical record entries made by the scribe were at my direction and in my presence.  I have reviewed the chart and agree that the record reflects my personal performance and is accurate and complete. Anai Whipple PA-C.  1:49 PM 04/14/2020                        Clinical Impression:       ICD-10-CM ICD-9-CM   1. Viral syndrome B34.9 079.99   2. Covid-19 Virus not Detected [id number]    3. Muscle strain T14.8XXA 848.9         Disposition:   Disposition: Discharged  Condition: Stable     ED Disposition Condition    Discharge Stable        ED Prescriptions     None        Follow-up Information     Follow up With Specialties Details Why Contact Info    Jae Dolan MD Family Medicine   [address] [facility] [address]  [phone]      [facility] Emergency Medicine  As needed [address] [place] [id number]  [phone]                                     Anai Whipple PA-C  04/14/20 [id number]

## 2020-04-15 ENCOUNTER — DOCUMENTATION ONLY (OUTPATIENT)
Dept: FAMILY MEDICINE | Facility: CLINIC | Age: 75
End: 2020-04-15

## 2020-04-15 NOTE — PROGRESS NOTES
Pre-Visit Chart Review  For Appointment Scheduled on 4-16-20    Health Maintenance Due   Topic Date Due    Lipid Panel  03/13/2020

## 2020-04-16 ENCOUNTER — NURSE TRIAGE (OUTPATIENT)
Dept: ADMINISTRATIVE | Facility: CLINIC | Age: 75
End: 2020-04-16

## 2020-04-16 ENCOUNTER — OFFICE VISIT (OUTPATIENT)
Dept: FAMILY MEDICINE | Facility: CLINIC | Age: 75
End: 2020-04-16
Attending: FAMILY MEDICINE
Payer: MEDICARE

## 2020-04-16 VITALS
TEMPERATURE: 98 F | WEIGHT: 143.06 LBS | SYSTOLIC BLOOD PRESSURE: 90 MMHG | HEART RATE: 75 BPM | BODY MASS INDEX: 24.42 KG/M2 | HEIGHT: 64 IN | RESPIRATION RATE: 16 BRPM | OXYGEN SATURATION: 93 % | DIASTOLIC BLOOD PRESSURE: 70 MMHG

## 2020-04-16 DIAGNOSIS — M54.2 NECK PAIN: Primary | ICD-10-CM

## 2020-04-16 DIAGNOSIS — B34.9 ACUTE VIRAL SYNDROME: ICD-10-CM

## 2020-04-16 PROCEDURE — 99213 OFFICE O/P EST LOW 20 MIN: CPT | Mod: S$PBB,,, | Performed by: FAMILY MEDICINE

## 2020-04-16 PROCEDURE — 99213 PR OFFICE/OUTPT VISIT, EST, LEVL III, 20-29 MIN: ICD-10-PCS | Mod: S$PBB,,, | Performed by: FAMILY MEDICINE

## 2020-04-16 PROCEDURE — 99999 PR PBB SHADOW E&M-EST. PATIENT-LVL III: ICD-10-PCS | Mod: PBBFAC,,, | Performed by: FAMILY MEDICINE

## 2020-04-16 PROCEDURE — 99999 PR PBB SHADOW E&M-EST. PATIENT-LVL III: CPT | Mod: PBBFAC,,, | Performed by: FAMILY MEDICINE

## 2020-04-16 PROCEDURE — 99213 OFFICE O/P EST LOW 20 MIN: CPT | Mod: PBBFAC,PO | Performed by: FAMILY MEDICINE

## 2020-04-16 RX ORDER — TIZANIDINE 4 MG/1
4 TABLET ORAL EVERY 6 HOURS PRN
Qty: 60 TABLET | Refills: 1 | Status: SHIPPED | OUTPATIENT
Start: 2020-04-16 | End: 2020-04-26

## 2020-04-16 NOTE — PROGRESS NOTES
"Subjective:       Patient ID: Yuniel Gracia is a 74 y.o. female.    Chief Complaint: Hospital Follow Up (neck / back )    74-year-old female coming in for an ER follow-up from a visit on April 14, 2020 [facility].  She presented with about a three day history of low-grade fever to 100.4, neck pain and upper back pain with no respiratory complaints or shortness of breath.  She was tested for the COVID-19 virus and the test was negative subsequently.  The patient was given instructions but no medications were dispensed.  She had some tramadol given almost two years ago for knee pain and has use that sparingly with little affect.  She has no radicular symptoms down the arm and no weakness of , or clumsiness.  She has not seen a rash.  Her fever has abated.    Past Medical History:  No date: Anemia  No date: Arthritis  No date: Blood transfusion  No date: Breast cancer  RIGHT BREAST: Cancer  No date: Chronic constipation  No date: Clotting disorder  No date: Colon polyp  No date: Diverticulosis  No date: Glaucoma  2000: Hepatitis C      Comment:  pt states history of hepatits c-"cured by Dr. Lynch";               TREATED 2 YEARS - 2000   OK NOW  No date: Hypothyroid  No date: Insomnia  4/12/2012: Malignant neoplasm of breast  No date: Memory loss      Comment:  reports some memory loss since chemo  No date: Osteoarthritis  No date: Panic attacks  No date: Raynaud's disease      Comment:  takes amlodipine for this  No date: Ulcer  No date: Vertigo    Past Surgical History:  No date: APPENDECTOMY  2011: BLADDER SURGERY      Comment:  sling - Dr Giles  - [facility] reji  2007: BRAIN SURGERY      Comment:  temporal neuralgia - [place]  No date: BREAST BIOPSY  3/12: BREAST LUMPECTOMY      Comment:  malignant - had chemo and radiotherapy  01/03/2011: COLONOSCOPY      Comment:  Dr Lynch, in media section, diverticulosis,                hemorrhoids, otherwise normal findings; normal findings            "     on random biopsies  6/1/2017: COLONOSCOPY; N/A      Comment:  Procedure: COLONOSCOPY;  Surgeon: Nate Lamb MD;                Location: [facility];  Service: Endoscopy;  Laterality:                N/A;  06/01/2017: COLONOSCOPY W/ POLYPECTOMY      Comment:  Dr. Lamb, 1 adenomatous polyp, recheck 5 years  No date: EYE SURGERY      Comment:  cataracts bilateral  No date: HYSTERECTOMY  09/25/2017: JOINT REPLACEMENT      Comment:  left [facility] [address] [place]   No date: removal of port a cath  No date: right lymph node removed from breast area  04/2012: TUNNELED VENOUS PORT PLACEMENT      Comment:  left chest  prior to 2011: UPPER GASTROINTESTINAL ENDOSCOPY      Comment:  small hiatal hernia    Current Outpatient Medications on File Prior to Visit:  amlodipine (NORVASC) 2.5 MG tablet, Take 2.5 mg by mouth once daily. , Disp: , Rfl:   anastrozole (ARIMIDEX) 1 mg Tab, TAKE 1 TABLET DAILY, Disp: 90 tablet, Rfl: 4  CALCIUM CARBONATE (CALCIUM 600 ORAL), Take 1 tablet by mouth once daily., Disp: , Rfl:   CALCIUM CARBONATE/VITAMIN D3 (VITAMIN D-3 ORAL), Take 400 Units by mouth daily as needed., Disp: , Rfl:   co-enzyme Q-10 30 mg capsule, Take 30 mg by mouth once daily., Disp: , Rfl:   erythromycin (ROMYCIN) ophthalmic ointment, Place a 1/2 inch ribbon of ointment into the lower eyelid TID, Disp: 1 Tube, Rfl: 0  fish oil-omega-3 fatty acids 300-1,000 mg capsule, Take by mouth once daily., Disp: , Rfl:   levothyroxine (SYNTHROID) 75 MCG tablet, Take 1 tablet (75 mcg total) by mouth once daily., Disp: 90 tablet, Rfl: 3  pravastatin (PRAVACHOL) 40 MG tablet, TAKE 1 TABLET AT BEDTIME, Disp: 90 tablet, Rfl: 3  sertraline (ZOLOFT) 50 MG tablet, Take 1 tablet (50 mg total) by mouth once daily., Disp: 90 tablet, Rfl: 3  solifenacin (VESICARE) 5 MG tablet, TAKE 1 TABLET DAILY, Disp: 90 tablet, Rfl: 4  temazepam (RESTORIL) 15 mg Cap, Take 1 capsule (15 mg total) by mouth every evening. (Patient taking differently:  Take 15 mg by mouth nightly as needed. ), Disp: 90 capsule, Rfl: 0  nitrofurantoin, macrocrystal-monohydrate, (MACROBID) 100 MG capsule, Take 1 capsule (100 mg total) by mouth 2 (two) times daily. (Patient not taking: Reported on 4/16/2020), Disp: 20 capsule, Rfl: 0  (DISCONTINUED) UNABLE TO FIND, Homeostasis: Pure Cannabidiol CBC Oil, Disp: , Rfl:     No current facility-administered medications on file prior to visit.         Review of Systems   Constitutional: Positive for fatigue. Negative for fever.   Respiratory: Negative for chest tightness and shortness of breath.    Cardiovascular: Negative for chest pain and palpitations.   Gastrointestinal: Negative for constipation, diarrhea, nausea and vomiting.   Musculoskeletal: Positive for myalgias, neck pain and neck stiffness. Negative for arthralgias.   Skin: Negative for color change and rash.       Objective:      Physical Exam   Constitutional: She is oriented to person, place, and time. She appears well-developed and well-nourished. No distress.   Normal blood pressure  Normal weight with a BMI of 24.6 she is down 2.9 lb from her March 11, 2019 visit with Jenna SHEPHERD:   Head: Normocephalic and atraumatic.   Right Ear: External ear normal.   Left Ear: External ear normal.   Nose: Nose normal.   Mouth/Throat: Oropharynx is clear and moist. No oropharyngeal exudate.   Eyes: Pupils are equal, round, and reactive to light. EOM are normal. No scleral icterus.   Neck: Normal range of motion. Neck supple. No JVD present. No tracheal deviation present. No thyromegaly present.   Cardiovascular: Normal rate, regular rhythm and normal heart sounds. Exam reveals no gallop and no friction rub.   No murmur heard.  Pulmonary/Chest: Effort normal and breath sounds normal. No stridor. No respiratory distress. She has no wheezes. She has no rales. She exhibits no tenderness.   Musculoskeletal:        Right shoulder: Normal.        Left shoulder: Normal.        Cervical back:  She exhibits tenderness (Tenderness and the paraspinal posterior columns worse on the left than the right.) and spasm. She exhibits no bony tenderness (No bony tenderness over cervical spinous processes), no swelling, no edema and no deformity.   Lymphadenopathy:     She has no cervical adenopathy.   Neurological: She is alert and oriented to person, place, and time.   Skin: She is not diaphoretic.   Psychiatric: She has a normal mood and affect. Her behavior is normal. Judgment and thought content normal.   Nursing note and vitals reviewed.      Assessment:       1. Neck pain    2. Acute viral syndrome        Plan:       1. Neck pain  Patient may take tramadol she has on hand  - tiZANidine (ZANAFLEX) 4 MG tablet; Take 1 tablet (4 mg total) by mouth every 6 (six) hours as needed.  Dispense: 60 tablet; Refill: 1    2. Acute viral syndrome  Push fluids, rest, may use anti inflammatories and/or Tylenol if fever recurs

## 2020-04-16 NOTE — TELEPHONE ENCOUNTER
Reason for Disposition   Caller has already spoken with the PCP and has no further questions.    Protocols used: NO CONTACT OR DUPLICATE CONTACT CALL-A-    Was told that this patient had requested a call back concerning a medication question call. When triage nurse reached her she stated that someone had spoken to her already from doctor's office and advised she go to urgent care and her and her  were en route.

## 2020-04-30 ENCOUNTER — EXTERNAL CHRONIC CARE MANAGEMENT (OUTPATIENT)
Dept: PRIMARY CARE CLINIC | Facility: CLINIC | Age: 75
End: 2020-04-30
Payer: MEDICARE

## 2020-04-30 PROCEDURE — 99490 CHRNC CARE MGMT STAFF 1ST 20: CPT | Mod: PBBFAC,PO | Performed by: FAMILY MEDICINE

## 2020-04-30 PROCEDURE — 99490 CHRNC CARE MGMT STAFF 1ST 20: CPT | Mod: S$PBB,,, | Performed by: FAMILY MEDICINE

## 2020-04-30 PROCEDURE — 99490 PR CHRONIC CARE MGMT, 1ST 20 MIN: ICD-10-PCS | Mod: S$PBB,,, | Performed by: FAMILY MEDICINE

## 2020-05-31 ENCOUNTER — EXTERNAL CHRONIC CARE MANAGEMENT (OUTPATIENT)
Dept: PRIMARY CARE CLINIC | Facility: CLINIC | Age: 75
End: 2020-05-31
Payer: MEDICARE

## 2020-05-31 PROCEDURE — 99490 PR CHRONIC CARE MGMT, 1ST 20 MIN: ICD-10-PCS | Mod: S$PBB,,, | Performed by: FAMILY MEDICINE

## 2020-05-31 PROCEDURE — 99490 CHRNC CARE MGMT STAFF 1ST 20: CPT | Mod: PBBFAC,PO | Performed by: FAMILY MEDICINE

## 2020-05-31 PROCEDURE — 99490 CHRNC CARE MGMT STAFF 1ST 20: CPT | Mod: S$PBB,,, | Performed by: FAMILY MEDICINE

## 2020-06-30 ENCOUNTER — EXTERNAL CHRONIC CARE MANAGEMENT (OUTPATIENT)
Dept: PRIMARY CARE CLINIC | Facility: CLINIC | Age: 75
End: 2020-06-30
Payer: MEDICARE

## 2020-06-30 PROCEDURE — 99490 CHRNC CARE MGMT STAFF 1ST 20: CPT | Mod: S$PBB,,, | Performed by: FAMILY MEDICINE

## 2020-06-30 PROCEDURE — 99490 CHRNC CARE MGMT STAFF 1ST 20: CPT | Mod: PBBFAC,PO | Performed by: FAMILY MEDICINE

## 2020-06-30 PROCEDURE — 99490 PR CHRONIC CARE MGMT, 1ST 20 MIN: ICD-10-PCS | Mod: S$PBB,,, | Performed by: FAMILY MEDICINE

## 2020-07-17 DIAGNOSIS — Z71.89 COMPLEX CARE COORDINATION: ICD-10-CM

## 2020-07-31 ENCOUNTER — EXTERNAL CHRONIC CARE MANAGEMENT (OUTPATIENT)
Dept: PRIMARY CARE CLINIC | Facility: CLINIC | Age: 75
End: 2020-07-31
Payer: MEDICARE

## 2020-07-31 PROCEDURE — 99490 CHRNC CARE MGMT STAFF 1ST 20: CPT | Mod: S$PBB,,, | Performed by: FAMILY MEDICINE

## 2020-07-31 PROCEDURE — 99490 CHRNC CARE MGMT STAFF 1ST 20: CPT | Mod: PBBFAC,PO | Performed by: FAMILY MEDICINE

## 2020-07-31 PROCEDURE — 99490 PR CHRONIC CARE MGMT, 1ST 20 MIN: ICD-10-PCS | Mod: S$PBB,,, | Performed by: FAMILY MEDICINE

## 2020-08-09 DIAGNOSIS — F41.8 DEPRESSION WITH ANXIETY: ICD-10-CM

## 2020-08-09 DIAGNOSIS — E03.9 ACQUIRED HYPOTHYROIDISM: ICD-10-CM

## 2020-08-10 ENCOUNTER — TELEPHONE (OUTPATIENT)
Dept: HEMATOLOGY/ONCOLOGY | Facility: CLINIC | Age: 75
End: 2020-08-10

## 2020-08-10 DIAGNOSIS — M85.852 OSTEOPENIA OF LEFT HIP: Primary | ICD-10-CM

## 2020-08-10 NOTE — TELEPHONE ENCOUNTER
Pt notified of dexa scan needed prior to receiving prolia injection on 8/19. Pt confirmed apt date/time of scheduled apt and verbalized understanding.

## 2020-08-10 NOTE — TELEPHONE ENCOUNTER
Message printed to handle. Staff message sent to dr hicks to put in dexa scan orders as requested.

## 2020-08-11 NOTE — TELEPHONE ENCOUNTER
Her last thyroid check is over a year ago, it looks like she will be switching to Dr. Myers in the near future

## 2020-08-12 ENCOUNTER — HOSPITAL ENCOUNTER (OUTPATIENT)
Dept: RADIOLOGY | Facility: HOSPITAL | Age: 75
Discharge: HOME | End: 2020-08-12
Attending: INTERNAL MEDICINE
Payer: MEDICARE

## 2020-08-12 DIAGNOSIS — M85.852 OSTEOPENIA OF LEFT HIP: ICD-10-CM

## 2020-08-12 PROCEDURE — 77080 DXA BONE DENSITY AXIAL: CPT | Mod: 26,,, | Performed by: RADIOLOGY

## 2020-08-12 PROCEDURE — 77080 DEXA BONE DENSITY SPINE HIP: ICD-10-PCS | Mod: 26,,, | Performed by: RADIOLOGY

## 2020-08-12 PROCEDURE — 77080 DXA BONE DENSITY AXIAL: CPT | Mod: TC

## 2020-08-12 RX ORDER — SERTRALINE HYDROCHLORIDE 50 MG/1
50 TABLET, FILM COATED ORAL DAILY
Qty: 90 TABLET | Refills: 0 | Status: SHIPPED | OUTPATIENT
Start: 2020-08-12 | End: 2021-01-08

## 2020-08-13 ENCOUNTER — TELEPHONE (OUTPATIENT)
Dept: FAMILY MEDICINE | Facility: CLINIC | Age: 75
End: 2020-08-13

## 2020-08-13 DIAGNOSIS — E78.2 MIXED HYPERLIPIDEMIA: ICD-10-CM

## 2020-08-13 DIAGNOSIS — E02 SUBCLINICAL IODINE-DEFICIENCY HYPOTHYROIDISM: Primary | ICD-10-CM

## 2020-08-13 NOTE — TELEPHONE ENCOUNTER
----- Message from Jae Dolan MD sent at 8/12/2020  9:50 PM CDT -----  See refill request.  She appears to be hyperthyroid    Result sent by e-mail

## 2020-08-13 NOTE — TELEPHONE ENCOUNTER
If we reduce the dose it could actually aggravate the cold sensitivity.  We will continue at her current dose but I would recheck the TSH in 3 to 6 months.  Order is in.  She is also due for a lipid panel so I put in an order for that also to be done at that time.  Would prefer closer to three months.

## 2020-08-13 NOTE — TELEPHONE ENCOUNTER
Patient was called, reviewed message from refill. Stated she is not having any of the things listed. She is having some cold issues.     Jae Dolan MD         8:44 PM  Note     Her TSH is suppressed, it looks like she is somewhat hyperthyroid.  Is she feeling excessively warm or sensitive to heat?  Any diarrhea, palpitations, mind racing or anxiety, tremors?

## 2020-08-13 NOTE — TELEPHONE ENCOUNTER
Her TSH is suppressed, it looks like she is somewhat hyperthyroid.  Is she feeling excessively warm or sensitive to heat?  Any diarrhea, palpitations, mind racing or anxiety, tremors?

## 2020-08-14 ENCOUNTER — TELEPHONE (OUTPATIENT)
Dept: FAMILY MEDICINE | Facility: CLINIC | Age: 75
End: 2020-08-14

## 2020-08-14 NOTE — TELEPHONE ENCOUNTER
Patient notified I didn't call her. We already spoke yesterday and scheduled lab in November for her thyroid check. Her Sertraline was sent to [facility] on 8/12.

## 2020-08-14 NOTE — TELEPHONE ENCOUNTER
----- Message from Radha Kaur sent at 8/14/2020  4:15 PM CDT -----  Regarding: Pt advice  Contact: pt  Type:  Patient Returning Call    Who Called:  pt  Does the patient know what this is regarding?:  pt called back please follow up today or monday  Best Call Back Number:    Additional Information:  Thank you

## 2020-08-17 RX ORDER — LEVOTHYROXINE SODIUM 75 UG/1
TABLET ORAL
Qty: 90 TABLET | Refills: 2 | Status: SHIPPED | OUTPATIENT
Start: 2020-08-17 | End: 2021-08-27 | Stop reason: SDUPTHER

## 2020-08-17 NOTE — PROGRESS NOTES
Refill Authorization Note    is requesting a refill authorization.    Brief assessment and rationale for refill: APPROVE: prr          Medication Therapy Plan: GINO. approve     Medication reconciliation completed: No                         Comments:      Orders Placed This Encounter    TSH    sertraline (ZOLOFT) 50 MG tablet      Requested Prescriptions   Pending Prescriptions Disp Refills    levothyroxine (SYNTHROID) 75 MCG tablet [Pharmacy Med Name: L-THYROXINE (SYNTHROID) TABS 75MCG] 90 tablet 2     Sig: TAKE 1 TABLET DAILY       Thyroid Hormones Protocol Failed - 8/17/2020  2:24 PM        Failed - Normal TSH within past 12 months      Lab Results   Component Value Date    TSH 0.237 (L) 08/12/2020    TSH 0.950 07/02/2019    TSH 0.326 (L) 03/11/2019              Passed - Patient is not currently pregnant        Passed - No positive pregnancy test in past 12 months         Passed - Visit with authorizing provider in past 12 months or upcoming 90 days         Passed - Normal Free T4 within past 12 months     Lab Results   Component Value Date    FREET4 1.10 08/12/2020    FREET4 1.17 03/11/2019    FREET4 1.04 11/24/2014            Thyroid Hormones Protocol Failed - 8/17/2020  2:24 PM        Failed - Normal TSH in past 12 months     Lab Results   Component Value Date    TSH 0.237 (L) 08/12/2020    TSH 0.950 07/02/2019    TSH 0.326 (L) 03/11/2019              Passed - No positive pregnancy test in past 12 months         Passed - Visit with authorizing provider in past 12 months or upcoming 90 days         Passed - Patient is not currently pregnant        Passed - Normal Free T4 within past 12 months     Lab Results   Component Value Date    FREET4 1.10 08/12/2020    FREET4 1.17 03/11/2019    FREET4 1.04 11/24/2014              Signed Prescriptions Disp Refills    sertraline (ZOLOFT) 50 MG tablet 90 tablet 0     Sig: Take 1 tablet (50 mg total) by mouth once daily.       SSRI Protocol Passed -  8/12/2020  3:45 PM        Passed - Patient is not currently pregnant        Passed - No positive pregnancy test in past 12 months         Passed - Visit with authorizing provider in past 12 months or upcoming 90 days             Appointments  past 12m or future 3m with PCP    Date Provider   Last Visit   4/16/2020 Jae Dolan MD   Next Visit   8/13/2020 Jae Dolan MD   ED visits in past 90 days: 0     Note composed:2:25 PM 08/17/2020

## 2020-08-18 ENCOUNTER — TELEPHONE (OUTPATIENT)
Dept: HEMATOLOGY/ONCOLOGY | Facility: CLINIC | Age: 75
End: 2020-08-18

## 2020-08-19 ENCOUNTER — OFFICE VISIT (OUTPATIENT)
Dept: HEMATOLOGY/ONCOLOGY | Facility: CLINIC | Age: 75
End: 2020-08-19
Payer: MEDICARE

## 2020-08-19 ENCOUNTER — DOCUMENTATION ONLY (OUTPATIENT)
Dept: HEMATOLOGY/ONCOLOGY | Facility: CLINIC | Age: 75
End: 2020-08-19

## 2020-08-19 ENCOUNTER — TELEPHONE (OUTPATIENT)
Dept: INFUSION THERAPY | Facility: HOSPITAL | Age: 75
End: 2020-08-19

## 2020-08-19 ENCOUNTER — INFUSION (OUTPATIENT)
Dept: INFUSION THERAPY | Facility: HOSPITAL | Age: 75
End: 2020-08-19
Attending: INTERNAL MEDICINE
Payer: MEDICARE

## 2020-08-19 VITALS
TEMPERATURE: 98 F | WEIGHT: 142.31 LBS | HEART RATE: 72 BPM | DIASTOLIC BLOOD PRESSURE: 61 MMHG | SYSTOLIC BLOOD PRESSURE: 98 MMHG | BODY MASS INDEX: 24.43 KG/M2 | OXYGEN SATURATION: 99 % | RESPIRATION RATE: 16 BRPM

## 2020-08-19 VITALS
TEMPERATURE: 98 F | RESPIRATION RATE: 18 BRPM | DIASTOLIC BLOOD PRESSURE: 53 MMHG | BODY MASS INDEX: 24.35 KG/M2 | SYSTOLIC BLOOD PRESSURE: 115 MMHG | HEART RATE: 65 BPM | HEIGHT: 64 IN | WEIGHT: 142.63 LBS

## 2020-08-19 DIAGNOSIS — C50.612 MALIGNANT NEOPLASM OF AXILLARY TAIL OF LEFT FEMALE BREAST, UNSPECIFIED ESTROGEN RECEPTOR STATUS: ICD-10-CM

## 2020-08-19 DIAGNOSIS — E02 SUBCLINICAL IODINE-DEFICIENCY HYPOTHYROIDISM: ICD-10-CM

## 2020-08-19 DIAGNOSIS — C50.011 MALIGNANT NEOPLASM OF NIPPLE OF RIGHT BREAST IN FEMALE, UNSPECIFIED ESTROGEN RECEPTOR STATUS: ICD-10-CM

## 2020-08-19 DIAGNOSIS — E78.2 MIXED HYPERLIPIDEMIA: ICD-10-CM

## 2020-08-19 DIAGNOSIS — M85.852 OSTEOPENIA OF LEFT HIP: Primary | ICD-10-CM

## 2020-08-19 DIAGNOSIS — Z79.811 USE OF AROMATASE INHIBITORS: ICD-10-CM

## 2020-08-19 DIAGNOSIS — Z86.19 HEPATITIS C VIRUS INFECTION CURED AFTER ANTIVIRAL DRUG THERAPY: ICD-10-CM

## 2020-08-19 DIAGNOSIS — D69.6 THROMBOCYTOPENIA: ICD-10-CM

## 2020-08-19 DIAGNOSIS — M85.852 OSTEOPENIA OF LEFT HIP: ICD-10-CM

## 2020-08-19 DIAGNOSIS — Z79.811 USE OF AROMATASE INHIBITORS: Primary | ICD-10-CM

## 2020-08-19 PROCEDURE — 99999 PR PBB SHADOW E&M-EST. PATIENT-LVL III: CPT | Mod: PBBFAC,,, | Performed by: INTERNAL MEDICINE

## 2020-08-19 PROCEDURE — 63600175 PHARM REV CODE 636 W HCPCS: Mod: JG | Performed by: INTERNAL MEDICINE

## 2020-08-19 PROCEDURE — 99213 OFFICE O/P EST LOW 20 MIN: CPT | Mod: PBBFAC,PO | Performed by: INTERNAL MEDICINE

## 2020-08-19 PROCEDURE — 99214 PR OFFICE/OUTPT VISIT, EST, LEVL IV, 30-39 MIN: ICD-10-PCS | Mod: S$PBB,,, | Performed by: INTERNAL MEDICINE

## 2020-08-19 PROCEDURE — 99214 OFFICE O/P EST MOD 30 MIN: CPT | Mod: S$PBB,,, | Performed by: INTERNAL MEDICINE

## 2020-08-19 PROCEDURE — 99999 PR PBB SHADOW E&M-EST. PATIENT-LVL III: ICD-10-PCS | Mod: PBBFAC,,, | Performed by: INTERNAL MEDICINE

## 2020-08-19 PROCEDURE — 96372 THER/PROPH/DIAG INJ SC/IM: CPT

## 2020-08-19 RX ORDER — BIMATOPROST 0.1 MG/ML
SOLUTION/ DROPS OPHTHALMIC
COMMUNITY
Start: 2020-08-11 | End: 2022-10-28 | Stop reason: SDUPTHER

## 2020-08-19 RX ADMIN — DENOSUMAB 60 MG: 60 INJECTION SUBCUTANEOUS at 02:08

## 2020-08-19 NOTE — PLAN OF CARE
Problem: Fall Injury Risk  Goal: Absence of Fall and Fall-Related Injury  Outcome: Ongoing, Progressing  Intervention: Identify and Manage Contributors to Fall Injury Risk  Flowsheets (Taken 8/19/2020 1445)  Self-Care Promotion: independence encouraged  Medication Review/Management: medications reviewed

## 2020-08-19 NOTE — PROGRESS NOTES
Subjective:       Patient ID: Yuniel Gracia is a 75 y.o. female.    Chief Complaint:breast ca    HPI:    She has a chronic ITP and hep C, history of    stage I breast cancer,rec score of 32 , so underwent TC  chemotherapy. She did notice a new mass in the rt breast that came back without issue, On arimidex  5 years in 9/2017/ Mild thrombocytopenia related to rx and hep c     saw ENT for mouth sores got better.   and pt are both reporting that pt is unabe to find some words, she gets  Forgetful   states she is afraid to take a dementia test    REVIEW OF SYSTEMS:     CONSTITUTIONAL: The patient denies any weight change. There is no apparent    change in appetite, fever, night sweats, headaches, fatigue, dizziness, or    weakness.      SKIN: Denies rash, issues with nails, non-healing sores, bleeding, blotching    skin or abnormal bruising. Denies new moles or changes to existing moles.      BREASTS: There is no swelling around breasts or nipple discharge.    EYES: Denies eye pain, blurred vision, swelling, redness or discharge.      ENT AND MOUTH:  3 sores around gum line.  And buccal mucosa  CARDIOVASCULAR: Denies chest pain, discomfort or palpitations. Denies neck    swelling or episodes of passing out.      RESPIRATORY: Denies cough, sputum production, blood in sputum, and denies    shortness of breath.      GI: Denies trouble swallowing, indigestion, heartburn, abdominal pain, nausea,    vomiting, diarrhea, altered bowel habits, blood in stool, discoloration of    stools, change in nature of stool, bloating, increased abdominal girth.      GENITOURINARY: No discharge. No pelvic pain or lumps. No rash around groin or  lesions. No urinary frequency, hesitation, painful urination or blood in    urine. Denies incontinence. No problems with intercourse.      MUSCULOSKELETAL: Denies neck or back pain. Denies weakness in arms or legs,    joint problems or distended inflamed veins in legs. Denies swelling  or abnormal  glands.      NEUROLOGICAL: Denies tingling, numbness, altered mentation changes to nerve    function in the face, weakness to one or both of the body. Denies changes to    gait and denies multiple falls or accidents.      PSYCHIATRIC: Denies nervousness, anxiety, hallucinations, depression, suicidal    ideation, trouble sleeping or changes in behavior noticed by family.          PHYSICAL EXAM:     Wt Readings from Last 3 Encounters:   04/16/20 64.9 kg (143 lb 1.3 oz)   02/17/20 65.8 kg (145 lb)   02/17/20 66.6 kg (146 lb 13.2 oz)     Temp Readings from Last 3 Encounters:   04/16/20 98.6 °F (37 °C) (Oral)   04/16/20 97.9 °F (36.6 °C) (Oral)   04/14/20 97.2 °F (36.2 °C)     BP Readings from Last 3 Encounters:   04/16/20 (!) 98/50   04/16/20 90/70   04/14/20 (!) 117/56     Pulse Readings from Last 3 Encounters:   04/16/20 (!) 58   04/16/20 75   04/14/20 69       GENERAL: Comfortable looking patient. Patient is in no distress.  Awake, alert and oriented to time, person and place.  No anxiety, or agitation.    brusies to chin and after fall+    HEENT: Normal conjunctivae and eyelids. WNL.  PERRLA 3 to 4 mm. No icterus, no pallor, no congestion, and no discharge noted. 3 small aphthous ulcers noted in oral cavity    NECK:  Supple. Trachea is central.  No crepitus.  No JVD or masses.    RESPIRATORY:  No intercostal retractions.  No dullness to percussion.  Chest is clear to auscultation.  No rales, rhonchi or wheezes.  No crepitus.  Good air entry bilaterally.    CARDIOVASCULAR:  S1 and S2 are normally heard without murmurs or gallops.  All peripheral pulses are present.    ABDOMEN:  Normal abdomen.  No hepatosplenomegaly.  No free fluid.  Bowel sounds are present.  No hernia noted. No masses.  No rebound or tenderness.  No guarding or rigidity.  Umbilicus is midline.    LYMPHATICS:  No axillary, cervical, supraclavicular, submental, or inguinal lymphadenopathy.    SKIN/MUSCULOSKELETAL:  There is no evidence  of excoriation marks or ecchmosis.  No rashes.  No cyanosis.  No clubbing.  No joint or skeletal deformities noted.  Normal range of motion.    NEUROLOGIC:  Higher functions are appropriate.  No cranial nerve deficits.  Normal ludmila.  Normal strength.  Motor and sensory functions are normal.  Deep tendon reflexes are normal.    GENITAL/RECTAL:  Exams are deferred.      Laboratory:     CBC:  Lab Results   Component Value Date    WBC 4.25 08/12/2020    RBC 4.17 08/12/2020    HGB 12.4 08/12/2020    HCT 39.0 08/12/2020    MCV 94 08/12/2020    MCH 29.7 08/12/2020    MCHC 31.8 (L) 08/12/2020    RDW 14.7 (H) 08/12/2020     (L) 08/12/2020    MPV 9.6 08/12/2020    GRAN 2.3 08/12/2020    GRAN 54.6 08/12/2020    LYMPH 1.2 08/12/2020    LYMPH 28.5 08/12/2020    MONO 0.4 08/12/2020    MONO 10.4 08/12/2020    EOS 0.2 08/12/2020    BASO 0.03 08/12/2020    EOSINOPHIL 5.6 08/12/2020    BASOPHIL 0.7 08/12/2020       BMP: BMP  Lab Results   Component Value Date     08/12/2020    K 4.7 08/12/2020     08/12/2020    CO2 27 08/12/2020    BUN 16 08/12/2020    CREATININE 0.8 08/12/2020    CALCIUM 8.9 08/12/2020    ANIONGAP 9 08/12/2020    ESTGFRAFRICA >60 08/12/2020    EGFRNONAA >60 08/12/2020       LFT:   Lab Results   Component Value Date    ALT 22 08/12/2020    AST 32 08/12/2020    ALKPHOS 47 (L) 08/12/2020    BILITOT 0.5 08/12/2020   Impression:     Osteopenia 8/2020     Consider FDA approved medical therapies in postmenopausal women and men aged 50 years and older, based on the following:     *A hip or vertebral (clinical or morphometric) fracture  *T score less than or equal to -2.5 at the femoral neck or spine after appropriate evaluation to exclude secondary causes.  *Low bone mass -- also known as osteopenia (T score between -1.0 and -2.5 at the femoral neck or spine) and a 10 year probability of hip fracture greater than or equal to 3% or a 10 year probability of major osteoporosis-related fracture greater than  or equal to 20% based on the [place]-adapted WHO algorithm.  *Clinician's judgment and/or patient preference may indicate treatment for people with 10 year fracture probabilities is above or below these levels  12/2019 mammogram neg:   Most recent scan 2015 neg  Tumor marker  wnl 23.6 2/2020  Assessment/Plan:     Keep scheduled appointment with me for follow-up  Stage 1 breast ca  4. HTN, dyslipedemia, hypothyroidism, depression , contimue care with Dr. Dolan    Gave pt restoril rx for insomnia. Generic  Patient on antidepressant which may need to be discontinued to see if her memory improves will refer to Neurology for dementia workup  Breast ca: cont arimidex  For a total of 10 years dx in 2012  Keep follow-up with [facility], cmp she completes 10 years of arimidex in 9/2022   cont prolia  For osteo ok for infusion  thrombocytopenia cont to monitor,   due for mammo in 12/20   resume prolia today      Living Will  Discussed and documented previous visit

## 2020-08-20 ENCOUNTER — TELEPHONE (OUTPATIENT)
Dept: NEUROLOGY | Facility: CLINIC | Age: 75
End: 2020-08-20

## 2020-08-20 NOTE — TELEPHONE ENCOUNTER
New pt appointment sched for Dementia  on 09/09/2020 at 9am with Dr. Abbott. Voicemail left for pt and message sent in pt portal.

## 2020-08-25 ENCOUNTER — PATIENT MESSAGE (OUTPATIENT)
Dept: NEUROLOGY | Facility: CLINIC | Age: 75
End: 2020-08-25

## 2020-08-25 ENCOUNTER — TELEPHONE (OUTPATIENT)
Dept: NEUROLOGY | Facility: CLINIC | Age: 75
End: 2020-08-25

## 2020-08-25 NOTE — TELEPHONE ENCOUNTER
Spoke with pt's  and confirmed that appt was resched for 09/16/20 at 1pm. Verbalized understanding.

## 2020-08-25 NOTE — TELEPHONE ENCOUNTER
Message left to please call to reschedule appointment time with Dr. Abbott for 09/16/2020. The 11am appt needs to be moved to a different time.

## 2020-08-31 ENCOUNTER — EXTERNAL CHRONIC CARE MANAGEMENT (OUTPATIENT)
Dept: PRIMARY CARE CLINIC | Facility: CLINIC | Age: 75
End: 2020-08-31
Payer: MEDICARE

## 2020-08-31 PROCEDURE — 99490 PR CHRONIC CARE MGMT, 1ST 20 MIN: ICD-10-PCS | Mod: S$PBB,,, | Performed by: FAMILY MEDICINE

## 2020-08-31 PROCEDURE — 99490 CHRNC CARE MGMT STAFF 1ST 20: CPT | Mod: PBBFAC,PO | Performed by: FAMILY MEDICINE

## 2020-08-31 PROCEDURE — 99490 CHRNC CARE MGMT STAFF 1ST 20: CPT | Mod: S$PBB,,, | Performed by: FAMILY MEDICINE

## 2020-09-15 ENCOUNTER — PATIENT OUTREACH (OUTPATIENT)
Dept: ADMINISTRATIVE | Facility: OTHER | Age: 75
End: 2020-09-15

## 2020-09-15 NOTE — PROGRESS NOTES
Updates were requested from care everywhere.  Chart was reviewed for overdue Proactive [facility] Encounters (JULIANNE) topics (CRS, Breast Cancer Screening, Eye exam)  Health Maintenance has been updated.  LINKS immunization registry triggered.  Immunizations were reconciled.

## 2020-09-16 ENCOUNTER — OFFICE VISIT (OUTPATIENT)
Dept: NEUROLOGY | Facility: CLINIC | Age: 75
End: 2020-09-16
Payer: MEDICARE

## 2020-09-16 VITALS — RESPIRATION RATE: 16 BRPM | HEIGHT: 64 IN | WEIGHT: 142.19 LBS | BODY MASS INDEX: 24.28 KG/M2 | TEMPERATURE: 98 F

## 2020-09-16 DIAGNOSIS — R41.3 MEMORY LOSS: Primary | ICD-10-CM

## 2020-09-16 DIAGNOSIS — R41.3 OTHER AMNESIA: ICD-10-CM

## 2020-09-16 DIAGNOSIS — M85.852 OSTEOPENIA OF LEFT HIP: ICD-10-CM

## 2020-09-16 PROCEDURE — 99999 PR PBB SHADOW E&M-EST. PATIENT-LVL V: CPT | Mod: PBBFAC,,, | Performed by: PSYCHIATRY & NEUROLOGY

## 2020-09-16 PROCEDURE — 99215 OFFICE O/P EST HI 40 MIN: CPT | Mod: PBBFAC,PN | Performed by: PSYCHIATRY & NEUROLOGY

## 2020-09-16 PROCEDURE — 99204 OFFICE O/P NEW MOD 45 MIN: CPT | Mod: S$PBB,,, | Performed by: PSYCHIATRY & NEUROLOGY

## 2020-09-16 PROCEDURE — 99204 PR OFFICE/OUTPT VISIT, NEW, LEVL IV, 45-59 MIN: ICD-10-PCS | Mod: S$PBB,,, | Performed by: PSYCHIATRY & NEUROLOGY

## 2020-09-16 PROCEDURE — 99999 PR PBB SHADOW E&M-EST. PATIENT-LVL V: ICD-10-PCS | Mod: PBBFAC,,, | Performed by: PSYCHIATRY & NEUROLOGY

## 2020-09-16 NOTE — LETTER
October 28, 2020      Sherrie Almanza MD  [address] [address]           [facility]  [address] [place] [address]  Phone: [phone]  Fax: [phone]          Patient: Yuniel Gracia   MR Number: [id number]   YOB: 1945   Date of Visit: 9/16/2020       Dear Dr. Sherrie Almanza:    Thank you for referring Yuniel Gracia to me for evaluation. Attached you will find relevant portions of my assessment and plan of care.    If you have questions, please do not hesitate to call me. I look forward to following Yuniel Gracia along with you.    Sincerely,    Dave Abbott Jr., MD    Enclosure  CC:  No Recipients    If you would like to receive this communication electronically, please contact [email or web address] or [phone] to request more information on [employer] access.    For providers and/or their staff who would like to refer a patient to [facility], please contact us through our one-stop-shop provider referral line, [facility], at [phone].    If you feel you have received this communication in error or would no longer like to receive these types of communications, please e-mail [email or web address]

## 2020-09-30 ENCOUNTER — LAB VISIT (OUTPATIENT)
Dept: LAB | Facility: HOSPITAL | Age: 75
End: 2020-09-30
Attending: PSYCHIATRY & NEUROLOGY
Payer: MEDICARE

## 2020-09-30 ENCOUNTER — EXTERNAL CHRONIC CARE MANAGEMENT (OUTPATIENT)
Dept: PRIMARY CARE CLINIC | Facility: CLINIC | Age: 75
End: 2020-09-30
Payer: MEDICARE

## 2020-09-30 DIAGNOSIS — R41.3 MEMORY LOSS: ICD-10-CM

## 2020-09-30 LAB
ALBUMIN SERPL BCP-MCNC: 3.6 G/DL (ref 3.5–5.2)
ALP SERPL-CCNC: 39 U/L (ref 55–135)
ALT SERPL W/O P-5'-P-CCNC: 13 U/L (ref 10–44)
AMMONIA PLAS-SCNC: 35 UMOL/L (ref 10–50)
ANION GAP SERPL CALC-SCNC: 7 MMOL/L (ref 8–16)
AST SERPL-CCNC: 29 U/L (ref 10–40)
BASOPHILS # BLD AUTO: 0.02 K/UL (ref 0–0.2)
BASOPHILS NFR BLD: 0.6 % (ref 0–1.9)
BILIRUB SERPL-MCNC: 0.9 MG/DL (ref 0.1–1)
BUN SERPL-MCNC: 15 MG/DL (ref 8–23)
CALCIUM SERPL-MCNC: 9 MG/DL (ref 8.7–10.5)
CHLORIDE SERPL-SCNC: 105 MMOL/L (ref 95–110)
CO2 SERPL-SCNC: 27 MMOL/L (ref 23–29)
CREAT SERPL-MCNC: 0.8 MG/DL (ref 0.5–1.4)
DIFFERENTIAL METHOD: ABNORMAL
EOSINOPHIL # BLD AUTO: 0.3 K/UL (ref 0–0.5)
EOSINOPHIL NFR BLD: 9.4 % (ref 0–8)
ERYTHROCYTE [DISTWIDTH] IN BLOOD BY AUTOMATED COUNT: 15.4 % (ref 11.5–14.5)
EST. GFR  (AFRICAN AMERICAN): >60 ML/MIN/1.73 M^2
EST. GFR  (NON AFRICAN AMERICAN): >60 ML/MIN/1.73 M^2
FOLATE SERPL-MCNC: 16.1 NG/ML (ref 4–24)
GLUCOSE SERPL-MCNC: 92 MG/DL (ref 70–110)
HCT VFR BLD AUTO: 37.6 % (ref 37–48.5)
HGB BLD-MCNC: 12.2 G/DL (ref 12–16)
IMM GRANULOCYTES # BLD AUTO: 0.01 K/UL (ref 0–0.04)
IMM GRANULOCYTES NFR BLD AUTO: 0.3 % (ref 0–0.5)
LYMPHOCYTES # BLD AUTO: 1.3 K/UL (ref 1–4.8)
LYMPHOCYTES NFR BLD: 35.3 % (ref 18–48)
MCH RBC QN AUTO: 30.7 PG (ref 27–31)
MCHC RBC AUTO-ENTMCNC: 32.4 G/DL (ref 32–36)
MCV RBC AUTO: 95 FL (ref 82–98)
MONOCYTES # BLD AUTO: 0.4 K/UL (ref 0.3–1)
MONOCYTES NFR BLD: 9.7 % (ref 4–15)
NEUTROPHILS # BLD AUTO: 1.6 K/UL (ref 1.8–7.7)
NEUTROPHILS NFR BLD: 44.7 % (ref 38–73)
NRBC BLD-RTO: 0 /100 WBC
PLATELET # BLD AUTO: 150 K/UL (ref 150–350)
PMV BLD AUTO: 11 FL (ref 9.2–12.9)
POTASSIUM SERPL-SCNC: 4.2 MMOL/L (ref 3.5–5.1)
PROT SERPL-MCNC: 6.9 G/DL (ref 6–8.4)
RBC # BLD AUTO: 3.98 M/UL (ref 4–5.4)
SODIUM SERPL-SCNC: 139 MMOL/L (ref 136–145)
T4 FREE SERPL-MCNC: 0.7 NG/DL (ref 0.71–1.51)
TSH SERPL DL<=0.005 MIU/L-ACNC: 51.56 UIU/ML (ref 0.4–4)
VIT B12 SERPL-MCNC: 1330 PG/ML (ref 210–950)
WBC # BLD AUTO: 3.6 K/UL (ref 3.9–12.7)

## 2020-09-30 PROCEDURE — 82607 VITAMIN B-12: CPT

## 2020-09-30 PROCEDURE — 84439 ASSAY OF FREE THYROXINE: CPT

## 2020-09-30 PROCEDURE — 86592 SYPHILIS TEST NON-TREP QUAL: CPT

## 2020-09-30 PROCEDURE — 84443 ASSAY THYROID STIM HORMONE: CPT

## 2020-09-30 PROCEDURE — 99490 CHRNC CARE MGMT STAFF 1ST 20: CPT | Mod: PBBFAC,PO | Performed by: FAMILY MEDICINE

## 2020-09-30 PROCEDURE — 82746 ASSAY OF FOLIC ACID SERUM: CPT

## 2020-09-30 PROCEDURE — 84425 ASSAY OF VITAMIN B-1: CPT

## 2020-09-30 PROCEDURE — 99490 PR CHRONIC CARE MGMT, 1ST 20 MIN: ICD-10-PCS | Mod: S$PBB,,, | Performed by: FAMILY MEDICINE

## 2020-09-30 PROCEDURE — 85025 COMPLETE CBC W/AUTO DIFF WBC: CPT

## 2020-09-30 PROCEDURE — 82140 ASSAY OF AMMONIA: CPT

## 2020-09-30 PROCEDURE — 80053 COMPREHEN METABOLIC PANEL: CPT

## 2020-09-30 PROCEDURE — 36415 COLL VENOUS BLD VENIPUNCTURE: CPT | Mod: PO

## 2020-09-30 PROCEDURE — 99490 CHRNC CARE MGMT STAFF 1ST 20: CPT | Mod: S$PBB,,, | Performed by: FAMILY MEDICINE

## 2020-10-01 LAB — RPR SER QL: NORMAL

## 2020-10-02 ENCOUNTER — PATIENT MESSAGE (OUTPATIENT)
Dept: FAMILY MEDICINE | Facility: CLINIC | Age: 75
End: 2020-10-02

## 2020-10-02 NOTE — TELEPHONE ENCOUNTER
I have you scheduled 10/07/2020 (Wednesday) at 8:00 am.  Let me know if you need that rescheduled to another day or time.

## 2020-10-05 ENCOUNTER — HOSPITAL ENCOUNTER (OUTPATIENT)
Dept: RADIOLOGY | Facility: HOSPITAL | Age: 75
Discharge: HOME | End: 2020-10-05
Attending: PSYCHIATRY & NEUROLOGY
Payer: MEDICARE

## 2020-10-05 DIAGNOSIS — R41.3 OTHER AMNESIA: ICD-10-CM

## 2020-10-05 DIAGNOSIS — R41.3 MEMORY LOSS: ICD-10-CM

## 2020-10-05 PROCEDURE — 70551 MRI BRAIN WITHOUT CONTRAST: ICD-10-PCS | Mod: 26,,, | Performed by: RADIOLOGY

## 2020-10-05 PROCEDURE — 70551 MRI BRAIN STEM W/O DYE: CPT | Mod: TC,PO

## 2020-10-05 PROCEDURE — 70551 MRI BRAIN STEM W/O DYE: CPT | Mod: 26,,, | Performed by: RADIOLOGY

## 2020-10-06 LAB — VIT B1 BLD-MCNC: 57 UG/L (ref 38–122)

## 2020-10-07 ENCOUNTER — LAB VISIT (OUTPATIENT)
Dept: LAB | Facility: HOSPITAL | Age: 75
End: 2020-10-07
Attending: STUDENT IN AN ORGANIZED HEALTH CARE EDUCATION/TRAINING PROGRAM
Payer: MEDICARE

## 2020-10-07 ENCOUNTER — PATIENT MESSAGE (OUTPATIENT)
Dept: FAMILY MEDICINE | Facility: CLINIC | Age: 75
End: 2020-10-07

## 2020-10-07 ENCOUNTER — OFFICE VISIT (OUTPATIENT)
Dept: FAMILY MEDICINE | Facility: CLINIC | Age: 75
End: 2020-10-07
Payer: MEDICARE

## 2020-10-07 VITALS
TEMPERATURE: 97 F | HEIGHT: 64 IN | SYSTOLIC BLOOD PRESSURE: 122 MMHG | RESPIRATION RATE: 16 BRPM | HEART RATE: 76 BPM | OXYGEN SATURATION: 97 % | BODY MASS INDEX: 24.84 KG/M2 | DIASTOLIC BLOOD PRESSURE: 74 MMHG | WEIGHT: 145.5 LBS

## 2020-10-07 DIAGNOSIS — N32.81 OVERACTIVE BLADDER: ICD-10-CM

## 2020-10-07 DIAGNOSIS — C50.011 MALIGNANT NEOPLASM OF NIPPLE OF RIGHT BREAST IN FEMALE, UNSPECIFIED ESTROGEN RECEPTOR STATUS: ICD-10-CM

## 2020-10-07 DIAGNOSIS — G47.09 OTHER INSOMNIA: ICD-10-CM

## 2020-10-07 DIAGNOSIS — Z79.811 USE OF AROMATASE INHIBITORS: ICD-10-CM

## 2020-10-07 DIAGNOSIS — E78.2 MIXED HYPERLIPIDEMIA: ICD-10-CM

## 2020-10-07 DIAGNOSIS — I73.00 RAYNAUD'S PHENOMENON WITHOUT GANGRENE: ICD-10-CM

## 2020-10-07 DIAGNOSIS — F32.5 DEPRESSION, MAJOR, IN REMISSION: ICD-10-CM

## 2020-10-07 DIAGNOSIS — E03.9 ACQUIRED HYPOTHYROIDISM: ICD-10-CM

## 2020-10-07 DIAGNOSIS — M85.852 OSTEOPENIA OF LEFT HIP: ICD-10-CM

## 2020-10-07 DIAGNOSIS — I70.0 AORTIC CALCIFICATION: ICD-10-CM

## 2020-10-07 DIAGNOSIS — E03.9 ACQUIRED HYPOTHYROIDISM: Primary | ICD-10-CM

## 2020-10-07 PROBLEM — F32.1 MAJOR DEPRESSIVE DISORDER, SINGLE EPISODE, MODERATE: Status: ACTIVE | Noted: 2020-10-07

## 2020-10-07 LAB
T4 FREE SERPL-MCNC: 0.68 NG/DL (ref 0.71–1.51)
TSH SERPL DL<=0.005 MIU/L-ACNC: 39.18 UIU/ML (ref 0.4–4)

## 2020-10-07 PROCEDURE — 99214 PR OFFICE/OUTPT VISIT, EST, LEVL IV, 30-39 MIN: ICD-10-PCS | Mod: S$PBB,,, | Performed by: STUDENT IN AN ORGANIZED HEALTH CARE EDUCATION/TRAINING PROGRAM

## 2020-10-07 PROCEDURE — 99999 PR PBB SHADOW E&M-EST. PATIENT-LVL IV: CPT | Mod: PBBFAC,,, | Performed by: STUDENT IN AN ORGANIZED HEALTH CARE EDUCATION/TRAINING PROGRAM

## 2020-10-07 PROCEDURE — 99999 PR PBB SHADOW E&M-EST. PATIENT-LVL IV: ICD-10-PCS | Mod: PBBFAC,,, | Performed by: STUDENT IN AN ORGANIZED HEALTH CARE EDUCATION/TRAINING PROGRAM

## 2020-10-07 PROCEDURE — 36415 COLL VENOUS BLD VENIPUNCTURE: CPT

## 2020-10-07 PROCEDURE — 99214 OFFICE O/P EST MOD 30 MIN: CPT | Mod: PBBFAC,PO | Performed by: STUDENT IN AN ORGANIZED HEALTH CARE EDUCATION/TRAINING PROGRAM

## 2020-10-07 PROCEDURE — 99214 OFFICE O/P EST MOD 30 MIN: CPT | Mod: S$PBB,,, | Performed by: STUDENT IN AN ORGANIZED HEALTH CARE EDUCATION/TRAINING PROGRAM

## 2020-10-07 PROCEDURE — 84443 ASSAY THYROID STIM HORMONE: CPT

## 2020-10-07 PROCEDURE — 84439 ASSAY OF FREE THYROXINE: CPT

## 2020-10-07 NOTE — PROGRESS NOTES
" went home after discussion today and he discovered that she was not taking her Synthroid 75 mg daily as she was supposed to since the bottle was misplaced.  I recommended that she restart this dosing week and she recheck the TSH in 1 month.  and pt agreed to plan.     Documentation entered by me for this encounter may have been done in part using speech-recognition technology. Although I have made an effort to ensure accuracy, "sound like" errors may exist and should be interpreted in context.     Bhaskar Olivera MD  10/07/2020      "

## 2020-10-07 NOTE — PROGRESS NOTES
[facility] Note  Subjective:    Chief Complaint:   Chief Complaint   Patient presents with    [facility]    tsh       History of Present Illness:  75 y.o. female presents for multiple issues.      Hypothyroidism-patient had blood work and had elevated TSH of 51 which was asymptomatic on labs and is concerned about this.  She has been taking Synthroid 75 mg per her  but she did stop her SSRI and it is possible she is not taking her Synthroid.  Will repeat her TSH to confirm and her  will check to see if she is actually taking her Synthroid.  She is not taking biotin.  It could be lab error since her pulse is normal and she has absolutely no symptoms such as fatigue constipation dry skin Excedrin.  He was close to normal range few months ago.  If TSH is elevated 51 I will increase her Synthroid dose to 100 mg daily and recheck in 4 weeks. Normal pulse today.    Hyperlipidemia-stable well controlled continue statin   Raynaud's phenomena-stable well controlled on amlodipine continue   Osteopenia-stable continue vitamin-D   Breast cancer in remission-well controlled on aero meds Ace inhibitor and seen heme Onc up-to-date   Depression-in remission improved and she stop her SSRI continue she is not feeling said her symptoms today.   Memory-gradual and she has all neurology and had MRI that showed chronic cerebral loss and changes in following with them      This is the extent of the patient's complaints at this present time.     She denies chest pain upon exertion, dyspnea, nausea, vomiting, diaphoresis, and syncope. No pleuritic chest pain, unilateral leg swelling, calf tenderness, or calf pain.     She denies having a family history of cancer.     The following portions of the patient's history were reviewed and updated as appropriate: allergies, current medications, past family history, past medical history, past social history, past surgical history and problem  "list.    Review of Systems [Negative unless checked off]  Gen ROS: []fever []chills []weight loss []malaise []fatigue   Neuro: []dizzy []numbness []LOC []weakness []headaches   Psych ROS: []hallucinate [] depression []anxiety []suicidal ideation    ENT ROS: []congestion []rhinorrhea []sore throat []neck pain []hearing loss   Eye ROS: []hazy vision [] diplopia []photophobia []eye pain    Pulm ROS: []cough []pleuritic pain []dyspnea []wheezing    [facility] : []chest pain []SOB on exertion []orthopnea []PND []leg swelling   GI ROS: []n/v []abd pain []diarrhea []constipation []blood/black stool   Uro ROS: []dysuria []frequency []flank pain [] trouble voiding []hematuria   MSK ROS: []myalgias [x]joint pain []neck pain []back pain [] falls   Derm ROS: []pruritis []rash []jaundice        Past Medical History:   Diagnosis Date    Anemia     Arthritis     Blood transfusion     Breast cancer     Cancer RIGHT BREAST    Chronic constipation     Clotting disorder     Colon polyp     Diverticulosis     Glaucoma     Hepatitis C 2000    pt states history of hepatits c-"cured by Dr. Lynch"; TREATED 2 YEARS - 2000   OK NOW    Hypothyroid     Insomnia     Malignant neoplasm of breast 4/12/2012    Memory loss     reports some memory loss since chemo    Osteoarthritis     Panic attacks     Raynaud's disease     takes amlodipine for this    Ulcer     Vertigo      Past Surgical History:   Procedure Laterality Date    APPENDECTOMY      BLADDER SURGERY  2011    sling - Dr Giles  - [facility]    BRAIN SURGERY  2007    [place] neuralgia [place]    BREAST BIOPSY      BREAST LUMPECTOMY  3/12    malignant - had chemo and radiotherapy    COLONOSCOPY  01/03/2011    Dr Lynch, in media section, diverticulosis, hemorrhoids, otherwise normal findings; normal findings on random biopsies    COLONOSCOPY N/A 6/1/2017    Procedure: COLONOSCOPY;  Surgeon: Nate Lamb MD;  [facility];  Service: Endoscopy;  " Laterality: N/A;    COLONOSCOPY W/ POLYPECTOMY  06/01/2017    Dr. Lamb, 1 adenomatous polyp, recheck 5 years    EYE SURGERY      cataracts bilateral    HYSTERECTOMY      JOINT REPLACEMENT  09/25/2017    left [facility]nee [facility]     removal of port a cath      right lymph node removed from breast area      TUNNELED VENOUS PORT PLACEMENT  04/2012    left chest    UPPER GASTROINTESTINAL ENDOSCOPY  prior to 2011    small hiatal hernia     Social History  Social History     Tobacco Use    Smoking status: Never Smoker    Smokeless tobacco: Never Used   Substance Use Topics    Alcohol use: Yes     Alcohol/week: 7.0 standard drinks     Types: 4 Glasses of wine, 1 Shots of liquor, 2 Standard drinks or equivalent per week     Comment: wine most days - 1 glass    Drug use: No     Family History   Problem Relation Age of Onset    Cancer Mother         breast    Breast cancer Mother     Diabetes Father     Heart disease Father     Cancer Sister         breast    Breast cancer Sister     Diabetes Paternal Aunt     Cancer Other         breast    Breast cancer Other     Drug abuse Son     Obesity Son     Diabetes Maternal Aunt     Heart disease Maternal Uncle     Tuberculosis Paternal Uncle         x2    Diabetes Paternal Uncle     Early death Maternal Grandmother         tonsils    Heart disease Maternal Grandfather     Alcohol abuse Maternal Grandfather     Tuberculosis Paternal Grandfather     Cancer Sister         breast    Breast cancer Sister     Ovarian cancer Neg Hx     Colon cancer Neg Hx     Colon polyps Neg Hx     Crohn's disease Neg Hx     Ulcerative colitis Neg Hx     Melanoma Neg Hx     Lupus Neg Hx     Psoriasis Neg Hx     Eczema Neg Hx      Review of patient's allergies indicates:   Allergen Reactions    Adhesive Dermatitis and Rash    Codeine Nausea Only       Physical Examination  /74 (BP Location: Left arm, Patient Position: Sitting, BP Method:  "Medium (Manual))   Pulse 76   Temp 97.2 °F (36.2 °C) (Temporal)   Resp 16   Ht 5' 4" (1.626 m)   Wt 66 kg (145 lb 8.1 oz)   LMP 03/02/1998   SpO2 97%   BMI 24.98 kg/m²   Wt Readings from Last 3 Encounters:   10/07/20 66 kg (145 lb 8.1 oz)   09/16/20 64.5 kg (142 lb 3.2 oz)   08/19/20 64.6 kg (142 lb 5.1 oz)     BP Readings from Last 3 Encounters:   10/07/20 122/74   08/19/20 98/61   08/19/20 (!) 115/53     Estimated body mass index is 24.98 kg/m² as calculated from the following:    Height as of this encounter: 5' 4" (1.626 m).    Weight as of this encounter: 66 kg (145 lb 8.1 oz).     General appearance: alert, cooperative, no distress  Eyes: pupils equal and reactive, extraocular eye movements intact   Ears: bilateral TM's and external ear canals normal   Nose: normal and patent, no erythema, discharge or polyps   Sinuses: Normal paranasal sinuses without tenderness   Throat: mucous membranes moist, pharynx normal without lesions   Neck: no thyromegaly, trachea midline  Lungs: clear to auscultation, no wheezes, rales or rhonchi, symmetric air entry, no dullness to percussion bilaterally.  Heart: normal rate, regular rhythm, normal S1, S2, no murmurs, rubs, clicks or gallops, no displacement of the PMI.  Abdomen: soft, nontender, nondistended, no masses or organomegaly No rigidity, rebound, or guarding.   Back: full range of motion, no tenderness, palpable spasm or pain on motion   Extremities: peripheral pulses normal, no pedal edema, no clubbing or cyanosis   Feet: warm, good capillary refill.  Neurological:alert, oriented, normal speech, no focal findings or movement disorder noted   Psychiatric: alert, oriented to person, place, and time  Integument: normal coloration and turgor, no rashes, no suspicious skin lesions noted.    Data reviewed    Previous medical records reviewed and summarized above in HPI.     Laboratory    I have reviewed old labs below:    Lab Results   Component Value Date    WBC 3.60 " (L) 09/30/2020    HGB 12.2 09/30/2020    HCT 37.6 09/30/2020    MCV 95 09/30/2020     09/30/2020     09/30/2020    K 4.2 09/30/2020     09/30/2020    CALCIUM 9.0 09/30/2020    PHOS 4.2 04/22/2018    CO2 27 09/30/2020    GLU 92 09/30/2020    BUN 15 09/30/2020    CREATININE 0.8 09/30/2020    ANIONGAP 7 (L) 09/30/2020    ESTGFRAFRICA >60.0 09/30/2020    EGFRNONAA >60.0 09/30/2020    PROT 6.9 09/30/2020    ALBUMIN 3.6 09/30/2020    BILITOT 0.9 09/30/2020    ALKPHOS 39 (L) 09/30/2020    ALT 13 09/30/2020    AST 29 09/30/2020    INR 1.1 04/22/2018    CHOL 173 03/13/2019    TRIG 78 03/13/2019    HDL 52 03/13/2019    LDLCALC 105.4 03/13/2019    TSH 51.565 (H) 09/30/2020    HGBA1C 5.5 03/11/2019     Lab reviewed by me: Particular labs of significance that I will monitor, workup, or treat to improve are marked below.     []HGB low []A1c  high []LDL high []Cr    high []Na  high []K  high []AST high [x]TSH  high   []Plt low  []Gluc high  []Trig high []GFR low []Na  low []K  low []ALT high []TSH  low       Imaging/Tracing: I have reviewed the pertinent results/findings and my personal findings are below:     Mri cerebral loss   dexa osteopenia     Assessment/Plan    Yuniel Gracia is a 75 y.o. female who presents to clinic with:    1. Acquired hypothyroidism    2. Mixed hyperlipidemia    3. Aortic calcification    4. Overactive bladder    5. Other insomnia    6. Osteopenia of left hip    7. Raynaud's phenomenon without gangrene    8. Use of aromatase inhibitors    9. Depression, major, in remission    10. Malignant neoplasm of nipple of right breast in female, unspecified estrogen receptor status         Diagnostic impression remarks      Hypothyroidism-patient had blood work and had elevated TSH of 51 which was asymptomatic on labs and is concerned about this.  She has been taking Synthroid 75 mg per her  but she did stop her SSRI and it is possible she is not taking her Synthroid.  Will repeat  "her TSH to confirm and her  will check to see if she is actually taking her Synthroid.  She is not taking biotin.  He could be lab error since her pulse is normal and she has absolutely no symptoms such as fatigue constipation dry skin Excedrin.  He was close to normal range few months ago.  If TSH is elevated 51 I will increase her Synthroid dose to 100 mg daily and recheck in 4 weeks.   Hyperlipidemia-stable well controlled continue statin   Raynaud's phenomena-stable well controlled on amlodipine continue   Osteopenia-stable continue vitamin-D   Breast cancer in remission-well controlled on aero meds Ace inhibitor and seen heme Onc up-to-date   Depression-in remission improved and she stop her SSRI continue she is not feeling said her symptoms today.   Memory-gradual and she has all neurology and had MRI that showed chronic cerebral loss and changes in following with them      BMI Goal    Counseled patient on her ideal body weight, health consequences of being obese and current recommendations including weekly exercise and a heart healthy diet.  She is aware that ideal BMI < 25  Estimated body mass index is 24.98 kg/m² as calculated from the following:    Height as of this encounter: 5' 4" (1.626 m).    Weight as of this encounter: 66 kg (145 lb 8.1 oz).     She was counseled about the importance of healthy dietary habits as well as routine physical activity and exercise for better health outcomes. I also discussed the importance of cancer screening.     Medication Monitoring    In today's visit, monitoring for drug toxicity was accomplished. Proper use of medications was also discussed.     Counseling    Hyperlipidemia Counseling    Eat Less Unhealthy Fat   -Cut back on saturated fats and trans (also called hydrogenated) fats. A diet thats high in these fats increases your bad cholesterol. Its not enough to just cut back on foods containing cholesterol.   -Eat about 2 servings of fish per week. " Most fish contain omega-3 fatty acids. These help lower blood cholesterol.   -Eat more whole grains and soluble fiber (such as oat bran). These lower overall cholesterol.   -Counseled that most cholesterol is made in the liver and only a minority comes from diet.    Be Active   -Choose an activity you enjoy. Walking, swimming, and riding a bike are some good ways to be active.   -Start at a level where you feel comfortable. Increase your time and pace a little each week.   -Work up to 30 minutes on most days. You can break this up into three 10-minute periods.   -Remember, some activity is better than none.   -If you havent been exercising regularly, start slowly. Check with your doctor to make sure the exercise plan is right for you.     Take Medication As Directed: Many patients need medication to get their LDL levels to a safe level. Medication to lower cholesterol levels is effective and safe. (But taking medication is not a substitute for exercise or watching your diet!).      I discussed imaging findings, diagnosis, possibilities, treatment options, medications, risks, and benefits. She had many questions regarding the options and long-term effects. All questions were answered. She expressed understanding after counseling regarding the diagnosis and recommendations. She was capable and demonstrated competence with understanding of these options. Shared decision making was performed resulting in her choosing the current treatment plan.     I also discussed the importance of close follow up to discuss labs, change or modify her medications if needed, monitor side effects, and further evaluation of medical problems.     Additional workup planned: see labs ordered below.    See below for labs and meds ordered with associated diagnosis    1. Acquired hypothyroidism  - TSH; Future  - T4, Free; Future    2. Mixed hyperlipidemia    3. Aortic calcification    4. Overactive bladder    5. Other insomnia    6. Osteopenia  "of left hip    7. Raynaud's phenomenon without gangrene    8. Use of aromatase inhibitors    9. Depression, major, in remission    10. Malignant neoplasm of nipple of right breast in female, unspecified estrogen receptor status       Medication List with Changes/Refills   Current Medications    AMLODIPINE (NORVASC) 2.5 MG TABLET    Take 2.5 mg by mouth once daily.     ANASTROZOLE (ARIMIDEX) 1 MG TAB    TAKE 1 TABLET DAILY    CALCIUM CARBONATE/VITAMIN D3 (VITAMIN D-3 ORAL)    Take 400 Units by mouth daily as needed.    CO-ENZYME Q-10 30 MG CAPSULE    Take 30 mg by mouth once daily.    ERYTHROMYCIN (ROMYCIN) OPHTHALMIC OINTMENT    Place a 1/2 inch ribbon of ointment into the lower eyelid TID    FISH OIL-OMEGA-3 FATTY ACIDS 300-1,000 MG CAPSULE    Take by mouth once daily.    LEVOTHYROXINE (SYNTHROID) 75 MCG TABLET    TAKE 1 TABLET DAILY    LUMIGAN 0.01 % DROP        PRAVASTATIN (PRAVACHOL) 40 MG TABLET    TAKE 1 TABLET AT BEDTIME    SERTRALINE (ZOLOFT) 50 MG TABLET    Take 1 tablet (50 mg total) by mouth once daily.    SOLIFENACIN (VESICARE) 5 MG TABLET    TAKE 1 TABLET DAILY    TEMAZEPAM (RESTORIL) 15 MG CAP    Take 1 capsule (15 mg total) by mouth every evening.     Modified Medications    No medications on file       Follow up in about 3 months (around 1/7/2021). for further workup and reassessment if labs and tests obtained are stable or sooner as needed. She was instructed to call the clinic or go to the emergency department if her symptoms do not improve, worsens, or if new symptoms develop. Patient knows to call any time if an emergency arises. Shared decision making occurred and she verbalized understanding in agreement with this plan.     Documentation entered by me for this encounter may have been done in part using speech-recognition technology. Although I have made an effort to ensure accuracy, "sound like" errors may exist and should be interpreted in context.     Bhaskar Olivera MD  10/07/2020     "

## 2020-10-14 ENCOUNTER — TELEPHONE (OUTPATIENT)
Dept: NEUROLOGY | Facility: CLINIC | Age: 75
End: 2020-10-14

## 2020-10-14 NOTE — TELEPHONE ENCOUNTER
----- Message from Dave Abbott Jr., MD sent at 10/14/2020 11:30 AM CDT -----  TSH is high and free T4 is low.  This can be seen in hypothyroidism.  She should follow up with her PCP.

## 2020-10-20 ENCOUNTER — PES CALL (OUTPATIENT)
Dept: ADMINISTRATIVE | Facility: CLINIC | Age: 75
End: 2020-10-20

## 2020-10-31 ENCOUNTER — EXTERNAL CHRONIC CARE MANAGEMENT (OUTPATIENT)
Dept: PRIMARY CARE CLINIC | Facility: CLINIC | Age: 75
End: 2020-10-31
Payer: MEDICARE

## 2020-10-31 PROCEDURE — 99490 PR CHRONIC CARE MGMT, 1ST 20 MIN: ICD-10-PCS | Mod: S$PBB,,, | Performed by: FAMILY MEDICINE

## 2020-10-31 PROCEDURE — 99490 CHRNC CARE MGMT STAFF 1ST 20: CPT | Mod: S$PBB,,, | Performed by: FAMILY MEDICINE

## 2020-10-31 PROCEDURE — 99490 CHRNC CARE MGMT STAFF 1ST 20: CPT | Mod: PBBFAC,PO | Performed by: FAMILY MEDICINE

## 2020-11-11 ENCOUNTER — LAB VISIT (OUTPATIENT)
Dept: LAB | Facility: HOSPITAL | Age: 75
End: 2020-11-11
Attending: FAMILY MEDICINE
Payer: MEDICARE

## 2020-11-11 DIAGNOSIS — E78.2 MIXED HYPERLIPIDEMIA: ICD-10-CM

## 2020-11-11 DIAGNOSIS — E02 SUBCLINICAL IODINE-DEFICIENCY HYPOTHYROIDISM: ICD-10-CM

## 2020-11-11 LAB
CHOLEST SERPL-MCNC: 167 MG/DL (ref 120–199)
CHOLEST/HDLC SERPL: 2.9 {RATIO} (ref 2–5)
HDLC SERPL-MCNC: 58 MG/DL (ref 40–75)
HDLC SERPL: 34.7 % (ref 20–50)
LDLC SERPL CALC-MCNC: 94.6 MG/DL (ref 63–159)
NONHDLC SERPL-MCNC: 109 MG/DL
T4 FREE SERPL-MCNC: 1.06 NG/DL (ref 0.71–1.51)
TRIGL SERPL-MCNC: 72 MG/DL (ref 30–150)
TSH SERPL DL<=0.005 MIU/L-ACNC: 7.11 UIU/ML (ref 0.4–4)

## 2020-11-11 PROCEDURE — 36415 COLL VENOUS BLD VENIPUNCTURE: CPT | Mod: PO

## 2020-11-11 PROCEDURE — 80061 LIPID PANEL: CPT

## 2020-11-11 PROCEDURE — 84443 ASSAY THYROID STIM HORMONE: CPT

## 2020-11-11 PROCEDURE — 84439 ASSAY OF FREE THYROXINE: CPT

## 2020-11-12 ENCOUNTER — TELEPHONE (OUTPATIENT)
Dept: FAMILY MEDICINE | Facility: CLINIC | Age: 75
End: 2020-11-12

## 2020-11-12 NOTE — TELEPHONE ENCOUNTER
----- Message from Venus Blow sent at 11/12/2020 11:27 AM CST -----  Regarding: Results, Return Call  Contact: pt  Type:  Patient Returning Call    Who Called:  pt  Who Left Message for Patient:  Myrna  Does the patient know what this is regarding?:  Results  Best Call Back Number:  [phone] (home)   Additional Information:  Please call pt to advise, Thanks!

## 2020-11-13 ENCOUNTER — PATIENT MESSAGE (OUTPATIENT)
Dept: FAMILY MEDICINE | Facility: CLINIC | Age: 75
End: 2020-11-13

## 2020-11-13 NOTE — TELEPHONE ENCOUNTER
Pt stated she had fever,headache, fatigue, and nausea Pt scheduled for covid clinic 11/14/2020 at 9:00. Pt verbalized understanding,

## 2020-11-14 ENCOUNTER — OFFICE VISIT (OUTPATIENT)
Dept: URGENT CARE | Facility: CLINIC | Age: 75
End: 2020-11-14
Payer: MEDICARE

## 2020-11-14 VITALS
DIASTOLIC BLOOD PRESSURE: 74 MMHG | SYSTOLIC BLOOD PRESSURE: 123 MMHG | RESPIRATION RATE: 16 BRPM | TEMPERATURE: 97 F | HEART RATE: 65 BPM | OXYGEN SATURATION: 100 %

## 2020-11-14 DIAGNOSIS — R50.9 FEVER: ICD-10-CM

## 2020-11-14 DIAGNOSIS — Z11.52 ENCOUNTER FOR SCREENING LABORATORY TESTING FOR COVID-19 VIRUS: Primary | ICD-10-CM

## 2020-11-14 DIAGNOSIS — R11.0 NAUSEA: ICD-10-CM

## 2020-11-14 PROCEDURE — 93000 PR ELECTROCARDIOGRAM, COMPLETE: ICD-10-PCS | Mod: S$GLB,,, | Performed by: NURSE PRACTITIONER

## 2020-11-14 PROCEDURE — 93000 ELECTROCARDIOGRAM COMPLETE: CPT | Mod: S$GLB,,, | Performed by: NURSE PRACTITIONER

## 2020-11-14 PROCEDURE — 99204 PR OFFICE/OUTPT VISIT, NEW, LEVL IV, 45-59 MIN: ICD-10-PCS | Mod: S$GLB,,, | Performed by: NURSE PRACTITIONER

## 2020-11-14 PROCEDURE — U0003 INFECTIOUS AGENT DETECTION BY NUCLEIC ACID (DNA OR RNA); SEVERE ACUTE RESPIRATORY SYNDROME CORONAVIRUS 2 (SARS-COV-2) (CORONAVIRUS DISEASE [COVID-19]), AMPLIFIED PROBE TECHNIQUE, MAKING USE OF HIGH THROUGHPUT TECHNOLOGIES AS DESCRIBED BY CMS-2020-01-R: HCPCS

## 2020-11-14 PROCEDURE — 99204 OFFICE O/P NEW MOD 45 MIN: CPT | Mod: S$GLB,,, | Performed by: NURSE PRACTITIONER

## 2020-11-14 RX ORDER — ONDANSETRON 4 MG/1
4 TABLET, ORALLY DISINTEGRATING ORAL EVERY 8 HOURS PRN
Qty: 15 TABLET | Refills: 0 | Status: SHIPPED | OUTPATIENT
Start: 2020-11-14 | End: 2022-06-24 | Stop reason: SDUPTHER

## 2020-11-14 NOTE — PROGRESS NOTES
Subjective:       Patient ID: Yuniel Gracia is a 75 y.o. female.    Vitals:  temperature is 97.4 °F (36.3 °C). Her blood pressure is 123/74 and her pulse is 65. Her respiration is 16 and oxygen saturation is 100%.     Chief Complaint: No chief complaint on file.    Yuniel Gracia is a 75 year old female presenting to the clinic with c/o 5 days of nausea. She has had no vomiting, diarrhea, fever. She denies any chest pain, SOB.       Constitution: Negative for chills, fatigue and fever.   HENT: Negative for congestion and sore throat.    Neck: Negative for painful lymph nodes.   Cardiovascular: Negative for chest pain and leg swelling.   Eyes: Negative for double vision and blurred vision.   Respiratory: Negative for cough and shortness of breath.    Gastrointestinal: Positive for nausea. Negative for vomiting and diarrhea.   Genitourinary: Negative for dysuria, frequency, urgency and history of kidney stones.   Musculoskeletal: Negative for joint pain, joint swelling, muscle cramps and muscle ache.   Skin: Negative for color change, pale, rash and bruising.   Allergic/Immunologic: Negative for seasonal allergies.   Neurological: Negative for dizziness, history of vertigo, light-headedness, passing out and headaches.   Hematologic/Lymphatic: Negative for swollen lymph nodes.   Psychiatric/Behavioral: Negative for nervous/anxious, sleep disturbance and depression. The patient is not nervous/anxious.        Objective:      Physical Exam   Constitutional: She is oriented to person, place, and time. She appears well-developed. She is cooperative.  Non-toxic appearance. She does not appear ill. No distress.   HENT:   Head: Normocephalic and atraumatic.   Ears:   Right Ear: Hearing, tympanic membrane, external ear and ear canal normal.   Left Ear: Hearing, tympanic membrane, external ear and ear canal normal.   Nose: Nose normal. No mucosal edema, rhinorrhea or nasal deformity. No epistaxis. Right sinus  exhibits no maxillary sinus tenderness and no frontal sinus tenderness. Left sinus exhibits no maxillary sinus tenderness and no frontal sinus tenderness.   Mouth/Throat: Uvula is midline, oropharynx is clear and moist and mucous membranes are normal. No trismus in the jaw. Normal dentition. No uvula swelling. No posterior oropharyngeal erythema.   Eyes: Conjunctivae and lids are normal. Right eye exhibits no discharge. Left eye exhibits no discharge. No scleral icterus.   Neck: Trachea normal, normal range of motion, full passive range of motion without pain and phonation normal. Neck supple.   Cardiovascular: Normal rate, regular rhythm, normal heart sounds and normal pulses.   Pulmonary/Chest: Effort normal and breath sounds normal. No respiratory distress.   Abdominal: Soft. Normal appearance and bowel sounds are normal. She exhibits no distension, no pulsatile midline mass and no mass. There is no abdominal tenderness.   Musculoskeletal: Normal range of motion.         General: No deformity.   Neurological: She is alert and oriented to person, place, and time. She exhibits normal muscle tone. Coordination normal.   Skin: Skin is warm, dry, intact, not diaphoretic and not pale. Psychiatric: Her speech is normal and behavior is normal. Judgment and thought content normal.   Nursing note and vitals reviewed.        Assessment:       1. Encounter for screening laboratory testing for COVID-19 virus    2. Nausea    3. Fever        Plan:       Patient presents with nausea with no other symptoms. EKG done in clinic and interpreted by me. No evidence of ST elevation or acute ischemia. Advised patient that symptoms may be cardiac in nature and that additional workup would be necessary to rule this out. Advised her that this would require ED visit. Patient requesting zofran which will be sent to pharmacy but advised that she will need to go to ED if symptoms persist. Patient states she does not want to go at this time but  will go if symptoms persist.     Encounter for screening laboratory testing for COVID-19 virus    Nausea  -     COVID-19 Routine Screening    Fever  -     COVID-19 Routine Screening

## 2020-11-15 ENCOUNTER — PATIENT MESSAGE (OUTPATIENT)
Dept: FAMILY MEDICINE | Facility: CLINIC | Age: 75
End: 2020-11-15

## 2020-11-15 LAB — SARS-COV-2 RNA RESP QL NAA+PROBE: NOT DETECTED

## 2020-11-16 ENCOUNTER — PATIENT MESSAGE (OUTPATIENT)
Dept: FAMILY MEDICINE | Facility: CLINIC | Age: 75
End: 2020-11-16

## 2020-11-16 ENCOUNTER — OFFICE VISIT (OUTPATIENT)
Dept: FAMILY MEDICINE | Facility: CLINIC | Age: 75
End: 2020-11-16
Payer: MEDICARE

## 2020-11-16 VITALS
DIASTOLIC BLOOD PRESSURE: 74 MMHG | OXYGEN SATURATION: 16 % | HEIGHT: 64 IN | BODY MASS INDEX: 24.62 KG/M2 | WEIGHT: 144.19 LBS | RESPIRATION RATE: 16 BRPM | SYSTOLIC BLOOD PRESSURE: 128 MMHG | TEMPERATURE: 97 F | HEART RATE: 61 BPM

## 2020-11-16 DIAGNOSIS — N30.01 ACUTE CYSTITIS WITH HEMATURIA: Primary | ICD-10-CM

## 2020-11-16 DIAGNOSIS — F32.1 MAJOR DEPRESSIVE DISORDER, SINGLE EPISODE, MODERATE: ICD-10-CM

## 2020-11-16 DIAGNOSIS — M85.852 OSTEOPENIA OF LEFT HIP: ICD-10-CM

## 2020-11-16 DIAGNOSIS — Z79.811 USE OF AROMATASE INHIBITORS: ICD-10-CM

## 2020-11-16 DIAGNOSIS — E53.8 B12 DEFICIENCY: ICD-10-CM

## 2020-11-16 DIAGNOSIS — E02 SUBCLINICAL IODINE-DEFICIENCY HYPOTHYROIDISM: ICD-10-CM

## 2020-11-16 DIAGNOSIS — I73.00 RAYNAUD'S PHENOMENON WITHOUT GANGRENE: ICD-10-CM

## 2020-11-16 DIAGNOSIS — D69.6 THROMBOCYTOPENIA: ICD-10-CM

## 2020-11-16 DIAGNOSIS — N32.81 OVERACTIVE BLADDER: ICD-10-CM

## 2020-11-16 DIAGNOSIS — E78.2 MIXED HYPERLIPIDEMIA: ICD-10-CM

## 2020-11-16 LAB
BILIRUB SERPL-MCNC: ABNORMAL MG/DL
BLOOD URINE, POC: 50
CLARITY, POC UA: ABNORMAL
COLOR, POC UA: ABNORMAL
GLUCOSE UR QL STRIP: NORMAL
KETONES UR QL STRIP: ABNORMAL
LEUKOCYTE ESTERASE URINE, POC: ABNORMAL
NITRITE, POC UA: ABNORMAL
PH, POC UA: 6
PROTEIN, POC: ABNORMAL
UROBILINOGEN, POC UA: ABNORMAL

## 2020-11-16 PROCEDURE — 99215 OFFICE O/P EST HI 40 MIN: CPT | Mod: PBBFAC,PO | Performed by: STUDENT IN AN ORGANIZED HEALTH CARE EDUCATION/TRAINING PROGRAM

## 2020-11-16 PROCEDURE — 99999 PR PBB SHADOW E&M-EST. PATIENT-LVL V: ICD-10-PCS | Mod: PBBFAC,,, | Performed by: STUDENT IN AN ORGANIZED HEALTH CARE EDUCATION/TRAINING PROGRAM

## 2020-11-16 PROCEDURE — 99214 OFFICE O/P EST MOD 30 MIN: CPT | Mod: S$PBB,,, | Performed by: STUDENT IN AN ORGANIZED HEALTH CARE EDUCATION/TRAINING PROGRAM

## 2020-11-16 PROCEDURE — 81002 URINALYSIS NONAUTO W/O SCOPE: CPT | Mod: 59,PBBFAC,PO | Performed by: STUDENT IN AN ORGANIZED HEALTH CARE EDUCATION/TRAINING PROGRAM

## 2020-11-16 PROCEDURE — 87086 URINE CULTURE/COLONY COUNT: CPT

## 2020-11-16 PROCEDURE — 99999 PR PBB SHADOW E&M-EST. PATIENT-LVL V: CPT | Mod: PBBFAC,,, | Performed by: STUDENT IN AN ORGANIZED HEALTH CARE EDUCATION/TRAINING PROGRAM

## 2020-11-16 PROCEDURE — 81001 URINALYSIS AUTO W/SCOPE: CPT

## 2020-11-16 PROCEDURE — 99214 PR OFFICE/OUTPT VISIT, EST, LEVL IV, 30-39 MIN: ICD-10-PCS | Mod: S$PBB,,, | Performed by: STUDENT IN AN ORGANIZED HEALTH CARE EDUCATION/TRAINING PROGRAM

## 2020-11-16 PROCEDURE — 87088 URINE BACTERIA CULTURE: CPT

## 2020-11-16 PROCEDURE — 96372 THER/PROPH/DIAG INJ SC/IM: CPT | Mod: PBBFAC,PO

## 2020-11-16 RX ORDER — NITROFURANTOIN 25; 75 MG/1; MG/1
100 CAPSULE ORAL 2 TIMES DAILY
Qty: 14 CAPSULE | Refills: 0 | Status: SHIPPED | OUTPATIENT
Start: 2020-11-16 | End: 2020-11-23

## 2020-11-16 RX ORDER — CYANOCOBALAMIN 1000 UG/ML
1000 INJECTION, SOLUTION INTRAMUSCULAR; SUBCUTANEOUS
Status: COMPLETED | OUTPATIENT
Start: 2020-11-16 | End: 2020-11-16

## 2020-11-16 RX ADMIN — CYANOCOBALAMIN 1000 MCG: 1000 INJECTION INTRAMUSCULAR; SUBCUTANEOUS at 02:11

## 2020-11-16 NOTE — PROGRESS NOTES
[facility]  Subjective:    Chief Complaint:   Chief Complaint   Patient presents with    Nausea    Abdominal Pain       History of Present Illness:  75 y.o. female presents for abdominal pain       Seminal pain-patient reports that for the past week she has had a suprapubic abdominal pain that lasted few hours but is dull with no radiation and is not alleviated or aggravated with food.  She reports some mild nausea and no vomiting.  She has no diarrhea or constipation or blood or black in her stool.  She reports that Zofran did help with this.  She went to an urgent care and negative COVID test reports no recent antibiotics ir or dysuria or flank pain.  Does have frequency. She reports no fever chills. No rlq pain. She reports having a bowel movement every day which is increased from her baseline which is less than daily.  She reports no chest pain or shortness of breath.  For further review of systems.    [Negative unless checked off]  [x] Nausea   [] Vomiting   [] Constipation  [] Diarrhea  [] Pain better/worse with BM  [] Fever/chills  [] Weight loss  [x] Anorexia  [] Hematochezia  [] Blood/black in stool  [] Heartburn  [] Hematuria  [] Dysuria  [] Flank pain  [] Groin pain  [] Vag discharge  [] Vag bleeding  [] Menstrual period irregular  [] Previous surgeries  [] Hx of gallstones  [] Hx of appendectomy  [] Hx of cholecystectomy  [] Heavy NSAID use  [] Ripping or tearing pain to back  [] Chest pain  [] Dyspnea  [] Heavy alcohol use     She denies RLQ pain, RLQ tenderness, pain migration to RLQ, and fever/chills.     This is the extent of the patient's complaints at this present time.     She denies chest pain upon exertion, dyspnea, nausea, vomiting, diaphoresis, and syncope. No pleuritic chest pain, unilateral leg swelling, calf tenderness, or calf pain.     She denies having a family history of cancer.     The following portions of the patient's history were reviewed and updated as  "appropriate: allergies, current medications, past family history, past medical history, past social history, past surgical history and problem list.    Review of Systems [Negative unless checked off]  Gen ROS: []fever []chills []weight loss []malaise [x]fatigue   Neuro: []dizzy []numbness []LOC []weakness []headaches   Psych ROS: []hallucinate [] depression []anxiety []suicidal ideation    ENT ROS: []congestion []rhinorrhea []sore throat []neck pain []hearing loss   Eye ROS: []hazy vision [] diplopia []photophobia []eye pain    Pulm ROS: []cough []pleuritic pain []dyspnea []wheezing    [facility] : []chest pain []SOB on exertion []orthopnea []PND []leg swelling   GI ROS: [x]n/v [x]abd pain []diarrhea []constipation []blood/black stool   Uro ROS: []dysuria []frequency []flank pain [] trouble voiding []hematuria   MSK ROS: []myalgias []joint pain []neck pain []back pain [] falls   Derm ROS: []pruritis []rash []jaundice        Past Medical History:   Diagnosis Date    Anemia     Arthritis     Blood transfusion     Breast cancer     Cancer RIGHT BREAST    Chronic constipation     Clotting disorder     Colon polyp     Diverticulosis     Glaucoma     Hepatitis C 2000    pt states history of hepatits c-"cured by Dr. Lynch"; TREATED 2 YEARS - 2000   OK NOW    Hypothyroid     Insomnia     Malignant neoplasm of breast 4/12/2012    Memory loss     reports some memory loss since chemo    Osteoarthritis     Panic attacks     Raynaud's disease     takes amlodipine for this    Ulcer     Vertigo      Past Surgical History:   Procedure Laterality Date    APPENDECTOMY      BLADDER SURGERY  2011    sling - Dr Giles  [facility]    BRAIN SURGERY  2007    [place]    BREAST BIOPSY      BREAST LUMPECTOMY  3/12    malignant - had chemo and radiotherapy    COLONOSCOPY  01/03/2011    Dr Lynch, in media section, diverticulosis, hemorrhoids, otherwise normal findings; normal findings on random " biopsies    COLONOSCOPY N/A 6/1/2017    Procedure: COLONOSCOPY;  Surgeon: Nate Lamb MD;  Location: [facility];  Service: Endoscopy;  Laterality: N/A;    COLONOSCOPY W/ POLYPECTOMY  06/01/2017    Dr. Lamb, 1 adenomatous polyp, recheck 5 years    EYE SURGERY      cataracts bilateral    HYSTERECTOMY      JOINT REPLACEMENT  09/25/2017    left Knee [facility]     removal of port a cath      right lymph node removed from breast area      TUNNELED VENOUS PORT PLACEMENT  04/2012    left chest    UPPER GASTROINTESTINAL ENDOSCOPY  prior to 2011    small hiatal hernia     Social History  Social History     Tobacco Use    Smoking status: Never Smoker    Smokeless tobacco: Never Used   Substance Use Topics    Alcohol use: Yes     Alcohol/week: 7.0 standard drinks     Types: 4 Glasses of wine, 1 Shots of liquor, 2 Standard drinks or equivalent per week     Comment: wine most days - 1 glass    Drug use: No     Family History   Problem Relation Age of Onset    Cancer Mother         breast    Breast cancer Mother     Diabetes Father     Heart disease Father     Cancer Sister         breast    Breast cancer Sister     Diabetes Paternal Aunt     Cancer Other         breast    Breast cancer Other     Drug abuse Son     Obesity Son     Diabetes Maternal Aunt     Heart disease Maternal Uncle     Tuberculosis Paternal Uncle         x2    Diabetes Paternal Uncle     Early death Maternal Grandmother         tonsils    Heart disease Maternal Grandfather     Alcohol abuse Maternal Grandfather     Tuberculosis Paternal Grandfather     Cancer Sister         breast    Breast cancer Sister     Ovarian cancer Neg Hx     Colon cancer Neg Hx     Colon polyps Neg Hx     Crohn's disease Neg Hx     Ulcerative colitis Neg Hx     Melanoma Neg Hx     Lupus Neg Hx     Psoriasis Neg Hx     Eczema Neg Hx      Review of patient's allergies indicates:   Allergen Reactions    Adhesive  "Dermatitis and Rash    Codeine Nausea Only       Physical Examination  /74 (BP Location: Right arm, Patient Position: Sitting, BP Method: Medium (Manual))   Pulse 61   Temp 97.3 °F (36.3 °C) (Temporal)   Resp 16   Ht 5' 4" (1.626 m)   Wt 65.4 kg (144 lb 2.9 oz)   LMP 03/02/1998   SpO2 (!) 16%   BMI 24.75 kg/m²   Wt Readings from Last 3 Encounters:   11/16/20 65.4 kg (144 lb 2.9 oz)   10/07/20 66 kg (145 lb 8.1 oz)   09/16/20 64.5 kg (142 lb 3.2 oz)     BP Readings from Last 3 Encounters:   11/16/20 128/74   11/14/20 123/74   10/07/20 122/74     Estimated body mass index is 24.75 kg/m² as calculated from the following:    Height as of this encounter: 5' 4" (1.626 m).    Weight as of this encounter: 65.4 kg (144 lb 2.9 oz).     General appearance: alert, cooperative, no distress  Eyes: pupils equal and reactive, extraocular eye movements intact   Ears: bilateral TM's and external ear canals normal   Nose: normal and patent, no erythema, discharge or polyps   Sinuses: Normal paranasal sinuses without tenderness   Throat: mucous membranes moist, pharynx normal without lesions   Neck: no thyromegaly, trachea midline  Lungs: clear to auscultation, no wheezes, rales or rhonchi, symmetric air entry, no dullness to percussion bilaterally.  Heart: normal rate, regular rhythm, normal S1, S2, no murmurs, rubs, clicks or gallops, no displacement of the PMI.  Abdomen: soft, pain over bladder, nondistended, no masses or organomegaly No rigidity, rebound, or guarding. No tenderness at McBurney's point. Neg psoas and obturator sign. Neg grey sign.   Back: full range of motion, no tenderness, palpable spasm or pain on motion   Extremities: peripheral pulses normal, no pedal edema, no clubbing or cyanosis   Feet: warm, good capillary refill.  Neurological:alert, oriented, normal speech, no focal findings or movement disorder noted   Psychiatric: alert, oriented to person, place, and time  Integument: normal coloration " and turgor, no rashes, no suspicious skin lesions noted.    Data reviewed    Previous medical records reviewed and summarized above in HPI.     Laboratory    I have reviewed old labs below:    Lab Results   Component Value Date    WBC 3.60 (L) 09/30/2020    HGB 12.2 09/30/2020    HCT 37.6 09/30/2020    MCV 95 09/30/2020     09/30/2020     09/30/2020    K 4.2 09/30/2020     09/30/2020    CALCIUM 9.0 09/30/2020    PHOS 4.2 04/22/2018    CO2 27 09/30/2020    GLU 92 09/30/2020    BUN 15 09/30/2020    CREATININE 0.8 09/30/2020    ANIONGAP 7 (L) 09/30/2020    ESTGFRAFRICA >60.0 09/30/2020    EGFRNONAA >60.0 09/30/2020    PROT 6.9 09/30/2020    ALBUMIN 3.6 09/30/2020    BILITOT 0.9 09/30/2020    ALKPHOS 39 (L) 09/30/2020    ALT 13 09/30/2020    AST 29 09/30/2020    INR 1.1 04/22/2018    CHOL 167 11/11/2020    TRIG 72 11/11/2020    HDL 58 11/11/2020    LDLCALC 94.6 11/11/2020    TSH 7.113 (H) 11/11/2020    HGBA1C 5.5 03/11/2019     Lab reviewed by me: Particular labs of significance that I will monitor, workup, or treat to improve are marked below.     elev tsh     Assessment/Plan    Yuniel Gracia is a 75 y.o. female who presents to clinic with:    1. Acute cystitis with hematuria    2. Overactive bladder    3. Mixed hyperlipidemia    4. Major depressive disorder, single episode, moderate    5. Thrombocytopenia    6. Subclinical iodine-deficiency hypothyroidism    7. Raynaud's phenomenon without gangrene    8. Osteopenia of left hip    9. Use of aromatase inhibitors    10. B12 deficiency         Diagnostic impression remarks      UTI-needs improvement pain over bladder and will put in Macrobid since dipstick showed leuko sites and cloudy urine.  No flank tenderness or fevers.  Can continue Zofran as needed.  Normal vitals   B12 deficiency-neurologist recommended B12 shot and thus will give 1 will monitor for improvement   HLD-stable controlled on pravastatin will  "continue   Hypothyroidism-improving and last TSH was 7 but will recheck in future since she was not taking this consistently since last visit   Raynaud's phenomena-stable will continue amlodipine no pain in fingers will monitor   Breast cancer in remission-patient is seen hematology is on anastrozole   Osteopenia-stable taking calcium and vitamin-D will continue    BMI Goal    Counseled patient on her ideal body weight, health consequences of being obese and current recommendations including weekly exercise and a heart healthy diet.  She is aware that ideal BMI < 25  Estimated body mass index is 24.75 kg/m² as calculated from the following:    Height as of this encounter: 5' 4" (1.626 m).    Weight as of this encounter: 65.4 kg (144 lb 2.9 oz).     She was counseled about the importance of healthy dietary habits as well as routine physical activity and exercise for better health outcomes. I also discussed the importance of cancer screening.     Medication Monitoring    In today's visit, monitoring for drug toxicity was accomplished. Proper use of medications was also discussed.     Counseling    Eat Less Unhealthy Fat   -Cut back on saturated fats and trans (also called hydrogenated) fats. A diet thats high in these fats increases your bad cholesterol. Its not enough to just cut back on foods containing cholesterol.   -Eat about 2 servings of fish per week. Most fish contain omega-3 fatty acids. These help lower blood cholesterol.   -Eat more whole grains and soluble fiber (such as oat bran). These lower overall cholesterol.   -Counseled that most cholesterol is made in the liver and only a minority comes from diet.    Be Active   -Choose an activity you enjoy. Walking, swimming, and riding a bike are some good ways to be active.   -Start at a level where you feel comfortable. Increase your time and pace a little each week.   -Work up to 30 minutes on most days. You can break this up into three 10-minute " periods.   -Remember, some activity is better than none.   -If you havent been exercising regularly, start slowly. Check with your doctor to make sure the exercise plan is right for you.     Take Medication As Directed: Many patients need medication to get their LDL levels to a safe level. Medication to lower cholesterol levels is effective and safe. (But taking medication is not a substitute for exercise or watching your diet!).      I discussed imaging findings, diagnosis, possibilities, treatment options, medications, risks, and benefits. She had many questions regarding the options and long-term effects. All questions were answered. She expressed understanding after counseling regarding the diagnosis and recommendations. She was capable and demonstrated competence with understanding of these options. Shared decision making was performed resulting in her choosing the current treatment plan.     I also discussed the importance of close follow up to discuss labs, change or modify her medications if needed, monitor side effects, and further evaluation of medical problems.     Additional workup planned: see labs ordered below.    See below for labs and meds ordered with associated diagnosis    1. Acute cystitis with hematuria  - Urinalysis, Reflex to Urine Culture Urine, Clean Catch  - POCT URINE DIPSTICK WITHOUT MICROSCOPE  - nitrofurantoin, macrocrystal-monohydrate, (MACROBID) 100 MG capsule; Take 1 capsule (100 mg total) by mouth 2 (two) times daily. for 7 days  Dispense: 14 capsule; Refill: 0    2. Overactive bladder    3. Mixed hyperlipidemia    4. Major depressive disorder, single episode, moderate    5. Thrombocytopenia    6. Subclinical iodine-deficiency hypothyroidism    7. Raynaud's phenomenon without gangrene    8. Osteopenia of left hip    9. Use of aromatase inhibitors    10. B12 deficiency  - cyanocobalamin injection 1,000 mcg       Medication List with Changes/Refills   New Medications     "NITROFURANTOIN, MACROCRYSTAL-MONOHYDRATE, (MACROBID) 100 MG CAPSULE    Take 1 capsule (100 mg total) by mouth 2 (two) times daily. for 7 days   Current Medications    AMLODIPINE (NORVASC) 2.5 MG TABLET    Take 2.5 mg by mouth once daily.     ANASTROZOLE (ARIMIDEX) 1 MG TAB    TAKE 1 TABLET DAILY    CALCIUM CARBONATE/VITAMIN D3 (VITAMIN D-3 ORAL)    Take 400 Units by mouth daily as needed.    CO-ENZYME Q-10 30 MG CAPSULE    Take 30 mg by mouth once daily.    ERYTHROMYCIN (ROMYCIN) OPHTHALMIC OINTMENT    Place a 1/2 inch ribbon of ointment into the lower eyelid TID    FISH OIL-OMEGA-3 FATTY ACIDS 300-1,000 MG CAPSULE    Take by mouth once daily.    LEVOTHYROXINE (SYNTHROID) 75 MCG TABLET    TAKE 1 TABLET DAILY    LUMIGAN 0.01 % DROP        ONDANSETRON (ZOFRAN ODT) 4 MG TBDL    Take 1 tablet (4 mg total) by mouth every 8 (eight) hours as needed (nausea).    PRAVASTATIN (PRAVACHOL) 40 MG TABLET    TAKE 1 TABLET AT BEDTIME    SERTRALINE (ZOLOFT) 50 MG TABLET    Take 1 tablet (50 mg total) by mouth once daily.    SOLIFENACIN (VESICARE) 5 MG TABLET    TAKE 1 TABLET DAILY    TEMAZEPAM (RESTORIL) 15 MG CAP    Take 1 capsule (15 mg total) by mouth every evening.     Modified Medications    No medications on file       No follow-ups on file. for further workup and reassessment if labs and tests obtained are stable or sooner as needed. She was instructed to call the clinic or go to the emergency department if her symptoms do not improve, worsens, or if new symptoms develop. Patient knows to call any time if an emergency arises. Shared decision making occurred and she verbalized understanding in agreement with this plan.     Documentation entered by me for this encounter may have been done in part using speech-recognition technology. Although I have made an effort to ensure accuracy, "sound like" errors may exist and should be interpreted in context.     Bhaskar Olivera MD  11/16/2020     "

## 2020-11-16 NOTE — PROGRESS NOTES
Identified patient's name and . Administered 1000 mg Vit B12, IM. Patient tolerated well, aseptic technique maintained. Pain scale 0/10 with injection. No residual bleeding at insertion site noted.

## 2020-11-16 NOTE — PATIENT INSTRUCTIONS

## 2020-11-16 NOTE — PROGRESS NOTES
"Patient reports pain over bladder and positive leukocytes in urine given Macrobid.  No fevers or flank pain and will monitor for improvement.    Documentation entered by me for this encounter may have been done in part using speech-recognition technology. Although I have made an effort to ensure accuracy, "sound like" errors may exist and should be interpreted in context.     Bhaskar Olivera MD  11/16/2020      "

## 2020-11-17 LAB
BACTERIA #/AREA URNS AUTO: ABNORMAL /HPF
BILIRUB UR QL STRIP: NEGATIVE
CLARITY UR REFRACT.AUTO: ABNORMAL
COLOR UR AUTO: YELLOW
GLUCOSE UR QL STRIP: NEGATIVE
HGB UR QL STRIP: NEGATIVE
HYALINE CASTS UR QL AUTO: 6 /LPF
KETONES UR QL STRIP: NEGATIVE
LEUKOCYTE ESTERASE UR QL STRIP: ABNORMAL
MICROSCOPIC COMMENT: ABNORMAL
NITRITE UR QL STRIP: NEGATIVE
PH UR STRIP: 6 [PH] (ref 5–8)
PROT UR QL STRIP: NEGATIVE
RBC #/AREA URNS AUTO: 10 /HPF (ref 0–4)
SP GR UR STRIP: 1.01 (ref 1–1.03)
URN SPEC COLLECT METH UR: ABNORMAL
WBC #/AREA URNS AUTO: >100 /HPF (ref 0–5)

## 2020-11-18 ENCOUNTER — PATIENT MESSAGE (OUTPATIENT)
Dept: FAMILY MEDICINE | Facility: CLINIC | Age: 75
End: 2020-11-18

## 2020-11-18 ENCOUNTER — HOSPITAL ENCOUNTER (EMERGENCY)
Facility: HOSPITAL | Age: 75
Discharge: HOME | End: 2020-11-18
Attending: EMERGENCY MEDICINE
Payer: MEDICARE

## 2020-11-18 VITALS
HEART RATE: 64 BPM | RESPIRATION RATE: 16 BRPM | SYSTOLIC BLOOD PRESSURE: 95 MMHG | WEIGHT: 144 LBS | DIASTOLIC BLOOD PRESSURE: 48 MMHG | BODY MASS INDEX: 24.72 KG/M2 | TEMPERATURE: 98 F | OXYGEN SATURATION: 99 %

## 2020-11-18 DIAGNOSIS — R50.9 FEVER, UNSPECIFIED FEVER CAUSE: ICD-10-CM

## 2020-11-18 DIAGNOSIS — N12 PYELONEPHRITIS: Primary | ICD-10-CM

## 2020-11-18 LAB
ALBUMIN SERPL BCP-MCNC: 3.7 G/DL (ref 3.5–5.2)
ALP SERPL-CCNC: 41 U/L (ref 55–135)
ALT SERPL W/O P-5'-P-CCNC: 13 U/L (ref 10–44)
ANION GAP SERPL CALC-SCNC: 12 MMOL/L (ref 8–16)
AST SERPL-CCNC: 25 U/L (ref 10–40)
BACTERIA #/AREA URNS HPF: ABNORMAL /HPF
BACTERIA UR CULT: ABNORMAL
BASOPHILS # BLD AUTO: 0.03 K/UL (ref 0–0.2)
BASOPHILS NFR BLD: 0.5 % (ref 0–1.9)
BILIRUB SERPL-MCNC: 1.2 MG/DL (ref 0.1–1)
BILIRUB UR QL STRIP: NEGATIVE
BUN SERPL-MCNC: 15 MG/DL (ref 8–23)
CALCIUM SERPL-MCNC: 9 MG/DL (ref 8.7–10.5)
CHLORIDE SERPL-SCNC: 101 MMOL/L (ref 95–110)
CLARITY UR: CLEAR
CO2 SERPL-SCNC: 25 MMOL/L (ref 23–29)
COLOR UR: YELLOW
CREAT SERPL-MCNC: 0.9 MG/DL (ref 0.5–1.4)
DIFFERENTIAL METHOD: ABNORMAL
EOSINOPHIL # BLD AUTO: 0.2 K/UL (ref 0–0.5)
EOSINOPHIL NFR BLD: 3.6 % (ref 0–8)
ERYTHROCYTE [DISTWIDTH] IN BLOOD BY AUTOMATED COUNT: 14.4 % (ref 11.5–14.5)
EST. GFR  (AFRICAN AMERICAN): >60 ML/MIN/1.73 M^2
EST. GFR  (NON AFRICAN AMERICAN): >60 ML/MIN/1.73 M^2
GLUCOSE SERPL-MCNC: 107 MG/DL (ref 70–110)
GLUCOSE UR QL STRIP: NEGATIVE
HCT VFR BLD AUTO: 40.9 % (ref 37–48.5)
HGB BLD-MCNC: 13.7 G/DL (ref 12–16)
HGB UR QL STRIP: ABNORMAL
HYALINE CASTS #/AREA URNS LPF: 6 /LPF
IMM GRANULOCYTES # BLD AUTO: 0.02 K/UL (ref 0–0.04)
IMM GRANULOCYTES NFR BLD AUTO: 0.3 % (ref 0–0.5)
INFLUENZA A, MOLECULAR: NEGATIVE
INFLUENZA B, MOLECULAR: NEGATIVE
KETONES UR QL STRIP: ABNORMAL
LACTATE SERPL-SCNC: 1.2 MMOL/L (ref 0.5–2.2)
LEUKOCYTE ESTERASE UR QL STRIP: ABNORMAL
LYMPHOCYTES # BLD AUTO: 0.7 K/UL (ref 1–4.8)
LYMPHOCYTES NFR BLD: 12.4 % (ref 18–48)
MCH RBC QN AUTO: 31.2 PG (ref 27–31)
MCHC RBC AUTO-ENTMCNC: 33.5 G/DL (ref 32–36)
MCV RBC AUTO: 93 FL (ref 82–98)
MICROSCOPIC COMMENT: ABNORMAL
MONOCYTES # BLD AUTO: 0.3 K/UL (ref 0.3–1)
MONOCYTES NFR BLD: 5.7 % (ref 4–15)
NEUTROPHILS # BLD AUTO: 4.5 K/UL (ref 1.8–7.7)
NEUTROPHILS NFR BLD: 77.5 % (ref 38–73)
NITRITE UR QL STRIP: NEGATIVE
NRBC BLD-RTO: 0 /100 WBC
PH UR STRIP: 6 [PH] (ref 5–8)
PLATELET # BLD AUTO: 124 K/UL (ref 150–350)
PMV BLD AUTO: 10.4 FL (ref 9.2–12.9)
POTASSIUM SERPL-SCNC: 4.2 MMOL/L (ref 3.5–5.1)
PROT SERPL-MCNC: 7.4 G/DL (ref 6–8.4)
PROT UR QL STRIP: NEGATIVE
RBC # BLD AUTO: 4.39 M/UL (ref 4–5.4)
RBC #/AREA URNS HPF: 3 /HPF (ref 0–4)
SARS-COV-2 RDRP RESP QL NAA+PROBE: NEGATIVE
SODIUM SERPL-SCNC: 138 MMOL/L (ref 136–145)
SP GR UR STRIP: <=1.005 (ref 1–1.03)
SPECIMEN SOURCE: NORMAL
SQUAMOUS #/AREA URNS HPF: 3 /HPF
URN SPEC COLLECT METH UR: ABNORMAL
UROBILINOGEN UR STRIP-ACNC: NEGATIVE EU/DL
WBC # BLD AUTO: 5.82 K/UL (ref 3.9–12.7)
WBC #/AREA URNS HPF: 35 /HPF (ref 0–5)

## 2020-11-18 PROCEDURE — 25000003 PHARM REV CODE 250: Performed by: EMERGENCY MEDICINE

## 2020-11-18 PROCEDURE — 87086 URINE CULTURE/COLONY COUNT: CPT

## 2020-11-18 PROCEDURE — 81000 URINALYSIS NONAUTO W/SCOPE: CPT

## 2020-11-18 PROCEDURE — 80053 COMPREHEN METABOLIC PANEL: CPT

## 2020-11-18 PROCEDURE — 96360 HYDRATION IV INFUSION INIT: CPT

## 2020-11-18 PROCEDURE — 99284 EMERGENCY DEPT VISIT MOD MDM: CPT | Mod: 25

## 2020-11-18 PROCEDURE — 83605 ASSAY OF LACTIC ACID: CPT

## 2020-11-18 PROCEDURE — 36415 COLL VENOUS BLD VENIPUNCTURE: CPT

## 2020-11-18 PROCEDURE — U0002 COVID-19 LAB TEST NON-CDC: HCPCS

## 2020-11-18 PROCEDURE — 87502 INFLUENZA DNA AMP PROBE: CPT

## 2020-11-18 PROCEDURE — 85025 COMPLETE CBC W/AUTO DIFF WBC: CPT

## 2020-11-18 RX ORDER — AMOXICILLIN AND CLAVULANATE POTASSIUM 875; 125 MG/1; MG/1
1 TABLET, FILM COATED ORAL 2 TIMES DAILY
Qty: 19 TABLET | Refills: 0 | Status: SHIPPED | OUTPATIENT
Start: 2020-11-18 | End: 2020-11-28

## 2020-11-18 RX ORDER — ACETAMINOPHEN 500 MG
1000 TABLET ORAL
Status: COMPLETED | OUTPATIENT
Start: 2020-11-18 | End: 2020-11-18

## 2020-11-18 RX ORDER — AMOXICILLIN AND CLAVULANATE POTASSIUM 875; 125 MG/1; MG/1
1 TABLET, FILM COATED ORAL
Status: COMPLETED | OUTPATIENT
Start: 2020-11-18 | End: 2020-11-18

## 2020-11-18 RX ADMIN — AMOXICILLIN AND CLAVULANATE POTASSIUM 1 TABLET: 875; 125 TABLET, FILM COATED ORAL at 01:11

## 2020-11-18 RX ADMIN — SODIUM CHLORIDE 1000 ML: 0.9 INJECTION, SOLUTION INTRAVENOUS at 10:11

## 2020-11-18 RX ADMIN — ACETAMINOPHEN 1000 MG: 500 TABLET ORAL at 10:11

## 2020-11-18 NOTE — TELEPHONE ENCOUNTER
Called patient and talked to her along with her .  Patient was diagnosed with a urinary tract infection with no evidence of flank pain or CVA tenderness at that time in clinic when I saw her a few days ago.  I prescribed Macrobid but she reported that she had a 101.7 fever yesterday while on Macrobid along with a severe acute headache malaise and worsening symptoms.  She does not endorse flank pain or back pain but there was concern for possible spread of her urinary tract infection to her kidneys which Macrobid does not cover.  No N/V/. Patient still feels poorly and is febrile and I recommended to go to emergency room to be evaluated for possible pyelo or other cause worsening symptoms.  She agreed to plan.

## 2020-11-18 NOTE — ED PROVIDER NOTES
"Encounter Date: 11/18/2020    SCRIBE #1 NOTE: I, Margarita Kitchen, am scribing for, and in the presence of, Mikal Bynum MD.       History     Chief Complaint   Patient presents with    Headache     Presents with headache and fever 101.7 last night; reports being diagnosed with a UTI Monday and currently on abx       Time seen by provider: 9:40 AM on 11/18/2020    Yuniel Gracia is a 75 y.o. female who presents to the ED with an onset of a headache accompanied by nausea and fever that started last night. Patient states she had a fever of 101.7 last night. She saw her PCP two days ago for suprapubic abdominal pain and was placed on Macrobid for positive UA. As of today, her urine culture has not had any growth. She denies having body aches, chills, vomiting, diarrhea, blood or burning present during urination, chest pain, abdominal pain, back pain, neck pain, cough, congestion or rash. She states she did not take any medication last night for the headache. PSHx of hysterectomy, appendectomy, and cholecystectomy.    The history is provided by the patient.     Review of patient's allergies indicates:   Allergen Reactions    Adhesive Dermatitis and Rash    Codeine Nausea Only     Past Medical History:   Diagnosis Date    Anemia     Arthritis     Blood transfusion     Breast cancer     Cancer RIGHT BREAST    Chronic constipation     Clotting disorder     Colon polyp     Diverticulosis     Glaucoma     Hepatitis C 2000    pt states history of hepatits c-"cured by Dr. Lynch"; TREATED 2 YEARS - 2000   OK NOW    Hypothyroid     Insomnia     Malignant neoplasm of breast 4/12/2012    Memory loss     reports some memory loss since chemo    Osteoarthritis     Panic attacks     Raynaud's disease     takes amlodipine for this    Ulcer     Vertigo      Past Surgical History:   Procedure Laterality Date    APPENDECTOMY      BLADDER SURGERY  2011    [facility]    BRAIN " SURGERY  2007    temporal neuralgia - [place]    BREAST BIOPSY      BREAST LUMPECTOMY  3/12    malignant - had chemo and radiotherapy    COLONOSCOPY  01/03/2011    Dr Lynch, in media section, diverticulosis, hemorrhoids, otherwise normal findings; normal findings on random biopsies    COLONOSCOPY N/A 6/1/2017    Procedure: COLONOSCOPY;  Surgeon: Nate Lamb MD;  Location: [facility];  Service: Endoscopy;  Laterality: N/A;    COLONOSCOPY W/ POLYPECTOMY  06/01/2017    Dr. Lamb, 1 adenomatous polyp, recheck 5 years    EYE SURGERY      cataracts bilateral    HYSTERECTOMY      JOINT REPLACEMENT  09/25/2017    left [facility]nee [facility]     removal of port a cath      right lymph node removed from breast area      TUNNELED VENOUS PORT PLACEMENT  04/2012    left chest    UPPER GASTROINTESTINAL ENDOSCOPY  prior to 2011    small hiatal hernia     Family History   Problem Relation Age of Onset    Cancer Mother         breast    Breast cancer Mother     Diabetes Father     Heart disease Father     Cancer Sister         breast    Breast cancer Sister     Diabetes Paternal Aunt     Cancer Other         breast    Breast cancer Other     Drug abuse Son     Obesity Son     Diabetes Maternal Aunt     Heart disease Maternal Uncle     Tuberculosis Paternal Uncle         x2    Diabetes Paternal Uncle     Early death Maternal Grandmother         tonsils    Heart disease Maternal Grandfather     Alcohol abuse Maternal Grandfather     Tuberculosis Paternal Grandfather     Cancer Sister         breast    Breast cancer Sister     Ovarian cancer Neg Hx     Colon cancer Neg Hx     Colon polyps Neg Hx     Crohn's disease Neg Hx     Ulcerative colitis Neg Hx     Melanoma Neg Hx     Lupus Neg Hx     Psoriasis Neg Hx     Eczema Neg Hx      Social History     Tobacco Use    Smoking status: Never Smoker    Smokeless tobacco: Never Used   Substance Use Topics    Alcohol use: Yes      Alcohol/week: 7.0 standard drinks     Types: 4 Glasses of wine, 1 Shots of liquor, 2 Standard drinks or equivalent per week     Comment: wine most days - 1 glass    Drug use: No     Review of Systems   Constitutional: Positive for fever. Negative for chills.   HENT: Negative for congestion and sore throat.    Respiratory: Negative for cough and shortness of breath.    Cardiovascular: Negative for chest pain.   Gastrointestinal: Positive for nausea. Negative for vomiting.   Genitourinary: Negative for dysuria and hematuria.   Musculoskeletal: Negative for back pain and neck pain.   Skin: Negative for rash.   Neurological: Positive for headaches. Negative for weakness.   Hematological: Does not bruise/bleed easily.       Physical Exam     Initial Vitals [11/18/20 0931]   BP Pulse Resp Temp SpO2   (!) 107/54 72 16 98.7 °F (37.1 °C) 99 %      MAP       --         Physical Exam    Nursing note and vitals reviewed.  Constitutional: She appears well-developed and well-nourished. She is not diaphoretic. No distress.   HENT:   Head: Normocephalic and atraumatic.   Mouth/Throat: Oropharynx is clear and moist.   Eyes: Conjunctivae are normal.   Neck: Neck supple. No neck rigidity.   Neck is supple without nucchal rigidity.   Cardiovascular: Normal rate, regular rhythm, normal heart sounds and intact distal pulses. Exam reveals no gallop and no friction rub.    No murmur heard.  Pulmonary/Chest: Breath sounds normal. She has no wheezes. She has no rhonchi. She has no rales.   Abdominal: Soft. She exhibits no distension. There is no abdominal tenderness. There is no CVA tenderness.   Abdomin soft. Non tender and non distended. No CVA tenderness. No back or flank tenderness.   Musculoskeletal: Normal range of motion.   Neurological: She is alert and oriented to person, place, and time. She has normal strength.   Cranial nerves III through XII grossly intact. 5/5 strength with intact sensation to BUE's and BLE's.   Skin: No rash  noted. No erythema.   Psychiatric: Her speech is normal.         ED Course   Procedures  Labs Reviewed   CBC W/ AUTO DIFFERENTIAL - Abnormal; Notable for the following components:       Result Value    MCH 31.2 (*)     Platelets 124 (*)     Lymph # 0.7 (*)     Gran % 77.5 (*)     Lymph % 12.4 (*)     All other components within normal limits   COMPREHENSIVE METABOLIC PANEL - Abnormal; Notable for the following components:    Total Bilirubin 1.2 (*)     Alkaline Phosphatase 41 (*)     All other components within normal limits   URINALYSIS, REFLEX TO URINE CULTURE - Abnormal; Notable for the following components:    Specific Gravity, UA <=1.005 (*)     Ketones, UA 1+ (*)     Occult Blood UA Trace (*)     Leukocytes, UA 1+ (*)     All other components within normal limits    Narrative:     Specimen Source->Urine   URINALYSIS MICROSCOPIC - Abnormal; Notable for the following components:    WBC, UA 35 (*)     Bacteria Few (*)     Hyaline Casts, UA 6 (*)     All other components within normal limits    Narrative:     Specimen Source->Urine   INFLUENZA A & B BY MOLECULAR   CULTURE, URINE   LACTIC ACID, PLASMA   SARS-COV-2 RNA AMPLIFICATION, QUAL          Imaging Results          X-Ray Chest 1 View (Final result)  Result time 11/18/20 10:18:27    Final result by Rj Liu Jr., MD (11/18/20 10:18:27)                 Impression:      Negative chest x-ray.      Electronically signed by: Rj Liu MD  Date:    11/18/2020  Time:    10:18             Narrative:    EXAMINATION:  XR CHEST 1 VIEW    CLINICAL HISTORY:  SOB;    TECHNIQUE:  Single frontal view of the chest was performed.    COMPARISON:  Prior chest of August 9, 2019.    FINDINGS:  The mediastinal and cardiac size and contours are normal.  The diaphragms of pleura are clear.  There are no intrapulmonary masses or infiltrates.  There is no pneumothorax or pleural effusion.                            (rad read)     Medical Decision Making:   Clinical  Tests:   Lab Tests: Ordered and Reviewed  Radiological Study: Ordered and Reviewed            Scribe Attestation:   Scribe #1: I performed the above scribed service and the documentation accurately describes the services I performed. I attest to the accuracy of the note.    I, Dr. Mikal Bynum, personally performed the services described in this documentation. All medical record entries made by the scribe were at my direction and in my presence.  I have reviewed the chart and agree that the record reflects my personal performance and is accurate and complete. Mikal Bynum MD.  4:04 PM 11/18/2020    Yuniel Gracia is a 75 y.o. female presenting with constitutional symptoms including intermittent headache and fever in the setting of previous suprapubic pain on Macrobid.  In the setting of fever I do have some concern for upper tract disease.  She does not appear toxic and I doubt sepsis.  There is no renal insult.  I doubt other emergent processes obstructive uropathy or renal abscess.  I do not think she requires other imaging.  I have very low suspicion for other acute intracranial process such as CVA or meningitis.  I do not think brain imaging or lumbar puncture are indicated.  I do not think she requires IV antibiotics at this point is appropriate for discharge home.  Her  will monitor at home with close PCP follow-up recommended.  Augmentin initiated at the time of discharge.  Return precautions reviewed in detail.          ED Course as of Nov 18 1605 Wed Nov 18, 2020   1013 CXR:  NAD. (my read)    [MR]      ED Course User Index  [MR] Mikal Bynum MD            Clinical Impression:     ICD-10-CM ICD-9-CM   1. Pyelonephritis  N12 590.80   2. Fever, unspecified fever cause  R50.9 780.60                      Disposition:   Disposition: Discharged  Condition: Stable     ED Disposition Condition    Discharge Stable        ED Prescriptions     Medication Sig Dispense Start Date  End Date Auth. Provider    amoxicillin-clavulanate 875-125mg (AUGMENTIN) 875-125 mg per tablet Take 1 tablet by mouth 2 (two) times daily. for 10 days 19 tablet 11/18/2020 11/28/2020 Mikla Bynum MD        Follow-up Information     Follow up With Specialties Details Why Contact Info    Bhaskar Olivera MD Family Medicine  3-4 days [address] [facility]  [place] [address]  [phone]                                         Mikal Bynum MD  11/18/20 5705

## 2020-11-18 NOTE — DISCHARGE INSTRUCTIONS
As we discussed, return to the emergency department for new or worsening symptoms including confusion, inability to keep fluids or medicines down, or new or worsening pain.

## 2020-11-19 LAB — BACTERIA UR CULT: NO GROWTH

## 2020-11-30 ENCOUNTER — EXTERNAL CHRONIC CARE MANAGEMENT (OUTPATIENT)
Dept: PRIMARY CARE CLINIC | Facility: CLINIC | Age: 75
End: 2020-11-30
Payer: MEDICARE

## 2020-11-30 PROCEDURE — 99490 CHRNC CARE MGMT STAFF 1ST 20: CPT | Mod: PBBFAC,PO | Performed by: FAMILY MEDICINE

## 2020-11-30 PROCEDURE — 99490 CHRNC CARE MGMT STAFF 1ST 20: CPT | Mod: S$PBB,,, | Performed by: FAMILY MEDICINE

## 2020-11-30 PROCEDURE — 99490 PR CHRONIC CARE MGMT, 1ST 20 MIN: ICD-10-PCS | Mod: S$PBB,,, | Performed by: FAMILY MEDICINE

## 2020-12-01 ENCOUNTER — HOSPITAL ENCOUNTER (OUTPATIENT)
Dept: RADIOLOGY | Facility: HOSPITAL | Age: 75
Discharge: HOME | End: 2020-12-01
Attending: INTERNAL MEDICINE
Payer: MEDICARE

## 2020-12-01 DIAGNOSIS — C50.612 MALIGNANT NEOPLASM OF AXILLARY TAIL OF LEFT FEMALE BREAST, UNSPECIFIED ESTROGEN RECEPTOR STATUS: ICD-10-CM

## 2020-12-01 DIAGNOSIS — C50.919 BREAST CA: ICD-10-CM

## 2020-12-01 PROCEDURE — 77066 DX MAMMO INCL CAD BI: CPT | Mod: TC

## 2020-12-01 PROCEDURE — 77066 DX MAMMO INCL CAD BI: CPT | Mod: 26,,, | Performed by: RADIOLOGY

## 2020-12-01 PROCEDURE — 77062 MAMMO DIGITAL DIAGNOSTIC BILAT WITH TOMO: ICD-10-PCS | Mod: 26,,, | Performed by: RADIOLOGY

## 2020-12-01 PROCEDURE — 77066 MAMMO DIGITAL DIAGNOSTIC BILAT WITH TOMO: ICD-10-PCS | Mod: 26,,, | Performed by: RADIOLOGY

## 2020-12-01 PROCEDURE — 77062 BREAST TOMOSYNTHESIS BI: CPT | Mod: 26,,, | Performed by: RADIOLOGY

## 2020-12-12 ENCOUNTER — PATIENT MESSAGE (OUTPATIENT)
Dept: FAMILY MEDICINE | Facility: CLINIC | Age: 75
End: 2020-12-12

## 2020-12-16 ENCOUNTER — PATIENT MESSAGE (OUTPATIENT)
Dept: NEUROLOGY | Facility: CLINIC | Age: 75
End: 2020-12-16

## 2020-12-17 ENCOUNTER — TELEPHONE (OUTPATIENT)
Dept: NEUROLOGY | Facility: CLINIC | Age: 75
End: 2020-12-17

## 2020-12-17 ENCOUNTER — PES CALL (OUTPATIENT)
Dept: ADMINISTRATIVE | Facility: CLINIC | Age: 75
End: 2020-12-17

## 2020-12-20 ENCOUNTER — OFFICE VISIT (OUTPATIENT)
Dept: URGENT CARE | Facility: CLINIC | Age: 75
End: 2020-12-20
Payer: MEDICARE

## 2020-12-20 ENCOUNTER — NURSE TRIAGE (OUTPATIENT)
Dept: ADMINISTRATIVE | Facility: CLINIC | Age: 75
End: 2020-12-20

## 2020-12-20 VITALS
TEMPERATURE: 99 F | DIASTOLIC BLOOD PRESSURE: 80 MMHG | OXYGEN SATURATION: 97 % | RESPIRATION RATE: 16 BRPM | SYSTOLIC BLOOD PRESSURE: 118 MMHG | HEART RATE: 76 BPM

## 2020-12-20 DIAGNOSIS — U07.1 COVID-19 VIRUS DETECTED: ICD-10-CM

## 2020-12-20 DIAGNOSIS — Z20.828 EXPOSURE TO SARS-ASSOCIATED CORONAVIRUS: Primary | ICD-10-CM

## 2020-12-20 LAB
CTP QC/QA: YES
SARS-COV-2 RDRP RESP QL NAA+PROBE: POSITIVE

## 2020-12-20 PROCEDURE — 99213 PR OFFICE/OUTPT VISIT, EST, LEVL III, 20-29 MIN: ICD-10-PCS | Mod: S$GLB,,, | Performed by: NURSE PRACTITIONER

## 2020-12-20 PROCEDURE — 99213 OFFICE O/P EST LOW 20 MIN: CPT | Mod: S$GLB,,, | Performed by: NURSE PRACTITIONER

## 2020-12-20 PROCEDURE — U0002: ICD-10-PCS | Mod: QW,CR,S$GLB, | Performed by: NURSE PRACTITIONER

## 2020-12-20 PROCEDURE — U0002 COVID-19 LAB TEST NON-CDC: HCPCS | Mod: QW,CR,S$GLB, | Performed by: NURSE PRACTITIONER

## 2020-12-20 NOTE — PROGRESS NOTES
Subjective:       Patient ID: Yuniel Gracia is a 75 y.o. female.    Vitals:  vitals were not taken for this visit.     Chief Complaint: No chief complaint on file.    HPI    Constitution: Positive for fatigue and fever. Negative for chills.   HENT: Positive for congestion and postnasal drip. Negative for sinus pain, sinus pressure and sore throat.    Neck: Negative for painful lymph nodes.   Cardiovascular: Negative for chest pain and leg swelling.   Eyes: Negative for double vision and blurred vision.   Respiratory: Negative for cough and shortness of breath.    Gastrointestinal: Negative for nausea, vomiting and diarrhea.   Genitourinary: Negative for dysuria, frequency, urgency and history of kidney stones.   Musculoskeletal: Negative for joint pain, joint swelling, muscle cramps and muscle ache.   Skin: Negative for color change, pale, rash and bruising.   Allergic/Immunologic: Negative for seasonal allergies.   Neurological: Negative for dizziness, history of vertigo, light-headedness, passing out and headaches.   Hematologic/Lymphatic: Negative for swollen lymph nodes.   Psychiatric/Behavioral: Negative for nervous/anxious, sleep disturbance and depression. The patient is not nervous/anxious.        Objective:      Physical Exam   Constitutional: She is oriented to person, place, and time. She appears well-developed. She is cooperative.  Non-toxic appearance. She does not appear ill. No distress.   HENT:   Head: Normocephalic and atraumatic.   Ears:   Right Ear: Hearing, external ear and ear canal normal. A middle ear effusion is present.   Left Ear: Hearing, external ear and ear canal normal. A middle ear effusion is present.   Nose: Rhinorrhea present. No mucosal edema or nasal deformity. No epistaxis. Right sinus exhibits maxillary sinus tenderness. Right sinus exhibits no frontal sinus tenderness. Left sinus exhibits maxillary sinus tenderness. Left sinus exhibits no frontal sinus tenderness.    Mouth/Throat: Uvula is midline and mucous membranes are normal. No trismus in the jaw. Normal dentition. No uvula swelling. Posterior oropharyngeal erythema and cobblestoning present.   Eyes: Conjunctivae and lids are normal. Right eye exhibits no discharge. Left eye exhibits no discharge. No scleral icterus.   Neck: Trachea normal, normal range of motion, full passive range of motion without pain and phonation normal. Neck supple.   Cardiovascular: Normal rate, regular rhythm, normal heart sounds and normal pulses.   Pulmonary/Chest: Effort normal and breath sounds normal. No respiratory distress.   Abdominal: Soft. Normal appearance and bowel sounds are normal. She exhibits no distension, no pulsatile midline mass and no mass. There is no abdominal tenderness.   Musculoskeletal: Normal range of motion.         General: No deformity.   Neurological: She is alert and oriented to person, place, and time. She exhibits normal muscle tone. Coordination normal.   Skin: Skin is warm, dry, intact, not diaphoretic and not pale. Psychiatric: Her speech is normal and behavior is normal. Judgment and thought content normal.   Nursing note and vitals reviewed.        Assessment:       1. Exposure to SARS-associated coronavirus        Plan:         Exposure to SARS-associated coronavirus  -     POCT COVID-19 Rapid Screening

## 2020-12-20 NOTE — TELEPHONE ENCOUNTER
Stated last night she was feeling bad and had 100.5 fever. Stated she is aching all over.    Reason for Disposition   HIGH RISK patient (e.g., age > 64 years, diabetes, heart or lung disease, weak immune system)     Escalated disposition    Additional Information   Negative: Severe difficulty breathing (e.g., struggling for each breath, speaks in single words)   Negative: Difficult to awaken or acting confused (e.g., disoriented, slurred speech)   Negative: Bluish (or gray) lips or face now   Negative: Shock suspected (e.g., cold/pale/clammy skin, too weak to stand, low BP, rapid pulse)   Negative: Sounds like a life-threatening emergency to the triager   Negative: SEVERE or constant chest pain (Exception: mild central chest pain, present only when coughing)   Negative: MODERATE difficulty breathing (e.g., speaks in phrases, SOB even at rest, pulse 100-120)   Negative: Patient sounds very sick or weak to the triager   Negative: MILD difficulty breathing (e.g., minimal/no SOB at rest, SOB with walking, pulse <100)   Negative: Chest pain   Negative: Fever > 103 F (39.4 C)   Negative: [1] Fever > 101 F (38.3 C) AND [2] age > 60   Negative: [1] Fever > 100.0 F (37.8 C) AND [2] bedridden (e.g., nursing home patient, CVA, chronic illness, recovering from surgery)    Protocols used: CORONAVIRUS (COVID-19) - DIAGNOSED OR [id number]  Pt stated she feels sick and has a fever of 100.5 and muscle pains since last night. Per triage protocol, Pt is considered high risk for COVID due to age greater than 64. Advised to see a MD at Urgent Care within 4 hours. Spouse verbalized understanding of instructions.

## 2020-12-21 ENCOUNTER — TELEPHONE (OUTPATIENT)
Dept: FAMILY MEDICINE | Facility: CLINIC | Age: 75
End: 2020-12-21

## 2020-12-21 NOTE — TELEPHONE ENCOUNTER
----- Message from Adrienne Cerrato sent at 12/21/2020  2:19 PM CST -----  Type:  Patient Returning Call    Who Called:  Lul Gracia (Spouse)  Does the patient know what this is regarding?:  yes  Best Call Back Number:    Additional Information:  No response on messenger.

## 2020-12-22 ENCOUNTER — PATIENT MESSAGE (OUTPATIENT)
Dept: FAMILY MEDICINE | Facility: CLINIC | Age: 75
End: 2020-12-22

## 2020-12-22 ENCOUNTER — TELEPHONE (OUTPATIENT)
Dept: FAMILY MEDICINE | Facility: CLINIC | Age: 75
End: 2020-12-22

## 2020-12-22 NOTE — TELEPHONE ENCOUNTER
----- Message from Amy Cohen sent at 12/22/2020 12:16 PM CST -----  Contact: spouse  Type:  Patient Returning Call    Who Called:  Richar - spouse  Who Left Message for Patient:  Hannah Franko  Does the patient know what this is regarding?:  no  Best Call Back Number:  [phone]  Additional Information:

## 2020-12-22 NOTE — TELEPHONE ENCOUNTER
"Patient is doing"ok" a lot muscle pain, but breathing good. Offered video visit for f/u she will call back  "

## 2020-12-23 ENCOUNTER — OFFICE VISIT (OUTPATIENT)
Dept: FAMILY MEDICINE | Facility: CLINIC | Age: 75
End: 2020-12-23
Payer: MEDICARE

## 2020-12-23 DIAGNOSIS — U07.1 COVID-19: Primary | ICD-10-CM

## 2020-12-23 PROCEDURE — 99214 OFFICE O/P EST MOD 30 MIN: CPT | Mod: CR,95,, | Performed by: NURSE PRACTITIONER

## 2020-12-23 PROCEDURE — 99214 PR OFFICE/OUTPT VISIT, EST, LEVL IV, 30-39 MIN: ICD-10-PCS | Mod: CR,95,, | Performed by: NURSE PRACTITIONER

## 2020-12-23 RX ORDER — DEXAMETHASONE 6 MG/1
6 TABLET ORAL
Qty: 10 TABLET | Refills: 0 | Status: SHIPPED | OUTPATIENT
Start: 2020-12-23 | End: 2021-01-02

## 2020-12-23 RX ORDER — ALBUTEROL SULFATE 1.25 MG/3ML
1.25 SOLUTION RESPIRATORY (INHALATION) EVERY 6 HOURS PRN
Qty: 1 BOX | Refills: 0 | Status: SHIPPED | OUTPATIENT
Start: 2020-12-23 | End: 2021-08-20

## 2020-12-23 NOTE — PATIENT INSTRUCTIONS
ZINC 10-50 MG A DAY.  MAGNESIUM 300-400MG A DAY.  VITAMIN D3 2,000 UNITS A DAY.  VITAMIN C 500MG TWICE A DAY.     FACT SHEET FOR PATIENTS, PARENTS AND CAREGIVERS  EMERGENCY USE AUTHORIZATION (EUA) OF CASIRIVIMAB AND IMDEVIMAB  FOR CORONAVIRUS DISEASE 2019  (COVID-19)  You are being given a medicine called casirivimab and imdevimab for the treatment of  coronavirus disease 2019 (COVID-19). This Fact Sheet contains information to help you  understand the potential risks and potential benefits of taking casirivimab and imdevimab, which  you may receive.  Receiving casirivimab and imdevimab may benefit certain people with COVID-19.  Read this Fact Sheet for information about casirivimab and imdevimab. Talk to your healthcare  provider if you have questions. It is your choice to receive casirivimab and imdevimab or stop at  any time.  WHAT IS COVID-19?  COVID-19 is caused by a virus called a coronavirus. People can get COVID-19 through contact  with another person who has the virus.  COVID-19 illnesses have ranged from very mild (including some with no reported symptoms) to  severe, including illness resulting in death. While information so far suggests that most COVID19 illness is mild, serious illness can occur and may cause some of your other medical conditions  to become worse. People of all ages with severe, long-lasting (chronic) medical conditions like  heart disease, lung disease, and diabetes, for example, seem to be at higher risk of being  hospitalized for COVID-19.  WHAT ARE THE SYMPTOMS OF COVID-19?  The symptoms of COVID-19 include fever, cough, and shortness of breath, which may appear 2  to 14 days after exposure. Serious illness including breathing problems can occur and may cause  your other medical conditions to become worse.  WHAT IS CASIRIVIMAB AND IMDEVIMAB?  Casirivimab and imdevimab are investigational medicines used to treat mild to moderate  symptoms of COVID-19 in non-hospitalized adults and  "adolescents (12 years of age and older  who weigh at least 88 pounds (40 kg)), and who are at high risk for developing severe COVID19 symptoms or the need for hospitalization. Casirivimab and imdevimab are investigational  because they are still being studied. There is limited information known about the safety and  effectiveness of using casirivimab and imdevimab to treat people with COVID-19.  The FDA has authorized the emergency use of casirivimab and imdevimab for the treatment of  COVID-19 under an Emergency Use Authorization (EUA). For more information on EUA, see  the "What is an Emergency Use Authorization (EUA)?" section at the end of this Fact Sheet.  Page 2 of 3  WHAT SHOULD I TELL MY HEALTH CARE PROVIDER BEFORE I RECEIVE  CASIRIVIMAB AND IMDEVIMAB?  Tell your healthcare provider about all of your medical conditions, including if you:   Have any allergies   Are pregnant or plan to become pregnant   Are breastfeeding or plan to breastfeed   Have any serious illnesses   Are taking any medications (prescription, over-the-counter, vitamins, and herbal  products)  HOW WILL I RECEIVE CASIRIVIMAB AND IMDEVIMAB?   Casirivimab and imdevimab are two investigational medicines given together as a single  intravenous infusion (through a vein) for at least 1 hour.   You will receive one dose of casirivimab and imdevimab by intravenous infusion.  WHAT ARE THE IMPORTANT POSSIBLE SIDE EFFECTS OF CASIRIVIMAB AND  IMDEVIMAB?  Possible side effects of casirivimab and imdevimab are:   Allergic reactions. Allergic reactions can happen during and after infusion with  casirivimab and imdevimab. Tell your healthcare provider or nurse, or get medical help  right away if you get any of the following signs and symptoms of allergic reactions:  fever, chills, low blood pressure, changes in your heartbeat, shortness of breath,  wheezing, swelling of your lips, face, or throat, rash including hives, itching, headache,  nausea, " vomiting, sweating, muscle aches, dizziness and shivering.  The side effects of getting any medicine by vein may include brief pain, bleeding, bruising of the  skin, soreness, swelling, and possible infection at the infusion site.  These are not all the possible side effects of casirivimab and imdevimab. Not a lot of people have  been given casirivimab and imdevimab. Serious and unexpected side effects may happen.  Casirivimab and imdevimab are still being studied so it is possible that all of the risks are not  known at this time.  It is possible that casirivimab and imdevimab could interfere with your body's own ability to  fight off a future infection of SARS-CoV-2. Similarly, casirivimab and imdevimab may reduce  your body's immune response to a vaccine for SARS-CoV-2. Specific studies have not been  conducted to address these possible risks. Talk to your healthcare provider if you have any  questions.  WHAT OTHER TREATMENT CHOICES ARE THERE?  Like casirivimab and imdevimab, FDA may allow for the emergency use of other medicines to  treat people with COVID-19. Go to https://www.jcjwr63vmqxurakeqioynotyso.nih.gov/ for  information on other medicines used to treat people with COVID-19.  It is your choice to be treated or not to be treated with casirivimab and imdevimab. Should you  decide not to receive casirivimab and imdevimab or stop it at any time, it will not change your  standard medical care.  Page 3 of 3  WHAT IF I AM PREGNANT OR BREASTFEEDING?  There is limited experience treating pregnant women or breastfeeding mothers with casirivimab  and imdevimab. For a mother and unborn baby, the benefit of receiving casirivimab and  imdevimab may be greater than the risk from the treatment. If you are pregnant or breastfeeding,  discuss your options and specific situation with your healthcare provider.  HOW DO I REPORT SIDE EFFECTS WITH CASIRIVIMAB AND IMDEVIMAB?  Tell your healthcare provider right away if you  have any side effect that bothers you or does not  go away.  Report side effects to FDA MedWatch at www.fda.gov/medwatch or call 4-256-ADE-4743 or  call 1-858.360.5062.  HOW CAN I LEARN MORE?   Ask your health care provider.   Visit [email or web address].com   Visit https://www.jkdui06rrtjnbkyjmbhmjvnhnk.nih.gov/   Contact your local or state public health department.  WHAT IS AN EMERGENCY USE AUTHORIZATION (EUA)?  The [place] FDA has made casirivimab and imdevimab available under an emergency  access mechanism called an EUA. The EUA is supported by a [place] of Health and Human  Service (HHS) declaration that circumstances exist to justify the emergency use of drugs and  biological products during the COVID-19 pandemic.  Casirivimab and imdevimab have not undergone the same type of review as an FDA-approved or  cleared product. The FDA may issue an EUA when certain criteria are met, which includes that  there are no adequate, approved, available alternatives. In addition, the FDA decision is based on  the totality of scientific evidence available showing that it is reasonable to believe that the  product meets certain criteria for safety, performance, and labeling and may be effective in  treatment of patients during the COVID-19 pandemic. All of these criteria must be met to allow  for the product to be used in the treatment of patients during the COVID-19 pandemic.  The EUA for casirivimab and imdevimab is in effect for the duration of the COVID-19  declaration justifying emergency use of these products, unless terminated or revoked (after  which the products may no longer be used).  Manufactured by:  [employer]  [address] [place] [id number]  ©2020 [employer] All rights reserved.  Authorized: 11/2020

## 2020-12-24 ENCOUNTER — TELEPHONE (OUTPATIENT)
Dept: ADMINISTRATIVE | Facility: CLINIC | Age: 75
End: 2020-12-24

## 2020-12-24 NOTE — TELEPHONE ENCOUNTER
Surveillance Program Enrollment Attempted #1: initially reached patient but was disconnected and unable to reach patient after multiple repeat calls. Sent [employer] message with program information. Additionally, [id number] added to chart.

## 2020-12-24 NOTE — PROGRESS NOTES
This dictation has been generated using Modal Fluency Dictation some phonetic errors may occur. Please contact author for clarification if needed.     Problem List Items Addressed This Visit     None      Visit Diagnoses     COVID-19    -  Primary    Relevant Orders    Ambulatory referral/consult to [facility]          Orders Placed This Encounter    Ambulatory referral/consult to [facility]    COVID-19 Surveillance Program    pulse oximeter (PULSE OXIMETER) device    dexAMETHasone (DECADRON) 6 MG tablet    albuterol (ACCUNEB) 1.25 mg/3 mL Nebu     covid rx as above interested in covid antibody. mychart message oumar.   Discussed [facility] and lack of formal FDA approval. Limited studies show benefits.      Follow up if symptoms worsen or fail to improve.    ________________________________________________________________  ________________________________________________________________      Chief Complaint   Patient presents with    COVID-19 Concerns    Headache    Cough    Fever     History of present illness  This 75 y.o. presents today for complaint of COVID follow-up.  She states she tested positive.  She went to same urgent cares or friends but was not given any medicine.  She has mild cough symptoms.  Generally does not feel well.  Symptoms started Sunday morning. Sunday she tested positive.  She has had some very mild shortness of breath and tightness.  No nausea vomiting diarrhea.  No loss of taste or smell.  We discussed a HFA however they have a nebulizer machine in requested med for that.  The patient location is: [place]  The chief complaint leading to consultation is: covid positive. No meds so far wants to know what to take.     Visit type: audiovisual    Face to Face time with patient: 8  15 minutes of total time spent on the encounter, which includes face to face time and non-face to face time preparing to see the patient (eg, review of tests), Obtaining and/or reviewing separately obtained  "history, Documenting clinical information in the electronic or other health record, Independently interpreting results (not separately reported) and communicating results to the patient/family/caregiver, or Care coordination (not separately reported).         Each patient to whom he or she provides medical services by telemedicine is:  (1) informed of the relationship between the physician and patient and the respective role of any other health care provider with respect to management of the patient; and (2) notified that he or she may decline to receive medical services by telemedicine and may withdraw from such care at any time.    Notes:        Past Medical History:   Diagnosis Date    Anemia     Arthritis     Blood transfusion     Breast cancer     Cancer RIGHT BREAST    Chronic constipation     Clotting disorder     Colon polyp     Diverticulosis     Glaucoma     Hepatitis C 2000    pt states history of hepatits c-"cured by Dr. Lynch"; TREATED 2 YEARS - 2000   OK NOW    Hypothyroid     Insomnia     Malignant neoplasm of breast 4/12/2012    Memory loss     reports some memory loss since chemo    Osteoarthritis     Panic attacks     Raynaud's disease     takes amlodipine for this    Ulcer     Vertigo        Past Surgical History:   Procedure Laterality Date    APPENDECTOMY      BLADDER SURGERY  2011    sling - Dr Giles  [facility]    BRAIN SURGERY  2007    temporal neuralgia - [place]    BREAST BIOPSY      BREAST LUMPECTOMY  3/12    malignant - had chemo and radiotherapy    COLONOSCOPY  01/03/2011    Dr Lynch, in media section, diverticulosis, hemorrhoids, otherwise normal findings; normal findings on random biopsies    COLONOSCOPY N/A 6/1/2017    Procedure: COLONOSCOPY;  Surgeon: Nate Lamb MD;  Location[facility];  Service: Endoscopy;  Laterality: N/A;    COLONOSCOPY W/ POLYPECTOMY  06/01/2017    Dr. Lamb, 1 adenomatous polyp, recheck 5 years    EYE SURGERY   "    cataracts bilateral    HYSTERECTOMY      JOINT REPLACEMENT  09/25/2017    left Knee [facility]     removal of port a cath      right lymph node removed from breast area      TUNNELED VENOUS PORT PLACEMENT  04/2012    left chest    UPPER GASTROINTESTINAL ENDOSCOPY  prior to 2011    small hiatal hernia       Family History   Problem Relation Age of Onset    Cancer Mother         breast    Breast cancer Mother     Diabetes Father     Heart disease Father     Cancer Sister         breast    Breast cancer Sister     Diabetes Paternal Aunt     Cancer Other         breast    Breast cancer Other     Drug abuse Son     Obesity Son     Diabetes Maternal Aunt     Heart disease Maternal Uncle     Tuberculosis Paternal Uncle         x2    Diabetes Paternal Uncle     Early death Maternal Grandmother         tonsils    Heart disease Maternal Grandfather     Alcohol abuse Maternal Grandfather     Tuberculosis Paternal Grandfather     Cancer Sister         breast    Breast cancer Sister     Ovarian cancer Neg Hx     Colon cancer Neg Hx     Colon polyps Neg Hx     Crohn's disease Neg Hx     Ulcerative colitis Neg Hx     Melanoma Neg Hx     Lupus Neg Hx     Psoriasis Neg Hx     Eczema Neg Hx        Social History     Socioeconomic History    Marital status:      Spouse name: Not on file    Number of children: Not on file    Years of education: Not on file    Highest education level: Not on file   Occupational History    Not on file   Social Needs    Financial resource strain: Not hard at all    Food insecurity     Worry: Never true     Inability: Never true    Transportation needs     Medical: No     Non-medical: No   Tobacco Use    Smoking status: Never Smoker    Smokeless tobacco: Never Used   Substance and Sexual Activity    Alcohol use: Yes     Alcohol/week: 7.0 standard drinks     Types: 4 Glasses of wine, 1 Shots of liquor, 2 Standard drinks or equivalent  per week     Frequency: Monthly or less     Drinks per session: 1 or 2     Binge frequency: Never     Comment: wine most days - 1 glass    Drug use: No    Sexual activity: Yes     Partners: Male     Birth control/protection: None   Lifestyle    Physical activity     Days per week: 0 days     Minutes per session: 10 min    Stress: Only a little   Relationships    Social connections     Talks on phone: More than three times a week     Gets together: Once a week     Attends [employer] service: Not on file     Active member of club or organization: Yes     Attends meetings of clubs or organizations: Never     Relationship status:    Other Topics Concern    Are you pregnant or think you may be? Not Asked    Breast-feeding Not Asked   Social History Narrative    Not on file       Current Outpatient Medications   Medication Sig Dispense Refill    albuterol (ACCUNEB) 1.25 mg/3 mL Nebu Take 3 mLs (1.25 mg total) by nebulization every 6 (six) hours as needed. Rescue 1 Box 0    amlodipine (NORVASC) 2.5 MG tablet Take 2.5 mg by mouth once daily.       anastrozole (ARIMIDEX) 1 mg Tab TAKE 1 TABLET DAILY 90 tablet 3    CALCIUM CARBONATE/VITAMIN D3 (VITAMIN D-3 ORAL) Take 400 Units by mouth daily as needed.      co-enzyme Q-10 30 mg capsule Take 30 mg by mouth once daily.      dexAMETHasone (DECADRON) 6 MG tablet Take 1 tablet (6 mg total) by mouth daily with breakfast. for 10 days 10 tablet 0    erythromycin (ROMYCIN) ophthalmic ointment Place a 1/2 inch ribbon of ointment into the lower eyelid TID 1 Tube 0    fish oil-omega-3 fatty acids 300-1,000 mg capsule Take by mouth once daily.      levothyroxine (SYNTHROID) 75 MCG tablet TAKE 1 TABLET DAILY 90 tablet 2    LUMIGAN 0.01 % Drop       ondansetron (ZOFRAN ODT) 4 MG TbDL Take 1 tablet (4 mg total) by mouth every 8 (eight) hours as needed (nausea). 15 tablet 0    pravastatin (PRAVACHOL) 40 MG tablet TAKE 1 TABLET AT BEDTIME 90 tablet 3    pulse  oximeter (PULSE OXIMETER) device by Apply Externally route 2 (two) times a day. Use twice daily at 8 AM and 3 PM and [facility] the [facility] as directed. 1 each 0    sertraline (ZOLOFT) 50 MG tablet Take 1 tablet (50 mg total) by mouth once daily. (Patient not taking: Reported on 10/7/2020) 90 tablet 0    solifenacin (VESICARE) 5 MG tablet TAKE 1 TABLET DAILY 90 tablet 4    temazepam (RESTORIL) 15 mg Cap Take 1 capsule (15 mg total) by mouth every evening. (Patient taking differently: Take 15 mg by mouth nightly as needed. ) 90 capsule 0     No current facility-administered medications for this visit.        Review of patient's allergies indicates:   Allergen Reactions    Adhesive Dermatitis and Rash    Codeine Nausea Only       Physical examination  Vitals Reviewed\  There were no vitals filed for this visit.    Gen. Well-dressed well-nourished   Skin warm dry and intact.  No rashes noted.  Neck is supple without adenopathy  Chest.  Respirations are even unlabored.  Infrequent cough.  Speaking in full sentences no evidence of shortness of breath.  Neuro. Awake alert oriented x4.  Normal judgment and cognition noted.  No confusion.  Extremities no clubbing cyanosis or edema noted.     Call or return to clinic prn if these symptoms worsen or fail to improve as anticipated.

## 2020-12-25 ENCOUNTER — PATIENT MESSAGE (OUTPATIENT)
Dept: ADMINISTRATIVE | Facility: OTHER | Age: 75
End: 2020-12-25

## 2020-12-26 ENCOUNTER — PATIENT MESSAGE (OUTPATIENT)
Dept: ADMINISTRATIVE | Facility: OTHER | Age: 75
End: 2020-12-26

## 2020-12-26 ENCOUNTER — NURSE TRIAGE (OUTPATIENT)
Dept: ADMINISTRATIVE | Facility: CLINIC | Age: 75
End: 2020-12-26

## 2020-12-26 NOTE — TELEPHONE ENCOUNTER
Pt said that she was doing work and walking down the stairs when they took it pt at 99 now she is sitting and resting pt denies any SOB will continue to monitor and call as needed. 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

## 2020-12-27 ENCOUNTER — PATIENT MESSAGE (OUTPATIENT)
Dept: ADMINISTRATIVE | Facility: OTHER | Age: 75
End: 2020-12-27

## 2020-12-27 ENCOUNTER — NURSE TRIAGE (OUTPATIENT)
Dept: ADMINISTRATIVE | Facility: CLINIC | Age: 75
End: 2020-12-27

## 2020-12-27 NOTE — TELEPHONE ENCOUNTER
Spo2 97 Pr 57 pts first set was after coming down stairs asked that she rest prior to putting them in. She said that she id doing ok. Will call if needs anything    Reason for Disposition   Health Information question, no triage required and triager able to answer question    Protocols used: INFORMATION ONLY CALL - NO TRIAGE-A-

## 2020-12-28 ENCOUNTER — NURSE TRIAGE (OUTPATIENT)
Dept: ADMINISTRATIVE | Facility: CLINIC | Age: 75
End: 2020-12-28

## 2020-12-28 ENCOUNTER — INFUSION (OUTPATIENT)
Dept: INFECTIOUS DISEASES | Facility: HOSPITAL | Age: 75
End: 2020-12-28
Attending: NURSE PRACTITIONER
Payer: MEDICARE

## 2020-12-28 ENCOUNTER — PATIENT MESSAGE (OUTPATIENT)
Dept: ADMINISTRATIVE | Facility: OTHER | Age: 75
End: 2020-12-28

## 2020-12-28 ENCOUNTER — TELEPHONE (OUTPATIENT)
Dept: ADMINISTRATIVE | Facility: CLINIC | Age: 75
End: 2020-12-28

## 2020-12-28 ENCOUNTER — HOSPITAL ENCOUNTER (EMERGENCY)
Facility: HOSPITAL | Age: 75
Discharge: HOME | End: 2020-12-28
Attending: EMERGENCY MEDICINE
Payer: MEDICARE

## 2020-12-28 VITALS
SYSTOLIC BLOOD PRESSURE: 103 MMHG | WEIGHT: 140 LBS | OXYGEN SATURATION: 100 % | HEIGHT: 64 IN | HEART RATE: 67 BPM | DIASTOLIC BLOOD PRESSURE: 51 MMHG | BODY MASS INDEX: 23.9 KG/M2 | RESPIRATION RATE: 18 BRPM | TEMPERATURE: 98 F

## 2020-12-28 VITALS
BODY MASS INDEX: 23.9 KG/M2 | TEMPERATURE: 98 F | SYSTOLIC BLOOD PRESSURE: 100 MMHG | RESPIRATION RATE: 20 BRPM | OXYGEN SATURATION: 99 % | HEIGHT: 64 IN | HEART RATE: 66 BPM | DIASTOLIC BLOOD PRESSURE: 51 MMHG | WEIGHT: 140 LBS

## 2020-12-28 DIAGNOSIS — U07.1 COVID-19: ICD-10-CM

## 2020-12-28 DIAGNOSIS — U07.1 COVID-19 VIRUS INFECTION: Primary | ICD-10-CM

## 2020-12-28 PROCEDURE — 63600175 PHARM REV CODE 636 W HCPCS: Performed by: NURSE PRACTITIONER

## 2020-12-28 PROCEDURE — 99283 EMERGENCY DEPT VISIT LOW MDM: CPT | Mod: 25

## 2020-12-28 PROCEDURE — M0243 CASIRIVI AND IMDEVI INFUSION: HCPCS | Performed by: NURSE PRACTITIONER

## 2020-12-28 PROCEDURE — 25000003 PHARM REV CODE 250: Performed by: NURSE PRACTITIONER

## 2020-12-28 RX ORDER — EPINEPHRINE 0.1 MG/ML
0.3 INJECTION INTRAVENOUS
Status: DISCONTINUED | OUTPATIENT
Start: 2020-12-28 | End: 2022-07-25 | Stop reason: ALTCHOICE

## 2020-12-28 RX ORDER — SODIUM CHLORIDE 0.9 % (FLUSH) 0.9 %
10 SYRINGE (ML) INJECTION
Status: DISCONTINUED | OUTPATIENT
Start: 2020-12-28 | End: 2022-07-25 | Stop reason: ALTCHOICE

## 2020-12-28 RX ORDER — ONDANSETRON 4 MG/1
4 TABLET, ORALLY DISINTEGRATING ORAL ONCE AS NEEDED
Status: DISCONTINUED | OUTPATIENT
Start: 2020-12-28 | End: 2022-07-14

## 2020-12-28 RX ORDER — DIPHENHYDRAMINE HYDROCHLORIDE 50 MG/ML
25 INJECTION INTRAMUSCULAR; INTRAVENOUS ONCE AS NEEDED
Status: DISCONTINUED | OUTPATIENT
Start: 2020-12-28 | End: 2022-06-03

## 2020-12-28 RX ORDER — ACETAMINOPHEN 325 MG/1
650 TABLET ORAL ONCE AS NEEDED
Status: DISCONTINUED | OUTPATIENT
Start: 2020-12-28 | End: 2022-07-25 | Stop reason: ALTCHOICE

## 2020-12-28 RX ORDER — ALBUTEROL SULFATE 90 UG/1
2 AEROSOL, METERED RESPIRATORY (INHALATION)
Status: DISCONTINUED | OUTPATIENT
Start: 2020-12-28 | End: 2022-07-25 | Stop reason: ALTCHOICE

## 2020-12-28 RX ADMIN — CASIRIVIMAB: 1332 INJECTION, SOLUTION, CONCENTRATE INTRAVENOUS at 12:12

## 2020-12-28 RX ADMIN — SODIUM CHLORIDE: 0.9 INJECTION, SOLUTION INTRAVENOUS at 12:12

## 2020-12-28 NOTE — PLAN OF CARE
Problem: Adult Inpatient Plan of Care  Goal: Plan of Care Review  Outcome: Ongoing, Progressing  Flowsheets (Taken 12/28/2020 1440)  Plan of Care Reviewed With: patient   Pt tolerated covid monoclonal antibody infusion well.  No adverse reaction noted.  IV flushed with NS and D/C per protocol.  Patient left clinic in no acute distress.

## 2020-12-28 NOTE — TELEPHONE ENCOUNTER
Called patient due to RN escalation in COVID Surveillance program. Pt escalated due to SOB 2/5 at rest and 3/5 activity.    Patient location: [facility] [place]    Vitals: Sp02 :97%. P: 48. Temp: 97  Ambulatory Sp02 on the phone (if applicable): 84%     75 y.o. female with pertinent PMHx Raynauds Thrombocytopenia, HLD, Prediabetes Hypothyroidism osteopenia on day 9 of Covid symptoms. Positive Covid screen 12/20/20. CXR none recent. Home oxygen: no. COVID-19 Hospitalization History:no    HPI:  76 yo female with PMHX of above Pt is scheduled today at noon for monoclonal infusion today states has been increased SOB and increased fatigue no fevers.  Denies any increased cough states used a breathing treatment today and seemed to have helped.    ROS: Denies worsening cough, light headedness, fever, chills, diaphoresis, chest pain, abdominal pain, emesis, diarrhea or further symptoms.     Assessment: Vitals appear WNL. During phone call, patient appears alert and oriented. Able to speak in full sentences without difficulty. No audible wheezing heard during call. 97%  p68   Walking Sp 84% but came back to 97% within a minute advised to go to ER  Pt states going to hospital at noon for infusion and see what it is then.  Repeated again walking O2 89%  on pulse oximeter and low again and SOB. Unknown if accurate pulse oximeter reading for HR. Denies chest pain or palpitations.    Plan:  Advised to go to the ER will send note to PCP. Advised to call PCP as well.  Reviewed with patient the reasons for seeking emergency care. Pt aware that if Sp02 <94% or if they have any worsening symptoms, they need to go to the emergency department. If they are having a medical emergency, they will call 911. Otherwise, patient will continue to submit data as scheduled. Reviewed importance of wearing mask if self or family members leave the house.     Advised next steps: ED referral

## 2020-12-28 NOTE — TELEPHONE ENCOUNTER
Patient escalated SOB 2/5 and 3/5 and HR 48. Pt's  states that she is a little more SOB. RN escalated call to FE for further assessment and directives. Secure chat sent to FE, chat acknowledged. FE to call pt.    Reason for Disposition   Nursing judgment    Additional Information   Negative: Nursing judgment    Protocols used: INFORMATION ONLY CALL - NO TRIAGE-A-[place]

## 2020-12-28 NOTE — PLAN OF CARE
Problem: Adult Inpatient Plan of Care  Goal: Patient-Specific Goal (Individualization)  Outcome: Ongoing, Progressing  Flowsheets (Taken 12/28/2020 9326)  Individualized Care Needs: recliner, pillow, blankets, snacks  Anxieties, Fears or Concerns: covid 19  Patient-Specific Goals (Include Timeframe): no s/s of rx during tx

## 2020-12-29 ENCOUNTER — PATIENT MESSAGE (OUTPATIENT)
Dept: ADMINISTRATIVE | Facility: OTHER | Age: 75
End: 2020-12-29

## 2020-12-30 ENCOUNTER — PATIENT MESSAGE (OUTPATIENT)
Dept: ADMINISTRATIVE | Facility: OTHER | Age: 75
End: 2020-12-30

## 2020-12-31 ENCOUNTER — PATIENT MESSAGE (OUTPATIENT)
Dept: ADMINISTRATIVE | Facility: OTHER | Age: 75
End: 2020-12-31

## 2020-12-31 ENCOUNTER — NURSE TRIAGE (OUTPATIENT)
Dept: ADMINISTRATIVE | Facility: CLINIC | Age: 75
End: 2020-12-31

## 2021-01-01 ENCOUNTER — TELEPHONE (OUTPATIENT)
Dept: ADMINISTRATIVE | Facility: CLINIC | Age: 76
End: 2021-01-01

## 2021-01-01 ENCOUNTER — PATIENT MESSAGE (OUTPATIENT)
Dept: ADMINISTRATIVE | Facility: OTHER | Age: 76
End: 2021-01-01

## 2021-01-02 ENCOUNTER — TELEPHONE (OUTPATIENT)
Dept: HOME HEALTH SERVICES | Facility: CLINIC | Age: 76
End: 2021-01-02

## 2021-01-02 ENCOUNTER — PATIENT MESSAGE (OUTPATIENT)
Dept: ADMINISTRATIVE | Facility: OTHER | Age: 76
End: 2021-01-02

## 2021-01-03 ENCOUNTER — NURSE TRIAGE (OUTPATIENT)
Dept: ADMINISTRATIVE | Facility: CLINIC | Age: 76
End: 2021-01-03

## 2021-01-03 ENCOUNTER — PATIENT MESSAGE (OUTPATIENT)
Dept: ADMINISTRATIVE | Facility: OTHER | Age: 76
End: 2021-01-03

## 2021-01-03 ENCOUNTER — TELEPHONE (OUTPATIENT)
Dept: ADMINISTRATIVE | Facility: CLINIC | Age: 76
End: 2021-01-03

## 2021-01-08 ENCOUNTER — OFFICE VISIT (OUTPATIENT)
Dept: FAMILY MEDICINE | Facility: CLINIC | Age: 76
End: 2021-01-08
Payer: MEDICARE

## 2021-01-08 VITALS
SYSTOLIC BLOOD PRESSURE: 118 MMHG | HEART RATE: 54 BPM | WEIGHT: 141.75 LBS | OXYGEN SATURATION: 100 % | HEIGHT: 64 IN | TEMPERATURE: 97 F | BODY MASS INDEX: 24.2 KG/M2 | DIASTOLIC BLOOD PRESSURE: 66 MMHG

## 2021-01-08 DIAGNOSIS — Z17.0 MALIGNANT NEOPLASM OF NIPPLE OF RIGHT BREAST IN FEMALE, ESTROGEN RECEPTOR POSITIVE: ICD-10-CM

## 2021-01-08 DIAGNOSIS — E78.2 MIXED HYPERLIPIDEMIA: ICD-10-CM

## 2021-01-08 DIAGNOSIS — C50.011 MALIGNANT NEOPLASM OF NIPPLE OF RIGHT BREAST IN FEMALE, ESTROGEN RECEPTOR POSITIVE: ICD-10-CM

## 2021-01-08 DIAGNOSIS — I10 HYPERTENSION, UNSPECIFIED TYPE: ICD-10-CM

## 2021-01-08 DIAGNOSIS — E03.9 ACQUIRED HYPOTHYROIDISM: ICD-10-CM

## 2021-01-08 DIAGNOSIS — F41.8 DEPRESSION WITH ANXIETY: ICD-10-CM

## 2021-01-08 DIAGNOSIS — N32.81 OVERACTIVE BLADDER: ICD-10-CM

## 2021-01-08 DIAGNOSIS — U07.1 COVID-19 VIRUS INFECTION: Primary | ICD-10-CM

## 2021-01-08 PROCEDURE — 99214 OFFICE O/P EST MOD 30 MIN: CPT | Mod: S$PBB,CR,, | Performed by: NURSE PRACTITIONER

## 2021-01-08 PROCEDURE — 99999 PR PBB SHADOW E&M-EST. PATIENT-LVL V: CPT | Mod: PBBFAC,CR,, | Performed by: NURSE PRACTITIONER

## 2021-01-08 PROCEDURE — 99214 PR OFFICE/OUTPT VISIT, EST, LEVL IV, 30-39 MIN: ICD-10-PCS | Mod: S$PBB,CR,, | Performed by: NURSE PRACTITIONER

## 2021-01-08 PROCEDURE — 99215 OFFICE O/P EST HI 40 MIN: CPT | Mod: PBBFAC,PO | Performed by: NURSE PRACTITIONER

## 2021-01-08 PROCEDURE — 99999 PR PBB SHADOW E&M-EST. PATIENT-LVL V: ICD-10-PCS | Mod: PBBFAC,CR,, | Performed by: NURSE PRACTITIONER

## 2021-01-10 ENCOUNTER — IMMUNIZATION (OUTPATIENT)
Dept: PRIMARY CARE CLINIC | Facility: CLINIC | Age: 76
End: 2021-01-10
Payer: MEDICARE

## 2021-01-10 DIAGNOSIS — Z23 NEED FOR VACCINATION: ICD-10-CM

## 2021-01-10 PROCEDURE — 0001A COVID-19, MRNA, LNP-S, PF, 30 MCG/0.3 ML DOSE VACCINE: CPT | Mod: S$GLB,,, | Performed by: FAMILY MEDICINE

## 2021-01-10 PROCEDURE — 91300 COVID-19, MRNA, LNP-S, PF, 30 MCG/0.3 ML DOSE VACCINE: ICD-10-PCS | Mod: S$GLB,,, | Performed by: FAMILY MEDICINE

## 2021-01-10 PROCEDURE — 0001A COVID-19, MRNA, LNP-S, PF, 30 MCG/0.3 ML DOSE VACCINE: ICD-10-PCS | Mod: S$GLB,,, | Performed by: FAMILY MEDICINE

## 2021-01-10 PROCEDURE — 91300 COVID-19, MRNA, LNP-S, PF, 30 MCG/0.3 ML DOSE VACCINE: CPT | Mod: S$GLB,,, | Performed by: FAMILY MEDICINE

## 2021-01-14 ENCOUNTER — PATIENT MESSAGE (OUTPATIENT)
Dept: NEUROLOGY | Facility: CLINIC | Age: 76
End: 2021-01-14

## 2021-01-31 ENCOUNTER — IMMUNIZATION (OUTPATIENT)
Dept: PRIMARY CARE CLINIC | Facility: CLINIC | Age: 76
End: 2021-01-31
Payer: MEDICARE

## 2021-01-31 DIAGNOSIS — Z23 NEED FOR VACCINATION: Primary | ICD-10-CM

## 2021-01-31 PROCEDURE — 0002A COVID-19, MRNA, LNP-S, PF, 30 MCG/0.3 ML DOSE VACCINE: CPT | Mod: S$GLB,,, | Performed by: FAMILY MEDICINE

## 2021-01-31 PROCEDURE — 91300 COVID-19, MRNA, LNP-S, PF, 30 MCG/0.3 ML DOSE VACCINE: ICD-10-PCS | Mod: S$GLB,,, | Performed by: FAMILY MEDICINE

## 2021-01-31 PROCEDURE — 0002A COVID-19, MRNA, LNP-S, PF, 30 MCG/0.3 ML DOSE VACCINE: ICD-10-PCS | Mod: S$GLB,,, | Performed by: FAMILY MEDICINE

## 2021-01-31 PROCEDURE — 91300 COVID-19, MRNA, LNP-S, PF, 30 MCG/0.3 ML DOSE VACCINE: CPT | Mod: S$GLB,,, | Performed by: FAMILY MEDICINE

## 2021-02-15 ENCOUNTER — TELEPHONE (OUTPATIENT)
Dept: NEUROLOGY | Facility: CLINIC | Age: 76
End: 2021-02-15

## 2021-02-17 ENCOUNTER — LAB VISIT (OUTPATIENT)
Dept: LAB | Facility: HOSPITAL | Age: 76
End: 2021-02-17
Attending: INTERNAL MEDICINE
Payer: MEDICARE

## 2021-02-17 DIAGNOSIS — M85.852 OSTEOPENIA OF LEFT HIP: ICD-10-CM

## 2021-02-17 DIAGNOSIS — C50.612 MALIGNANT NEOPLASM OF AXILLARY TAIL OF LEFT FEMALE BREAST, UNSPECIFIED ESTROGEN RECEPTOR STATUS: ICD-10-CM

## 2021-02-17 LAB
ALBUMIN SERPL BCP-MCNC: 3.8 G/DL (ref 3.5–5.2)
ALP SERPL-CCNC: 45 U/L (ref 55–135)
ALT SERPL W/O P-5'-P-CCNC: 13 U/L (ref 10–44)
ANION GAP SERPL CALC-SCNC: 9 MMOL/L (ref 8–16)
AST SERPL-CCNC: 26 U/L (ref 10–40)
BASOPHILS # BLD AUTO: 0.04 K/UL (ref 0–0.2)
BASOPHILS NFR BLD: 1.1 % (ref 0–1.9)
BILIRUB SERPL-MCNC: 0.7 MG/DL (ref 0.1–1)
BUN SERPL-MCNC: 17 MG/DL (ref 8–23)
CALCIUM SERPL-MCNC: 9.2 MG/DL (ref 8.7–10.5)
CHLORIDE SERPL-SCNC: 105 MMOL/L (ref 95–110)
CO2 SERPL-SCNC: 27 MMOL/L (ref 23–29)
CREAT SERPL-MCNC: 0.9 MG/DL (ref 0.5–1.4)
DIFFERENTIAL METHOD: ABNORMAL
EOSINOPHIL # BLD AUTO: 0.3 K/UL (ref 0–0.5)
EOSINOPHIL NFR BLD: 7.8 % (ref 0–8)
ERYTHROCYTE [DISTWIDTH] IN BLOOD BY AUTOMATED COUNT: 14.7 % (ref 11.5–14.5)
EST. GFR  (AFRICAN AMERICAN): >60 ML/MIN/1.73 M^2
EST. GFR  (NON AFRICAN AMERICAN): >60 ML/MIN/1.73 M^2
GLUCOSE SERPL-MCNC: 92 MG/DL (ref 70–110)
HCT VFR BLD AUTO: 39.7 % (ref 37–48.5)
HGB BLD-MCNC: 12.8 G/DL (ref 12–16)
IMM GRANULOCYTES # BLD AUTO: 0.02 K/UL (ref 0–0.04)
IMM GRANULOCYTES NFR BLD AUTO: 0.5 % (ref 0–0.5)
LYMPHOCYTES # BLD AUTO: 1 K/UL (ref 1–4.8)
LYMPHOCYTES NFR BLD: 28 % (ref 18–48)
MCH RBC QN AUTO: 30.1 PG (ref 27–31)
MCHC RBC AUTO-ENTMCNC: 32.2 G/DL (ref 32–36)
MCV RBC AUTO: 93 FL (ref 82–98)
MONOCYTES # BLD AUTO: 0.4 K/UL (ref 0.3–1)
MONOCYTES NFR BLD: 11.1 % (ref 4–15)
NEUTROPHILS # BLD AUTO: 1.9 K/UL (ref 1.8–7.7)
NEUTROPHILS NFR BLD: 51.5 % (ref 38–73)
NRBC BLD-RTO: 0 /100 WBC
PLATELET # BLD AUTO: 147 K/UL (ref 150–350)
PMV BLD AUTO: 10.4 FL (ref 9.2–12.9)
POTASSIUM SERPL-SCNC: 4.4 MMOL/L (ref 3.5–5.1)
PROT SERPL-MCNC: 7.1 G/DL (ref 6–8.4)
RBC # BLD AUTO: 4.25 M/UL (ref 4–5.4)
SODIUM SERPL-SCNC: 141 MMOL/L (ref 136–145)
WBC # BLD AUTO: 3.71 K/UL (ref 3.9–12.7)

## 2021-02-17 PROCEDURE — 80053 COMPREHEN METABOLIC PANEL: CPT

## 2021-02-17 PROCEDURE — 85025 COMPLETE CBC W/AUTO DIFF WBC: CPT

## 2021-02-19 LAB — CANCER AG27-29 SERPL-ACNC: 34.1 U/ML

## 2021-02-24 ENCOUNTER — INFUSION (OUTPATIENT)
Dept: INFUSION THERAPY | Facility: HOSPITAL | Age: 76
End: 2021-02-24
Attending: INTERNAL MEDICINE
Payer: MEDICARE

## 2021-02-24 ENCOUNTER — OFFICE VISIT (OUTPATIENT)
Dept: HEMATOLOGY/ONCOLOGY | Facility: CLINIC | Age: 76
End: 2021-02-24
Payer: MEDICARE

## 2021-02-24 VITALS
SYSTOLIC BLOOD PRESSURE: 114 MMHG | BODY MASS INDEX: 24.42 KG/M2 | HEIGHT: 64 IN | TEMPERATURE: 97 F | HEART RATE: 68 BPM | OXYGEN SATURATION: 100 % | WEIGHT: 143.06 LBS | RESPIRATION RATE: 16 BRPM | DIASTOLIC BLOOD PRESSURE: 53 MMHG

## 2021-02-24 VITALS
SYSTOLIC BLOOD PRESSURE: 114 MMHG | BODY MASS INDEX: 24.56 KG/M2 | TEMPERATURE: 98 F | DIASTOLIC BLOOD PRESSURE: 53 MMHG | RESPIRATION RATE: 17 BRPM | OXYGEN SATURATION: 100 % | WEIGHT: 143.06 LBS | HEART RATE: 68 BPM

## 2021-02-24 DIAGNOSIS — Z79.811 USE OF AROMATASE INHIBITORS: ICD-10-CM

## 2021-02-24 DIAGNOSIS — Z86.19 HEPATITIS C VIRUS INFECTION CURED AFTER ANTIVIRAL DRUG THERAPY: ICD-10-CM

## 2021-02-24 DIAGNOSIS — M85.852 OSTEOPENIA OF LEFT HIP: ICD-10-CM

## 2021-02-24 DIAGNOSIS — Z79.811 USE OF AROMATASE INHIBITORS: Primary | ICD-10-CM

## 2021-02-24 DIAGNOSIS — C50.612 MALIGNANT NEOPLASM OF AXILLARY TAIL OF LEFT FEMALE BREAST, UNSPECIFIED ESTROGEN RECEPTOR STATUS: Primary | ICD-10-CM

## 2021-02-24 DIAGNOSIS — D69.6 THROMBOCYTOPENIA: ICD-10-CM

## 2021-02-24 DIAGNOSIS — E78.2 MIXED HYPERLIPIDEMIA: ICD-10-CM

## 2021-02-24 DIAGNOSIS — C50.011 MALIGNANT NEOPLASM OF NIPPLE OF RIGHT BREAST IN FEMALE, UNSPECIFIED ESTROGEN RECEPTOR STATUS: ICD-10-CM

## 2021-02-24 PROCEDURE — 99999 PR PBB SHADOW E&M-EST. PATIENT-LVL V: CPT | Mod: PBBFAC,,, | Performed by: INTERNAL MEDICINE

## 2021-02-24 PROCEDURE — 99215 OFFICE O/P EST HI 40 MIN: CPT | Mod: PBBFAC,PO | Performed by: INTERNAL MEDICINE

## 2021-02-24 PROCEDURE — 99214 PR OFFICE/OUTPT VISIT, EST, LEVL IV, 30-39 MIN: ICD-10-PCS | Mod: S$PBB,,, | Performed by: INTERNAL MEDICINE

## 2021-02-24 PROCEDURE — 96372 THER/PROPH/DIAG INJ SC/IM: CPT

## 2021-02-24 PROCEDURE — 63600175 PHARM REV CODE 636 W HCPCS: Mod: JG | Performed by: INTERNAL MEDICINE

## 2021-02-24 PROCEDURE — 99214 OFFICE O/P EST MOD 30 MIN: CPT | Mod: S$PBB,,, | Performed by: INTERNAL MEDICINE

## 2021-02-24 PROCEDURE — 99999 PR PBB SHADOW E&M-EST. PATIENT-LVL V: ICD-10-PCS | Mod: PBBFAC,,, | Performed by: INTERNAL MEDICINE

## 2021-02-24 RX ADMIN — DENOSUMAB 60 MG: 60 INJECTION SUBCUTANEOUS at 02:02

## 2021-02-25 ENCOUNTER — TELEPHONE (OUTPATIENT)
Dept: HEMATOLOGY/ONCOLOGY | Facility: CLINIC | Age: 76
End: 2021-02-25

## 2021-02-25 ENCOUNTER — PATIENT MESSAGE (OUTPATIENT)
Dept: HEMATOLOGY/ONCOLOGY | Facility: CLINIC | Age: 76
End: 2021-02-25

## 2021-03-15 ENCOUNTER — OFFICE VISIT (OUTPATIENT)
Dept: FAMILY MEDICINE | Facility: CLINIC | Age: 76
End: 2021-03-15
Payer: MEDICARE

## 2021-03-15 VITALS
DIASTOLIC BLOOD PRESSURE: 62 MMHG | HEIGHT: 64 IN | HEART RATE: 61 BPM | BODY MASS INDEX: 24.57 KG/M2 | WEIGHT: 143.94 LBS | OXYGEN SATURATION: 97 % | SYSTOLIC BLOOD PRESSURE: 116 MMHG

## 2021-03-15 DIAGNOSIS — F32.1 MAJOR DEPRESSIVE DISORDER, SINGLE EPISODE, MODERATE: ICD-10-CM

## 2021-03-15 DIAGNOSIS — I70.0 AORTIC CALCIFICATION: ICD-10-CM

## 2021-03-15 DIAGNOSIS — Z00.00 ENCOUNTER FOR PREVENTIVE HEALTH EXAMINATION: Primary | ICD-10-CM

## 2021-03-15 DIAGNOSIS — D69.6 THROMBOCYTOPENIA: ICD-10-CM

## 2021-03-15 DIAGNOSIS — F41.8 DEPRESSION WITH ANXIETY: ICD-10-CM

## 2021-03-15 DIAGNOSIS — E78.2 MIXED HYPERLIPIDEMIA: ICD-10-CM

## 2021-03-15 PROCEDURE — 99999 PR PBB SHADOW E&M-EST. PATIENT-LVL V: ICD-10-PCS | Mod: PBBFAC,,, | Performed by: NURSE PRACTITIONER

## 2021-03-15 PROCEDURE — 99215 OFFICE O/P EST HI 40 MIN: CPT | Mod: PBBFAC,PO | Performed by: NURSE PRACTITIONER

## 2021-03-15 PROCEDURE — 99999 PR PBB SHADOW E&M-EST. PATIENT-LVL V: CPT | Mod: PBBFAC,,, | Performed by: NURSE PRACTITIONER

## 2021-03-15 PROCEDURE — G0439 PR MEDICARE ANNUAL WELLNESS SUBSEQUENT VISIT: ICD-10-PCS | Mod: ,,, | Performed by: NURSE PRACTITIONER

## 2021-03-15 PROCEDURE — G0439 PPPS, SUBSEQ VISIT: HCPCS | Mod: ,,, | Performed by: NURSE PRACTITIONER

## 2021-08-06 ENCOUNTER — PATIENT MESSAGE (OUTPATIENT)
Dept: HEMATOLOGY/ONCOLOGY | Facility: CLINIC | Age: 76
End: 2021-08-06

## 2021-08-09 ENCOUNTER — PATIENT MESSAGE (OUTPATIENT)
Dept: FAMILY MEDICINE | Facility: CLINIC | Age: 76
End: 2021-08-09

## 2021-08-20 ENCOUNTER — OFFICE VISIT (OUTPATIENT)
Dept: FAMILY MEDICINE | Facility: CLINIC | Age: 76
End: 2021-08-20
Payer: MEDICARE

## 2021-08-20 ENCOUNTER — LAB VISIT (OUTPATIENT)
Dept: LAB | Facility: HOSPITAL | Age: 76
End: 2021-08-20
Attending: INTERNAL MEDICINE
Payer: MEDICARE

## 2021-08-20 ENCOUNTER — TELEPHONE (OUTPATIENT)
Dept: HEMATOLOGY/ONCOLOGY | Facility: CLINIC | Age: 76
End: 2021-08-20

## 2021-08-20 VITALS
DIASTOLIC BLOOD PRESSURE: 66 MMHG | WEIGHT: 146.81 LBS | TEMPERATURE: 98 F | HEART RATE: 66 BPM | OXYGEN SATURATION: 99 % | BODY MASS INDEX: 25.06 KG/M2 | SYSTOLIC BLOOD PRESSURE: 110 MMHG | HEIGHT: 64 IN

## 2021-08-20 DIAGNOSIS — F41.8 DEPRESSION WITH ANXIETY: ICD-10-CM

## 2021-08-20 DIAGNOSIS — R41.3 MEMORY DIFFICULTIES: ICD-10-CM

## 2021-08-20 DIAGNOSIS — C50.612 MALIGNANT NEOPLASM OF AXILLARY TAIL OF LEFT FEMALE BREAST, UNSPECIFIED ESTROGEN RECEPTOR STATUS: ICD-10-CM

## 2021-08-20 DIAGNOSIS — E03.9 ACQUIRED HYPOTHYROIDISM: ICD-10-CM

## 2021-08-20 DIAGNOSIS — E03.1 CONGENITAL HYPOTHYROIDISM WITHOUT GOITER: Primary | ICD-10-CM

## 2021-08-20 LAB
ALBUMIN SERPL BCP-MCNC: 3.8 G/DL (ref 3.5–5.2)
ALP SERPL-CCNC: 43 U/L (ref 55–135)
ALT SERPL W/O P-5'-P-CCNC: 11 U/L (ref 10–44)
ANION GAP SERPL CALC-SCNC: 10 MMOL/L (ref 8–16)
AST SERPL-CCNC: 25 U/L (ref 10–40)
BASOPHILS # BLD AUTO: 0.04 K/UL (ref 0–0.2)
BASOPHILS NFR BLD: 0.8 % (ref 0–1.9)
BILIRUB SERPL-MCNC: 0.7 MG/DL (ref 0.1–1)
BUN SERPL-MCNC: 20 MG/DL (ref 8–23)
CALCIUM SERPL-MCNC: 10.1 MG/DL (ref 8.7–10.5)
CHLORIDE SERPL-SCNC: 104 MMOL/L (ref 95–110)
CO2 SERPL-SCNC: 27 MMOL/L (ref 23–29)
CREAT SERPL-MCNC: 0.9 MG/DL (ref 0.5–1.4)
DIFFERENTIAL METHOD: ABNORMAL
EOSINOPHIL # BLD AUTO: 0.3 K/UL (ref 0–0.5)
EOSINOPHIL NFR BLD: 5.9 % (ref 0–8)
ERYTHROCYTE [DISTWIDTH] IN BLOOD BY AUTOMATED COUNT: 13.8 % (ref 11.5–14.5)
EST. GFR  (AFRICAN AMERICAN): >60 ML/MIN/1.73 M^2
EST. GFR  (NON AFRICAN AMERICAN): >60 ML/MIN/1.73 M^2
GLUCOSE SERPL-MCNC: 100 MG/DL (ref 70–110)
HCT VFR BLD AUTO: 38.8 % (ref 37–48.5)
HGB BLD-MCNC: 13 G/DL (ref 12–16)
IMM GRANULOCYTES # BLD AUTO: 0.02 K/UL (ref 0–0.04)
IMM GRANULOCYTES NFR BLD AUTO: 0.4 % (ref 0–0.5)
LYMPHOCYTES # BLD AUTO: 1.4 K/UL (ref 1–4.8)
LYMPHOCYTES NFR BLD: 29.3 % (ref 18–48)
MCH RBC QN AUTO: 31.1 PG (ref 27–31)
MCHC RBC AUTO-ENTMCNC: 33.5 G/DL (ref 32–36)
MCV RBC AUTO: 93 FL (ref 82–98)
MONOCYTES # BLD AUTO: 0.4 K/UL (ref 0.3–1)
MONOCYTES NFR BLD: 8.6 % (ref 4–15)
NEUTROPHILS # BLD AUTO: 2.6 K/UL (ref 1.8–7.7)
NEUTROPHILS NFR BLD: 55 % (ref 38–73)
NRBC BLD-RTO: 0 /100 WBC
PLATELET # BLD AUTO: 145 K/UL (ref 150–450)
PMV BLD AUTO: 10.2 FL (ref 9.2–12.9)
POTASSIUM SERPL-SCNC: 4.3 MMOL/L (ref 3.5–5.1)
PROT SERPL-MCNC: 7.1 G/DL (ref 6–8.4)
RBC # BLD AUTO: 4.18 M/UL (ref 4–5.4)
SODIUM SERPL-SCNC: 141 MMOL/L (ref 136–145)
WBC # BLD AUTO: 4.75 K/UL (ref 3.9–12.7)

## 2021-08-20 PROCEDURE — 99999 PR PBB SHADOW E&M-EST. PATIENT-LVL V: ICD-10-PCS | Mod: PBBFAC,,, | Performed by: PHYSICIAN ASSISTANT

## 2021-08-20 PROCEDURE — 85025 COMPLETE CBC W/AUTO DIFF WBC: CPT | Mod: PO | Performed by: INTERNAL MEDICINE

## 2021-08-20 PROCEDURE — 80053 COMPREHEN METABOLIC PANEL: CPT | Performed by: INTERNAL MEDICINE

## 2021-08-20 PROCEDURE — 36415 COLL VENOUS BLD VENIPUNCTURE: CPT | Mod: PO | Performed by: INTERNAL MEDICINE

## 2021-08-20 PROCEDURE — 99214 OFFICE O/P EST MOD 30 MIN: CPT | Mod: S$PBB,,, | Performed by: PHYSICIAN ASSISTANT

## 2021-08-20 PROCEDURE — 99999 PR PBB SHADOW E&M-EST. PATIENT-LVL V: CPT | Mod: PBBFAC,,, | Performed by: PHYSICIAN ASSISTANT

## 2021-08-20 PROCEDURE — 99214 PR OFFICE/OUTPT VISIT, EST, LEVL IV, 30-39 MIN: ICD-10-PCS | Mod: S$PBB,,, | Performed by: PHYSICIAN ASSISTANT

## 2021-08-20 PROCEDURE — 99215 OFFICE O/P EST HI 40 MIN: CPT | Mod: PBBFAC,PO | Performed by: PHYSICIAN ASSISTANT

## 2021-08-20 RX ORDER — SERTRALINE HYDROCHLORIDE 50 MG/1
50 TABLET, FILM COATED ORAL DAILY
Qty: 30 TABLET | Refills: 11 | Status: SHIPPED | OUTPATIENT
Start: 2021-08-20 | End: 2024-02-09

## 2021-08-24 LAB — CANCER AG27-29 SERPL-ACNC: 27 U/ML

## 2021-08-25 ENCOUNTER — INFUSION (OUTPATIENT)
Dept: INFUSION THERAPY | Facility: HOSPITAL | Age: 76
End: 2021-08-25
Attending: INTERNAL MEDICINE
Payer: MEDICARE

## 2021-08-25 ENCOUNTER — TELEPHONE (OUTPATIENT)
Dept: HEMATOLOGY/ONCOLOGY | Facility: CLINIC | Age: 76
End: 2021-08-25

## 2021-08-25 ENCOUNTER — OFFICE VISIT (OUTPATIENT)
Dept: HEMATOLOGY/ONCOLOGY | Facility: CLINIC | Age: 76
End: 2021-08-25
Payer: MEDICARE

## 2021-08-25 ENCOUNTER — PATIENT MESSAGE (OUTPATIENT)
Dept: FAMILY MEDICINE | Facility: CLINIC | Age: 76
End: 2021-08-25

## 2021-08-25 VITALS
BODY MASS INDEX: 24.5 KG/M2 | HEART RATE: 60 BPM | DIASTOLIC BLOOD PRESSURE: 56 MMHG | TEMPERATURE: 98 F | WEIGHT: 143.5 LBS | OXYGEN SATURATION: 97 % | RESPIRATION RATE: 17 BRPM | HEIGHT: 64 IN | SYSTOLIC BLOOD PRESSURE: 117 MMHG

## 2021-08-25 VITALS
RESPIRATION RATE: 18 BRPM | TEMPERATURE: 97 F | WEIGHT: 143.31 LBS | SYSTOLIC BLOOD PRESSURE: 96 MMHG | DIASTOLIC BLOOD PRESSURE: 65 MMHG | BODY MASS INDEX: 24.6 KG/M2 | HEART RATE: 71 BPM | OXYGEN SATURATION: 99 %

## 2021-08-25 DIAGNOSIS — D69.6 THROMBOCYTOPENIA: ICD-10-CM

## 2021-08-25 DIAGNOSIS — C50.612 MALIGNANT NEOPLASM OF AXILLARY TAIL OF LEFT FEMALE BREAST, UNSPECIFIED ESTROGEN RECEPTOR STATUS: Primary | ICD-10-CM

## 2021-08-25 DIAGNOSIS — Z79.811 USE OF AROMATASE INHIBITORS: Primary | ICD-10-CM

## 2021-08-25 DIAGNOSIS — M81.6 LOCALIZED OSTEOPOROSIS (LEQUESNE): ICD-10-CM

## 2021-08-25 DIAGNOSIS — Z79.811 USE OF AROMATASE INHIBITORS: ICD-10-CM

## 2021-08-25 DIAGNOSIS — M85.852 OSTEOPENIA OF LEFT HIP: ICD-10-CM

## 2021-08-25 DIAGNOSIS — Z85.3 HISTORY OF BREAST CANCER: ICD-10-CM

## 2021-08-25 DIAGNOSIS — Z86.19 HEPATITIS C VIRUS INFECTION CURED AFTER ANTIVIRAL DRUG THERAPY: ICD-10-CM

## 2021-08-25 PROCEDURE — 99214 PR OFFICE/OUTPT VISIT, EST, LEVL IV, 30-39 MIN: ICD-10-PCS | Mod: S$PBB,,, | Performed by: INTERNAL MEDICINE

## 2021-08-25 PROCEDURE — 96372 THER/PROPH/DIAG INJ SC/IM: CPT

## 2021-08-25 PROCEDURE — 99999 PR PBB SHADOW E&M-EST. PATIENT-LVL V: CPT | Mod: PBBFAC,,, | Performed by: INTERNAL MEDICINE

## 2021-08-25 PROCEDURE — 99999 PR PBB SHADOW E&M-EST. PATIENT-LVL V: ICD-10-PCS | Mod: PBBFAC,,, | Performed by: INTERNAL MEDICINE

## 2021-08-25 PROCEDURE — 99214 OFFICE O/P EST MOD 30 MIN: CPT | Mod: S$PBB,,, | Performed by: INTERNAL MEDICINE

## 2021-08-25 PROCEDURE — 99215 OFFICE O/P EST HI 40 MIN: CPT | Mod: PBBFAC,PO | Performed by: INTERNAL MEDICINE

## 2021-08-25 PROCEDURE — 63600175 PHARM REV CODE 636 W HCPCS: Mod: JG | Performed by: INTERNAL MEDICINE

## 2021-08-25 RX ADMIN — DENOSUMAB 60 MG: 60 INJECTION SUBCUTANEOUS at 03:08

## 2021-08-27 RX ORDER — LEVOTHYROXINE SODIUM 75 UG/1
75 TABLET ORAL DAILY
Qty: 90 TABLET | Refills: 1 | Status: SHIPPED | OUTPATIENT
Start: 2021-08-27 | End: 2021-10-25

## 2021-09-01 ENCOUNTER — HOSPITAL ENCOUNTER (EMERGENCY)
Facility: HOSPITAL | Age: 76
Discharge: HOME | End: 2021-09-02
Attending: EMERGENCY MEDICINE
Payer: MEDICARE

## 2021-09-01 ENCOUNTER — PATIENT MESSAGE (OUTPATIENT)
Dept: HEMATOLOGY/ONCOLOGY | Facility: CLINIC | Age: 76
End: 2021-09-01

## 2021-09-01 DIAGNOSIS — T58.94XA TOXIC EFFECT OF CARBON MONOXIDE, UNDETERMINED INTENT, INITIAL ENCOUNTER: Primary | ICD-10-CM

## 2021-09-01 LAB
ALBUMIN SERPL BCP-MCNC: 4.1 G/DL (ref 3.5–5.2)
ALLENS TEST: POSITIVE
ALP SERPL-CCNC: 39 U/L (ref 55–135)
ALT SERPL W/O P-5'-P-CCNC: 14 U/L (ref 10–44)
ANION GAP SERPL CALC-SCNC: 11 MMOL/L (ref 8–16)
AST SERPL-CCNC: 24 U/L (ref 10–40)
BASOPHILS # BLD AUTO: 0.03 K/UL (ref 0–0.2)
BASOPHILS NFR BLD: 0.6 % (ref 0–1.9)
BILIRUB SERPL-MCNC: 0.8 MG/DL (ref 0.1–1)
BNP SERPL-MCNC: 96 PG/ML (ref 0–99)
BUN SERPL-MCNC: 18 MG/DL (ref 8–23)
CALCIUM SERPL-MCNC: 9.3 MG/DL (ref 8.7–10.5)
CHLORIDE SERPL-SCNC: 102 MMOL/L (ref 95–110)
CK MB SERPL-MCNC: 1.3 NG/ML (ref 0.1–6.5)
CK SERPL-CCNC: 97 U/L (ref 20–180)
CO2 SERPL-SCNC: 24 MMOL/L (ref 23–29)
CORRECTED TEMPERATURE (PCO2): 25.7 MMHG
CORRECTED TEMPERATURE (PH): 7.54
CORRECTED TEMPERATURE (PO2): 103 MMHG
CREAT SERPL-MCNC: 0.9 MG/DL (ref 0.5–1.4)
DIFFERENTIAL METHOD: ABNORMAL
EOSINOPHIL # BLD AUTO: 0 K/UL (ref 0–0.5)
EOSINOPHIL NFR BLD: 0.6 % (ref 0–8)
ERYTHROCYTE [DISTWIDTH] IN BLOOD BY AUTOMATED COUNT: 13.8 % (ref 11.5–14.5)
EST. GFR  (AFRICAN AMERICAN): >60 ML/MIN/1.73 M^2
EST. GFR  (NON AFRICAN AMERICAN): >60 ML/MIN/1.73 M^2
FIO2: 21 %
GLUCOSE SERPL-MCNC: 108 MG/DL (ref 70–110)
HCO3 UR-SCNC: 21.9 MMOL/L (ref 24–28)
HCT VFR BLD AUTO: 40.8 % (ref 37–48.5)
HGB BLD-MCNC: 13.5 G/DL
HGB BLD-MCNC: 13.8 G/DL (ref 12–16)
IMM GRANULOCYTES # BLD AUTO: 0.03 K/UL (ref 0–0.04)
IMM GRANULOCYTES NFR BLD AUTO: 0.6 % (ref 0–0.5)
LYMPHOCYTES # BLD AUTO: 1.6 K/UL (ref 1–4.8)
LYMPHOCYTES NFR BLD: 30.6 % (ref 18–48)
Lab: ABNORMAL
MCH RBC QN AUTO: 30.7 PG (ref 27–31)
MCHC RBC AUTO-ENTMCNC: 33.8 G/DL (ref 32–36)
MCV RBC AUTO: 91 FL (ref 82–98)
MONOCYTES # BLD AUTO: 0.4 K/UL (ref 0.3–1)
MONOCYTES NFR BLD: 7.1 % (ref 4–15)
NEUTROPHILS # BLD AUTO: 3.2 K/UL (ref 1.8–7.7)
NEUTROPHILS NFR BLD: 60.5 % (ref 38–73)
NOTIFIED BY: ABNORMAL
NRBC BLD-RTO: 0 /100 WBC
O2DEVICE: ABNORMAL
PCO2 BLDA: 25.7 MMHG (ref 35–45)
PH SMN: 7.54 [PH] (ref 7.35–7.45)
PLATELET # BLD AUTO: 147 K/UL (ref 150–450)
PMV BLD AUTO: 9.8 FL (ref 9.2–12.9)
PO2 BLDA: 103 MMHG (ref 80–100)
POC BASE DEFICIT: -0.7 MMOL/L (ref -2–2)
POC COHB: 10.8 % (ref 0–5)
POC METHB: 0.7 % (ref 0–3)
POC O2HB: 87.8 %
POC PERFORMED BY: ABNORMAL
POC SATURATED O2: 99.3 % (ref 95–100)
POC TCO2: 22.7 MMOL/L (ref 10.3–12)
POC TEMPERATURE: 37 C
POTASSIUM SERPL-SCNC: 3.7 MMOL/L (ref 3.5–5.1)
PROT SERPL-MCNC: 7.4 G/DL (ref 6–8.4)
PROVIDER NOTIFIED: ABNORMAL
RBC # BLD AUTO: 4.5 M/UL (ref 4–5.4)
SODIUM SERPL-SCNC: 137 MMOL/L (ref 136–145)
SPECIMEN SOURCE: ABNORMAL
TROPONIN I SERPL DL<=0.01 NG/ML-MCNC: <0.03 NG/ML
WBC # BLD AUTO: 5.32 K/UL (ref 3.9–12.7)

## 2021-09-01 PROCEDURE — 82375 ASSAY CARBOXYHB QUANT: CPT | Mod: 59

## 2021-09-01 PROCEDURE — 93010 ELECTROCARDIOGRAM REPORT: CPT | Mod: ,,, | Performed by: INTERNAL MEDICINE

## 2021-09-01 PROCEDURE — 84484 ASSAY OF TROPONIN QUANT: CPT | Performed by: EMERGENCY MEDICINE

## 2021-09-01 PROCEDURE — 83880 ASSAY OF NATRIURETIC PEPTIDE: CPT | Performed by: EMERGENCY MEDICINE

## 2021-09-01 PROCEDURE — 82803 BLOOD GASES ANY COMBINATION: CPT | Mod: 59

## 2021-09-01 PROCEDURE — 99900031 HC PATIENT EDUCATION (STAT)

## 2021-09-01 PROCEDURE — 82550 ASSAY OF CK (CPK): CPT | Performed by: EMERGENCY MEDICINE

## 2021-09-01 PROCEDURE — 99900035 HC TECH TIME PER 15 MIN (STAT)

## 2021-09-01 PROCEDURE — 82553 CREATINE MB FRACTION: CPT | Performed by: EMERGENCY MEDICINE

## 2021-09-01 PROCEDURE — 99285 EMERGENCY DEPT VISIT HI MDM: CPT

## 2021-09-01 PROCEDURE — 85025 COMPLETE CBC W/AUTO DIFF WBC: CPT | Mod: 59 | Performed by: EMERGENCY MEDICINE

## 2021-09-01 PROCEDURE — 36600 WITHDRAWAL OF ARTERIAL BLOOD: CPT

## 2021-09-01 PROCEDURE — 80053 COMPREHEN METABOLIC PANEL: CPT | Performed by: EMERGENCY MEDICINE

## 2021-09-01 PROCEDURE — 93010 EKG 12-LEAD: ICD-10-PCS | Mod: ,,, | Performed by: INTERNAL MEDICINE

## 2021-09-01 PROCEDURE — 93005 ELECTROCARDIOGRAM TRACING: CPT | Performed by: INTERNAL MEDICINE

## 2021-09-02 VITALS
RESPIRATION RATE: 16 BRPM | TEMPERATURE: 99 F | OXYGEN SATURATION: 100 % | DIASTOLIC BLOOD PRESSURE: 55 MMHG | HEIGHT: 65 IN | BODY MASS INDEX: 17.66 KG/M2 | WEIGHT: 106 LBS | SYSTOLIC BLOOD PRESSURE: 115 MMHG | HEART RATE: 59 BPM

## 2021-09-02 LAB
ALLENS TEST: POSITIVE
ALLENS TEST: POSITIVE
CORRECTED TEMPERATURE (PCO2): 29.1 MMHG
CORRECTED TEMPERATURE (PCO2): 29.6 MMHG
CORRECTED TEMPERATURE (PH): 7.52
CORRECTED TEMPERATURE (PH): 7.52
CORRECTED TEMPERATURE (PO2): 376 MMHG
CORRECTED TEMPERATURE (PO2): 381 MMHG
FIO2: 21 %
FIO2: 21 %
GLUCOSE SERPL-MCNC: 102 MG/DL (ref 70–100)
GLUCOSE SERPL-MCNC: 103 MG/DL (ref 70–100)
HCO3 UR-SCNC: 23.7 MMOL/L (ref 24–28)
HCO3 UR-SCNC: 24 MMOL/L (ref 24–28)
HCT VFR BLD CALC: 39 % (ref 36–54)
HCT VFR BLD CALC: 40.3 % (ref 36–54)
HGB BLD-MCNC: 12.7 G/DL
HGB BLD-MCNC: 13.2 G/DL
O2DEVICE: ABNORMAL
O2DEVICE: ABNORMAL
PCO2 BLDA: 29.1 MMHG (ref 35–45)
PCO2 BLDA: 29.6 MMHG (ref 35–45)
PH SMN: 7.52 [PH] (ref 7.35–7.45)
PH SMN: 7.52 [PH] (ref 7.35–7.45)
PO2 BLDA: 376 MMHG (ref 80–100)
PO2 BLDA: 381 MMHG (ref 80–100)
POC BASE DEFICIT: 0.9 MMOL/L (ref -2–2)
POC BASE DEFICIT: 1.1 MMOL/L (ref -2–2)
POC COHB: 0.3 % (ref 0–5)
POC IONIZED CALCIUM: 1.12 MMOL/L (ref 1.06–1.42)
POC IONIZED CALCIUM: 1.14 MMOL/L (ref 1.06–1.42)
POC METHB: 1.2 % (ref 0–3)
POC O2HB: 98.5 %
POC O2HB: 99 %
POC PERFORMED BY: ABNORMAL
POC PERFORMED BY: ABNORMAL
POC SATURATED O2: 100 % (ref 95–100)
POC SATURATED O2: 100 % (ref 95–100)
POC TCO2: 24.6 MMOL/L (ref 10.3–12)
POC TCO2: 24.9 MMOL/L (ref 10.3–12)
POC TEMPERATURE: 37 C
POC TEMPERATURE: 37 C
POTASSIUM BLD-SCNC: 3.5 MMOL/L (ref 3.5–5.1)
POTASSIUM BLD-SCNC: 3.5 MMOL/L (ref 3.5–5.1)
SODIUM BLD-SCNC: 136 MMOL/L (ref 136–145)
SODIUM BLD-SCNC: 138 MMOL/L (ref 136–145)
SPECIMEN SOURCE: ABNORMAL
SPECIMEN SOURCE: ABNORMAL

## 2021-09-02 PROCEDURE — 82803 BLOOD GASES ANY COMBINATION: CPT | Mod: 91

## 2021-09-02 PROCEDURE — 84295 ASSAY OF SERUM SODIUM: CPT | Mod: 91

## 2021-09-02 PROCEDURE — 82375 ASSAY CARBOXYHB QUANT: CPT

## 2021-09-02 PROCEDURE — 84132 ASSAY OF SERUM POTASSIUM: CPT | Mod: 91

## 2021-09-02 PROCEDURE — 99900035 HC TECH TIME PER 15 MIN (STAT)

## 2021-09-02 PROCEDURE — 82330 ASSAY OF CALCIUM: CPT

## 2021-09-02 PROCEDURE — 36600 WITHDRAWAL OF ARTERIAL BLOOD: CPT

## 2021-09-02 PROCEDURE — 85014 HEMATOCRIT: CPT | Mod: 91

## 2021-10-14 DIAGNOSIS — E03.9 ACQUIRED HYPOTHYROIDISM: ICD-10-CM

## 2021-10-14 RX ORDER — LEVOTHYROXINE SODIUM 75 UG/1
TABLET ORAL
Qty: 90 TABLET | Refills: 3 | OUTPATIENT
Start: 2021-10-14

## 2021-10-18 ENCOUNTER — LAB VISIT (OUTPATIENT)
Dept: LAB | Facility: HOSPITAL | Age: 76
End: 2021-10-18
Attending: NURSE PRACTITIONER
Payer: MEDICARE

## 2021-10-18 DIAGNOSIS — E03.9 ACQUIRED HYPOTHYROIDISM: ICD-10-CM

## 2021-10-18 PROCEDURE — 84439 ASSAY OF FREE THYROXINE: CPT | Performed by: NURSE PRACTITIONER

## 2021-10-18 PROCEDURE — 84443 ASSAY THYROID STIM HORMONE: CPT | Performed by: NURSE PRACTITIONER

## 2021-10-18 PROCEDURE — 36415 COLL VENOUS BLD VENIPUNCTURE: CPT | Mod: PO | Performed by: NURSE PRACTITIONER

## 2021-10-19 LAB
T4 FREE SERPL-MCNC: 0.71 NG/DL (ref 0.71–1.51)
TSH SERPL DL<=0.005 MIU/L-ACNC: 19.8 UIU/ML (ref 0.4–4)

## 2021-10-25 RX ORDER — LEVOTHYROXINE SODIUM 88 UG/1
88 TABLET ORAL
Qty: 90 TABLET | Refills: 3 | Status: SHIPPED | OUTPATIENT
Start: 2021-10-25 | End: 2022-02-22 | Stop reason: SDUPTHER

## 2021-11-23 NOTE — PROGRESS NOTES
"Date of service:  9/16/2020    Chief complaint:  Memory Loss    History of present illness:  The patient is a 75 y.o. female referred for evaluation of memory loss.  She was seen with her  who supplies additional history. This issue began about 2 years ago.  The primary issue is with word finding. It involves short-term memory more than long-term memory.  She may have some difficulties with executive function.  There are no issues with multiple step processes. She has no problems with ADLs. She does not get lost in familiar areas. There are no hallucinations. There are no personality changes.  She does endorse depression and depression.    Of note, the patient reports that she had been on Zoloft for approximately 20 years.  Recently, she read that Zoloft could produce issues with language.  Her Oncologist instructed her to discontinue it, which she did about a month ago.  She reports some improvement in her word finding since that time, though it is not back to baseline.  She has been having some anxiety since coming off this drug, though.      Past Medical History:   Diagnosis Date    Anemia     Arthritis     Blood transfusion     Breast cancer     Cancer RIGHT BREAST    Chronic constipation     Clotting disorder     Colon polyp     Diverticulosis     Glaucoma     Hepatitis C 2000    pt states history of hepatits c-"cured by Dr. Lynch"; TREATED 2 YEARS - 2000   OK NOW    Hypothyroid     Insomnia     Malignant neoplasm of breast 4/12/2012    Memory loss     reports some memory loss since chemo    Osteoarthritis     Panic attacks     Raynaud's disease     takes amlodipine for this    Ulcer     Vertigo        Past Surgical History:   Procedure Laterality Date    APPENDECTOMY      BLADDER SURGERY  2011    [facility]ling [facility] Giles  [facility]    BRAIN SURGERY  2007    temporal neuralgia - [place]    BREAST BIOPSY      BREAST LUMPECTOMY  3/12    malignant - had chemo and radiotherapy    " COLONOSCOPY  01/03/2011    Dr Lynch, in media section, diverticulosis, hemorrhoids, otherwise normal findings; normal findings on random biopsies    COLONOSCOPY N/A 6/1/2017    Procedure: COLONOSCOPY;  Surgeon: Nate Lamb MD;  Location: [facility];  Service: Endoscopy;  Laterality: N/A;    COLONOSCOPY W/ POLYPECTOMY  06/01/2017    Dr. Lamb, 1 adenomatous polyp, recheck 5 years    EYE SURGERY      cataracts bilateral    HYSTERECTOMY      JOINT REPLACEMENT  09/25/2017    left Knee [facility]     removal of port a cath      right lymph node removed from breast area      TUNNELED VENOUS PORT PLACEMENT  04/2012    left chest    UPPER GASTROINTESTINAL ENDOSCOPY  prior to 2011    small hiatal hernia       Family History   Problem Relation Age of Onset    Cancer Mother         breast    Breast cancer Mother     Diabetes Father     Heart disease Father     Cancer Sister         breast    Breast cancer Sister     Diabetes Paternal Aunt     Cancer Other         breast    Breast cancer Other     Drug abuse Son     Obesity Son     Diabetes Maternal Aunt     Heart disease Maternal Uncle     Tuberculosis Paternal Uncle         x2    Diabetes Paternal Uncle     Early death Maternal Grandmother         tonsils    Heart disease Maternal Grandfather     Alcohol abuse Maternal Grandfather     Tuberculosis Paternal Grandfather     Cancer Sister         breast    Breast cancer Sister     Ovarian cancer Neg Hx     Colon cancer Neg Hx     Colon polyps Neg Hx     Crohn's disease Neg Hx     Ulcerative colitis Neg Hx     Melanoma Neg Hx     Lupus Neg Hx     Psoriasis Neg Hx     Eczema Neg Hx        Social History     Socioeconomic History    Marital status:      Spouse name: Not on file    Number of children: Not on file    Years of education: Not on file    Highest education level: Not on file   Occupational History    Not on file   Social Needs    Financial  resource strain: Not on file    Food insecurity     Worry: Not on file     Inability: Not on file    Transportation needs     Medical: Not on file     Non-medical: Not on file   Tobacco Use    Smoking status: Never Smoker    Smokeless tobacco: Never Used   Substance and Sexual Activity    Alcohol use: Yes     Alcohol/week: 7.0 standard drinks     Types: 4 Glasses of wine, 1 Shots of liquor, 2 Standard drinks or equivalent per week     Comment: wine most days - 1 glass    Drug use: No    Sexual activity: Yes     Partners: Male     Birth control/protection: None   Lifestyle    Physical activity     Days per week: Not on file     Minutes per session: Not on file    Stress: Not on file   Relationships    Social connections     Talks on phone: Not on file     Gets together: Not on file     Attends Advent service: Not on file     Active member of club or organization: Not on file     Attends meetings of clubs or organizations: Not on file     Relationship status: Not on file   Other Topics Concern    Are you pregnant or think you may be? Not Asked    Breast-feeding Not Asked   Social History Narrative    Not on file        Review of patient's allergies indicates:   Allergen Reactions    Adhesive Dermatitis and Rash    Codeine Nausea Only       Review of Systems  General/Constitutional:  No unintentional weight loss, No change in appetite  Eyes/Vision:  No change in vision, No double vision  ENT:  No frequent nose bleeds, No ringing in the ears  Respiratory:  No cough, No wheezing  Cardiovascular:  No chest pain, No palpitations  Gastrointestinal:  No jaundice, No nausea/vomiting  Genitourinary:  No incontinence, No burning with urination  Hematologic/Lymphatic:  No easy bruising/bleeding, No night sweats  Neurological:  No numbness, No weakness  Endocrine:  No fatigue, No heat/cold intolerance  Allergy/Immunologic:  No fevers, No chills  Musculoskeletal:  No muscle pain, No joint pain   Psychiatric:  No  "thoughts of harming self/others, No depression  Integumentary:  No rashes, No sores that do not heal    Physical exam:  Temp 97.6 °F (36.4 °C)   Resp 16   Ht 5' 4" (1.626 m)   Wt 64.5 kg (142 lb 3.2 oz)   LMP 03/02/1998   BMI 24.41 kg/m²   General: Well developed, well nourished.  No acute distress.  ENT: Mucus membranes moist.  Atraumatic external nose and ears.  Lymphatic: No apparent lymphadenopathy.  Cardiovascular: Regular rate and rhythm.  Pulmonary: No increased work of breathing.  Abdomen/GI: No guarding.  Musculoskeletal: No obvious joint deformities, moves all extremities well.    Neurological exam:  Mental status: Awake and alert.  Oriented x3.  Speech fluent and appropriate.  Recent and remote memory appear to be limited.  Fund of knowledge limited.  MMSE = 23/30.  Cranial nerves: Pupils equal round and reactive to light, extraocular movements intact, facial strength and sensation intact bilaterally, palate and tongue midline, hearing grossly intact bilaterally.  Motor: 5 out of 5 strength throughout the upper and lower extremities bilaterally. Normal bulk and tone.  Sensation: Intact to light touch and temperature bilaterally.  DTR: 2+ at the knees and biceps bilaterally.  Coordination: Finger-nose-finger testing intact bilaterally.  Gait: Nonfocal gait.      Data base:  Notes of the referring physician were reviewed.  Briefly summarized, these discuss her her memory issues and her onocologic issues.    Caregiver name: Richar  Relationship to the patient:   Does the patient have a living will? yes  Does the patient have a designated healthcare POA? no  Does the patient have a designated general POA? no    Have educational materials/resources been provided? yes      Activities of Daily Living    Bathing: Independent  Dressing: Independent  Grooming: Independent  Mouth Care: Independent  Toileting: Independent  Transferring Bed/Chair: Independent  Walking: Needs Help  Climbing: " Independent  Eating: Independent      Instrumental Activities of Daily Living    Shopping: Independent  Cooking: Independent  Managing Medications: Needs Help  Using the phone and looking up numbers: Independent  Doing Housework: Independent  Doing Laundry: Independent  Driving or using public transportation: Needs Help  Managing finances: Dependent    Functional Assessment Staging  2   Complains of forgetting location of objects. Subjective work difficulties.         Depression Patient Health Questionnaire 9/16/2020 12/4/2019 4/26/2018 12/12/2017 11/16/2016 11/4/2015 12/5/2014   Over the last two weeks how often have you been bothered by little interest or pleasure in doing things 0 0 0 0 0 0 0   Over the last two weeks how often have you been bothered by feeling down, depressed or hopeless 0 0 0 0 0 0 0   PHQ-2 Total Score 0 0 0 0 0 0 0          Assessment and plan:  The patient is a 75 y.o. female referred for evaluation of memory loss. I feel that the differential diagnosis include Alzheimer's disease and effects of chemotherapy. I think a reasonable workup would feature serologies, MRI of the brain, and neuropsychological testing. We will plan on seeing the patient back in a few months.    Cognition and function were assessed and the patient's functional assessment staging test (FAST) score is 2. Patient is felt to have decision making capacity. PHQ-2 score was 0. Medications were reconciled and reviewed for high-risk medications. The patient's behavior and psychiatric health were reviewed and addressed. The patient and family were counseled on safety in the home and operation of vehicles. Discussed caregiver needs and social support. Advance Care Plan was reviewed. Written care plan and support information provided to the patient or caregiver and information was provided.       General Respiratory

## 2021-11-30 ENCOUNTER — IMMUNIZATION (OUTPATIENT)
Dept: PRIMARY CARE CLINIC | Facility: CLINIC | Age: 76
End: 2021-11-30
Payer: MEDICARE

## 2021-11-30 DIAGNOSIS — Z23 NEED FOR VACCINATION: Primary | ICD-10-CM

## 2021-11-30 PROCEDURE — 0004A COVID-19, MRNA, LNP-S, PF, 30 MCG/0.3 ML DOSE VACCINE: CPT | Mod: S$GLB,,, | Performed by: FAMILY MEDICINE

## 2021-11-30 PROCEDURE — 91300 COVID-19, MRNA, LNP-S, PF, 30 MCG/0.3 ML DOSE VACCINE: ICD-10-PCS | Mod: S$GLB,,, | Performed by: FAMILY MEDICINE

## 2021-11-30 PROCEDURE — 91300 COVID-19, MRNA, LNP-S, PF, 30 MCG/0.3 ML DOSE VACCINE: CPT | Mod: S$GLB,,, | Performed by: FAMILY MEDICINE

## 2021-11-30 PROCEDURE — 0004A COVID-19, MRNA, LNP-S, PF, 30 MCG/0.3 ML DOSE VACCINE: ICD-10-PCS | Mod: S$GLB,,, | Performed by: FAMILY MEDICINE

## 2021-12-15 ENCOUNTER — HOSPITAL ENCOUNTER (OUTPATIENT)
Dept: RADIOLOGY | Facility: HOSPITAL | Age: 76
Discharge: HOME | End: 2021-12-15
Attending: INTERNAL MEDICINE
Payer: MEDICARE

## 2021-12-15 DIAGNOSIS — C50.612 MALIGNANT NEOPLASM OF AXILLARY TAIL OF LEFT FEMALE BREAST, UNSPECIFIED ESTROGEN RECEPTOR STATUS: ICD-10-CM

## 2021-12-15 PROCEDURE — 77066 MAMMO DIGITAL DIAGNOSTIC BILAT WITH TOMO: ICD-10-PCS | Mod: 26,,, | Performed by: RADIOLOGY

## 2021-12-15 PROCEDURE — 77062 BREAST TOMOSYNTHESIS BI: CPT | Mod: TC

## 2021-12-15 PROCEDURE — 77066 DX MAMMO INCL CAD BI: CPT | Mod: 26,,, | Performed by: RADIOLOGY

## 2021-12-15 PROCEDURE — 77062 MAMMO DIGITAL DIAGNOSTIC BILAT WITH TOMO: ICD-10-PCS | Mod: 26,,, | Performed by: RADIOLOGY

## 2021-12-15 PROCEDURE — 77062 BREAST TOMOSYNTHESIS BI: CPT | Mod: 26,,, | Performed by: RADIOLOGY

## 2022-02-10 ENCOUNTER — PATIENT MESSAGE (OUTPATIENT)
Dept: HEMATOLOGY/ONCOLOGY | Facility: CLINIC | Age: 77
End: 2022-02-10
Payer: MEDICARE

## 2022-02-14 ENCOUNTER — PES CALL (OUTPATIENT)
Dept: ADMINISTRATIVE | Facility: CLINIC | Age: 77
End: 2022-02-14
Payer: MEDICARE

## 2022-02-14 ENCOUNTER — TELEPHONE (OUTPATIENT)
Dept: HEMATOLOGY/ONCOLOGY | Facility: CLINIC | Age: 77
End: 2022-02-14
Payer: MEDICARE

## 2022-02-14 NOTE — TELEPHONE ENCOUNTER
Reviewed pt's chart. All requirements for upcoming prolia complete. Pt verified she has not had any recent dental issues/infections.      ----- Message from Sarah Hernandez sent at 2/14/2022 11:33 AM CST -----  Please review patient's Appointment Desk for an upcoming PROLIA INJECTION appointment.       The following are needed for this appointment.   Please contact patient for any tests or follow-up needed:      ORDER.  Current / active in the Epic Therapy Plan, signed within a year.  LABS. Serum Calcium within 6 weeks of treatment.    DEXA SCAN within the last 2 years   DENTAL PRECAUTION CONFIRMED WITH PATIENT. No recent issues (infections, etc). No invasive procedures recently done or planned (root canal, extraction, implants, etc.)  A patient will need to bring a Dental Clearance signed by patient's dental provider if there are recent dental issues/procedures within the last 3 months.   FOLLOW-UP APPOINTMENT within the last 12 months with the diagnosis mentioned in the visit notes.         Any item unavailable by the day prior to the scheduled appointment will cause the appointment to be CANCELLED.        To reschedule appointments, please contact [facility] INFUSION SCHEDULING POOL or call [phone] or [phone].       Thank you,  [facility], Infusion Department

## 2022-02-18 ENCOUNTER — LAB VISIT (OUTPATIENT)
Dept: LAB | Facility: HOSPITAL | Age: 77
End: 2022-02-18
Attending: INTERNAL MEDICINE
Payer: MEDICARE

## 2022-02-18 DIAGNOSIS — C50.612 MALIGNANT NEOPLASM OF AXILLARY TAIL OF LEFT FEMALE BREAST, UNSPECIFIED ESTROGEN RECEPTOR STATUS: ICD-10-CM

## 2022-02-18 LAB
ALBUMIN SERPL BCP-MCNC: 3.9 G/DL (ref 3.5–5.2)
ALP SERPL-CCNC: 52 U/L (ref 55–135)
ALT SERPL W/O P-5'-P-CCNC: 20 U/L (ref 10–44)
ANION GAP SERPL CALC-SCNC: 7 MMOL/L (ref 8–16)
AST SERPL-CCNC: 29 U/L (ref 10–40)
BASOPHILS # BLD AUTO: 0.05 K/UL (ref 0–0.2)
BASOPHILS NFR BLD: 1.1 % (ref 0–1.9)
BILIRUB SERPL-MCNC: 0.9 MG/DL (ref 0.1–1)
BUN SERPL-MCNC: 19 MG/DL (ref 8–23)
CALCIUM SERPL-MCNC: 9.5 MG/DL (ref 8.7–10.5)
CHLORIDE SERPL-SCNC: 105 MMOL/L (ref 95–110)
CO2 SERPL-SCNC: 27 MMOL/L (ref 23–29)
CREAT SERPL-MCNC: 0.9 MG/DL (ref 0.5–1.4)
DIFFERENTIAL METHOD: ABNORMAL
EOSINOPHIL # BLD AUTO: 0.3 K/UL (ref 0–0.5)
EOSINOPHIL NFR BLD: 7.1 % (ref 0–8)
ERYTHROCYTE [DISTWIDTH] IN BLOOD BY AUTOMATED COUNT: 14.8 % (ref 11.5–14.5)
EST. GFR  (AFRICAN AMERICAN): >60 ML/MIN/1.73 M^2
EST. GFR  (NON AFRICAN AMERICAN): >60 ML/MIN/1.73 M^2
GLUCOSE SERPL-MCNC: 93 MG/DL (ref 70–110)
HCT VFR BLD AUTO: 40.3 % (ref 37–48.5)
HGB BLD-MCNC: 13.3 G/DL (ref 12–16)
IMM GRANULOCYTES # BLD AUTO: 0.01 K/UL (ref 0–0.04)
IMM GRANULOCYTES NFR BLD AUTO: 0.2 % (ref 0–0.5)
LYMPHOCYTES # BLD AUTO: 1.5 K/UL (ref 1–4.8)
LYMPHOCYTES NFR BLD: 31.5 % (ref 18–48)
MCH RBC QN AUTO: 30.2 PG (ref 27–31)
MCHC RBC AUTO-ENTMCNC: 33 G/DL (ref 32–36)
MCV RBC AUTO: 92 FL (ref 82–98)
MONOCYTES # BLD AUTO: 0.5 K/UL (ref 0.3–1)
MONOCYTES NFR BLD: 9.7 % (ref 4–15)
NEUTROPHILS # BLD AUTO: 2.3 K/UL (ref 1.8–7.7)
NEUTROPHILS NFR BLD: 50.4 % (ref 38–73)
NRBC BLD-RTO: 0 /100 WBC
PLATELET # BLD AUTO: 149 K/UL (ref 150–450)
PMV BLD AUTO: 10.4 FL (ref 9.2–12.9)
POTASSIUM SERPL-SCNC: 5 MMOL/L (ref 3.5–5.1)
PROT SERPL-MCNC: 7.2 G/DL (ref 6–8.4)
RBC # BLD AUTO: 4.4 M/UL (ref 4–5.4)
SODIUM SERPL-SCNC: 139 MMOL/L (ref 136–145)
WBC # BLD AUTO: 4.63 K/UL (ref 3.9–12.7)

## 2022-02-18 PROCEDURE — 85025 COMPLETE CBC W/AUTO DIFF WBC: CPT | Performed by: INTERNAL MEDICINE

## 2022-02-18 PROCEDURE — 36415 COLL VENOUS BLD VENIPUNCTURE: CPT | Performed by: INTERNAL MEDICINE

## 2022-02-18 PROCEDURE — 80053 COMPREHEN METABOLIC PANEL: CPT | Performed by: INTERNAL MEDICINE

## 2022-02-21 LAB — CANCER AG27-29 SERPL-ACNC: 29.6 U/ML

## 2022-02-22 ENCOUNTER — PATIENT MESSAGE (OUTPATIENT)
Dept: FAMILY MEDICINE | Facility: CLINIC | Age: 77
End: 2022-02-22
Payer: MEDICARE

## 2022-02-22 DIAGNOSIS — E03.9 ACQUIRED HYPOTHYROIDISM: ICD-10-CM

## 2022-02-22 RX ORDER — LEVOTHYROXINE SODIUM 88 UG/1
88 TABLET ORAL
Qty: 90 TABLET | Refills: 3 | Status: SHIPPED | OUTPATIENT
Start: 2022-02-22 | End: 2022-06-07

## 2022-02-25 ENCOUNTER — OFFICE VISIT (OUTPATIENT)
Dept: HEMATOLOGY/ONCOLOGY | Facility: CLINIC | Age: 77
End: 2022-02-25
Payer: MEDICARE

## 2022-02-25 ENCOUNTER — INFUSION (OUTPATIENT)
Dept: INFUSION THERAPY | Facility: HOSPITAL | Age: 77
End: 2022-02-25
Attending: INTERNAL MEDICINE
Payer: MEDICARE

## 2022-02-25 VITALS
RESPIRATION RATE: 18 BRPM | HEART RATE: 58 BPM | HEIGHT: 64 IN | BODY MASS INDEX: 25.27 KG/M2 | TEMPERATURE: 97 F | SYSTOLIC BLOOD PRESSURE: 119 MMHG | WEIGHT: 148 LBS | DIASTOLIC BLOOD PRESSURE: 66 MMHG

## 2022-02-25 VITALS
RESPIRATION RATE: 16 BRPM | WEIGHT: 147.94 LBS | SYSTOLIC BLOOD PRESSURE: 132 MMHG | BODY MASS INDEX: 25.25 KG/M2 | HEIGHT: 64 IN | HEART RATE: 60 BPM | OXYGEN SATURATION: 96 % | TEMPERATURE: 96 F | DIASTOLIC BLOOD PRESSURE: 62 MMHG

## 2022-02-25 DIAGNOSIS — Z85.3 HISTORY OF BREAST CANCER: ICD-10-CM

## 2022-02-25 DIAGNOSIS — C50.612 MALIGNANT NEOPLASM OF AXILLARY TAIL OF LEFT FEMALE BREAST, UNSPECIFIED ESTROGEN RECEPTOR STATUS: Primary | ICD-10-CM

## 2022-02-25 DIAGNOSIS — Z79.811 USE OF AROMATASE INHIBITORS: Primary | ICD-10-CM

## 2022-02-25 DIAGNOSIS — M85.852 OSTEOPENIA OF LEFT HIP: ICD-10-CM

## 2022-02-25 PROCEDURE — 99999 PR PBB SHADOW E&M-EST. PATIENT-LVL V: ICD-10-PCS | Mod: PBBFAC,,, | Performed by: NURSE PRACTITIONER

## 2022-02-25 PROCEDURE — 99215 OFFICE O/P EST HI 40 MIN: CPT | Mod: PBBFAC,PO | Performed by: NURSE PRACTITIONER

## 2022-02-25 PROCEDURE — 99213 OFFICE O/P EST LOW 20 MIN: CPT | Mod: S$PBB,,, | Performed by: NURSE PRACTITIONER

## 2022-02-25 PROCEDURE — 99213 PR OFFICE/OUTPT VISIT, EST, LEVL III, 20-29 MIN: ICD-10-PCS | Mod: S$PBB,,, | Performed by: NURSE PRACTITIONER

## 2022-02-25 PROCEDURE — 99999 PR PBB SHADOW E&M-EST. PATIENT-LVL V: CPT | Mod: PBBFAC,,, | Performed by: NURSE PRACTITIONER

## 2022-02-25 PROCEDURE — 63600175 PHARM REV CODE 636 W HCPCS: Mod: JG | Performed by: INTERNAL MEDICINE

## 2022-02-25 PROCEDURE — 96372 THER/PROPH/DIAG INJ SC/IM: CPT

## 2022-02-25 RX ADMIN — DENOSUMAB 60 MG: 60 INJECTION SUBCUTANEOUS at 09:02

## 2022-02-25 NOTE — PROGRESS NOTES
Subjective:       Patient ID: Yuniel Gracia is a 76 y.o. female.    Chief Complaint:breast ca    HPI:    She has a chronic ITP and hep C, history of    stage I breast cancer,rec score of 32 , so underwent TC  chemotherapy. She did notice a new mass in the rt breast that came back without issue, On arimidex  5 years in 9/2017/ Mild thrombocytopenia related to rx and hep c     saw ENT for mouth sores got better.   and pt are both reporting that pt is unabe to find some words, she gets  Forgetful   states she is afraid to take a dementia test  Had COVID, wasn't hopsitlized  REVIEW OF SYSTEMS:     CONSTITUTIONAL: The patient denies any weight change. There is no apparent    change in appetite, fever, night sweats, headaches, fatigue, dizziness, or    weakness.   is with reports worsening dementia    SKIN: Denies rash, issues with nails, non-healing sores, bleeding, blotching    skin or abnormal bruising. Denies new moles or changes to existing moles.      BREASTS: There is no swelling around breasts or nipple discharge.    EYES: Denies eye pain, blurred vision, swelling, redness or discharge.      ENT AND MOUTH:  3 sores around gum line.  And buccal mucosa  CARDIOVASCULAR: Denies chest pain, discomfort or palpitations. Denies neck    swelling or episodes of passing out.      RESPIRATORY: Denies cough, sputum production, blood in sputum, and denies    shortness of breath.      GI: Denies trouble swallowing, indigestion, heartburn, abdominal pain, nausea,    vomiting, diarrhea, altered bowel habits, blood in stool, discoloration of    stools, change in nature of stool, bloating, increased abdominal girth.      GENITOURINARY: No discharge. No pelvic pain or lumps. No rash around groin or  lesions. No urinary frequency, hesitation, painful urination or blood in    urine. Denies incontinence. No problems with intercourse.      MUSCULOSKELETAL: Denies neck or back pain. Denies weakness in arms or legs,     joint problems or distended inflamed veins in legs. Denies swelling or abnormal  glands.      NEUROLOGICAL: Denies tingling, numbness, altered mentation changes to nerve    function in the face, weakness to one or both of the body. Denies changes to    gait and denies multiple falls or accidents.      PSYCHIATRIC: Denies nervousness, anxiety, hallucinations, depression, suicidal    ideation, trouble sleeping or changes in behavior noticed by family.          PHYSICAL EXAM:     Wt Readings from Last 3 Encounters:   02/25/22 67.1 kg (147 lb 14.9 oz)   09/02/21 66 kg (145 lb 8.1 oz)   09/01/21 48.1 kg (106 lb)     Temp Readings from Last 3 Encounters:   02/25/22 96.1 °F (35.6 °C) (Temporal)   09/02/21 98 °F (36.7 °C) (Oral)   09/02/21 98.5 °F (36.9 °C)     BP Readings from Last 3 Encounters:   02/25/22 132/62   09/02/21 134/68   09/02/21 (!) 115/55     Pulse Readings from Last 3 Encounters:   02/25/22 60   09/02/21 78   09/02/21 (!) 59       GENERAL: Comfortable looking patient. Patient is in no distress.  Awake, alert and oriented to time, person and place.  No anxiety, or agitation.    brusies to chin and after fall+    HEENT: Normal conjunctivae and eyelids. WNL.  PERRLA 3 to 4 mm. No icterus, no pallor, no congestion, and no discharge noted. 3 small aphthous ulcers noted in oral cavity    NECK:  Supple. Trachea is central.  No crepitus.  No JVD or masses.    RESPIRATORY:  No intercostal retractions.  No dullness to percussion.  Chest is clear to auscultation.  No rales, rhonchi or wheezes.  No crepitus.  Good air entry bilaterally.    CARDIOVASCULAR:  S1 and S2 are normally heard without murmurs or gallops.  All peripheral pulses are present.    ABDOMEN:  Normal abdomen.  No hepatosplenomegaly.  No free fluid.  Bowel sounds are present.  No hernia noted. No masses.  No rebound or tenderness.  No guarding or rigidity.  Umbilicus is midline.    LYMPHATICS:  No axillary, cervical, supraclavicular, submental, or  inguinal lymphadenopathy.    SKIN/MUSCULOSKELETAL:  There is no evidence of excoriation marks or ecchmosis.  No rashes.  No cyanosis.  No clubbing.  No joint or skeletal deformities noted.  Normal range of motion.    NEUROLOGIC:  Higher functions are appropriate.  No cranial nerve deficits.  Normal ludmila.  Normal strength.  Motor and sensory functions are normal.  Deep tendon reflexes are normal.    GENITAL/RECTAL:  Exams are deferred.      Laboratory:     CBC:  Lab Results   Component Value Date    WBC 4.63 02/18/2022    RBC 4.40 02/18/2022    HGB 13.3 02/18/2022    HCT 40.3 02/18/2022    MCV 92 02/18/2022    MCH 30.2 02/18/2022    MCHC 33.0 02/18/2022    RDW 14.8 (H) 02/18/2022     (L) 02/18/2022    MPV 10.4 02/18/2022    GRAN 2.3 02/18/2022    GRAN 50.4 02/18/2022    LYMPH 1.5 02/18/2022    LYMPH 31.5 02/18/2022    MONO 0.5 02/18/2022    MONO 9.7 02/18/2022    EOS 0.3 02/18/2022    BASO 0.05 02/18/2022    EOSINOPHIL 7.1 02/18/2022    BASOPHIL 1.1 02/18/2022       BMP: BMP  Lab Results   Component Value Date     02/18/2022    K 5.0 02/18/2022     02/18/2022    CO2 27 02/18/2022    BUN 19 02/18/2022    CREATININE 0.9 02/18/2022    CALCIUM 9.5 02/18/2022    ANIONGAP 7 (L) 02/18/2022    ESTGFRAFRICA >60 02/18/2022    EGFRNONAA >60 02/18/2022       LFT:   Lab Results   Component Value Date    ALT 20 02/18/2022    AST 29 02/18/2022    ALKPHOS 52 (L) 02/18/2022    BILITOT 0.9 02/18/2022   Impression:     Osteopenia 8/2020     Consider FDA approved medical therapies in postmenopausal women and men aged 50 years and older, based on the following:     *A hip or vertebral (clinical or morphometric) fracture  *T score less than or equal to -2.5 at the femoral neck or spine after appropriate evaluation to exclude secondary causes.  *Low bone mass -- also known as osteopenia (T score between -1.0 and -2.5 at the femoral neck or spine) and a 10 year probability of hip fracture greater than or equal to 3% or a  10 year probability of major osteoporosis-related fracture greater than or equal to 20% based on the [place]-adapted WHO algorithm.  *Clinician's judgment and/or patient preference may indicate treatment for people with 10 year fracture probabilities is above or below these levels  12/2020mammogram neg:   Most recent scan 2015 neg  Tumor marker  wnl 23.6 2/2021    Assessment/Plan:     Keep scheduled appointment with me for follow-up  Stage 1 breast ca  4. HTN, dyslipedemia, hypothyroidism, depression , contimue care with Dr. Dolan  Had plasma for COVID  Gave pt restoril rx for insomnia. Generic in the past  Patient on antidepressant which may need to be discontinued to see if her memory improves will refer to Neurology for dementia workup  Breast ca: cont arimidex  For a total of 10 years dx in 2012  Keep follow-up with cbc, cmp she completes 10 years of arimidex in 9/2022  cont prolia    Thrombocytopenia is stable  RTC in 6 months  Cont arimidex       Advance Care planning/ directives /living will/patient's wishes discussed with patient.  Patient has been given guidelines and instructions on completing these directives  COVID social distancing, face mask use, hand washing techniques and personal hygiene routine discussed with patient.  Also discussed vaccine availability.  Need for considering vaccination  Good exercise, nutrition and weight management discussed with patient  Health maintenance activities and follow-up with PCPs recommendations discussed with patient

## 2022-02-25 NOTE — PLAN OF CARE
Problem: Fatigue  Goal: Improved Activity Tolerance  Intervention: Promote Improved Energy  Flowsheets (Taken 2/25/2022 9551)  Fatigue Management: fatigue-related activity identified  Activity Management: Ambulated -L4

## 2022-04-01 NOTE — CHAPLAIN
cultivated a relationship of care and support with pt and provided chaplaincy education.    explored pt concerns and provided reflective listening and prayer.   SC will continue to follow this pt.   Chacha CALDERON  [id number]   Levothyroxine 50mg and 75mg on alternating days  HOLD po levothyroxine as no NGT desired and cannot safely swallow  -Convert to IV levothyroxine HOLD home seroquel and gabapentin patient is altered and cannot take po HOLD home statin. unable to take PO and family does not desire NGT

## 2022-05-24 ENCOUNTER — HOSPITAL ENCOUNTER (EMERGENCY)
Facility: HOSPITAL | Age: 77
Discharge: HOME | End: 2022-05-24
Attending: EMERGENCY MEDICINE
Payer: MEDICARE

## 2022-05-24 VITALS
BODY MASS INDEX: 25.27 KG/M2 | OXYGEN SATURATION: 97 % | TEMPERATURE: 98 F | DIASTOLIC BLOOD PRESSURE: 67 MMHG | WEIGHT: 148 LBS | SYSTOLIC BLOOD PRESSURE: 129 MMHG | RESPIRATION RATE: 20 BRPM | HEIGHT: 64 IN | HEART RATE: 60 BPM

## 2022-05-24 DIAGNOSIS — Z77.098 CHEMICAL EXPOSURE: ICD-10-CM

## 2022-05-24 DIAGNOSIS — R06.02 SHORTNESS OF BREATH: Primary | ICD-10-CM

## 2022-05-24 LAB
ALBUMIN SERPL BCP-MCNC: 4.1 G/DL (ref 3.5–5.2)
ALP SERPL-CCNC: 48 U/L (ref 55–135)
ALT SERPL W/O P-5'-P-CCNC: 18 U/L (ref 10–44)
ANION GAP SERPL CALC-SCNC: 13 MMOL/L (ref 8–16)
AST SERPL-CCNC: 29 U/L (ref 10–40)
BASOPHILS # BLD AUTO: 0.03 K/UL (ref 0–0.2)
BASOPHILS NFR BLD: 0.6 % (ref 0–1.9)
BILIRUB SERPL-MCNC: 0.9 MG/DL (ref 0.1–1)
BUN SERPL-MCNC: 16 MG/DL (ref 8–23)
CALCIUM SERPL-MCNC: 9.8 MG/DL (ref 8.7–10.5)
CHLORIDE SERPL-SCNC: 105 MMOL/L (ref 95–110)
CO2 SERPL-SCNC: 22 MMOL/L (ref 23–29)
CREAT SERPL-MCNC: 0.8 MG/DL (ref 0.5–1.4)
DIFFERENTIAL METHOD: ABNORMAL
EOSINOPHIL # BLD AUTO: 0.2 K/UL (ref 0–0.5)
EOSINOPHIL NFR BLD: 4.2 % (ref 0–8)
ERYTHROCYTE [DISTWIDTH] IN BLOOD BY AUTOMATED COUNT: 15.1 % (ref 11.5–14.5)
EST. GFR  (AFRICAN AMERICAN): >60 ML/MIN/1.73 M^2
EST. GFR  (NON AFRICAN AMERICAN): >60 ML/MIN/1.73 M^2
GLUCOSE SERPL-MCNC: 98 MG/DL (ref 70–110)
HCT VFR BLD AUTO: 41.7 % (ref 37–48.5)
HGB BLD-MCNC: 14.5 G/DL (ref 12–16)
IMM GRANULOCYTES # BLD AUTO: 0.01 K/UL (ref 0–0.04)
IMM GRANULOCYTES NFR BLD AUTO: 0.2 % (ref 0–0.5)
LYMPHOCYTES # BLD AUTO: 1.1 K/UL (ref 1–4.8)
LYMPHOCYTES NFR BLD: 23.6 % (ref 18–48)
MCH RBC QN AUTO: 31.4 PG (ref 27–31)
MCHC RBC AUTO-ENTMCNC: 34.8 G/DL (ref 32–36)
MCV RBC AUTO: 90 FL (ref 82–98)
MONOCYTES # BLD AUTO: 0.4 K/UL (ref 0.3–1)
MONOCYTES NFR BLD: 8 % (ref 4–15)
NEUTROPHILS # BLD AUTO: 3 K/UL (ref 1.8–7.7)
NEUTROPHILS NFR BLD: 63.4 % (ref 38–73)
NRBC BLD-RTO: 0 /100 WBC
PLATELET # BLD AUTO: 139 K/UL (ref 150–450)
PMV BLD AUTO: 10.4 FL (ref 9.2–12.9)
POTASSIUM SERPL-SCNC: 4.6 MMOL/L (ref 3.5–5.1)
PROT SERPL-MCNC: 7.7 G/DL (ref 6–8.4)
RBC # BLD AUTO: 4.62 M/UL (ref 4–5.4)
SODIUM SERPL-SCNC: 140 MMOL/L (ref 136–145)
TROPONIN I SERPL DL<=0.01 NG/ML-MCNC: 0.01 NG/ML (ref 0–0.03)
WBC # BLD AUTO: 4.75 K/UL (ref 3.9–12.7)

## 2022-05-24 PROCEDURE — 85025 COMPLETE CBC W/AUTO DIFF WBC: CPT | Performed by: PHYSICIAN ASSISTANT

## 2022-05-24 PROCEDURE — 36415 COLL VENOUS BLD VENIPUNCTURE: CPT | Performed by: PHYSICIAN ASSISTANT

## 2022-05-24 PROCEDURE — 93010 ELECTROCARDIOGRAM REPORT: CPT | Mod: ,,, | Performed by: INTERNAL MEDICINE

## 2022-05-24 PROCEDURE — 99285 EMERGENCY DEPT VISIT HI MDM: CPT | Mod: 25

## 2022-05-24 PROCEDURE — 93005 ELECTROCARDIOGRAM TRACING: CPT

## 2022-05-24 PROCEDURE — 80053 COMPREHEN METABOLIC PANEL: CPT | Performed by: PHYSICIAN ASSISTANT

## 2022-05-24 PROCEDURE — 84484 ASSAY OF TROPONIN QUANT: CPT | Performed by: PHYSICIAN ASSISTANT

## 2022-05-24 PROCEDURE — 93010 EKG 12-LEAD: ICD-10-PCS | Mod: ,,, | Performed by: INTERNAL MEDICINE

## 2022-05-24 NOTE — ED PROVIDER NOTES
"Encounter Date: 5/24/2022       History     Chief Complaint   Patient presents with    Chemical Exposure     Inhaled fumes from      Shortness of Breath     Patient is a 76 year old female who presents with shortness of breath that started 45 minutes PTA. She has PMH significant for anemia, hepatitis C, breast CA and hypothyroid.  was using  on a piece of furniture when she walked in the room, she felt short of breath and tightness in her chest. She reports symptoms have improved since leaving the area. She denied recent illness.  denied past lung or cardiac history.     The history is provided by the patient and the spouse.     Review of patient's allergies indicates:   Allergen Reactions    Adhesive Dermatitis and Rash    Codeine Nausea Only     Past Medical History:   Diagnosis Date    Anemia     Arthritis     Blood transfusion     Breast cancer     Cancer RIGHT BREAST    Chronic constipation     Clotting disorder     Colon polyp     Diverticulosis     Glaucoma     Hepatitis C 2000    pt states history of hepatits c-"cured by Dr. Lynch"; TREATED 2 YEARS - 2000   OK NOW    Hypothyroid     Insomnia     Malignant neoplasm of breast 4/12/2012    Memory loss     reports some memory loss since chemo    Osteoarthritis     Panic attacks     Raynaud's disease     takes amlodipine for this    Ulcer     Vertigo      Past Surgical History:   Procedure Laterality Date    APPENDECTOMY      BLADDER SURGERY  2011    sling - Dr Giles  [facility]    BRAIN SURGERY  2007    temporal neuralgia - [place]    BREAST BIOPSY      BREAST LUMPECTOMY  3/12    malignant - had chemo and radiotherapy    COLONOSCOPY  01/03/2011    Dr Lynch, in media section, diverticulosis, hemorrhoids, otherwise normal findings; normal findings on random biopsies    COLONOSCOPY N/A 6/1/2017    Procedure: COLONOSCOPY;  Surgeon: Nate Lamb MD;  [facility];  " Service: Endoscopy;  Laterality: N/A;    COLONOSCOPY W/ POLYPECTOMY  06/01/2017    Dr. Lamb, 1 adenomatous polyp, recheck 5 years    EYE SURGERY      cataracts bilateral    HYSTERECTOMY      JOINT REPLACEMENT  09/25/2017    left [facility]nee [facility]     removal of port a cath      right lymph node removed from breast area      TUNNELED VENOUS PORT PLACEMENT  04/2012    left chest    UPPER GASTROINTESTINAL ENDOSCOPY  prior to 2011    small hiatal hernia     Family History   Problem Relation Age of Onset    Cancer Mother         breast    Breast cancer Mother     Diabetes Father     Heart disease Father     Cancer Sister         breast    Breast cancer Sister     Diabetes Paternal Aunt     Cancer Other         breast    Breast cancer Other     Drug abuse Son     Obesity Son     Diabetes Maternal Aunt     Heart disease Maternal Uncle     Tuberculosis Paternal Uncle         x2    Diabetes Paternal Uncle     Early death Maternal Grandmother         tonsils    Heart disease Maternal Grandfather     Alcohol abuse Maternal Grandfather     Tuberculosis Paternal Grandfather     Cancer Sister         breast    Breast cancer Sister     Ovarian cancer Neg Hx     Colon cancer Neg Hx     Colon polyps Neg Hx     Crohn's disease Neg Hx     Ulcerative colitis Neg Hx     Melanoma Neg Hx     Lupus Neg Hx     Psoriasis Neg Hx     Eczema Neg Hx      Social History     Tobacco Use    Smoking status: Never Smoker    Smokeless tobacco: Never Used   Substance Use Topics    Alcohol use: Yes     Alcohol/week: 7.0 standard drinks     Types: 4 Glasses of wine, 1 Shots of liquor, 2 Standard drinks or equivalent per week     Comment: wine most days - 1 glass    Drug use: No     Review of Systems   Constitutional: Negative for activity change, appetite change, chills and fever.   HENT: Negative for congestion, rhinorrhea and sore throat.    Eyes: Negative for redness and visual disturbance.    Respiratory: Positive for chest tightness and shortness of breath. Negative for cough.    Cardiovascular: Negative for chest pain.   Gastrointestinal: Negative for abdominal pain, diarrhea, nausea and vomiting.   Genitourinary: Negative for dysuria and frequency.   Musculoskeletal: Negative for back pain, neck pain and neck stiffness.   Skin: Negative for rash.   Neurological: Negative for dizziness, syncope, numbness and headaches.       Physical Exam     Initial Vitals [05/24/22 0942]   BP Pulse Resp Temp SpO2   (!) 148/70 (!) 55 20 97.4 °F (36.3 °C) 100 %      MAP       --         Physical Exam    Nursing note and vitals reviewed.  Constitutional: She appears well-developed and well-nourished. She is cooperative.  Non-toxic appearance. She does not have a sickly appearance.   HENT:   Head: Normocephalic and atraumatic.   Right Ear: External ear normal.   Left Ear: External ear normal.   Nose: Nose normal.   Eyes: Conjunctivae and lids are normal.   Neck:    Full passive range of motion without pain.     Cardiovascular: Normal rate, regular rhythm and normal heart sounds. Exam reveals no gallop and no friction rub.    No murmur heard.  Pulmonary/Chest: Breath sounds normal. She has no wheezes. She has no rhonchi. She has no rales.   Abdominal: Abdomen is soft. There is no abdominal tenderness. There is no rebound and no guarding.   Musculoskeletal:      Cervical back: Full passive range of motion without pain.     Neurological: She is alert.   Skin: Skin is warm, dry and intact. No rash noted.         ED Course   Procedures  Labs Reviewed   CBC W/ AUTO DIFFERENTIAL - Abnormal; Notable for the following components:       Result Value    MCH 31.4 (*)     RDW 15.1 (*)     Platelets 139 (*)     All other components within normal limits   COMPREHENSIVE METABOLIC PANEL - Abnormal; Notable for the following components:    CO2 22 (*)     Alkaline Phosphatase 48 (*)     All other components within normal limits    TROPONIN I     EKG Readings: (Independently Interpreted)   Rhythm: Normal Sinus Rhythm. Heart Rate: 60. Ectopy: No Ectopy. Conduction: Normal. ST Segments: Normal ST Segments. T Waves: Normal. Clinical Impression: Normal Sinus Rhythm     ECG Results          EKG 12-lead (Final result)  Result time 05/24/22 10:51:22    Final result by Interface, Lab In [facility] (05/24/22 10:51:22)                 Narrative:    Test Reason : R06.02,    Vent. Rate : 060 BPM     Atrial Rate : 060 BPM     P-R Int : 164 ms          QRS Dur : 066 ms      QT Int : 472 ms       P-R-T Axes : 000 036 063 degrees     QTc Int : 472 ms    Normal sinus rhythm  Low voltage QRS    Abnormal ECG  When compared with ECG of 01-SEP-2021 18:16,  Septal infarct is now Present  Confirmed by Owen Miller MD ([id number]) on 5/24/2022 10:51:09 AM    Referred By: [email or web address]   SELF           Confirmed By:Owen Miller MD                            Imaging Results          X-Ray Chest 1 View (Final result)  Result time 05/24/22 10:19:55    Final result by Anju Montenegro MD (05/24/22 10:19:55)                 Impression:      No acute abnormality.      Electronically signed by: Anju Montenegro MD  Date:    05/24/2022  Time:    10:19             Narrative:    EXAMINATION:  XR CHEST 1 VIEW    CLINICAL HISTORY:  Shortness of breath    TECHNIQUE:  Single frontal view of the chest was performed.    COMPARISON:  09/01/2021    FINDINGS:  The lungs are clear, with normal appearance of pulmonary vasculature and no pleural effusion or pneumothorax.    The cardiac silhouette is normal in size. The hilar and mediastinal contours are unremarkable.    Bones are intact.                                 Medications - No data to display  Medical Decision Making:   History:   Old Medical Records: I decided to obtain old medical records.  Clinical Tests:   Lab Tests: Ordered and Reviewed  Radiological Study: Ordered and Reviewed  Medical Tests: Ordered and Reviewed       APC /  Resident Notes:   Emergent evaluation of a 76-year-old female who presents with shortness of breath after walking out and being exposed to furniture brownish.  She reports her symptoms have improved since being removed from the area and coming to the emergency room.  She is well appearing.  Vital signs are stable.  Oxygen saturation is normal.  EKG shows no acute abnormalities.  Chest x-ray is normal.  I have low suspicion for ACS, pulmonary embolism other acute emergent process. Discussed results with patient. Return precautions given. Based on my clinical evaluation, I do not appreciate any immediate, emergent, or life threatening condition or etiology that warrants additional workup today and feel that the patient can be discharged with close follow up care.  Patient is to follow up with their primary care provider. Case was discussed with Dr. Almaraz who is in agreement with the plan of care. All questions answered.                    Clinical Impression:   Final diagnoses:  [R06.02] Shortness of breath (Primary)  [Z77.098] Chemical exposure          ED Disposition Condition    Discharge Stable        ED Prescriptions     None        Follow-up Information     Follow up With Specialties Details Why Contact Info    Bhaskar Olivera MD Family Medicine   [address] [place] [id number]  [phone]      [facility] Emergency Dept Emergency Medicine  As needed [address]  [facility] [address] [phone]           Anai Whipple PA-C  05/24/22 203

## 2022-05-31 ENCOUNTER — HOSPITAL ENCOUNTER (EMERGENCY)
Facility: HOSPITAL | Age: 77
Discharge: HOME | End: 2022-05-31
Attending: EMERGENCY MEDICINE
Payer: MEDICARE

## 2022-05-31 ENCOUNTER — PATIENT MESSAGE (OUTPATIENT)
Dept: FAMILY MEDICINE | Facility: CLINIC | Age: 77
End: 2022-05-31
Payer: MEDICARE

## 2022-05-31 VITALS
OXYGEN SATURATION: 98 % | WEIGHT: 148 LBS | SYSTOLIC BLOOD PRESSURE: 133 MMHG | RESPIRATION RATE: 18 BRPM | BODY MASS INDEX: 29.06 KG/M2 | DIASTOLIC BLOOD PRESSURE: 69 MMHG | TEMPERATURE: 98 F | HEART RATE: 60 BPM | HEIGHT: 60 IN

## 2022-05-31 DIAGNOSIS — K21.00 GASTROESOPHAGEAL REFLUX DISEASE WITH ESOPHAGITIS WITHOUT HEMORRHAGE: Primary | ICD-10-CM

## 2022-05-31 DIAGNOSIS — R07.9 CHEST PAIN: ICD-10-CM

## 2022-05-31 LAB
ALBUMIN SERPL BCP-MCNC: 4 G/DL (ref 3.5–5.2)
ALP SERPL-CCNC: 46 U/L (ref 55–135)
ALT SERPL W/O P-5'-P-CCNC: 19 U/L (ref 10–44)
ANION GAP SERPL CALC-SCNC: 9 MMOL/L (ref 8–16)
AST SERPL-CCNC: 27 U/L (ref 10–40)
BASOPHILS # BLD AUTO: 0.03 K/UL (ref 0–0.2)
BASOPHILS NFR BLD: 0.6 % (ref 0–1.9)
BILIRUB SERPL-MCNC: 0.7 MG/DL (ref 0.1–1)
BNP SERPL-MCNC: 37 PG/ML (ref 0–99)
BUN SERPL-MCNC: 18 MG/DL (ref 8–23)
CALCIUM SERPL-MCNC: 9.5 MG/DL (ref 8.7–10.5)
CHLORIDE SERPL-SCNC: 104 MMOL/L (ref 95–110)
CO2 SERPL-SCNC: 24 MMOL/L (ref 23–29)
CREAT SERPL-MCNC: 0.9 MG/DL (ref 0.5–1.4)
DIFFERENTIAL METHOD: ABNORMAL
EOSINOPHIL # BLD AUTO: 0.2 K/UL (ref 0–0.5)
EOSINOPHIL NFR BLD: 3.4 % (ref 0–8)
ERYTHROCYTE [DISTWIDTH] IN BLOOD BY AUTOMATED COUNT: 14.6 % (ref 11.5–14.5)
EST. GFR  (AFRICAN AMERICAN): >60 ML/MIN/1.73 M^2
EST. GFR  (NON AFRICAN AMERICAN): >60 ML/MIN/1.73 M^2
GLUCOSE SERPL-MCNC: 111 MG/DL (ref 70–110)
HCT VFR BLD AUTO: 40.9 % (ref 37–48.5)
HGB BLD-MCNC: 14.2 G/DL (ref 12–16)
IMM GRANULOCYTES # BLD AUTO: 0.01 K/UL (ref 0–0.04)
IMM GRANULOCYTES NFR BLD AUTO: 0.2 % (ref 0–0.5)
LIPASE SERPL-CCNC: 9 U/L (ref 4–60)
LYMPHOCYTES # BLD AUTO: 1.3 K/UL (ref 1–4.8)
LYMPHOCYTES NFR BLD: 26.7 % (ref 18–48)
MCH RBC QN AUTO: 31.1 PG (ref 27–31)
MCHC RBC AUTO-ENTMCNC: 34.7 G/DL (ref 32–36)
MCV RBC AUTO: 90 FL (ref 82–98)
MONOCYTES # BLD AUTO: 0.4 K/UL (ref 0.3–1)
MONOCYTES NFR BLD: 8.7 % (ref 4–15)
NEUTROPHILS # BLD AUTO: 2.8 K/UL (ref 1.8–7.7)
NEUTROPHILS NFR BLD: 60.4 % (ref 38–73)
NRBC BLD-RTO: 0 /100 WBC
PLATELET # BLD AUTO: 142 K/UL (ref 150–450)
PMV BLD AUTO: 10.1 FL (ref 9.2–12.9)
POTASSIUM SERPL-SCNC: 4 MMOL/L (ref 3.5–5.1)
PROT SERPL-MCNC: 7.6 G/DL (ref 6–8.4)
RBC # BLD AUTO: 4.57 M/UL (ref 4–5.4)
SODIUM SERPL-SCNC: 137 MMOL/L (ref 136–145)
TROPONIN I SERPL DL<=0.01 NG/ML-MCNC: <0.006 NG/ML (ref 0–0.03)
WBC # BLD AUTO: 4.69 K/UL (ref 3.9–12.7)

## 2022-05-31 PROCEDURE — 93005 ELECTROCARDIOGRAM TRACING: CPT

## 2022-05-31 PROCEDURE — 85025 COMPLETE CBC W/AUTO DIFF WBC: CPT | Performed by: EMERGENCY MEDICINE

## 2022-05-31 PROCEDURE — 80053 COMPREHEN METABOLIC PANEL: CPT | Performed by: EMERGENCY MEDICINE

## 2022-05-31 PROCEDURE — 99285 EMERGENCY DEPT VISIT HI MDM: CPT | Mod: 25

## 2022-05-31 PROCEDURE — 25000003 PHARM REV CODE 250: Performed by: EMERGENCY MEDICINE

## 2022-05-31 PROCEDURE — 36415 COLL VENOUS BLD VENIPUNCTURE: CPT | Performed by: EMERGENCY MEDICINE

## 2022-05-31 PROCEDURE — 93010 ELECTROCARDIOGRAM REPORT: CPT | Mod: ,,, | Performed by: GENERAL PRACTICE

## 2022-05-31 PROCEDURE — 93010 EKG 12-LEAD: ICD-10-PCS | Mod: ,,, | Performed by: GENERAL PRACTICE

## 2022-05-31 PROCEDURE — 84484 ASSAY OF TROPONIN QUANT: CPT | Performed by: EMERGENCY MEDICINE

## 2022-05-31 PROCEDURE — 83880 ASSAY OF NATRIURETIC PEPTIDE: CPT | Performed by: EMERGENCY MEDICINE

## 2022-05-31 PROCEDURE — 83690 ASSAY OF LIPASE: CPT | Performed by: EMERGENCY MEDICINE

## 2022-05-31 RX ORDER — ONDANSETRON 4 MG/1
4 TABLET, ORALLY DISINTEGRATING ORAL
Status: COMPLETED | OUTPATIENT
Start: 2022-05-31 | End: 2022-05-31

## 2022-05-31 RX ORDER — PANTOPRAZOLE SODIUM 20 MG/1
20 TABLET, DELAYED RELEASE ORAL DAILY
Qty: 30 TABLET | Refills: 0 | Status: SHIPPED | OUTPATIENT
Start: 2022-05-31 | End: 2022-06-22 | Stop reason: SDUPTHER

## 2022-05-31 RX ORDER — ASPIRIN 325 MG
325 TABLET ORAL
Status: COMPLETED | OUTPATIENT
Start: 2022-05-31 | End: 2022-05-31

## 2022-05-31 RX ORDER — ONDANSETRON 4 MG/1
4 TABLET, ORALLY DISINTEGRATING ORAL EVERY 8 HOURS PRN
Qty: 12 TABLET | Refills: 0 | Status: SHIPPED | OUTPATIENT
Start: 2022-05-31 | End: 2022-06-01 | Stop reason: SDUPTHER

## 2022-05-31 RX ORDER — LIDOCAINE HYDROCHLORIDE 20 MG/ML
10 SOLUTION OROPHARYNGEAL
Status: COMPLETED | OUTPATIENT
Start: 2022-05-31 | End: 2022-05-31

## 2022-05-31 RX ORDER — PANTOPRAZOLE SODIUM 40 MG/1
40 TABLET, DELAYED RELEASE ORAL
Status: COMPLETED | OUTPATIENT
Start: 2022-05-31 | End: 2022-05-31

## 2022-05-31 RX ORDER — MAG HYDROX/ALUMINUM HYD/SIMETH 200-200-20
30 SUSPENSION, ORAL (FINAL DOSE FORM) ORAL
Status: COMPLETED | OUTPATIENT
Start: 2022-05-31 | End: 2022-05-31

## 2022-05-31 RX ADMIN — PANTOPRAZOLE SODIUM 40 MG: 40 TABLET, DELAYED RELEASE ORAL at 07:05

## 2022-05-31 RX ADMIN — ALUMINUM HYDROXIDE, MAGNESIUM HYDROXIDE, AND SIMETHICONE 30 ML: 200; 200; 20 SUSPENSION ORAL at 05:05

## 2022-05-31 RX ADMIN — ONDANSETRON 4 MG: 4 TABLET, ORALLY DISINTEGRATING ORAL at 07:05

## 2022-05-31 RX ADMIN — ASPIRIN 325 MG ORAL TABLET 325 MG: 325 PILL ORAL at 05:05

## 2022-05-31 RX ADMIN — LIDOCAINE HYDROCHLORIDE 10 ML: 20 SOLUTION ORAL; TOPICAL at 05:05

## 2022-05-31 NOTE — ED PROVIDER NOTES
"Encounter Date: 5/31/2022       History     Chief Complaint   Patient presents with    Abdominal Pain     Patient is a 76-year-old female with a past medical history of memory loss history of breast cancer constipation anemia who presents emergency room for evaluation midepigastric abdominal discomfort that radiates up towards the throat.  She has nauseous and had a decreased appetite.  She has had some belching and burping.  She has mild chest discomfort.  Pain does not radiate to her arms or her back.  She denies any black or bloody stools or vomiting.  She has no urinary complaints.  She has no runny nose sore throat cough cold or congestion.        Review of patient's allergies indicates:   Allergen Reactions    Adhesive Dermatitis and Rash    Codeine Nausea Only     Past Medical History:   Diagnosis Date    Anemia     Arthritis     Blood transfusion     Breast cancer     Cancer RIGHT BREAST    Chronic constipation     Clotting disorder     Colon polyp     Diverticulosis     Glaucoma     Hepatitis C 2000    pt states history of hepatits c-"cured by Dr. Lynch"; TREATED 2 YEARS - 2000   OK NOW    Hypothyroid     Insomnia     Malignant neoplasm of breast 4/12/2012    Memory loss     reports some memory loss since chemo    Osteoarthritis     Panic attacks     Raynaud's disease     takes amlodipine for this    Ulcer     Vertigo      Past Surgical History:   Procedure Laterality Date    APPENDECTOMY      BLADDER SURGERY  2011    sling - Dr Giles  - [facility]    BRAIN SURGERY  2007    temporal neuralgia - [facility]    BREAST BIOPSY      BREAST LUMPECTOMY  3/12    malignant - had chemo and radiotherapy    COLONOSCOPY  01/03/2011    Dr Lynch, in media section, diverticulosis, hemorrhoids, otherwise normal findings; normal findings on random biopsies    COLONOSCOPY N/A 6/1/2017    Procedure: COLONOSCOPY;  Surgeon: Nate Lamb MD;  Location: [facility];  Service: Endoscopy;  " Laterality: N/A;    COLONOSCOPY W/ POLYPECTOMY  06/01/2017    Dr. Lamb, 1 adenomatous polyp, recheck 5 years    EYE SURGERY      cataracts bilateral    HYSTERECTOMY      JOINT REPLACEMENT  09/25/2017    left [facility]nee [facility]     removal of port a cath      right lymph node removed from breast area      TUNNELED VENOUS PORT PLACEMENT  04/2012    left chest    UPPER GASTROINTESTINAL ENDOSCOPY  prior to 2011    small hiatal hernia     Family History   Problem Relation Age of Onset    Cancer Mother         breast    Breast cancer Mother     Diabetes Father     Heart disease Father     Cancer Sister         breast    Breast cancer Sister     Diabetes Paternal Aunt     Cancer Other         breast    Breast cancer Other     Drug abuse Son     Obesity Son     Diabetes Maternal Aunt     Heart disease Maternal Uncle     Tuberculosis Paternal Uncle         x2    Diabetes Paternal Uncle     Early death Maternal Grandmother         tonsils    Heart disease Maternal Grandfather     Alcohol abuse Maternal Grandfather     Tuberculosis Paternal Grandfather     Cancer Sister         breast    Breast cancer Sister     Ovarian cancer Neg Hx     Colon cancer Neg Hx     Colon polyps Neg Hx     Crohn's disease Neg Hx     Ulcerative colitis Neg Hx     Melanoma Neg Hx     Lupus Neg Hx     Psoriasis Neg Hx     Eczema Neg Hx      Social History     Tobacco Use    Smoking status: Never Smoker    Smokeless tobacco: Never Used   Substance Use Topics    Alcohol use: Yes     Alcohol/week: 7.0 standard drinks     Types: 4 Glasses of wine, 1 Shots of liquor, 2 Standard drinks or equivalent per week     Comment: wine most days - 1 glass    Drug use: No     Review of Systems   Constitutional: Positive for appetite change. Negative for diaphoresis, fatigue and fever.   HENT: Positive for sore throat. Negative for ear pain.    Eyes: Negative for pain and visual disturbance.   Respiratory:  Negative for cough, choking, chest tightness and shortness of breath.    Cardiovascular: Positive for chest pain. Negative for palpitations and leg swelling.   Gastrointestinal: Positive for abdominal pain and nausea. Negative for diarrhea and vomiting.        Dyspepsia   Genitourinary: Negative for difficulty urinating, dyspareunia, dysuria and flank pain.   Musculoskeletal: Negative for arthralgias.   Skin: Negative for rash.   Neurological: Negative for weakness, numbness and headaches.   All other systems reviewed and are negative.      Physical Exam     Initial Vitals [05/31/22 1642]   BP Pulse Resp Temp SpO2   137/60 69 18 98.2 °F (36.8 °C) 99 %      MAP       --         Physical Exam    Nursing note and vitals reviewed.  Constitutional: She appears well-developed and well-nourished.  Non-toxic appearance. No distress.   HENT:   Head: Normocephalic and atraumatic.   Mouth/Throat: Oropharynx is clear and moist.   Eyes: EOM are normal. Pupils are equal, round, and reactive to light.   Neck: Neck supple.   Normal range of motion.  Cardiovascular: Normal rate, regular rhythm, normal heart sounds and intact distal pulses. Exam reveals no gallop and no friction rub.    No murmur heard.  Pulmonary/Chest: Breath sounds normal. No respiratory distress. She has no decreased breath sounds. She has no wheezes. She has no rhonchi. She has no rales.   Abdominal: Abdomen is soft. Bowel sounds are normal. She exhibits no distension. There is abdominal tenderness (Minimal midepigastric abdominal tenderness).   Musculoskeletal:         General: No edema. Normal range of motion.      Cervical back: Normal range of motion and neck supple.     Neurological: She is alert and oriented to person, place, and time. She has normal strength. No sensory deficit.   Skin: Skin is warm and dry. No erythema.   Psychiatric: She has a normal mood and affect.         ED Course   Procedures  Labs Reviewed   CBC W/ AUTO DIFFERENTIAL - Abnormal;  Notable for the following components:       Result Value    MCH 31.1 (*)     RDW 14.6 (*)     Platelets 142 (*)     All other components within normal limits   COMPREHENSIVE METABOLIC PANEL - Abnormal; Notable for the following components:    Glucose 111 (*)     Alkaline Phosphatase 46 (*)     All other components within normal limits   TROPONIN I   LIPASE   B-TYPE NATRIURETIC PEPTIDE   TROPONIN I          Imaging Results          X-Ray Chest AP Portable (Final result)  Result time 05/31/22 17:38:46    Final result by Mikal Bartlett MD (05/31/22 17:38:46)                 Narrative:    EXAMINATION:  XR CHEST AP PORTABLE    CLINICAL HISTORY:  Chest Pain;    TECHNIQUE:  Single frontal view of the chest was performed.    COMPARISON:  05/24/2022    FINDINGS:  Lungs are clear.  Normal size heart.  Aortic arch atherosclerosis.  No pleural effusion or pneumothorax.  No acute osseous abnormality.  Partially imaged dextrocurvature and degenerative change near the thoracolumbar junction.      Electronically signed by: Mikal Bartlett  Date:    05/31/2022  Time:    17:38                               Medications   ondansetron disintegrating tablet 4 mg (has no administration in time range)   pantoprazole EC tablet 40 mg (has no administration in time range)   aspirin tablet 325 mg (325 mg Oral Given 5/31/22 1712)   aluminum-magnesium hydroxide-simethicone 200-200-20 mg/5 mL suspension 30 mL (30 mLs Oral Given 5/31/22 1738)     And   LIDOcaine HCl 2% oral solution 10 mL (10 mLs Oral Given 5/31/22 1738)                 ED Course as of 05/31/22 1853   Tue May 31, 2022   1725 Chest x-ray independently interpreted by me as no acute cardiopulmonary process. [JS]   1725 EKG independently interpreted by me as normal sinus rate [JS]   1725  rhythm axis and intervals with no ST segment elevation or depression. [JS]   1849 Labs all appear to be stable.  Chest x-ray is normal.  I do not believe this is cardiac in nature.  Patient  feels better after GI cocktail.  I suspect the patient is having significant reflux.  I will prescribe her Zofran and Protonix.  She can follow up with primary care and she is stable for discharge at this time. [JS]      ED Course User Index  [JS] Kee Yip MD             Clinical Impression:   Final diagnoses:  [R07.9] Chest pain  [K21.00] Gastroesophageal reflux disease with esophagitis without hemorrhage (Primary)          ED Disposition Condition    Discharge Stable        ED Prescriptions     Medication Sig Dispense Start Date End Date Auth. Provider    ondansetron (ZOFRAN-ODT) 4 MG TbDL Take 1 tablet (4 mg total) by mouth every 8 (eight) hours as needed. 12 tablet 5/31/2022  Kee Yip MD    pantoprazole (PROTONIX) 20 MG tablet Take 1 tablet (20 mg total) by mouth once daily. 30 tablet 5/31/2022 5/31/2023 Kee Yip MD        Follow-up Information     Follow up With Specialties Details Why Contact Info    Bhaskar Olivera MD Family Medicine Schedule an appointment as soon as possible for a visit   [address] [facility] [address]  [phone]      [facility] Emergency Dept Emergency Medicine  If symptoms worsen [address] [place] [id number] [phone]           Kee Yip MD  05/31/22 2239

## 2022-06-01 ENCOUNTER — OFFICE VISIT (OUTPATIENT)
Dept: FAMILY MEDICINE | Facility: CLINIC | Age: 77
End: 2022-06-01
Payer: MEDICARE

## 2022-06-01 ENCOUNTER — OFFICE VISIT (OUTPATIENT)
Dept: GASTROENTEROLOGY | Facility: CLINIC | Age: 77
End: 2022-06-01
Payer: MEDICARE

## 2022-06-01 VITALS
TEMPERATURE: 98 F | HEART RATE: 61 BPM | DIASTOLIC BLOOD PRESSURE: 68 MMHG | BODY MASS INDEX: 28.91 KG/M2 | OXYGEN SATURATION: 96 % | WEIGHT: 147.25 LBS | SYSTOLIC BLOOD PRESSURE: 102 MMHG | HEIGHT: 60 IN

## 2022-06-01 VITALS — BODY MASS INDEX: 25.21 KG/M2 | HEIGHT: 64 IN | WEIGHT: 147.69 LBS

## 2022-06-01 DIAGNOSIS — Z87.19 HISTORY OF CHRONIC CONSTIPATION: ICD-10-CM

## 2022-06-01 DIAGNOSIS — R79.89 ELEVATED TSH: ICD-10-CM

## 2022-06-01 DIAGNOSIS — Z09 HOSPITAL DISCHARGE FOLLOW-UP: Primary | ICD-10-CM

## 2022-06-01 DIAGNOSIS — Z86.010 HISTORY OF COLON POLYPS: ICD-10-CM

## 2022-06-01 DIAGNOSIS — F32.1 MAJOR DEPRESSIVE DISORDER, SINGLE EPISODE, MODERATE: ICD-10-CM

## 2022-06-01 DIAGNOSIS — R10.13 EPIGASTRIC PAIN: ICD-10-CM

## 2022-06-01 DIAGNOSIS — D69.6 THROMBOCYTOPENIA: ICD-10-CM

## 2022-06-01 DIAGNOSIS — D69.6 DECREASED PLATELET COUNT: ICD-10-CM

## 2022-06-01 DIAGNOSIS — Z79.811 USE OF AROMATASE INHIBITORS: ICD-10-CM

## 2022-06-01 DIAGNOSIS — R07.9 NONSPECIFIC CHEST PAIN: ICD-10-CM

## 2022-06-01 DIAGNOSIS — Z86.19 HISTORY OF HEPATITIS C: ICD-10-CM

## 2022-06-01 DIAGNOSIS — R10.13 EPIGASTRIC PAIN: Primary | ICD-10-CM

## 2022-06-01 DIAGNOSIS — Z85.3 HISTORY OF BREAST CANCER: ICD-10-CM

## 2022-06-01 DIAGNOSIS — R41.3 MEMORY LOSS: ICD-10-CM

## 2022-06-01 DIAGNOSIS — K21.9 GASTROESOPHAGEAL REFLUX DISEASE WITHOUT ESOPHAGITIS: ICD-10-CM

## 2022-06-01 PROCEDURE — 99214 OFFICE O/P EST MOD 30 MIN: CPT | Mod: S$PBB,,, | Performed by: NURSE PRACTITIONER

## 2022-06-01 PROCEDURE — 99999 PR PBB SHADOW E&M-EST. PATIENT-LVL V: CPT | Mod: PBBFAC,,, | Performed by: NURSE PRACTITIONER

## 2022-06-01 PROCEDURE — 99214 PR OFFICE/OUTPT VISIT, EST, LEVL IV, 30-39 MIN: ICD-10-PCS | Mod: S$PBB,,, | Performed by: NURSE PRACTITIONER

## 2022-06-01 PROCEDURE — 99999 PR PBB SHADOW E&M-EST. PATIENT-LVL V: ICD-10-PCS | Mod: PBBFAC,,, | Performed by: NURSE PRACTITIONER

## 2022-06-01 PROCEDURE — 99215 OFFICE O/P EST HI 40 MIN: CPT | Mod: 25,PBBFAC,PO | Performed by: NURSE PRACTITIONER

## 2022-06-01 PROCEDURE — 99215 OFFICE O/P EST HI 40 MIN: CPT | Mod: PBBFAC,27,PN | Performed by: NURSE PRACTITIONER

## 2022-06-01 NOTE — PROGRESS NOTES
"Subjective:       Patient ID: Yuniel Gracia is a 76 y.o. female.    Chief Complaint: ER f/u    76-year-old female presents to the clinic today for emergency room follow-up.  She presented to the emergency room with complaint of evaluation for mid epigastric abdominal discomfort that radiates up toward her throat.  She has nausea and had decreased appetite.  She had some belching and burping.  She had mild chest discomfort.  Pain  does not radiate to her arms or her back.  She denies any black or bloody stools or vomiting.  She denies any urinary symptoms.  She has no runny nose, sore throat, cough, cold, or congestion.  In the emergency room she was given a GI cocktail with relief.  She was set home with a prescription of Protonix and Zofran.  She took the Protonix this morning.  She has not had any nausea so she has not needed the Zofran.  She still has mild discomfort between her breast.  She denies any cardiac chest pain, heart palpitations, shortness of breath, or swelling to lower extremities.  She had a cardiac workup in the emergency room which was all negative.  I am going to refer her to GI soon as possible.  She also has memory loss and trouble with her words.  Her  would like her to see Neurology.  I will put in referral for Neurology.    Past Medical History:   Diagnosis Date    Anemia     Arthritis     Blood transfusion     Breast cancer     Cancer RIGHT BREAST    Chronic constipation     Clotting disorder     Colon polyp     Diverticulosis     Glaucoma     Hepatitis C 2000    pt states history of hepatits c-"cured by Dr. Lynch"; TREATED 2 YEARS - 2000   OK NOW    Hypothyroid     Insomnia     Malignant neoplasm of breast 4/12/2012    Memory loss     reports some memory loss since chemo    Osteoarthritis     Panic attacks     Raynaud's disease     takes amlodipine for this    Ulcer     Vertigo      Past Surgical History:   Procedure Laterality Date    APPENDECTOMY   "    BLADDER SURGERY  2011    [facility]ling [facility] Dr Giles  [facility]    BRAIN SURGERY  2007    temporal neuralgia - [place]    BREAST BIOPSY      BREAST LUMPECTOMY  3/12    malignant - had chemo and radiotherapy    COLONOSCOPY  01/03/2011    Dr Lynch, in media section, diverticulosis, hemorrhoids, otherwise normal findings; normal findings on random biopsies    COLONOSCOPY N/A 6/1/2017    Procedure: COLONOSCOPY;  Surgeon: Nate Lamb MD;  Location: [facility];  Service: Endoscopy;  Laterality: N/A;    COLONOSCOPY W/ POLYPECTOMY  06/01/2017    Dr. Lamb, 1 adenomatous polyp, recheck 5 years    EYE SURGERY      cataracts bilateral    HYSTERECTOMY      JOINT REPLACEMENT  09/25/2017    left Knee [facility]     removal of port a cath      right lymph node removed from breast area      TUNNELED VENOUS PORT PLACEMENT  04/2012    left chest    UPPER GASTROINTESTINAL ENDOSCOPY  prior to 2011    small hiatal hernia      reports that she has never smoked. She has never used smokeless tobacco. She reports current alcohol use of about 7.0 standard drinks of alcohol per week. She reports that she does not use drugs.  Review of Systems   Respiratory: Negative for cough, shortness of breath and wheezing.    Cardiovascular: Negative for chest pain, palpitations and leg swelling.   Gastrointestinal: Positive for abdominal pain. Negative for diarrhea, nausea and vomiting.   Neurological: Negative for dizziness, light-headedness and headaches.       Objective:      Physical Exam  Vitals and nursing note reviewed.   Constitutional:       General: She is not in acute distress.     Appearance: Normal appearance. She is normal weight. She is not ill-appearing, toxic-appearing or diaphoretic.   Cardiovascular:      Rate and Rhythm: Normal rate and regular rhythm.      Heart sounds: Normal heart sounds. No murmur heard.    No friction rub. No gallop.   Pulmonary:      Effort: Pulmonary effort is normal.       Breath sounds: Normal breath sounds. No wheezing or rhonchi.   Abdominal:      General: Bowel sounds are normal.      Palpations: Abdomen is soft.      Tenderness: There is abdominal tenderness. There is no right CVA tenderness, left CVA tenderness, guarding or rebound.      Comments: Mild tenderness between her breast no rebound or guarding noted    Musculoskeletal:         General: No swelling. Normal range of motion.   Skin:     General: Skin is warm and dry.   Neurological:      Mental Status: She is alert.   Psychiatric:         Mood and Affect: Mood normal.         Behavior: Behavior normal.         Thought Content: Thought content normal.         Judgment: Judgment normal.         Assessment:       1. Hospital discharge follow-up    2. Epigastric pain    3. Gastroesophageal reflux disease without esophagitis    4. Memory loss    5. Thrombocytopenia    6. History of breast cancer    7. Use of aromatase inhibitors    8. Major depressive disorder, single episode, moderate        Plan:         Hospital discharge follow-up  - continue Protonix     Epigastric pain  - bland diet     Gastroesophageal reflux disease without esophagitis  -     Ambulatory referral/consult to Gastroenterology; Future; Expected date: 06/08/2022  - to GI today for further evaluation     Memory loss  -     Ambulatory referral/consult to Neurology; Future; Expected date: 06/08/2022  - to see neurology on Friday this week     Thrombocytopenia  - stable   - followed by hematology     History of breast cancer  - followed by oncology/hematology     Use of aromatase inhibitors  - The current medical regimen is effective;  continue present plan and medications.    Major depressive disorder, single episode, moderate  - The current medical regimen is effective;  continue present plan and medications.

## 2022-06-01 NOTE — PATIENT INSTRUCTIONS
Saad Brice,     If you are due for any health screening(s) below please notify me so we can arrange them to be ordered and scheduled to maintain your health. Most healthy patients complete it. Don't lose out on improving your health.     Health Maintenance   Topic Date Due    Lipid Panel  11/11/2021    Colonoscopy  06/01/2022    DEXA Scan  08/12/2023    TETANUS VACCINE  02/15/2030    Hepatitis C Screening  Completed        - [place] diet    - continue Protonix   - use Zofran as needed   - referral to GI for epigastric pain and GERD   - referral to neurology for memory loss   - follow up with Dr. Olivera

## 2022-06-01 NOTE — PATIENT INSTRUCTIONS
Epigastric Pain (Uncertain Cause)     Epigastric pain can be a sign of disease in the upper abdomen. Common causes include:  Acid reflux (stomach acid flowing up into the esophagus)  Gastritis (irritation of the stomach lining)  Peptic Ulcer Disease  Inflammation of the pancreas  Gallstone  Infection in the gallbladder  Pain may be dull or burning. It may spread upward to the chest or to the back. There may be other symptoms such as belching, bloating, cramps or hunger pains. There may be weight loss or poor appetite, nausea or vomiting.  Since the diagnosis of your pain is not certain yet, further tests may sometimes be needed. Sometimes the doctor will treat you for the most likely condition to see if there is improvement before doing further tests.  Home care  Medicines  Antacids help neutralize the normal acids in your stomach. Examples are Maalox, Mylanta, Rolaids, and Tums. If you dont like the liquid, you can also try a chewable one. You may find one works better than another for you. Overuse can cause diarrhea or constipation.  Acid blockers (H2 blockers) decrease acid production. Examples are cimetidine (Tagamet), famotidine (Pepcid) and ranitidine (Zantac).  Acid inhibitors (PPIs) decrease acid production in a different way than the blockers. You may find they work better, but can take a little longer to take effect.  Examples are omeprazole (Prilosec), lansoprazole (Prevacid), pantoprazole (Protonix), rabeprazole (Aciphex), and esomeprazole (Nexium).  Take an antacid 30-60 minutes after eating and at bedtime, but not at the same time as an acid blocker.  Try not to take NSAIDs. Aspirin may also cause problems, but if taking it for your heart or other medical reasons, talk to your doctor before stopping it; you do not want to cause a worse problem, like a heart attack or stroke.  Diet  If certain foods seem to cause your spasm, try to avoid them.   Eat slowly and chew food well before swallowing. Symptoms  of gastritis can be worsened by certain foods. Limit or avoid fatty, fried, and spicy foods, as well as coffee, chocolate, mint, and foods with high acid content such as tomatoes and citrus fruit and juices (orange, grapefruit, lemon).  Avoid alcohol, caffeine, and tobacco, which can delay healing and worsen your problem.  Try eating smaller meals with snacks in between  Follow-up care  Follow up with your healthcare provider or as advised.  When to seek medical advice  Call your healthcare provider right away if any of the following occur:  Stomach pain worsens or moves to the right lower part of the abdomen  Chest pain appears, or if it worsens or spreads to the chest, back, neck, shoulder, or arm  Frequent vomiting (cant keep down liquids)  Blood in the stool or vomit (red or black color)  Feeling weak or dizzy, fainting, or having trouble breathing  Fever of 100.4ºF (38ºC) or higher, or as directed by your healthcare provider  Abdominal swelling  Date Last Reviewed: 9/25/2015  © 2449-1761 [employer]. [address] [place] [address]. All rights reserved. This information is not intended as a substitute for professional medical care. Always follow your healthcare professional's instructions.   GERD (Adult)    The esophagus is a tube that carries food from the mouth to the stomach. A valve at the lower end of the esophagus prevents stomach acid from flowing upward. When this valve doesn't work properly, stomach contents may repeatedly flow back up (reflux) into the esophagus. This is called gastroesophageal reflux disease (GERD). GERD can irritate the esophagus. It can cause problems with swallowing or breathing. In severe cases, GERD can cause recurrent pneumonia or other serious problems.  Symptoms of reflux include burning, pressure or sharp pain in the upper abdomen or mid to lower chest. The pain can spread to the neck, back, or shoulder. There may be belching, an acid taste in the  "back of the throat, chronic cough, or sore throat or hoarseness. GERD symptoms often occur during the day after a big meal. They can also occur at night when lying down.   Home care  Lifestyle changes can help reduce symptoms. If needed, medicines may be prescribed. Symptoms often improve with treatment, but if treatment is stopped, the symptoms often return after a few months. So most persons with GERD will need to continue treatment.  Lifestyle changes  Limit or avoid fatty, fried, and spicy foods, as well as coffee, chocolate, mint, and foods with high acid content such as tomatoes and citrus fruit and juices (orange, grapefruit, lemon).  Dont eat large meals, especially at night. Frequent, smaller meals are best. Do not lie down right after eating. And dont eat anything 3 hours before going to bed.  Avoid drinking alcohol and smoking. As much as possible, stay away from second hand smoke.  If you are overweight, losing weight will reduce symptoms.   Avoid wearing tight clothing around your stomach area.  If your symptoms occur during sleep, use a foam wedge to elevate your upper body (not just your head.) Or, place 4" blocks under the head of your bed.  Medicines  If needed, medicines can help relieve the symptoms of GERD and prevent damage to the esophagus. Discuss a medicine plan with your healthcare provider. This may include one or more of the following medicines:  Antacids to help neutralize the normal acids in your stomach.  Acid blockers (H2 blockers) to decrease acid production.  Acid inhibitors (PPIs) to decrease acid production in a different way than the blockers. They may work better, but can take a little longer to take effect.  Take an antacid 30-60 minutes after eating and at bedtime, but not at the same time as an acid blocker.  Try not to take medicines such as ibuprofen and aspirin. If you are taking aspirin for your heart or other medical reasons, talk to your healthcare provider about " stopping it.  Follow-up care  Follow up with your healthcare provider or as advised by our staff.  When to seek medical advice  Call your healthcare provider if any of the following occur:  Stomach pain gets worse or moves to the lower right abdomen (appendix area)  Chest pain appears or gets worse, or spreads to the back, neck, shoulder, or arm  Frequent vomiting (cant keep down liquids)  Blood in the stool or vomit (red or black in color)  Feeling weak or dizzy  Fever of 100.4ºF (38ºC) or higher, or as directed by your healthcare provider  Date Last Reviewed: 6/23/2015  © 9702-6832 [employer]. [address] [place] [address], [place] [address]. All rights reserved. This information is not intended as a substitute for professional medical care. Always follow your healthcare professional's instructions.

## 2022-06-01 NOTE — PROGRESS NOTES
Subjective:       Patient ID: Yuniel Gracia is a 76 y.o. female Body mass index is 25.35 kg/m².    Chief Complaint: Other (Abdominal pain)    This patient is established with Dr. Vines & myself (last in 2017).  Referred by MELIZA Burks NP for GERD.    Patient is here with her , whom assisted with history.    GI Problem  The primary symptoms include abdominal pain and nausea (mild this morning; was prescribed zofran but has not needed to take it). Primary symptoms do not include fever, weight loss, fatigue, vomiting, diarrhea, melena, hematemesis, hematochezia or dysuria.   The abdominal pain began more than 2 days ago (started ~5/29/2022). Progression: intermittent, last had this morning. The abdominal pain is located in the epigastric region (lasts a couple of hours, described as a pressure). The abdominal pain radiates to the chest (throat). The severity of the abdominal pain is 0/10 (CURRENTLY). Eating (denies any aggravating factor) relieves the abdominal pain.   The illness is also significant for constipation (bowel movements are 2-3 times a week of formed-firm stool; PAST TREATMENT: fiber supplement; miralax once daily- no relief, linzess). The illness does not include chills, dysphagia, odynophagia or bloating. Associated symptoms comments: Ibuprofen PRN- takes about twice a week. Significant associated medical issues include GERD (denies any heartburn or reflux; started taking protonix 20 mg once daily this morning).     Review of Systems   Constitutional: Positive for appetite change (decreased). Negative for chills, fatigue, fever and weight loss.   HENT: Negative for sore throat and trouble swallowing.    Eyes: Negative for visual disturbance.   Respiratory: Positive for shortness of breath (seen in the ER for it; denies currently). Negative for cough and choking.    Cardiovascular: Positive for chest pain (seen in the ER for it; denies currently).   Gastrointestinal: Positive for  "abdominal pain, constipation (bowel movements are 2-3 times a week of formed-firm stool; PAST TREATMENT: fiber supplement; miralax once daily- no relief, linzess) and nausea (mild this morning; was prescribed zofran but has not needed to take it). Negative for anal bleeding, bloating, blood in stool, diarrhea, dysphagia, hematemesis, hematochezia, melena and vomiting.   Genitourinary: Negative for dysuria, flank pain, frequency, pelvic pain and urgency.   Neurological: Negative for weakness.       Patient's last menstrual period was 03/02/1998. s/p hysterectomy.  Past Medical History:   Diagnosis Date    Anemia     Arthritis     Blood transfusion     Breast cancer     Cancer RIGHT BREAST    Chronic constipation     Clotting disorder     Colon polyp     Diverticulosis     Glaucoma     Hepatitis C 2000    pt states history of hepatits c-"cured by Dr. Lynch"; TREATED 2 YEARS - 2000   OK NOW    Hypothyroid     Insomnia     Malignant neoplasm of breast 4/12/2012    Memory loss     reports some memory loss since chemo    Osteoarthritis     Panic attacks     Raynaud's disease     takes amlodipine for this    Ulcer     Vertigo      Past Surgical History:   Procedure Laterality Date    APPENDECTOMY      BLADDER SURGERY  2011    sling - Dr Giles  [facility]    BRAIN SURGERY  2007    temporal neuralgia - [place]    BREAST BIOPSY      BREAST LUMPECTOMY  03/2012    malignant - had chemo and radiotherapy    COLONOSCOPY  01/03/2011    Dr Lynch, in media section, diverticulosis, hemorrhoids, otherwise normal findings; normal findings on random biopsies    COLONOSCOPY N/A 06/01/2017    Procedure: COLONOSCOPY;  Surgeon: Nate Lamb MD;  Location: [facility];  Service: Endoscopy;  Laterality: N/A; hemorrhoids, diverticulosis, 1 colon polyp removed; Repeat colonoscopy in 5 years for surveillance; biopsy: Tubular adenoma, random biopsies unremarkable    EYE SURGERY      cataracts bilateral    " HYSTERECTOMY      JOINT REPLACEMENT  09/25/2017    left Knee [facility]     removal of port a cath      right lymph node removed from breast area      TUNNELED VENOUS PORT PLACEMENT  04/2012    left chest    UPPER GASTROINTESTINAL ENDOSCOPY  prior to 2011    small hiatal hernia     Family History   Problem Relation Age of Onset    Breast cancer Mother     Diabetes Father     Heart disease Father     Breast cancer Sister     Breast cancer Sister     Early death Maternal Grandmother         tonsils    Heart disease Maternal Grandfather     Alcohol abuse Maternal Grandfather     Tuberculosis Paternal Grandfather     Drug abuse Son     Obesity Son     Diabetes Maternal Aunt     Heart disease Maternal Uncle     Diabetes Paternal Aunt     Tuberculosis Paternal Uncle         x2    Diabetes Paternal Uncle     Breast cancer Other     Ovarian cancer Neg Hx     Colon cancer Neg Hx     Colon polyps Neg Hx     Crohn's disease Neg Hx     Ulcerative colitis Neg Hx     Melanoma Neg Hx     Lupus Neg Hx     Psoriasis Neg Hx     Eczema Neg Hx      Social History     Tobacco Use    Smoking status: Never Smoker    Smokeless tobacco: Never Used   Substance Use Topics    Alcohol use: Yes     Alcohol/week: 7.0 - 9.0 standard drinks     Types: 2 Standard drinks or equivalent, 5 - 7 Shots of liquor per week    Drug use: No     Wt Readings from Last 10 Encounters:   06/01/22 67 kg (147 lb 11.3 oz)   06/01/22 66.8 kg (147 lb 4.3 oz)   05/31/22 67.1 kg (148 lb)   05/24/22 67.1 kg (148 lb)   02/25/22 67.1 kg (148 lb)   02/25/22 67.1 kg (147 lb 14.9 oz)   09/02/21 66 kg (145 lb 8.1 oz)   09/01/21 48.1 kg (106 lb)   08/25/21 65 kg (143 lb 4.8 oz)   08/25/21 65.1 kg (143 lb 8.3 oz)     Lab Results   Component Value Date    WBC 4.69 05/31/2022    HGB 14.2 05/31/2022    HCT 40.9 05/31/2022    MCV 90 05/31/2022     (L) 05/31/2022     CMP  Sodium   Date Value Ref Range Status   05/31/2022 137 136  - 145 mmol/L Final     Potassium   Date Value Ref Range Status   05/31/2022 4.0 3.5 - 5.1 mmol/L Final     Chloride   Date Value Ref Range Status   05/31/2022 104 95 - 110 mmol/L Final     CO2   Date Value Ref Range Status   05/31/2022 24 23 - 29 mmol/L Final     Glucose   Date Value Ref Range Status   05/31/2022 111 (H) 70 - 110 mg/dL Final     BUN   Date Value Ref Range Status   05/31/2022 18 8 - 23 mg/dL Final     Creatinine   Date Value Ref Range Status   05/31/2022 0.9 0.5 - 1.4 mg/dL Final   07/27/2012 0.8 0.2 - 1.4 mg/dl Final     Calcium   Date Value Ref Range Status   05/31/2022 9.5 8.7 - 10.5 mg/dL Final   07/27/2012 8.4 (L) 8.6 - 10.2 mg/dl Final     Total Protein   Date Value Ref Range Status   05/31/2022 7.6 6.0 - 8.4 g/dL Final     Albumin   Date Value Ref Range Status   05/31/2022 4.0 3.5 - 5.2 g/dL Final     Total Bilirubin   Date Value Ref Range Status   05/31/2022 0.7 0.1 - 1.0 mg/dL Final     Comment:     For infants and newborns, interpretation of results should be based  on gestational age, weight and in agreement with clinical  observations.    Premature Infant recommended reference ranges:  Up to 24 hours.............<8.0 mg/dL  Up to 48 hours............<12.0 mg/dL  3-5 days..................<15.0 mg/dL  6-29 days.................<15.0 mg/dL       Alkaline Phosphatase   Date Value Ref Range Status   05/31/2022 46 (L) 55 - 135 U/L Final   07/25/2012 64 23 - 119 UNIT/L Final     AST   Date Value Ref Range Status   05/31/2022 27 10 - 40 U/L Final   07/25/2012 19 10 - 30 UNIT/L Final     ALT   Date Value Ref Range Status   05/31/2022 19 10 - 44 U/L Final     Anion Gap   Date Value Ref Range Status   05/31/2022 9 8 - 16 mmol/L Final   07/27/2012 7 5 - 15 meq/L Final     eGFR if    Date Value Ref Range Status   05/31/2022 >60 >60 mL/min/1.73 m^2 Final     eGFR if non    Date Value Ref Range Status   05/31/2022 >60 >60 mL/min/1.73 m^2 Final     Comment:      Calculation used to obtain the estimated glomerular filtration  rate (eGFR) is the CKD-EPI equation.        Lab Results   Component Value Date    LIPASE 9 05/31/2022     Lab Results   Component Value Date    TSH 19.800 (H) 10/18/2021     Reviewed prior medical records including radiology report of 11/11/2019 CT abdomen pelvis; 5/31/2022 and 5/24/2022 ER visit notes; & endoscopy history (see surgical history).    Objective:      Physical Exam  Vitals and nursing note reviewed.   Constitutional:       General: She is not in acute distress.     Appearance: Normal appearance. She is well-developed. She is not diaphoretic.   HENT:      Mouth/Throat:      Comments: Patient is wearing a face mask, which covers patient's mouth and nose, due to COVID 19 concerns.  Eyes:      General: No scleral icterus.     Conjunctiva/sclera: Conjunctivae normal.      Pupils: Pupils are equal, round, and reactive to light.   Pulmonary:      Effort: Pulmonary effort is normal. No respiratory distress.      Breath sounds: Normal breath sounds. No wheezing.   Abdominal:      General: Bowel sounds are normal. There is no distension or abdominal bruit.      Palpations: Abdomen is soft. Abdomen is not rigid. There is no mass.      Tenderness: There is no abdominal tenderness. There is no guarding or rebound. Negative signs include West's sign and McBurney's sign.   Skin:     General: Skin is warm and dry.      Coloration: Skin is not pale.      Findings: No erythema or rash.      Comments: Non-jaundiced   Neurological:      Mental Status: She is alert and oriented to person, place, and time.   Psychiatric:         Behavior: Behavior normal.         Thought Content: Thought content normal.         Judgment: Judgment normal.         Assessment:       1. Epigastric pain    2. Nonspecific chest pain    3. History of colon polyps    4. Elevated TSH    5. History of hepatitis C    6. Decreased platelet count    7. History of chronic constipation         Plan:       Epigastric pain  -     US Abdomen Complete; Future; Expected date: 06/01/2022  -     HEPATITIS C RNA, QUANTITATIVE, PCR; Future; Expected date: 06/01/2022  - CONTINUE PROTONIX 20 MG ONCE DAILY AS DIRECTED; discussed with patient & her  that we can try higher dosage of 40 mg once daily; they verbalized understanding but they declined increasing dosage at this time and want to continue current dosage since she just started it  - discussed about scheduling EGD, discussed procedure with patient & her , including risks and benefits, patient & her  verbalized understanding but they declined scheduling EGD at this time  - avoid/minimize use of NSAIDs- since they can cause GI upset, bleeding and/or ulcers. If NSAID must be taken, recommend take with food.    Nonspecific chest pain  - follow-up with PCP &/or cardiologist for continued evaluation and management ASAP  - if experiencing symptoms of headache, chest pain, shortness of breath, and/or blurred vision, recommend going to ER for further evaluation and management    History of colon polyps  - schedule Colonoscopy, discussed procedure with the patient, including risks and benefits, patient verbalized understanding and patient declined scheduling at this time; patient will follow-up to schedule when she is ready    Elevated TSH  - Recommend follow-up with Primary Care Provider/endocrinology for continued evaluation and management. May be contributing to constipation.    History of hepatitis C  -     US Abdomen Complete; Future; Expected date: 06/01/2022  -     HEPATITIS C RNA, QUANTITATIVE, PCR; Future; Expected date: 06/01/2022  - minimize/avoid alcohol and tylenol products, & follow-up with PCP for continued evaluation and management; if specialist is needed, recommend seeing hepatology.    Decreased platelet count  -     US Abdomen Complete; Future; Expected date: 06/01/2022  -     HEPATITIS C RNA, QUANTITATIVE, PCR; Future; Expected  date: 06/01/2022  - Recommend follow-up with Primary Care Provider & hematology for continued evaluation and management.    History of chronic constipation  Recommend daily exercise as tolerated, adequate water intake (six 8-oz glasses of water daily), and high fiber diet. OTC fiber supplements are recommended if diet does not reach daily fiber goal (20-30 grams daily), such as Citrucel or FiberCon (take as directed, separate from other oral medications by >2 hours).  -Recommend taking an OTC stool softener such as Colace as directed to avoid hard stools and straining with bowel movements PRN  - Recommend follow-up with Primary Care Provider/endocrinology for continued evaluation and management of elevated TSH level  -If still no improvement with these measures, call/follow-up  - schedule Colonoscopy, discussed procedure with the patient, including risks and benefits, patient verbalized understanding and patient declined scheduling at this time; patient will follow-up to schedule when she is ready    Follow up in about 1 month (around 7/1/2022), or if symptoms worsen or fail to improve.    If no improvement in symptoms or symptoms worsen, call/follow-up at clinic or go to ER.        43 minutes of total time spent on the encounter, which includes face to face time and non-face to face time preparing to see the patient (eg, review of tests), Obtaining and/or reviewing separately obtained history, Documenting clinical information in the electronic or other health record, Independently interpreting results (not separately reported) and communicating results to the patient/family/caregiver, or Care coordination (not separately reported).

## 2022-06-01 NOTE — TELEPHONE ENCOUNTER
Called and spoke with pt .  states he wrote the portal message on behalf of wife when they were in the ER last night.  states wife was sleeping due to waking up and having stomach pains.  Pt scheduled to see one of our providers on 6/1/22.

## 2022-06-02 ENCOUNTER — TELEPHONE (OUTPATIENT)
Dept: NEUROLOGY | Facility: CLINIC | Age: 77
End: 2022-06-02
Payer: MEDICARE

## 2022-06-02 NOTE — TELEPHONE ENCOUNTER
Attempted to call patient to confirm appointment with ALAN Montes on . Location/Date/Time left on message.

## 2022-06-03 ENCOUNTER — OFFICE VISIT (OUTPATIENT)
Dept: NEUROLOGY | Facility: CLINIC | Age: 77
End: 2022-06-03
Payer: MEDICARE

## 2022-06-03 ENCOUNTER — HOSPITAL ENCOUNTER (OUTPATIENT)
Dept: RADIOLOGY | Facility: HOSPITAL | Age: 77
Discharge: HOME | End: 2022-06-03
Attending: NURSE PRACTITIONER
Payer: MEDICARE

## 2022-06-03 VITALS
HEIGHT: 64 IN | DIASTOLIC BLOOD PRESSURE: 72 MMHG | HEART RATE: 59 BPM | SYSTOLIC BLOOD PRESSURE: 122 MMHG | WEIGHT: 150.44 LBS | BODY MASS INDEX: 25.68 KG/M2

## 2022-06-03 DIAGNOSIS — Z86.19 HISTORY OF HEPATITIS C: ICD-10-CM

## 2022-06-03 DIAGNOSIS — D69.6 DECREASED PLATELET COUNT: ICD-10-CM

## 2022-06-03 DIAGNOSIS — R10.13 EPIGASTRIC PAIN: ICD-10-CM

## 2022-06-03 DIAGNOSIS — R41.3 MEMORY LOSS: ICD-10-CM

## 2022-06-03 PROCEDURE — 76700 US ABDOMEN COMPLETE: ICD-10-PCS | Mod: 26,,, | Performed by: RADIOLOGY

## 2022-06-03 PROCEDURE — 99215 OFFICE O/P EST HI 40 MIN: CPT | Mod: PBBFAC,25,PO | Performed by: NURSE PRACTITIONER

## 2022-06-03 PROCEDURE — 99483 PR ASSMT/CARE PLANNING, PT W/COGN IMPAIRMENT: ICD-10-PCS | Mod: S$PBB,,, | Performed by: NURSE PRACTITIONER

## 2022-06-03 PROCEDURE — 99499 NO LOS: ICD-10-PCS | Mod: S$PBB,,, | Performed by: NURSE PRACTITIONER

## 2022-06-03 PROCEDURE — 76700 US EXAM ABDOM COMPLETE: CPT | Mod: TC,PO

## 2022-06-03 PROCEDURE — 76700 US EXAM ABDOM COMPLETE: CPT | Mod: 26,,, | Performed by: RADIOLOGY

## 2022-06-03 PROCEDURE — 99483 ASSMT & CARE PLN PT COG IMP: CPT | Mod: S$PBB,,, | Performed by: NURSE PRACTITIONER

## 2022-06-03 PROCEDURE — 99499 UNLISTED E&M SERVICE: CPT | Mod: S$PBB,,, | Performed by: NURSE PRACTITIONER

## 2022-06-03 PROCEDURE — 99999 PR PBB SHADOW E&M-EST. PATIENT-LVL V: ICD-10-PCS | Mod: PBBFAC,,, | Performed by: NURSE PRACTITIONER

## 2022-06-03 PROCEDURE — 99999 PR PBB SHADOW E&M-EST. PATIENT-LVL V: CPT | Mod: PBBFAC,,, | Performed by: NURSE PRACTITIONER

## 2022-06-03 RX ORDER — DONEPEZIL HYDROCHLORIDE 5 MG/1
TABLET, FILM COATED ORAL
Qty: 30 TABLET | Refills: 11 | Status: SHIPPED | OUTPATIENT
Start: 2022-06-03 | End: 2022-07-28

## 2022-06-03 NOTE — PATIENT INSTRUCTIONS
To prevent further memory loss, some of the best preventative measures are following a healthy diet, getting regular exercise, and ensuring good sleep habits.  Approximately 30 to 45 minutes of brisk physical activity (brisk walking, swimming, stationary bicycle, etc.) 5 days a week has been shown to improve function in vascular dementias, and can lower the risk of stroke and slow progression of memory loss.    A Mediterranean style diet, or the DASH diet with lots of fresh fruits and vegetables, whole grains, more fish and chicken, less red meat, less dairy, less processed foods is also beneficial. For more information see:     https://www.rush.[facility]/news/diet-may-help-prevent-alzheimers     Minimize or eliminate the use of alcohol, and discuss any prescription medications you might be taking with your doctor to avoid medications which can cause sedation or worsen cognitive function.    Establish a regular, consistent sleep pattern and practice good sleep hygiene.  Avoid screen time (computer, TV, smartphones or tablets) or heavy meals for at least an hour before bedtime, and avoid caffeine or stimulants after 2 PM. Exercise earlier in the day or mornings and keep your sleeping environment comfortable.     Socializing with friends and family and staying both mentally and physically active is also very important. Continue with hobbies or activities that are engaging and practice activities that are mentally stimulating (word puzzles, Sudoku, etc) and stay active in the community.    Some supplements which may be of potential benefit include:  - tumeric (curcumin) supplement  - omega-3 fatty acids with sufficient amounts of EPA and DHA, 1000 to 2000 mg a day   - Vitamin D3 supplement 2000 units (IU) daily   - Green tea and green tea extracts may also help (caffeine free)   - supplements containing Phosphatidylserine (PS) and phosphatidylcholine (PC)     There may be some benefit to dietary supplements, however  there is no definitive evidence to support the prevention of cognitive impairment or dementia.     Please look into the following websites, to help you find resources including day programs, caregivers and caregiver support, legal questions, support groups, etc.    [facility] ([facility])  [facility] - [address]  [facility] - [address] [facility]    Group meetings facilitated by Cruz Acosta MA, [phone]    ADDRESS  Mailing Address:  [address]  [place] [address] Street Address:  [address] [place] [address]   Web Address:  [email or web address]     Services offered at this location:  Chore, Congregate Meals, Home Delivered Meals, Homemaker, Information and Assistance, Legal, Material Aid, Medical Alert, Medication Management, NFCSP Information and Assistance, NFCSP In-Home Respite, NFCSP Material Aid, NGCSP Personal Care , NFCSP Public Education, NFCSP Sitter Service, NFCSP Support Groups, Nutrition Counseling, Nutrition Education, Outreach, Recreation, Transportation, Utility Assistance, Wellness, Area Agency on Aging, [place] on Aging      Website for the Alzheimers Association:  http://www.alz.org/   Excellent resources for finding community resources  Action plan navigator and online tools  Call [phone] for 24/7 helpline    [facility] Office of Aging and Adult Services:  Http://www.ldh.[place].gov/index.cfm/subhome/12/n/7    [facility] With Dementia: http://carepart[employer].com/    Website for [facility] Agency on Ageing: http://goea.[place].gov/index.cfm?md=vandana&tmp=category&catID=38&nid=24&ssid=0&startIndex=1       PATIENT/FAMILY RESOURCES:  1. Alzheimer's Association       http://www.alz.org  2. Alzheimer's Foundation of [place]     http://www.alzfdn.org  3. The Alzheimers Disease Education and Referral Center  https://www.fran.nih.gov/alzheimers  4. Lewy Body Dementia Association      http://www.lbda.org  5. National  [place] of Mental Health     http://www.nimh.nih.gov  6. National [place] on Mental Illness     http://www.jus.org  7. Mental Health [place]       http://www.mentalhealthamerica.net  8. Mental Health.gov        http://www.mentalhealth.gov  9. National [place] for Behavioral Health     http://www.thenationalcouncil.org  10. Substance Abuse and Mental Health Services Administration  http://www.samhsa.gov    11. Licensed local counselors, social workers, psychiatrists, psychologists - one starting point is the Psychology Today website, therapist finder

## 2022-06-03 NOTE — PROGRESS NOTES
"  NEUROLOGY  Outpatient Follow Up    [facility]  [address] [facility], [place] [address]  [phone] (office) / [phone] (fax)    Patient Name:  Yuniel Gracia  :  1945  MR #:  [id number]  Acct #:  [id number]    Date of Neurology Visit: 2022  Name of Provider: YESIKA Montes    Other Physicians:  Bhaskar Olivera MD (Primary Care Physician); Susannah Burks FN* (Referring)      Chief Complaint: Memory Loss      History of Present Illness (HPI):  Prior HPI  2020 (Dr. Abbott):  "The patient is a 75 y.o. female referred for evaluation of memory loss.  She was seen with her  who supplies additional history. This issue began about 2 years ago.  The primary issue is with word finding. It involves short-term memory more than long-term memory.  She may have some difficulties with executive function.  There are no issues with multiple step processes. She has no problems with ADLs. She does not get lost in familiar areas. There are no hallucinations. There are no personality changes.  She does endorse depression and depression.     Of note, the patient reports that she had been on Zoloft for approximately 20 years.  Recently, she read that Zoloft could produce issues with language.  Her Oncologist instructed her to discontinue it, which she did about a month ago.  She reports some improvement in her word finding since that time, though it is not back to baseline.  She has been having some anxiety since coming off this drug, though."          Interval Hx 6/3/2022:   Patient is here today for memory follow up. She is accompanied by her spouse who supplies information. Overall she feels that her memory is unchanged. Spouse states she has trouble coming up with simple words and reports that she is also repetitive. He believes there has been a decline.      Patient's highest level of education completed was highschool. She was a housewife. The onset of memory " decline started around 2018. She struggles more with short term memory which has worsened as per spouse. There are no personality or behavioral changes reports. She denies depression. She was previously on Zoloft in 2020 but came of it for thoughts that is caused increased word finding difficulty. She is now back on a very low dose as of 1 year ago. She denies hallucinations. She reports her sleep is good. She denies sleeping during the day. Spouse does endorse difficulty with executive function. Spouse is managing the finances and has done so for the last 20 years. Spouse is also managing her medications for the last 4 years. She denies issues with hygiene and able navid perform ADLs without assistance. She endorses word finding difficulty and spouse beleives it has worsened. She denies urinary incontinence. Her last fall was a couple of days ago stating she tripped but doesn't recall how it happened. She is no longer driving and hasn't done so in the last 5 years. She likes to stay active and work in the yard and play cards weekly.         Past Medical, Surgical, Family & Social History:   Reviewed and updated.    Home Medications:     Current Outpatient Medications:     amLODIPine (NORVASC) 2.5 MG tablet, TAKE 1 TABLET DAILY, Disp: 90 tablet, Rfl: 3    anastrozole (ARIMIDEX) 1 mg Tab, TAKE 1 TABLET DAILY, Disp: 90 tablet, Rfl: 3    CALCIUM CARBONATE/VITAMIN D3 (VITAMIN D-3 ORAL), Take 400 Units by mouth daily as needed., Disp: , Rfl:     co-enzyme Q-10 30 mg capsule, Take 30 mg by mouth once daily., Disp: , Rfl:     erythromycin (ROMYCIN) ophthalmic ointment, Place a 1/2 inch ribbon of ointment into the lower eyelid TID, Disp: 1 Tube, Rfl: 0    fish oil-omega-3 fatty acids 300-1,000 mg capsule, Take by mouth once daily., Disp: , Rfl:     levothyroxine (SYNTHROID) 88 MCG tablet, Take 1 tablet (88 mcg total) by mouth before breakfast., Disp: 90 tablet, Rfl: 3    LUMIGAN 0.01 % Drop, , Disp: , Rfl:      "ondansetron (ZOFRAN ODT) 4 MG TbDL, Take 1 tablet (4 mg total) by mouth every 8 (eight) hours as needed (nausea)., Disp: 15 tablet, Rfl: 0    pantoprazole (PROTONIX) 20 MG tablet, Take 1 tablet (20 mg total) by mouth once daily., Disp: 30 tablet, Rfl: 0    pravastatin (PRAVACHOL) 40 MG tablet, TAKE 1 TABLET AT BEDTIME, Disp: 90 tablet, Rfl: 3    sertraline (ZOLOFT) 50 MG tablet, Take 1 tablet (50 mg total) by mouth once daily., Disp: 30 tablet, Rfl: 11    solifenacin (VESICARE) 5 MG tablet, TAKE 1 TABLET DAILY, Disp: 90 tablet, Rfl: 3    Current Facility-Administered Medications:     acetaminophen tablet 650 mg, 650 mg, Oral, Once PRN, Quinton Kendall, NP    albuterol inhaler 2 puff, 2 puff, Inhalation, Q20 Min PRN, Quinton Kendall NP    diphenhydrAMINE injection 25 mg, 25 mg, Intravenous, Once PRN, Quinton Kendall NP    EPINEPHrine 0.1 mg/mL injection 0.3 mg, 0.3 mg, Intramuscular, PRN, Quinton Kendall NP    methylPREDNISolone sodium succinate injection 40 mg, 40 mg, Intravenous, Once PRN, Quinton Kendall NP    ondansetron disintegrating tablet 4 mg, 4 mg, Oral, Once PRN, Quinton Kendall, NP    sodium chloride 0.9% 500 mL flush bag, , Intravenous, PRN, Quinton Kendall NP, Stopped at 12/28/20 1439    sodium chloride 0.9% flush 10 mL, 10 mL, Intravenous, PRN, Quinton Kendall NP    Physical Examination:  /72 (BP Location: Left arm, Patient Position: Sitting, BP Method: Medium (Automatic))   Pulse (!) 59   Ht 5' 4" (1.626 m)   Wt 68.2 kg (150 lb 7.4 oz)   LMP 03/02/1998   BMI 25.83 kg/m²   MMSE 6/3/2022   What is the (year), (season), (date), (day), (month)? 1   Where are we (state), (country), (town or city), (hospital), (floor)? 4   Name 3 common objects (eg. "apple", "table", "tal"). Take 1 second to say each. Then ask the patient to repeat all 3. Give 1 point for each correct answer. Then repeat them until he/she learns all 3. Count trials and record. 3   Serial 7's " "backwards. Stop after 5 answers. (100,93,86,79,72) or alternatively  spell "WORLD" backwards. (D..L..R..O..W). The score is the number of letters in correct order. 1   Ask for the 3 common objects named earlier in the exam. Give 1 point for each correct answer. 1   Name a "pencil" and "watch." 2   Repeat the following: "No ifs, ands, or buts." 1   Follow a 3-stage command: "Take a paper in your right hand, fold it in half, & put it on the floor." 3   Read and obey the following: (see paper exam) 1   Write a sentence. 1   Copy the following design: (see paper exam) 0   Total MMSE Score 18   Some recent data might be hidden       GENERAL:  General appearance: Well, non-toxic appearing.  No apparent distress.  Neck: supple.    MENTAL STATUS:  Alertness, attention span & concentration: normal.  Language: normal.  Orientation to self, place & time:  Did not know current city, year, date, month or day of week  Memory, recent & remote: limited  Fund of knowledge: normal.  MMSE:18/30    SPEECH:  Clear and fluent.  Follows complex commands.    CRANIAL NERVES:  Cranial Nerves II-XII were examined.  II - Visual fields: normal.  III, IV, VI: PERRL, EOMI, No ptosis, No nystagmus.  V - Facial sensation: normal.  VII - Face symmetry & mobility: Due to Covid-19 recommended precautions, patient wearing facial mask; mouth and nose not examined.  VIII - Hearing: normal.  IX, X - Palate: Due to Covid-19 recommended precautions, patient wearing facial mask; mouth and nose not examined.  XI - Shoulder shrug: normal.  XII - Tongue protrusion: Due to Covid-19 recommended precautions, patient wearing facial mask; mouth and nose not examined.    GROSS MOTOR:  Gait & station: non focal; slightly stooped posture  Tone: normal.  Abnormal movements: none.  Finger-nose : normal.  Rapid alternating movements: normal.  Pronator drift: normal  Romberg: absent    MUSCLE STRENGTH:   Hand grasp:   - right:5/5   - left:5/5    Fascics Atrophy RIGHT    " LEFT Atrophy Fascics     5 Biceps 5       5 Triceps 5       5 Forearm.Pr. 5                5 Iliopsoas flex    5       5 Hip Abduct 5       5 Hip Adduct 5       5 Quads 5       5 Hams 5       5 Dorsiflex 5       5 Plantar Flex 5       REFLEXES:    RIGHT Reflex   LEFT   2 Biceps 2   2 Brachiorad. 2        2 Patellar 2     SENSORY:  Light touch: Normal throughout.             Diagnostic Data Reviewed:   Component      Latest Ref Rng & Units 5/31/2022   WBC      3.90 - 12.70 K/uL 4.69   RBC      4.00 - 5.40 M/uL 4.57   Hemoglobin      12.0 - 16.0 g/dL 14.2   Hematocrit      37.0 - 48.5 % 40.9   MCV      82 - 98 fL 90   MCH      27.0 - 31.0 pg 31.1 (H)   MCHC      32.0 - 36.0 g/dL 34.7   RDW      11.5 - 14.5 % 14.6 (H)   Platelets      150 - 450 K/uL 142 (L)   MPV      9.2 - 12.9 fL 10.1   Immature Granulocytes      0.0 - 0.5 % 0.2   Gran # (ANC)      1.8 - 7.7 K/uL 2.8   Immature Grans (Abs)      0.00 - 0.04 K/uL 0.01   Lymph #      1.0 - 4.8 K/uL 1.3   Mono #      0.3 - 1.0 K/uL 0.4   Eos #      0.0 - 0.5 K/uL 0.2   Baso #      0.00 - 0.20 K/uL 0.03   nRBC      0 /100 WBC 0   Gran %      38.0 - 73.0 % 60.4   Lymph %      18.0 - 48.0 % 26.7   Mono %      4.0 - 15.0 % 8.7   Eosinophil %      0.0 - 8.0 % 3.4   Basophil %      0.0 - 1.9 % 0.6   Differential Method       Automated   Sodium      136 - 145 mmol/L 137   Potassium      3.5 - 5.1 mmol/L 4.0   Chloride      95 - 110 mmol/L 104   CO2      23 - 29 mmol/L 24   Glucose      70 - 110 mg/dL 111 (H)   BUN      8 - 23 mg/dL 18   Creatinine      0.5 - 1.4 mg/dL 0.9   Calcium      8.7 - 10.5 mg/dL 9.5   PROTEIN TOTAL      6.0 - 8.4 g/dL 7.6   Albumin      3.5 - 5.2 g/dL 4.0   BILIRUBIN TOTAL      0.1 - 1.0 mg/dL 0.7   Alkaline Phosphatase      55 - 135 U/L 46 (L)   AST      10 - 40 U/L 27   ALT      10 - 44 U/L 19   Anion Gap      8 - 16 mmol/L 9   eGFR if African American      >60 mL/min/1.73 m:2 >60   eGFR if non African American      >60 mL/min/1.73 m:2 >60       MRI  brain 2020:  FINDINGS:  Intracranial contents:There is no acute intracranial abnormality.  There is no acute hemorrhage.  There are no regions of restricted diffusion to suggest acute infarction.  There is focal encephalomalacia and gliosis in the lateral right cerebellum with overlying postsurgical changes with a similar appearance to the comparison studies.  Otherwise, cerebellar volume appears normal but there is generalized cerebral volume loss with a moderate burden of periventricular and subcortical white matter FLAIR and T2 hyperintense signal.  These findings were present previously and are without change but remain nonspecific and could reflect sequelae of chronic small vessel disease.  Many of the lesions are periventricular in location with a somewhat ovoid configuration which can be seen in the setting of demyelinating disease.  Clinical correlation is needed.  The findings are felt to be stable.  There is no hydrocephalus or midline shift.  There is no abnormal extra-axial fluid collection.  The basilar cisterns are open.  The cerebellar tonsils are normal position.  Flow voids indicating patency are present in the major vessels at the base of the brain.  The basilar artery is slightly small in caliber but there are patent bilateral posterior communicating arteries.  This anatomy may represent fetal origin of the posterior cerebral arteries.     Extracranial contents, calvarium, soft tissues:Baseline marrow signal is normal.  There is multilevel degenerative change in the cervical spine without acute cord compression.  The paranasal sinuses and mastoid air cells are clear.     Impression:  1. There is no acute abnormality.  There is no definite or significant change compared to the prior studies.  There is focal encephalomalacia and gliosis in the lateral right cerebellum with overlying surgical changes.  2. There is generalized cerebral volume loss with moderate nonspecific white matter change which is  mostly periventricular in location.  The findings are stable and although are nonspecific, likely reflect sequelae of chronic small vessel disease in a patient of this age with other etiologies, including demyelination felt to be less likely.                   Assessment and Plan:      Problem List Items Addressed This Visit        Neuro    Memory loss    Current Assessment & Plan     Patient is a 77 y/o female that presents with worsening memory difficulty. Onset ~ 2018. She struggles with word finding, repetition and short term memory loss.   There are no reports of hallucinations, depression, behavorial changes, urinary incontinence or sleep disturbances. She does endorse a h/o of anxiety and maintained on Zoloft.   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   Previous MRI with moderate volume loss and encephalomalacia to right cerebellum   Obtain updated MRI brain scan  Recent serologies noted   - obtain updated TSH as last level in Oct elevated  Referral placed for NP testing  Discussed role vs expectation of cognitive enhancing drugs at length   - Start low dose Aricept with goal of 10 mg QHS. S/E discussed.   There are no safety concerns  Caregiver info given at time of appt                                   Important to note, also  has a past medical history of Anemia, Arthritis, Blood transfusion, Breast cancer, Cancer (RIGHT BREAST), Chronic constipation, Clotting disorder, Colon polyp, Diverticulosis, Glaucoma, Hepatitis C (2000), Hypothyroid, Insomnia, Malignant neoplasm of breast (4/12/2012), Memory loss, Osteoarthritis, Panic attacks, Raynaud's disease, Ulcer, and Vertigo.          The patient will return to clinic in 6 months with Dr. Abbott in memory clinic    All questions were answered and patient is comfortable with the plan.         Thank you very much for the opportunity to assist in this patient's care.    If you have any questions or concerns,  please do not hesitate to contact me at any time.      Sincerely,     YESIKA Montes  [facility] [place]

## 2022-06-03 NOTE — PROGRESS NOTES
Caregiver name: Richar Gracia  Relationship to the patient:    Does the patient have a living will? yes  Does the patient have a designated healthcare POA? yes  Does the patient have a designated general POA? yes    Have educational materials/resources been provided? yes      Activities of Daily Living    Bathing: Independent  Dressing: Independent  Grooming: Independent  Mouth Care: Independent  Toileting: Independent  Transferring Bed/Chair: Independent  Walking: Needs Help  Climbing: Independent  Eating: Independent      Instrumental Activities of Daily Living    Shopping: Independent  Cooking: Dependent  Managing Medications: Needs Help  Using the phone and looking up numbers: Dependent  Doing Housework: Independent  Doing Laundry: Independent  Driving or using public transportation: Cannot Do  Managing finances: Cannot Do    Functional Assessment Staging  4   Decreased ability to perform complex tasks, e.g. planning dinner for guests, handling personal finances (such as forgetting to pay bills), difficulty marketing, etc.*         Depression Patient Health Questionnaire 6/3/2022 3/15/2021 1/8/2021 9/16/2020 12/4/2019 4/26/2018 12/12/2017   Over the last two weeks how often have you been bothered by little interest or pleasure in doing things 0 0 0 0 0 0 0   Over the last two weeks how often have you been bothered by feeling down, depressed or hopeless 0 0 0 0 0 0 0   PHQ-2 Total Score 0 0 0 0 0 0 0

## 2022-06-03 NOTE — ASSESSMENT & PLAN NOTE
Patient is a 75 y/o female that presents with worsening memory difficulty. Onset ~ 2018. She struggles with word finding, repetition and short term memory loss.   There are no reports of hallucinations, depression, behavorial changes, urinary incontinence or sleep disturbances. She does endorse a h/o of anxiety and maintained on Zoloft.   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   Previous MRI with moderate volume loss and encephalomalacia to right cerebellum   Obtain updated MRI brain scan  Recent serologies noted   - obtain updated TSH as last level in Oct elevated  Referral placed for NP testing  Discussed role vs expectation of cognitive enhancing drugs at length   - Start low dose Aricept with goal of 10 mg QHS. S/E discussed.   There are no safety concerns  Caregiver info given at time of appt

## 2022-06-06 ENCOUNTER — TELEPHONE (OUTPATIENT)
Dept: GASTROENTEROLOGY | Facility: CLINIC | Age: 77
End: 2022-06-06
Payer: MEDICARE

## 2022-06-06 DIAGNOSIS — R93.2 ABNORMAL LIVER ULTRASOUND: ICD-10-CM

## 2022-06-06 DIAGNOSIS — Z86.19 HISTORY OF HEPATITIS C: Primary | ICD-10-CM

## 2022-06-06 NOTE — TELEPHONE ENCOUNTER
"Please call to inform & review the results with the patient- radiology report of the abdominal ultrasound showed mildly coarsened liver tissue without suspicious focal lesion. Small mildly complex liver cyst. Recommend follow-up with hepatology for continued evaluation and management. Referral placed.  Otherwise, unremarkable findings of the GI tract/GI organs.  Other findings showed "Simple right renal cyst." Recommend follow-up with Primary Care Provider for continued evaluation and management of this finding.    Continue with previous recommendations. If no improvement in symptoms or symptoms worsen, call/follow-up at clinic or go to ER.    Thanks,        "

## 2022-06-07 ENCOUNTER — TELEPHONE (OUTPATIENT)
Dept: FAMILY MEDICINE | Facility: CLINIC | Age: 77
End: 2022-06-07
Payer: MEDICARE

## 2022-06-07 ENCOUNTER — TELEPHONE (OUTPATIENT)
Dept: NEUROLOGY | Facility: CLINIC | Age: 77
End: 2022-06-07
Payer: MEDICARE

## 2022-06-07 DIAGNOSIS — E03.1 CONGENITAL HYPOTHYROIDISM WITHOUT GOITER: Primary | ICD-10-CM

## 2022-06-07 RX ORDER — LEVOTHYROXINE SODIUM 112 UG/1
112 TABLET ORAL
Qty: 30 TABLET | Refills: 1 | Status: SHIPPED | OUTPATIENT
Start: 2022-06-07 | End: 2022-07-18

## 2022-06-07 NOTE — TELEPHONE ENCOUNTER
Talked to patient's  , I released provider's message to him, verbalized understanding, also he said pt was not taking her thyroid medication for about 2 month due to mishap with the medications.   appt scheduled to pt convenience

## 2022-06-07 NOTE — TELEPHONE ENCOUNTER
Patient has not seen me since 2020 needs a visit with me or status with another provider sent and a higher dose of thyroid medication and needs to recheck in 2 months.

## 2022-06-07 NOTE — TELEPHONE ENCOUNTER
I spoke with the patient's  about his wife thyroid. He said she has not been taking her thyroid medication and Dr. Olivera's office just called and sent a higher dose in and she will start taking it in am on a empty stomach.

## 2022-06-07 NOTE — TELEPHONE ENCOUNTER
----- Message from ALAN Che sent at 6/6/2022 11:57 AM CDT -----  Please communicate elevated TSH level to her PCP and let pt know she should follow up with them for further advisement on medication.

## 2022-06-08 ENCOUNTER — PATIENT MESSAGE (OUTPATIENT)
Dept: FAMILY MEDICINE | Facility: CLINIC | Age: 77
End: 2022-06-08
Payer: MEDICARE

## 2022-06-20 ENCOUNTER — PATIENT MESSAGE (OUTPATIENT)
Dept: GASTROENTEROLOGY | Facility: CLINIC | Age: 77
End: 2022-06-20
Payer: MEDICARE

## 2022-06-20 ENCOUNTER — HOSPITAL ENCOUNTER (OUTPATIENT)
Dept: RADIOLOGY | Facility: HOSPITAL | Age: 77
Discharge: HOME | End: 2022-06-20
Attending: NURSE PRACTITIONER
Payer: MEDICARE

## 2022-06-20 DIAGNOSIS — R41.3 MEMORY LOSS: ICD-10-CM

## 2022-06-20 PROCEDURE — 70551 MRI BRAIN STEM W/O DYE: CPT | Mod: 26,,, | Performed by: RADIOLOGY

## 2022-06-20 PROCEDURE — 70551 MRI BRAIN STEM W/O DYE: CPT | Mod: TC,PO

## 2022-06-20 PROCEDURE — 70551 MRI BRAIN WITHOUT CONTRAST: ICD-10-PCS | Mod: 26,,, | Performed by: RADIOLOGY

## 2022-06-22 ENCOUNTER — PATIENT MESSAGE (OUTPATIENT)
Dept: GASTROENTEROLOGY | Facility: CLINIC | Age: 77
End: 2022-06-22
Payer: MEDICARE

## 2022-06-23 ENCOUNTER — PES CALL (OUTPATIENT)
Dept: ADMINISTRATIVE | Facility: CLINIC | Age: 77
End: 2022-06-23
Payer: MEDICARE

## 2022-06-24 ENCOUNTER — LAB VISIT (OUTPATIENT)
Dept: LAB | Facility: HOSPITAL | Age: 77
End: 2022-06-24
Attending: NURSE PRACTITIONER
Payer: MEDICARE

## 2022-06-24 ENCOUNTER — OFFICE VISIT (OUTPATIENT)
Dept: GASTROENTEROLOGY | Facility: CLINIC | Age: 77
End: 2022-06-24
Payer: MEDICARE

## 2022-06-24 VITALS — HEIGHT: 64 IN | BODY MASS INDEX: 24.62 KG/M2 | WEIGHT: 144.19 LBS

## 2022-06-24 DIAGNOSIS — Z87.19 HISTORY OF CHRONIC CONSTIPATION: ICD-10-CM

## 2022-06-24 DIAGNOSIS — R07.9 NONSPECIFIC CHEST PAIN: ICD-10-CM

## 2022-06-24 DIAGNOSIS — Z86.010 HISTORY OF COLON POLYPS: ICD-10-CM

## 2022-06-24 DIAGNOSIS — Z87.898 HISTORY OF SHORTNESS OF BREATH: ICD-10-CM

## 2022-06-24 DIAGNOSIS — R11.2 NON-INTRACTABLE VOMITING WITH NAUSEA: ICD-10-CM

## 2022-06-24 DIAGNOSIS — R74.01 ELEVATED AST (SGOT): ICD-10-CM

## 2022-06-24 DIAGNOSIS — Z86.19 HISTORY OF HEPATITIS C: ICD-10-CM

## 2022-06-24 DIAGNOSIS — R10.33 PERIUMBILICAL PAIN: ICD-10-CM

## 2022-06-24 DIAGNOSIS — R79.89 ELEVATED TSH: ICD-10-CM

## 2022-06-24 DIAGNOSIS — R10.13 EPIGASTRIC PAIN: Primary | ICD-10-CM

## 2022-06-24 LAB
ALBUMIN SERPL BCP-MCNC: 4 G/DL (ref 3.5–5.2)
ALP SERPL-CCNC: 44 U/L (ref 55–135)
ALT SERPL W/O P-5'-P-CCNC: 16 U/L (ref 10–44)
AST SERPL-CCNC: 26 U/L (ref 10–40)
BILIRUB DIRECT SERPL-MCNC: 0.4 MG/DL (ref 0.1–0.3)
BILIRUB SERPL-MCNC: 1.1 MG/DL (ref 0.1–1)
PROT SERPL-MCNC: 7.2 G/DL (ref 6–8.4)

## 2022-06-24 PROCEDURE — 99999 PR PBB SHADOW E&M-EST. PATIENT-LVL IV: ICD-10-PCS | Mod: PBBFAC,,, | Performed by: NURSE PRACTITIONER

## 2022-06-24 PROCEDURE — 99999 PR PBB SHADOW E&M-EST. PATIENT-LVL IV: CPT | Mod: PBBFAC,,, | Performed by: NURSE PRACTITIONER

## 2022-06-24 PROCEDURE — 99214 OFFICE O/P EST MOD 30 MIN: CPT | Mod: S$PBB,,, | Performed by: NURSE PRACTITIONER

## 2022-06-24 PROCEDURE — 99214 PR OFFICE/OUTPT VISIT, EST, LEVL IV, 30-39 MIN: ICD-10-PCS | Mod: S$PBB,,, | Performed by: NURSE PRACTITIONER

## 2022-06-24 PROCEDURE — 36415 COLL VENOUS BLD VENIPUNCTURE: CPT | Mod: PO | Performed by: NURSE PRACTITIONER

## 2022-06-24 PROCEDURE — 99214 OFFICE O/P EST MOD 30 MIN: CPT | Mod: PBBFAC,PO | Performed by: NURSE PRACTITIONER

## 2022-06-24 PROCEDURE — 80076 HEPATIC FUNCTION PANEL: CPT | Performed by: NURSE PRACTITIONER

## 2022-06-24 RX ORDER — PANTOPRAZOLE SODIUM 40 MG/1
40 TABLET, DELAYED RELEASE ORAL
Qty: 30 TABLET | Refills: 0 | Status: SHIPPED | OUTPATIENT
Start: 2022-06-24 | End: 2022-07-20 | Stop reason: SDUPTHER

## 2022-06-24 NOTE — PATIENT INSTRUCTIONS

## 2022-06-24 NOTE — PROGRESS NOTES
Subjective:       Patient ID: Yuniel Gracia is a 76 y.o. female Body mass index is 24.75 kg/m².    Chief Complaint: Nausea, Abdominal Pain, and Follow-up    This patient is established with Dr. Vines & myself (last in 2017).    Patient is here with her , whom assisted with history. Patient has been to the ER multiple times for abdominal pain (diagnosed with cystitis once). Last on 6/22/2022 (visit note not completed yet).    GI Problem  The primary symptoms include abdominal pain, nausea (frequent; zofran PRN) and vomiting. Primary symptoms do not include fever, weight loss, fatigue, diarrhea, melena, hematemesis, hematochezia or dysuria.   The abdominal pain began more than 2 days ago (started ~5/29/2022). The abdominal pain has been gradually worsening (intermittent pain) since its onset. The abdominal pain is located in the epigastric region and periumbilical region (daily; lasts most of the day, described as nausea & pressure). The abdominal pain radiates to the chest (throat). The severity of the abdominal pain is 0/10 (CURRENTLY). Relieved by: carafate; aggravating factors: eating.   The vomiting began 2 days ago. Frequency: twice. The emesis contains stomach contents.   The illness is also significant for constipation (bowel movements are daily of formed-firm and occasional pellet-like stool; miralax PRN; PAST TREATMENT: fiber supplement, linzess). The illness does not include chills, dysphagia, odynophagia or bloating. Associated symptoms comments: Ibuprofen PRN- takes about twice a week. Significant associated medical issues include GERD (denies any heartburn or reflux; taking protonix 20 mg once daily (taking since our last visit) & carafate 1 gram QID (started on 6/22/2022)) and liver disease (history of hepatitis C).     Review of Systems   Constitutional: Positive for appetite change (decreased). Negative for chills, fatigue, fever and weight loss.   HENT: Negative for sore throat and  "trouble swallowing.    Eyes: Negative for visual disturbance.   Respiratory: Positive for shortness of breath (seen in the ER for it; denies currently). Negative for cough and choking.    Cardiovascular: Positive for chest pain (seen in the ER for it; denies currently).   Gastrointestinal: Positive for abdominal pain, constipation (bowel movements are daily of formed-firm and occasional pellet-like stool; miralax PRN; PAST TREATMENT: fiber supplement, linzess), nausea (frequent; zofran PRN) and vomiting. Negative for anal bleeding, bloating, blood in stool, diarrhea, dysphagia, hematemesis, hematochezia and melena.   Genitourinary: Negative for dysuria, flank pain, frequency, pelvic pain and urgency.   Neurological: Negative for weakness.       Patient's last menstrual period was 03/02/1998. s/p hysterectomy.  Past Medical History:   Diagnosis Date    Anemia     Arthritis     Blood transfusion     Breast cancer     Cancer RIGHT BREAST    Chronic constipation     Clotting disorder     Colon polyp     Diverticulosis     Glaucoma     Hepatitis C 2000    pt states history of hepatits c-"cured by Dr. Lynch"; TREATED 2 YEARS - 2000   OK NOW    Hypothyroid     Insomnia     Malignant neoplasm of breast 4/12/2012    Memory loss     reports some memory loss since chemo    Osteoarthritis     Panic attacks     Raynaud's disease     takes amlodipine for this    Ulcer     Vertigo      Past Surgical History:   Procedure Laterality Date    APPENDECTOMY      BLADDER SURGERY  2011    sling - Dr Giles  - [facility]    BRAIN SURGERY  2007    temporal neuralgia - [facility]    BREAST BIOPSY      BREAST LUMPECTOMY  03/2012    malignant - had chemo and radiotherapy    COLONOSCOPY  01/03/2011    Dr Lynch, in media section, diverticulosis, hemorrhoids, otherwise normal findings; normal findings on random biopsies    COLONOSCOPY N/A 06/01/2017    Procedure: COLONOSCOPY;  Surgeon: Nate Lamb MD;  " Location: [facility];  Service: Endoscopy;  Laterality: N/A; hemorrhoids, diverticulosis, 1 colon polyp removed; Repeat colonoscopy in 5 years for surveillance; biopsy: Tubular adenoma, random biopsies unremarkable    EYE SURGERY      cataracts bilateral    HYSTERECTOMY      JOINT REPLACEMENT  09/25/2017    left Knee [facility]     removal of port a cath      right lymph node removed from breast area      TUNNELED VENOUS PORT PLACEMENT  04/2012    left chest    UPPER GASTROINTESTINAL ENDOSCOPY  prior to 2011    small hiatal hernia     Family History   Problem Relation Age of Onset    Breast cancer Mother     Diabetes Father     Heart disease Father     Breast cancer Sister     Breast cancer Sister     Early death Maternal Grandmother         tonsils    Heart disease Maternal Grandfather     Alcohol abuse Maternal Grandfather     Tuberculosis Paternal Grandfather     Drug abuse Son     Obesity Son     Diabetes Maternal Aunt     Heart disease Maternal Uncle     Diabetes Paternal Aunt     Tuberculosis Paternal Uncle         x2    Diabetes Paternal Uncle     Breast cancer Other     Ovarian cancer Neg Hx     Colon cancer Neg Hx     Colon polyps Neg Hx     Crohn's disease Neg Hx     Ulcerative colitis Neg Hx     Melanoma Neg Hx     Lupus Neg Hx     Psoriasis Neg Hx     Eczema Neg Hx      Social History     Tobacco Use    Smoking status: Never Smoker    Smokeless tobacco: Never Used   Substance Use Topics    Alcohol use: Yes     Alcohol/week: 7.0 - 9.0 standard drinks     Types: 2 Standard drinks or equivalent, 5 - 7 Shots of liquor per week    Drug use: No     Wt Readings from Last 10 Encounters:   06/24/22 65.4 kg (144 lb 2.9 oz)   06/22/22 63.5 kg (140 lb)   06/18/22 66.7 kg (147 lb 0.8 oz)   06/18/22 68 kg (150 lb)   06/03/22 68.2 kg (150 lb 7.4 oz)   06/01/22 67 kg (147 lb 11.3 oz)   06/01/22 66.8 kg (147 lb 4.3 oz)   05/31/22 67.1 kg (148 lb)   05/24/22 67.1 kg (148  lb)   02/25/22 67.1 kg (148 lb)     Lab Results   Component Value Date    WBC 4.64 06/22/2022    HGB 15.0 06/22/2022    HCT 42.2 06/22/2022    MCV 88 06/22/2022     06/22/2022     CMP  Sodium   Date Value Ref Range Status   06/22/2022 138 136 - 145 mmol/L Final     Potassium   Date Value Ref Range Status   06/22/2022 5.0 3.5 - 5.1 mmol/L Final     Chloride   Date Value Ref Range Status   06/22/2022 106 95 - 110 mmol/L Final     CO2   Date Value Ref Range Status   06/22/2022 28 22 - 31 mmol/L Final     Glucose   Date Value Ref Range Status   06/22/2022 99 70 - 110 mg/dL Final     Comment:     The ADA recommends the following guidelines for fasting glucose:    Normal:       less than 100 mg/dL    Prediabetes:  100 mg/dL to 125 mg/dL    Diabetes:     126 mg/dL or higher       BUN   Date Value Ref Range Status   06/22/2022 10 7 - 18 mg/dL Final     Creatinine   Date Value Ref Range Status   06/22/2022 0.81 0.50 - 1.40 mg/dL Final   07/27/2012 0.8 0.2 - 1.4 mg/dl Final     Calcium   Date Value Ref Range Status   06/22/2022 9.2 8.4 - 10.2 mg/dL Final   07/27/2012 8.4 (L) 8.6 - 10.2 mg/dl Final     Total Protein   Date Value Ref Range Status   06/22/2022 8.1 6.0 - 8.4 g/dL Final     Albumin   Date Value Ref Range Status   06/22/2022 4.5 3.5 - 5.2 g/dL Final     Total Bilirubin   Date Value Ref Range Status   06/22/2022 1.2 0.2 - 1.3 mg/dL Final     Alkaline Phosphatase   Date Value Ref Range Status   06/22/2022 53 38 - 145 U/L Final   07/25/2012 64 23 - 119 UNIT/L Final     AST   Date Value Ref Range Status   06/22/2022 46 (H) 14 - 36 U/L Final   07/25/2012 19 10 - 30 UNIT/L Final     ALT   Date Value Ref Range Status   06/22/2022 21 0 - 35 U/L Final     Anion Gap   Date Value Ref Range Status   06/22/2022 4 (L) 8 - 16 mmol/L Final   07/27/2012 7 5 - 15 meq/L Final     eGFR if    Date Value Ref Range Status   06/22/2022 >60 >60 mL/min/1.73 m^2 Final     eGFR if non    Date Value Ref  Range Status   06/22/2022 >60 >60 mL/min/1.73 m^2 Final     Comment:     Calculation used to obtain the estimated glomerular filtration  rate (eGFR) is the CKD-EPI equation.        Lab Results   Component Value Date    LIPASE 9 05/31/2022     Lab Results   Component Value Date    LIPASERES 49 06/22/2022     Lab Results   Component Value Date    TSH 30.548 (H) 06/03/2022     Reviewed prior medical records including radiology report of 6/22/2022 CTA abdomen pelvis; 6/18/2022 CT abdomen pelvis; 6/3/2022 abdominal ultrasound; 6/18/2022 ER visit note; & endoscopy history (see surgical history).    Objective:      Physical Exam  Vitals and nursing note reviewed.   Constitutional:       General: She is not in acute distress.     Appearance: Normal appearance. She is well-developed. She is not diaphoretic.   HENT:      Mouth/Throat:      Lips: Pink.      Mouth: Mucous membranes are moist. No oral lesions.      Pharynx: Oropharynx is clear. No pharyngeal swelling or posterior oropharyngeal erythema.   Eyes:      General: No scleral icterus.     Conjunctiva/sclera: Conjunctivae normal.      Pupils: Pupils are equal, round, and reactive to light.   Pulmonary:      Effort: Pulmonary effort is normal. No respiratory distress.      Breath sounds: Normal breath sounds. No wheezing.   Abdominal:      General: Bowel sounds are normal. There is no distension or abdominal bruit.      Palpations: Abdomen is soft. Abdomen is not rigid. There is no mass.      Tenderness: There is abdominal tenderness (mild) in the epigastric area. There is no guarding or rebound. Negative signs include West's sign and McBurney's sign.   Skin:     General: Skin is warm and dry.      Coloration: Skin is not pale.      Findings: No erythema or rash.      Comments: Non-jaundiced   Neurological:      Mental Status: She is alert and oriented to person, place, and time.   Psychiatric:         Behavior: Behavior normal.         Thought Content: Thought content  normal.         Judgment: Judgment normal.         Assessment:       1. Epigastric pain    2. Periumbilical pain    3. Non-intractable vomiting with nausea    4. Elevated TSH    5. Elevated AST (SGOT)    6. History of hepatitis C    7. History of colon polyps    8. History of chronic constipation    9. History of shortness of breath    10. Nonspecific chest pain        Plan:       Epigastric pain & Periumbilical pain  -  INCREASE TO   pantoprazole (PROTONIX) 40 MG tablet; Take 1 tablet (40 mg total) by mouth before breakfast.  Dispense: 30 tablet; Refill: 0  - CONTINUE CARAFATE 1 GRAM QID AS DIRECTED  - avoid/minimize use of NSAIDs- since they can cause GI upset, bleeding and/or ulcers. If NSAID must be taken, recommend take with food.    Non-intractable vomiting with nausea  -  INCREASE TO   pantoprazole (PROTONIX) 40 MG tablet; Take 1 tablet (40 mg total) by mouth before breakfast.  Dispense: 30 tablet; Refill: 0  - schedule EGD, discussed procedure with patient, including risks and benefits, patient verbalized understanding  - CONTINUE ZOFRAN PRN AS DIRECTED FOR NAUSEA    Elevated TSH  Recommend follow-up with Primary Care Provider/ENDOCRINOLOGY for continued evaluation and management.    Elevated AST (SGOT) & History of hepatitis C  -     Hepatic Function Panel; Future; Expected date: 06/24/2022  -     HEPATITIS C RNA, QUANTITATIVE, PCR; Future; Expected date: 06/01/2022  - minimize/avoid alcohol and tylenol products, & follow-up with PCP for continued evaluation and management; if specialist is needed, recommend seeing hepatology.    History of colon polyps  - schedule Colonoscopy, discussed procedure with the patient, including risks and benefits, patient verbalized understanding    History of chronic constipation  Recommend daily exercise as tolerated, adequate water intake (six 8-oz glasses of water daily), and high fiber diet. OTC fiber supplements are recommended if diet does not reach daily fiber goal  (20-30 grams daily), such as Citrucel or FiberCon (take as directed, separate from other oral medications by >2 hours).  -Recommend taking an OTC stool softener such as Colace as directed to avoid hard stools and straining with bowel movements PRN  - Recommend follow-up with Primary Care Provider/endocrinology for continued evaluation and management of elevated TSH level  - Does not require endoscopic evaluation at this time. However, patient is due for a screening colonoscopy and it will be scheduled.    History of shortness of breath  - follow-up with PCP/pulmonology for continued evaluation and management ASAP  - if experiencing symptoms of headache, chest pain, severe/persistent shortness of breath, dizziness, and/or blurred vision, recommend going to ER for further evaluation and management    Nonspecific chest pain  - follow-up with PCP &/or cardiologist for continued evaluation and management ASAP  - if experiencing symptoms of headache, chest pain, shortness of breath, and/or blurred vision, recommend going to ER for further evaluation and management    Follow up in about 1 month (around 7/24/2022), or if symptoms worsen or fail to improve.    If no improvement in symptoms or symptoms worsen, call/follow-up at clinic or go to ER.        49 minutes of total time spent on the encounter, which includes face to face time and non-face to face time preparing to see the patient (eg, review of tests), Obtaining and/or reviewing separately obtained history, Documenting clinical information in the electronic or other health record, Independently interpreting results (not separately reported) and communicating results to the patient/family/caregiver, or Care coordination (not separately reported).

## 2022-06-24 NOTE — H&P (VIEW-ONLY)
Subjective:       Patient ID: Yuniel Gracia is a 76 y.o. female Body mass index is 24.75 kg/m².    Chief Complaint: Nausea, Abdominal Pain, and Follow-up    This patient is established with Dr. Vines & myself (last in 2017).    Patient is here with her , whom assisted with history. Patient has been to the ER multiple times for abdominal pain (diagnosed with cystitis once). Last on 6/22/2022 (visit note not completed yet).    GI Problem  The primary symptoms include abdominal pain, nausea (frequent; zofran PRN) and vomiting. Primary symptoms do not include fever, weight loss, fatigue, diarrhea, melena, hematemesis, hematochezia or dysuria.   The abdominal pain began more than 2 days ago (started ~5/29/2022). The abdominal pain has been gradually worsening (intermittent pain) since its onset. The abdominal pain is located in the epigastric region and periumbilical region (daily; lasts most of the day, described as nausea & pressure). The abdominal pain radiates to the chest (throat). The severity of the abdominal pain is 0/10 (CURRENTLY). Relieved by: carafate; aggravating factors: eating.   The vomiting began 2 days ago. Frequency: twice. The emesis contains stomach contents.   The illness is also significant for constipation (bowel movements are daily of formed-firm and occasional pellet-like stool; miralax PRN; PAST TREATMENT: fiber supplement, linzess). The illness does not include chills, dysphagia, odynophagia or bloating. Associated symptoms comments: Ibuprofen PRN- takes about twice a week. Significant associated medical issues include GERD (denies any heartburn or reflux; taking protonix 20 mg once daily (taking since our last visit) & carafate 1 gram QID (started on 6/22/2022)) and liver disease (history of hepatitis C).     Review of Systems   Constitutional: Positive for appetite change (decreased). Negative for chills, fatigue, fever and weight loss.   HENT: Negative for sore throat and  "trouble swallowing.    Eyes: Negative for visual disturbance.   Respiratory: Positive for shortness of breath (seen in the ER for it; denies currently). Negative for cough and choking.    Cardiovascular: Positive for chest pain (seen in the ER for it; denies currently).   Gastrointestinal: Positive for abdominal pain, constipation (bowel movements are daily of formed-firm and occasional pellet-like stool; miralax PRN; PAST TREATMENT: fiber supplement, linzess), nausea (frequent; zofran PRN) and vomiting. Negative for anal bleeding, bloating, blood in stool, diarrhea, dysphagia, hematemesis, hematochezia and melena.   Genitourinary: Negative for dysuria, flank pain, frequency, pelvic pain and urgency.   Neurological: Negative for weakness.       Patient's last menstrual period was 03/02/1998. s/p hysterectomy.  Past Medical History:   Diagnosis Date    Anemia     Arthritis     Blood transfusion     Breast cancer     Cancer RIGHT BREAST    Chronic constipation     Clotting disorder     Colon polyp     Diverticulosis     Glaucoma     Hepatitis C 2000    pt states history of hepatits c-"cured by Dr. Lynch"; TREATED 2 YEARS - 2000   OK NOW    Hypothyroid     Insomnia     Malignant neoplasm of breast 4/12/2012    Memory loss     reports some memory loss since chemo    Osteoarthritis     Panic attacks     Raynaud's disease     takes amlodipine for this    Ulcer     Vertigo      Past Surgical History:   Procedure Laterality Date    APPENDECTOMY      BLADDER SURGERY  2011    sling - Dr Giles  - [facility]    BRAIN SURGERY  2007    temporal neuralgia - [facility]    BREAST BIOPSY      BREAST LUMPECTOMY  03/2012    malignant - had chemo and radiotherapy    COLONOSCOPY  01/03/2011    Dr Lynch, in media section, diverticulosis, hemorrhoids, otherwise normal findings; normal findings on random biopsies    COLONOSCOPY N/A 06/01/2017    Procedure: COLONOSCOPY;  Surgeon: Nate Lamb MD;  " Location: [facility];  Service: Endoscopy;  Laterality: N/A; hemorrhoids, diverticulosis, 1 colon polyp removed; Repeat colonoscopy in 5 years for surveillance; biopsy: Tubular adenoma, random biopsies unremarkable    EYE SURGERY      cataracts bilateral    HYSTERECTOMY      JOINT REPLACEMENT  09/25/2017    left Knee [facility]     removal of port a cath      right lymph node removed from breast area      TUNNELED VENOUS PORT PLACEMENT  04/2012    left chest    UPPER GASTROINTESTINAL ENDOSCOPY  prior to 2011    small hiatal hernia     Family History   Problem Relation Age of Onset    Breast cancer Mother     Diabetes Father     Heart disease Father     Breast cancer Sister     Breast cancer Sister     Early death Maternal Grandmother         tonsils    Heart disease Maternal Grandfather     Alcohol abuse Maternal Grandfather     Tuberculosis Paternal Grandfather     Drug abuse Son     Obesity Son     Diabetes Maternal Aunt     Heart disease Maternal Uncle     Diabetes Paternal Aunt     Tuberculosis Paternal Uncle         x2    Diabetes Paternal Uncle     Breast cancer Other     Ovarian cancer Neg Hx     Colon cancer Neg Hx     Colon polyps Neg Hx     Crohn's disease Neg Hx     Ulcerative colitis Neg Hx     Melanoma Neg Hx     Lupus Neg Hx     Psoriasis Neg Hx     Eczema Neg Hx      Social History     Tobacco Use    Smoking status: Never Smoker    Smokeless tobacco: Never Used   Substance Use Topics    Alcohol use: Yes     Alcohol/week: 7.0 - 9.0 standard drinks     Types: 2 Standard drinks or equivalent, 5 - 7 Shots of liquor per week    Drug use: No     Wt Readings from Last 10 Encounters:   06/24/22 65.4 kg (144 lb 2.9 oz)   06/22/22 63.5 kg (140 lb)   06/18/22 66.7 kg (147 lb 0.8 oz)   06/18/22 68 kg (150 lb)   06/03/22 68.2 kg (150 lb 7.4 oz)   06/01/22 67 kg (147 lb 11.3 oz)   06/01/22 66.8 kg (147 lb 4.3 oz)   05/31/22 67.1 kg (148 lb)   05/24/22 67.1 kg (148  lb)   02/25/22 67.1 kg (148 lb)     Lab Results   Component Value Date    WBC 4.64 06/22/2022    HGB 15.0 06/22/2022    HCT 42.2 06/22/2022    MCV 88 06/22/2022     06/22/2022     CMP  Sodium   Date Value Ref Range Status   06/22/2022 138 136 - 145 mmol/L Final     Potassium   Date Value Ref Range Status   06/22/2022 5.0 3.5 - 5.1 mmol/L Final     Chloride   Date Value Ref Range Status   06/22/2022 106 95 - 110 mmol/L Final     CO2   Date Value Ref Range Status   06/22/2022 28 22 - 31 mmol/L Final     Glucose   Date Value Ref Range Status   06/22/2022 99 70 - 110 mg/dL Final     Comment:     The ADA recommends the following guidelines for fasting glucose:    Normal:       less than 100 mg/dL    Prediabetes:  100 mg/dL to 125 mg/dL    Diabetes:     126 mg/dL or higher       BUN   Date Value Ref Range Status   06/22/2022 10 7 - 18 mg/dL Final     Creatinine   Date Value Ref Range Status   06/22/2022 0.81 0.50 - 1.40 mg/dL Final   07/27/2012 0.8 0.2 - 1.4 mg/dl Final     Calcium   Date Value Ref Range Status   06/22/2022 9.2 8.4 - 10.2 mg/dL Final   07/27/2012 8.4 (L) 8.6 - 10.2 mg/dl Final     Total Protein   Date Value Ref Range Status   06/22/2022 8.1 6.0 - 8.4 g/dL Final     Albumin   Date Value Ref Range Status   06/22/2022 4.5 3.5 - 5.2 g/dL Final     Total Bilirubin   Date Value Ref Range Status   06/22/2022 1.2 0.2 - 1.3 mg/dL Final     Alkaline Phosphatase   Date Value Ref Range Status   06/22/2022 53 38 - 145 U/L Final   07/25/2012 64 23 - 119 UNIT/L Final     AST   Date Value Ref Range Status   06/22/2022 46 (H) 14 - 36 U/L Final   07/25/2012 19 10 - 30 UNIT/L Final     ALT   Date Value Ref Range Status   06/22/2022 21 0 - 35 U/L Final     Anion Gap   Date Value Ref Range Status   06/22/2022 4 (L) 8 - 16 mmol/L Final   07/27/2012 7 5 - 15 meq/L Final     eGFR if    Date Value Ref Range Status   06/22/2022 >60 >60 mL/min/1.73 m^2 Final     eGFR if non    Date Value Ref  Range Status   06/22/2022 >60 >60 mL/min/1.73 m^2 Final     Comment:     Calculation used to obtain the estimated glomerular filtration  rate (eGFR) is the CKD-EPI equation.        Lab Results   Component Value Date    LIPASE 9 05/31/2022     Lab Results   Component Value Date    LIPASERES 49 06/22/2022     Lab Results   Component Value Date    TSH 30.548 (H) 06/03/2022     Reviewed prior medical records including radiology report of 6/22/2022 CTA abdomen pelvis; 6/18/2022 CT abdomen pelvis; 6/3/2022 abdominal ultrasound; 6/18/2022 ER visit note; & endoscopy history (see surgical history).    Objective:      Physical Exam  Vitals and nursing note reviewed.   Constitutional:       General: She is not in acute distress.     Appearance: Normal appearance. She is well-developed. She is not diaphoretic.   HENT:      Mouth/Throat:      Lips: Pink.      Mouth: Mucous membranes are moist. No oral lesions.      Pharynx: Oropharynx is clear. No pharyngeal swelling or posterior oropharyngeal erythema.   Eyes:      General: No scleral icterus.     Conjunctiva/sclera: Conjunctivae normal.      Pupils: Pupils are equal, round, and reactive to light.   Pulmonary:      Effort: Pulmonary effort is normal. No respiratory distress.      Breath sounds: Normal breath sounds. No wheezing.   Abdominal:      General: Bowel sounds are normal. There is no distension or abdominal bruit.      Palpations: Abdomen is soft. Abdomen is not rigid. There is no mass.      Tenderness: There is abdominal tenderness (mild) in the epigastric area. There is no guarding or rebound. Negative signs include West's sign and McBurney's sign.   Skin:     General: Skin is warm and dry.      Coloration: Skin is not pale.      Findings: No erythema or rash.      Comments: Non-jaundiced   Neurological:      Mental Status: She is alert and oriented to person, place, and time.   Psychiatric:         Behavior: Behavior normal.         Thought Content: Thought content  normal.         Judgment: Judgment normal.         Assessment:       1. Epigastric pain    2. Periumbilical pain    3. Non-intractable vomiting with nausea    4. Elevated TSH    5. Elevated AST (SGOT)    6. History of hepatitis C    7. History of colon polyps    8. History of chronic constipation    9. History of shortness of breath    10. Nonspecific chest pain        Plan:       Epigastric pain & Periumbilical pain  -  INCREASE TO   pantoprazole (PROTONIX) 40 MG tablet; Take 1 tablet (40 mg total) by mouth before breakfast.  Dispense: 30 tablet; Refill: 0  - CONTINUE CARAFATE 1 GRAM QID AS DIRECTED  - avoid/minimize use of NSAIDs- since they can cause GI upset, bleeding and/or ulcers. If NSAID must be taken, recommend take with food.    Non-intractable vomiting with nausea  -  INCREASE TO   pantoprazole (PROTONIX) 40 MG tablet; Take 1 tablet (40 mg total) by mouth before breakfast.  Dispense: 30 tablet; Refill: 0  - schedule EGD, discussed procedure with patient, including risks and benefits, patient verbalized understanding  - CONTINUE ZOFRAN PRN AS DIRECTED FOR NAUSEA    Elevated TSH  Recommend follow-up with Primary Care Provider/ENDOCRINOLOGY for continued evaluation and management.    Elevated AST (SGOT) & History of hepatitis C  -     Hepatic Function Panel; Future; Expected date: 06/24/2022  -     HEPATITIS C RNA, QUANTITATIVE, PCR; Future; Expected date: 06/01/2022  - minimize/avoid alcohol and tylenol products, & follow-up with PCP for continued evaluation and management; if specialist is needed, recommend seeing hepatology.    History of colon polyps  - schedule Colonoscopy, discussed procedure with the patient, including risks and benefits, patient verbalized understanding    History of chronic constipation  Recommend daily exercise as tolerated, adequate water intake (six 8-oz glasses of water daily), and high fiber diet. OTC fiber supplements are recommended if diet does not reach daily fiber goal  (20-30 grams daily), such as Citrucel or FiberCon (take as directed, separate from other oral medications by >2 hours).  -Recommend taking an OTC stool softener such as Colace as directed to avoid hard stools and straining with bowel movements PRN  - Recommend follow-up with Primary Care Provider/endocrinology for continued evaluation and management of elevated TSH level  - Does not require endoscopic evaluation at this time. However, patient is due for a screening colonoscopy and it will be scheduled.    History of shortness of breath  - follow-up with PCP/pulmonology for continued evaluation and management ASAP  - if experiencing symptoms of headache, chest pain, severe/persistent shortness of breath, dizziness, and/or blurred vision, recommend going to ER for further evaluation and management    Nonspecific chest pain  - follow-up with PCP &/or cardiologist for continued evaluation and management ASAP  - if experiencing symptoms of headache, chest pain, shortness of breath, and/or blurred vision, recommend going to ER for further evaluation and management    Follow up in about 1 month (around 7/24/2022), or if symptoms worsen or fail to improve.    If no improvement in symptoms or symptoms worsen, call/follow-up at clinic or go to ER.        49 minutes of total time spent on the encounter, which includes face to face time and non-face to face time preparing to see the patient (eg, review of tests), Obtaining and/or reviewing separately obtained history, Documenting clinical information in the electronic or other health record, Independently interpreting results (not separately reported) and communicating results to the patient/family/caregiver, or Care coordination (not separately reported).

## 2022-06-27 DIAGNOSIS — R17 SERUM TOTAL BILIRUBIN ELEVATED: ICD-10-CM

## 2022-06-27 DIAGNOSIS — Z86.19 HISTORY OF HEPATITIS C: Primary | ICD-10-CM

## 2022-07-02 ENCOUNTER — TELEPHONE (OUTPATIENT)
Dept: HEPATOLOGY | Facility: CLINIC | Age: 77
End: 2022-07-02
Payer: MEDICARE

## 2022-07-14 ENCOUNTER — PATIENT MESSAGE (OUTPATIENT)
Dept: GASTROENTEROLOGY | Facility: CLINIC | Age: 77
End: 2022-07-14
Payer: MEDICARE

## 2022-07-14 RX ORDER — ONDANSETRON 4 MG/1
4 TABLET, ORALLY DISINTEGRATING ORAL EVERY 6 HOURS PRN
Qty: 30 TABLET | Refills: 0 | Status: SHIPPED | OUTPATIENT
Start: 2022-07-14 | End: 2022-07-15

## 2022-07-15 ENCOUNTER — LAB VISIT (OUTPATIENT)
Dept: LAB | Facility: HOSPITAL | Age: 77
End: 2022-07-15
Attending: STUDENT IN AN ORGANIZED HEALTH CARE EDUCATION/TRAINING PROGRAM
Payer: MEDICARE

## 2022-07-15 DIAGNOSIS — E03.1 CONGENITAL HYPOTHYROIDISM WITHOUT GOITER: ICD-10-CM

## 2022-07-15 LAB
T4 FREE SERPL-MCNC: 1.6 NG/DL (ref 0.71–1.51)
TSH SERPL DL<=0.005 MIU/L-ACNC: 0.09 UIU/ML (ref 0.4–4)

## 2022-07-15 PROCEDURE — 84439 ASSAY OF FREE THYROXINE: CPT | Performed by: STUDENT IN AN ORGANIZED HEALTH CARE EDUCATION/TRAINING PROGRAM

## 2022-07-15 PROCEDURE — 84443 ASSAY THYROID STIM HORMONE: CPT | Performed by: STUDENT IN AN ORGANIZED HEALTH CARE EDUCATION/TRAINING PROGRAM

## 2022-07-15 PROCEDURE — 36415 COLL VENOUS BLD VENIPUNCTURE: CPT | Mod: PO | Performed by: STUDENT IN AN ORGANIZED HEALTH CARE EDUCATION/TRAINING PROGRAM

## 2022-07-18 ENCOUNTER — PATIENT MESSAGE (OUTPATIENT)
Dept: FAMILY MEDICINE | Facility: CLINIC | Age: 77
End: 2022-07-18
Payer: MEDICARE

## 2022-07-18 DIAGNOSIS — E03.1 CONGENITAL HYPOTHYROIDISM WITHOUT GOITER: Primary | ICD-10-CM

## 2022-07-18 RX ORDER — LEVOTHYROXINE SODIUM 100 UG/1
100 TABLET ORAL
Qty: 30 TABLET | Refills: 2 | Status: SHIPPED | OUTPATIENT
Start: 2022-07-18 | End: 2022-10-06

## 2022-07-19 ENCOUNTER — OFFICE VISIT (OUTPATIENT)
Dept: FAMILY MEDICINE | Facility: CLINIC | Age: 77
End: 2022-07-19
Payer: MEDICARE

## 2022-07-19 VITALS
RESPIRATION RATE: 16 BRPM | DIASTOLIC BLOOD PRESSURE: 80 MMHG | HEIGHT: 64 IN | WEIGHT: 141.13 LBS | OXYGEN SATURATION: 96 % | HEART RATE: 60 BPM | SYSTOLIC BLOOD PRESSURE: 132 MMHG | BODY MASS INDEX: 24.1 KG/M2 | TEMPERATURE: 98 F

## 2022-07-19 DIAGNOSIS — Z00.00 HEALTHCARE MAINTENANCE: Primary | ICD-10-CM

## 2022-07-19 DIAGNOSIS — K21.9 GASTROESOPHAGEAL REFLUX DISEASE, UNSPECIFIED WHETHER ESOPHAGITIS PRESENT: ICD-10-CM

## 2022-07-19 DIAGNOSIS — I70.0 AORTIC CALCIFICATION: ICD-10-CM

## 2022-07-19 DIAGNOSIS — E78.2 MIXED HYPERLIPIDEMIA: ICD-10-CM

## 2022-07-19 DIAGNOSIS — F33.40 RECURRENT MAJOR DEPRESSIVE DISORDER, IN REMISSION: ICD-10-CM

## 2022-07-19 DIAGNOSIS — R11.0 NAUSEA: ICD-10-CM

## 2022-07-19 PROCEDURE — 99397 PER PM REEVAL EST PAT 65+ YR: CPT | Mod: S$PBB,GY,, | Performed by: STUDENT IN AN ORGANIZED HEALTH CARE EDUCATION/TRAINING PROGRAM

## 2022-07-19 PROCEDURE — 99215 OFFICE O/P EST HI 40 MIN: CPT | Mod: PBBFAC,PO | Performed by: STUDENT IN AN ORGANIZED HEALTH CARE EDUCATION/TRAINING PROGRAM

## 2022-07-19 PROCEDURE — 99999 PR PBB SHADOW E&M-EST. PATIENT-LVL V: CPT | Mod: PBBFAC,,, | Performed by: STUDENT IN AN ORGANIZED HEALTH CARE EDUCATION/TRAINING PROGRAM

## 2022-07-19 PROCEDURE — 99397 PR PREVENTIVE VISIT,EST,65 & OVER: ICD-10-PCS | Mod: S$PBB,GY,, | Performed by: STUDENT IN AN ORGANIZED HEALTH CARE EDUCATION/TRAINING PROGRAM

## 2022-07-19 PROCEDURE — 99999 PR PBB SHADOW E&M-EST. PATIENT-LVL V: ICD-10-PCS | Mod: PBBFAC,,, | Performed by: STUDENT IN AN ORGANIZED HEALTH CARE EDUCATION/TRAINING PROGRAM

## 2022-07-19 RX ORDER — ONDANSETRON 4 MG/1
4 TABLET, FILM COATED ORAL EVERY 6 HOURS PRN
Qty: 30 TABLET | Refills: 1 | Status: SHIPPED | OUTPATIENT
Start: 2022-07-19 | End: 2022-08-18

## 2022-07-19 NOTE — PATIENT INSTRUCTIONS
Saad Brice, Please read below to learn more on healthy choices, screenings, and useful information related to your health.  Most of my patients read it. Most of my healthy patients complete their cancer screenings. Don't lose out on improving your health.     Table of Contents:     Overdue health maintenance   Cancer prevention  Explanation on the different components of your blood work and interpretation  Frequently asked questions   Blood pressure log     If you are due for any health screening(s) below please notify me so we can arrange them to be ordered and scheduled to maintain your health. Most healthy patients at your age complete them.   Health Maintenance Due   Topic    Lipid Panel     COVID-19 Vaccine (4 - Booster for Pfizer series)    Colonoscopy            Colon Cancer Screening    Of cancers affecting both men and women, colorectal cancer is the third leading cancer killer in the [place]. But it doesnt have to be. Screening can prevent colorectal cancer or find it at an early stage when treatment often leads to a cure.    A colonoscopy is the preferred test for detecting colon cancer. It is needed only once every 10 years if results are negative. While sedated, a flexible, lighted tube with a tiny camera is inserted into the rectum and advanced through the colon to look for cancers. An alternative screening test that is used at home and returned to the lab may also be used. It detects hidden blood in bowel movements which could indicate cancer in the colon. If results are positive, you will need a colonoscopy to determine if the blood is a sign of cancer. This type of follow up (diagnostic) colonoscopy usually requires additional copays as required by your insurance provider. Please contact your PCP if you have any questions.    Although you are still overdue for this important screening, due to the COVID-19 pandemic, we recommend scheduling it in the near future.                    Cancer  "Prevention    Why should you choose to get screened for cancer? One simple reason is because you are important. You matter and deserve to have the best health so you can fulfill your great potential.       Colon Cancer screening - Most colon cancers can be prevented by screening. In most cases a polyp in the colon can grow and is not cancerous at first, but it can become cancerous years later. A polyp is like a seed that can grow into a weed if it is left in the soil. If the seed is detected and removed, no weed sprouts. A FIT test/Cologuard test and colonoscopy can detect precancerous polyps and lead to the prevention of cancer. A polyp is just like a seed that can be removed before the roots take hold. A colonoscopy can remove these polyps and eliminate the chance that these polyps turn into cancer.     Cervical Cancer screening- A PAP smear can detect precancerous cells that can become cervical cancer and can lead to procedures to remove them. It is very important to get a PAP smear as investing these few minutes can help prevent a lot of trouble down the road. If you want to do the most you can do to spend more time with your family and friends', cancer screenings can help with that goal.     Breast Cancer screening- Mammograms can detect breast cancer before it has spread and early detection allows the ability to remove the lesion prior to any spread. It is similar to a small rotten spot on fruit. If the spoiled part is removed quickly it can preserve the rest of the fruit, but if it is left alone it will corrupt the whole peach.     Smoking Cessation- "Smoking is like a chimney. The tar builds up and causes a back draft which leads to a cough. Smoking is like sitting in a car with a blocked exhaust system." Smoking can increase your risk for lung, mouth, throat, nose, esophagus, bladder, kidney, ureter, pancreas, stomach, liver, cervix, ovary, bowel, and leukemia [2]. If you have smoked in the past, you might " "meet the criteria for a CT lung cancer screening.     Lung Cancer Screening - "The U.S. Preventive Services Task Force (USPSTF) recommendsexternal icon yearly lung cancer screening with LDCT for people who--have a 20 pack-year or more smoking history, and smoke now or have quit within the past 15 years, and are between 50 and 80 years old. A pack-year is smoking an average of one pack of cigarettes per day for one year. For example, a person could have a 20 pack-year history by smoking one pack a day for 20 years or two packs a day for 10 years." [3]    Hypertension - Common high blood pressure meds may lower colorectal cancer risk-JANES, July 6, 2020 - Hypertension Journal Report Medications commonly prescribed to treat high blood pressure may also reduce patients' colorectal cancer risk, according to new research published today in Hypertension, an American Heart Association journal." [4].     Low HbA1c - "Higher HbA1c levels (within both the non?diabetic and diabetic ranges) were associated with the risk of colorectal cancer (Model 2; P linear?=?0.009), especially for colon cancer." [5].    A healthy diet, exercise, limitation of alcohol use, certain infections, avoidance of radiation/environmental toxins, and staying lean lowers your cancer risk [6].     I want to empower you to make informed choices for your health. I have a listed below the most common blood components that we check for when you get blood work. I discuss what each component measures along with common reasons for why they can be abnormal. If you are interested in understanding your blood work better, please read the lab explanation attached below.     Explanation of Lab Results    Please note: This information is included as a reference to help you better understand your lab results and is not be used for diagnosis.     Lipid Panel:  Cholesterol is a measure of cardiac risk and stroke risk. A lipid panel measures total cholesterol and " provides readings which are broken down into 3 subsections: Triglycerides, HDL and LDL.    Total Cholesterol: Your total blood cholesterol is a measure of LDL cholesterol, HDL cholesterol, and other lipid components.  If your individual lipid level components are in the normal range and you have an elevated total cholesterol level we are generally not to concerned since we primarily look at the LDL cholesterol which is considered the bad cholesterol which can lead to heart attacks and strokes.  Your calculated cardiac risk is the most important factor in deciding if you would benefit from a cholesterol-lowering medication that the total cholesterol level.    Triglycerides are most diet and weight sensitive. They are affected easily by lifestyle changes. Ideally, this reading should be less than 150, although if the remainder of the cholesterol panel is within normal limits, we will tolerate upwards of 250-300. For the most part, if triglycerides are the only part of your cholesterol panel reading as 'abnormal', it is best to lose weight and modify your diet, including less saturated fat and less simple sugars. We only start medication to lower this is the level is greater than 500 since this can increase ones risk for pancreatitis. This is not the cholesterol type that leads to heart attacks.     HDL is your 'good cholesterol.' Ideally, the reading should be over 40 and is particularly helpful when it is greater than 60. Research indicates that high HDL is extremely protective; and if your HDL level is greater than 60, we tolerate far worse LDL or triglyceride levels. For the most part, HDL is responsive to aerobic activity and ideal weight. We do not have medicines that increase the HDL reading very easily.    LDL is your 'bad cholesterol.' Our goals depend on how many cardiac risk factors you have.     High LDL: If you are diabetic or have had a heart attack, we like the LDL level to be less than 100. If you have  2 cardiac risk factors (such as diabetes, hypertension, family history, a low HDL and smoking), we like your LDL to be less than 130. If you have 0-2 cardiac risk factors, we like your LDL level to be less than 160, but we may not necessarily initiate medications to 190 unless you cannot lower it. The latest guidelines recommend to consider taking a statin only if your ASCVD cardiac risk score is at least greater than 7.5% and a strong recommendation if your risk score is greater than 10%.     Low LDL: Normal or low LDL is considered good and having an LDL below the normal range is not of a concern.     Complete Blood Count (CBC):    White blood cells: These are infection fighting cells. Mild fluctuations may represent minor illness at the time of blood draw. Marked fluctuations can represent immune diseases. This can also be elevated from smoking.     High WBC: Elevation in wbc can commonly be caused by an infection, steroid use, or any cause of inflammation such as many rheumatologic diseases, surgery, or trauma.      Low WBC: Many different things can cause this such as infections, medications, rheumatologic disorders, malignancies, nutritional deficiencies, and a normal variant in some individuals. If this occurs it is common practice to rule out a few viruses such as HIV, Hep C, B12, folate along with a a peripheral blood smear to look for abnormalities. If you are otherwise healthy with no concerning symptoms and the workup is negative we usually monitor it with yearly blood work.  If there are other abnormal cell line abnormalities sometimes we refer to hematology to further evaluate.    Hemoglobin: A key indicator for anemia. Ranges depend on age. If you are significantly out of this range, we may need to talk with you.    High Hemoglobin: This can be elevated in some blood disorders, sleep apnea, chronic lung disease, kidney disease, testosterone use, dehydration, smoking, along with some other rare causes.      Low Hemoglobin: Many things can lead to this but the most common cause is an iron deficiency in females who lose blood during their periods, decreased absorption due to antacids, poor diet, sickle cell, anemia of chronic disease, malignancy, bleeding from the gut, along with some other less common causes. If you are a young female who has periods it is usually assumed that this is from that blood loss unless other symptoms or abnormal blood work is present. If you are above 45 and have an iron deficiency it is always recommended to get a colonoscopy to ensure that there is no lesion causing blood loss and to exclude malignancy.     Hematocrit: Another way of looking at anemia, much like your hemoglobin. If you are significantly out of this range, we may need to talk with you.    MCV: This looks at the size of your red blood cells.     High MCV:  A large number may indicate a B-12 deficiency, folate deficiency, alcohol use, liver disease, medication-induced phenomenon, or a need for further workup.    Low MCV: A small number may imply iron anemia or genetic disorder such as alpha-thalassemia. We may check iron studies called to see if you are low.    MCH: Much like MCV above.    Platelets: These are clotting factors. We tolerate a broad range in this. If your numbers are less than 100, we may need to work it up.     High Platelet Count: If your numbers are elevated, this could represent ongoing inflammation within the body which can be caused by any infection, illness, iron deficiency, or other malignancy. We usually recheck it to monitor for improvement and if it does not resolve will work it up with more blood work.     Low Platelet Count: Many things can cause this such as infections, alcohol use, medications, liver disease, vitamin deficiencies, aspleenia, and some other rare blood disorders. If it persists many times we refer to hematology if a cause is not identified.     Iron:  This is not a reliable blood  marker since this fluctuates throughout the day and if you are fasting many times your iron level in your blood is low but this does not necessarily mean you have a deficiency.  There are more reliable ways to measure your iron stores and these are discussed below.    Transferrin:  Many things can cause an abnormal result and this is not as important as some other labs mentioned below.    TIBC:  This is the total iron-binding capacity and this measures the total amount of iron that can be bound by proteins in the blood.  If you have an elevated TIBC this is a good marker that you could be low on iron and this is useful blood marker.  A simple way to think of this is seats in a car. The TIBC is the number of open seats that iron can sit in. If you have a high TIBC (number of open seats) that means you have a low iron level.     Ferritin:  A low ferritin is almost always caused from an iron deficiency.  A normal ferritin level does not need necessarily exclude an iron deficiency since many inflammatory conditions such as kidney disease, diabetes, arthritis, and obesity can raise this level hiding an iron deficiency.  If you are healthy with no inflammatory conditions this is a very reliable test for detecting an iron deficiency since nothing else lowers your ferritin level except a low iron level. Keep in mind that you can have an iron deficiency if your ferritin level is normal but your TIBC is elevated.  If you have a low iron level the next step is always to identify why you have a low iron level.    CMP (Comprehensive Metabolic Panel):  Broadly, this is a test of organ function including the kidneys, liver, and electrolyte levels.    Glucose: This is primarily looking for diabetes. We like your blood glucose level to be less than 100 when fasting. Readings of 100-125 indicate what we call 'pre-diabetes' or 'glucose intolerance.' This does not necessarily indicate diabetes, but we may check another test called a  hemoglobin A1C for confirmation. This level puts you at great risk for becoming a true diabetic and we would encourage the reduction of simple sugars and processed white flour as well as appropriate weight loss. If this number is greater than 125, it is likely you are diabetic; we will get an additional hemoglobin A1C test and will likely schedule you for an appointment. If you notice that your blood glucose is above 125 and we have not scheduled a follow-up appointment, please call us. Patients who are known diabetics can have readings greater than 125.    Urea Nitrogen: This is a kidney function and maybe elevated because of mild dehydration or because of excessive muscle breakdown from aggressive exercise habits.    Creatinine: This also is a kidney function test. It may be mildly elevated if you have particularly large muscle bulk or taking a supplement like creatine. It is related to the GFR. It is a muscle product that we track to look at your kidney function. If this is elevated and new, we may need to talk with you. If you have had this mildly elevated in the past, it is likely that we will just track it to ensure that it does not worsen quickly. Some medications may gently worsen this, namely blood pressure pills called ace inhibitors. To some degree, we will permit levels of up to 1.6.    Sodium: This is essentially the concentration of salt within the body. This may be mildly low because of dehydration, overhydration, diuretics, .    Potassium: This is an electrolyte that can cause muscular cramping or cardiac difficulties. It is sometimes lowered by diuretic medications.    Chloride: For the most part, this is only relevant if the other electrolytes are abnormal.    CO2: This is a function of acid balance within the body. For the most part, mild abnormalities are not important and may represent a starvation or dehydration state when blood was drawn. Many medications can change this level as well such as  diuretics, acetazolamide, calcium carbonate, laxatives, aspirin, and many others.    High CO2: Many things can cause this which includes dehydration, sleep apnea, and COPD.     Low CO2: Many things can lead to a low level and if you are generally healthy we are usually  not concerned. Diarrhea, renal disease, diabetes, and many medications can cause this.     Anion Gap: Only relevant if your CO2 is abnormal.    Calcium: This is not related to dietary intake of calcium. It may fluctuate gently based on the amount of protein within your body. If it is above 10.9, we may need to do additional testing. Many times if low the albumin level is low and once the corrected calcium level is calculated the calcium is in a normal range.     Total protein: This looks at protein within the body. Markedly elevated levels can represent an immune response and may require further workup.    Albumin: This may be elevated because of particularly high level of fitness. If it is markedly suppressed, it may represent organ dysfunction, particularly in the liver or kidneys.    AST and ALT: These are enzymes in the liver and if elevated can indicate liver damage.     Elevated AST/ALT: Many things can caused elevated liver enzymes and the most common reason is viral infections, alcohol use, medications, supplements, autoimmune, along with some other rare causes. One of the most common reasons for a mild elevation in liver enzymes (less than 3 times the upper limit) is fatty liver disease. Many individuals who have extra fat in their diet store this in the liver and this can build up and cause a mild elevation. This is usually diagnosed with a liver ultrasound and exclusion of other causes. The only treatment for this is diet and exercise along with avoidance of liver toxic medications and alcohol. It is standard of care to rule our viral hepatitis and get imaging of the liver if elevated. This is monitored and if I feel concerned will refer  to hepatology.     Alk Phos: Part of liver function or bones    High Alk Phos: elevated levels may indicate a liver injury or obstruction of bile flow. Elevated levels can also be seen in vitamin D deficiency, drug induced, or bone disorders.     Low Alk Phos: In most cases having a low Alkaline Phosphatase enzyme activity is not due to any disease and is simply a normal variant.  Having an elevated Alk Phos is more concerning and associated with many diseases and thus I would not be overly concerned with a low level which is seen in many health individuals. The reasons for a low serum alkaline phosphatase activity were reviewed in a 1-yr retrospective study and in this study it was found that no cause was found in most cases.  Low activity values were recorded in several individuals in the absence of any obvious cause. This would suggest that the definition of the lower limit of the reference range for alkaline phosphatase is arbitrary, thus limiting the use of low serum activity as a marker of disease.  In some cases micronutrients like Zinc (Zn) and Magnesium (Mg) are causes of low ALP activity and you can take zinc or magnesium supplements. Unfortunately, the blood work to measure zinc and magnesium levels are unreliable and not very accurate since it does not test for the intracellular zinc or magnesium levels.     Tarik SOLOMOND, Joaquin JH, Viviana DIANA. Clinical significance of a low serum alkaline phosphatase. Neth J Med. 1992 Feb;40(1-2):9-14. PMID: 6003220.  Lul HERNANDEZ S, Cornel B, Keena I, Behera S, Lul S. Low Alkaline Phosphatase (ALP) In Adult Population an Indicator of Zinc (Zn) and Magnesium (Mg) Deficiency. Curr Res Nutr Food Sci 2017;5(3). doi : http://dx.[email or web address]    Total Bilirubin: This is also part of liver function.    High T Bili: If you have right upper quadrant pain an elevation in total bilirubin can be caused by a gallbladder stone that is blocking the biliary tract that  leads to your gastrointestinal tract. If you have fever and right upper quadrant pain this can sometimes be elevated when your gallbladder is infected and most individuals have nausea with vomiting associated with it. If you have no symptoms and are otherwise healthy this can be caused by Gilbert syndrome which is a benign normal variant. There are other causes such as some anemias but there would be abnormal blood counts.     Low T Bili: Nothing to be concerned about.     Thyroid Stimulating Hormone (TSH): This reading is an indicator of your thyroid function. The thyroid regulates energy levels throughout the entire body, affecting almost every organ system. This is an inverse relationship so a high number actually presents a low thyroid. If this is abnormal, we will often check an additional lab called a Free T4 to evaluate this more carefully. Borderline elevations, those of 5-10, can be watched or worked up further. Please do not take the supplement Biotin for at least a week prior to getting your TSH checked since this can lead to false measurement levels.     Prostate Specific Antigen (PSA): (Men only.) This is a prostate cancer screening test, and is no longer a routine screening test. Levels are truly a function of age. Being less than 4 is typical for someone more in their 60s. If you are young, it should probably be less than 3. A higher PSA result does not necessarily mean that you have cancer, but may indicate a need for a discussion with your provider. Options include observation to look at rate of rise or prostate biopsy. Not only is the absolute value important, but how much it has changed from previous years. Please ensure that there is not a dramatic rise from previous years.    Please Note: This information is included as a reference to help you better understand your lab results and is not be used for diagnosis.    Frequently asked questions    When should I take my blood pressure  "medication?    The latest studies show that taking your blood pressure medication at night is the best time. A recent study showed that this prevents more heart attacks and strokes. See the answer below from [place].     "Q. I've taken blood pressure medicines every morning for many years, and they keep my pressure under control. Recently, my doctor recommended taking them at bedtime, instead. Does that make sense?    A. It actually does make sense -- based on recent research. For many years, there have been at least three theoretical reasons for taking blood pressure medicines before bedtime. First, a body system that strongly affects blood pressure, called the renin-angiotensin system, has its peak activity during sleep. Second, circadian rhythms cause differences in the body chemistry at night compared with daytime. Third, most heart attacks occur in the morning, before medicines taken in the morning have a chance to "kick in."" [6].     When should I take my cholesterol medication?    It used to be recommended to take your cholesterol medication at night since the original statins that lowered cholesterol did not last 24 hours and most cholesterol synthesis is done at night. The long acting statins such as atorvastatin and rosuvastatin last 24 hours so they can be taken any time during the day. Simvastatin, pravastatin, fluvastatin, and lovastatin are shorter acting and should be taken at bed time.     Can supplements affect my blood work?    Yes they can. A very important supplement to not take for at least a week prior to your blood work is biotin. "Biotin supplement use is common and can lead to the false measurement of thyroid hormone in commonly used assays." [8.]    What are conditions that should not be addressed during a virtual visit?    There are some conditions that should not be evaluated via a virtual visit since optimal care is impossible. Chest pain, shortness of breath, lung conditions, abdominal " pain, and any neurological complaint such as headache, dizziness, numbness ect.         When will I get commentary of my blood work?    I review all blood work that you get and I will send out commentary on this via [employer] within 72 hours. In most cases you will get a message from me sooner, but many times not all of the blood work is completed thus I usually wait until all results have returned. If there is a critical abnormality you should be contacted the same day you got blood work.     How frequently do I need to have visits to get controlled substances?    It is standard protocol to have a visit every 3 months if you are taking scheduled substances such as ADHD medications, psychiatric medications, and pain medications. This is to ensure your safety and monitor for any side effects.     When should I bring prior to a visit if I want to lose weight?    I recommend that you make a food diary for a week and fill out what you ate each day. You can bring this form in to your visit and I can look over it to suggest changes that you can make.     Which over the counter medications should I avoid if I have decreased kidney function?    NSAIDs which includes ibuprofen (Advil, Motrin, Nuprin), naproxen (Aleve), meloxicam (Mobic) and diclofenac(Zorvolex, Voltaren) and ketorolac (Toradol) can damage your kidneys if you take this long term.Tylenol does not affect your kidney and thus is safe as long as you don't have liver disease.     Is there an [facility] pharmacy?     Yes! The [facility] is located on the first floor of the [facility]. The address is listed below. You can get curbside pickup if you call their number at [phone]. One of the many benefits of using the [facility] pharmacy is that the pharmacists can contact me directly if a cheaper alternative is available to save you money. They also see your note to know more about what the medication was prescribed for. I recommend this pharmacy since  communication with me is quick in case any confusion arises on your medications.     [address]  [address]  [place] [address]  Phone: [phone]    Hours:  Monday - Friday  8:00 a.m. - 5:30 a.m.    Why is it not in my best interest to call in order to get an antibiotic?    Medicine is a complex field and many times the correct diagnosis is critical in order to provide the correct care. One of the most important goals of a healthcare provider is to ensure that no dangerous condition is masquerading as a mild illness. Specific questions are very important to obtain during an examination that provide a wealth of information to understand your illness. Health care providers are trained to investigate for signs that can be dangerous to your health. Messaging or calling the office in order to get an antibiotic can be very dangerous.     For example, many urinary tract infections can lead to an infection in the kidney that can result in a serious blood stream infection that can lead to hospitalization if not recognized. A cough can be caused by many different things and not necessarily an infection. It is not uncommon that one assumes a cough is from an infection when it is actually caused by a blood clot in the lungs. This can lead to death. Determining your risk can only be performed after a thorough history and examination. A few sentences through e-mail is not enough.     What are some common symptoms that should be evaluated by the emergency department and not by phone or e-mail?    This does not include every symptom, but common examples of symptoms that should prompt one to go to the emergency department are chest pain, chest pressure, shortness of breath, difficulty breathing, abdominal pain, weakness or numbness or an extremity, sudden weakness or drooping on one side of the body, speaking difficulty, unusual or bad headache (particularly if it started suddenly), head injury, confusion, seizure, passing  "out, lightheaded, pain in arm or jaw, suddenly not able to speak, see, walk, or move, dizziness, neck stiffness, suicidal thoughts, testicular pain, cuts and wounds, severe pain, along with many others. This is not an inclusive list.     Outside Records    It is common to have an colonoscopy, mammogram, PAP smear, or eye exam done outside the [facility] system. Many times we do not get the records automatically sent to us. Please call your provider's office to notify them to fax us your records so that we can have the most up to date information. Your provider will review your outside results in order to provide you with the complete care that you deserve. We appreciate that you decided to choose us to be serving on your healthcare team and the more information we have about your health is essential.     If I have a psychiatric crisis what should I do?    If you ever feel that there is a risk of a harm to yourself we recommend to go to the emergency room. There is a National Suicide Prevention lifeline number 0-044-160-TALK which route you to the nearest crisis center. There is also a suicide hotline 7-502-GPQBUSA (1-272.151.3298).    Patient Education       Checking Your Blood Pressure at Home   The Basics   Written by the doctors and editors at [facility]   How is blood pressure measured? -- Blood pressure is usually measured with a device that goes around your upper arm. This is often done in a doctor's office. But some people also check their blood pressure themselves, at home or at work.  Blood pressure is explained with 2 numbers. For instance, your blood pressure might be "140 over 90." The first (top) number is the pressure inside your arteries when your heart is maninder. The second (bottom) number is the pressure inside your arteries when your heart is relaxed. The table shows how doctors and nurses define high and normal blood pressure (table 1).  If your blood pressure gets too high, it puts you at risk " "for heart attack, stroke, and kidney disease. High blood pressure does not usually cause symptoms. But it can be serious.  What is a home blood pressure meter? -- A home blood pressure meter (or "monitor") is a device you can use to check your blood pressure yourself. It has a cuff that goes around your upper arm (figure 1). Some devices have a cuff that goes around your wrist instead. But doctors aren't sure if these work as well. The meter also has a small screen, or dial, that shows your blood pressure numbers.  There are also special meters you can wear for a day or 2. These are different because they automatically check your blood pressure throughout the day and night, even while you are sleeping. If your doctor thinks you should use one of these devices, they will talk to you about how to wear it.  Why do I need to check my blood pressure at home? -- If your doctor knows or suspects that you have high blood pressure, they might want you to check it at home. There are a few reasons for this. Your doctor might want to look at:  Whether your blood pressure measures the same at home as it did in the doctor's office  How well your blood pressure medicines are working  Changes in your blood pressure, for example, if it goes up and down  People who check their own blood pressure at home usually do better at keeping it low.  How do I choose a home blood pressure meter? -- When choosing a home blood pressure meter, you will probably want to think about:  Cost - Some devices cost more than others. You should also check to see if your insurance will help pay for your device.  Size - It's important to make sure the cuff fits your arm comfortably. Your doctor or nurse can help you with this.  How easy it is to use - You should make sure you understand how to use the device. You also need to be able to read the numbers on the screen.  You do not need a prescription to buy a home blood pressure meter. You can buy them at " most pharmacies or over the internet. Your doctor or nurse can help you choose the right device for you.  How do I check my blood pressure at home? -- Once you have a home blood pressure meter, your doctor or nurse should check it to make sure it fits you and works correctly.  When it's time to check your blood pressure:  Go to the bathroom and empty your bladder first. Having a full bladder can temporarily increase your blood pressure, making the results inaccurate.  Sit in a chair with your feet flat on the ground.  Try to breathe normally and stay calm.  Attach the cuff to your arm. Place the cuff directly on your skin, not over your clothing. The cuff should be tight enough to not slip down, but not uncomfortably tight.  Sit and relax for about 3 to 5 minutes with the cuff on.  Follow the directions that came with your device to start measuring your blood pressure. This might involve squeezing the bulb at the end of the tube to inflate the cuff (fill it with air). With some monitors, you just need to press a button to inflate the cuff. When the cuff fills with air, it feels like someone is squeezing your arm, but it should not hurt. Then you will slowly deflate the cuff (let the air out of it), or it will deflate by itself. The screen or dial will show your blood pressure numbers.  Stay seated and relax for 1 minute, then measure your blood pressure again.  How often should I check my blood pressure? -- It depends. Different people need to follow different schedules. Your doctor or nurse will tell you how often to check your blood pressure, and when. Some people need to check their blood pressure twice a day, in the morning and evening.  Your doctor or nurse will probably tell you to keep track of your blood pressure for at least a few days (table 2). Then they will look at the numbers. The reason for this is that it's normal for your blood pressure to change a bit from day to day. For example, the numbers might  "change depending on whether you recently had caffeine, just exercised, or feel stressed. Checking your blood pressure over several days - or longer - will give your doctor or nurse a better idea of what is average for you.  How should I keep track of my blood pressure? -- Some blood pressure meters will record your numbers for you, or send them to your computer or smartphone. If yours does not do this, you will need to write them down. Your doctor or nurse can help you figure out the best way to keep track of the numbers.  What if my blood pressure is high? -- Your doctor or nurse will tell you what to do if your blood pressure is high when you check it at home. If you get a number that is higher than normal, measure it again to see if it is still high. If it is very high (above a certain number, which your doctor or nurse will tell you to watch out for), you should call your doctor right away.  If your blood pressure is only a little high, your doctor or nurse might tell you to keep checking it for a few more days or weeks, and then call if it does not go back down. Then they can help you decide what to do next.  All topics are updated as new evidence becomes available and our peer review process is complete.  This topic retrieved from [employer] on: Sep 21, 2021.  Topic 359380 Version 4.0  Release: 29.4.2 - C29.263  © 2021 UpToDate, Inc. and/or its affiliates. All rights reserved.  table 1: Definition of normal and high blood pressure  Level  Top number  Bottom number    High 130 or above 80 or above   Elevated 120 to 129 79 or below   Normal 119 or below 79 or below   These definitions are from the American College of Cardiology/American Heart Association. Other expert groups might use slightly different definitions.  "Elevated blood pressure" is a term doctor or nurses use as a warning. It means you do not yet have high blood pressure, but your blood pressure is not as low as it should be for good health.  Graphic " 25344 Version 6.0  figure 1: Using a home blood pressure meter     This is an example of a person using a home blood pressure meter.  Graphic 618053 Version 1.0    Consumer Information Use and Disclaimer   This information is not specific medical advice and does not replace information you receive from your health care provider. This is only a brief summary of general information. It does NOT include all information about conditions, illnesses, injuries, tests, procedures, treatments, therapies, discharge instructions or life-style choices that may apply to you. You must talk with your health care provider for complete information about your health and treatment options. This information should not be used to decide whether or not to accept your health care provider's advice, instructions or recommendations. Only your health care provider has the knowledge and training to provide advice that is right for you. The use of this information is governed by the [employer] End User License Agreement, available at https://www.[employer]/en/solutions/[employer]fly/about/mar.The use of [employer] content is governed by the [employer] Terms of Use. ©2021 UpToDate, Inc. All rights reserved.  Copyright   © 2021 UpToDate, Inc. and/or its affiliates. All rights reserved.      Daily Blood Pressure Log    Please print this form to assist you in keeping track of your blood pressure at home.      Name:  Date of Birth:       Average Blood Pressure:           Date: Time  (a.m.) Blood  Pressure: Pulse  Rate: Time  (p.m.) Blood  Pressure : Pulse  Rate: Comments:   Sample 8:37 127/83 84    Stressful morning                                                                                                                                                                                                     Wishing you good health,     Bhaskar Olivera MD      References:  1-https://www.[employer]/speakers/anne marie_anupamas  2-https://www.[employer].com.au/news/wixco-hpb-90-cancers-that-can-gu-kdbggj-tn-smoking/  3-https://www.cdc.gov/cancer/lung/basic_info/screening.htm  4-https://newsroom.heart.org/news/common-hypertension-medications-may-reduce-colorectal-cancer-risk  5-Zeeshano A, Nathen M, Matthewada N, et al. High hemoglobin A1c levels within the non-diabetic range are associated with the risk of all cancers. Int J Cancer. 2016;138(7):6091-4456. doi:10.1002/ijc.76600  6-https://www.health.[employer].edu/newsletter_article/the-10-commandments-of-cancer-prevention  7-https://www.health.[employer].edu/diseases-and-conditions/should-i-take-blood-pressure-medications-at-night  8-Carly TIERNEY et al 2018 Prevalence of biotin supplement usage in outpatients and plasma biotin concentrations in patients presenting to the emergency department. Clin Biochem. Epub 2018 Jul 20. PMID: [id number].

## 2022-07-19 NOTE — PROGRESS NOTES
[facility] Note    Subjective:    The HPI and pertinent ROS is included in the Diagnostic Impression Remarks section at the end of the note. Please see below for further details. Chief complaint is at end of note.     Medication List with Changes/Refills   New Medications    ONDANSETRON (ZOFRAN) 4 MG TABLET    Take 1 tablet (4 mg total) by mouth every 6 (six) hours as needed for Nausea.   Current Medications    AMLODIPINE (NORVASC) 2.5 MG TABLET    TAKE 1 TABLET DAILY    ANASTROZOLE (ARIMIDEX) 1 MG TAB    TAKE 1 TABLET DAILY    CALCIUM CARBONATE/VITAMIN D3 (VITAMIN D-3 ORAL)    Take 400 Units by mouth daily as needed.    CO-ENZYME Q-10 30 MG CAPSULE    Take 30 mg by mouth once daily.    DONEPEZIL (ARICEPT) 5 MG TABLET    Take 1/2 tablet (2.5 mg) QHS x 4 weeks then increase to 1 tablet (5 mg) QHS    ERYTHROMYCIN (ROMYCIN) OPHTHALMIC OINTMENT    Place a 1/2 inch ribbon of ointment into the lower eyelid TID    FISH OIL-OMEGA-3 FATTY ACIDS 300-1,000 MG CAPSULE    Take by mouth once daily.    LEVOTHYROXINE (SYNTHROID) 100 MCG TABLET    Take 1 tablet (100 mcg total) by mouth before breakfast.    LUMIGAN 0.01 % DROP        PANTOPRAZOLE (PROTONIX) 40 MG TABLET    Take 1 tablet (40 mg total) by mouth before breakfast.    PRAVASTATIN (PRAVACHOL) 40 MG TABLET    TAKE 1 TABLET AT BEDTIME    SERTRALINE (ZOLOFT) 50 MG TABLET    Take 1 tablet (50 mg total) by mouth once daily.    SOLIFENACIN (VESICARE) 5 MG TABLET    TAKE 1 TABLET DAILY    SUCRALFATE (CARAFATE) 1 GRAM TABLET    Take 1 tablet (1 g total) by mouth 4 (four) times daily before meals and nightly.   Discontinued Medications    ONDANSETRON (ZOFRAN-ODT) 4 MG TBDL    DISSOLVE ONE TABLET BY MOUTH EVERY 6 HOURS AS NEEDED     Modified Medications    No medications on file       Data reviewed 274}  Previous medical records reviewed and summarized in plan section at end of note.      If you are due for any health screening(s) below please notify me so we can  arrange them to be ordered and scheduled to maintain your health.     Health Maintenance Due   Topic Date Due    Lipid Panel  11/11/2021    COVID-19 Vaccine (4 - Booster for Pfizer series) 03/30/2022    Colonoscopy  06/01/2022       The following portions of the patient's history were reviewed and updated as appropriate: allergies, current medications, past family history, past medical history, past social history, past surgical history and problem list.    She  has a past medical history of Anemia, Arthritis, Blood transfusion, Breast cancer, Cancer (RIGHT BREAST), Chronic constipation, Clotting disorder, Colon polyp, Diverticulosis, Glaucoma, Hepatitis C (2000), Hypothyroid, Insomnia, Malignant neoplasm of breast (4/12/2012), Memory loss, Osteoarthritis, Panic attacks, Raynaud's disease, Ulcer, and Vertigo.  She  has a past surgical history that includes Hysterectomy; Brain surgery (2007); Tunneled venous port placement (04/2012); right lymph node removed from breast area; Bladder surgery (2011); removal of port a cath; Colonoscopy (01/03/2011); Upper gastrointestinal endoscopy (prior to 2011); Colonoscopy (N/A, 06/01/2017); Breast lumpectomy (03/2012); Eye surgery; Joint replacement (09/25/2017); Breast biopsy; and Appendectomy.    She  reports that she has never smoked. She has never used smokeless tobacco. She reports current alcohol use of about 7.0 - 9.0 standard drinks of alcohol per week. She reports that she does not use drugs.  She family history includes Alcohol abuse in her maternal grandfather; Breast cancer in her mother, other, sister, and sister; Diabetes in her father, maternal aunt, paternal aunt, and paternal uncle; Drug abuse in her son; Early death in her maternal grandmother; Heart disease in her father, maternal grandfather, and maternal uncle; Obesity in her son; Tuberculosis in her paternal grandfather and paternal uncle.    Review of patient's allergies indicates:   Allergen Reactions  "   Iodinated contrast media Shortness Of Breath     Spontaneous jaw movements    Adhesive Dermatitis and Rash    Codeine Nausea Only       Tobacco Use: Low Risk     Smoking Tobacco Use: Never Smoker    Smokeless Tobacco Use: Never Used     Physical Examination  General appearance: alert, cooperative, no distress  Neck: no thyromegaly, no neck stiffness  Lungs: clear to auscultation, no wheezes, rales or rhonchi, symmetric air entry  Heart: normal rate, regular rhythm, normal S1, S2, no murmurs, rubs, clicks or gallops  Abdomen: soft, nontender, nondistended, no rigidity, rebound, or guarding.   Back: no point tenderness over spine  Extremities: peripheral pulses normal, no unilateral leg swelling or calf tenderness   Neurological:alert, oriented, normal speech, no new focal findings or movement disorder noted from baseline    BP Readings from Last 3 Encounters:   07/19/22 132/80   06/22/22 (!) 155/73   06/18/22 103/60     Wt Readings from Last 3 Encounters:   07/19/22 64 kg (141 lb 1.5 oz)   06/24/22 65.4 kg (144 lb 2.9 oz)   06/22/22 63.5 kg (140 lb)     /80 (BP Location: Left arm, Patient Position: Sitting, BP Method: Large (Manual))   Pulse 60   Temp 98.2 °F (36.8 °C) (Oral)   Resp 16   Ht 5' 4" (1.626 m)   Wt 64 kg (141 lb 1.5 oz)   LMP 03/02/1998   SpO2 96%   BMI 24.22 kg/m²    274}  Laboratory: I have reviewed old labs below:    274}    Lab Results   Component Value Date    WBC 4.64 06/22/2022    HGB 15.0 06/22/2022    HCT 42.2 06/22/2022    MCV 88 06/22/2022     06/22/2022     06/22/2022    K 5.0 06/22/2022     06/22/2022    CALCIUM 9.2 06/22/2022    PHOS 4.2 04/22/2018    CO2 28 06/22/2022    GLU 99 06/22/2022    BUN 10 06/22/2022    CREATININE 0.81 06/22/2022    ANIONGAP 4 (L) 06/22/2022    ESTGFRAFRICA >60 06/22/2022    EGFRNONAA >60 06/22/2022    PROT 7.2 06/24/2022    ALBUMIN 4.0 06/24/2022    BILITOT 1.1 (H) 06/24/2022    ALKPHOS 44 (L) 06/24/2022    ALT 16 " "06/24/2022    AST 26 06/24/2022    INR 1.1 04/22/2018    CHOL 167 11/11/2020    TRIG 72 11/11/2020    HDL 58 11/11/2020    LDLCALC 94.6 11/11/2020    TSH 0.092 (L) 07/15/2022    HGBA1C 5.5 03/11/2019     Lab reviewed by me: Particular labs of significance that I will monitor, workup, or treat to improve are mentioned below in diagnostic impression remarks.    The 10-year ASCVD risk score (Rowenalashawn DAWKINS Jr., et al., 2013) is: 27.1%    Values used to calculate the score:      Age: 76 years      Sex: Female      Is Non- : No      Diabetic: No      Tobacco smoker: No      Systolic Blood Pressure: 132 mmHg      Is BP treated: Yes      HDL Cholesterol: 58 mg/dL      Total Cholesterol: 167 mg/dL      Imaging/EKG: I have reviewed the pertinent results and my findings are noted in remarks.  274}    CC:   Chief Complaint   Patient presents with    Follow-up    Hypertension        274}    Assessment/Plan  Yuniel Gracia is a 76 y.o. female who presents to clinic with:  1. Healthcare maintenance    2. Nausea    3. Gastroesophageal reflux disease, unspecified whether esophagitis present    4. Mixed hyperlipidemia    5. Recurrent major depressive disorder, in remission    6. Aortic calcification       274}  Diagnostic Impression Remarks + HPI     Documentation entered by me for this encounter may have been done in part using speech-recognition technology. Although I have made an effort to ensure accuracy, "sound like" errors may exist and should be interpreted in context.      Nausea-patient reports having some nausea this pain is going on roughly 3 weeks no abdominal pain no chest pain or shortness of breath no new vomiting no diarrhea constipation.  Is scheduled to have a scope tomorrow and recommend to proceed with this.  Also she was started recently on donepezil and some study show that there is a high rate of nausea with this medications thus consider if her nausea does not improve to hold this " medication for a week and see if her nausea improves and discuss this with the patient and they agree.  Also send in additional Zofran.   GERD-needs improvement continue PPI has scoped tomorrow check for H pylori   Depression-stable continue current medications no SI or plan   Hypothyroidism-needs improvement recently her Synthroid was changed from 112-100 will recheck thyroid in the future  Calcifications of aorta- stable continue statin and healthy diet monitor follow lipid levels     This is the extent of the patient's concerns at this present time. She did not feel chest pain upon exertion, dyspnea, nausea, vomiting, diaphoresis, or syncope. No pleuritic chest pain, unilateral leg swelling, calf tenderness, or calf pain. Yuniel will return to clinic in a few months for further workup and reassessment or sooner as needed. She was instructed to call the clinic or go to the emergency department if her symptoms do not improve, worsens, or if new symptoms develop. As we discussed that symptoms could worsen over the next 24 hours she was advised that if any increased swelling, pain, or numbness arise to go immediately to the ED. Patient knows to call any time if an emergency arises. Shared decision making occurred and she verbalized understanding in agreement with this plan. I discussed imaging findings, diagnosis, possibilities, treatment options, medications, risks, and benefits. She had many questions regarding the options and long-term effects. All questions were answered. She expressed understanding after counseling regarding the diagnosis and recommendations. She was capable and demonstrated competence with understanding of these options. Shared decision making was performed resulting in her choosing the current treatment plan. Patient handout was given with instructions and recommendations. Advised the patient that if they become pregnant to alert us immediately to assess for medication changes. I also  discussed the importance of close follow up to discuss labs, change or modify her medications if needed, monitor side effects, and further evaluation of medical problems.     Additional workup planned: see labs ordered below.    See below for labs and meds ordered with associated diagnosis      1. Healthcare maintenance    2. Nausea  - ondansetron (ZOFRAN) 4 MG tablet; Take 1 tablet (4 mg total) by mouth every 6 (six) hours as needed for Nausea.  Dispense: 30 tablet; Refill: 1    3. Gastroesophageal reflux disease, unspecified whether esophagitis present    4. Mixed hyperlipidemia    5. Recurrent major depressive disorder, in remission    6. Aortic calcification      Bhaskar Olivera MD   274}  07/19/2022

## 2022-07-20 ENCOUNTER — ANESTHESIA (OUTPATIENT)
Dept: ENDOSCOPY | Facility: HOSPITAL | Age: 77
End: 2022-07-20
Payer: MEDICARE

## 2022-07-20 ENCOUNTER — ANESTHESIA EVENT (OUTPATIENT)
Dept: ENDOSCOPY | Facility: HOSPITAL | Age: 77
End: 2022-07-20
Payer: MEDICARE

## 2022-07-20 ENCOUNTER — HOSPITAL ENCOUNTER (OUTPATIENT)
Facility: HOSPITAL | Age: 77
Discharge: HOME | End: 2022-07-20
Attending: INTERNAL MEDICINE | Admitting: INTERNAL MEDICINE
Payer: MEDICARE

## 2022-07-20 VITALS
DIASTOLIC BLOOD PRESSURE: 72 MMHG | SYSTOLIC BLOOD PRESSURE: 142 MMHG | TEMPERATURE: 98 F | HEART RATE: 65 BPM | HEIGHT: 64 IN | WEIGHT: 141.13 LBS | BODY MASS INDEX: 24.1 KG/M2 | OXYGEN SATURATION: 95 % | RESPIRATION RATE: 16 BRPM

## 2022-07-20 DIAGNOSIS — K29.70 GASTRITIS, PRESENCE OF BLEEDING UNSPECIFIED, UNSPECIFIED CHRONICITY, UNSPECIFIED GASTRITIS TYPE: Primary | ICD-10-CM

## 2022-07-20 DIAGNOSIS — R11.2 NAUSEA AND VOMITING: ICD-10-CM

## 2022-07-20 PROCEDURE — D9220A PRA ANESTHESIA: ICD-10-PCS | Mod: ANES,,, | Performed by: ANESTHESIOLOGY

## 2022-07-20 PROCEDURE — 25000003 PHARM REV CODE 250: Performed by: INTERNAL MEDICINE

## 2022-07-20 PROCEDURE — 43239 EGD BIOPSY SINGLE/MULTIPLE: CPT | Performed by: INTERNAL MEDICINE

## 2022-07-20 PROCEDURE — 37000008 HC ANESTHESIA 1ST 15 MINUTES: Performed by: INTERNAL MEDICINE

## 2022-07-20 PROCEDURE — 63600175 PHARM REV CODE 636 W HCPCS: Performed by: NURSE ANESTHETIST, CERTIFIED REGISTERED

## 2022-07-20 PROCEDURE — 25000003 PHARM REV CODE 250: Performed by: NURSE ANESTHETIST, CERTIFIED REGISTERED

## 2022-07-20 PROCEDURE — 88305 TISSUE EXAM BY PATHOLOGIST: CPT | Performed by: PATHOLOGY

## 2022-07-20 PROCEDURE — D9220A PRA ANESTHESIA: ICD-10-PCS | Mod: CRNA,,, | Performed by: NURSE ANESTHETIST, CERTIFIED REGISTERED

## 2022-07-20 PROCEDURE — 43239 EGD BIOPSY SINGLE/MULTIPLE: CPT | Mod: ,,, | Performed by: INTERNAL MEDICINE

## 2022-07-20 PROCEDURE — 43239 PR EGD, FLEX, W/BIOPSY, SGL/MULTI: ICD-10-PCS | Mod: ,,, | Performed by: INTERNAL MEDICINE

## 2022-07-20 PROCEDURE — 88305 TISSUE EXAM BY PATHOLOGIST: CPT | Mod: 26,,, | Performed by: PATHOLOGY

## 2022-07-20 PROCEDURE — 27201012 HC FORCEPS, HOT/COLD, DISP: Performed by: INTERNAL MEDICINE

## 2022-07-20 PROCEDURE — D9220A PRA ANESTHESIA: Mod: CRNA,,, | Performed by: NURSE ANESTHETIST, CERTIFIED REGISTERED

## 2022-07-20 PROCEDURE — 88305 TISSUE EXAM BY PATHOLOGIST: ICD-10-PCS | Mod: 26,,, | Performed by: PATHOLOGY

## 2022-07-20 PROCEDURE — D9220A PRA ANESTHESIA: Mod: ANES,,, | Performed by: ANESTHESIOLOGY

## 2022-07-20 RX ORDER — SODIUM CHLORIDE 9 MG/ML
INJECTION, SOLUTION INTRAVENOUS CONTINUOUS
Status: DISCONTINUED | OUTPATIENT
Start: 2022-07-20 | End: 2022-07-20 | Stop reason: HOSPADM

## 2022-07-20 RX ORDER — LIDOCAINE HYDROCHLORIDE 20 MG/ML
INJECTION INTRAVENOUS
Status: DISCONTINUED | OUTPATIENT
Start: 2022-07-20 | End: 2022-07-20

## 2022-07-20 RX ORDER — PROPOFOL 10 MG/ML
VIAL (ML) INTRAVENOUS
Status: DISCONTINUED | OUTPATIENT
Start: 2022-07-20 | End: 2022-07-20

## 2022-07-20 RX ADMIN — PROPOFOL 100 MG: 10 INJECTION, EMULSION INTRAVENOUS at 12:07

## 2022-07-20 RX ADMIN — LIDOCAINE HYDROCHLORIDE 75 MG: 20 INJECTION, SOLUTION INTRAVENOUS at 12:07

## 2022-07-20 RX ADMIN — SODIUM CHLORIDE: 0.9 INJECTION, SOLUTION INTRAVENOUS at 12:07

## 2022-07-20 RX ADMIN — PROPOFOL 50 MG: 10 INJECTION, EMULSION INTRAVENOUS at 12:07

## 2022-07-20 NOTE — ANESTHESIA POSTPROCEDURE EVALUATION
Anesthesia Post Evaluation    Patient: Yuniel Gracia    Procedure(s) Performed: Procedure(s) (LRB):  EGD (ESOPHAGOGASTRODUODENOSCOPY) (N/A)    Final Anesthesia Type: general      Patient location during evaluation: PACU  Patient participation: Yes- Able to Participate  Level of consciousness: awake and alert  Post-procedure vital signs: reviewed and stable  Pain management: adequate  Airway patency: patent    PONV status at discharge: No PONV  Anesthetic complications: no      Cardiovascular status: blood pressure returned to baseline  Respiratory status: unassisted  Hydration status: euvolemic  Follow-up not needed.          Vitals Value Taken Time   /72 07/20/22 1255   Temp 36.6 °C (97.9 °F) 07/20/22 1255   Pulse 65 07/20/22 1255   Resp 16 07/20/22 1255   SpO2 95 % 07/20/22 1255         No case tracking events are documented in the log.      Pain/Eduar Score: No data recorded

## 2022-07-20 NOTE — TRANSFER OF CARE
"Anesthesia Transfer of Care Note    Patient: Yuniel Gracia    Procedure(s) Performed: Procedure(s) (LRB):  EGD (ESOPHAGOGASTRODUODENOSCOPY) (N/A)    Patient location: GI    Anesthesia Type: general    Transport from OR: Transported from OR on room air with adequate spontaneous ventilation    Post pain: adequate analgesia    Post assessment: no apparent anesthetic complications    Post vital signs: stable    Level of consciousness: sedated    Nausea/Vomiting: no nausea/vomiting    Complications: none    Transfer of care protocol was followed      Last vitals:   Visit Vitals  /67 (BP Location: Left arm, Patient Position: Lying)   Pulse 71   Temp 36.6 °C (97.9 °F) (Skin)   Resp 15   Ht 5' 4" (1.626 m)   Wt 64 kg (141 lb 1.5 oz)   LMP 03/02/1998   SpO2 95%   BMI 24.22 kg/m²     "

## 2022-07-20 NOTE — PLAN OF CARE
Vss, rukhsana po fluids, denies pain, ambulates easily. IV removed, catheter intact. Discharge instructions provided and states understanding. States ready to go home.  Discharged from facility with family per wheelchair.

## 2022-07-20 NOTE — PROVATION PATIENT INSTRUCTIONS
Discharge Summary/Instructions after an Endoscopic Procedure  Patient Name: Yuniel Gracia  Patient MRN: [id number]  Patient YOB: 1945  Wednesday, July 20, 2022  Nate Vines MD  Dear patient,  As a result of recent federal legislation (The Federal Cures Act), you may   receive lab or pathology results from your procedure in your [facility]   account before your physician is able to contact you. Your physician or   their representative will relay the results to you with their   recommendations at their soonest availability.  Thank you,  RESTRICTIONS:  During your procedure today, you received medications for sedation.  These   medications may affect your judgment, balance and coordination.  Therefore,   for 24 hours, you have the following restrictions:   - DO NOT drive a car, operate machinery, make legal/financial decisions,   sign important papers or drink alcohol.    ACTIVITY:  Today: no heavy lifting, straining or running due to procedural   sedation/anesthesia.  The following day: return to full activity including work.  DIET:  Eat and drink normally unless instructed otherwise.     TREATMENT FOR COMMON SIDE EFFECTS:  - Mild abdominal pain, nausea, belching, bloating or excessive gas:  rest,   eat lightly and use a heating pad.  - Sore Throat: treat with throat lozenges and/or gargle with warm salt   water.  - Because air was used during the procedure, expelling large amounts of air   from your rectum or belching is normal.  - If a bowel prep was taken, you may not have a bowel movement for 1-3 days.    This is normal.  SYMPTOMS TO WATCH FOR AND REPORT TO YOUR PHYSICIAN:  1. Abdominal pain or bloating, other than gas cramps.  2. Chest pain.  3. Back pain.  4. Signs of infection such as: chills or fever occurring within 24 hours   after the procedure.  5. Rectal bleeding, which would show as bright red, maroon, or black stools.   (A tablespoon of blood from the rectum is not serious, especially  if   hemorrhoids are present.)  6. Vomiting.  7. Weakness or dizziness.  GO DIRECTLY TO THE NEAREST EMERGENCY ROOM IF YOU HAVE ANY OF THE FOLLOWING:      Difficulty breathing              Chills and/or fever over 101 F   Persistent vomiting and/or vomiting blood   Severe abdominal pain   Severe chest pain   Black, tarry stools   Bleeding- more than one tablespoon   Any other symptom or condition that you feel may need urgent attention  Your doctor recommends these additional instructions:  If any biopsies were taken, your doctors clinic will contact you in 1 to 2   weeks with any results.  - Patient has a contact number available for emergencies.  The signs and   symptoms of potential delayed complications were discussed with the   patient.  Return to normal activities tomorrow.  Written discharge   instructions were provided to the patient.   - Resume previous diet.   - Continue present medications.   - No aspirin, ibuprofen, naproxen, or other non-steroidal anti-inflammatory   drugs.   - Await pathology results.   - Discharge patient to home (ambulatory).   - Follow an antireflux regimen.   - Return to nurse practitioner after studies are complete.  For questions, problems or results please call your physician - Nate Vines MD at Work:  [phone].  [facility], EMERGENCY ROOM PHONE NUMBER: [phone]  IF A COMPLICATION OR EMERGENCY SITUATION ARISES AND YOU ARE UNABLE TO REACH   YOUR PHYSICIAN - GO DIRECTLY TO THE EMERGENCY ROOM.  Nate Vines MD  7/20/2022 12:35:38 PM  This report has been verified and signed electronically.  Dear patient,  As a result of recent federal legislation (The Federal Cures Act), you may   receive lab or pathology results from your procedure in your [facility]   account before your physician is able to contact you. Your physician or   their representative will relay the results to you with their   recommendations at their soonest availability.  Thank  you,  PROVATION

## 2022-07-20 NOTE — ANESTHESIA PREPROCEDURE EVALUATION
07/20/2022  Yuniel Gracia is a 76 y.o., female.      Pre-op Assessment    I have reviewed the Patient Summary Reports.     I have reviewed the Nursing Notes. I have reviewed the NPO Status.   I have reviewed the Medications.     Review of Systems  Anesthesia Hx:  Denies Family Hx of Anesthesia complications.   Denies Personal Hx of Anesthesia complications.   Hematology/Oncology:         -- Cancer in past history: Breast right   Hepatic/GI:   Liver Disease, Hepatitis, C    Musculoskeletal:   Arthritis     Endocrine:   Hypothyroidism    Psych:   Psychiatric History          Physical Exam  General: Well nourished, Cooperative, Alert and Oriented    Airway:  Mallampati: II / I  Mouth Opening: Normal  TM Distance: Normal  Tongue: Normal  Neck ROM: Normal ROM    Dental:  Intact, Caps / Implants    Chest/Lungs:  Normal Respiratory Rate    Heart:  Rate: Normal  Rhythm: Regular Rhythm        Anesthesia Plan  Type of Anesthesia, risks & benefits discussed:    Anesthesia Type: Gen Natural Airway  Intra-op Monitoring Plan: Standard ASA Monitors  Induction:  IV  Informed Consent: Informed consent signed with the Patient and all parties understand the risks and agree with anesthesia plan.  All questions answered.   ASA Score: 3    Ready For Surgery From Anesthesia Perspective.     .

## 2022-07-22 ENCOUNTER — TELEPHONE (OUTPATIENT)
Dept: ADMINISTRATIVE | Facility: CLINIC | Age: 77
End: 2022-07-22
Payer: MEDICARE

## 2022-07-25 ENCOUNTER — TELEPHONE (OUTPATIENT)
Dept: GASTROENTEROLOGY | Facility: CLINIC | Age: 77
End: 2022-07-25
Payer: MEDICARE

## 2022-07-25 ENCOUNTER — TELEPHONE (OUTPATIENT)
Dept: ADMINISTRATIVE | Facility: CLINIC | Age: 77
End: 2022-07-25
Payer: MEDICARE

## 2022-07-25 ENCOUNTER — OFFICE VISIT (OUTPATIENT)
Dept: INTERNAL MEDICINE | Facility: CLINIC | Age: 77
End: 2022-07-25
Payer: MEDICARE

## 2022-07-25 DIAGNOSIS — R41.3 MEMORY LOSS: ICD-10-CM

## 2022-07-25 DIAGNOSIS — I73.00 RAYNAUD'S PHENOMENON WITHOUT GANGRENE: ICD-10-CM

## 2022-07-25 DIAGNOSIS — D69.6 THROMBOCYTOPENIA: ICD-10-CM

## 2022-07-25 DIAGNOSIS — E78.2 MIXED HYPERLIPIDEMIA: ICD-10-CM

## 2022-07-25 DIAGNOSIS — I70.0 AORTIC CALCIFICATION: ICD-10-CM

## 2022-07-25 DIAGNOSIS — Z85.3 HISTORY OF BREAST CANCER: ICD-10-CM

## 2022-07-25 DIAGNOSIS — F32.1 MAJOR DEPRESSIVE DISORDER, SINGLE EPISODE, MODERATE: ICD-10-CM

## 2022-07-25 DIAGNOSIS — N32.81 OVERACTIVE BLADDER: ICD-10-CM

## 2022-07-25 DIAGNOSIS — Z86.19 HEPATITIS C VIRUS INFECTION CURED AFTER ANTIVIRAL DRUG THERAPY: ICD-10-CM

## 2022-07-25 DIAGNOSIS — R73.03 PREDIABETES: ICD-10-CM

## 2022-07-25 DIAGNOSIS — C50.612 MALIGNANT NEOPLASM OF AXILLARY TAIL OF LEFT FEMALE BREAST, UNSPECIFIED ESTROGEN RECEPTOR STATUS: ICD-10-CM

## 2022-07-25 DIAGNOSIS — F41.8 DEPRESSION WITH ANXIETY: ICD-10-CM

## 2022-07-25 DIAGNOSIS — M85.852 OSTEOPENIA OF LEFT HIP: ICD-10-CM

## 2022-07-25 DIAGNOSIS — Z00.00 ENCOUNTER FOR PREVENTIVE HEALTH EXAMINATION: Primary | ICD-10-CM

## 2022-07-25 DIAGNOSIS — Z79.811 USE OF AROMATASE INHIBITORS: ICD-10-CM

## 2022-07-25 DIAGNOSIS — E03.1 CONGENITAL HYPOTHYROIDISM WITHOUT GOITER: ICD-10-CM

## 2022-07-25 LAB
FINAL PATHOLOGIC DIAGNOSIS: NORMAL
GROSS: NORMAL
Lab: NORMAL

## 2022-07-25 PROCEDURE — G0439 PR MEDICARE ANNUAL WELLNESS SUBSEQUENT VISIT: ICD-10-PCS | Mod: 95,,, | Performed by: NURSE PRACTITIONER

## 2022-07-25 PROCEDURE — G0439 PPPS, SUBSEQ VISIT: HCPCS | Mod: 95,,, | Performed by: NURSE PRACTITIONER

## 2022-07-25 NOTE — Clinical Note
Medicare AWV completed. Discussed covid booster. Colonoscopy as scheduled. Lipid panel per PCP with annual labs. Stopped aricept d/t GI side effects and has noticed a substantial improvement.  --Omaira

## 2022-07-25 NOTE — PROGRESS NOTES
The patient location is: [place]  The chief complaint leading to consultation is: HRA    Visit type: audiovisual    Face to Face time with patient: 14  30 minutes of total time spent on the encounter, which includes face to face time and non-face to face time preparing to see the patient (eg, review of tests), Obtaining and/or reviewing separately obtained history, Documenting clinical information in the electronic or other health record, Independently interpreting results (not separately reported) and communicating results to the patient/family/caregiver, or Care coordination (not separately reported).         Each patient to whom he or she provides medical services by telemedicine is:  (1) informed of the relationship between the physician and patient and the respective role of any other health care provider with respect to management of the patient; and (2) notified that he or she may decline to receive medical services by telemedicine and may withdraw from such care at any time.    Notes:       Yuniel Gracia presented for a  Medicare AWV and comprehensive Health Risk Assessment today. The following components were reviewed and updated:    · Medical history  · Family History  · Social history  · Allergies and Current Medications  · Health Risk Assessment  · Health Maintenance  · Care Team         ** See Completed Assessments for Annual Wellness Visit within the encounter summary.**         The following assessments were completed:  · Living Situation  · CAGE  · Depression Screening  · Fall Risk Assessment (MACH 10)  · Hearing Assessment(HHI)  · Cognitive Function Screening - Deferred, being evaluated by neurology  · Nutrition Screening  · ADL Screening  · PAQ Screening    Patient unable to provide vital signs. Pain 0/10.    Physical Exam  Constitutional:       General: She is not in acute distress.     Appearance: Normal appearance. She is not ill-appearing.   Pulmonary:      Effort: Pulmonary effort is  normal.   Neurological:      Mental Status: She is alert and oriented to person, place, and time.   Psychiatric:         Mood and Affect: Mood normal.         Behavior: Behavior normal.         Thought Content: Thought content normal.         Judgment: Judgment normal.     Physical exam limited by virtual visit.          Diagnoses and health risks identified today and associated recommendations/orders:    1. Encounter for preventive health examination  Assessments completed as appropriate.  HM recommendations reviewed. Discussed covid booster. Colonoscopy as scheduled. Lipid panel per PCP with annual labs.  F/u with PCP as instructed.    2. Aortic calcification  Chronic, stable on current regimen. Followed by PCP. On statin.     3. Major depressive disorder, single episode, moderate  Chronic, stable on current regimen. Followed by PCP.    4. Thrombocytopenia  Chronic, stable on current regimen. Followed by heme/onc.    5. Malignant neoplasm of axillary tail of left female breast, unspecified estrogen receptor status  Chronic, stable on current regimen. Followed by heme/onc. On anastrozole.    6. Depression with anxiety  Chronic, stable on current regimen. Followed by PCP.    7. Mixed hyperlipidemia  Chronic, stable on current regimen. Followed by PCP.    8. Prediabetes  Chronic, stable on current regimen. Followed by PCP.     9. Congenital hypothyroidism without goiter  Chronic, stable on current regimen. Followed by PCP.    10. BMI 24.0-24.9, adult  Chronic, stable on current regimen. Followed by PCP.    11. Osteopenia of left hip  Chronic, stable on current regimen. Followed by heme/onc. On prolia injections.    12. History of breast cancer  Chronic, stable on current regimen. Followed by heme/onc.    13. Use of aromatase inhibitors  Chronic, stable on current regimen. Followed by heme/onc.    14. Memory loss  Chronic, stable on current regimen. Followed by neurology. Stopped taking aricept d/t GI side  effects.    15. Raynaud's phenomenon without gangrene  Chronic, stable on current regimen. Followed by PCP.    16. Overactive bladder  Chronic, stable on current regimen. Followed by urology.    17. Hepatitis C virus infection cured after antiviral drug therapy  Chronic, stable on current regimen. Followed by PCP. Establishing with hepatology.      Provided Yuniel with a 5-10 year written screening schedule and personal prevention plan. Recommendations were developed using the USPSTF age appropriate recommendations. Education, counseling, and referrals were provided as needed. After Visit Summary printed and given to patient which includes a list of additional screenings\tests needed.    Follow up in about 1 year (around 7/25/2023) for Medicare AWV and with PCP as instructed.       Omaira Barksdale NP     I offered to discuss advanced care planning, including how to pick a person who would make decisions for you if you were unable to make them for yourself, called a health care power of , and what kind of decisions you might make such as use of life sustaining treatments such as ventilators and tube feeding when faced with a life limiting illness recorded on a living will that they will need to know. (How you want to be cared for as you near the end of your natural life)     X  Patient has advanced directives on file, which we reviewed, and they do not wish to make changes.

## 2022-07-25 NOTE — PATIENT INSTRUCTIONS
1. Follow up with Dr. Bhaskar Olivera MD as instructed.    2. Colonoscopy as scheduled.    3. Eligible for additional covid booster if interested.    Counseling and Referral of Other Preventative  (Italic type indicates deductible and co-insurance are waived)    Patient Name: Yuniel Gracia  Today's Date: 7/25/2022    Health Maintenance         Date Due Completion Date    Lipid Panel 11/11/2021 11/11/2020    COVID-19 Vaccine (4 - Booster for Pfizer series) 03/30/2022 11/30/2021    Colonoscopy 06/01/2022 6/1/2017    Override on 11/20/2011: Done (Dr Vinh craig 6 years )    Override on 1/3/2011: Done (Dr Lynch )    Influenza Vaccine (1) 09/01/2022 11/16/2021    DEXA Scan 08/12/2023 8/12/2020    TETANUS VACCINE 02/15/2030 2/15/2020          No orders of the defined types were placed in this encounter.    The following information is provided to all patients.  This information is to help you find resources for any of the problems found today that may be affecting your health:                Living healthy guide: www.[facility].[place].gov      Understanding Diabetes: www.diabetes.org      Eating healthy: www.cdc.gov/healthyweight      CDC home safety checklist: www.cdc.gov/steadi/patient.html      Agency on Aging: www.goea.[place].[facility]      Alcoholics anonymous (AA): www.aa.org      Physical Activity: www.fran.nih.gov/vm8hmjj      Tobacco use: www.quitwithusla.org

## 2022-07-25 NOTE — TELEPHONE ENCOUNTER
Called pt; informed pt's  I was just making a reminder call for pt's virtual visit today at 3:30pm and to see if pt needed any help; pt's  stated pt didn't need any help; pt's  informed to login 15 minutes prior to appt time

## 2022-07-28 ENCOUNTER — PATIENT MESSAGE (OUTPATIENT)
Dept: NEUROLOGY | Facility: CLINIC | Age: 77
End: 2022-07-28
Payer: MEDICARE

## 2022-07-28 ENCOUNTER — TELEPHONE (OUTPATIENT)
Dept: NEUROLOGY | Facility: CLINIC | Age: 77
End: 2022-07-28
Payer: MEDICARE

## 2022-07-28 DIAGNOSIS — R41.3 MEMORY LOSS: Primary | ICD-10-CM

## 2022-07-28 RX ORDER — RIVASTIGMINE 4.6 MG/24H
1 PATCH, EXTENDED RELEASE TRANSDERMAL DAILY
Qty: 30 PATCH | Refills: 11 | Status: SHIPPED | OUTPATIENT
Start: 2022-07-28 | End: 2022-08-01

## 2022-07-28 NOTE — TELEPHONE ENCOUNTER
Spoke with patient's spouse and he did confirm that patient has an allergy to adhesive. Wants to know if there is anything else that can be prescribed.

## 2022-07-28 NOTE — TELEPHONE ENCOUNTER
----- Message from Dave Wade sent at 7/28/2022  2:14 PM CDT -----  Regarding: med question  Type:  Pharmacy Calling to Clarify an RX    Name of Caller:  Jessica    Pharmacy Name:    [facility] - [place], [place] - [address] [place] [id number]  Phone: [phone] Fax: [phone]    Prescription Name:    rivastigmine (EXELON) 4.6 mg/24 hour PT24 30 patch 11 7/28/2022 7/28/2023  Sig - Route: Place 1 patch onto the skin once daily. - Transdermal   Sent to pharmacy as: rivastigmine (EXELON) 4.6 mg/24 hour PT24   E-Prescribing Status: Receipt confirmed by pharmacy (7/28/2022 11:02 AM CDT)     What do they need to clarify?:  Pharm has Adhesive Allergy on file. Please call to clarify.    Best Call Back Number:  [phone]    Additional Information:  REF [id number]

## 2022-08-01 RX ORDER — RIVASTIGMINE TARTRATE 1.5 MG/1
1.5 CAPSULE ORAL 2 TIMES DAILY
Qty: 60 CAPSULE | Refills: 11 | Status: SHIPPED | OUTPATIENT
Start: 2022-08-01 | End: 2022-12-07

## 2022-08-01 NOTE — TELEPHONE ENCOUNTER
"Spoke with patient's  regarding message per Oneyda Blanco NP, "Mariama sent in the same medication but in pill form to pharmacy on file" Patient's  v/u   "

## 2022-08-04 ENCOUNTER — PATIENT MESSAGE (OUTPATIENT)
Dept: ENDOSCOPY | Facility: HOSPITAL | Age: 77
End: 2022-08-04
Payer: MEDICARE

## 2022-08-19 ENCOUNTER — LAB VISIT (OUTPATIENT)
Dept: LAB | Facility: HOSPITAL | Age: 77
End: 2022-08-19
Attending: INTERNAL MEDICINE
Payer: MEDICARE

## 2022-08-19 DIAGNOSIS — C50.612 MALIGNANT NEOPLASM OF AXILLARY TAIL OF LEFT FEMALE BREAST, UNSPECIFIED ESTROGEN RECEPTOR STATUS: ICD-10-CM

## 2022-08-19 LAB
ALBUMIN SERPL BCP-MCNC: 3.7 G/DL (ref 3.5–5.2)
ALP SERPL-CCNC: 48 U/L (ref 55–135)
ALT SERPL W/O P-5'-P-CCNC: 15 U/L (ref 10–44)
ANION GAP SERPL CALC-SCNC: 8 MMOL/L (ref 8–16)
AST SERPL-CCNC: 28 U/L (ref 10–40)
BASOPHILS # BLD AUTO: 0.03 K/UL (ref 0–0.2)
BASOPHILS NFR BLD: 0.8 % (ref 0–1.9)
BILIRUB SERPL-MCNC: 0.6 MG/DL (ref 0.1–1)
BUN SERPL-MCNC: 19 MG/DL (ref 8–23)
CALCIUM SERPL-MCNC: 9.7 MG/DL (ref 8.7–10.5)
CHLORIDE SERPL-SCNC: 104 MMOL/L (ref 95–110)
CO2 SERPL-SCNC: 29 MMOL/L (ref 23–29)
CREAT SERPL-MCNC: 0.8 MG/DL (ref 0.5–1.4)
DIFFERENTIAL METHOD: ABNORMAL
EOSINOPHIL # BLD AUTO: 0.4 K/UL (ref 0–0.5)
EOSINOPHIL NFR BLD: 9.8 % (ref 0–8)
ERYTHROCYTE [DISTWIDTH] IN BLOOD BY AUTOMATED COUNT: 14.7 % (ref 11.5–14.5)
EST. GFR  (NO RACE VARIABLE): >60 ML/MIN/1.73 M^2
GLUCOSE SERPL-MCNC: 79 MG/DL (ref 70–110)
HCT VFR BLD AUTO: 39.7 % (ref 37–48.5)
HGB BLD-MCNC: 12.9 G/DL (ref 12–16)
IMM GRANULOCYTES # BLD AUTO: 0.01 K/UL (ref 0–0.04)
IMM GRANULOCYTES NFR BLD AUTO: 0.3 % (ref 0–0.5)
LYMPHOCYTES # BLD AUTO: 1.2 K/UL (ref 1–4.8)
LYMPHOCYTES NFR BLD: 30.1 % (ref 18–48)
MCH RBC QN AUTO: 30 PG (ref 27–31)
MCHC RBC AUTO-ENTMCNC: 32.5 G/DL (ref 32–36)
MCV RBC AUTO: 92 FL (ref 82–98)
MONOCYTES # BLD AUTO: 0.4 K/UL (ref 0.3–1)
MONOCYTES NFR BLD: 9 % (ref 4–15)
NEUTROPHILS # BLD AUTO: 2 K/UL (ref 1.8–7.7)
NEUTROPHILS NFR BLD: 50 % (ref 38–73)
NRBC BLD-RTO: 0 /100 WBC
PLATELET # BLD AUTO: 158 K/UL (ref 150–450)
PMV BLD AUTO: 10.5 FL (ref 9.2–12.9)
POTASSIUM SERPL-SCNC: 4.9 MMOL/L (ref 3.5–5.1)
PROT SERPL-MCNC: 6.6 G/DL (ref 6–8.4)
RBC # BLD AUTO: 4.3 M/UL (ref 4–5.4)
SODIUM SERPL-SCNC: 141 MMOL/L (ref 136–145)
WBC # BLD AUTO: 3.99 K/UL (ref 3.9–12.7)

## 2022-08-19 PROCEDURE — 85025 COMPLETE CBC W/AUTO DIFF WBC: CPT | Performed by: NURSE PRACTITIONER

## 2022-08-19 PROCEDURE — 80053 COMPREHEN METABOLIC PANEL: CPT | Performed by: NURSE PRACTITIONER

## 2022-08-22 LAB — CANCER AG27-29 SERPL-ACNC: 31.8 U/ML

## 2022-08-23 DIAGNOSIS — M81.6 LOCALIZED OSTEOPOROSIS (LEQUESNE): Primary | ICD-10-CM

## 2022-08-24 ENCOUNTER — HOSPITAL ENCOUNTER (OUTPATIENT)
Dept: RADIOLOGY | Facility: CLINIC | Age: 77
Discharge: HOME | End: 2022-08-24
Attending: INTERNAL MEDICINE
Payer: MEDICARE

## 2022-08-24 DIAGNOSIS — M81.6 LOCALIZED OSTEOPOROSIS (LEQUESNE): ICD-10-CM

## 2022-08-24 PROCEDURE — 77080 DXA BONE DENSITY AXIAL: CPT | Mod: 26,,, | Performed by: RADIOLOGY

## 2022-08-24 PROCEDURE — 77080 DEXA BONE DENSITY SPINE HIP: ICD-10-PCS | Mod: 26,,, | Performed by: RADIOLOGY

## 2022-08-24 PROCEDURE — 77080 DXA BONE DENSITY AXIAL: CPT | Mod: TC,PO

## 2022-08-26 ENCOUNTER — INFUSION (OUTPATIENT)
Dept: INFUSION THERAPY | Facility: HOSPITAL | Age: 77
End: 2022-08-26
Attending: INTERNAL MEDICINE
Payer: MEDICARE

## 2022-08-26 ENCOUNTER — OFFICE VISIT (OUTPATIENT)
Dept: HEMATOLOGY/ONCOLOGY | Facility: CLINIC | Age: 77
End: 2022-08-26
Payer: MEDICARE

## 2022-08-26 ENCOUNTER — TELEPHONE (OUTPATIENT)
Dept: HEMATOLOGY/ONCOLOGY | Facility: CLINIC | Age: 77
End: 2022-08-26

## 2022-08-26 VITALS
HEART RATE: 56 BPM | TEMPERATURE: 97 F | SYSTOLIC BLOOD PRESSURE: 129 MMHG | RESPIRATION RATE: 16 BRPM | WEIGHT: 146.81 LBS | BODY MASS INDEX: 25.2 KG/M2 | OXYGEN SATURATION: 99 % | DIASTOLIC BLOOD PRESSURE: 60 MMHG

## 2022-08-26 DIAGNOSIS — Z85.3 HISTORY OF BREAST CANCER: ICD-10-CM

## 2022-08-26 DIAGNOSIS — D69.6 THROMBOCYTOPENIA: Primary | ICD-10-CM

## 2022-08-26 DIAGNOSIS — Z86.19 HEPATITIS C VIRUS INFECTION CURED AFTER ANTIVIRAL DRUG THERAPY: ICD-10-CM

## 2022-08-26 DIAGNOSIS — E02 SUBCLINICAL IODINE-DEFICIENCY HYPOTHYROIDISM: ICD-10-CM

## 2022-08-26 DIAGNOSIS — C79.81 SECONDARY MALIGNANT NEOPLASM OF BREAST: ICD-10-CM

## 2022-08-26 DIAGNOSIS — Z79.811 USE OF AROMATASE INHIBITORS: Primary | ICD-10-CM

## 2022-08-26 DIAGNOSIS — Z79.811 USE OF AROMATASE INHIBITORS: ICD-10-CM

## 2022-08-26 DIAGNOSIS — M85.852 OSTEOPENIA OF LEFT HIP: ICD-10-CM

## 2022-08-26 PROCEDURE — 99214 OFFICE O/P EST MOD 30 MIN: CPT | Mod: S$PBB,,, | Performed by: INTERNAL MEDICINE

## 2022-08-26 PROCEDURE — 63600175 PHARM REV CODE 636 W HCPCS: Mod: JG | Performed by: INTERNAL MEDICINE

## 2022-08-26 PROCEDURE — 99999 PR PBB SHADOW E&M-EST. PATIENT-LVL IV: ICD-10-PCS | Mod: PBBFAC,,, | Performed by: INTERNAL MEDICINE

## 2022-08-26 PROCEDURE — 99214 PR OFFICE/OUTPT VISIT, EST, LEVL IV, 30-39 MIN: ICD-10-PCS | Mod: S$PBB,,, | Performed by: INTERNAL MEDICINE

## 2022-08-26 PROCEDURE — 99999 PR PBB SHADOW E&M-EST. PATIENT-LVL IV: CPT | Mod: PBBFAC,,, | Performed by: INTERNAL MEDICINE

## 2022-08-26 PROCEDURE — 96372 THER/PROPH/DIAG INJ SC/IM: CPT

## 2022-08-26 PROCEDURE — 99214 OFFICE O/P EST MOD 30 MIN: CPT | Mod: PBBFAC,PO | Performed by: INTERNAL MEDICINE

## 2022-08-26 RX ADMIN — DENOSUMAB 60 MG: 60 INJECTION SUBCUTANEOUS at 10:08

## 2022-08-26 NOTE — TELEPHONE ENCOUNTER
Staff msg sent to notify inf scheduling that nor more prolia would be needed for this pt.    ----- Message from Sherrie Almanza MD sent at 8/26/2022  9:32 AM CDT -----  Ok for prolia and d/c after this  dose  no more needed   MRI abd and see me with resulst   Rtc 6 months with cbc, c p [id number]

## 2022-08-26 NOTE — PROGRESS NOTES
Subjective:       Patient ID: Yuniel Gracia is a 77 y.o. female.    Chief Complaint:breast ca    HPI:    She has a chronic ITP and hep C, history of    stage I breast cancer,rec score of 32 , so underwent TC  chemotherapy. She did notice a new mass in the rt breast that came back without issue, On arimidex  5 years in 9/2017/ Mild thrombocytopenia related to rx and hep c     saw ENT for mouth sores got better.   and pt are both reporting that pt is unabe to find some words, she gets  Forgetful   states she is afraid to take a dementia test  Had COVID, wasn't hopsitlized  Pt had rivastigmine for memory loss, but made her sick has been changed, went to ED for s/e stomach aches and nausea.was found to have some thing on her liver  REVIEW OF SYSTEMS:     CONSTITUTIONAL: The patient denies any weight change. There is no apparent    change in appetite, fever, night sweats, headaches, fatigue, dizziness, or    weakness.   is with reports worsening dementia    SKIN: Denies rash, issues with nails, non-healing sores, bleeding, blotching    skin or abnormal bruising. Denies new moles or changes to existing moles.      BREASTS: There is no swelling around breasts or nipple discharge.    EYES: Denies eye pain, blurred vision, swelling, redness or discharge.      ENT AND MOUTH:  3 sores around gum line.  And buccal mucosa  CARDIOVASCULAR: Denies chest pain, discomfort or palpitations. Denies neck    swelling or episodes of passing out.      RESPIRATORY: Denies cough, sputum production, blood in sputum, and denies    shortness of breath.      GI: Denies trouble swallowing, indigestion, heartburn, abdominal pain, nausea,    vomiting, diarrhea, altered bowel habits, blood in stool, discoloration of    stools, change in nature of stool, bloating, increased abdominal girth.      GENITOURINARY: No discharge. No pelvic pain or lumps. No rash around groin or  lesions. No urinary frequency, hesitation, painful  urination or blood in    urine. Denies incontinence. No problems with intercourse.      MUSCULOSKELETAL: Denies neck or back pain. Denies weakness in arms or legs,    joint problems or distended inflamed veins in legs. Denies swelling or abnormal  glands.      NEUROLOGICAL: Denies tingling, numbness, altered mentation changes to nerve    function in the face, weakness to one or both of the body. Denies changes to    gait and denies multiple falls or accidents.  + memory loss    PSYCHIATRIC: Denies nervousness, anxiety, hallucinations, depression, suicidal    ideation, trouble sleeping or changes in behavior noticed by family.          PHYSICAL EXAM:     Wt Readings from Last 3 Encounters:   08/26/22 66.6 kg (146 lb 13.2 oz)   07/20/22 64 kg (141 lb 1.5 oz)   07/19/22 64 kg (141 lb 1.5 oz)     Temp Readings from Last 3 Encounters:   08/26/22 97.2 °F (36.2 °C) (Temporal)   07/20/22 97.9 °F (36.6 °C) (Skin)   07/19/22 98.2 °F (36.8 °C) (Oral)     BP Readings from Last 3 Encounters:   08/26/22 129/60   07/20/22 (!) 142/72   07/19/22 132/80     Pulse Readings from Last 3 Encounters:   08/26/22 (!) 56   07/20/22 65   07/19/22 60       GENERAL: Comfortable looking patient. Patient is in no distress.  Awake, alert and oriented to time, person and place.  No anxiety, or agitation.    brusies to chin and after fall+    HEENT: Normal conjunctivae and eyelids. WNL.  PERRLA 3 to 4 mm. No icterus, no pallor, no congestion, and no discharge noted. 3 small aphthous ulcers noted in oral cavity    NECK:  Supple. Trachea is central.  No crepitus.  No JVD or masses.    RESPIRATORY:  No intercostal retractions.  No dullness to percussion.  Chest is clear to auscultation.  No rales, rhonchi or wheezes.  No crepitus.  Good air entry bilaterally.    CARDIOVASCULAR:  S1 and S2 are normally heard without murmurs or gallops.  All peripheral pulses are present.    ABDOMEN:  Normal abdomen.  No hepatosplenomegaly.  No free fluid.  Bowel sounds  are present.  No hernia noted. No masses.  No rebound or tenderness.  No guarding or rigidity.  Umbilicus is midline.    LYMPHATICS:  No axillary, cervical, supraclavicular, submental, or inguinal lymphadenopathy.    SKIN/MUSCULOSKELETAL:  There is no evidence of excoriation marks or ecchmosis.  No rashes.  No cyanosis.  No clubbing.  No joint or skeletal deformities noted.  Normal range of motion.    NEUROLOGIC:  Higher functions are appropriate.  No cranial nerve deficits.  Normal ludmila.  Normal strength.  Motor and sensory functions are normal.  Deep tendon reflexes are normal.    GENITAL/RECTAL:  Exams are deferred.      Laboratory:     CBC:  Lab Results   Component Value Date    WBC 3.99 08/19/2022    RBC 4.30 08/19/2022    HGB 12.9 08/19/2022    HCT 39.7 08/19/2022    MCV 92 08/19/2022    MCH 30.0 08/19/2022    MCHC 32.5 08/19/2022    RDW 14.7 (H) 08/19/2022     08/19/2022    MPV 10.5 08/19/2022    GRAN 2.0 08/19/2022    GRAN 50.0 08/19/2022    LYMPH 1.2 08/19/2022    LYMPH 30.1 08/19/2022    MONO 0.4 08/19/2022    MONO 9.0 08/19/2022    EOS 0.4 08/19/2022    BASO 0.03 08/19/2022    EOSINOPHIL 9.8 (H) 08/19/2022    BASOPHIL 0.8 08/19/2022       BMP: BMP  Lab Results   Component Value Date     08/19/2022    K 4.9 08/19/2022     08/19/2022    CO2 29 08/19/2022    BUN 19 08/19/2022    CREATININE 0.8 08/19/2022    CALCIUM 9.7 08/19/2022    ANIONGAP 8 08/19/2022    ESTGFRAFRICA >60 06/22/2022    EGFRNONAA >60 06/22/2022       LFT:   Lab Results   Component Value Date    ALT 15 08/19/2022    AST 28 08/19/2022    ALKPHOS 48 (L) 08/19/2022    BILITOT 0.6 08/19/2022   Impression:     Osteopenia 8/2020     Consider FDA approved medical therapies in postmenopausal women and men aged 50 years and older, based on the following:     *A hip or vertebral (clinical or morphometric) fracture  *T score less than or equal to -2.5 at the femoral neck or spine after appropriate evaluation to exclude secondary  causes.  *Low bone mass -- also known as osteopenia (T score between -1.0 and -2.5 at the femoral neck or spine) and a 10 year probability of hip fracture greater than or equal to 3% or a 10 year probability of major osteoporosis-related fracture greater than or equal to 20% based on the [place]-adapted WHO algorithm.  *Clinician's judgment and/or patient preference may indicate treatment for people with 10 year fracture probabilities is above or below these levels  12/2020mammogram neg:   Most recent scan 2015 neg  Tumor marker  wnl 23.6 2/2021      Abdomen/pelvis: Ct abd/ pelvis 6/22     Hyperinflation lung bases with dependent atelectatic changes.     The liver demonstrates several hypodensities which are too small to characterize.  The spleen, adrenals, kidneys, and pancreas are normal.  The gallbladder is present.     Small bowel is of normal caliber.  Colon is also normal caliber with scattered colonic diverticula.  No adjacent inflammatory changes.  The appendix is within normal limits.     The uterus is surgically absent.  The bladder is under distended.  The mesentery is without adenopathy nor is the retroperitoneum.  No free fluid in the abdomen pelvis.     No suspicious osseous lesions.  Degenerative changes are identified.  The soft tissues are normal.     Impression:     1. No evidence for mesenteric ischemia.  The vasculature is patent.  2. Other secondary findings as noted.    Bone density wnl 8/22  Assessment/Plan:     Keep scheduled appointment with me for follow-up  Stage 1 breast ca  4. HTN, dyslipedemia, hypothyroidism, depression , contimue care with Dr. Dolan  Had plasma for COV D  Gave pt restoril rx for insomnia. Generic in the past  Patient on antidepressant which may need to be discontinued to see if her memory improves will refer  F/u wit neuro for memory loss  Breast ca: cont arimidex  For a total of 10 years dx in 2012  Keep follow-up with cbc, cmp she completes 10 years of arimidex in  9/2022, explianed   ok for  prolia  For osteo ok for infusion, has normalized so after this we can stop prolia and monitor with bone densities q 2 years  thrombocytopenia cont to monitor,wnl this visit     due for mammo in 12/22      Advance Care planning/ directives /living will/patient's wishes discussed with patient.  Patient has been given guidelines and instructions on completing these directives  COVID social distancing, face mask use, hand washing techniques and personal hygiene routine discussed with patient.  Also discussed vaccine availability.  Need for considering vaccination  Good exercise, nutrition and weight management discussed with patient  Health maintenance activities and follow-up with PCPs recommendations discussed with patient

## 2022-08-26 NOTE — Clinical Note
Ok for prolia and d/c after this  dose  no more needed  MRI abd and see me with resulst  Rtc 6 months with cbc, c p [id number]

## 2022-08-26 NOTE — PLAN OF CARE
Problem: Fall Injury Risk  Goal: Absence of Fall and Fall-Related Injury  Outcome: Ongoing, Progressing  Intervention: Identify and Manage Contributors  Flowsheets (Taken 8/26/2022 0959)  Self-Care Promotion: independence encouraged  Medication Review/Management: medications reviewed  Intervention: Promote Injury-Free Environment  Flowsheets (Taken 8/26/2022 0959)  Safety Promotion/Fall Prevention: medications reviewed

## 2022-09-09 ENCOUNTER — HOSPITAL ENCOUNTER (OUTPATIENT)
Dept: RADIOLOGY | Facility: HOSPITAL | Age: 77
Discharge: HOME | End: 2022-09-09
Attending: INTERNAL MEDICINE
Payer: MEDICARE

## 2022-09-09 DIAGNOSIS — D69.6 THROMBOCYTOPENIA: ICD-10-CM

## 2022-09-09 DIAGNOSIS — Z79.811 USE OF AROMATASE INHIBITORS: ICD-10-CM

## 2022-09-09 DIAGNOSIS — E02 SUBCLINICAL IODINE-DEFICIENCY HYPOTHYROIDISM: ICD-10-CM

## 2022-09-09 DIAGNOSIS — Z86.19 HEPATITIS C VIRUS INFECTION CURED AFTER ANTIVIRAL DRUG THERAPY: ICD-10-CM

## 2022-09-09 PROCEDURE — 25500020 PHARM REV CODE 255: Performed by: INTERNAL MEDICINE

## 2022-09-09 PROCEDURE — 74183 MRI ABDOMEN W WO CONTRAST: ICD-10-PCS | Mod: 26,,, | Performed by: RADIOLOGY

## 2022-09-09 PROCEDURE — 74183 MRI ABD W/O CNTR FLWD CNTR: CPT | Mod: TC

## 2022-09-09 PROCEDURE — A9585 GADOBUTROL INJECTION: HCPCS | Performed by: INTERNAL MEDICINE

## 2022-09-09 PROCEDURE — 74183 MRI ABD W/O CNTR FLWD CNTR: CPT | Mod: 26,,, | Performed by: RADIOLOGY

## 2022-09-09 RX ORDER — GADOBUTROL 604.72 MG/ML
6 INJECTION INTRAVENOUS
Status: COMPLETED | OUTPATIENT
Start: 2022-09-09 | End: 2022-09-09

## 2022-09-09 RX ADMIN — GADOBUTROL 6 ML: 604.72 INJECTION INTRAVENOUS at 07:09

## 2022-09-12 ENCOUNTER — OFFICE VISIT (OUTPATIENT)
Dept: HEMATOLOGY/ONCOLOGY | Facility: CLINIC | Age: 77
End: 2022-09-12
Payer: MEDICARE

## 2022-09-12 VITALS
SYSTOLIC BLOOD PRESSURE: 100 MMHG | DIASTOLIC BLOOD PRESSURE: 53 MMHG | HEIGHT: 64 IN | WEIGHT: 151.88 LBS | HEART RATE: 60 BPM | TEMPERATURE: 97 F | OXYGEN SATURATION: 98 % | BODY MASS INDEX: 25.93 KG/M2 | RESPIRATION RATE: 14 BRPM

## 2022-09-12 DIAGNOSIS — Z79.811 USE OF AROMATASE INHIBITORS: ICD-10-CM

## 2022-09-12 DIAGNOSIS — C50.611 MALIGNANT NEOPLASM OF AXILLARY TAIL OF RIGHT FEMALE BREAST, UNSPECIFIED ESTROGEN RECEPTOR STATUS: ICD-10-CM

## 2022-09-12 DIAGNOSIS — F32.1 MAJOR DEPRESSIVE DISORDER, SINGLE EPISODE, MODERATE: Primary | ICD-10-CM

## 2022-09-12 DIAGNOSIS — F41.8 DEPRESSION WITH ANXIETY: ICD-10-CM

## 2022-09-12 DIAGNOSIS — Z86.19 HEPATITIS C VIRUS INFECTION CURED AFTER ANTIVIRAL DRUG THERAPY: ICD-10-CM

## 2022-09-12 PROCEDURE — 99999 PR PBB SHADOW E&M-EST. PATIENT-LVL IV: CPT | Mod: PBBFAC,,, | Performed by: INTERNAL MEDICINE

## 2022-09-12 PROCEDURE — 99214 OFFICE O/P EST MOD 30 MIN: CPT | Mod: PBBFAC,PO | Performed by: INTERNAL MEDICINE

## 2022-09-12 PROCEDURE — 99214 OFFICE O/P EST MOD 30 MIN: CPT | Mod: S$PBB,,, | Performed by: INTERNAL MEDICINE

## 2022-09-12 PROCEDURE — 99214 PR OFFICE/OUTPT VISIT, EST, LEVL IV, 30-39 MIN: ICD-10-PCS | Mod: S$PBB,,, | Performed by: INTERNAL MEDICINE

## 2022-09-12 PROCEDURE — 99999 PR PBB SHADOW E&M-EST. PATIENT-LVL IV: ICD-10-PCS | Mod: PBBFAC,,, | Performed by: INTERNAL MEDICINE

## 2022-09-12 NOTE — PROGRESS NOTES
Subjective:       Patient ID: Yuniel Gracia is a 77 y.o. female.    Chief Complaint:breast ca    HPI:    She has a chronic ITP and hep C, history of    stage I breast cancer,rec score of 32 , so underwent TC  chemotherapy. She did notice a new mass in the rt breast that came back without issue, On arimidex  5 years in 9/2017/ Mild thrombocytopenia related to rx and hep c     saw ENT for mouth sores got better.   and pt are both reporting that pt is unabe to find some words, she gets  Forgetful   states she is afraid to take a dementia test  Had COVID, wasn't hopsitlized  Pt had rivastigmine for memory loss, but made her sick has been changed, went to ED for s/e stomach aches and nausea.was found to have some thing on her liver  REVIEW OF SYSTEMS:     CONSTITUTIONAL: The patient denies any weight change. There is no apparent    change in appetite, fever, night sweats, headaches, fatigue, dizziness, or    weakness.   is with reports worsening dementia    SKIN: Denies rash, issues with nails, non-healing sores, bleeding, blotching    skin or abnormal bruising. Denies new moles or changes to existing moles.      BREASTS: There is no swelling around breasts or nipple discharge.    EYES: Denies eye pain, blurred vision, swelling, redness or discharge.      ENT AND MOUTH:  3 sores around gum line.  And buccal mucosa  CARDIOVASCULAR: Denies chest pain, discomfort or palpitations. Denies neck    swelling or episodes of passing out.      RESPIRATORY: Denies cough, sputum production, blood in sputum, and denies    shortness of breath.      GI: Denies trouble swallowing, indigestion, heartburn, abdominal pain, nausea,    vomiting, diarrhea, altered bowel habits, blood in stool, discoloration of    stools, change in nature of stool, bloating, increased abdominal girth.      GENITOURINARY: No discharge. No pelvic pain or lumps. No rash around groin or  lesions. No urinary frequency, hesitation, painful  urination or blood in    urine. Denies incontinence. No problems with intercourse.      MUSCULOSKELETAL: Denies neck or back pain. Denies weakness in arms or legs,    joint problems or distended inflamed veins in legs. Denies swelling or abnormal  glands.      NEUROLOGICAL: Denies tingling, numbness, altered mentation changes to nerve    function in the face, weakness to one or both of the body. Denies changes to    gait and denies multiple falls or accidents.  + memory loss    PSYCHIATRIC: Denies nervousness, anxiety, hallucinations, depression, suicidal    ideation, trouble sleeping or changes in behavior noticed by family.          PHYSICAL EXAM:     Wt Readings from Last 3 Encounters:   09/12/22 68.9 kg (151 lb 14.4 oz)   08/26/22 66.6 kg (146 lb 13.2 oz)   07/20/22 64 kg (141 lb 1.5 oz)     Temp Readings from Last 3 Encounters:   09/12/22 97.3 °F (36.3 °C) (Temporal)   08/26/22 97.2 °F (36.2 °C) (Temporal)   07/20/22 97.9 °F (36.6 °C) (Skin)     BP Readings from Last 3 Encounters:   09/12/22 (!) 100/53   08/26/22 129/60   07/20/22 (!) 142/72     Pulse Readings from Last 3 Encounters:   09/12/22 60   08/26/22 (!) 56   07/20/22 65       GENERAL: Comfortable looking patient. Patient is in no distress.  Awake, alert and oriented to time, person and place.  No anxiety, or agitation.    brusies to chin and after fall+    HEENT: Normal conjunctivae and eyelids. WNL.  PERRLA 3 to 4 mm. No icterus, no pallor, no congestion, and no discharge noted. 3 small aphthous ulcers noted in oral cavity    NECK:  Supple. Trachea is central.  No crepitus.  No JVD or masses.    RESPIRATORY:  No intercostal retractions.  No dullness to percussion.  Chest is clear to auscultation.  No rales, rhonchi or wheezes.  No crepitus.  Good air entry bilaterally.    CARDIOVASCULAR:  S1 and S2 are normally heard without murmurs or gallops.  All peripheral pulses are present.    ABDOMEN:  Normal abdomen.  No hepatosplenomegaly.  No free fluid.   Bowel sounds are present.  No hernia noted. No masses.  No rebound or tenderness.  No guarding or rigidity.  Umbilicus is midline.    LYMPHATICS:  No axillary, cervical, supraclavicular, submental, or inguinal lymphadenopathy.    SKIN/MUSCULOSKELETAL:  There is no evidence of excoriation marks or ecchmosis.  No rashes.  No cyanosis.  No clubbing.  No joint or skeletal deformities noted.  Normal range of motion.    NEUROLOGIC:  Higher functions are appropriate.  No cranial nerve deficits.  Normal ludmila.  Normal strength.  Motor and sensory functions are normal.  Deep tendon reflexes are normal.    GENITAL/RECTAL:  Exams are deferred.      Laboratory:     CBC:  Lab Results   Component Value Date    WBC 3.99 08/19/2022    RBC 4.30 08/19/2022    HGB 12.9 08/19/2022    HCT 39.7 08/19/2022    MCV 92 08/19/2022    MCH 30.0 08/19/2022    MCHC 32.5 08/19/2022    RDW 14.7 (H) 08/19/2022     08/19/2022    MPV 10.5 08/19/2022    GRAN 2.0 08/19/2022    GRAN 50.0 08/19/2022    LYMPH 1.2 08/19/2022    LYMPH 30.1 08/19/2022    MONO 0.4 08/19/2022    MONO 9.0 08/19/2022    EOS 0.4 08/19/2022    BASO 0.03 08/19/2022    EOSINOPHIL 9.8 (H) 08/19/2022    BASOPHIL 0.8 08/19/2022       BMP: BMP  Lab Results   Component Value Date     08/19/2022    K 4.9 08/19/2022     08/19/2022    CO2 29 08/19/2022    BUN 19 08/19/2022    CREATININE 0.8 08/19/2022    CALCIUM 9.7 08/19/2022    ANIONGAP 8 08/19/2022    ESTGFRAFRICA >60 06/22/2022    EGFRNONAA >60 06/22/2022       LFT:   Lab Results   Component Value Date    ALT 15 08/19/2022    AST 28 08/19/2022    ALKPHOS 48 (L) 08/19/2022    BILITOT 0.6 08/19/2022   Impression:     Osteopenia 8/2020     Consider FDA approved medical therapies in postmenopausal women and men aged 50 years and older, based on the following:     *A hip or vertebral (clinical or morphometric) fracture  *T score less than or equal to -2.5 at the femoral neck or spine after appropriate evaluation to exclude  secondary causes.  *Low bone mass -- also known as osteopenia (T score between -1.0 and -2.5 at the femoral neck or spine) and a 10 year probability of hip fracture greater than or equal to 3% or a 10 year probability of major osteoporosis-related fracture greater than or equal to 20% based on the [place]-adapted WHO algorithm.  *Clinician's judgment and/or patient preference may indicate treatment for people with 10 year fracture probabilities is above or below these levels  12/2020mammogram neg:   Most recent scan 2015 neg  Tumor marker  wnl 23.6 2/2021      Abdomen/pelvis: Ct abd/ pelvis 6/22     Hyperinflation lung bases with dependent atelectatic changes.     The liver demonstrates several hypodensities which are too small to characterize.  The spleen, adrenals, kidneys, and pancreas are normal.  The gallbladder is present.     Small bowel is of normal caliber.  Colon is also normal caliber with scattered colonic diverticula.  No adjacent inflammatory changes.  The appendix is within normal limits.     The uterus is surgically absent.  The bladder is under distended.  The mesentery is without adenopathy nor is the retroperitoneum.  No free fluid in the abdomen pelvis.     No suspicious osseous lesions.  Degenerative changes are identified.  The soft tissues are normal.     Impression:     1. No evidence for mesenteric ischemia.  The vasculature is patent.  2. Other secondary findings as noted.    Impression:MRI abd 9/9/22     Few scattered cysts in the liver largest measuring 8 mm.  Two right renal cysts.       Bone density wnl 8/22  Assessment/Plan:      Pt had MRI of abd to follow lesions described in CT confirms cysts  Stage 1 breast ca  4. HTN, dyslipedemia, hypothyroidism, depression , contimue care with Dr. Dolan  Had plasma for COV D  Gave pt restoril rx for insomnia. Generic in the past  Patient on antidepressant which may need to be discontinued to see if her memory improves will refer  F/u wit neuro for  memory loss  Breast ca: cont arimidex  For a total of 10 years dx in 2012  Keep follow-up with cbc, cmp she completes 10 years of arimidex in 9/2022, explianed    osteo o, has normalized so  can stop prolia and monitor with bone densities q 2 years  thrombocytopenia cont to monitor,wnl this visit     due for mammo in 12/22      Advance Care planning/ directives /living will/patient's wishes discussed with patient.  Patient has been given guidelines and instructions on completing these directives  COVID social distancing, face mask use, hand washing techniques and personal hygiene routine discussed with patient.  Also discussed vaccine availability.  Need for considering vaccination  Good exercise, nutrition and weight management discussed with patient  Health maintenance activities and follow-up with PCPs recommendations discussed with patient

## 2022-09-19 ENCOUNTER — LAB VISIT (OUTPATIENT)
Dept: LAB | Facility: HOSPITAL | Age: 77
End: 2022-09-19
Attending: STUDENT IN AN ORGANIZED HEALTH CARE EDUCATION/TRAINING PROGRAM
Payer: MEDICARE

## 2022-09-19 ENCOUNTER — IMMUNIZATION (OUTPATIENT)
Dept: PRIMARY CARE CLINIC | Facility: CLINIC | Age: 77
End: 2022-09-19
Payer: MEDICARE

## 2022-09-19 DIAGNOSIS — Z23 NEED FOR VACCINATION: Primary | ICD-10-CM

## 2022-09-19 DIAGNOSIS — E03.1 CONGENITAL HYPOTHYROIDISM WITHOUT GOITER: ICD-10-CM

## 2022-09-19 LAB
T4 FREE SERPL-MCNC: 1.04 NG/DL (ref 0.71–1.51)
TSH SERPL DL<=0.005 MIU/L-ACNC: 5.06 UIU/ML (ref 0.4–4)

## 2022-09-19 PROCEDURE — 0124A COVID-19, MRNA, LNP-S, BIVALENT BOOSTER, PF, 30 MCG/0.3 ML DOSE: CPT | Mod: S$GLB,,, | Performed by: FAMILY MEDICINE

## 2022-09-19 PROCEDURE — 91312 COVID-19, MRNA, LNP-S, BIVALENT BOOSTER, PF, 30 MCG/0.3 ML DOSE: CPT | Mod: S$GLB,,, | Performed by: FAMILY MEDICINE

## 2022-09-19 PROCEDURE — 84439 ASSAY OF FREE THYROXINE: CPT | Performed by: STUDENT IN AN ORGANIZED HEALTH CARE EDUCATION/TRAINING PROGRAM

## 2022-09-19 PROCEDURE — 91312 COVID-19, MRNA, LNP-S, BIVALENT BOOSTER, PF, 30 MCG/0.3 ML DOSE: ICD-10-PCS | Mod: S$GLB,,, | Performed by: FAMILY MEDICINE

## 2022-09-19 PROCEDURE — 0124A COVID-19, MRNA, LNP-S, BIVALENT BOOSTER, PF, 30 MCG/0.3 ML DOSE: ICD-10-PCS | Mod: S$GLB,,, | Performed by: FAMILY MEDICINE

## 2022-09-19 PROCEDURE — 36415 COLL VENOUS BLD VENIPUNCTURE: CPT | Mod: PO | Performed by: STUDENT IN AN ORGANIZED HEALTH CARE EDUCATION/TRAINING PROGRAM

## 2022-09-19 PROCEDURE — 84443 ASSAY THYROID STIM HORMONE: CPT | Performed by: STUDENT IN AN ORGANIZED HEALTH CARE EDUCATION/TRAINING PROGRAM

## 2022-10-06 DIAGNOSIS — E03.1 CONGENITAL HYPOTHYROIDISM WITHOUT GOITER: ICD-10-CM

## 2022-10-06 RX ORDER — LEVOTHYROXINE SODIUM 112 UG/1
112 TABLET ORAL
Qty: 90 TABLET | Refills: 0 | Status: SHIPPED | OUTPATIENT
Start: 2022-10-06 | End: 2023-01-03

## 2022-10-06 RX ORDER — LEVOTHYROXINE SODIUM 100 UG/1
100 TABLET ORAL
Qty: 30 TABLET | Refills: 2 | OUTPATIENT
Start: 2022-10-06 | End: 2023-01-04

## 2022-10-06 NOTE — TELEPHONE ENCOUNTER
Refill Routing Note   Medication(s) are not appropriate for processing by [facility] for the following reason(s):      - Medication requested has undergone a recent dosage adjustment (<3 months)    [facility] action(s):  Defer                Appointments  past 12m or future 3m with PCP    Date Provider   Last Visit   7/19/2022 Bhaskar Olivera MD   Next Visit   Visit date not found Bhaskar Olivera MD   ED visits in past 90 days: 0        Note composed:2:58 PM 10/06/2022

## 2022-10-06 NOTE — TELEPHONE ENCOUNTER
No new care gaps identified.  [facility] Embedded Care Gaps. Reference number: [id number]. 10/06/2022   11:22:51 AM WALTERT

## 2022-10-14 NOTE — INTERVAL H&P NOTE
The patient has been examined and the H&P has been reviewed:    I concur with the findings and no changes have occurred since H&P was written.    Anesthesia/Surgery risks, benefits and alternative options discussed and understood by patient/family.          Active Hospital Problems    Diagnosis  POA    Primary osteoarthritis of right knee [M17.11]  Yes      Resolved Hospital Problems    Diagnosis Date Resolved POA   No resolved problems to display.     
ht  wt

## 2022-10-28 ENCOUNTER — OFFICE VISIT (OUTPATIENT)
Dept: OPTOMETRY | Facility: CLINIC | Age: 77
End: 2022-10-28
Payer: MEDICARE

## 2022-10-28 DIAGNOSIS — Z96.1 PSEUDOPHAKIA: ICD-10-CM

## 2022-10-28 DIAGNOSIS — H40.1134 PRIMARY OPEN ANGLE GLAUCOMA (POAG) OF BOTH EYES, INDETERMINATE STAGE: Primary | ICD-10-CM

## 2022-10-28 DIAGNOSIS — H52.7 REFRACTIVE ERROR: ICD-10-CM

## 2022-10-28 PROCEDURE — 92004 PR EYE EXAM, NEW PATIENT,COMPREHESV: ICD-10-PCS | Mod: S$PBB,,, | Performed by: OPTOMETRIST

## 2022-10-28 PROCEDURE — 99999 PR PBB SHADOW E&M-EST. PATIENT-LVL III: ICD-10-PCS | Mod: PBBFAC,,, | Performed by: OPTOMETRIST

## 2022-10-28 PROCEDURE — 99213 OFFICE O/P EST LOW 20 MIN: CPT | Mod: PBBFAC,PO | Performed by: OPTOMETRIST

## 2022-10-28 PROCEDURE — 92004 COMPRE OPH EXAM NEW PT 1/>: CPT | Mod: S$PBB,,, | Performed by: OPTOMETRIST

## 2022-10-28 PROCEDURE — 99999 PR PBB SHADOW E&M-EST. PATIENT-LVL III: CPT | Mod: PBBFAC,,, | Performed by: OPTOMETRIST

## 2022-10-28 RX ORDER — BIMATOPROST 0.1 MG/ML
1 SOLUTION/ DROPS OPHTHALMIC NIGHTLY
Qty: 7.5 ML | Refills: 3 | Status: SHIPPED | OUTPATIENT
Start: 2022-10-28 | End: 2022-12-07

## 2022-10-28 NOTE — PROGRESS NOTES
HPI    DLE x 3 yrs (Dr. Amato)   Pt here today for annual exam.    States   blurred vision at near only, distance vision good.   Declines refraction   today, happy with OTC readers.    Denies any headaches, eye pain or pressure.    Hx of glaucoma OU --- compliant with Lumigan gtts OU qhs, needs rx   refilled.    (-) allergies / dry eys  (-) floaters or light flashes  Last edited by Omid Wade, OD on 10/28/2022  2:43 PM.            Assessment /Plan     For exam results, see Encounter Report.    Primary open angle glaucoma (POAG) of both eyes, indeterminate stage  -     LUMIGAN 0.01 % Drop; Place 1 drop into both eyes every evening.  Dispense: 7.5 mL; Refill: 3  -     OCT - Optic Nerve; Future  -     Reid Visual Field - OU - Extended - Both Eyes; Future    Pseudophakia    Refractive error      1. Primary open angle glaucoma (POAG) of both eyes, indeterminate stage  POAG OU, indeterminate stage  Continue lumigan: 1 gt qPM OU, refilled today  Return for [facility] 24-2 sf  oct rnfl  IOP check in 4 months    - LUMIGAN 0.01 % Drop; Place 1 drop into both eyes every evening.  Dispense: 7.5 mL; Refill: 3  - OCT - Optic Nerve; Future  - Reid Visual Field - OU - Extended - Both Eyes; Future    2. Pseudophakia  S/p cataract extraction OU, no PCO    3. Refractive error  Declines refraction, happy with otc readers prn for near

## 2022-11-08 ENCOUNTER — OFFICE VISIT (OUTPATIENT)
Dept: NEUROLOGY | Facility: CLINIC | Age: 77
End: 2022-11-08
Payer: MEDICARE

## 2022-11-08 DIAGNOSIS — F03.90 MAJOR NEUROCOGNITIVE DISORDER: Primary | ICD-10-CM

## 2022-11-08 DIAGNOSIS — R41.3 MEMORY LOSS: ICD-10-CM

## 2022-11-08 PROCEDURE — 96116 NUBHVL XM PHYS/QHP 1ST HR: CPT | Mod: S$PBB,,, | Performed by: STUDENT IN AN ORGANIZED HEALTH CARE EDUCATION/TRAINING PROGRAM

## 2022-11-08 PROCEDURE — 96133 NRPSYC TST EVAL PHYS/QHP EA: CPT | Mod: ,,, | Performed by: STUDENT IN AN ORGANIZED HEALTH CARE EDUCATION/TRAINING PROGRAM

## 2022-11-08 PROCEDURE — 99499 NO LOS: ICD-10-PCS | Mod: S$PBB,,, | Performed by: STUDENT IN AN ORGANIZED HEALTH CARE EDUCATION/TRAINING PROGRAM

## 2022-11-08 PROCEDURE — 99999 PR PBB SHADOW E&M-EST. PATIENT-LVL I: CPT | Mod: PBBFAC,,, | Performed by: STUDENT IN AN ORGANIZED HEALTH CARE EDUCATION/TRAINING PROGRAM

## 2022-11-08 PROCEDURE — 99499 UNLISTED E&M SERVICE: CPT | Mod: S$PBB,,, | Performed by: STUDENT IN AN ORGANIZED HEALTH CARE EDUCATION/TRAINING PROGRAM

## 2022-11-08 PROCEDURE — 96132 NRPSYC TST EVAL PHYS/QHP 1ST: CPT | Mod: ,,, | Performed by: STUDENT IN AN ORGANIZED HEALTH CARE EDUCATION/TRAINING PROGRAM

## 2022-11-08 PROCEDURE — 96116 NUBHVL XM PHYS/QHP 1ST HR: CPT | Mod: PBBFAC,PO | Performed by: STUDENT IN AN ORGANIZED HEALTH CARE EDUCATION/TRAINING PROGRAM

## 2022-11-08 PROCEDURE — 96133 PR NEUROPSYCHOLOGIC TEST EVAL SVCS, EA ADDTL HR: ICD-10-PCS | Mod: ,,, | Performed by: STUDENT IN AN ORGANIZED HEALTH CARE EDUCATION/TRAINING PROGRAM

## 2022-11-08 PROCEDURE — 96138 PSYCL/NRPSYC TECH 1ST: CPT | Mod: ,,, | Performed by: STUDENT IN AN ORGANIZED HEALTH CARE EDUCATION/TRAINING PROGRAM

## 2022-11-08 PROCEDURE — 96116 PR NEUROBEHAVIORAL STATUS EXAM BY PSYCH/PHYS: ICD-10-PCS | Mod: S$PBB,,, | Performed by: STUDENT IN AN ORGANIZED HEALTH CARE EDUCATION/TRAINING PROGRAM

## 2022-11-08 PROCEDURE — 99999 PR PBB SHADOW E&M-EST. PATIENT-LVL I: ICD-10-PCS | Mod: PBBFAC,,, | Performed by: STUDENT IN AN ORGANIZED HEALTH CARE EDUCATION/TRAINING PROGRAM

## 2022-11-08 PROCEDURE — 96132 PR NEUROPSYCHOLOGIC TEST EVAL SVCS, 1ST HR: ICD-10-PCS | Mod: ,,, | Performed by: STUDENT IN AN ORGANIZED HEALTH CARE EDUCATION/TRAINING PROGRAM

## 2022-11-08 PROCEDURE — 96138 PR PSYCH/NEUROPSYCH TEST ADMIN/SCORING, BY TECH, 2+ TESTS, 1ST 30 MIN: ICD-10-PCS | Mod: ,,, | Performed by: STUDENT IN AN ORGANIZED HEALTH CARE EDUCATION/TRAINING PROGRAM

## 2022-11-08 PROCEDURE — 99211 OFF/OP EST MAY X REQ PHY/QHP: CPT | Mod: PBBFAC,PO | Performed by: STUDENT IN AN ORGANIZED HEALTH CARE EDUCATION/TRAINING PROGRAM

## 2022-11-08 PROCEDURE — 96139 PSYCL/NRPSYC TST TECH EA: CPT | Mod: ,,, | Performed by: STUDENT IN AN ORGANIZED HEALTH CARE EDUCATION/TRAINING PROGRAM

## 2022-11-08 PROCEDURE — 96139 PR PSYCH/NEUROPSYCH TEST ADMIN/SCORING, BY TECH, 2+ TESTS, EA ADDTL 30 MIN: ICD-10-PCS | Mod: ,,, | Performed by: STUDENT IN AN ORGANIZED HEALTH CARE EDUCATION/TRAINING PROGRAM

## 2022-11-08 NOTE — PROGRESS NOTES
NEUROPSYCHOLOGY CONSULT    Referral Information  Name: Yuniel Gracia  MRN: [id number]  Age: 77 y.o.    : 1945  Race: White    Education: 12 years   Gender: Female   Handedness: Right     Referring Provider: Oneyda Blanco  Referral Reason/Medical Necessity: Neuropsychological evaluation to assess current cognitive functioning, aid in differential diagnosis, and provide treatment recommendations in the context of memory decline.  Consent/Emergency Plan: The patient expressed an understanding of the purpose of the evaluation and consented to all procedures. I informed the patient of limits to confidentiality and discussed an emergency plan.  Visit Type: In-person interview, testing, and treatment plan on 2022.   Sources of Information: The following was gathered from a clinical interview with Ms. Gracia, her  in a separate interview with her permission, and review of available medical records.  Billing table provided at the end of this note.    SUMMARY/TREATMENT PLAN   Results from the evaluation indicate the following diagnoses and treatment plan recommendations. The patient is likely able to follow a treatment plan with help from family.    Ms. Gracia is a 77 y.o. female with history of breast cancer s/p TC chemotherapy () on anastrozole, hepatitis C (), mixed hyperlipidemia, aortic calcification, depression, GERD, and prior surgery for trigeminal neuralgia. She is referred for a neuropsychological evaluation in the context of word finding difficulty, memory, and executive functioning. She consumes two servings of vodka every night. Her  assists with instrumental ADLs.     She obtained an MMSE score of 14/30 today, reduced from 18/30 in 2022, and 23/30 in . Results are interpreted in the context of low average to average premorbid abilities. She demonstrates impairments in visuospatial abilities, executive functioning, language, and memory. Basic  attention is preserved. Learning/acquisition and retrieval are impaired, but she does benefit from recognition format on story and figure memory tests. Naming and verbal fluency are profoundly low, and repetition is poor for sentences. She made phonologic paraphasic errors and occasionally said nonsense words. Object knowledge is spared. Comprehension is variable. Visuospatial ablities/visuoconstruction is also markedly impaired. She has signficant difficulty with working memory, mental set shifting, and mental set maintenance, and understanding abstract concepts. She denies emotional distress.     Her clinical presentation and profile are concerning for a neurodegenerative disease process.  Her  reports that he initially noticed word finding difficulties. She exhibits prominent word retrieval deficits with phonologic paraphasias and neologisms in speech with relatively spared object/semantic knowledge and motor speech. Speech/naming deficits are in disproportion to memory deficits, as she benefits modestly from recognition format. 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 Executive dysfunction and visuospatial deficits are common in lvPPA. MRI appears to show asymmetry with possibly greater left temporal atrophy, but this has not been confirmed by neuroradiology. Further imaging studies (FDG PET) could help inform the differential. Cholinesterase inhibitor medication is still worth consideration, but she has not tolerated aricept or rivastigmine. She meets criteria for major neurocognitive disorder, mild in severity.     Problem List Items Addressed This Visit          Neuro    Memory loss     Other Visit Diagnoses       Major neurocognitive disorder    -  Primary            Referral Diagnosis: R41.3 (ICD-10-CM) - Memory loss    PLAN & RECOMMENDATIONS  Medical Follow-Up: Continue usual follow up for current active medical issues.     Follow up with Neurology.    Speech/Language Therapy  to provide compensatory strategies and introduce alternative/augmentative communication aids to the patient and family.     Limit alcohol consumption. Alcohol can exacerbate cognitive problems and worsen health conditions.      Monitoring and oversight of daily activities. Continued supervision and assistance with daily activities is appropriate given the severity of cognitive deficits. Encourage the patient's participation in daily activities whenever safely possible, as this can impart a sense of belonging, purpose, and contribution. However, maintain supervision and oversight.    Implement routine:  Establish a memory routine by doing things the same way over and over again (routine sleep, medication, and meal times; place personal items in the same designated locations).  Link behaviors that naturally go together (e.g., taking medications with meals) to increase the likelihood of forming the habit and routine.     Caregiving Assistance. The family may wish to consider additional supports. [place] [facility] ([facility]; https://www.coastseniors.org) offers a variety of services including caregiving respite. The [facility] also provides long term personal care services (https://ldh.la.gov/subhome/12).       Care Management Recommendation. Referral to Social Work for care management recommendation/needs. Referral to [facility] for education and support on a monthly basis.     Support groups for caregivers. Care partners can contact Cruz Acosta MA, MBA [email or web address]) or Myriam Tello LCSW ([email or web address]) to learn more about [facility] Dementia Education Series via Zoom; Dementia Caregiver Support Group via Zoom; or volunteer-led in-person Dementia Caregiver Support Group.    Plan and prepare for the future. If not already discussed, Ms. Gracia and caregivers should consider formal arrangements to allow a designated person to  make medical and financial decision when she becomes unable to do so. Options to consider include designating a healthcare proxy, medical and/or financial power of , and completing advanced directives for healthcare decisions and estate planning (e.g., finalizing a will). If cost is prohibitive, [facility] (https://[employer].org/) provides free  for individuals with low income.    Resources: Consider resources for support through the Governor's Office of Elderly Affairs (http://goea.[place].gov/), [place] Chapter of the Alzheimer's Association (www.alz.org/[place]/), the Family Caregiver [place] (www.caregiver.org), and the American Psychological Association (http://www.apa.org/pi/about/publications/caregivers/consumers/index.aspxconsumers/index.aspx).      Thank you for allowing me to participate in Ms. Gracia's care.  If you have any questions, please contact me at [phone].    Griselda Cuevas, Ph.D.  Licensed Clinical Neuropsychologist  [facility] - Department of Neurology    CLINICAL INTERVIEW & RECORD REVIEW   COGNITIVE SYMPTOMS & HISTORY OF PRESENT ILLNESS  Ms. Gracia reports cognitive changes. She feels her memory is weaker. She is less interested in activities she used to enjoy because she does not remember how to do them. Her  reports memory changes two years ago. He first noticed that she was having trouble coming up with simple words in conversation and had frequent pauses when speaking. She forgets dates and names. She may forget information quickly. For instance, her  may tell her they will have soup for dinner. She may ask what they are having for dinner a few minutes later. When he repeats answers to her, she does not appear have recollection of him telling her before. She sometimes forgets her birthday. She plays cards with family and friends every week. They have played for many years. She forgets some of the rules of the  game and others help her. She started having trouble with the game within the past year. She forgets that she completed a task (e.g., sweeping the floor repeatedly, washing clothes daily). She began having trouble using the washing/drying machine, and her  started to place notes/markers on the machine. About 6 months ago, she started having trouble using the television remote. She now asks her  to help her turn down the volume on the television. She gets confused about how to switch the thermostat from heat to fan setting. She likes to help her  with cooking but she has trouble following instructions. She always had some trouble with a smart phone, but she is able to use her tablet for [employer]. Her speed of thinking is reduced. She does not engage in tasks with multi-tasking anymore. Her  does not notice changes to attention. Her  describes gradual decline in the past two years.     ACTIVITIES OF DAILY LIVING  Basic ADLs: Independent.   Medications: Her  has been organizing the pill box for the past year. He reminds her to take her medication and hands her the box. He checks to see if she took it and sometimes she still forgets.   Appointments/Schedule: Her  has been managing appointments for the past five years.   Finances: Her  always handled this. She was always less skilled at handling finances/budgeting.   Cooking: Her  took over cooking about 2 years ago because she was having trouble. She may try to warm up coffee in the microwave for 5 minutes.   Shopping/Household: They go grocery shopping together but her  primarily handles this. She does household chores. She has some issues using the laundry machines.   Driving: She stopped driving about a year and a half ago. She had trouble with distance. She became confused at times and was driving significantly below traffic speed.     PHYSICAL SYMPTOMS  She feels less steady when walking and  "holds on to her . She denies pain, dizziness. She wears Depends at night. Her  reports that her head shake sometimes. She has had cataract surgery. Hearing is good. She still has nausea in the mornings with rivastigmine, so her  discontinued this three days ago. She has not had nausea since discontinuing.     MOOD/PSYCHIATRIC HISTORY  Mood: She describes her recent mood as "well" and describes herself as "a very happy person." She plays cards. She takes Zoloft. She was initially placed on this after chemotherapy. She denies history of depression. She states that she is anxious about today's appointment but does not generally experience worry.   Behavior: Negative for agitation, delusions, hallucinations, apathy, or compulsions.   Neurovegetative: She sleeps well throughout the night, about 8-9 hours per night. She feels well rested when she wakes up but occasionally falls asleep during the day when watching television. She does not snore. She may scream and yell during sleep, this is more frequent in the past four years. No complex movements. Energy level is stable. Appetite is normal.   Suicidal/Homicidal Ideation: Denied.     SOCIAL HISTORY AND HEALTH BEHAVIOR  Family Status:  to  of 59 years. She has three sons.   Current Living Situation: Lives with  in their home of 40s years  Primary Source of Support:   Daily Activities:Yard work, television  Developmental History: No gestational or later developmental concerns.  Academic History: No reported history of learning, attention, or behavioral difficulties. No grade retention.  Education Level: High school  Occupational Status and History: Homemaker. 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   Exercise: None  Substance Use:   Alcohol: Two shots of vodka every evening (for the past year). She used to only drink on " "occasion.   Cigarettes/tobacco: Never smoked cigarettes or used smokeless tobacco.   Illicit drugs: None.     FAMILY HISTORY  family history includes Alcohol abuse in her maternal grandfather; Breast cancer in her mother, sister, sister, and another family member; Diabetes in her father, maternal aunt, paternal aunt, and paternal uncle; Drug abuse in her son; Early death in her maternal grandmother; Heart disease in her father, maternal grandfather, and maternal uncle; Obesity in her son; Tuberculosis in her paternal grandfather and paternal uncle.  Her father passed away at age 65 and her mother passed away at age 94. She has an older sister.   Family Neurologic History: Negative for heritable risk factors  Family Psychiatric History: Possible alcohol abuse in her father and paternal uncles.     MEDICAL STATUS  Patient Active Problem List   Diagnosis    History of breast cancer    Insomnia    Hypothyroidism    Osteopenia of left hip    Hepatitis C virus infection cured after antiviral drug therapy    Thrombocytopenia    Use of aromatase inhibitors    Raynaud's phenomenon without gangrene    Depression with anxiety    Overactive bladder    Mixed hyperlipidemia    Prediabetes    BMI 25.0-25.9,adult    Aortic calcification    Balance disorder    Gait instability    Leg weakness    Oral ulcer    Major depressive disorder, single episode, moderate    Memory loss     Past Medical History:   Diagnosis Date    Anemia     Arthritis     Blood transfusion     Breast cancer     Cancer RIGHT BREAST    Chronic constipation     Clotting disorder     Colon polyp     Diverticulosis     Glaucoma     Hepatitis C 2000    pt states history of hepatits c-"cured by Dr. Lynch"; TREATED 2 YEARS - 2000   OK NOW    Hypothyroid     Insomnia     Malignant neoplasm of breast 4/12/2012    Memory loss     reports some memory loss since chemo    Osteoarthritis     Panic attacks     Raynaud's disease     takes amlodipine for this    Ulcer     " Vertigo      Past Surgical History:   Procedure Laterality Date    APPENDECTOMY      BLADDER SURGERY  2011    sling - Dr Giles  - [facility]    BRAIN SURGERY  2007    temporal neuralgia - [place]    BREAST BIOPSY      BREAST LUMPECTOMY  03/2012    malignant - had chemo and radiotherapy    CATARACT EXTRACTION      COLONOSCOPY  01/03/2011    Dr Lynch, in media section, diverticulosis, hemorrhoids, otherwise normal findings; normal findings on random biopsies    COLONOSCOPY N/A 06/01/2017    Procedure: COLONOSCOPY;  Surgeon: Nate Lamb MD;  Location: [facility] ENDO;  Service: Endoscopy;  Laterality: N/A; hemorrhoids, diverticulosis, 1 colon polyp removed; Repeat colonoscopy in 5 years for surveillance; biopsy: Tubular adenoma, random biopsies unremarkable    ESOPHAGOGASTRODUODENOSCOPY N/A 07/20/2022    Procedure: EGD (ESOPHAGOGASTRODUODENOSCOPY);  Surgeon: Nate Lamb MD;  Location: [facility] ENDO;  Service: Endoscopy;  Laterality: N/A;    EYE SURGERY      cataracts bilateral    HYSTERECTOMY      JOINT REPLACEMENT  09/25/2017    left Knee [facility]     removal of port a cath      right lymph node removed from breast area      TUNNELED VENOUS PORT PLACEMENT  04/2012    left chest    UPPER GASTROINTESTINAL ENDOSCOPY  prior to 2011    small hiatal hernia       RELEVANT NEUROLOGIC HISTORY  Falls: She states that she trips often. 12/2020 fall in the hallway of her house, no LOC, hematoma overlying right paramedian forehead with mild headach and neck pain. This was the last major fall.   TBI: None reported.  Seizures: Denied.   Stroke: Denied.   Movement concerns: Less steady when walking.   CNS Infection: Denied.     NEURODIAGNOSTICS  MRI Brain 6/20/2022  1. No evidence of an acute intracranial abnormality.  2.  Mild generalized cerebral volume loss with scattered supratentorial white matter T2/FLAIR hyperintense signal, nonspecific but likely representing chronic small vessel disease.  3. Stable  postoperative changes of right occipital craniotomy with underlying right cerebellar encephalomalacia.    RECENT LABS  Lab Results   Component Value Date    MGPCKYDL01 1330 (H) 09/30/2020     Lab Results   Component Value Date    RPR Non-reactive 09/30/2020     Lab Results   Component Value Date    FOLATE 16.1 09/30/2020     Lab Results   Component Value Date    TSH 5.059 (H) 09/19/2022     Lab Results   Component Value Date    HGBA1C 5.5 03/11/2019     No results found for: HIV1X2, [id number]    CURRENT MEDICATIONS  Ms. Gracia has a current medication list which includes the following prescription(s): amlodipine, anastrozole, calcium carbonate/vitamin d3, co-enzyme q-10, erythromycin, fish oil-omega-3 fatty acids, levothyroxine, lumigan, pantoprazole, pravastatin, rivastigmine tartrate, sertraline, solifenacin, and sucralfate.     MENTAL STATUS AND OBSERVATIONS  Appearance: Casually dressed and adequate grooming/hygiene.   Alertness/orientation: Attentive and alert. She was oriented to person, season, but note year, month (stated 92), day of the week (Monday instead of Tuesday), or date (provided her birthday and realized this was not what the examiner had asked for). She identifies the country and state but not city ([place]) or clinic.   Gait/motor: Unremarkable.   Sensory: She had difficulty on visuospatial tasks but not visuoperceptual tasks. She denied difficulty seeing stimuli. Hearing was adequate for testing purposes.   Speech/language: Speech was dysfluent at times. She occasionally stammered over words but this was not consistent. She exhibited frequent word finding difficulties and hesitations in speech. There were occasional neologisms. This seemed to be particularly during times when she was responding to questions, and not as much with just spontaneous conversation. She appeared to had trouble understanding questions and instructions at times.   Stated mood/affect: The patient's stated  "mood was "happy." Affect was congruent with stated mood.   Interpersonal behavior: Rapport was quickly and easily established.   Thought processes: Logical and goal-directed. She forget details of her history (was not sure how many children she had).   Test taking behavior and validity: She often exhibited confusion and required frequent repetition and/or clarification of questions and task instructions (and even then did not always understand the task).  She became upset when she felt she was not performing well (especially with memory tasks and naming tasks). She frequently apologized for her performance.  She sometimes gave facile excuses ("I don't pay attention to those things"). Mood questionnaire items were read aloud to her. Scores on stand-alone and embedded performance validity measures are considered valid after accounting for her current level of functional/instrumental impairment.The current results are considered a credible reflection of the patient's current functioning.     PROCEDURES & RESULTS   In addition to performing a review of pertinent medical records, reviewing limits to confidentiality, conducting a clinical interview, and explaining procedures, the following measures were administered:  CITH; DCT; Mini-Mental Status Examination (MMSE); Wide Range Achievement Test, Fifth Edition (WRAT-5) Reading; [place] Picture Vocabulary Test, Fifth Edition (PPVT-5); Wechsler Adult Intelligence Scale, Fourth Edition (WAIS-IV) select subtests; Wechsler Memory Scale, Fourth Edition (WMS-IV) select subtests Repeatable Battery for the Assessment of Neuropsychological Status (RBANS); Neuropsychological Assessment Battery (NAB) Naming; Controlled Oral Word Association Test (COWAT) and Animal naming ([facility] norms)  Arabi Diagnostic Aphasia Examination, Third Edition (BDAE-3) Repetition; Trail Making Test ([facility] norms); Oral [place] Making Test; Clock Drawing Test and Copy; Visual Object and Space Perception " (VOSP); Frontal Assessment Battery (GONZALES) motor items; Generalized Anxiety Disorder Assessment (DANIEL-7); Geriatric Depression Scale (GDS-30); Manual norms were used unless otherwise indicated.      TEST RESULTS  GLOBAL COGNITIVE FUNCTION  Performance is impaired on a cognitive screening measure (MMSE 14/30). She missed points on orientation to time and place, working memory, delayed recall, naming (stated time for watch), following commands, and design copy.     COGNITIVE BASELINE  Baseline cognitive function is estimated to be in the low average to average range based on a picture vocabulary test and educational/occupational attainment.    ATTENTION & PROCESSING SPEED  Immediate auditory attention is low average. Scores on auditory working memory tasks are exceptionally low to below average. She had difficulty understanding instructions on a digit symbol coding task. She was unable to complete a rapid number sequencing task in the written modality.She had difficulty locating the numbers and at times misread the numbers.  Rapid counting is average in the oral/verbal modality.     EXECUTIVE FUNCTIONS  On a mental set shifting task with numbers and letters (oral/verbal modality), she lost set after number 1-A-2-B.  Clock drawing to command is notable for a [place] down and two clock hands in the upper left quadrant with no numbers written. Performance is intact on a 3-step hand motor sequencing task. On a go-no-go motor inhibition task, she repeated the examiner's tapping pattern in the first trial and correctly performed the second trial after instructions were changed.     LANGUAGE  Receptive vocabulary is low average. Word reading is exceptionally low. She reported having trouble seeing the words. Naming and verbal fluency are exceptionally low. She made circumlocultory and phonemic errors on naming tasks (said welk before she self corrected for well; ostris instead of ostrich). She appeared to recognize the  objects, as she typically described objects she could not name. She lost set during fluency tasks (listed coffee when asked to name fruits and vegetables). Repetition of words and nonsense words is intact. She has difficulty with repetition of long phrases (word subsitutions and neologisms). She was able to write simple sentences to dictation but omits words in a longer sentence. She mispelled words in the second sentence, including one she was able to spell correctly in the shorter sentence (box instead of krishna; hog instead of dog).     VISUOSPATIAL/PERCEPTUAL  She was unable to copy a clock. She started to write numbers in the clockwise order but continued to write numbers towards the top of the page and above the sample clock. Copy of a complex figure is markedly disorganized with signficant errors of placement and distortions. She was unable to understand instructions for a letter identification task in which letters are visually degraded. Instructions were re-explained and simplified. Answers for sample items were given. She still exhibited confusion. She repeatedly forgot instructions on a task required her to identify the location of numbers.     MEMORY  She was was only able to learn one word over a series of 4 learning trials. She was unable to recall any words after a delay. Recogition memory is exceptionally low (8 hits, 2 false positive errors). Performance on a two-sentence story memory task is exceptionally low and she could not recall details after a delay. Recognition of story elements is low average. Performance on a more detailed story memory test is exceptionally low with low average recogntion. She was unable to recall a figure after a delay but identifed the correct figure in multiple choice format.      MOOD  Responses on self-report mood inventories suggest the absence of clinically significant symptoms of depression or anxiety.       Raw Score Standardized Score Percentile/CP  Descriptor   Dot Counting Escore 19 - - -   ACS LM II Rec 14 - - -   ACS RDS 6 - - -   CITH 10 - - -   RBANS EI 0 -      PREMORBID FUNCTIONING Raw Score Standardized Score Percentile/CP Descriptor   WRAT-5 Reading 27 57.00 0.2 Exceptionally Low    PPVT-5 206 88.00 21 Low Average   INTELLECTUAL FUNCTIONING Raw Score Standardized Score Percentile/CP Descriptor   WAIS-IV  N/A      Digit Span 11 3 1 Exceptionally Low          DS Forward 7 7 16 Low Average         DS Backward 2 2 0.4 Exceptionally Low          DS Sequence 2 4 2 Below Average         Longest Digit Forward 5 - - -         Longest Digit Backward 2 - - -         Longest Digit Sequence 2 - - -   ACADEMIC ACHIEVEMENT Raw Score Standardized Score Percentile/CP Descriptor   WRAT-5 Reading        Reading 27 57 0.2 Exceptionally Low    Reading Grade Equivalent 27 1.4 - -   COGNITIVE SCREENING Raw Score Standardized Score Percentile/CP Descriptor   MMSE 14 - - Impaired   Orientation - Place 1/5 - - -   Orientation - Date 3/5 - - -   RBANS        Immediate Memory - 40 0.1 Exceptionally Low    VS/Construction - 53 0.1 Exceptionally Low    Language - 51 0.1 Exceptionally Low    Attention - 60 0.4 Exceptionally Low    Delayed Memory - 52 0.1 Exceptionally Low    Total Scale - 47 0.1 Exceptionally Low    Subtests        List Learning 4 1 0 Exceptionally Low    Story Memory 2 1 0 Exceptionally Low    Figure Copy 6 1 0 Exceptionally Low    Line Orientation 6 - <2 Exceptionally Low   Naming 7 - <2 Exceptionally Low   Fluency 2 1 0 Exceptionally Low    Digit Span 8 7 16 Low Average   Coding 4 1 0 Exceptionally Low    List Recall 0 - <2 Exceptionally Low   List Recognition 16 - <2 Exceptionally Low   Story Recall 0 1 0 Exceptionally Low    Figure Recall 0 1 0 Exceptionally Low    Story Recognition  9 - 16-26 Low Average   Figure Recognition 1 - - -   LANGUAGE FUNCTIONING Raw Score Standardized Score Percentile/CP Descriptor   RBANS Naming 7 - <2 Exceptionally Low   RBANS  Semantic Fluency 2 1 0.1 Exceptionally Low    NAB Naming 11 19 <0.1 Exceptionally Low    NAB Naming Percent Correct After Semantic Cuing 0 - 38 Average   NAB Naming Percent Correct After Phonemic Cuing 35 - 28 Average   FAS 9 19 <0.1 Exceptionally Low    Animal Naming 3 13 <0.1 Exceptionally Low   BDAE Repetition Words 10 100 100 Exceptionally High   BDAE Repetition Sentences  7 60 60 Average   Writing Sample (qualitative)        VISUOSPATIAL FUNCTIONING Raw Score Standardized Score Percentile/CP Descriptor   RBANS Line Orientation  6 - <2 Exceptionally Low   RBANS Figure Copy 6 1 0 Exceptionally Low    Clock Request 1 - <1 Exceptionally Low   Clock Copy 0 - <11.6 Low Average   Clock Total 1 - <2.3 Below Average   VOSP Incomplete Letters Unable  N/A N/A   VOSP Number Location 4 -3.1 <2 Exceptionally Low   LEARNING & MEMORY Raw Score Standardized Score Percentile/CP Descriptor   RBANS        Immediate Memory - 40 0.1 Exceptionally Low    Delayed Memory - 52 0.1 Exceptionally Low    List Learning 4 1 0 Exceptionally Low    List Recall 0 - <2 Exceptionally Low   List Recognition 16 - <2 Exceptionally Low   Story Memory 2 1 0.1 Exceptionally Low    Story Recall 0 1 0.1 Exceptionally Low    Story Recognition  9 - 16-26 Low Average   Figure Recall 0 1 0.1 Exceptionally Low    Figure Recognition 1 - - -   WMS-IV Subtests        LM I 3 1 0.1 Exceptionally Low    LM II 0 1 0.1 Exceptionally Low    LM Recognition 14 - 10-16 Low Average   ATTENTION/WORKING MEMORY Raw Score Standardized Score Percentile/CP Descriptor   WAIS-IV Digit Span 11 3 1 Exceptionally Low          DS Forward 7 7 16 Low Average         DS Backward 2 2 0.4 Exceptionally Low          DS Sequence 2 4 2 Below Average         Longest Digit Forward 5 - - -         Longest Digit Backward 2 - - -         Longest Digit Sequence 2 - - -   RBANS Digit Span  8 7 16 Low Average   MENTAL PROCESSING SPEED Raw Score Standardized Score Percentile/CP Descriptor   RBANS  Coding 4 1 0 Exceptionally Low    TMT A  d/c #N/A #N/A #N/A   TMT A errors 4 - - -   TMT A (oral version) 7 0.01 50 Average   TMT A (oral version) errors 0 - - -   EXECUTIVE FUNCTIONING Raw Score Standardized Score Percentile/CP Descriptor   TMT B unable #N/A #N/A #N/A   TMT B errors N/A - - -   TMT B (oral version) d/c N/A N/A N/A   TMT B (oral version) errors N/A - - -   GONZALES (motor) 6/9  - #N/A   Clock Request 1 - <1 Exceptionally Low   MOOD & PERSONALITY Raw Score Standardized Score Percentile/CP Descriptor   GDS-30 4 - - WNL   DANIEL-7 2 - - WNL     It is important to note that scores/percentiles should only be interpreted by a neuropsychologist. It is common for healthy individuals to have 1-3 isolated low/unusual scores that are not indicative of any significant cognitive dysfunction.    BILLING     Service Description CPT Code Minutes Units   Psychiatric diagnostic evaluation by physician [id number]  [id number]   Neurobehavioral status exam by physician [id number]   Each additional hour by physician [id number]  0   Test Evaluation Services --  --   Neuropsychological testing evaluation services by physician [id number] [phone]   Each additional hour by physician [phone]   Test Administration and Scoring --  --   Psychological or neuropsychological test administration and scoring by technician [phone]   Each additional 30 minutes by technician [id number]  6

## 2022-11-16 ENCOUNTER — OFFICE VISIT (OUTPATIENT)
Dept: NEUROLOGY | Facility: CLINIC | Age: 77
End: 2022-11-16
Payer: COMMERCIAL

## 2022-11-16 ENCOUNTER — PATIENT MESSAGE (OUTPATIENT)
Dept: NEUROLOGY | Facility: CLINIC | Age: 77
End: 2022-11-16
Payer: MEDICARE

## 2022-11-16 DIAGNOSIS — F03.90 MAJOR NEUROCOGNITIVE DISORDER: Primary | ICD-10-CM

## 2022-11-16 DIAGNOSIS — R41.3 MEMORY LOSS: ICD-10-CM

## 2022-11-16 PROCEDURE — 99499 UNLISTED E&M SERVICE: CPT | Mod: 95,,, | Performed by: STUDENT IN AN ORGANIZED HEALTH CARE EDUCATION/TRAINING PROGRAM

## 2022-11-16 PROCEDURE — 99499 NO LOS: ICD-10-PCS | Mod: 95,,, | Performed by: STUDENT IN AN ORGANIZED HEALTH CARE EDUCATION/TRAINING PROGRAM

## 2022-11-16 NOTE — PROGRESS NOTES
NEUROPSYCHOLOGICAL EVALUATION FEEDBACK    Referral Information  Name: Yuniel Gracia  MRN: [id number]  Age: 77 y.o.    : 1945  Race: White    Gender: female        Chief complaint leading to consultation/medical necessity: Feedback from neuropsychological evaluation.  Established Patient - Telehealth Visit   Patient location: [place], [place]  Visit type: Virtual visit with audio only (telephone)  The reason for the audio only service rather than synchronous audio and video virtual visit was related to technical difficulties or patient preference/necessity.  Total time spent with patient: 35 minutes.  Billin attached to testing visit on 2022.  Each patient to whom he or she provides medical services by telemedicine is: (1) informed of the relationship between the physician and patient and the respective role of any other health care provider with respect to management of the patient; and (2) notified that he or she may decline to receive medical services by telemedicine and may withdraw from such care at any time. Patient verbally consented to receive this service via voice-only telephone call. This service was not originating from a related E/M service provided within the previous 7 days nor will  to an E/M service or procedure within the next 24 hours or my soonest available appointment.  Prevailing standard of care was able to be met in this audio-only visit.   Consent/Emergency Plan: The patient expressed an understanding of the purpose of the evaluation and consented to all procedures. I informed the patient of limits to confidentiality and discussed an emergency plan.    FEEDBACK NOTE       Ms. Yuniel Gracia and her  participated in a telephone feedback session today. We discussed the results of the neuropsychological evaluation. I gave time to discuss questions and concerns. A copy of a evaluation report will be provided to Ms. Gracia via portal message.      Griselda Cuevas, Ph.D.  Licensed Clinical Neuropsychologist  [facility] - Department of Neurology

## 2022-12-05 ENCOUNTER — PATIENT MESSAGE (OUTPATIENT)
Dept: RADIOLOGY | Facility: HOSPITAL | Age: 77
End: 2022-12-05
Payer: MEDICARE

## 2022-12-07 ENCOUNTER — OFFICE VISIT (OUTPATIENT)
Dept: NEUROLOGY | Facility: CLINIC | Age: 77
End: 2022-12-07
Payer: MEDICARE

## 2022-12-07 ENCOUNTER — PATIENT MESSAGE (OUTPATIENT)
Dept: NEUROLOGY | Facility: CLINIC | Age: 77
End: 2022-12-07
Payer: MEDICARE

## 2022-12-07 VITALS
HEART RATE: 61 BPM | WEIGHT: 153.69 LBS | SYSTOLIC BLOOD PRESSURE: 132 MMHG | BODY MASS INDEX: 26.38 KG/M2 | RESPIRATION RATE: 17 BRPM | DIASTOLIC BLOOD PRESSURE: 84 MMHG

## 2022-12-07 DIAGNOSIS — F03.90 DEMENTIA WITHOUT BEHAVIORAL DISTURBANCE: Primary | ICD-10-CM

## 2022-12-07 PROCEDURE — 99483 ASSMT & CARE PLN PT COG IMP: CPT | Mod: S$PBB,,, | Performed by: PSYCHIATRY & NEUROLOGY

## 2022-12-07 PROCEDURE — 99213 OFFICE O/P EST LOW 20 MIN: CPT | Mod: PBBFAC,PO | Performed by: PSYCHIATRY & NEUROLOGY

## 2022-12-07 PROCEDURE — 99999 PR PBB SHADOW E&M-EST. PATIENT-LVL III: ICD-10-PCS | Mod: PBBFAC,,, | Performed by: PSYCHIATRY & NEUROLOGY

## 2022-12-07 PROCEDURE — 99499 NO LOS: ICD-10-PCS | Mod: S$PBB,,, | Performed by: PSYCHIATRY & NEUROLOGY

## 2022-12-07 PROCEDURE — 99483 PR ASSMT/CARE PLANNING, PT W/COGN IMPAIRMENT: ICD-10-PCS | Mod: S$PBB,,, | Performed by: PSYCHIATRY & NEUROLOGY

## 2022-12-07 PROCEDURE — 99999 PR PBB SHADOW E&M-EST. PATIENT-LVL III: CPT | Mod: PBBFAC,,, | Performed by: PSYCHIATRY & NEUROLOGY

## 2022-12-07 PROCEDURE — 99499 UNLISTED E&M SERVICE: CPT | Mod: S$PBB,,, | Performed by: PSYCHIATRY & NEUROLOGY

## 2022-12-07 RX ORDER — RIVASTIGMINE 4.6 MG/24H
1 PATCH, EXTENDED RELEASE TRANSDERMAL DAILY
Qty: 30 PATCH | Refills: 11 | Status: SHIPPED | OUTPATIENT
Start: 2022-12-07 | End: 2023-03-11

## 2022-12-07 NOTE — PROGRESS NOTES
"Date of service:  12/7/2022    Chief complaint:  Memory Loss    Interval history:  The patient is a 77 y.o. female seen previously for memory loss.  I saw her in 2020.  She saw Oneyda Blanco earlier this year with interval history to that point as below.  The patient was started on Aricept at this most recent visit, but she had GI side effects.  She then tried oral Exelon, which also had side effects.  There was previously concern for an allergy to adhesives.  Her  indicates that this is not actually correct.  They would like to try the patch version of Exelon.      History of present illness (2020):  The patient is a 77 y.o. female referred for evaluation of memory loss.  She was seen with her  who supplies additional history. This issue began about 2 years ago.  The primary issue is with word finding. It involves short-term memory more than long-term memory.  She may have some difficulties with executive function.  There are no issues with multiple step processes. She has no problems with ADLs. She does not get lost in familiar areas. There are no hallucinations. There are no personality changes.  She does endorse depression and depression.    Of note, the patient reports that she had been on Zoloft for approximately 20 years.  Recently, she read that Zoloft could produce issues with language.  Her Oncologist instructed her to discontinue it, which she did about a month ago.  She reports some improvement in her word finding since that time, though it is not back to baseline.  She has been having some anxiety since coming off this drug, though.    HPI from Oneyda Blanco (6/22):  "Patient is here today for memory follow up. She is accompanied by her spouse who supplies information. Overall she feels that her memory is unchanged. Spouse states she has trouble coming up with simple words and reports that she is also repetitive. He believes there has been a decline.    Patient's highest level of " "education completed was highschool. She was a housewife. The onset of memory decline started around 2018. She struggles more with short term memory which has worsened as per spouse. There are no personality or behavioral changes reports. She denies depression. She was previously on Zoloft in 2020 but came of it for thoughts that is caused increased word finding difficulty. She is now back on a very low dose as of 1 year ago. She denies hallucinations. She reports her sleep is good. She denies sleeping during the day. Spouse does endorse difficulty with executive function. Spouse is managing the finances and has done so for the last 20 years. Spouse is also managing her medications for the last 4 years. She denies issues with hygiene and able navid perform ADLs without assistance. She endorses word finding difficulty and spouse beleives it has worsened. She denies urinary incontinence. Her last fall was a couple of days ago stating she tripped but doesn't recall how it happened. She is no longer driving and hasn't done so in the last 5 years. She likes to stay active and work in the yard and play cards weekly. "      Past Medical History:   Diagnosis Date    Anemia     Arthritis     Blood transfusion     Breast cancer     Cancer RIGHT BREAST    Chronic constipation     Clotting disorder     Colon polyp     Diverticulosis     Glaucoma     Hepatitis C 2000    pt states history of hepatits c-"cured by Dr. Lynch"; TREATED 2 YEARS - 2000   OK NOW    Hypothyroid     Insomnia     Malignant neoplasm of breast 4/12/2012    Memory loss     reports some memory loss since chemo    Osteoarthritis     Panic attacks     Raynaud's disease     takes amlodipine for this    Ulcer     Vertigo        Past Surgical History:   Procedure Laterality Date    APPENDECTOMY      BLADDER SURGERY  2011    sling - Dr Giles  - [facility]    BRAIN SURGERY  2007    temporal neuralgia - [place]    BREAST BIOPSY      BREAST LUMPECTOMY  03/2012    " malignant - had chemo and radiotherapy    CATARACT EXTRACTION      COLONOSCOPY  01/03/2011    Dr Lynch, in media section, diverticulosis, hemorrhoids, otherwise normal findings; normal findings on random biopsies    COLONOSCOPY N/A 06/01/2017    Procedure: COLONOSCOPY;  Surgeon: Nate Lamb MD;  Location: [facility] ENDO;  Service: Endoscopy;  Laterality: N/A; hemorrhoids, diverticulosis, 1 colon polyp removed; Repeat colonoscopy in 5 years for surveillance; biopsy: Tubular adenoma, random biopsies unremarkable    ESOPHAGOGASTRODUODENOSCOPY N/A 07/20/2022    Procedure: EGD (ESOPHAGOGASTRODUODENOSCOPY);  Surgeon: Nate Lamb MD;  Location: [facility] ENDO;  Service: Endoscopy;  Laterality: N/A;    EYE SURGERY      cataracts bilateral    HYSTERECTOMY      JOINT REPLACEMENT  09/25/2017    left Knee [facility]     removal of port a cath      right lymph node removed from breast area      TUNNELED VENOUS PORT PLACEMENT  04/2012    left chest    UPPER GASTROINTESTINAL ENDOSCOPY  prior to 2011    small hiatal hernia       Family History   Problem Relation Age of Onset    Breast cancer Mother     Diabetes Father     Heart disease Father     Breast cancer Sister     Breast cancer Sister     Early death Maternal Grandmother         tonsils    Heart disease Maternal Grandfather     Alcohol abuse Maternal Grandfather     Tuberculosis Paternal Grandfather     Drug abuse Son     Obesity Son     Diabetes Maternal Aunt     Heart disease Maternal Uncle     Diabetes Paternal Aunt     Tuberculosis Paternal Uncle         x2    Diabetes Paternal Uncle     Breast cancer Other     Ovarian cancer Neg Hx     Colon cancer Neg Hx     Colon polyps Neg Hx     Crohn's disease Neg Hx     Ulcerative colitis Neg Hx     Melanoma Neg Hx     Lupus Neg Hx     Psoriasis Neg Hx     Eczema Neg Hx        Social History     Socioeconomic History    Marital status:    Tobacco Use    Smoking status: Never    Smokeless tobacco: Never    Substance and Sexual Activity    Alcohol use: Yes     Alcohol/week: 7.0 - 9.0 standard drinks     Types: 5 - 7 Shots of liquor, 2 Standard drinks or equivalent per week     Comment: occasional    Drug use: No    Sexual activity: Yes     Partners: Male     Birth control/protection: None     Social Determinants of Health     Financial Resource Strain: Low Risk     Difficulty of Paying Living Expenses: Not hard at all   Food Insecurity: No Food Insecurity    Worried About Running Out of Food in the Last Year: Never true    Ran Out of Food in the Last Year: Never true   Transportation Needs: No Transportation Needs    Lack of Transportation (Medical): No    Lack of Transportation (Non-Medical): No   Physical Activity: Inactive    Days of Exercise per Week: 0 days    Minutes of Exercise per Session: 0 min   Stress: No Stress Concern Present    Feeling of Stress : Not at all   Social Connections: Unknown    Frequency of Communication with Friends and Family: Three times a week    Frequency of Social Gatherings with Friends and Family: Twice a week    Active Member of Clubs or Organizations: Yes    Attends Club or Organization Meetings: 1 to 4 times per year    Marital Status:    Housing Stability: Low Risk     Unable to Pay for Housing in the Last Year: No    Number of Places Lived in the Last Year: 1    Unstable Housing in the Last Year: No        Review of patient's allergies indicates:   Allergen Reactions    Iodinated contrast media Shortness Of Breath     Spontaneous jaw movements    Adhesive Dermatitis and Rash    Codeine Nausea Only       Review of Systems  The patient's ROS is noncontributory except as noted above.     Physical exam:  /84 (BP Location: Left arm, Patient Position: Sitting, BP Method: Medium (Automatic))   Pulse 61   Resp 17   Wt 69.7 kg (153 lb 10.6 oz)   LMP 03/02/1998   BMI 26.38 kg/m²   Exam is largely consistent with that documented by the NP.  MMSE today was 15/30.  In  "2020, it was 23/30.      Data base:  Notes of the referring physician were reviewed.  Briefly summarized, these discuss her her memory issues and her onocologic issues.    Labs:  Labs checked at our initial visit in 2020 were notable for an elevated TSH.  Her PCP has been following TFTs in the interim.    MRI brain (6/22):  "1. No evidence of an acute intracranial abnormality.  2.  Mild generalized cerebral volume loss with scattered supratentorial white matter T2/FLAIR hyperintense signal, nonspecific but likely representing chronic small vessel disease.  3. Stable postoperative changes of right occipital craniotomy with underlying right cerebellar encephalomalacia."    Neuropsychological testing (2022):  "Her clinical presentation and profile are concerning for a neurodegenerative disease process.  Her  reports that he initially noticed word finding difficulties. She exhibits prominent word retrieval deficits with phonologic paraphasias and neologisms in speech with relatively spared object/semantic knowledge and motor speech. Speech/naming deficits are in disproportion to memory deficits, as she benefits modestly from recognition format. 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 Executive dysfunction and visuospatial deficits are common in lvPPA. MRI appears to show asymmetry with possibly greater left temporal atrophy, but this has not been confirmed by neuroradiology. Further imaging studies (FDG PET) could help inform the differential. Cholinesterase inhibitor medication is still worth consideration, but she has not tolerated aricept or rivastigmine. 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 0   Where are we (state), (country), (town or city), (hospital), (floor)? 4   Name 3 common objects (eg. "apple", "table", "tal"). Take 1 second to say each. Then ask the patient to repeat " "all 3. Give 1 point for each correct answer. Then repeat them until he/she learns all 3. Count trials and record. 1   Serial 7's backwards. Stop after 5 answers. (100,93,86,79,72) or alternatively  spell "WORLD" backwards. (D..L..R..O..W). The score is the number of letters in correct order. 1   Ask for the 3 common objects named earlier in the exam. Give 1 point for each correct answer. 1   Name a "pencil" and "watch." 2   Repeat the following: "No ifs, ands, or buts." 1   Follow a 3-stage command: "Take a paper in your right hand, fold it in half, & put it on the floor." 3   Read and obey the following: (see paper exam) 1   Write a sentence. 1   Copy the following design: (see paper exam) 0   Total MMSE Score 15   Some recent data might be hidden            Caregiver name: Ray  Relationship to the patient: Spouse  Does the patient have a living will? no  Does the patient have a designated healthcare POA? yes  Does the patient have a designated general POA? no    Have educational materials/resources been provided? yes      Activities of Daily Living    Bathing: Independent  Dressing: Independent  Grooming: Independent  Mouth Care: Independent  Toileting: Independent  Transferring Bed/Chair: Independent  Walking: Independent  Climbing: Independent  Eating: Independent      Instrumental Activities of Daily Living    Shopping: Needs Help  Cooking: Needs Help  Managing Medications: Needs Help  Using the phone and looking up numbers: Cannot Do  Doing Housework: Independent  Doing Laundry: Independent  Driving or using public transportation: Dependent  Managing finances: Dependent    Functional Assessment Staging  4   Decreased ability to perform complex tasks, e.g. planning dinner for guests, handling personal finances (such as forgetting to pay bills), difficulty marketing, etc.*         Depression Patient Health Questionnaire 12/7/2022 7/25/2022 6/3/2022 3/15/2021 1/8/2021 9/16/2020 12/4/2019   Over the last two weeks " how often have you been bothered by little interest or pleasure in doing things Not at all Not at all Not at all Not at all Not at all Not at all Not at all   Over the last two weeks how often have you been bothered by feeling down, depressed or hopeless Not at all Not at all Not at all Not at all Not at all Not at all Not at all   PHQ-2 Total Score 0 0 0 0 0 0 0           Assessment and plan:  The patient is a 77 y.o. female referred for evaluation of memory loss. 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  We will obtain PET as recommended by neuropsychology.  We will try the patient on the patch version of Exelon as she has not tolerated multiple oral cholinesterase inhibitors.  She is to work with her PCP on vascular health, including BP management, lipid profile optimization, glycemic monitoring, diet, exercise, and so forth.  She has also been advised to continue following with her PCP regarding her thyroid issues.    Cognition and function were assessed and the patient's functional assessment staging test (FAST) score is 4. Patient is felt to not have decision making capacity. PHQ-2 score was 0. Medications were reconciled and reviewed for high-risk medications. The patient's behavior and psychiatric health were reviewed and addressed. The patient and family were counseled on safety in the home and operation of vehicles. Discussed caregiver needs and social support. Advance Care Plan was reviewed. Written care plan and support information provided to the patient or caregiver and information was provided.

## 2022-12-12 ENCOUNTER — TELEPHONE (OUTPATIENT)
Dept: NEUROLOGY | Facility: CLINIC | Age: 77
End: 2022-12-12
Payer: MEDICARE

## 2022-12-12 NOTE — TELEPHONE ENCOUNTER
Pt's 's requested for information regarding mental health services for their grandson. Spoke with Pt's  regarding neuropsychological vs. psychological services.

## 2022-12-20 ENCOUNTER — TELEPHONE (OUTPATIENT)
Dept: NEUROLOGY | Facility: CLINIC | Age: 77
End: 2022-12-20
Payer: MEDICARE

## 2022-12-20 NOTE — TELEPHONE ENCOUNTER
----- Message from Christianne Aguilar sent at 12/20/2022 11:34 AM CST -----  Regarding: ##NEED NEW PET SCAN##  Good Afternoon,     The above mentioned patient is scheduled for a Pet Scan on tomorrow December 21, 2022. The diagnosis code F03.90 provided on the order does not meet medical necessity and may not be covered by the patient's insurance. A new order must be provided prior to the patient's appointment.  Please advise if the patient needs to be rescheduled or cancelled.  If I can be of any assistance, please call me at [phone].        Thanks,     Nicki Aguilar  [facility] Pre-Service

## 2022-12-21 ENCOUNTER — HOSPITAL ENCOUNTER (OUTPATIENT)
Dept: RADIOLOGY | Facility: HOSPITAL | Age: 77
Discharge: HOME | End: 2022-12-21
Attending: PSYCHIATRY & NEUROLOGY
Payer: MEDICARE

## 2022-12-21 VITALS — HEIGHT: 64 IN | BODY MASS INDEX: 25.61 KG/M2 | WEIGHT: 150 LBS

## 2022-12-21 DIAGNOSIS — F03.90 DEMENTIA WITHOUT BEHAVIORAL DISTURBANCE: ICD-10-CM

## 2022-12-21 LAB — GLUCOSE SERPL-MCNC: 96 MG/DL (ref 70–110)

## 2022-12-21 PROCEDURE — 78608 BRAIN IMAGING (PET): CPT | Mod: TC,PO

## 2022-12-21 PROCEDURE — A9552 F18 FDG: HCPCS | Mod: PO

## 2022-12-23 ENCOUNTER — TELEPHONE (OUTPATIENT)
Dept: NEUROLOGY | Facility: CLINIC | Age: 77
End: 2022-12-23
Payer: MEDICARE

## 2022-12-23 ENCOUNTER — PATIENT MESSAGE (OUTPATIENT)
Dept: NEUROLOGY | Facility: CLINIC | Age: 77
End: 2022-12-23
Payer: MEDICARE

## 2022-12-23 ENCOUNTER — HOSPITAL ENCOUNTER (OUTPATIENT)
Dept: RADIOLOGY | Facility: HOSPITAL | Age: 77
Discharge: HOME | End: 2022-12-23
Attending: NURSE PRACTITIONER
Payer: MEDICARE

## 2022-12-23 DIAGNOSIS — C50.612 MALIGNANT NEOPLASM OF AXILLARY TAIL OF LEFT FEMALE BREAST, UNSPECIFIED ESTROGEN RECEPTOR STATUS: ICD-10-CM

## 2022-12-23 DIAGNOSIS — C50.919 BREAST CANCER: ICD-10-CM

## 2022-12-23 PROCEDURE — 77066 DX MAMMO INCL CAD BI: CPT | Mod: TC

## 2022-12-23 PROCEDURE — 77062 BREAST TOMOSYNTHESIS BI: CPT | Mod: 26,,, | Performed by: RADIOLOGY

## 2022-12-23 PROCEDURE — 77062 MAMMO DIGITAL DIAGNOSTIC BILAT WITH TOMO: ICD-10-PCS | Mod: 26,,, | Performed by: RADIOLOGY

## 2022-12-23 PROCEDURE — 77066 MAMMO DIGITAL DIAGNOSTIC BILAT WITH TOMO: ICD-10-PCS | Mod: 26,,, | Performed by: RADIOLOGY

## 2022-12-23 PROCEDURE — 77066 DX MAMMO INCL CAD BI: CPT | Mod: 26,,, | Performed by: RADIOLOGY

## 2022-12-23 NOTE — TELEPHONE ENCOUNTER
----- Message from Dave Abbott Jr., MD sent at 12/22/2022  4:42 PM CST -----  This was done to assess for evidence of Alzheimer's disease.  Please ask radiology to comment whether this scan does or does not support such a diagnosis.    ----- Message -----  From: Interface, Rad Results In  Sent: 12/22/2022   9:06 AM CST  To: Dave Abbott Jr., MD

## 2023-01-03 DIAGNOSIS — E03.1 CONGENITAL HYPOTHYROIDISM WITHOUT GOITER: ICD-10-CM

## 2023-01-03 RX ORDER — LEVOTHYROXINE SODIUM 112 UG/1
112 TABLET ORAL
Qty: 30 TABLET | Refills: 0 | Status: SHIPPED | OUTPATIENT
Start: 2023-01-03 | End: 2023-01-30

## 2023-01-03 NOTE — TELEPHONE ENCOUNTER
No new care gaps identified.  [facility] Embedded Care Gaps. Reference number: [id number]. 1/03/2023   10:35:44 AM CST

## 2023-01-06 ENCOUNTER — CLINICAL SUPPORT (OUTPATIENT)
Dept: OPHTHALMOLOGY | Facility: CLINIC | Age: 78
End: 2023-01-06
Payer: MEDICARE

## 2023-01-06 ENCOUNTER — LAB VISIT (OUTPATIENT)
Dept: LAB | Facility: HOSPITAL | Age: 78
End: 2023-01-06
Attending: STUDENT IN AN ORGANIZED HEALTH CARE EDUCATION/TRAINING PROGRAM
Payer: MEDICARE

## 2023-01-06 DIAGNOSIS — E03.1 CONGENITAL HYPOTHYROIDISM WITHOUT GOITER: ICD-10-CM

## 2023-01-06 DIAGNOSIS — H40.1134 PRIMARY OPEN ANGLE GLAUCOMA (POAG) OF BOTH EYES, INDETERMINATE STAGE: ICD-10-CM

## 2023-01-06 LAB — TSH SERPL DL<=0.005 MIU/L-ACNC: 0.59 UIU/ML (ref 0.4–4)

## 2023-01-06 PROCEDURE — 84443 ASSAY THYROID STIM HORMONE: CPT | Performed by: STUDENT IN AN ORGANIZED HEALTH CARE EDUCATION/TRAINING PROGRAM

## 2023-01-06 PROCEDURE — 36415 COLL VENOUS BLD VENIPUNCTURE: CPT | Performed by: STUDENT IN AN ORGANIZED HEALTH CARE EDUCATION/TRAINING PROGRAM

## 2023-01-06 NOTE — PROGRESS NOTES
OCT RNFL and HVF 24-2 DONE        Assessment /Plan     For exam results, see Encounter Report.    Primary open angle glaucoma (POAG) of both eyes, indeterminate stage  -     Reid Visual Field - OU - Extended - Both Eyes  -     OCT - Optic Nerve

## 2023-01-08 ENCOUNTER — PATIENT MESSAGE (OUTPATIENT)
Dept: FAMILY MEDICINE | Facility: CLINIC | Age: 78
End: 2023-01-08
Payer: MEDICARE

## 2023-01-09 ENCOUNTER — PATIENT MESSAGE (OUTPATIENT)
Dept: FAMILY MEDICINE | Facility: CLINIC | Age: 78
End: 2023-01-09
Payer: MEDICARE

## 2023-01-13 ENCOUNTER — TELEPHONE (OUTPATIENT)
Dept: OPTOMETRY | Facility: CLINIC | Age: 78
End: 2023-01-13
Payer: MEDICARE

## 2023-01-13 NOTE — TELEPHONE ENCOUNTER
LVM for pt regarding hvf  oct results -- moderate stage, repeat in 6 months to monitor. Return as scheduled in march for iop check.

## 2023-02-01 ENCOUNTER — TELEPHONE (OUTPATIENT)
Dept: HEMATOLOGY/ONCOLOGY | Facility: CLINIC | Age: 78
End: 2023-02-01
Payer: MEDICARE

## 2023-02-02 ENCOUNTER — PATIENT MESSAGE (OUTPATIENT)
Dept: FAMILY MEDICINE | Facility: CLINIC | Age: 78
End: 2023-02-02
Payer: MEDICARE

## 2023-02-03 ENCOUNTER — OFFICE VISIT (OUTPATIENT)
Dept: FAMILY MEDICINE | Facility: CLINIC | Age: 78
End: 2023-02-03
Payer: MEDICARE

## 2023-02-03 ENCOUNTER — TELEPHONE (OUTPATIENT)
Dept: NEUROLOGY | Facility: CLINIC | Age: 78
End: 2023-02-03
Payer: MEDICARE

## 2023-02-03 VITALS
HEART RATE: 69 BPM | HEIGHT: 64 IN | DIASTOLIC BLOOD PRESSURE: 70 MMHG | BODY MASS INDEX: 25.78 KG/M2 | OXYGEN SATURATION: 98 % | TEMPERATURE: 98 F | SYSTOLIC BLOOD PRESSURE: 100 MMHG | WEIGHT: 151 LBS

## 2023-02-03 DIAGNOSIS — R11.0 NAUSEA: ICD-10-CM

## 2023-02-03 DIAGNOSIS — R35.0 FREQUENCY OF URINATION: Primary | ICD-10-CM

## 2023-02-03 LAB
BILIRUB SERPL-MCNC: NEGATIVE MG/DL
BLOOD URINE, POC: NEGATIVE
CLARITY, POC UA: ABNORMAL
COLOR, POC UA: YELLOW
GLUCOSE UR QL STRIP: NEGATIVE
KETONES UR QL STRIP: ABNORMAL
LEUKOCYTE ESTERASE URINE, POC: ABNORMAL
NITRITE, POC UA: NEGATIVE
PH, POC UA: 7.5
PROTEIN, POC: 30
SPECIFIC GRAVITY, POC UA: 1.01
UROBILINOGEN, POC UA: 1

## 2023-02-03 PROCEDURE — 99999 PR PBB SHADOW E&M-EST. PATIENT-LVL IV: ICD-10-PCS | Mod: PBBFAC,,, | Performed by: NURSE PRACTITIONER

## 2023-02-03 PROCEDURE — 87088 URINE BACTERIA CULTURE: CPT | Performed by: NURSE PRACTITIONER

## 2023-02-03 PROCEDURE — 99999 PR PBB SHADOW E&M-EST. PATIENT-LVL IV: CPT | Mod: PBBFAC,,, | Performed by: NURSE PRACTITIONER

## 2023-02-03 PROCEDURE — 99213 OFFICE O/P EST LOW 20 MIN: CPT | Mod: S$PBB,,, | Performed by: NURSE PRACTITIONER

## 2023-02-03 PROCEDURE — 81002 URINALYSIS NONAUTO W/O SCOPE: CPT | Mod: 59,PBBFAC,PO | Performed by: NURSE PRACTITIONER

## 2023-02-03 PROCEDURE — 99213 PR OFFICE/OUTPT VISIT, EST, LEVL III, 20-29 MIN: ICD-10-PCS | Mod: S$PBB,,, | Performed by: NURSE PRACTITIONER

## 2023-02-03 PROCEDURE — 99214 OFFICE O/P EST MOD 30 MIN: CPT | Mod: PBBFAC,PO | Performed by: NURSE PRACTITIONER

## 2023-02-03 PROCEDURE — 81001 URINALYSIS AUTO W/SCOPE: CPT | Performed by: NURSE PRACTITIONER

## 2023-02-03 PROCEDURE — 87086 URINE CULTURE/COLONY COUNT: CPT | Performed by: NURSE PRACTITIONER

## 2023-02-03 RX ORDER — NITROFURANTOIN 25; 75 MG/1; MG/1
100 CAPSULE ORAL 2 TIMES DAILY
Qty: 14 CAPSULE | Refills: 0 | Status: SHIPPED | OUTPATIENT
Start: 2023-02-03 | End: 2023-02-10

## 2023-02-03 NOTE — TELEPHONE ENCOUNTER
[facility] called [facility] to offer [facility] [employer] program.  Consents/Baselines are scheduled for 2/15/23 at 9AM.    Email with program info sent.

## 2023-02-03 NOTE — PROGRESS NOTES
Subjective:       Patient ID: Yuniel Gracia is a 77 y.o. female.    Chief Complaint: No chief complaint on file.     HPI   78 y/o female patient with medical problems listed below presents for nausea and urinary frequency for 3 weeks. Patient was accompanied by  (Mr. Mccarty). Patient is on vesicare for overactive bladder. She states nausea is chronic, and had EGD done 7/2022 (normal esophagus, normal gastric fundus and gastric body, gastritis, negative for h pylori). She states nausea and urinary frequency is as baseline but noted has had more frequent urination 3 week ago. Patient takes peptobis mol as needed. Denies chest pain, sob, vomiting, abdominal pain, dysuria, hematuria, constipation or diarrhea, fever, chills, flank pain.     Patient Active Problem List   Diagnosis    History of breast cancer    Insomnia    Hypothyroidism    Osteopenia of left hip    Hepatitis C virus infection cured after antiviral drug therapy    Thrombocytopenia    Use of aromatase inhibitors    Raynaud's phenomenon without gangrene    Depression with anxiety    Overactive bladder    Mixed hyperlipidemia    Prediabetes    BMI 25.0-25.9,adult    Aortic calcification    Balance disorder    Gait instability    Leg weakness    Oral ulcer    Major depressive disorder, single episode, moderate    Memory loss        Review of patient's allergies indicates:   Allergen Reactions    Iodinated contrast media Shortness Of Breath     Spontaneous jaw movements    Adhesive Dermatitis and Rash    Codeine Nausea Only       Past Surgical History:   Procedure Laterality Date    APPENDECTOMY      BLADDER SURGERY  2011    sling - Dr Giles  - [facility]    BRAIN SURGERY  2007    temporal neuralgia - [place]    BREAST BIOPSY      BREAST LUMPECTOMY  03/2012    malignant - had chemo and radiotherapy    CATARACT EXTRACTION      COLONOSCOPY  01/03/2011    Dr Lynch, in media section, diverticulosis, hemorrhoids, otherwise normal findings; normal  findings on random biopsies    COLONOSCOPY N/A 06/01/2017    Procedure: COLONOSCOPY;  Surgeon: Nate Lamb MD;  Location: [facility] ENDO;  Service: Endoscopy;  Laterality: N/A; hemorrhoids, diverticulosis, 1 colon polyp removed; Repeat colonoscopy in 5 years for surveillance; biopsy: Tubular adenoma, random biopsies unremarkable    ESOPHAGOGASTRODUODENOSCOPY N/A 07/20/2022    Procedure: EGD (ESOPHAGOGASTRODUODENOSCOPY);  Surgeon: Nate Lamb MD;  Location: [facility] ENDO;  Service: Endoscopy;  Laterality: N/A;    EYE SURGERY      cataracts bilateral    HYSTERECTOMY      JOINT REPLACEMENT  09/25/2017    left Knee [facility]     removal of port a cath      right lymph node removed from breast area      TUNNELED VENOUS PORT PLACEMENT  04/2012    left chest    UPPER GASTROINTESTINAL ENDOSCOPY  prior to 2011    small hiatal hernia          Current Outpatient Medications:     amLODIPine (NORVASC) 2.5 MG tablet, TAKE 1 TABLET DAILY, Disp: 90 tablet, Rfl: 3    anastrozole (ARIMIDEX) 1 mg Tab, TAKE 1 TABLET DAILY, Disp: 90 tablet, Rfl: 3    bimatoprost (LUMIGAN) 0.01 % Drop, Place 1 drop into both eyes every evening., Disp: 7.5 mL, Rfl: 3    CALCIUM CARBONATE/VITAMIN D3 (VITAMIN D-3 ORAL), Take 400 Units by mouth daily as needed., Disp: , Rfl:     co-enzyme Q-10 30 mg capsule, Take 30 mg by mouth once daily., Disp: , Rfl:     erythromycin (ROMYCIN) ophthalmic ointment, Place a 1/2 inch ribbon of ointment into the lower eyelid TID, Disp: 1 Tube, Rfl: 0    fish oil-omega-3 fatty acids 300-1,000 mg capsule, Take by mouth once daily., Disp: , Rfl:     levothyroxine (SYNTHROID) 112 MCG tablet, Take 1 tablet (112 mcg total) by mouth before breakfast., Disp: 90 tablet, Rfl: 1    pantoprazole (PROTONIX) 40 MG tablet, TAKE 1 TABLET (40 MG TOTAL) BY MOUTH BEFORE BREAKFAST., Disp: 30 tablet, Rfl: 0    rivastigmine (EXELON) 4.6 mg/24 hour PT24, Place 1 patch onto the skin once daily., Disp: 30 patch, Rfl: 11     "solifenacin (VESICARE) 5 MG tablet, TAKE 1 TABLET DAILY, Disp: 90 tablet, Rfl: 3    nitrofurantoin, macrocrystal-monohydrate, (MACROBID) 100 MG capsule, Take 1 capsule (100 mg total) by mouth 2 (two) times daily. for 7 days, Disp: 14 capsule, Rfl: 0    pravastatin (PRAVACHOL) 40 MG tablet, TAKE 1 TABLET AT BEDTIME (Patient not taking: Reported on 12/7/2022), Disp: 90 tablet, Rfl: 3    sertraline (ZOLOFT) 50 MG tablet, Take 1 tablet (50 mg total) by mouth once daily., Disp: 30 tablet, Rfl: 11    sucralfate (CARAFATE) 1 gram tablet, Take 1 tablet (1 g total) by mouth 4 (four) times daily before meals and nightly. (Patient not taking: Reported on 12/7/2022), Disp: 40 tablet, Rfl: 0    Review of Systems   Constitutional:  Negative for chills, fever and unexpected weight change.   Respiratory:  Negative for cough, chest tightness and shortness of breath.    Cardiovascular:  Negative for chest pain and palpitations.   Gastrointestinal:  Positive for nausea. Negative for abdominal pain.   Genitourinary:  Positive for frequency. Negative for flank pain and hematuria.   Neurological:  Negative for dizziness and headaches.       Objective:   /70 (BP Location: Right arm, Patient Position: Sitting, BP Method: Medium (Manual))   Pulse 69   Temp 97.8 °F (36.6 °C) (Oral)   Ht 5' 4" (1.626 m)   Wt 68.5 kg (151 lb 0.2 oz)   LMP 03/02/1998   SpO2 98%   BMI 25.92 kg/m²         Physical Exam  Constitutional:       General: She is not in acute distress.     Appearance: Normal appearance.   HENT:      Head: Atraumatic.   Cardiovascular:      Rate and Rhythm: Normal rate and regular rhythm.      Pulses: Normal pulses.      Heart sounds: Normal heart sounds.   Pulmonary:      Effort: Pulmonary effort is normal.      Breath sounds: Normal breath sounds.   Abdominal:      General: Abdomen is flat. Bowel sounds are normal.      Palpations: Abdomen is soft.   Skin:     General: Skin is warm and dry.   Neurological:      General: " No focal deficit present.      Mental Status: She is alert and oriented to person, place, and time.   Psychiatric:         Mood and Affect: Mood normal.       Assessment:       1. Frequency of urination    2. Nausea        Plan:       1. Frequency of urination  - Urinalysis  - Urine culture  - POCT urine dipstick without microscope: positive for large wbc and negative for blood, nit  - nitrofurantoin, macrocrystal-monohydrate, (MACROBID) 100 MG capsule; Take 1 capsule (100 mg total) by mouth 2 (two) times daily. for 7 days  Dispense: 14 capsule; Refill: 0    2. Nausea  - Urinalysis  - Urine culture  - POCT urine dipstick without microscope     Patient with be reevaluated in  as needed  or sooner cory Levy NP

## 2023-02-04 LAB
BACTERIA #/AREA URNS AUTO: NORMAL /HPF
BILIRUB UR QL STRIP: NEGATIVE
CLARITY UR REFRACT.AUTO: ABNORMAL
COLOR UR AUTO: ABNORMAL
GLUCOSE UR QL STRIP: NEGATIVE
HGB UR QL STRIP: NEGATIVE
HYALINE CASTS UR QL AUTO: 0 /LPF
KETONES UR QL STRIP: NEGATIVE
LEUKOCYTE ESTERASE UR QL STRIP: ABNORMAL
MICROSCOPIC COMMENT: NORMAL
NITRITE UR QL STRIP: NEGATIVE
PH UR STRIP: 8 [PH] (ref 5–8)
PROT UR QL STRIP: ABNORMAL
RBC #/AREA URNS AUTO: 1 /HPF (ref 0–4)
SP GR UR STRIP: 1.02 (ref 1–1.03)
SQUAMOUS #/AREA URNS AUTO: 1 /HPF
TRI-PHOS CRY UR QL COMP ASSIST: NORMAL
URN SPEC COLLECT METH UR: ABNORMAL
WBC #/AREA URNS AUTO: 3 /HPF (ref 0–5)

## 2023-02-05 LAB — BACTERIA UR CULT: ABNORMAL

## 2023-02-15 ENCOUNTER — OUTPATIENT CASE MANAGEMENT (OUTPATIENT)
Dept: NEUROLOGY | Facility: CLINIC | Age: 78
End: 2023-02-15
Payer: MEDICARE

## 2023-02-15 NOTE — PROGRESS NOTES
Subject was consented today (02/15/2023) for the following study:     Study title: [facility]  IRB #: [id number]  IRB approval date: 12/12/2022  Sponsor: [employer]    Screening of inclusion/exclusion criteria evaluation has been reviewed by Tanya Carrera and Ruthann Dunn. At this time, the subject meets all inclusion and does not meet any one of the exclusion criteria.       INFORMED CONSENT PROCESS/ INVOLVEMENT IN CARE/ PROXY   Present for discussion: Patient and caregiver (Ray - spouse)  Does subject have capacity to consent per evaluation: yes      NOTES ON SIGNING ICF/INVOLVEMENT IN CARE/PROXY  [Capacity is determined per chart review, interview with LAR and patient, along with taking into consideration past practices]    Subject has capacity  Subject and caregiver (on caregiver line, not LAR) sign forms        CONSENT:  Verbal Consent: yes  Written Consent: no     Prior to the Informed Consent (IC) being signed, or any protocol required testing, procedure, or intervention being performed, the following was done or discussed:    Purpose of the Study, Qualifications to Participate: YES/NO: yes  Study Design, Schedule and Procedures: YES/NO: yes  Risks, Benefits, Alternative Treatments, Compensation and Costs: YES/NO: yes  Confidentiality and HIPAA Authorization for Release of Medical Records for the research trial/subject's right/study related injury: YES/NO: yes  Study related contact information: YES/NO: yes  Voluntary Participation and Withdrawal from the research trial at any time: YES/NO: yes  Optional samples/procedures (if applicable): Not applicable.  Patient has been offered the opportunity to ask questions regarding the study and all questions were answered satisfactorily: YES/NO: yes  Patient and/or LAR verbalizes understanding of the study/procedures and agrees to participate: YES/NO: yes  CRC and PI contact information given to LAR and/or patient: No - does not apply. Verbal consent documented in  [employer].   Signed copy given to patient and/or LAR: No - does not apply. Participant Information sheet sent via e-mail  Copy in patient's chart and original uploaded to Epic: No - documented in RedCap        Person Obtaining Consent: Tanya Cage  Witness: Ruthann Dunn  Subject Study ID: [id number]

## 2023-02-15 NOTE — PROGRESS NOTES
PELON (with [place] for consent) completed consents and BL questionnaires.  CG and pt both participated.  Med review/Care plan call is scheduled for 3/1/23 at 930AM.  PELON provided name and contact information and encouraged CG to call with any future needs.

## 2023-02-22 ENCOUNTER — LAB VISIT (OUTPATIENT)
Dept: LAB | Facility: HOSPITAL | Age: 78
End: 2023-02-22
Attending: STUDENT IN AN ORGANIZED HEALTH CARE EDUCATION/TRAINING PROGRAM
Payer: MEDICARE

## 2023-02-22 DIAGNOSIS — Z79.811 USE OF AROMATASE INHIBITORS: ICD-10-CM

## 2023-02-22 DIAGNOSIS — Z86.19 HEPATITIS C VIRUS INFECTION CURED AFTER ANTIVIRAL DRUG THERAPY: ICD-10-CM

## 2023-02-22 DIAGNOSIS — D69.6 THROMBOCYTOPENIA: ICD-10-CM

## 2023-02-22 DIAGNOSIS — E02 SUBCLINICAL IODINE-DEFICIENCY HYPOTHYROIDISM: ICD-10-CM

## 2023-02-22 DIAGNOSIS — C79.81 SECONDARY MALIGNANT NEOPLASM OF BREAST: ICD-10-CM

## 2023-02-22 LAB
ALBUMIN SERPL BCP-MCNC: 3.6 G/DL (ref 3.5–5.2)
ALP SERPL-CCNC: 48 U/L (ref 55–135)
ALT SERPL W/O P-5'-P-CCNC: 12 U/L (ref 10–44)
ANION GAP SERPL CALC-SCNC: 9 MMOL/L (ref 8–16)
AST SERPL-CCNC: 24 U/L (ref 10–40)
BASOPHILS # BLD AUTO: 0.03 K/UL (ref 0–0.2)
BASOPHILS NFR BLD: 0.7 % (ref 0–1.9)
BILIRUB SERPL-MCNC: 0.7 MG/DL (ref 0.1–1)
BUN SERPL-MCNC: 18 MG/DL (ref 8–23)
CALCIUM SERPL-MCNC: 9.6 MG/DL (ref 8.7–10.5)
CHLORIDE SERPL-SCNC: 106 MMOL/L (ref 95–110)
CO2 SERPL-SCNC: 27 MMOL/L (ref 23–29)
CREAT SERPL-MCNC: 0.9 MG/DL (ref 0.5–1.4)
DIFFERENTIAL METHOD: ABNORMAL
EOSINOPHIL # BLD AUTO: 0.3 K/UL (ref 0–0.5)
EOSINOPHIL NFR BLD: 7.5 % (ref 0–8)
ERYTHROCYTE [DISTWIDTH] IN BLOOD BY AUTOMATED COUNT: 13.8 % (ref 11.5–14.5)
EST. GFR  (NO RACE VARIABLE): >60 ML/MIN/1.73 M^2
GLUCOSE SERPL-MCNC: 94 MG/DL (ref 70–110)
HCT VFR BLD AUTO: 38.3 % (ref 37–48.5)
HGB BLD-MCNC: 13 G/DL (ref 12–16)
IMM GRANULOCYTES # BLD AUTO: 0.01 K/UL (ref 0–0.04)
IMM GRANULOCYTES NFR BLD AUTO: 0.2 % (ref 0–0.5)
LYMPHOCYTES # BLD AUTO: 1.6 K/UL (ref 1–4.8)
LYMPHOCYTES NFR BLD: 39.4 % (ref 18–48)
MCH RBC QN AUTO: 30.7 PG (ref 27–31)
MCHC RBC AUTO-ENTMCNC: 33.9 G/DL (ref 32–36)
MCV RBC AUTO: 91 FL (ref 82–98)
MONOCYTES # BLD AUTO: 0.4 K/UL (ref 0.3–1)
MONOCYTES NFR BLD: 9.5 % (ref 4–15)
NEUTROPHILS # BLD AUTO: 1.8 K/UL (ref 1.8–7.7)
NEUTROPHILS NFR BLD: 42.7 % (ref 38–73)
NRBC BLD-RTO: 0 /100 WBC
PLATELET # BLD AUTO: 141 K/UL (ref 150–450)
PMV BLD AUTO: 10.6 FL (ref 9.2–12.9)
POTASSIUM SERPL-SCNC: 4.5 MMOL/L (ref 3.5–5.1)
PROT SERPL-MCNC: 6.8 G/DL (ref 6–8.4)
RBC # BLD AUTO: 4.23 M/UL (ref 4–5.4)
SODIUM SERPL-SCNC: 142 MMOL/L (ref 136–145)
WBC # BLD AUTO: 4.11 K/UL (ref 3.9–12.7)

## 2023-02-22 PROCEDURE — 80053 COMPREHEN METABOLIC PANEL: CPT | Performed by: INTERNAL MEDICINE

## 2023-02-22 PROCEDURE — 85025 COMPLETE CBC W/AUTO DIFF WBC: CPT | Mod: PO | Performed by: INTERNAL MEDICINE

## 2023-02-24 ENCOUNTER — PATIENT MESSAGE (OUTPATIENT)
Dept: HEMATOLOGY/ONCOLOGY | Facility: CLINIC | Age: 78
End: 2023-02-24
Payer: MEDICARE

## 2023-02-24 LAB — CANCER AG27-29 SERPL-ACNC: 23.5 U/ML

## 2023-02-27 ENCOUNTER — PATIENT MESSAGE (OUTPATIENT)
Dept: HEMATOLOGY/ONCOLOGY | Facility: CLINIC | Age: 78
End: 2023-02-27
Payer: MEDICARE

## 2023-03-01 ENCOUNTER — OUTPATIENT CASE MANAGEMENT (OUTPATIENT)
Dept: NEUROLOGY | Facility: CLINIC | Age: 78
End: 2023-03-01
Payer: MEDICARE

## 2023-03-01 NOTE — PROGRESS NOTES
"  [facility] Dementia Care Management Plan - BASELINE     Assigned Care Team Navigator: Ruthann Dunn LCSW  Referring Provider: Griselda Cuevas  Primary Care: Bhaskar Olivera MD       Patient Demographic Information     Name: Yuniel Gracia  Preferred Name: Yuniel  MRN: [id number]  : 1945  Age: 77 y.o.  Gender: female  Race: White  Ethnicity: Not  or /a  Level of Education: High School graduate, or the equivalent   Occupational Status/History: Housewife    Communication Preferences     Language: English   Please contact primary caregiver by Phone for scheduling purposes.   Please send information and forms via E-mail    Caregiver(s) and Social History     Family Status: 3 adult sons--oldest son lives with pt/CG with his two adult sons.  Another son is moving to [place], but staying with pt/CG, another son lives about 5 miles from pt/CG.  Family is supportive and close.    Current Living Situation: Spouse and see above  Support System: [employer] community, [facility]  and other [place] involvement, couple plays cards with couples group every Thursday.    Patient Hobbies/Activities: Tablet, Facebook, likes to go for rides.   Patient Stressors (e.g., Pain): No  Current Programs/Services: No      Caregiver Name  Role Relationship Main Contact #     Lul Carlson  Primary Spouse [phone]                           Medical History     Providers   (Relevant to dementia care) Dr. Bhaskar Olivera, Dr. Griselda Cuevas   Types of help wanted   (at baseline) Information about dementia and what to expect in the future, Caregiving strategies for helping Test do as much as he/she still can, Ideas for managing behavior symptoms, and Help with back-up or crisis planning   Concerns and Goals   (from caregiver and PWD) Pt/CG are very stable and "happy" right now.  They are working on getting their older son out of their home and into an apt.     Type of Dementia and Stage  (all that apply) Dementia Type: " "Unspecified Dementia  Stage: Mild  MMSE    Date: 12/22      No flowsheet data found.       Medication Review (at baseline)   Medication reviewed with caregiver Yes.   Information is based off patient chart and patient and/or caregiver self-report as of (03/01/2023)  *Make note of any changes to current medication list.*    Current Outpatient Medications   Medication Instructions    amLODIPine (NORVASC) 2.5 MG tablet TAKE 1 TABLET DAILY    anastrozole (ARIMIDEX) 1 mg Tab TAKE 1 TABLET DAILY   ### no longer taking##    bimatoprost (LUMIGAN) 0.01 % Drop 1 drop, Both Eyes, Nightly    CALCIUM CARBONATE/VITAMIN D3 (VITAMIN D-3 ORAL) 400 Units, Oral, Daily PRN    co-enzyme Q-10 30 mg, Oral, Daily    erythromycin (ROMYCIN) ophthalmic ointment Place a 1/2 inch ribbon of ointment into the lower eyelid TID  ### no longer taking  ###    fish oil-omega-3 fatty acids 300-1,000 mg capsule Oral, Daily  ###  discontinued  ###    levothyroxine (SYNTHROID) 112 mcg, Oral, Before breakfast    pantoprazole (PROTONIX) 40 mg, Oral, Before breakfast  ###Not taking  ###    pravastatin (PRAVACHOL) 40 MG tablet TAKE 1 TABLET AT BEDTIME  ### no longer taking  ###    rivastigmine (EXELON) 4.6 mg/24 hour PT24 1 patch, Transdermal, Daily    sertraline (ZOLOFT) 50 mg, Oral, Daily   ### "cancelled"--will request refill ###    solifenacin (VESICARE) 5 MG tablet TAKE 1 TABLET DAILY    sucralfate (CARAFATE)   1 g, Oral, Before meals & nightly ### no longer taking  ###              Over the counter medications: Multi vitamin for seniors, Chromium picolinate  Vitamins, supplements: See above  Caffeine, alcohol, tobacco, marijuana products: Coffee, diet coke      THE FOLLOWING CONCERNS WERE IDENTIFIED:  Medication management: No  Access/cost: No  Side effects: Yes - Dementia meds cause abdominal cramping and nausea  Recent changes: Yes - rivastigmine added in the last three months with reduction in side effects  Polypharmacy (>9 routine prescription " "medications?): No  Behaviors/Sleep: No  Other symptoms (falls/incontinence/diarrhea/swelling/itching/weight loss): No      PLAN:  CTN to send medication list to PharmD this week. Review will be addended by PharmD.   CTN will route pharmacist recommendations to Bhaskar Olivera MD   CTN will share and discuss pharmacist recommendations with caregiver during next scheduled call.         Advanced Care Planning      Living Will: No:        Copy on chart: no  [place]: No:      Medical Power of : Yes      Copy on chart: yes   Financial Power of : Yes      Copy on chart: no   Agent's Name: Lul Carlson       Goals of Care      Patient Values: [place] and Engage in Activities    Future Care Plans:   - Long term care goal: Home  - Resources available to pay for care: Public Benefits private pay      Current Concerns - BASELINE     Primary Concern(s)  (Immediate needs) Getting older son and his sons an apt.  Pt and CG are stable    Cognitive Concerns  (Include decision making capacity) Short term memory is "bad." She has trouble with word finding.    Primary Behavior Concerns  (Symptoms, triggers, strategies) Things are stable right now.     Functional  (Include PWD daily routine and sensory - vision/hearing - information) Stable currently       LA-GWEP 2/15/2023   Memory and Recall Moderate to severe memory loss, only highly learned information remembered, new information rapidly forgotten   Orientation Slight difficulty keeping track of time, may forget day or date more frequently than in the past   Decision Making and Problem Solving Abilities Moderate difficulty with handling problems and making decisions, defers many decisions to others, social judgment and behavior may be slightly impaired, loss of insight   Activities Outside the Home Unable to function independently but still may attend and be engaged, appears "normal" to others, notable changes in driving skills, concern about ability to " handle emergency situations   Function at Home and Hobby Activities Mild but definite impairment in home and hobby function, more difficult chores or tasks abandoned, more complicated hobbies and interests given up   Toileting and Personal Hygeine Fully capable of self-care (dressing, grooming, washing, bathing, toileting)   Behavior and Personality Changes Socially appropriate behavior in public and private, no changes in personality   Language and Communication Abilities Consistent mild word finding difficulties, using descriptive terms or takes longer to get point across, mild problems with comprehension, decreased conversation, may affect reading and writing   Mood No changes in mood, interest or motivation level   Attention and Concentration Normal attention, concentration and interaction with his/her environment and surroundings       NPIQ RFS 2/15/2023   WHO IS FILLING OUT FORM? Both   Does this patient have false beliefs, such as thinking that others are stealing from him/her or planning to harm him/her in some way? No   Does this patient have hallucinations such as false visions or voices? Summers she/he seem to hear or see things that are not present? No   Is the patient resistive to help from others at times, or hard to handle? Yes   Agitation Agression Severity 1   Agitation/Agression Distress 1   Does the patient seem sad or say that he/she is depressed? No   Does the patient become upset when  from you? Does he/she have any other signs of nervousness such as shortness of breath, sighing, being unable tor elax, or feeling excessively tense? Yes   Anxiety Severity 1   Anxiety Distress 1   Does the patient appear to feel good or act excessively happy? No   Does this patient seem less interested in his/her usual activities or in the activities and plans of others? No   Does this patient seem to act cumpolsively, for example, talking to strangers as if she/he knows them, or saying things that may hurt  people's feelings? No   Is the patient impatient and cranky? Does he/she have difficulty coping with delays or waiting for planned activities? No   Does the patient engage in repetitive activities such as pacing around the house, handling buttons, wrapping string, or doing other things repeatedly? Yes   Motor Disturbance Severity 1   Motor Disturbance Distress 1   Does this patient awaken you during the night, rise too early in the morning, or take excessive naps during the day? No   Has the patient lost or gained weight, or had a change in the type of food he/she likes? No   NPI Total Severity Score 3   NPI Total Distress Score 3           CTN Plan & Recommendations     CTN provided the following resources/recommendations for: Caregiver Use [employer] to communicate with providers regarding refills.  Submit POA for scanning in to the pt's chart.    Next steps: Send med list for Pharm review.

## 2023-03-03 ENCOUNTER — OFFICE VISIT (OUTPATIENT)
Dept: OPTOMETRY | Facility: CLINIC | Age: 78
End: 2023-03-03
Payer: MEDICARE

## 2023-03-03 DIAGNOSIS — H40.1134 PRIMARY OPEN ANGLE GLAUCOMA (POAG) OF BOTH EYES, INDETERMINATE STAGE: Primary | ICD-10-CM

## 2023-03-03 PROCEDURE — 99213 PR OFFICE/OUTPT VISIT, EST, LEVL III, 20-29 MIN: ICD-10-PCS | Mod: S$PBB,,, | Performed by: OPTOMETRIST

## 2023-03-03 PROCEDURE — 99999 PR PBB SHADOW E&M-EST. PATIENT-LVL III: ICD-10-PCS | Mod: PBBFAC,,, | Performed by: OPTOMETRIST

## 2023-03-03 PROCEDURE — 99999 PR PBB SHADOW E&M-EST. PATIENT-LVL III: CPT | Mod: PBBFAC,,, | Performed by: OPTOMETRIST

## 2023-03-03 PROCEDURE — 99213 OFFICE O/P EST LOW 20 MIN: CPT | Mod: PBBFAC,PO | Performed by: OPTOMETRIST

## 2023-03-03 PROCEDURE — 99213 OFFICE O/P EST LOW 20 MIN: CPT | Mod: S$PBB,,, | Performed by: OPTOMETRIST

## 2023-03-03 NOTE — PROGRESS NOTES
HPI    Pt here today for 4 month IOP check for POAG - OU.     Good compliance   with Lumigan OU qhs.    States slight blurred vision at near only, happy with OTC readers prn.    Denies any eye pain or pressure.  Last edited by Rachel Matthew on 3/3/2023  1:46 PM.            Assessment /Plan     For exam results, see Encounter Report.    Primary open angle glaucoma (POAG) of both eyes, indeterminate stage      IOP stable with use of drops  Continue lumigan qPM OU  Reviewed HVF  OCT with patient  Recheck in 3 months: repeat hvf/oct, iop check, pachy

## 2023-03-06 ENCOUNTER — PATIENT MESSAGE (OUTPATIENT)
Dept: HEMATOLOGY/ONCOLOGY | Facility: CLINIC | Age: 78
End: 2023-03-06
Payer: MEDICARE

## 2023-03-07 ENCOUNTER — OFFICE VISIT (OUTPATIENT)
Dept: HEMATOLOGY/ONCOLOGY | Facility: CLINIC | Age: 78
End: 2023-03-07
Payer: MEDICARE

## 2023-03-07 ENCOUNTER — PATIENT MESSAGE (OUTPATIENT)
Dept: HEMATOLOGY/ONCOLOGY | Facility: CLINIC | Age: 78
End: 2023-03-07

## 2023-03-07 VITALS
BODY MASS INDEX: 25.67 KG/M2 | WEIGHT: 150.38 LBS | TEMPERATURE: 97 F | OXYGEN SATURATION: 98 % | HEIGHT: 64 IN | SYSTOLIC BLOOD PRESSURE: 104 MMHG | HEART RATE: 60 BPM | RESPIRATION RATE: 12 BRPM | DIASTOLIC BLOOD PRESSURE: 56 MMHG

## 2023-03-07 DIAGNOSIS — Z79.811 USE OF AROMATASE INHIBITORS: ICD-10-CM

## 2023-03-07 DIAGNOSIS — D69.6 THROMBOCYTOPENIA: Primary | ICD-10-CM

## 2023-03-07 DIAGNOSIS — E02 SUBCLINICAL IODINE-DEFICIENCY HYPOTHYROIDISM: ICD-10-CM

## 2023-03-07 DIAGNOSIS — E78.2 MIXED HYPERLIPIDEMIA: ICD-10-CM

## 2023-03-07 DIAGNOSIS — C50.311 MALIGNANT NEOPLASM OF LOWER-INNER QUADRANT OF RIGHT FEMALE BREAST, UNSPECIFIED ESTROGEN RECEPTOR STATUS: ICD-10-CM

## 2023-03-07 PROCEDURE — 99999 PR PBB SHADOW E&M-EST. PATIENT-LVL IV: ICD-10-PCS | Mod: PBBFAC,,, | Performed by: INTERNAL MEDICINE

## 2023-03-07 PROCEDURE — 99214 PR OFFICE/OUTPT VISIT, EST, LEVL IV, 30-39 MIN: ICD-10-PCS | Mod: S$PBB,,, | Performed by: INTERNAL MEDICINE

## 2023-03-07 PROCEDURE — 99214 OFFICE O/P EST MOD 30 MIN: CPT | Mod: PBBFAC,PO | Performed by: INTERNAL MEDICINE

## 2023-03-07 PROCEDURE — 99999 PR PBB SHADOW E&M-EST. PATIENT-LVL IV: CPT | Mod: PBBFAC,,, | Performed by: INTERNAL MEDICINE

## 2023-03-07 PROCEDURE — 99214 OFFICE O/P EST MOD 30 MIN: CPT | Mod: S$PBB,,, | Performed by: INTERNAL MEDICINE

## 2023-03-07 NOTE — PROGRESS NOTES
Subjective:       Patient ID: Yuniel Gracia is a 77 y.o. female.    Chief Complaint:breast ca    HPI:    She has a chronic ITP and hep C, history of    stage I breast cancer,rec score of 32 , so underwent TC  chemotherapy. She did notice a new mass in the rt breast that came back without issue, On arimidex  5 years in 9/2017/ Mild thrombocytopenia related to rx and hep c     saw ENT for mouth sores got better.   and pt are both reporting that pt is unabe to find some words, she gets  Forgetful   states she is afraid to take a dementia test  Had COVID, wasn't hopsitlized  Pt had rivastigmine for memory loss, but made her sick has been changed, went to ED for s/e stomach aches and nausea.was found to have some thing on her liver  REVIEW OF SYSTEMS:     CONSTITUTIONAL: The patient denies any weight change. There is no apparent    change in appetite, fever, night sweats, headaches, fatigue, dizziness, or    weakness.   is with reports worsening dementia    SKIN: Denies rash, issues with nails, non-healing sores, bleeding, blotching    skin or abnormal bruising. Denies new moles or changes to existing moles.      BREASTS: There is no swelling around breasts or nipple discharge.    EYES: Denies eye pain, blurred vision, swelling, redness or discharge.      ENT AND MOUTH:  3 sores around gum line.  And buccal mucosa  CARDIOVASCULAR: Denies chest pain, discomfort or palpitations. Denies neck    swelling or episodes of passing out.      RESPIRATORY: Denies cough, sputum production, blood in sputum, and denies    shortness of breath.      GI: Denies trouble swallowing, indigestion, heartburn, abdominal pain, nausea,    vomiting, diarrhea, altered bowel habits, blood in stool, discoloration of    stools, change in nature of stool, bloating, increased abdominal girth.      GENITOURINARY: No discharge. No pelvic pain or lumps. No rash around groin or  lesions. No urinary frequency, hesitation, painful  urination or blood in    urine. Denies incontinence. No problems with intercourse.      MUSCULOSKELETAL: Denies neck or back pain. Denies weakness in arms or legs,    joint problems or distended inflamed veins in legs. Denies swelling or abnormal  glands.      NEUROLOGICAL: Denies tingling, numbness, altered mentation changes to nerve    function in the face, weakness to one or both of the body. Denies changes to    gait and denies multiple falls or accidents.  + memory loss    PSYCHIATRIC: Denies nervousness, anxiety, hallucinations, depression, suicidal    ideation, trouble sleeping or changes in behavior noticed by family.          PHYSICAL EXAM:     Wt Readings from Last 3 Encounters:   03/07/23 68.2 kg (150 lb 5.7 oz)   02/03/23 68.5 kg (151 lb 0.2 oz)   12/21/22 68 kg (150 lb)     Temp Readings from Last 3 Encounters:   03/07/23 96.9 °F (36.1 °C) (Temporal)   02/03/23 97.8 °F (36.6 °C) (Oral)   09/12/22 97.3 °F (36.3 °C) (Temporal)     BP Readings from Last 3 Encounters:   03/07/23 (!) 104/56   02/03/23 100/70   12/07/22 132/84     Pulse Readings from Last 3 Encounters:   03/07/23 60   02/03/23 69   12/07/22 61       GENERAL: Comfortable looking patient. Patient is in no distress.  Awake, alert and oriented to time, person and place.  No anxiety, or agitation.    brusies to chin and after fall+    HEENT: Normal conjunctivae and eyelids. WNL.  PERRLA 3 to 4 mm. No icterus, no pallor, no congestion, and no discharge noted. 3 small aphthous ulcers noted in oral cavity    NECK:  Supple. Trachea is central.  No crepitus.  No JVD or masses.    RESPIRATORY:  No intercostal retractions.  No dullness to percussion.  Chest is clear to auscultation.  No rales, rhonchi or wheezes.  No crepitus.  Good air entry bilaterally.    CARDIOVASCULAR:  S1 and S2 are normally heard without murmurs or gallops.  All peripheral pulses are present.    ABDOMEN:  Normal abdomen.  No hepatosplenomegaly.  No free fluid.  Bowel sounds are  present.  No hernia noted. No masses.  No rebound or tenderness.  No guarding or rigidity.  Umbilicus is midline.    LYMPHATICS:  No axillary, cervical, supraclavicular, submental, or inguinal lymphadenopathy.    SKIN/MUSCULOSKELETAL:  There is no evidence of excoriation marks or ecchmosis.  No rashes.  No cyanosis.  No clubbing.  No joint or skeletal deformities noted.  Normal range of motion.    NEUROLOGIC:  Higher functions are appropriate.  No cranial nerve deficits.  Normal ludmila.  Normal strength.  Motor and sensory functions are normal.  Deep tendon reflexes are normal.    GENITAL/RECTAL:  Exams are deferred.      Laboratory:     CBC:  Lab Results   Component Value Date    WBC 4.11 02/22/2023    RBC 4.23 02/22/2023    HGB 13.0 02/22/2023    HCT 38.3 02/22/2023    MCV 91 02/22/2023    MCH 30.7 02/22/2023    MCHC 33.9 02/22/2023    RDW 13.8 02/22/2023     (L) 02/22/2023    MPV 10.6 02/22/2023    GRAN 1.8 02/22/2023    GRAN 42.7 02/22/2023    LYMPH 1.6 02/22/2023    LYMPH 39.4 02/22/2023    MONO 0.4 02/22/2023    MONO 9.5 02/22/2023    EOS 0.3 02/22/2023    BASO 0.03 02/22/2023    EOSINOPHIL 7.5 02/22/2023    BASOPHIL 0.7 02/22/2023       BMP: BMP  Lab Results   Component Value Date     02/22/2023    K 4.5 02/22/2023     02/22/2023    CO2 27 02/22/2023    BUN 18 02/22/2023    CREATININE 0.9 02/22/2023    CALCIUM 9.6 02/22/2023    ANIONGAP 9 02/22/2023    ESTGFRAFRICA >60 06/22/2022    EGFRNONAA >60 06/22/2022       LFT:   Lab Results   Component Value Date    ALT 12 02/22/2023    AST 24 02/22/2023    ALKPHOS 48 (L) 02/22/2023    BILITOT 0.7 02/22/2023   Impression:     Osteopenia 8/2020     Consider FDA approved medical therapies in postmenopausal women and men aged 50 years and older, based on the following:     *A hip or vertebral (clinical or morphometric) fracture  *T score less than or equal to -2.5 at the femoral neck or spine after appropriate evaluation to exclude secondary  causes.  *Low bone mass -- also known as osteopenia (T score between -1.0 and -2.5 at the femoral neck or spine) and a 10 year probability of hip fracture greater than or equal to 3% or a 10 year probability of major osteoporosis-related fracture greater than or equal to 20% based on the [place]-adapted WHO algorithm.  *Clinician's judgment and/or patient preference may indicate treatment for people with 10 year fracture probabilities is above or below these levels  12/2020mammogram neg:   Most recent scan 2015 neg  Tumor marker  wnl 23.6 2/2021      Abdomen/pelvis: Ct abd/ pelvis 6/22     Hyperinflation lung bases with dependent atelectatic changes.     The liver demonstrates several hypodensities which are too small to characterize.  The spleen, adrenals, kidneys, and pancreas are normal.  The gallbladder is present.     Small bowel is of normal caliber.  Colon is also normal caliber with scattered colonic diverticula.  No adjacent inflammatory changes.  The appendix is within normal limits.     The uterus is surgically absent.  The bladder is under distended.  The mesentery is without adenopathy nor is the retroperitoneum.  No free fluid in the abdomen pelvis.     No suspicious osseous lesions.  Degenerative changes are identified.  The soft tissues are normal.     Impression:     1. No evidence for mesenteric ischemia.  The vasculature is patent.  2. Other secondary findings as noted.    Impression:MRI abd 9/9/22     Few scattered cysts in the liver largest measuring 8 mm.  Two right renal cysts.       Bone density wnl 8/22  Assessment/Plan:      Pt had MRI of abd to follow lesions described in CT confirms cysts  Stage 1 breast ca  4. HTN, dyslipedemia, hypothyroidism, depression , contimue care with Dr. Dolan  Had plasma for COV D  Gave pt restoril rx for insomnia. Generic in the past  Patient on antidepressant which may need to be discontinued to see if her memory improves will refer  F/u wit neuro for memory  loss  Breast ca: cont arimidex  For a total of 10 years dx in 2012  Keep follow-up with cbc, cmp she completes 10 years of arimidex in 9/2022, explianed    osteo o, has normalized so  can stop prolia and monitor with bone densities q 2 years  thrombocytopenia cont to monitor,wnl this visit     due for mammo in 12/23

## 2023-03-10 ENCOUNTER — PATIENT MESSAGE (OUTPATIENT)
Dept: UROLOGY | Facility: CLINIC | Age: 78
End: 2023-03-10
Payer: MEDICARE

## 2023-03-12 NOTE — PROGRESS NOTES
Care Ecosystem Medication Review - PharmD    [facility] patient. Medication review completed by Care Team Navigator (CTN) and Renata Parada PharmD to identify dementia specific medication concerns, as well as opportunities to reduce polypharmacy, high risk medications, and fall risk as needed.     Current Outpatient Medications on File Prior to Visit   Medication Sig    Amlodipine 2.5mg tablet Take 1 tablet by mouth daily    bimatoprost (LUMIGAN) 0.01 % Drop Place 1 drop into both eyes every evening.    CALCIUM CARBONATE/VITAMIN D3 (VITAMIN D-3 ORAL) Take 400 Units by mouth daily as needed.    co-enzyme Q-10 30 mg capsule Take 30 mg by mouth once daily.    levothyroxine (SYNTHROID) 112 MCG tablet Take 1 tablet (112 mcg total) by mouth before breakfast.    rivastigmine (EXELON) 4.6 mg/24 hour PT24    1 patch, Transdermal, Daily       sertraline (ZOLOFT) 50 MG tablet Take 1 tablet (50 mg total) by mouth once daily.    solifenacin (VESICARE) 5 MG tablet TAKE 1 TABLET DAILY      PharmD Recommendations:    I can't entirely tell from her fill history if she has been taking the tablets or patches of rivastigmine but typically the patch has less GI side effects  No other med issues. Meds updated in Epic per CTN notes.    Time spent delivering care (in minutes): 10    Plan:  Pharmacist to route recommendations to CTN.

## 2023-03-13 NOTE — PROGRESS NOTES
Renata Parada, PharmD returned med recommendations for pt.  SW called Cg and since4 recs are minimal LVM for CG with pharm recs.  In addition, PELON sent recs to PCP, Dr. Olivera and his staff. PELON remains available.

## 2023-03-14 ENCOUNTER — PATIENT MESSAGE (OUTPATIENT)
Dept: FAMILY MEDICINE | Facility: CLINIC | Age: 78
End: 2023-03-14
Payer: MEDICARE

## 2023-03-14 RX ORDER — SOLIFENACIN SUCCINATE 5 MG/1
5 TABLET, FILM COATED ORAL DAILY
Qty: 90 TABLET | Refills: 3 | Status: SHIPPED | OUTPATIENT
Start: 2023-03-14 | End: 2024-01-26

## 2023-03-14 NOTE — TELEPHONE ENCOUNTER
No new care gaps identified.  [facility] Embedded Care Gaps. Reference number: [id number]. 3/14/2023   9:50:05 AM WALTERT

## 2023-04-02 ENCOUNTER — PATIENT MESSAGE (OUTPATIENT)
Dept: FAMILY MEDICINE | Facility: CLINIC | Age: 78
End: 2023-04-02
Payer: MEDICARE

## 2023-04-02 DIAGNOSIS — M54.41 ACUTE LOW BACK PAIN WITH BILATERAL SCIATICA, UNSPECIFIED BACK PAIN LATERALITY: Primary | ICD-10-CM

## 2023-04-02 DIAGNOSIS — M54.42 ACUTE LOW BACK PAIN WITH BILATERAL SCIATICA, UNSPECIFIED BACK PAIN LATERALITY: Primary | ICD-10-CM

## 2023-04-03 ENCOUNTER — CLINICAL SUPPORT (OUTPATIENT)
Dept: REHABILITATION | Facility: HOSPITAL | Age: 78
End: 2023-04-03
Payer: MEDICARE

## 2023-04-03 ENCOUNTER — OUTPATIENT CASE MANAGEMENT (OUTPATIENT)
Dept: NEUROLOGY | Facility: CLINIC | Age: 78
End: 2023-04-03
Payer: MEDICARE

## 2023-04-03 ENCOUNTER — DOCUMENTATION ONLY (OUTPATIENT)
Dept: REHABILITATION | Facility: HOSPITAL | Age: 78
End: 2023-04-03

## 2023-04-03 DIAGNOSIS — M53.86 SCIATICA ASSOCIATED WITH DISORDER OF LUMBAR SPINE: ICD-10-CM

## 2023-04-03 NOTE — PROGRESS NOTES
PELON spoke to CGLul, for Month [employer] call.  Pt had an ED visit yesterday due to severe and worsening hip pain.  She has arthritis in L1 and L2.  She was started on Lidocaine patch, anti-emetics, and Robaxin and CG said this has been an effective combination.  CG would like PT to start immediately and PELON informed him that Ed doc has put orders in for outpatient PT.  PELON provided number to the location in [place] where CG would like to take pt for PT.  He will call and schedule.  In addition, He has already scheduled an appt with a spine dr with whom he is familiar.    He said pt has been showing signs of having new difficulty reading.  Cg will initiate audio books for them to listen to.  Otherwise the couple are stable and their son is still living in their home and was there to assist when pt could not get out of bed due to hip pain.  Pt had no falls this month. PELON remains available.

## 2023-04-03 NOTE — PROGRESS NOTES
Patient arrived with physical therapy evaluation on 4/3/2023 with . Patient arrived without AD but  asking Physical Therapist for wheelchair to bring patient into evaluation. Patient was brought into evaluation room with physical therapist. During history taking, pt was unable to answer questions requiring assistance from . She kept stating that she was fine before she was given muscle relaxer.  reports he gave her her second dose this morning. He reports the prescription is for two pills 3x/. He reports she really has not had much pain since yesterday at the ER. He reports she was fine this morning but after the second dose of medication, she started to get confused.  reported that she does have history of dementia as well but not to this extent. Physical Therapist suggested rescheduling evaluation due to inability to gather information from patient.     Information gathered from history from :     L sided low back pain that started Friday and got progressively worse. She wasn't even able to get out of bed and she has her son with them right now who was able to help her out of bed and get to the ER. ER doctor put in order for Physical Therapist. The spine doctor appointment is not until May. She was prescribed muscle relaxer and pain pill. She also had a lidocaine patch on right now. This morning  put on a patch. Prescription was: 2 pills , three times per day. She has been on muscle relaxers before without this effect. She has not been on muscle relaxers in a long time.     AYESHA: pt woke up one morning and the L side was killing her. She did a lot of housework the day before which might have lead to this     Patient was able to schedule for another evaluation on April 17th.

## 2023-04-21 ENCOUNTER — OFFICE VISIT (OUTPATIENT)
Dept: SPINE | Facility: CLINIC | Age: 78
End: 2023-04-21
Payer: MEDICARE

## 2023-04-21 VITALS — BODY MASS INDEX: 26.52 KG/M2 | HEIGHT: 63 IN | WEIGHT: 149.69 LBS

## 2023-04-21 DIAGNOSIS — M54.50 ACUTE LEFT-SIDED LOW BACK PAIN WITHOUT SCIATICA: Primary | ICD-10-CM

## 2023-04-21 DIAGNOSIS — M54.42 ACUTE LOW BACK PAIN WITH BILATERAL SCIATICA, UNSPECIFIED BACK PAIN LATERALITY: ICD-10-CM

## 2023-04-21 DIAGNOSIS — M54.41 ACUTE LOW BACK PAIN WITH BILATERAL SCIATICA, UNSPECIFIED BACK PAIN LATERALITY: ICD-10-CM

## 2023-04-21 PROCEDURE — 99204 OFFICE O/P NEW MOD 45 MIN: CPT | Mod: S$GLB,,, | Performed by: PHYSICAL MEDICINE & REHABILITATION

## 2023-04-21 PROCEDURE — 99204 PR OFFICE/OUTPT VISIT, NEW, LEVL IV, 45-59 MIN: ICD-10-PCS | Mod: S$GLB,,, | Performed by: PHYSICAL MEDICINE & REHABILITATION

## 2023-04-21 RX ORDER — METHOCARBAMOL 500 MG/1
500 TABLET, FILM COATED ORAL 4 TIMES DAILY
COMMUNITY
End: 2023-12-07 | Stop reason: CLARIF

## 2023-04-21 NOTE — PROGRESS NOTES
"  SUBJECTIVE:    Patient ID: Yuniel Gracia is a 77 y.o. female.    Chief Complaint: Low-back Pain    This is a 77-year-old woman who sees Dr. Olivera for her primary care.  History of hypothyroidism Raynaud's phenomenon and a remote history of breast cancer and hepatitis-C both of which are considered cured.  Otherwise denies any chronic major medical problems.  I get the impression she is had intermittent back pain through the years but never has had any specific treatment for it.  She presents with a primary complaint of left-sided low back pain at the lumbosacral junction without radicular symptoms which started spontaneously 1 or 2 months ago.  There was no injury.  She went to the emergency room on 04/02/2023 with a primary complaint of low back pain radiating into the left groin.  She was prescribed naproxen Robaxin and Lidoderm patches.  She presents to me feeling significantly better.  She is not complaining of any discomfort radiating into the left groin.  Denies bowel or bladder dysfunction fever chills sweats or unexpected weight loss.  X-rays done through the emergency department show scoliosis and multilevel degenerative disc disease.  Current pain level is 3/10.  She went to her initial physical therapy evaluation but she had taken Robaxin in it made her a bit loopy so she did not get much out of that evaluation.  She was rescheduled but her appointment is not until June.        Past Medical History:   Diagnosis Date    Anemia     Arthritis     Blood transfusion     Breast cancer     Cancer RIGHT BREAST    Chronic constipation     Clotting disorder     Colon polyp     Diverticulosis     Glaucoma     Hepatitis C 2000    pt states history of hepatits c-"cured by Dr. Lynch"; TREATED 2 YEARS - 2000   OK NOW    Hypothyroid     Insomnia     Malignant neoplasm of breast 4/12/2012    Memory loss     reports some memory loss since chemo    Osteoarthritis     Panic attacks     Raynaud's disease     " takes amlodipine for this    Ulcer     Vertigo      Social History     Socioeconomic History    Marital status:    Tobacco Use    Smoking status: Never    Smokeless tobacco: Never   Substance and Sexual Activity    Alcohol use: Yes     Alcohol/week: 7.0 - 9.0 standard drinks     Types: 5 - 7 Shots of liquor, 2 Standard drinks or equivalent per week     Comment: occasional    Drug use: No    Sexual activity: Yes     Partners: Male     Birth control/protection: None     Social Determinants of Health     Financial Resource Strain: Low Risk     Difficulty of Paying Living Expenses: Not hard at all   Food Insecurity: No Food Insecurity    Worried About Running Out of Food in the Last Year: Never true    Ran Out of Food in the Last Year: Never true   Transportation Needs: No Transportation Needs    Lack of Transportation (Medical): No    Lack of Transportation (Non-Medical): No   Physical Activity: Inactive    Days of Exercise per Week: 0 days    Minutes of Exercise per Session: 0 min   Stress: No Stress Concern Present    Feeling of Stress : Not at all   Social Connections: Unknown    Frequency of Communication with Friends and Family: Three times a week    Frequency of Social Gatherings with Friends and Family: Twice a week    Active Member of Clubs or Organizations: Yes    Attends Club or Organization Meetings: More than 4 times per year    Marital Status:    Housing Stability: Low Risk     Unable to Pay for Housing in the Last Year: No    Number of Places Lived in the Last Year: 1    Unstable Housing in the Last Year: No     Past Surgical History:   Procedure Laterality Date    APPENDECTOMY      BLADDER SURGERY  2011    sling - Dr Giles  - [facility]    BRAIN SURGERY  2007    temporal neuralgia - [place]    BREAST BIOPSY      BREAST LUMPECTOMY  03/2012    malignant - had chemo and radiotherapy    CATARACT EXTRACTION      COLONOSCOPY  01/03/2011    Dr Lynch, in media section, diverticulosis,  "hemorrhoids, otherwise normal findings; normal findings on random biopsies    COLONOSCOPY N/A 06/01/2017    Procedure: COLONOSCOPY;  Surgeon: Nate Lamb MD;  Location: [facility];  Service: Endoscopy;  Laterality: N/A; hemorrhoids, diverticulosis, 1 colon polyp removed; Repeat colonoscopy in 5 years for surveillance; biopsy: Tubular adenoma, random biopsies unremarkable    ESOPHAGOGASTRODUODENOSCOPY N/A 07/20/2022    Procedure: EGD (ESOPHAGOGASTRODUODENOSCOPY);  Surgeon: Nate Lamb MD;  Location: [facility];  Service: Endoscopy;  Laterality: N/A;    EYE SURGERY      cataracts bilateral    HYSTERECTOMY      JOINT REPLACEMENT  09/25/2017    left Knee [facility]     removal of port a cath      right lymph node removed from breast area      TUNNELED VENOUS PORT PLACEMENT  04/2012    left chest    UPPER GASTROINTESTINAL ENDOSCOPY  prior to 2011    small hiatal hernia     Family History   Problem Relation Age of Onset    Breast cancer Mother     Diabetes Father     Heart disease Father     Breast cancer Sister     Breast cancer Sister     Early death Maternal Grandmother         tonsils    Heart disease Maternal Grandfather     Alcohol abuse Maternal Grandfather     Tuberculosis Paternal Grandfather     Drug abuse Son     Obesity Son     Diabetes Maternal Aunt     Heart disease Maternal Uncle     Diabetes Paternal Aunt     Tuberculosis Paternal Uncle         x2    Diabetes Paternal Uncle     Breast cancer Other     Ovarian cancer Neg Hx     Colon cancer Neg Hx     Colon polyps Neg Hx     Crohn's disease Neg Hx     Ulcerative colitis Neg Hx     Melanoma Neg Hx     Lupus Neg Hx     Psoriasis Neg Hx     Eczema Neg Hx      Vitals:    04/21/23 1305   Weight: 67.9 kg (149 lb 11.1 oz)   Height: 5' 3" (1.6 m)       Review of Systems   Constitutional:  Negative for chills, diaphoresis, fatigue, fever and unexpected weight change.   HENT:  Negative for trouble swallowing.    Eyes:  Negative for visual " disturbance.   Respiratory:  Negative for shortness of breath.    Cardiovascular:  Negative for chest pain.   Gastrointestinal:  Negative for abdominal pain, constipation, nausea and vomiting.   Genitourinary:  Negative for difficulty urinating.   Musculoskeletal:  Negative for arthralgias, back pain, gait problem, joint swelling, myalgias, neck pain and neck stiffness.   Neurological:  Negative for dizziness, speech difficulty, weakness, light-headedness, numbness and headaches.        Objective:      Physical Exam  Neurological:      Mental Status: She is alert and oriented to person, place, and time.      Comments: She is awake and in no acute distress  Mild tenderness palpation left lumbar paraspinous musculature at the lumbosacral junction with no palpable masses  Forward flexion of the lumbar spine is to about 45° before she complains of pain on left at the lumbosacral junction.  Extension likewise causes pain on left at the lumbosacral junction  Reflexes- +1-+2 reflexes at the following:   C5-Biceps   C6-Brachioradialis   C7-Triceps   L3/4-Patellar   S1-Achilles   Rima sign negative bilaterally  Strength testing- 5/5 strength in the following muscle groups:  C5-Elbow flexion  C6-Wrist extension  C7-Elbow extension  C8-Finger flexion  T1-Finger abduction  L2-Hip flexion  L3-Knee extension  L4-Ankle dorsiflexion  L5-Great toe extension  S1-Ankle plantar flexion       Straight leg raise negative bilaterally  ERWIN test bilaterally causes ipsilateral low back pain but no groin pain           Assessment:       1. Acute left-sided low back pain without sciatica    2. Acute low back pain with bilateral sciatica, unspecified back pain laterality           Plan:     She has a nonfocal neurological examination and no historical red flags.  I suspect she has low back pain on basis of degenerative disc disease and facet arthropathy.  She has pain with facet loading.  I think she can be treated conservatively.  I  agree with physical therapy.  I wrote an order for external physical therapy.  Perhaps they can get in sooner.  If she fails that consider MRI and medial branch block/radiofrequency ablation.  Continue medications prescribed by the emergency department follow-up with me at the completion of therapy      Acute left-sided low back pain without sciatica  -     Ambulatory referral/consult to Physical/Occupational Therapy; Future; Expected date: 04/28/2023    Acute low back pain with bilateral sciatica, unspecified back pain laterality  -     Ambulatory referral/consult to [facility]

## 2023-04-25 NOTE — PROGRESS NOTES
See evaluation and plan of care with today's date in the treatment section of this patient's chart.

## 2023-05-01 ENCOUNTER — CLINICAL SUPPORT (OUTPATIENT)
Dept: REHABILITATION | Facility: HOSPITAL | Age: 78
End: 2023-05-01
Payer: MEDICARE

## 2023-05-01 DIAGNOSIS — Z55.9 SPECIAL EDUCATIONAL NEEDS: ICD-10-CM

## 2023-05-01 DIAGNOSIS — M54.50 ACUTE BILATERAL LOW BACK PAIN WITHOUT SCIATICA: Primary | ICD-10-CM

## 2023-05-01 DIAGNOSIS — R53.1 WEAKNESS: ICD-10-CM

## 2023-05-01 DIAGNOSIS — R26.2 DIFFICULTY WALKING: ICD-10-CM

## 2023-05-01 DIAGNOSIS — R68.89 DECREASED FUNCTIONAL ACTIVITY TOLERANCE: ICD-10-CM

## 2023-05-01 PROCEDURE — 97110 THERAPEUTIC EXERCISES: CPT | Mod: PN

## 2023-05-01 PROCEDURE — 97162 PT EVAL MOD COMPLEX 30 MIN: CPT | Mod: PN

## 2023-05-01 SDOH — SOCIAL DETERMINANTS OF HEALTH (SDOH): PROBLEMS RELATED TO EDUCATION AND LITERACY, UNSPECIFIED: Z55.9

## 2023-05-01 NOTE — PLAN OF CARE
"[facility]   Physical Therapy Initial Evaluation     Date: 5/1/2023   Name: Yuniel SOLOMON [facility] Number: [id number]    Therapy Diagnosis:   Encounter Diagnoses   Name Primary?    Weakness     Acute bilateral low back pain without sciatica Yes    Difficulty walking     Decreased functional activity tolerance     Special educational needs      Physician: Geoff Shaver DO    Physician Orders: PT Eval and Treat   Medical Diagnosis from Referral: M54.42,M54.41 (ICD-10-CM) - Acute low back pain with bilateral sciatica, unspecified back pain laterality   Evaluation Date: 5/1/2023  Authorization Period Expiration: 04/02/2024  Plan of Care Expiration: 06/23/2023 at 2x/wk x 6 weeks.  Progress Note Due: 06/01/2023  Visit # / Visits authorized: 1/ 1   FOTO: 1/ 3     Precautions: Standard History of Cancer. Difficulty with short term memory.     Time In: 9:15 am  Time Out: 10:08 am  Total Appointment Time (timed & untimed codes): 53 minutes  SUBJECTIVE   Date of onset: The patient reports a 2-3 month history of these complaints.     History of current condition - Yuniel reports: She reports having back complaints for a number of years. She hasn't had any specific back treatment. She reports that her pain is mainly left sided. It tends to be in the left sacroiliac area. She doesn't report any pain radiating down her left leg.  She reports having these complaints for about two to three months. She recalls no mechanism of injury. She did go to the Emergency Room on 04/02/2023 due to low back pain. At that time, she also had pain radiating into the left groin. She was prescribed pain patches. At her last M.D.visit, the left groin pain had resolved. Her M.D. reports shows that X-rays done through the emergency department showed scoliosis and multilevel degenerative disc disease.  Today, she reports she is about the same. Her pain is now periodic and can be directly proportional to her activity. "I " "have trouble walking. I have to hold onto my . I have some trouble at times with my memory also." The patient's daughter in law was present for the last part of this evaluation and confirmed the above history.     Falls: She reports no history of falls.     Imaging,  FINDINGS: AP and lateral views of the lumbar spine are obtained. Five non-rib-bearing lumbar type vertebral bodies are present with sacralization at L5. There is moderate dextroscoliotic curvature centered at the L1 vertebral body. Multilevel degenerative disc and hypertrophic facet changes are present, asymmetrical worse on the left. Degenerative disc changes are worst at the left half of L1-L2. No fracture seen. Vertebral body heights appear preserved.  There are mild degenerative changes of both SI joints. Impression:   1.Scoliotic curvature. 2.Multilevel degenerative disc and hypertrophic facet changes. Degenerative disc changes are worst at the left half of L1-L2.     Prior Therapy: The patient has not had PT for these complaints.   Social History: Lives with her spouse in a two story home.  Occupation: Retired.  Prior Level of Function: She could perform as desired. She was not limited by pain.  Current Level of Function: She reports that periodically her household tasks, walking, and desired activities are limited by low back and left posterior hip pain. This includes yard work.    Pain:  Current 0/10, worst 6/10, best 0/10   Location: Left lumbar and sacroiliac joint area.   Description: "Real achy."  Aggravating Factors: Yard work, over activity, going up and down stairs .  Easing Factors: "Tough it out."    Patients goals: "I'd like to learn some exercises,"     Medical History:   Past Medical History:   Diagnosis Date    Anemia     Arthritis     Blood transfusion     Breast cancer     Cancer RIGHT BREAST    Chronic constipation     Clotting disorder     Colon polyp     Diverticulosis     Glaucoma     Hepatitis C 2000    pt states " "history of hepatits c-"cured by Dr. Lynch"; TREATED 2 YEARS - 2000   OK NOW    Hypothyroid     Insomnia     Malignant neoplasm of breast 4/12/2012    Memory loss     reports some memory loss since chemo    Osteoarthritis     Panic attacks     Raynaud's disease     takes amlodipine for this    Ulcer     Vertigo      Surgical History:   Yuniel Gracia  has a past surgical history that includes Hysterectomy; Brain surgery (2007); Tunneled venous port placement (04/2012); right lymph node removed from breast area; Bladder surgery (2011); removal of port a cath; Colonoscopy (01/03/2011); Upper gastrointestinal endoscopy (prior to 2011); Colonoscopy (N/A, 06/01/2017); Breast lumpectomy (03/2012); Eye surgery; Joint replacement (09/25/2017); Breast biopsy; Appendectomy; Esophagogastroduodenoscopy (N/A, 07/20/2022); and Cataract extraction.    Medications:   Yuniel has a current medication list which includes the following prescription(s): amlodipine, lumigan, calcium carbonate/vitamin d3, co-enzyme q-10, levothyroxine, methocarbamol, rivastigmine, sertraline, and solifenacin.    Allergies:   Review of patient's allergies indicates:   Allergen Reactions    Iodinated contrast media Shortness Of Breath     Spontaneous jaw movements    Codeine Nausea Only      OBJECTIVE     L-Spine     Structural/Postural Inspection/Palpation: Patient with scoliosis. Right iliac crest is higher. Right shoulder is lower.      Patient with palpation tenderness to: The patient reported mild tenderness with palpation to the left lumbar and left sacroiliac joint area. No trigger points are noted.       Flexibility      Muscle Left Right Comment         Hamstrings Minimal Increase Normal    Quadriceps Normal Normal    Illipsoas Normal Normal    Pirifromis Minimal Increase Normal              ROM       L-Spine AROM AROM Comment    Left Right          Flexion  70*  * No pain with testing.   Extension  15*  *    Sidebending  5* 15*        "       MMT    L-spine   Comment    Left Right          Flexion 4-/5 -    Extension 4/5 -    Side Bending 4-/5 4-/5          Abdominals 3+/5 -    Lower Abdominals 3+/5 -    Hip ABduction 4/5 4/5    Hip ADduction 4+/5 4+/5    Hip Internal Rotation 4-/5 4-/5    Hip External Rotation 4-/5 4-/5               SPECIAL TESTS: The patient's bilateral lower extremity sensation in intact to light touch and pin prick throughout each lower extremity.         Test   Comment    Left Right    SLR Negative. Negative.    Slump Test Negative. Negative.    Fabers Negative Negative    Repetitive Flexion (standing) Negative. Negative.    Repetitive Extension (standing) Negative. Negative.              FUNCTIONAL MOBILITY        Balance: No gross abnormalities. Patient required hand held assist to attempt single leg stance. She was able to balance >3 seconds each leg with tactile assist.    Gait: Patient usually ambulates long distances by holding onto someone's arm. She reports more for lack of endurance. Short distance noted today. The gait was slow. She appeared cautious. Slight lateral trunk flexion noted.     Limitation/Restriction for FOTO Lumbar Survey    Therapist reviewed FOTO scores for Yuniel Grcaia on 5/1/2023.   FOTO documents entered into [employer] - see Media section.    Limitation Score: 56% limitation at the evaluation.       TREATMENT     Total Treatment time (time-based codes) separate from Evaluation: 10 minutes      Yuniel received the treatments listed below:    -bilateral lower trunk rotation with 5 second hold x 5  -gluteal squeezes with 2 second hold x 10  -quad sets with 5 second hold x 10  -single knee to chest stretch with sheet with 30 second hold x 3     PATIENT EDUCATION AND HOME EXERCISES     Education provided:   -The patient performed the exercises that will appear on her initial home program. She returned demo to PT and was without questions.    Written Home Exercises Provided: The patient performed the  exercises that will appear on her initial home program. Exercises were reviewed and Yuniel was able to demonstrate them prior to the end of the session.  Yuniel demonstrated good  understanding of the education provided. See EMR under Patient Instructions for exercises provided during therapy sessions.  ASSESSMENT     Yuniel is a 77 y.o. female referred to outpatient Physical Therapy with a medical diagnosis of M54.42,M54.41 (ICD-10-CM) - Acute low back pain with bilateral sciatica, unspecified back pain laterality. Patient presents with periodic low back and left sacroiliac joint pain. She presents with core and hip weakness. She reports decreased functional activity tolerance including ambulation for distance. She needs patient education and a custom written home program.    Patient prognosis is Good.   Patientt will benefit from skilled outpatient Physical Therapy to address the deficits stated above and in the chart below, provide patient /family education, and to maximize patientt's level of independence.     Plan of care discussed with patient: Yes  Patient's spiritual, cultural and educational needs considered and patient is agreeable to the plan of care and goals as stated below:     Anticipated Barriers for therapy: co-morbidities and difficulty with memory at times.     Medical Necessity is demonstrated by the following  History  Co-morbidities and personal factors that may impact the plan of care Co-morbidities:   coping style/mechanism, difficulty sleeping, history of cancer, level of undertstanding of current condition, transportation assistance required, and anemia, arthritis, diverticulosis, Hepatitis C, Insomnia, Memory loss since chemo, osteoarthritis, panic attacks, Raynaud's, and vertigo.    Personal Factors:   age  character  attitudes  Medical history     high   Examination  Body Structures and Functions, activity limitations and participation restrictions that may impact the plan of care Body  Regions:   back  lower extremities  trunk    Body Systems:    strength  gross coordinated movement  gait  motor control  motor learning  And pain control.     Participation Restrictions:   none    Activity limitations:   Learning and applying knowledge  solving problems    General Tasks and Commands  undertaking multiple tasks    Communication  communicating with/receiving non-verbal language    Mobility  lifting and carrying objects  walking  driving (bike, car, motorcycle)    Self care  washing oneself (bathing, drying, washing hands)  caring for body parts (brushing teeth, shaving, grooming)  dressing  looking after one's health    Domestic Life  shopping  cooking  doing house work (cleaning house, washing dishes, laundry)  assisting others    Interactions/Relationships  relating with strangers    Life Areas  no deficits    Community and Social Life  community life  recreation and leisure         moderate   Clinical Presentation stable and uncomplicated moderate   Decision Making/ Complexity Score: moderate     Goals:    STG  Weeks/Visits Date Established  Date Met   1.Decrease max low back and left sacroiliac area pain to 4/10 to improve the patient's quality of life. 3 weeks. 5/1/2023   In Progress   2. Increase core strength 1/2 grade to improve her standing activity endurance.  3 weeks. 5/1/2023   In Progress   3.Increase bilateral hip strength 1/2 grade to improve her standing activity endurance.  3 weeks. 5/1/2023   In Progress   4.Decrease FOTO limitation score to <=51% to improve her household task ability.  3 weeks. 5/1/2023   In Progress   5.Fair initial written home exercise program knowledge and be without questions to have carryover after discharge from PT.  3 weeks. 5/1/2023   In Progress     LTG Weeks/Visits Date Established  Date Met   1.Decrease max low back and left sacroiliac area pain to 2/10 to improve the patient's quality of life. 6 weeks. 5/1/2023   In Progress   2. Increase core strength  one grade from evaluation date to improve her standing activity endurance.  6 weeks. 5/1/2023     In Progress   3.Increase bilateral hip strength one grade from evaluation date to improve her standing activity endurance.  6 weeks. 5/1/2023   In Progress   4.Decrease FOTO limitation score to <=46% to improve her household task ability.  6 weeks. 5/1/2023   In Progress   5.Good Progressed written home exercise program knowledge and be without questions to have carryover after discharge from PT.  6 weeks. 5/1/2023   In Progress     PLAN   Plan of care Certification: 5/1/2023 to 06/23/2023.    Outpatient Physical Therapy 2 times weekly for 6 weeks to include the following interventions: Electrical Stimulation unattended, Manual Therapy, Moist Heat/ Ice, Neuromuscular Re-ed, Patient Education, Self Care, Therapeutic Activities, Therapeutic Exercise, Ultrasound, and care by a PTA.     Jose Luis Soriano, PT      I CERTIFY THE NEED FOR THESE SERVICES FURNISHED UNDER THIS PLAN OF TREATMENT AND WHILE UNDER MY CARE   Physician's comments:     Physician's Signature: ___________________________________________________

## 2023-05-03 ENCOUNTER — OUTPATIENT CASE MANAGEMENT (OUTPATIENT)
Dept: NEUROLOGY | Facility: CLINIC | Age: 78
End: 2023-05-03
Payer: MEDICARE

## 2023-05-03 NOTE — PROGRESS NOTES
Protocol: [employer] Effectiveness Study  Identifier: [id number]  IRB#: [id number]  PI: Dr. Kee Garcia, PhD  CO-I: Dr. Sneha Ibarra PsyD  Version Date: 12/05/2022  Pt Study ID: [id number]  Visit Month: M3  Care Plan documented on: (03/1/2023) - Please refer to note for more information.     Timeline:   (*Note for CTN - complete timeline using completed or planned date for study events. Add any important events (i.e. Withdrawals, Lost to Follow Up, Adverse Events, etc.).    Consent: Completed(02/15/2023/)  Baseline questionnaires: Completed(02/15/2023)  6-month questionnaires: Planned(08/2023)  12-month questionnaires: Planned(02/2023)      Visit Note:   Spoke with caregiver Lul (Spouse) for month M3 visit. CG and pt are staying hotel for the 60th wedding anniversary.  Pt has started outpatient PT and will continue for 6 weeks to continue to improve functioning related to hip pain.  Cg continues with his volunteer activities with success.        Falls in the past month: 0  UTIs in the past month: 0  Hospital encounters in the past month (reported by caregiver): 0      Next monthly visit scheduled for: 6/6/2023. Caregiver knows how to reach CTN, and is encouraged to do so, as needed before our next scheduled call.     Action items for CTN: Continue to follow        ClinCard sent/loaded: no  (*Only applies to 6-month and 12-month visits)

## 2023-05-08 ENCOUNTER — CLINICAL SUPPORT (OUTPATIENT)
Dept: REHABILITATION | Facility: HOSPITAL | Age: 78
End: 2023-05-08
Payer: MEDICARE

## 2023-05-08 DIAGNOSIS — R53.1 WEAKNESS: ICD-10-CM

## 2023-05-08 DIAGNOSIS — M54.50 ACUTE BILATERAL LOW BACK PAIN WITHOUT SCIATICA: Primary | ICD-10-CM

## 2023-05-08 DIAGNOSIS — R26.2 DIFFICULTY WALKING: ICD-10-CM

## 2023-05-08 DIAGNOSIS — Z55.9 SPECIAL EDUCATIONAL NEEDS: ICD-10-CM

## 2023-05-08 DIAGNOSIS — R68.89 DECREASED FUNCTIONAL ACTIVITY TOLERANCE: ICD-10-CM

## 2023-05-08 PROCEDURE — 97110 THERAPEUTIC EXERCISES: CPT | Mod: PN

## 2023-05-08 PROCEDURE — 97112 NEUROMUSCULAR REEDUCATION: CPT | Mod: PN

## 2023-05-08 SDOH — SOCIAL DETERMINANTS OF HEALTH (SDOH): PROBLEMS RELATED TO EDUCATION AND LITERACY, UNSPECIFIED: Z55.9

## 2023-05-08 NOTE — PROGRESS NOTES
"[facility]   Physical Therapy Treatment Note     Name: Yuniel SOLOMON [facility] Number: [id number]    Therapy Diagnosis:   Encounter Diagnoses   Name Primary?    Weakness     Acute bilateral low back pain without sciatica Yes    Difficulty walking     Decreased functional activity tolerance     Special educational needs      Physician: Geoff Shaver DO    Visit Date: 5/8/2023    Physician Orders: PT Eval and Treat   Medical Diagnosis from Referral: M54.42,M54.41 (ICD-10-CM) - Acute low back pain with bilateral sciatica, unspecified back pain laterality   Evaluation Date: 5/1/2023  Authorization Period Expiration: 12/31/2023.  Plan of Care Expiration: 06/23/2023 at 2x/wk x 6 weeks.  Progress Note Due: 06/01/2023  Visit # / Visits authorized: 1/20  (2/12 on Plan of Care)  FOTO: 1/ 3   PTA Visit #: 0/5      Time In: 2:16 pm   Time Out: 3:00 pm  Total Appointment Time (timed & untimed codes): 40 minutes    Precautions: Standard History of Cancer. Difficulty with short term memory.   SUBJECTIVE     Pt reports: Her  reports that she has been standing up a lot. "I have been picking flowers and placing them around the house. I am tired, but not really hurting right now." PT acknowledges.    She was compliant with home exercise program.    Response to previous treatment: This is the patient's first PT session since the evaluation.  Functional change: This is the patient's first PT session since the evaluation.     Pain: 0/10 upon entering the clinic today.   Location: Left lumbar and sacroiliac joint area.   OBJECTIVE     Objective Measures updated at progress report unless specified.     Treatment     Yuniel received the treatments listed below:      Yuniel  received therapeutic exercises to develop strength, endurance, flexibility, posture, and core stabilization for 30 minutes including:  -+Nu-step on Level 2 x 10 minutes  -bilateral lower trunk rotation with 5 second hold x " 5  -gluteal squeezes with 2 second hold x 10  -quad sets with 5 second hold x 10  -single knee to chest stretch with sheet with 30 second hold x 3   -+long arc quad bilaterally with 2 pound weight x 3 minutes.  -+mini squats x 10    Yuniel participated in neuromuscular re-education activities to improve: Balance, Coordination, Sense, Proprioception, Posture, and and muscle recruitment for 10 minutes. The following activities were included:  -+standing bilateral lower extremity: abduction and extension x 15 each  -+sit to stand from mat without arm use x 10  -bridges with adduction (pillow) x 6 attempts  -+heel raises x 20  Patient Education and Home Exercises     Home Exercises Provided and Patient Education Provided     Education provided:   -+05/08/2023 The patient and her  received an updated written home program today. The patient returned demo. The patient and her  were without questions.     Written Home Exercises Provided: yes. Exercises were reviewed and Yuniel was able to demonstrate them prior to the end of the session.  Yuniel demonstrated good  understanding of the education provided. See EMR under Patient Instructions for exercises provided during therapy sessions.  ASSESSMENT     Yuniel continued her low back PT plan of care. Today, her  was present with her. PT progressed her exercises so as to add to her home program. She required verbal cues at times to keep up with repetition count and also for form. However, she responded to instruction well and was able to perform them correctly. She reported fatigue, but she did not report an increase in pain. She can benefit from slow progression of her routine and her home program. She tolerated today's PT session well. She and her  were without questions.     Yuniel Is progressing well towards her goals.     Pt prognosis is Good.     Pt will continue to benefit from skilled outpatient physical therapy to address the deficits listed in  the problem list box on initial evaluation, provide pt/family education and to maximize pt's level of independence in the home and community environment.     Pt's spiritual, cultural and educational needs considered and pt agreeable to plan of care and goals.     Anticipated barriers to physical therapy: co-morbidities and difficulty with memory at times.     Goals:   STG  Weeks/Visits Date Established  Date Met   1.Decrease max low back and left sacroiliac area pain to 4/10 to improve the patient's quality of life. 3 weeks. 5/1/2023    In Progress 5/8/2023     2. Increase core strength 1/2 grade to improve her standing activity endurance.  3 weeks. 5/1/2023    In Progress 5/8/2023     3.Increase bilateral hip strength 1/2 grade to improve her standing activity endurance.  3 weeks. 5/1/2023    In Progress 5/8/2023     4.Decrease FOTO limitation score to <=51% to improve her household task ability.  3 weeks. 5/1/2023    In Progress 5/8/2023     5.Fair initial written home exercise program knowledge and be without questions to have carryover after discharge from PT.  3 weeks. 5/1/2023    In Progress 5/8/2023        LTG Weeks/Visits Date Established  Date Met   1.Decrease max low back and left sacroiliac area pain to 2/10 to improve the patient's quality of life. 6 weeks. 5/1/2023    In Progress 5/8/2023     2. Increase core strength one grade from evaluation date to improve her standing activity endurance.  6 weeks. 5/1/2023       In Progress 5/8/2023     3.Increase bilateral hip strength one grade from evaluation date to improve her standing activity endurance.  6 weeks. 5/1/2023    In Progress 5/8/2023     4.Decrease FOTO limitation score to <=46% to improve her household task ability.  6 weeks. 5/1/2023    In Progress 5/8/2023     5.Good Progressed written home exercise program knowledge and be without questions to have carryover after discharge from PT.  6 weeks. 5/1/2023    In Progress 5/8/2023        PLAN      Plan of care Certification: 5/1/2023 to 06/23/2023. Outpatient Physical Therapy 2 times weekly for 6 weeks. Plan to improve her core strength and hip strength so that her pain free functional ability will improve.        Jose Luis Soriano, PT

## 2023-05-10 ENCOUNTER — CLINICAL SUPPORT (OUTPATIENT)
Dept: REHABILITATION | Facility: HOSPITAL | Age: 78
End: 2023-05-10
Attending: STUDENT IN AN ORGANIZED HEALTH CARE EDUCATION/TRAINING PROGRAM
Payer: MEDICARE

## 2023-05-10 ENCOUNTER — DOCUMENTATION ONLY (OUTPATIENT)
Dept: REHABILITATION | Facility: HOSPITAL | Age: 78
End: 2023-05-10
Payer: MEDICARE

## 2023-05-10 DIAGNOSIS — Z55.9 SPECIAL EDUCATIONAL NEEDS: ICD-10-CM

## 2023-05-10 DIAGNOSIS — M54.50 ACUTE BILATERAL LOW BACK PAIN WITHOUT SCIATICA: ICD-10-CM

## 2023-05-10 DIAGNOSIS — R68.89 DECREASED FUNCTIONAL ACTIVITY TOLERANCE: ICD-10-CM

## 2023-05-10 DIAGNOSIS — R53.1 WEAKNESS: Primary | ICD-10-CM

## 2023-05-10 DIAGNOSIS — R26.2 DIFFICULTY WALKING: ICD-10-CM

## 2023-05-10 PROCEDURE — 97110 THERAPEUTIC EXERCISES: CPT | Mod: PN,CQ

## 2023-05-10 PROCEDURE — 97112 NEUROMUSCULAR REEDUCATION: CPT | Mod: PN,CQ

## 2023-05-10 SDOH — SOCIAL DETERMINANTS OF HEALTH (SDOH): PROBLEMS RELATED TO EDUCATION AND LITERACY, UNSPECIFIED: Z55.9

## 2023-05-10 NOTE — PROGRESS NOTES
[facility]   Physical Therapy Treatment Note     Name: Yuniel SOLOMON [facility] Number: [id number]    Therapy Diagnosis:   Encounter Diagnoses   Name Primary?    Weakness Yes    Acute bilateral low back pain without sciatica     Difficulty walking     Decreased functional activity tolerance     Special educational needs      Physician: Geoff Shaver DO    Visit Date: 5/10/2023    Physician Orders: PT Eval and Treat   Medical Diagnosis from Referral: M54.42,M54.41 (ICD-10-CM) - Acute low back pain with bilateral sciatica, unspecified back pain laterality   Evaluation Date: 5/1/2023  Authorization Period Expiration: 12/31/2023.  Plan of Care Expiration: 06/23/2023 at 2x/wk x 6 weeks.  Progress Note Due: 06/01/2023  Visit # / Visits authorized: 2/20  (3/12 on Plan of Care)  FOTO: 1/ 3   PTA Visit #: 1/5      Time In: 1:45 pm   Time Out: 2:30 pm  Total Appointment Time (timed & untimed codes): 45 minutes    Precautions: Standard History of Cancer. Difficulty with short term memory.   SUBJECTIVE     Pt reports: Her  reports that she woke up with pain today but she has taken an ibuprofen which has helped     She was compliant with home exercise program.    Response to previous treatment: no reported soreness  Functional change: unremarkable    Pain: 0/10 upon entering the clinic today.   Location: Left lumbar and sacroiliac joint area.   OBJECTIVE     Objective Measures updated at progress report unless specified.     Treatment     Yuniel received the treatments listed below:      Yuniel  received therapeutic exercises to develop strength, endurance, flexibility, posture, and core stabilization for 30 minutes including:  -+Nu-step on Level 2 x 10 minutes  -lumbar extension machine 20 # 20 x   -standing lumbar extension braced at plinth 30 x   -bilateral lower trunk rotation with 5 second hold x 5  -gluteal squeezes with 2 second hold x 10 performed with legs extended to decreased  back pain  -quad sets with 5 second hold x 10 Not performed today   -single knee to chest stretch with sheet with 30 second hold x 3   -+long arc quad bilaterally with 2 pound weight x 3 minutes. ( Performed as SAQs with bolster today)   -+mini squats x 10    Yuniel participated in neuromuscular re-education activities to improve: Balance, Coordination, Sense, Proprioception, Posture, and and muscle recruitment for 15 minutes. The following activities were included:  -+standing bilateral lower extremity: abduction and extension x 20 each  -+sit to stand from mat without arm use x 10  x 2   -bridges with adduction (pillow) x 6 attempts  -+heel raises x 30  Patient Education and Home Exercises     Home Exercises Provided and Patient Education Provided     Education provided:   -+05/08/2023 The patient and her  received an updated written home program today. The patient returned demo. The patient and her  were without questions.     Written Home Exercises Provided: yes. Exercises were reviewed and Yuniel was able to demonstrate them prior to the end of the session.  Yuniel demonstrated good  understanding of the education provided. See EMR under Patient Instructions for exercises provided during therapy sessions.  ASSESSMENT     Yuniel continued her low back PT plan of care. Her  was again present with her. PT progressed her exercises in clinic for additional strengthening. Pt responded well to lumbar extension based exercises with report of decreased pain following performance. Pt and spouse were informed that this exercise could be performed as needed for pain control at home. Pt stated during therapy that she is very sedentary at home and that she spends most of her time sitting in her chair and looking out a big window. Pt and spouse were educated on the benefit of period standing with extension in order to counteract the time spent seated for long term management of low back pain.. She tolerated  today's PT session well. She and her  were without questions.     Yuniel Is progressing well towards her goals.     Pt prognosis is Good.     Pt will continue to benefit from skilled outpatient physical therapy to address the deficits listed in the problem list box on initial evaluation, provide pt/family education and to maximize pt's level of independence in the home and community environment.     Pt's spiritual, cultural and educational needs considered and pt agreeable to plan of care and goals.     Anticipated barriers to physical therapy: co-morbidities and difficulty with memory at times.     Goals:   STG  Weeks/Visits Date Established  Date Met   1.Decrease max low back and left sacroiliac area pain to 4/10 to improve the patient's quality of life. 3 weeks. 5/1/2023    In Progress 5/10/2023     2. Increase core strength 1/2 grade to improve her standing activity endurance.  3 weeks. 5/1/2023    In Progress 5/10/2023     3.Increase bilateral hip strength 1/2 grade to improve her standing activity endurance.  3 weeks. 5/1/2023    In Progress 5/10/2023     4.Decrease FOTO limitation score to <=51% to improve her household task ability.  3 weeks. 5/1/2023    In Progress 5/10/2023     5.Fair initial written home exercise program knowledge and be without questions to have carryover after discharge from PT.  3 weeks. 5/1/2023    In Progress 5/10/2023        LTG Weeks/Visits Date Established  Date Met   1.Decrease max low back and left sacroiliac area pain to 2/10 to improve the patient's quality of life. 6 weeks. 5/1/2023    In Progress 5/10/2023     2. Increase core strength one grade from evaluation date to improve her standing activity endurance.  6 weeks. 5/1/2023       In Progress 5/10/2023     3.Increase bilateral hip strength one grade from evaluation date to improve her standing activity endurance.  6 weeks. 5/1/2023    In Progress 5/10/2023     4.Decrease FOTO limitation score to <=46% to improve her  household task ability.  6 weeks. 5/1/2023    In Progress 5/10/2023     5.Good Progressed written home exercise program knowledge and be without questions to have carryover after discharge from PT.  6 weeks. 5/1/2023    In Progress 5/10/2023        PLAN     Plan of care Certification: 5/1/2023 to 06/23/2023. Outpatient Physical Therapy 2 times weekly for 6 weeks. Plan to improve her core strength and hip strength so that her pain free functional ability will improve.        Latonya Garvin, PTA

## 2023-05-11 ENCOUNTER — DOCUMENTATION ONLY (OUTPATIENT)
Dept: REHABILITATION | Facility: HOSPITAL | Age: 78
End: 2023-05-11
Payer: MEDICARE

## 2023-05-11 NOTE — PROGRESS NOTES
PT/PTA face to face conference completed regarding patient treatment plan and progress towards established goals. Treatment will be continued as described in initial report/ evaluation and treatment/progress notes. Patient will be seen by physical therapist every sixth visit or minimally once per month.

## 2023-05-15 ENCOUNTER — CLINICAL SUPPORT (OUTPATIENT)
Dept: REHABILITATION | Facility: HOSPITAL | Age: 78
End: 2023-05-15
Payer: MEDICARE

## 2023-05-15 DIAGNOSIS — R26.2 DIFFICULTY WALKING: ICD-10-CM

## 2023-05-15 DIAGNOSIS — Z55.9 SPECIAL EDUCATIONAL NEEDS: ICD-10-CM

## 2023-05-15 DIAGNOSIS — M54.50 ACUTE BILATERAL LOW BACK PAIN WITHOUT SCIATICA: ICD-10-CM

## 2023-05-15 DIAGNOSIS — R68.89 DECREASED FUNCTIONAL ACTIVITY TOLERANCE: ICD-10-CM

## 2023-05-15 DIAGNOSIS — R53.1 WEAKNESS: Primary | ICD-10-CM

## 2023-05-15 PROCEDURE — 97112 NEUROMUSCULAR REEDUCATION: CPT | Mod: PN,CQ

## 2023-05-15 PROCEDURE — 97110 THERAPEUTIC EXERCISES: CPT | Mod: PN,CQ

## 2023-05-15 SDOH — SOCIAL DETERMINANTS OF HEALTH (SDOH): PROBLEMS RELATED TO EDUCATION AND LITERACY, UNSPECIFIED: Z55.9

## 2023-05-15 NOTE — PROGRESS NOTES
[facility]   Physical Therapy Treatment Note     Name: Yuniel SOLOMON [facility] Number: [id number]    Therapy Diagnosis:   Encounter Diagnoses   Name Primary?    Weakness Yes    Acute bilateral low back pain without sciatica     Difficulty walking     Decreased functional activity tolerance     Special educational needs      Physician: Geoff Shaver DO    Visit Date: 5/15/2023    Physician Orders: PT Eval and Treat   Medical Diagnosis from Referral: M54.42,M54.41 (ICD-10-CM) - Acute low back pain with bilateral sciatica, unspecified back pain laterality   Evaluation Date: 5/1/2023  Authorization Period Expiration: 12/31/2023.  Plan of Care Expiration: 06/23/2023 at 2x/wk x 6 weeks.  Progress Note Due: 06/01/2023  Visit # / Visits authorized: 3/20  (4/12 on Plan of Care)  FOTO: 1/ 3   PTA Visit #: 2/5      Time In: 2:45 pm   Time Out: 3:15 pm  Total Appointment Time (timed & untimed codes): 45 minutes    Precautions: Standard History of Cancer. Difficulty with short term memory.   SUBJECTIVE     Pt reports: no change of complaints today.     She was compliant with home exercise program.    Response to previous treatment: no reported soreness  Functional change: unremarkable    Pain: 0/10 upon entering the clinic today.   Location: Left lumbar and sacroiliac joint area.   OBJECTIVE     Objective Measures updated at progress report unless specified.     Treatment     Yuniel received the treatments listed below:      Yuniel  received therapeutic exercises to develop strength, endurance, flexibility, posture, and core stabilization for 30 minutes including:  -+Nu-step on Level 2 x 10 minutes  -lumbar extension machine 20 # 20 x   -standing lumbar extension braced at plinth 30 x   -bilateral lower trunk rotation with 5 second hold x 5  -gluteal squeezes with 5 second hold x 1 minute x 2  performed with legs extended to decreased back pain  -quad sets with 5 second hold x 10 Not performed  today   -single knee to chest stretch with sheet with 30 second hold x 3   -+long arc quad bilaterally with 2 pound weight x 1 minutes x 3 . ( Performed as SAQs with bolster today)   -+mini squats x 10 Not performed today   Prone hamstring curls 1 minute x 2     Yuniel participated in neuromuscular re-education activities to improve: Balance, Coordination, Sense, Proprioception, Posture, and and muscle recruitment for 15 minutes. The following activities were included:  -+standing bilateral lower extremity: abduction and extension x 20 each  -+sit to stand from mat without arm use x 5  x 3   -bridges with adduction (pillow) x 6 attempts  -+heel raises x 30  _+ education for pt and spouse for supine to sit transfers in order to minimize back pain for pt and decrease stress on caregiver.     Patient Education and Home Exercises     Home Exercises Provided and Patient Education Provided     Education provided:   -+05/08/2023 The patient and her  received an updated written home program today. The patient returned demo. The patient and her  were without questions.     Written Home Exercises Provided: yes. Exercises were reviewed and Yuniel was able to demonstrate them prior to the end of the session.  Yuniel demonstrated good  understanding of the education provided. See EMR under Patient Instructions for exercises provided during therapy sessions.  ASSESSMENT     Yuniel continued her low back PT plan of care. Her  was again present with her. Today's treatment included transfer training for bed mobility in order to minimize stress on spouse. She was able to perform transfer safely with min cues for guidance. She tolerated today's PT session well. She and her  were without questions.     Yuniel Is progressing well towards her goals.     Pt prognosis is Good.     Pt will continue to benefit from skilled outpatient physical therapy to address the deficits listed in the problem list box on initial  evaluation, provide pt/family education and to maximize pt's level of independence in the home and community environment.     Pt's spiritual, cultural and educational needs considered and pt agreeable to plan of care and goals.     Anticipated barriers to physical therapy: co-morbidities and difficulty with memory at times.     Goals:   STG  Weeks/Visits Date Established  Date Met   1.Decrease max low back and left sacroiliac area pain to 4/10 to improve the patient's quality of life. 3 weeks. 5/1/2023    In Progress 5/15/2023     2. Increase core strength 1/2 grade to improve her standing activity endurance.  3 weeks. 5/1/2023    In Progress 5/15/2023     3.Increase bilateral hip strength 1/2 grade to improve her standing activity endurance.  3 weeks. 5/1/2023    In Progress 5/15/2023     4.Decrease FOTO limitation score to <=51% to improve her household task ability.  3 weeks. 5/1/2023    In Progress 5/15/2023     5.Fair initial written home exercise program knowledge and be without questions to have carryover after discharge from PT.  3 weeks. 5/1/2023    In Progress 5/15/2023        LTG Weeks/Visits Date Established  Date Met   1.Decrease max low back and left sacroiliac area pain to 2/10 to improve the patient's quality of life. 6 weeks. 5/1/2023    In Progress 5/15/2023     2. Increase core strength one grade from evaluation date to improve her standing activity endurance.  6 weeks. 5/1/2023       In Progress 5/15/2023     3.Increase bilateral hip strength one grade from evaluation date to improve her standing activity endurance.  6 weeks. 5/1/2023    In Progress 5/15/2023     4.Decrease FOTO limitation score to <=46% to improve her household task ability.  6 weeks. 5/1/2023    In Progress 5/15/2023     5.Good Progressed written home exercise program knowledge and be without questions to have carryover after discharge from PT.  6 weeks. 5/1/2023    In Progress 5/15/2023        PLAN     Plan of care  Certification: 5/1/2023 to 06/23/2023. Outpatient Physical Therapy 2 times weekly for 6 weeks. Plan to improve her core strength and hip strength so that her pain free functional ability will improve.        Latonya Garvin, PTA

## 2023-05-20 DIAGNOSIS — E03.1 CONGENITAL HYPOTHYROIDISM WITHOUT GOITER: ICD-10-CM

## 2023-05-20 NOTE — TELEPHONE ENCOUNTER
Care Due:                  Date            Visit Type   Department     Provider  --------------------------------------------------------------------------------                                EP -                              PRIMARY      [facility]  Last Visit: 07-      CARE ([facility])   MEDICINE       Bhaskar Olivear  Next Visit: None Scheduled  None         None Found                                                            Last  Test          Frequency    Reason                     Performed    Due Date  --------------------------------------------------------------------------------    Office Visit  12 months..  levothyroxine............  07- 07-    [facility] Embedded Care Due Messages. Reference number: [id number].   5/20/2023 8:26:01 AM CALI

## 2023-05-22 RX ORDER — LEVOTHYROXINE SODIUM 112 UG/1
112 TABLET ORAL
Qty: 90 TABLET | Refills: 1 | Status: SHIPPED | OUTPATIENT
Start: 2023-05-22 | End: 2023-11-01

## 2023-05-24 ENCOUNTER — CLINICAL SUPPORT (OUTPATIENT)
Dept: REHABILITATION | Facility: HOSPITAL | Age: 78
End: 2023-05-24
Payer: MEDICARE

## 2023-05-24 DIAGNOSIS — M54.50 ACUTE BILATERAL LOW BACK PAIN WITHOUT SCIATICA: ICD-10-CM

## 2023-05-24 DIAGNOSIS — R68.89 DECREASED FUNCTIONAL ACTIVITY TOLERANCE: ICD-10-CM

## 2023-05-24 DIAGNOSIS — R26.2 DIFFICULTY WALKING: ICD-10-CM

## 2023-05-24 DIAGNOSIS — R53.1 WEAKNESS: Primary | ICD-10-CM

## 2023-05-24 DIAGNOSIS — Z55.9 SPECIAL EDUCATIONAL NEEDS: ICD-10-CM

## 2023-05-24 PROCEDURE — 97110 THERAPEUTIC EXERCISES: CPT | Mod: PN,CQ

## 2023-05-24 PROCEDURE — 97112 NEUROMUSCULAR REEDUCATION: CPT | Mod: PN,CQ

## 2023-05-24 SDOH — SOCIAL DETERMINANTS OF HEALTH (SDOH): PROBLEMS RELATED TO EDUCATION AND LITERACY, UNSPECIFIED: Z55.9

## 2023-05-24 NOTE — PROGRESS NOTES
[facility]   Physical Therapy Treatment Note     Name: Yuniel SOLOMON [facility] Number: [id number]    Therapy Diagnosis:   Encounter Diagnoses   Name Primary?    Weakness Yes    Acute bilateral low back pain without sciatica     Difficulty walking     Decreased functional activity tolerance     Special educational needs      Physician: Geoff Shaver DO    Visit Date: 5/24/2023    Physician Orders: PT Eval and Treat   Medical Diagnosis from Referral: M54.42,M54.41 (ICD-10-CM) - Acute low back pain with bilateral sciatica, unspecified back pain laterality   Evaluation Date: 5/1/2023  Authorization Period Expiration: 12/31/2023.  Plan of Care Expiration: 06/23/2023 at 2x/wk x 6 weeks.  Progress Note Due: 06/01/2023  Visit # / Visits authorized: 4/20  (5/12 on Plan of Care)  FOTO: 1/ 3   PTA Visit #: 3/5      Time In: 1:00 pm   Time Out: 1:45 pm  Total Appointment Time (timed & untimed codes): 45 minutes    Precautions: Standard History of Cancer. Difficulty with short term memory.   SUBJECTIVE     Pt reports: increased pain that she woke up with this morning.     She was compliant with home exercise program.    Response to previous treatment: no reported soreness  Functional change: unremarkable    Pain: 0/10 upon entering the clinic today. Pt has difficulty quantifying   Location: Left lumbar and sacroiliac joint area.   OBJECTIVE     Objective Measures updated at progress report unless specified.     Treatment     Yuniel received the treatments listed below:      Yuniel  received therapeutic exercises to develop strength, endurance, flexibility, posture, and core stabilization for 30 minutes including:  -+Nu-step on Level 2 x 10 minutes  -lumbar extension machine 25 # 20 x   -+hamstring curls 15# 2 x 10   _+ hip abduction 25# 2 x 10   -standing lumbar extension braced at plinth 30 x   -bilateral lower trunk rotation with 5 second hold x 5  -gluteal squeezes with 5 second hold x 1  minute x 2  performed with legs extended to decreased back pain  -+long arc quad bilaterally with 2 pound weight x 1 minutes x 3 . ( Performed as SAQs with bolster today)   -+mini squats x 10 Not performed today       Yuniel participated in neuromuscular re-education activities to improve: Balance, Coordination, Sense, Proprioception, Posture, and and muscle recruitment for 15 minutes. The following activities were included:  -+standing bilateral lower extremity: abduction and extension x 20 each   1#  (extension performed with support from wedge)   -+sit to stand from mat without arm use x 5  x 3   -bridges with adduction (pillow) x 6 attempts  -+heel raises x 30  _+ education for pt and spouse for supine to sit transfers in order to minimize back pain for pt and decrease stress on caregiver.     Patient Education and Home Exercises     Home Exercises Provided and Patient Education Provided     Education provided:   -+05/08/2023 The patient and her  received an updated written home program today. The patient returned demo. The patient and her  were without questions.     Written Home Exercises Provided: yes. Exercises were reviewed and Yuniel was able to demonstrate them prior to the end of the session.  Yuniel demonstrated good  understanding of the education provided. See EMR under Patient Instructions for exercises provided during therapy sessions.  ASSESSMENT     Yuniel continued her low back PT plan of care. She did report increased pain today and presented with more antalgic movement patterns today as compared to previous visits. She was able to perform all recommended therapeutic exercises but some modifications were made for comfort. Despite these challenges pt was able to tolerate the addition of hamstring curls and hip abduction on precor machines. She and her  were without questions.     Yuniel Is progressing well towards her goals.     Pt prognosis is Good.     Pt will continue to  benefit from skilled outpatient physical therapy to address the deficits listed in the problem list box on initial evaluation, provide pt/family education and to maximize pt's level of independence in the home and community environment.     Pt's spiritual, cultural and educational needs considered and pt agreeable to plan of care and goals.     Anticipated barriers to physical therapy: co-morbidities and difficulty with memory at times.     Goals:   STG  Weeks/Visits Date Established  Date Met   1.Decrease max low back and left sacroiliac area pain to 4/10 to improve the patient's quality of life. 3 weeks. 5/1/2023    In Progress 5/24/2023     2. Increase core strength 1/2 grade to improve her standing activity endurance.  3 weeks. 5/1/2023    In Progress 5/24/2023     3.Increase bilateral hip strength 1/2 grade to improve her standing activity endurance.  3 weeks. 5/1/2023    In Progress 5/24/2023     4.Decrease FOTO limitation score to <=51% to improve her household task ability.  3 weeks. 5/1/2023    In Progress 5/24/2023     5.Fair initial written home exercise program knowledge and be without questions to have carryover after discharge from PT.  3 weeks. 5/1/2023    In Progress 5/24/2023        LTG Weeks/Visits Date Established  Date Met   1.Decrease max low back and left sacroiliac area pain to 2/10 to improve the patient's quality of life. 6 weeks. 5/1/2023    In Progress 5/24/2023     2. Increase core strength one grade from evaluation date to improve her standing activity endurance.  6 weeks. 5/1/2023       In Progress 5/24/2023     3.Increase bilateral hip strength one grade from evaluation date to improve her standing activity endurance.  6 weeks. 5/1/2023    In Progress 5/24/2023     4.Decrease FOTO limitation score to <=46% to improve her household task ability.  6 weeks. 5/1/2023    In Progress 5/24/2023     5.Good Progressed written home exercise program knowledge and be without questions to have  carryover after discharge from PT.  6 weeks. 5/1/2023    In Progress 5/24/2023        PLAN     Plan of care Certification: 5/1/2023 to 06/23/2023. Outpatient Physical Therapy 2 times weekly for 6 weeks. Plan to improve her core strength and hip strength so that her pain free functional ability will improve.        Latonya Garvin, PTA

## 2023-05-29 ENCOUNTER — CLINICAL SUPPORT (OUTPATIENT)
Dept: REHABILITATION | Facility: HOSPITAL | Age: 78
End: 2023-05-29
Payer: MEDICARE

## 2023-05-29 DIAGNOSIS — Z55.9 SPECIAL EDUCATIONAL NEEDS: ICD-10-CM

## 2023-05-29 DIAGNOSIS — R53.1 WEAKNESS: Primary | ICD-10-CM

## 2023-05-29 DIAGNOSIS — R68.89 DECREASED FUNCTIONAL ACTIVITY TOLERANCE: ICD-10-CM

## 2023-05-29 DIAGNOSIS — M54.50 ACUTE BILATERAL LOW BACK PAIN WITHOUT SCIATICA: ICD-10-CM

## 2023-05-29 DIAGNOSIS — R26.2 DIFFICULTY WALKING: ICD-10-CM

## 2023-05-29 PROCEDURE — 97110 THERAPEUTIC EXERCISES: CPT | Mod: PN,CQ

## 2023-05-29 PROCEDURE — 97112 NEUROMUSCULAR REEDUCATION: CPT | Mod: PN,CQ

## 2023-05-29 SDOH — SOCIAL DETERMINANTS OF HEALTH (SDOH): PROBLEMS RELATED TO EDUCATION AND LITERACY, UNSPECIFIED: Z55.9

## 2023-05-29 NOTE — PROGRESS NOTES
"[facility]   Physical Therapy Treatment Note     Name: Yuniel SOLOMON [facility]  Clinic Number: [id number]    Therapy Diagnosis:   Encounter Diagnoses   Name Primary?    Weakness Yes    Acute bilateral low back pain without sciatica     Difficulty walking     Decreased functional activity tolerance     Special educational needs      Physician: Geoff Shaver DO    Visit Date: 5/29/2023    Physician Orders: PT Eval and Treat   Medical Diagnosis from Referral: M54.42,M54.41 (ICD-10-CM) - Acute low back pain with bilateral sciatica, unspecified back pain laterality   Evaluation Date: 5/1/2023  Authorization Period Expiration: 12/31/2023.  Plan of Care Expiration: 06/23/2023 at 2x/wk x 6 weeks.  Progress Note Due: 06/01/2023  Visit # / Visits authorized: 5/20  (6/12 on Plan of Care)  FOTO: 1/ 3   PTA Visit #: 4/5      Time In: 1:15 pm   Time Out: 2:00 pm  Total Appointment Time (timed & untimed codes): 45 minutes    Precautions: Standard History of Cancer. Difficulty with short term memory.   SUBJECTIVE     Pt reports:"It's still biting."     She was compliant with home exercise program.    Response to previous treatment: no reported soreness  Functional change: unremarkable    Pain: 0/10 upon entering the clinic today. Pt has difficulty quantifying   Location: Left lumbar and sacroiliac joint area.   OBJECTIVE     Objective Measures updated at progress report unless specified.     Treatment     Yuniel received the treatments listed below:      Yuniel  received therapeutic exercises to develop strength, endurance, flexibility, posture, and core stabilization for 30 minutes including:  -+Nu-step on Level 2 x 10 minutes  -lumbar extension machine 25 # 20 x   -+hamstring curls 15# 2 x 10   _+ hip abduction 25# 2 x 10   -standing lumbar extension braced at plinth 30 x   -bilateral lower trunk rotation with 5 second hold x 5  -gluteal squeezes with 5 second hold x 1 minute x 2  performed with legs " extended to decreased back pain  -+long arc quad bilaterally with 2 pound weight x 1 minutes x 3 . ( Performed as SAQs with bolster today)   -+mini squats x 10 Not performed today       Yuniel participated in neuromuscular re-education activities to improve: Balance, Coordination, Sense, Proprioception, Posture, and and muscle recruitment for 15 minutes. The following activities were included:  -+standing bilateral lower extremity: abduction and extension x 20 each   1#  (extension performed with support from wedge)   -+sit to stand from mat without arm use x 5  x 3   -bridges 2 x 10   -+heel raises x 30  _+ education for pt and spouse for supine to sit transfers in order to minimize back pain for pt and decrease stress on caregiver.     Patient Education and Home Exercises     Home Exercises Provided and Patient Education Provided     Education provided:   -+05/08/2023 The patient and her  received an updated written home program today. The patient returned demo. The patient and her  were without questions.     Written Home Exercises Provided: yes. Exercises were reviewed and Yuniel was able to demonstrate them prior to the end of the session.  Yuniel demonstrated good  understanding of the education provided. See EMR under Patient Instructions for exercises provided during therapy sessions.  ASSESSMENT     Yuniel continued her low back PT plan of care. She was able to perform bridges without pain today indicating improved hip strength and endurance. Pt is expected to continue progressing towards her therapy goals.     Yuniel Is progressing well towards her goals.     Pt prognosis is Good.     Pt will continue to benefit from skilled outpatient physical therapy to address the deficits listed in the problem list box on initial evaluation, provide pt/family education and to maximize pt's level of independence in the home and community environment.     Pt's spiritual, cultural and educational needs considered  and pt agreeable to plan of care and goals.     Anticipated barriers to physical therapy: co-morbidities and difficulty with memory at times.     Goals:   STG  Weeks/Visits Date Established  Date Met   1.Decrease max low back and left sacroiliac area pain to 4/10 to improve the patient's quality of life. 3 weeks. 5/1/2023    In Progress 5/29/2023     2. Increase core strength 1/2 grade to improve her standing activity endurance.  3 weeks. 5/1/2023    In Progress 5/29/2023     3.Increase bilateral hip strength 1/2 grade to improve her standing activity endurance.  3 weeks. 5/1/2023    In Progress 5/29/2023     4.Decrease FOTO limitation score to <=51% to improve her household task ability.  3 weeks. 5/1/2023    In Progress 5/29/2023     5.Fair initial written home exercise program knowledge and be without questions to have carryover after discharge from PT.  3 weeks. 5/1/2023    In Progress 5/29/2023        LTG Weeks/Visits Date Established  Date Met   1.Decrease max low back and left sacroiliac area pain to 2/10 to improve the patient's quality of life. 6 weeks. 5/1/2023    In Progress 5/29/2023     2. Increase core strength one grade from evaluation date to improve her standing activity endurance.  6 weeks. 5/1/2023       In Progress 5/29/2023     3.Increase bilateral hip strength one grade from evaluation date to improve her standing activity endurance.  6 weeks. 5/1/2023    In Progress 5/29/2023     4.Decrease FOTO limitation score to <=46% to improve her household task ability.  6 weeks. 5/1/2023    In Progress 5/29/2023     5.Good Progressed written home exercise program knowledge and be without questions to have carryover after discharge from PT.  6 weeks. 5/1/2023    In Progress 5/29/2023        PLAN     Plan of care Certification: 5/1/2023 to 06/23/2023. Outpatient Physical Therapy 2 times weekly for 6 weeks. Plan to improve her core strength and hip strength so that her pain free functional ability will  improve.        Latonya Garvin, PTA

## 2023-06-06 ENCOUNTER — OUTPATIENT CASE MANAGEMENT (OUTPATIENT)
Dept: NEUROLOGY | Facility: CLINIC | Age: 78
End: 2023-06-06
Payer: MEDICARE

## 2023-06-06 NOTE — PROGRESS NOTES
Protocol: The Care [facility] Effectiveness Study  Identifier: [id number]  IRB#: 2022.247  PI: Dr. Kee Garcia, PhD  CO-I: Dr. Sneha Ibarra PsyD  Version Date: 12/05/2022  Pt Study ID: [id number]  Visit Month: M4  Care Plan documented on: (03/1/2023) - Please refer to note for more information.      Timeline:   (*Note for CTN - complete timeline using completed or planned date for study events. Add any important events (i.e. Withdrawals, Lost to Follow Up, Adverse Events, etc.).     Consent: Completed(02/15/2023/)  Baseline questionnaires: Completed(02/15/2023)  6-month questionnaires: Planned(08/2023)  12-month questionnaires: Planned(02/2023)        Visit Note:   Spoke with caregiver Lul (Spouse) for month M4 visit. CG and pt are on vacation to celebrate their 60 wedding anniversary.  CG said all has gone smoothly so far.  Pt has had some pain relief due to PT.  Pt has not med changes and CG denied any needs this month.       Falls in the past month: 0  UTIs in the past month: 0  Hospital encounters in the past month (reported by caregiver): 0        Next monthly visit scheduled for: 7/6/2023. Caregiver knows how to reach CTN, and is encouraged to do so, as needed before our next scheduled call.      Action items for CTN: Continue to follow           ClinCard sent/loaded: no  (*Only applies to 6-month and 12-month visits)

## 2023-06-16 ENCOUNTER — PATIENT MESSAGE (OUTPATIENT)
Dept: SPINE | Facility: CLINIC | Age: 78
End: 2023-06-16
Payer: MEDICARE

## 2023-07-06 ENCOUNTER — OUTPATIENT CASE MANAGEMENT (OUTPATIENT)
Dept: NEUROLOGY | Facility: CLINIC | Age: 78
End: 2023-07-06
Payer: MEDICARE

## 2023-07-06 NOTE — PROGRESS NOTES
Protocol: The Care [facility] Effectiveness Study  Identifier: [id number]  IRB#: 2022.247  PI: Dr. Kee Garcia, PhD  CO-I: Dr. Sneha Ibarra PsyD  Version Date: 12/05/2022  Pt Study ID: [id number]  Visit Month: M5  Care Plan documented on: (03/1/2023) - Please refer to note for more information.      Timeline:   (*Note for CTN - complete timeline using completed or planned date for study events. Add any important events (i.e. Withdrawals, Lost to Follow Up, Adverse Events, etc.).     Consent: Completed(02/15/2023/)  Baseline questionnaires: Completed(02/15/2023)  6-month questionnaires: Planned(08/2023)  12-month questionnaires: Planned(02/2023)        Visit Note:   Spoke with caregiver Lul (Spouse) for month M5 visit. Pt is stable.  The couple's trip last month went very well.  Pt had no medication changes this month and CG denied any current needs.     Falls in the past month: 0  UTIs in the past month: 0  Hospital encounters in the past month (reported by caregiver): 0        Next monthly visit scheduled for: 8/7/2023. Caregiver knows how to reach CTN, and is encouraged to do so, as needed before our next scheduled call.      Action items for CTN: Continue to follow           ClinCard sent/loaded: no  (*Only applies to 6-month and 12-month visits)

## 2023-07-21 ENCOUNTER — OFFICE VISIT (OUTPATIENT)
Dept: OPTOMETRY | Facility: CLINIC | Age: 78
End: 2023-07-21
Payer: MEDICARE

## 2023-07-21 ENCOUNTER — CLINICAL SUPPORT (OUTPATIENT)
Dept: OPHTHALMOLOGY | Facility: CLINIC | Age: 78
End: 2023-07-21
Attending: OPTOMETRIST
Payer: MEDICARE

## 2023-07-21 DIAGNOSIS — H40.1134 PRIMARY OPEN ANGLE GLAUCOMA (POAG) OF BOTH EYES, INDETERMINATE STAGE: ICD-10-CM

## 2023-07-21 DIAGNOSIS — H40.1134 PRIMARY OPEN ANGLE GLAUCOMA (POAG) OF BOTH EYES, INDETERMINATE STAGE: Primary | ICD-10-CM

## 2023-07-21 PROCEDURE — 99999 PR PBB SHADOW E&M-EST. PATIENT-LVL III: CPT | Mod: PBBFAC,,, | Performed by: OPTOMETRIST

## 2023-07-21 PROCEDURE — 99213 PR OFFICE/OUTPT VISIT, EST, LEVL III, 20-29 MIN: ICD-10-PCS | Mod: S$PBB,,, | Performed by: OPTOMETRIST

## 2023-07-21 PROCEDURE — 99213 OFFICE O/P EST LOW 20 MIN: CPT | Mod: PBBFAC,PO | Performed by: OPTOMETRIST

## 2023-07-21 PROCEDURE — 99213 OFFICE O/P EST LOW 20 MIN: CPT | Mod: S$PBB,,, | Performed by: OPTOMETRIST

## 2023-07-21 PROCEDURE — 99999 PR PBB SHADOW E&M-EST. PATIENT-LVL III: ICD-10-PCS | Mod: PBBFAC,,, | Performed by: OPTOMETRIST

## 2023-07-21 NOTE — PROGRESS NOTES
HVF 24-2 sf and OCT rnfl done today OU  Restarted OS x 1. Eye movement, re instructed to keep fixated several times.   No adhesive or latex allergies  CAQ

## 2023-07-21 NOTE — PROGRESS NOTES
HPI    Pt here today for 3 month IOP check, pachy & rev hvf.   States no visual   changes or complaints.  Denies any eye pain or pressure.    Lumigan OU qhs --- pt unsure abt using this drop, does not sound familiar   to her.  Confirmed with Mr. Gracia that pt is using gtt nightly.  Last edited by Rachel Matthew on 7/21/2023  2:34 PM.            Assessment /Plan     For exam results, see Encounter Report.    Primary open angle glaucoma (POAG) of both eyes, indeterminate stage      Reviewed HVF/OCT results  OCT stable from previous  HVF difficult reliability     IOP stable  Continue lumigan qPM OU    Return for comprehensive in 6 months, sooner prn

## 2023-08-01 ENCOUNTER — PES CALL (OUTPATIENT)
Dept: ADMINISTRATIVE | Facility: CLINIC | Age: 78
End: 2023-08-01
Payer: MEDICARE

## 2023-08-07 ENCOUNTER — OUTPATIENT CASE MANAGEMENT (OUTPATIENT)
Dept: NEUROLOGY | Facility: CLINIC | Age: 78
End: 2023-08-07
Payer: MEDICARE

## 2023-08-07 NOTE — PROGRESS NOTES
In an attempt to complete monthly [facility] check-in, PELON LVM for CG and remains available.  PELON will make second attempt 8/10/23.

## 2023-08-11 NOTE — PROGRESS NOTES
In an attempt to complete monthly Care [facility] check-in, PELON LVM for CG and remains available.  PELON will make third attempt 8/15/23.

## 2023-08-14 ENCOUNTER — PATIENT MESSAGE (OUTPATIENT)
Dept: ADMINISTRATIVE | Facility: HOSPITAL | Age: 78
End: 2023-08-14
Payer: MEDICARE

## 2023-08-15 NOTE — PROGRESS NOTES
Protocol: [facility] Effectiveness Study  Identifier: [id number]  IRB#: 2022.247  PI: Dr. Kee Garcia, PhD  CO-I: Dr. Sneha Ibarra PsyD  Version Date: 12/05/2022  Pt Study ID: [id number]  Visit Month: M6  Care Plan documented on: (03/1/2023) - Please refer to note for more information.      Timeline:   (*Note for CTN - complete timeline using completed or planned date for study events. Add any important events (i.e. Withdrawals, Lost to Follow Up, Adverse Events, etc.).     Consent: Completed(02/15/2023/)  Baseline questionnaires: Completed(02/15/2023)  6-month questionnaires: Planned(08/2023)  12-month questionnaires: Planned(02/2023)        Visit Note:   Spoke with caregiver Lul (Spouse) for month M6 visit. Pt is stable.  Pt has severe curvature of spine and walks with cane.  Discussed fall risk. CG feels like cane and standby assist are working well as fall prevention. Pt's appetite is steady. Pt had no medication changes this month and CG denied any current needs.      Falls in the past month: 0  UTIs in the past month: 0  Hospital encounters in the past month (reported by caregiver): 0        Next monthly visit scheduled for: 9/13/2023. Caregiver knows how to reach CTN, and is encouraged to do so, as needed before our next scheduled call.      Action items for CTN: Continue to follow

## 2023-08-23 ENCOUNTER — PATIENT MESSAGE (OUTPATIENT)
Dept: FAMILY MEDICINE | Facility: CLINIC | Age: 78
End: 2023-08-23
Payer: MEDICARE

## 2023-08-23 NOTE — PROGRESS NOTES
[facility] Follow-up Surveys:  CTN (Alvina Oakes) completed 6 month surveys with caregiver Lul (Spouse) on 8/23/2023.   Surveys documented in RedCap and Epic Flowsheets.      ClinCard sent/loaded: yes  Advised caregiver to use ClinCard within the first three months to avoid any fees or deductions. Caregiver expressed understanding.

## 2023-09-01 ENCOUNTER — LAB VISIT (OUTPATIENT)
Dept: LAB | Facility: HOSPITAL | Age: 78
End: 2023-09-01
Attending: INTERNAL MEDICINE
Payer: MEDICARE

## 2023-09-01 DIAGNOSIS — C50.311 MALIGNANT NEOPLASM OF LOWER-INNER QUADRANT OF RIGHT FEMALE BREAST, UNSPECIFIED ESTROGEN RECEPTOR STATUS: ICD-10-CM

## 2023-09-01 DIAGNOSIS — E02 SUBCLINICAL IODINE-DEFICIENCY HYPOTHYROIDISM: ICD-10-CM

## 2023-09-01 DIAGNOSIS — D69.6 THROMBOCYTOPENIA: ICD-10-CM

## 2023-09-01 DIAGNOSIS — Z79.811 USE OF AROMATASE INHIBITORS: ICD-10-CM

## 2023-09-01 LAB
ALBUMIN SERPL BCP-MCNC: 3.6 G/DL (ref 3.5–5.2)
ALP SERPL-CCNC: 69 U/L (ref 55–135)
ALT SERPL W/O P-5'-P-CCNC: 11 U/L (ref 10–44)
ANION GAP SERPL CALC-SCNC: 8 MMOL/L (ref 8–16)
AST SERPL-CCNC: 22 U/L (ref 10–40)
BASOPHILS # BLD AUTO: 0.04 K/UL (ref 0–0.2)
BASOPHILS NFR BLD: 1 % (ref 0–1.9)
BILIRUB SERPL-MCNC: 0.6 MG/DL (ref 0.1–1)
BUN SERPL-MCNC: 15 MG/DL (ref 8–23)
CALCIUM SERPL-MCNC: 9.9 MG/DL (ref 8.7–10.5)
CHLORIDE SERPL-SCNC: 105 MMOL/L (ref 95–110)
CO2 SERPL-SCNC: 27 MMOL/L (ref 23–29)
CREAT SERPL-MCNC: 1.1 MG/DL (ref 0.5–1.4)
DIFFERENTIAL METHOD: ABNORMAL
EOSINOPHIL # BLD AUTO: 0.3 K/UL (ref 0–0.5)
EOSINOPHIL NFR BLD: 8.4 % (ref 0–8)
ERYTHROCYTE [DISTWIDTH] IN BLOOD BY AUTOMATED COUNT: 14 % (ref 11.5–14.5)
EST. GFR  (NO RACE VARIABLE): 51.1 ML/MIN/1.73 M^2
GLUCOSE SERPL-MCNC: 90 MG/DL (ref 70–110)
HCT VFR BLD AUTO: 39.4 % (ref 37–48.5)
HGB BLD-MCNC: 13.2 G/DL (ref 12–16)
IMM GRANULOCYTES # BLD AUTO: 0.05 K/UL (ref 0–0.04)
IMM GRANULOCYTES NFR BLD AUTO: 1.3 % (ref 0–0.5)
LYMPHOCYTES # BLD AUTO: 1.4 K/UL (ref 1–4.8)
LYMPHOCYTES NFR BLD: 37.7 % (ref 18–48)
MCH RBC QN AUTO: 30.2 PG (ref 27–31)
MCHC RBC AUTO-ENTMCNC: 33.5 G/DL (ref 32–36)
MCV RBC AUTO: 90 FL (ref 82–98)
MONOCYTES # BLD AUTO: 0.3 K/UL (ref 0.3–1)
MONOCYTES NFR BLD: 8.6 % (ref 4–15)
NEUTROPHILS # BLD AUTO: 1.6 K/UL (ref 1.8–7.7)
NEUTROPHILS NFR BLD: 43 % (ref 38–73)
NRBC BLD-RTO: 0 /100 WBC
PLATELET # BLD AUTO: 135 K/UL (ref 150–450)
PMV BLD AUTO: 10.1 FL (ref 9.2–12.9)
POTASSIUM SERPL-SCNC: 4.4 MMOL/L (ref 3.5–5.1)
PROT SERPL-MCNC: 6.8 G/DL (ref 6–8.4)
RBC # BLD AUTO: 4.37 M/UL (ref 4–5.4)
SODIUM SERPL-SCNC: 140 MMOL/L (ref 136–145)
WBC # BLD AUTO: 3.82 K/UL (ref 3.9–12.7)

## 2023-09-01 PROCEDURE — 80053 COMPREHEN METABOLIC PANEL: CPT | Performed by: INTERNAL MEDICINE

## 2023-09-01 PROCEDURE — 36415 COLL VENOUS BLD VENIPUNCTURE: CPT | Mod: PO | Performed by: INTERNAL MEDICINE

## 2023-09-01 PROCEDURE — 85025 COMPLETE CBC W/AUTO DIFF WBC: CPT | Mod: PO | Performed by: INTERNAL MEDICINE

## 2023-09-06 ENCOUNTER — OFFICE VISIT (OUTPATIENT)
Dept: HEMATOLOGY/ONCOLOGY | Facility: CLINIC | Age: 78
End: 2023-09-06
Payer: MEDICARE

## 2023-09-06 VITALS
WEIGHT: 146.63 LBS | HEIGHT: 64 IN | BODY MASS INDEX: 25.03 KG/M2 | HEART RATE: 56 BPM | RESPIRATION RATE: 12 BRPM | OXYGEN SATURATION: 98 % | SYSTOLIC BLOOD PRESSURE: 114 MMHG | DIASTOLIC BLOOD PRESSURE: 53 MMHG | TEMPERATURE: 97 F

## 2023-09-06 DIAGNOSIS — D69.6 THROMBOCYTOPENIA: Primary | ICD-10-CM

## 2023-09-06 DIAGNOSIS — C50.311 MALIGNANT NEOPLASM OF LOWER-INNER QUADRANT OF RIGHT FEMALE BREAST, UNSPECIFIED ESTROGEN RECEPTOR STATUS: ICD-10-CM

## 2023-09-06 DIAGNOSIS — E02 SUBCLINICAL IODINE-DEFICIENCY HYPOTHYROIDISM: ICD-10-CM

## 2023-09-06 DIAGNOSIS — E78.2 MIXED HYPERLIPIDEMIA: ICD-10-CM

## 2023-09-06 LAB — CANCER AG27-29 SERPL-ACNC: 23.2 U/ML

## 2023-09-06 PROCEDURE — 99214 OFFICE O/P EST MOD 30 MIN: CPT | Mod: S$PBB,,, | Performed by: INTERNAL MEDICINE

## 2023-09-06 PROCEDURE — 99214 PR OFFICE/OUTPT VISIT, EST, LEVL IV, 30-39 MIN: ICD-10-PCS | Mod: S$PBB,,, | Performed by: INTERNAL MEDICINE

## 2023-09-06 PROCEDURE — 99214 OFFICE O/P EST MOD 30 MIN: CPT | Mod: PBBFAC,PN | Performed by: INTERNAL MEDICINE

## 2023-09-06 PROCEDURE — 99999 PR PBB SHADOW E&M-EST. PATIENT-LVL IV: CPT | Mod: PBBFAC,,, | Performed by: INTERNAL MEDICINE

## 2023-09-06 PROCEDURE — 99999 PR PBB SHADOW E&M-EST. PATIENT-LVL IV: ICD-10-PCS | Mod: PBBFAC,,, | Performed by: INTERNAL MEDICINE

## 2023-09-06 NOTE — PROGRESS NOTES
Subjective:       Patient ID: Yuniel Gracia is a 78 y.o. female.    Chief Complaint:breast ca    HPI:    She has a chronic ITP and hep C, history of    stage I breast cancer,rec score of 32 , so underwent TC  chemotherapy. She did notice a new mass in the rt breast that came back without issue, On arimidex  5 years in 9/2017/ Mild thrombocytopenia related to rx and hep c     saw ENT for mouth sores got better.   and pt are both reporting that pt is unabe to find some words, she gets  Forgetful   states she is afraid to take a dementia test  Had COVID, wasn't hopsitlized  Pt had rivastigmine for memory loss, but made her sick has been changed, went to ED for s/e stomach aches and nausea.was found to have some thing on her liver  REVIEW OF SYSTEMS:     CONSTITUTIONAL: The patient denies any weight change. There is no apparent    change in appetite, fever, night sweats, headaches, fatigue, dizziness, or    weakness.   is with reports worsening dementia    SKIN: Denies rash, issues with nails, non-healing sores, bleeding, blotching    skin or abnormal bruising. Denies new moles or changes to existing moles.      BREASTS: There is no swelling around breasts or nipple discharge.    EYES: Denies eye pain, blurred vision, swelling, redness or discharge.      ENT AND MOUTH:  3 sores around gum line.  And buccal mucosa  CARDIOVASCULAR: Denies chest pain, discomfort or palpitations. Denies neck    swelling or episodes of passing out.      RESPIRATORY: Denies cough, sputum production, blood in sputum, and denies    shortness of breath.      GI: Denies trouble swallowing, indigestion, heartburn, abdominal pain, nausea,    vomiting, diarrhea, altered bowel habits, blood in stool, discoloration of    stools, change in nature of stool, bloating, increased abdominal girth.      GENITOURINARY: No discharge. No pelvic pain or lumps. No rash around groin or  lesions. No urinary frequency, hesitation, painful  urination or blood in    urine. Denies incontinence. No problems with intercourse.      MUSCULOSKELETAL: Denies neck or back pain. Denies weakness in arms or legs,    joint problems or distended inflamed veins in legs. Denies swelling or abnormal  glands.      NEUROLOGICAL: Denies tingling, numbness, altered mentation changes to nerve    function in the face, weakness to one or both of the body. Denies changes to    gait and denies multiple falls or accidents.  + memory loss    PSYCHIATRIC: Denies nervousness, anxiety, hallucinations, depression, suicidal    ideation, trouble sleeping or changes in behavior noticed by family.          PHYSICAL EXAM:     Wt Readings from Last 3 Encounters:   04/21/23 67.9 kg (149 lb 11.1 oz)   04/02/23 67.9 kg (149 lb 11.1 oz)   03/07/23 68.2 kg (150 lb 5.7 oz)     Temp Readings from Last 3 Encounters:   04/02/23 97.9 °F (36.6 °C)   03/07/23 96.9 °F (36.1 °C) (Temporal)   02/03/23 97.8 °F (36.6 °C) (Oral)     BP Readings from Last 3 Encounters:   04/02/23 (!) 117/57   03/07/23 (!) 104/56   02/03/23 100/70     Pulse Readings from Last 3 Encounters:   04/02/23 71   03/07/23 60   02/03/23 69       GENERAL: Comfortable looking patient. Patient is in no distress.  Awake, alert and oriented to time, person and place.  No anxiety, or agitation.    brusies to chin and after fall+    HEENT: Normal conjunctivae and eyelids. WNL.  PERRLA 3 to 4 mm. No icterus, no pallor, no congestion, and no discharge noted. 3 small aphthous ulcers noted in oral cavity    NECK:  Supple. Trachea is central.  No crepitus.  No JVD or masses.    RESPIRATORY:  No intercostal retractions.  No dullness to percussion.  Chest is clear to auscultation.  No rales, rhonchi or wheezes.  No crepitus.  Good air entry bilaterally.    CARDIOVASCULAR:  S1 and S2 are normally heard without murmurs or gallops.  All peripheral pulses are present.    ABDOMEN:  Normal abdomen.  No hepatosplenomegaly.  No free fluid.  Bowel sounds  are present.  No hernia noted. No masses.  No rebound or tenderness.  No guarding or rigidity.  Umbilicus is midline.    LYMPHATICS:  No axillary, cervical, supraclavicular, submental, or inguinal lymphadenopathy.    SKIN/MUSCULOSKELETAL:  There is no evidence of excoriation marks or ecchmosis.  No rashes.  No cyanosis.  No clubbing.  No joint or skeletal deformities noted.  Normal range of motion.    NEUROLOGIC:  Higher functions are appropriate.  No cranial nerve deficits.  Normal ludmila.  Normal strength.  Motor and sensory functions are normal.  Deep tendon reflexes are normal.    GENITAL/RECTAL:  Exams are deferred.      Laboratory:     CBC:  Lab Results   Component Value Date    WBC 3.82 (L) 09/01/2023    RBC 4.37 09/01/2023    HGB 13.2 09/01/2023    HCT 39.4 09/01/2023    MCV 90 09/01/2023    MCH 30.2 09/01/2023    MCHC 33.5 09/01/2023    RDW 14.0 09/01/2023     (L) 09/01/2023    MPV 10.1 09/01/2023    GRAN 1.6 (L) 09/01/2023    GRAN 43.0 09/01/2023    LYMPH 1.4 09/01/2023    LYMPH 37.7 09/01/2023    MONO 0.3 09/01/2023    MONO 8.6 09/01/2023    EOS 0.3 09/01/2023    BASO 0.04 09/01/2023    EOSINOPHIL 8.4 (H) 09/01/2023    BASOPHIL 1.0 09/01/2023       BMP: BMP  Lab Results   Component Value Date     09/01/2023    K 4.4 09/01/2023     09/01/2023    CO2 27 09/01/2023    BUN 15 09/01/2023    CREATININE 1.1 09/01/2023    CALCIUM 9.9 09/01/2023    ANIONGAP 8 09/01/2023    ESTGFRAFRICA >60 06/22/2022    EGFRNONAA >60 06/22/2022       LFT:   Lab Results   Component Value Date    ALT 11 09/01/2023    AST 22 09/01/2023    ALKPHOS 69 09/01/2023    BILITOT 0.6 09/01/2023   Impression:     Osteopenia 8/2020     Consider FDA approved medical therapies in postmenopausal women and men aged 50 years and older, based on the following:     *A hip or vertebral (clinical or morphometric) fracture  *T score less than or equal to -2.5 at the femoral neck or spine after appropriate evaluation to exclude secondary  causes.  *Low bone mass -- also known as osteopenia (T score between -1.0 and -2.5 at the femoral neck or spine) and a 10 year probability of hip fracture greater than or equal to 3% or a 10 year probability of major osteoporosis-related fracture greater than or equal to 20% based on the [place]-adapted WHO algorithm.  *Clinician's judgment and/or patient preference may indicate treatment for people with 10 year fracture probabilities is above or below these levels  12/2020mammogram neg:   Most recent scan 2015 neg  Tumor marker  wnl 23.6 2/2021      Abdomen/pelvis: Ct abd/ pelvis 6/22     Hyperinflation lung bases with dependent atelectatic changes.     The liver demonstrates several hypodensities which are too small to characterize.  The spleen, adrenals, kidneys, and pancreas are normal.  The gallbladder is present.     Small bowel is of normal caliber.  Colon is also normal caliber with scattered colonic diverticula.  No adjacent inflammatory changes.  The appendix is within normal limits.     The uterus is surgically absent.  The bladder is under distended.  The mesentery is without adenopathy nor is the retroperitoneum.  No free fluid in the abdomen pelvis.     No suspicious osseous lesions.  Degenerative changes are identified.  The soft tissues are normal.     Impression:     1. No evidence for mesenteric ischemia.  The vasculature is patent.  2. Other secondary findings as noted.    Impression:MRI abd 9/9/22     Few scattered cysts in the liver largest measuring 8 mm.  Two right renal cysts.       Bone density wnl 8/22  Assessment/Plan:      Abd cycst Pt had MRI of abd to follow lesions described in CT confirms cysts  Stage 1 breast ca,rec score of 32 , so underwent TC  chemotherapy. She did notice a new mass in the rt breast that came back without issue completed 10 years of arimidex 2022  . HTN, dyslipedemia, hypothyroidism, depression , contimue care with Dr. Dolan  Had plasma for COV D  Gave pt restoril rx  for insomnia. Generic in the past  Patient on antidepressant which may need to be discontinued to see if her memory improves will refer  F/u wit neuro for memory loss      osteopenia has normalized stopped  prolia and monitor with bone densities q 2 years  thrombocytopenia cont to monitor,wnl this visit     due for mammo in 12/23      Advance Care Planning     Date: 09/06/2023    Power of   I   confirmed acp documents filed

## 2023-09-13 ENCOUNTER — OUTPATIENT CASE MANAGEMENT (OUTPATIENT)
Dept: NEUROLOGY | Facility: CLINIC | Age: 78
End: 2023-09-13
Payer: MEDICARE

## 2023-09-13 NOTE — PROGRESS NOTES
"Protocol: The [facility] Effectiveness Study  Identifier: [id number]  IRB#: 2022.247  PI: Dr. Kee Garcia, PhD  CO-I: Dr. Sneha Ibarra PsyD  Version Date: 12/05/2022  Pt Study ID: [id number]  Visit Month: M7  Care Plan documented on: (03/1/2023) - Please refer to note for more information.      Timeline:   (*Note for CTN - complete timeline using completed or planned date for study events. Add any important events (i.e. Withdrawals, Lost to Follow Up, Adverse Events, etc.).     Consent: Completed(02/15/2023/)  Baseline questionnaires: Completed(02/15/2023)  6-month questionnaires: Completed (08/2023)  12-month questionnaires: Planned(02/2023)      Visit Note:   Spoke with caregiver Lul (Spouse) for month M7 visit. Pt is stable.  CG said everything is "status quo."  He went on to say that pt had an episode of N/V yesterday so he withheld rivastigmine patch.  Pt was encouraged to contact prescriber if altering med schedules.  He said pt is better today and he will resume memory med.  He denied any current needs.       Falls in the past month: 0  UTIs in the past month: 0  Hospital encounters in the past month (reported by caregiver): 0        Next monthly visit scheduled for: 10/11/2023. Caregiver knows how to reach CTN, and is encouraged to do so, as needed before our next scheduled call.      Action items for CTN: Continue to follow       "

## 2023-10-10 ENCOUNTER — OUTPATIENT CASE MANAGEMENT (OUTPATIENT)
Dept: NEUROLOGY | Facility: CLINIC | Age: 78
End: 2023-10-10
Payer: MEDICARE

## 2023-10-10 NOTE — PROGRESS NOTES
"Protocol: The [facility] Effectiveness Study  Identifier: [id number]  IRB#: 2022.247  PI: Dr. Kee Garcia, PhD  CO-I: Dr. Sneha Ibarra PsyD  Version Date: 12/05/2022  Pt Study ID: [id number]  Visit Month: M8  Care Plan documented on: (03/1/2023) - Please refer to note for more information.      Timeline:   (*Note for CTN - complete timeline using completed or planned date for study events. Add any important events (i.e. Withdrawals, Lost to Follow Up, Adverse Events, etc.).     Consent: Completed(02/15/2023/)  Baseline questionnaires: Completed(02/15/2023)  6-month questionnaires: Completed (08/2023)  12-month questionnaires: Planned(02/2023)      Visit Note:   Spoke with caregiver Lul (Spouse) for month M8 visit. Pt is overall stable.  CG said she is "slowly regressing" but he feels dementia med is helping. He works to be patient with patient's forgetfulness. Discussed taking time to oneself. Discussed pt's taste changing-no longer liking coffee. No needs expressed at this time.        Falls in the past month: 0  UTIs in the past month: 0  Hospital encounters in the past month (reported by caregiver): 0        Next monthly visit scheduled for: 11/9/2023. Caregiver knows how to reach CTN, and is encouraged to do so, as needed before our next scheduled call.      Action items for CTN: Continue to follow        "

## 2023-10-31 DIAGNOSIS — E03.1 CONGENITAL HYPOTHYROIDISM WITHOUT GOITER: ICD-10-CM

## 2023-10-31 NOTE — TELEPHONE ENCOUNTER
Care Due:                  Date            Visit Type   Department     Provider  --------------------------------------------------------------------------------                                EP -                              PRIMARY      [facility]  Last Visit: 07-      CARE ([facility])   MEDICINE       Bhaskar Olivera  Next Visit: None Scheduled  None         None Found                                                            Last  Test          Frequency    Reason                     Performed    Due Date  --------------------------------------------------------------------------------    Office Visit  15 months..  levothyroxine,             07-   10-                             solifenacin..............    TSH.........  12 months..  levothyroxine............  01- 01-    [facility] Embedded Care Due Messages. Reference number: [id number].   10/31/2023 4:54:50 PM CDT

## 2023-11-01 RX ORDER — LEVOTHYROXINE SODIUM 112 UG/1
112 TABLET ORAL
Qty: 90 TABLET | Refills: 3 | Status: ON HOLD | OUTPATIENT
Start: 2023-11-01 | End: 2023-12-11 | Stop reason: SDUPTHER

## 2023-11-09 ENCOUNTER — OUTPATIENT CASE MANAGEMENT (OUTPATIENT)
Dept: NEUROLOGY | Facility: CLINIC | Age: 78
End: 2023-11-09
Payer: MEDICARE

## 2023-11-09 NOTE — PROGRESS NOTES
In an attempt to complete monthly Care [employer] check-in, PELON LVM for CG and remains available.  PELON will make second attempt 11/13/23.

## 2023-11-13 NOTE — PROGRESS NOTES
In an attempt to complete monthly Care [facility] check-in, PELON LVM for CG and remains available.  PELON will make third attempt 11/16/23.

## 2023-11-16 ENCOUNTER — OUTPATIENT CASE MANAGEMENT (OUTPATIENT)
Dept: NEUROLOGY | Facility: CLINIC | Age: 78
End: 2023-11-16
Payer: MEDICARE

## 2023-11-16 NOTE — PROGRESS NOTES
Protocol: The [facility] Effectiveness Study  Identifier: [id number]  IRB#: [id number]  PI: Dr. Kee Garcia, PhD  CO-I: Dr. Sneha Ibarra PsyD  Version Date: 12/05/2022  Pt Study ID: [id number]  Visit Month: M9  Care Plan documented on: (03/1/2023) - Please refer to note for more information.      Timeline:   (*Note for CTN - complete timeline using completed or planned date for study events. Add any important events (i.e. Withdrawals, Lost to Follow Up, Adverse Events, etc.).     Consent: Completed(02/15/2023/)  Baseline questionnaires: Completed(02/15/2023)  6-month questionnaires: Completed (08/2023)  12-month questionnaires: Planned(02/2023)      Visit Note:   Spoke with caregiver Lul (Spouse) for month M9 visit. This was a missed visit.       Falls in the past month: 0  UTIs in the past month: 0  Hospital encounters in the past month (reported by caregiver): 0        Next monthly visit scheduled for: 12/12/2023. Caregiver knows how to reach CTN, and is encouraged to do so, as needed before our next scheduled call.

## 2023-12-07 PROBLEM — S72.141A CLOSED DISPLACED INTERTROCHANTERIC FRACTURE OF RIGHT FEMUR: Status: ACTIVE | Noted: 2023-12-07

## 2023-12-08 PROBLEM — I10 ESSENTIAL (PRIMARY) HYPERTENSION: Status: ACTIVE | Noted: 2023-12-08

## 2023-12-10 PROBLEM — F03.918 DEMENTIA WITH BEHAVIORAL DISTURBANCE: Status: ACTIVE | Noted: 2023-12-10

## 2023-12-12 ENCOUNTER — OUTPATIENT CASE MANAGEMENT (OUTPATIENT)
Dept: NEUROLOGY | Facility: CLINIC | Age: 78
End: 2023-12-12
Payer: MEDICARE

## 2023-12-12 NOTE — PROGRESS NOTES
Protocol: The [facility] Effectiveness Study  Identifier: [id number]  IRB#: [id number]  PI: Dr. Kee Garcia, PhD  CO-I: Dr. Sneha Ibarra PsyD  Version Date: 12/05/2022  Pt Study ID: [id number]  Visit Month: M10  Care Plan documented on: (03/1/2023) - Please refer to note for more information.      Timeline:   (*Note for CTN - complete timeline using completed or planned date for study events. Add any important events (i.e. Withdrawals, Lost to Follow Up, Adverse Events, etc.).     Consent: Completed(02/15/2023/)  Baseline questionnaires: Completed(02/15/2023)  6-month questionnaires: Completed (08/2023)  12-month questionnaires: Planned(02/2023)      Visit Note:   Spoke with caregiver Lul (Spouse) for month M10 visit. CG reported that pt fell and broke her hip. She has had surgery and is now in rehab facility close to their home.  CG said pt is doing very well. CG went on to say that they have strong family support should he need a break from the hospital.  Cg denied any current needs.        Falls in the past month: 1  UTIs in the past month: 0  Hospital encounters in the past month (reported by caregiver): 1        Next monthly visit scheduled for: 1/12/2024. Caregiver knows how to reach CTN, and is encouraged to do so, as needed before our next scheduled call.

## 2023-12-12 NOTE — PROGRESS NOTES
In an attempt to complete monthly [facility] check-in, PELON LVM for CG and remains available.  PELON will make second attempt 12/14/23

## 2023-12-18 ENCOUNTER — PATIENT MESSAGE (OUTPATIENT)
Dept: ORTHOPEDICS | Facility: CLINIC | Age: 78
End: 2023-12-18
Payer: MEDICARE

## 2023-12-20 DIAGNOSIS — S72.141A: Primary | ICD-10-CM

## 2023-12-21 ENCOUNTER — OFFICE VISIT (OUTPATIENT)
Dept: ORTHOPEDICS | Facility: CLINIC | Age: 78
End: 2023-12-21
Payer: MEDICARE

## 2023-12-21 ENCOUNTER — HOSPITAL ENCOUNTER (OUTPATIENT)
Dept: RADIOLOGY | Facility: HOSPITAL | Age: 78
Discharge: HOME | End: 2023-12-21
Attending: NURSE PRACTITIONER
Payer: MEDICARE

## 2023-12-21 VITALS — WEIGHT: 145 LBS | HEIGHT: 64 IN | BODY MASS INDEX: 24.75 KG/M2

## 2023-12-21 DIAGNOSIS — M89.8X5 PAIN IN RIGHT FEMUR: Primary | ICD-10-CM

## 2023-12-21 DIAGNOSIS — M89.8X5 PAIN IN RIGHT FEMUR: ICD-10-CM

## 2023-12-21 DIAGNOSIS — S72.141D CLOSED DISPLACED INTERTROCHANTERIC FRACTURE OF RIGHT FEMUR WITH ROUTINE HEALING, SUBSEQUENT ENCOUNTER: Primary | ICD-10-CM

## 2023-12-21 PROCEDURE — 99214 OFFICE O/P EST MOD 30 MIN: CPT | Mod: PBBFAC,PO | Performed by: NURSE PRACTITIONER

## 2023-12-21 PROCEDURE — 73552 XR FEMUR 2 VIEW RIGHT: ICD-10-PCS | Mod: 26,RT,, | Performed by: RADIOLOGY

## 2023-12-21 PROCEDURE — 99024 PR POST-OP FOLLOW-UP VISIT: ICD-10-PCS | Mod: POP,,, | Performed by: NURSE PRACTITIONER

## 2023-12-21 PROCEDURE — 73552 X-RAY EXAM OF FEMUR 2/>: CPT | Mod: TC,PO,RT

## 2023-12-21 PROCEDURE — 73552 X-RAY EXAM OF FEMUR 2/>: CPT | Mod: 26,RT,, | Performed by: RADIOLOGY

## 2023-12-21 PROCEDURE — 99024 POSTOP FOLLOW-UP VISIT: CPT | Mod: POP,,, | Performed by: NURSE PRACTITIONER

## 2023-12-21 PROCEDURE — 99999 PR PBB SHADOW E&M-EST. PATIENT-LVL IV: CPT | Mod: PBBFAC,,, | Performed by: NURSE PRACTITIONER

## 2023-12-21 PROCEDURE — 99999 PR PBB SHADOW E&M-EST. PATIENT-LVL IV: ICD-10-PCS | Mod: PBBFAC,,, | Performed by: NURSE PRACTITIONER

## 2023-12-21 RX ORDER — DICLOFENAC SODIUM 10 MG/G
2 GEL TOPICAL 2 TIMES DAILY PRN
COMMUNITY
Start: 2023-12-19 | End: 2024-02-09

## 2023-12-21 RX ORDER — CIPROFLOXACIN 500 MG/1
500 TABLET ORAL
COMMUNITY
Start: 2023-12-19 | End: 2023-12-25

## 2023-12-21 RX ORDER — CALCIUM CARBONATE/VITAMIN D3 250-3.125
1 TABLET ORAL EVERY MORNING
COMMUNITY
End: 2024-02-09

## 2023-12-21 RX ORDER — ACETAMINOPHEN 325 MG/1
650 TABLET ORAL EVERY 6 HOURS PRN
COMMUNITY
Start: 2023-12-19

## 2023-12-21 NOTE — PROGRESS NOTES
Chief Complaint   Patient presents with    Right Femur - Post-op Evaluation, Pain     Staple removal        HPI:  78 y.o. returns to clinic today status post right TFN 2 weeks ago. Pain is intermittent. Patient is compliant most of the time with restrictions.     R hip  Decreased ROM secondary to recent fracture/pain  Neurovascularly intact  Incisions x 2 with steri strips intact.     X-rays were performed today, personally reviewed by me and findings discussed with the patient.  2 views of the right femur show hardware in place with good alignment.         Closed displaced intertrochanteric fracture of right femur with routine healing, subsequent encounter  -     Ambulatory referral/consult to Physical/Occupational Therapy; Future; Expected date: 12/28/2023      2 week post op  Staples removed today.       Will get her back to follow up with MD Gabriel in 4 weeks. (6 week post op).

## 2023-12-22 ENCOUNTER — CLINICAL SUPPORT (OUTPATIENT)
Dept: REHABILITATION | Facility: HOSPITAL | Age: 78
End: 2023-12-22
Payer: MEDICARE

## 2023-12-22 DIAGNOSIS — R29.898 WEAKNESS OF BOTH LOWER EXTREMITIES: ICD-10-CM

## 2023-12-22 DIAGNOSIS — Z74.09 IMPAIRED FUNCTIONAL MOBILITY, BALANCE, GAIT, AND ENDURANCE: ICD-10-CM

## 2023-12-22 DIAGNOSIS — S72.141D CLOSED DISPLACED INTERTROCHANTERIC FRACTURE OF RIGHT FEMUR WITH ROUTINE HEALING, SUBSEQUENT ENCOUNTER: ICD-10-CM

## 2023-12-22 DIAGNOSIS — M25.651 DECREASED RANGE OF RIGHT HIP MOVEMENT: Primary | ICD-10-CM

## 2023-12-22 NOTE — PLAN OF CARE
[facility]   Physical Therapy Initial Evaluation      Name: Yuniel SOLOMON [facility] Number: [id number]    Therapy Diagnosis:   Encounter Diagnoses   Name Primary?    Closed displaced intertrochanteric fracture of right femur with routine healing, subsequent encounter     Decreased range of right hip movement Yes    Weakness of both lower extremities     Impaired functional mobility, balance, gait, and endurance         Physician: Corrie Ramirez FNP    Physician Orders: PT Eval and Treat   Medical Diagnosis from Referral: S72.141D (ICD-10-CM) - Closed displaced intertrochanteric fracture of right femur with routine healing, subsequent encounter   Evaluation Date: 12/22/2023  Authorization Period Expiration: 12/31/2023  Plan of Care Expiration: 02/22/2024  Progress Note Due: 01/22/2024  Visit # / Visits authorized: 1/ 1  FOTO: 1/3    Precautions: Standard and comorbidities listed below, status post ORIF of Right Femur   Weight Bearing As Tolerated     Time In: 1102  Time Out: 1140  Total Appointment Time (timed & untimed codes): 38 minutes    Subjective     Date of onset: 12/07/2023    History taken with  present secondary to dementia     History of current condition - Yuniel  and her  report: She tripped over a folding chair and fell on the concrete on 12/07. She went to the emergency room and found out she fractured her hip. This lead to her having an intermedullary nailing of her femur performed by Dr. Rios. She was in the hospital for a few days and discharged home on 12/11/2023. Since then she has had pain, but is ambulatory with her walker. Her and her  live alone in a 2 story house. They have an elevator so she does not have to go up and down stairs, they have 2 walkers as well as 2 commodes to have 1 up stairs and 1 down stairs. Prior to her fall she was ambulating without an AD and at times requires her 's arm to walk in the community.     Falls: not  "since 12/07    Imaging: x-ray: mpression:     The patient has undergone repair of an intertrochanteric right hip fracture with intramedullary aditi and proximal screw.    Prior Therapy: In the hospital  Social History:  lives with their spouse  Occupation: NA  Prior Level of Function: independent with chronic low back pain, ambulates without AD  Current Level of Function: 2 weeks status post intermedullary nailing of Right femur, ambulating with RW    Pain:  Current 4/10, worst 5/10, best 2/10   Location: right hip, anterior thigh and knee    Description: Aching  Aggravating Factors: Standing and Walking  Easing Factors: rest    Patients goals: walk without an assistive device      Medical History:   Past Medical History:   Diagnosis Date    Anemia     Arthritis     Blood transfusion     Breast cancer     Cancer RIGHT BREAST    Chronic constipation     Clotting disorder     Colon polyp     Diverticulosis     Glaucoma     Hepatitis C 2000    pt states history of hepatits c-"cured by Dr. Lynch"; TREATED 2 YEARS - 2000   OK NOW    Hypothyroid     Insomnia     Malignant neoplasm of breast 4/12/2012    Memory loss     reports some memory loss since chemo    Osteoarthritis     Panic attacks     Raynaud's disease     takes amlodipine for this    Ulcer     Vertigo        Surgical History:   Yuniel Gracia  has a past surgical history that includes Hysterectomy; Brain surgery (2007); Tunneled venous port placement (04/2012); right lymph node removed from breast area; Bladder surgery (2011); removal of port a cath; Colonoscopy (01/03/2011); Upper gastrointestinal endoscopy (prior to 2011); Colonoscopy (N/A, 06/01/2017); Breast lumpectomy (03/2012); Eye surgery; Joint replacement (09/25/2017); Breast biopsy; Appendectomy; Esophagogastroduodenoscopy (N/A, 07/20/2022); Cataract extraction; and Intramedullary rodding of trochanter of femur (Right, 12/7/2023).    Medications:   Yuniel has a current medication list which " includes the following prescription(s): acetaminophen, amlodipine, aspirin, lumigan, calcium carbonate-vitamin d3 250-125 mg, calcium carbonate/vitamin d3, ciprofloxacin hcl, diclofenac sodium, levothyroxine, oxycodone, rivastigmine, sertraline, solifenacin, and tramadol.    Allergies:   Review of patient's allergies indicates:   Allergen Reactions    Iodinated contrast media Shortness Of Breath     Spontaneous jaw movements    Codeine Nausea Only        Objective      Observation: ambulating with slow gait using RW     Posture: holds Right knee in flexed position     Hip Range of Motion:   Right Passive Left Passive   Flexion 60 90   Extension Not tested pt unable to lie prone  Not tested pt unable to lie prone    Ext. Rotation NT secondary to surgery   45   Int. Rotation NT secondary to surgery   35       Lower Extremity Strength  Right LE  Left LE    Quadriceps: 4+/5 Quadriceps: 5/5   Hamstrings: 4+/5 Hamstrings: 5/5   Iliopsoas: 3/5 modified seated marching  Iliopsoas: modified seated marching  5/5   Hip extension:  Unable to achieve testing position  Hip extension: Unable to achieve testing position    Seated hip abduction 4-/5 Seated hip abduction 5/5     Functional Tests:  30s sit to stand: 6 reps   TUs with RW and SBA                Treatment     Total Treatment time (time-based codes) separate from Evaluation: 10 minutes     Yuniel received the treatments listed below:        therapeutic activities to improve functional performance for 10 minutes, including:    PT educated pt on condition, prognosis, and plan of care.     PT educated pt on and provided pt HEP consisting of:     Sit to stand x10 using BILATERAL UPPER EXTREMITY assist   Alternating marching using RW x10 each side   Standing hip abduction x10 unable to perform on Left due to inability to fully weight bear on Right   Standing hip extension x10 on Right       Patient Education and Home Exercises     Education provided:   - Home Exercise  Program to be performed twice daily   - Safety awareness always have RW or  to help her     Written Home Exercises Provided: yes. Exercises were reviewed and Yuniel was able to demonstrate them prior to the end of the session.  Yuniel demonstrated good  understanding of the education provided. See EMR under Patient Instructions for exercises provided during therapy sessions.    Assessment     Yuniel is a 78 y.o. female referred to outpatient Physical Therapy with a medical diagnosis of S72.141D (ICD-10-CM) - Closed displaced intertrochanteric fracture of right femur with routine healing, subsequent encounter. Patient presents 2 weeks status post intermedullary nailing of Right femur. She demonstrates decreased Right hip Range of Motion, Right Lower Extremity weakness, and impaired gait, mobility, and balance. Her symptoms and deficits are limiting her ability to perform activities of daily living, and household chores, household/community ambulation.    Patient prognosis is Good.   Patient will benefit from skilled outpatient Physical Therapy to address the deficits stated above and in the chart below, provide patient /family education, and to maximize patientt's level of independence.     Plan of care discussed with patient: Yes  Patient's spiritual, cultural and educational needs considered and patient is agreeable to the plan of care and goals as stated below:     Anticipated Barriers for therapy: dementia, age     Medical Necessity is demonstrated by the following  History  Co-morbidities and personal factors that may impact the plan of care [] LOW: no personal factors / co-morbidities  [] MODERATE: 1-2 personal factors / co-morbidities  [x] HIGH: 3+ personal factors / co-morbidities    Moderate / High Support Documentation:   Co-morbidities affecting plan of care:   Anemia    Arthritis    Blood transfusion    Breast cancer    Cancer    Chronic constipation    Clotting disorder    Colon polyp   "  Diverticulosis    Glaucoma    Hepatitis C    pt states history of hepatits c-"cured by Dr. Lynch"; TREATED 2 YEARS - 2000   OK NOW   Hypothyroid    Insomnia    Malignant neoplasm of breast    Memory loss    reports some memory loss since chemo   Osteoarthritis    Panic attacks    Raynaud's disease    takes amlodipine for this   Ulcer    Vertigo        Personal Factors:   no deficits     Examination  Body Structures and Functions, activity limitations and participation restrictions that may impact the plan of care [] LOW: addressing 1-2 elements  [x] MODERATE: 3+ elements  [] HIGH: 4+ elements (please support below)    Moderate / High Support Documentation: activities of daily living, and household chores, household/community ambulation     Clinical Presentation [] LOW: stable  [x] MODERATE: Evolving  [] HIGH: Unstable     Decision Making/ Complexity Score: moderate       Goals:  Short Term Goals: 4 weeks   Pt will be IND with initial HEP to manage symptoms outside of PT.   Pt will report Right hip pain improved by >/= 50% with household ambulation to demonstrate improved condition.   Pt will improve MMT of lower extremity strength deficits by >/= 1/5 to improve tolerance for progressing rehab.   Pt will improve Right hip flexion ROM by >= 10 degrees to improve tolerance for sitting.  Pt will perform >/= 8 reps on 30s sit to stand to demonstrate improved lower extremity strength/endurance.   Pt will perform TUG in </= 30s using RW to demonstrate improved safety and independence with gait      Long Term Goals: 8 weeks   Pt will improve FOTO score to >/= 62 to demonstrate improved functional mobility.  Pt will be IND with final HEP to maintain/improve strength and mobility gained in PT.   Pt will report Right hip pain improved by >/= 75% with ambulation to demonstrate improved condition.   Pt will improve MMT of lower extremity strength deficits to >/= 4/ 5 to improve tolerance for activities of daily living.   Pt " will improve Right hip flexion ROM = to uninvolved side to improve tolerance for sitting.   Pt will perform >/= 10 reps on 30s sit to stand to demonstrate improved lower extremity strength/endurance.   Pt will perform TUG in </= 25s using least restrictive assistive device to demonstrate improved safety and independence with gait    Pt goal: Pt and her  will report confidence in managing her condition upon discharge from PT.    Plan     Plan of care Certification: 12/22/2023 to 02/22/2024.    Outpatient Physical Therapy 1-2 times weekly for 8 weeks to include the following interventions: Gait Training, Manual Therapy, Moist Heat/ Ice, Neuromuscular Re-ed, Patient Education, Therapeutic Activities, and Therapeutic Exercise.     Thad Chang, PT, DPT   Board Certified Clinical Specialist in Orthopedic Physical Therapy  Board Certified Clinical Specialist in Sports Physical Therapy

## 2023-12-26 ENCOUNTER — PATIENT MESSAGE (OUTPATIENT)
Dept: HEMATOLOGY/ONCOLOGY | Facility: CLINIC | Age: 78
End: 2023-12-26
Payer: MEDICARE

## 2023-12-27 ENCOUNTER — PATIENT MESSAGE (OUTPATIENT)
Dept: URGENT CARE | Facility: CLINIC | Age: 78
End: 2023-12-27
Payer: MEDICARE

## 2024-01-02 ENCOUNTER — CLINICAL SUPPORT (OUTPATIENT)
Dept: REHABILITATION | Facility: HOSPITAL | Age: 79
End: 2024-01-02
Payer: MEDICARE

## 2024-01-02 DIAGNOSIS — Z74.09 IMPAIRED FUNCTIONAL MOBILITY, BALANCE, GAIT, AND ENDURANCE: ICD-10-CM

## 2024-01-02 DIAGNOSIS — M25.651 DECREASED RANGE OF RIGHT HIP MOVEMENT: Primary | ICD-10-CM

## 2024-01-02 DIAGNOSIS — R29.898 WEAKNESS OF BOTH LOWER EXTREMITIES: ICD-10-CM

## 2024-01-02 PROCEDURE — 97110 THERAPEUTIC EXERCISES: CPT | Mod: PN,CQ

## 2024-01-02 NOTE — PROGRESS NOTES
"[facility]   Physical Therapy Treatment Note      Name: Yuniel SOLOMON [facility] Number: [id number]    Therapy Diagnosis:   Encounter Diagnoses   Name Primary?    Decreased range of right hip movement Yes    Weakness of both lower extremities     Impaired functional mobility, balance, gait, and endurance      Physician: Corrie Ramirez FNP    Visit Date: 1/2/2024    Physician Orders: PT Eval and Treat   Medical Diagnosis from Referral: S72.141D (ICD-10-CM) - Closed displaced intertrochanteric fracture of right femur with routine healing, subsequent encounter   Evaluation Date: 12/22/2023  Authorization Period Expiration: 12/31/2023  Plan of Care Expiration: 02/22/2024  Progress Note Due: 01/22/2024  Visit # / Visits authorized: 1/ 80  FOTO: 1/3     Precautions: Standard and comorbidities listed below, status post ORIF of Right Femur   Weight Bearing As Tolerated      Time In: 1305  Time Out: 1340  Total Appointment Time (timed & untimed codes): 30 minutes    PTA Visit #: 1/5       Subjective     Patient reports: The leg doesn't bother her as much as her hip does. "I didn't know I was supposed to do my homework." Her  reports she didn't take any pain medication prior to therapy. Mid way she verbalized increased in pain reporting she isn't having a good day and requested to end treatment early.     She was not compliant with home exercise program.  Response to previous treatment: first visit post initial evaluation   Functional change: non-remarkable     Pain: 4/10  Location: right hip       Objective      Objective Measures updated at progress report unless specified.     Treatment     Yuniel received the treatments listed below:      therapeutic exercises to develop strength, endurance, ROM, flexibility, and posture for 30 minutes including:    Bridges 3 x 10 repetitions   Hooklying clams with yellow theraband 3 x 10 repetitions   Hooklying hip adduction with ball 5 seconds holds x " 30 repetitions   Seated LAQ's x 3 minutes   Standing hip abduction 3 x 10 repetitions   Standing hip extension 3 x 10 repetitions     manual therapy techniques: Joint mobilizations were applied to the:  for 0 minutes, including:      neuromuscular re-education activities to improve: Balance, Coordination, Kinesthetic, Sense, Proprioception, and Posture for 0 minutes. The following activities were included:      therapeutic activities to improve functional performance for 0  minutes, including:      gait training to improve functional mobility and safety for 0  minutes, including:      Patient Education and Home Exercises       Education provided:   - Home Exercise Program     Written Home Exercises Provided: yes. Exercises were reviewed and Yuniel was able to demonstrate them prior to the end of the session.  Yuniel demonstrated good  understanding of the education provided. See Electronic Medical Record under Patient Instructions for exercises provided during therapy sessions    Assessment     Treatment focused on improving right hip strength within tolerance. Yuniel noted with poor tolerance to treatment as she had difficulty with supine/hooklying exercises. Also experienced increased pain with minimal exercises and requested to end treatment early. Yuneil will continue to benefit from skilled Physical Therapy to maximize strength gain as able.     Yuniel Is progressing well towards her goals.   Patient prognosis is Good.     Patient will continue to benefit from skilled outpatient physical therapy to address the deficits listed in the problem list box on initial evaluation, provide pt/family education and to maximize pt's level of independence in the home and community environment.     Patient's spiritual, cultural and educational needs considered and pt agreeable to plan of care and goals.     Anticipated barriers to physical therapy: dementia, age    Goals:       Short Term Goals: 4 weeks   Pt will be IND with  initial HEP to manage symptoms outside of PT.   Pt will report Right hip pain improved by >/= 50% with household ambulation to demonstrate improved condition.   Pt will improve MMT of lower extremity strength deficits by >/= 1/5 to improve tolerance for progressing rehab.   Pt will improve Right hip flexion ROM by >= 10 degrees to improve tolerance for sitting.  Pt will perform >/= 8 reps on 30s sit to stand to demonstrate improved lower extremity strength/endurance.   Pt will perform TUG in </= 30s using RW to demonstrate improved safety and independence with gait       Long Term Goals: 8 weeks   Pt will improve FOTO score to >/= 62 to demonstrate improved functional mobility.  Pt will be IND with final HEP to maintain/improve strength and mobility gained in PT.   Pt will report Right hip pain improved by >/= 75% with ambulation to demonstrate improved condition.   Pt will improve MMT of lower extremity strength deficits to >/= 4/ 5 to improve tolerance for activities of daily living.   Pt will improve Right hip flexion ROM = to uninvolved side to improve tolerance for sitting.   Pt will perform >/= 10 reps on 30s sit to stand to demonstrate improved lower extremity strength/endurance.   Pt will perform TUG in </= 25s using least restrictive assistive device to demonstrate improved safety and independence with gait    Pt goal: Pt and her  will report confidence in managing her condition upon discharge from PT.    Plan     Progress as per POC.     Jessica Galvan, ELLE

## 2024-01-04 ENCOUNTER — CLINICAL SUPPORT (OUTPATIENT)
Dept: REHABILITATION | Facility: HOSPITAL | Age: 79
End: 2024-01-04
Payer: MEDICARE

## 2024-01-04 DIAGNOSIS — Z74.09 IMPAIRED FUNCTIONAL MOBILITY, BALANCE, GAIT, AND ENDURANCE: ICD-10-CM

## 2024-01-04 DIAGNOSIS — M25.651 DECREASED RANGE OF RIGHT HIP MOVEMENT: Primary | ICD-10-CM

## 2024-01-04 DIAGNOSIS — R29.898 WEAKNESS OF BOTH LOWER EXTREMITIES: ICD-10-CM

## 2024-01-04 PROCEDURE — 97110 THERAPEUTIC EXERCISES: CPT | Mod: PN,CQ

## 2024-01-04 NOTE — PROGRESS NOTES
"[facility]   Physical Therapy Treatment Note      Name: Yuniel SOLOMON [facility] Number: [id number]    Therapy Diagnosis:   Encounter Diagnoses   Name Primary?    Decreased range of right hip movement Yes    Weakness of both lower extremities     Impaired functional mobility, balance, gait, and endurance      Physician: Corrie Ramirez FNP    Visit Date: 1/4/2024    Physician Orders: PT Eval and Treat   Medical Diagnosis from Referral: S72.141D (ICD-10-CM) - Closed displaced intertrochanteric fracture of right femur with routine healing, subsequent encounter   Evaluation Date: 12/22/2023  Authorization Period Expiration: 12/31/2023  Plan of Care Expiration: 02/22/2024  Progress Note Due: 01/22/2024  Visit # / Visits authorized: 2/ 80  FOTO: 1/3     Precautions: Standard and comorbidities listed below, status post ORIF of Right Femur   Weight Bearing As Tolerated      Time In: 1305  Time Out: 1345  Total Appointment Time (timed & untimed codes): 40 minutes    PTA Visit #: 2/5       Subjective     Patient reports: The leg doesn't bother her as much as her hip does. "I didn't know I was supposed to do my homework." Her  reports she didn't take any pain medication prior to therapy. Mid way she verbalized increased in pain reporting she isn't having a good day and requested to end treatment early.     She was not compliant with home exercise program.  Response to previous treatment: first visit post initial evaluation   Functional change: non-remarkable     Pain: 4/10  Location: right hip       Objective      Objective Measures updated at progress report unless specified.     Treatment     Yuniel received the treatments listed below:      therapeutic exercises to develop strength, endurance, ROM, flexibility, and posture for 45 minutes including:    Seated hip adduction squeeze x 3 minutes   Seated hip abduction with red theraband x 3 minutes   Seated marches x 3 minutes   Modified " straight leg raises 3 x 10 repetitions   Seated LAQ's 2 pounds x 3 minutes   Standing hip abduction 3 x 10 repetitions   Standing hip extension 3 x 10 repetitions   Sit to stands 3 x 5 repetitions without upper extremity support     manual therapy techniques: Joint mobilizations were applied to the:  for 0 minutes, including:      neuromuscular re-education activities to improve: Balance, Coordination, Kinesthetic, Sense, Proprioception, and Posture for 0 minutes. The following activities were included:      therapeutic activities to improve functional performance for 0  minutes, including:      gait training to improve functional mobility and safety for 0  minutes, including:      Patient Education and Home Exercises       Education provided:   - Home Exercise Program     Written Home Exercises Provided: yes. Exercises were reviewed and Yuniel was able to demonstrate them prior to the end of the session.  Yuniel demonstrated good  understanding of the education provided. See Electronic Medical Record under Patient Instructions for exercises provided during therapy sessions    Assessment     Improved tolerance to treatment this date. She continues to fatigue easily requiring increased therapeutic rest breaks. Requires cueing to improve forward weight shift with sit to stands due to reports of fear of falling. Yuniel will continue to benefit from skilled Physical Therapy to maximize strength gains as able.     Yuniel Is progressing well towards her goals.   Patient prognosis is Good.     Patient will continue to benefit from skilled outpatient physical therapy to address the deficits listed in the problem list box on initial evaluation, provide pt/family education and to maximize pt's level of independence in the home and community environment.     Patient's spiritual, cultural and educational needs considered and pt agreeable to plan of care and goals.     Anticipated barriers to physical therapy: dementia,  age    Goals:       Short Term Goals: 4 weeks   Pt will be IND with initial HEP to manage symptoms outside of PT.   Pt will report Right hip pain improved by >/= 50% with household ambulation to demonstrate improved condition.   Pt will improve MMT of lower extremity strength deficits by >/= 1/5 to improve tolerance for progressing rehab.   Pt will improve Right hip flexion ROM by >= 10 degrees to improve tolerance for sitting.  Pt will perform >/= 8 reps on 30s sit to stand to demonstrate improved lower extremity strength/endurance.   Pt will perform TUG in </= 30s using RW to demonstrate improved safety and independence with gait       Long Term Goals: 8 weeks   Pt will improve FOTO score to >/= 62 to demonstrate improved functional mobility.  Pt will be IND with final HEP to maintain/improve strength and mobility gained in PT.   Pt will report Right hip pain improved by >/= 75% with ambulation to demonstrate improved condition.   Pt will improve MMT of lower extremity strength deficits to >/= 4/ 5 to improve tolerance for activities of daily living.   Pt will improve Right hip flexion ROM = to uninvolved side to improve tolerance for sitting.   Pt will perform >/= 10 reps on 30s sit to stand to demonstrate improved lower extremity strength/endurance.   Pt will perform TUG in </= 25s using least restrictive assistive device to demonstrate improved safety and independence with gait    Pt goal: Pt and her  will report confidence in managing her condition upon discharge from PT.    Plan     Progress as per POC.     Jessica Galvan, ELLE

## 2024-01-09 ENCOUNTER — CLINICAL SUPPORT (OUTPATIENT)
Dept: REHABILITATION | Facility: HOSPITAL | Age: 79
End: 2024-01-09
Payer: MEDICARE

## 2024-01-09 DIAGNOSIS — M25.651 DECREASED RANGE OF RIGHT HIP MOVEMENT: Primary | ICD-10-CM

## 2024-01-09 DIAGNOSIS — R29.898 WEAKNESS OF BOTH LOWER EXTREMITIES: ICD-10-CM

## 2024-01-09 DIAGNOSIS — Z74.09 IMPAIRED FUNCTIONAL MOBILITY, BALANCE, GAIT, AND ENDURANCE: ICD-10-CM

## 2024-01-09 PROCEDURE — 97110 THERAPEUTIC EXERCISES: CPT | Mod: PN,CQ

## 2024-01-09 NOTE — PROGRESS NOTES
[facility]   Physical Therapy Treatment Note      Name: Yuniel SOLOMON [facility] Number: [id number]    Therapy Diagnosis:   No diagnosis found.    Physician: Corrie Ramirez FNP    Visit Date: 1/9/2024    Physician Orders: PT Eval and Treat   Medical Diagnosis from Referral: S72.141D (ICD-10-CM) - Closed displaced intertrochanteric fracture of right femur with routine healing, subsequent encounter   Evaluation Date: 12/22/2023  Authorization Period Expiration: 12/31/2023  Plan of Care Expiration: 02/22/2024  Progress Note Due: 01/22/2024  Visit # / Visits authorized: 3/ 80  FOTO: 1/3     Precautions: Standard and comorbidities listed below, status post ORIF of Right Femur   Weight Bearing As Tolerated      Time In: 1305  Time Out: 1350  Total Appointment Time (timed & untimed codes): 23 minutes    PTA Visit #: 3/5       Subjective     Patient reports: Her hip has been feeling real good. Denies increased pain and soreness following last treatment session.     She was not compliant with home exercise program.  Response to previous treatment: first visit post initial evaluation   Functional change: non-remarkable     Pain: 4/10  Location: right hip       Objective      Objective Measures updated at progress report unless specified.     Treatment     Yuniel received the treatments listed below:      therapeutic exercises to develop strength, endurance, ROM, flexibility, and posture for 45 minutes including:    Nu-step x 6 minutes   Seated hip adduction squeeze x 3 minutes   Seated hip abduction with red theraband x 3 minutes   Seated marches x 3 minutes   Modified straight leg raises 3 x 10 repetitions   Seated LAQ's 2 pounds x 3 minutes   Standing hip abduction 3 x 10 repetitions   Standing hip extension 3 x 10 repetitions   Sit to stands 3 x 5 repetitions without upper extremity support     manual therapy techniques: Joint mobilizations were applied to the:  for 0 minutes,  including:      neuromuscular re-education activities to improve: Balance, Coordination, Kinesthetic, Sense, Proprioception, and Posture for 0 minutes. The following activities were included:      therapeutic activities to improve functional performance for 0  minutes, including:      gait training to improve functional mobility and safety for 0  minutes, including:      Patient Education and Home Exercises       Education provided:   - Home Exercise Program     Written Home Exercises Provided: yes. Exercises were reviewed and Yuniel was able to demonstrate them prior to the end of the session.  Yuniel demonstrated good  understanding of the education provided. See Electronic Medical Record under Patient Instructions for exercises provided during therapy sessions    Assessment     Due to subjective reporting, continued exercises within her tolerance. She was able to incorporate nu-step well without issues. Improved execution of sit to stands this date. Yuniel will continue to benefit from skilled Physical Therapy to maximize strength gains as able.     Yuniel Is progressing well towards her goals.   Patient prognosis is Good.     Patient will continue to benefit from skilled outpatient physical therapy to address the deficits listed in the problem list box on initial evaluation, provide pt/family education and to maximize pt's level of independence in the home and community environment.     Patient's spiritual, cultural and educational needs considered and pt agreeable to plan of care and goals.     Anticipated barriers to physical therapy: dementia, age    Goals:       Short Term Goals: 4 weeks   Pt will be IND with initial HEP to manage symptoms outside of PT.   Pt will report Right hip pain improved by >/= 50% with household ambulation to demonstrate improved condition.   Pt will improve MMT of lower extremity strength deficits by >/= 1/5 to improve tolerance for progressing rehab.   Pt will improve Right hip flexion  ROM by >= 10 degrees to improve tolerance for sitting.  Pt will perform >/= 8 reps on 30s sit to stand to demonstrate improved lower extremity strength/endurance.   Pt will perform TUG in </= 30s using RW to demonstrate improved safety and independence with gait       Long Term Goals: 8 weeks   Pt will improve FOTO score to >/= 62 to demonstrate improved functional mobility.  Pt will be IND with final HEP to maintain/improve strength and mobility gained in PT.   Pt will report Right hip pain improved by >/= 75% with ambulation to demonstrate improved condition.   Pt will improve MMT of lower extremity strength deficits to >/= 4/ 5 to improve tolerance for activities of daily living.   Pt will improve Right hip flexion ROM = to uninvolved side to improve tolerance for sitting.   Pt will perform >/= 10 reps on 30s sit to stand to demonstrate improved lower extremity strength/endurance.   Pt will perform TUG in </= 25s using least restrictive assistive device to demonstrate improved safety and independence with gait    Pt goal: Pt and her  will report confidence in managing her condition upon discharge from PT.    Plan     Progress as per POC.     Jessica Galvan, PTA

## 2024-01-11 ENCOUNTER — CLINICAL SUPPORT (OUTPATIENT)
Dept: REHABILITATION | Facility: HOSPITAL | Age: 79
End: 2024-01-11
Payer: MEDICARE

## 2024-01-11 DIAGNOSIS — Z74.09 IMPAIRED FUNCTIONAL MOBILITY, BALANCE, GAIT, AND ENDURANCE: ICD-10-CM

## 2024-01-11 DIAGNOSIS — R29.898 WEAKNESS OF BOTH LOWER EXTREMITIES: ICD-10-CM

## 2024-01-11 DIAGNOSIS — M25.651 DECREASED RANGE OF RIGHT HIP MOVEMENT: Primary | ICD-10-CM

## 2024-01-11 DIAGNOSIS — Z00.00 ENCOUNTER FOR MEDICARE ANNUAL WELLNESS EXAM: ICD-10-CM

## 2024-01-11 PROCEDURE — 97530 THERAPEUTIC ACTIVITIES: CPT | Mod: PN

## 2024-01-11 PROCEDURE — 97110 THERAPEUTIC EXERCISES: CPT | Mod: PN

## 2024-01-11 NOTE — PROGRESS NOTES
[facility]   Physical Therapy Treatment Note      Name: Yuniel SOLOMON [facility] Number: [id number]    Therapy Diagnosis:   Encounter Diagnoses   Name Primary?    Decreased range of right hip movement Yes    Weakness of both lower extremities     Impaired functional mobility, balance, gait, and endurance        Physician: Corrie Ramirez FNP    Visit Date: 1/11/2024    Physician Orders: PT Eval and Treat   Medical Diagnosis from Referral: S72.141D (ICD-10-CM) - Closed displaced intertrochanteric fracture of right femur with routine healing, subsequent encounter   Evaluation Date: 12/22/2023  Authorization Period Expiration: 12/31/2023  Plan of Care Expiration: 02/22/2024  Progress Note Due: 01/22/2024  Visit # / Visits authorized: 4/ 80  FOTO: 1/3     Precautions: Standard and comorbidities listed below, status post ORIF of Right Femur   Weight Bearing As Tolerated      Time In: 1356  Time Out: 1340  Total Appointment Time (timed & untimed codes): 44 minutes    PTA Visit #: 0/5       Subjective     Patient reports: she feels like her hip is doing well overall     She was not compliant with home exercise program.  Response to previous treatment: first visit post initial evaluation   Functional change: non-remarkable     Pain: 4/10  Location: right hip       Objective      Objective Measures updated at progress report unless specified.     Treatment     Yuniel received the treatments listed below:      therapeutic exercises to develop strength, endurance, ROM, flexibility, and posture for 30 minutes including:    Nu-step x 10 minutes for lower extremity strength/endurance   Bridges 3x10   Straight leg raise  2x10 each side   Seated LAQ's 2 pounds 3x10  Supine bent knee fall out 3x10       Not today:   Seated hip adduction squeeze x 3 minutes   Seated hip abduction with red theraband x 3 minutes   Seated marches x 3 minutes   Modified straight leg raises 3 x 10 repetitions     manual therapy  techniques: Joint mobilizations were applied to the:  for 0 minutes, including:      neuromuscular re-education activities to improve: Balance, Coordination, Kinesthetic, Sense, Proprioception, and Posture for 0 minutes. The following activities were included:      therapeutic activities to improve functional performance for 14 minutes, including:    Standing hip abduction 3 x 10 repetitions   Standing hip extension 3 x 10 repetitions   Sit to stands 3 x 8 repetitions without upper extremity support       gait training to improve functional mobility and safety for 0  minutes, including:      Patient Education and Home Exercises       Education provided:   - Home Exercise Program     Written Home Exercises Provided: yes. Exercises were reviewed and Yuniel was able to demonstrate them prior to the end of the session.  Yuniel demonstrated good  understanding of the education provided. See Electronic Medical Record under Patient Instructions for exercises provided during therapy sessions    Assessment     Yuniel tolerated treatment well. We were able to progress to supine strengthening exercises. She did have an episode of feeling light headed following standing hip extension, we took her blood pressure at 108/75 mmHg. Her  reports this is her normal. They report she did not eat much today though. Symptoms subsided before pt left. Will follow up next visit.     Yuniel Is progressing well towards her goals.   Patient prognosis is Good.     Patient will continue to benefit from skilled outpatient physical therapy to address the deficits listed in the problem list box on initial evaluation, provide pt/family education and to maximize pt's level of independence in the home and community environment.     Patient's spiritual, cultural and educational needs considered and pt agreeable to plan of care and goals.     Anticipated barriers to physical therapy: dementia, age    Goals:       Short Term Goals: 4 weeks   Pt will be  IND with initial HEP to manage symptoms outside of PT.   Pt will report Right hip pain improved by >/= 50% with household ambulation to demonstrate improved condition.   Pt will improve MMT of lower extremity strength deficits by >/= 1/5 to improve tolerance for progressing rehab.   Pt will improve Right hip flexion ROM by >= 10 degrees to improve tolerance for sitting.  Pt will perform >/= 8 reps on 30s sit to stand to demonstrate improved lower extremity strength/endurance.   Pt will perform TUG in </= 30s using RW to demonstrate improved safety and independence with gait       Long Term Goals: 8 weeks   Pt will improve FOTO score to >/= 62 to demonstrate improved functional mobility.  Pt will be IND with final HEP to maintain/improve strength and mobility gained in PT.   Pt will report Right hip pain improved by >/= 75% with ambulation to demonstrate improved condition.   Pt will improve MMT of lower extremity strength deficits to >/= 4/ 5 to improve tolerance for activities of daily living.   Pt will improve Right hip flexion ROM = to uninvolved side to improve tolerance for sitting.   Pt will perform >/= 10 reps on 30s sit to stand to demonstrate improved lower extremity strength/endurance.   Pt will perform TUG in </= 25s using least restrictive assistive device to demonstrate improved safety and independence with gait    Pt goal: Pt and her  will report confidence in managing her condition upon discharge from PT.    Plan     Progress as per POC.     Thad Chang, PT

## 2024-01-12 ENCOUNTER — OUTPATIENT CASE MANAGEMENT (OUTPATIENT)
Dept: NEUROLOGY | Facility: CLINIC | Age: 79
End: 2024-01-12
Payer: MEDICARE

## 2024-01-12 DIAGNOSIS — M89.8X5 PAIN IN RIGHT FEMUR: Primary | ICD-10-CM

## 2024-01-12 NOTE — PROGRESS NOTES
In an attempt to complete monthly [facility] [employer] check-in, PELON LVM for CG and remains available.  PELON will make second attempt 1/17/24.

## 2024-01-17 NOTE — PROGRESS NOTES
Protocol: [employer] Effectiveness Study  Identifier: [id number]  IRB#: [id number]  PI: Dr. Kee Garcia, PhD  CO-I: Dr. Senha Ibarra PsyD  Version Date: 12/05/2022  Pt Study ID: [id number]  Visit Month: M11  Care Plan documented on: (03/1/2023) - Please refer to note for more information.      Timeline:   (*Note for [facility] - complete timeline using completed or planned date for study events. Add any important events (i.e. Withdrawals, Lost to Follow Up, Adverse Events, etc.).     Consent: Completed(02/15/2023/)  Baseline questionnaires: Completed(02/15/2023)  6-month questionnaires: Completed (08/2023)  12-month questionnaires: Planned(02/2023)      Visit Note:   Spoke with caregiver Lul (Spouse) for month M11 visit. This was a brief call.  CG said pt is home from rehab and doing well. He said he is encouraging her to use the walker all the time to avoid falling.  Cg denied any current needs.      Falls in the past month: 0  UTIs in the past month: 0  Hospital encounters in the past month (reported by caregiver): 0        Next monthly visit scheduled for: 2/19/2024. Caregiver knows how to reach [facility], and is encouraged to do so, as needed before our next scheduled call.

## 2024-01-18 ENCOUNTER — CLINICAL SUPPORT (OUTPATIENT)
Dept: REHABILITATION | Facility: HOSPITAL | Age: 79
End: 2024-01-18
Payer: MEDICARE

## 2024-01-18 DIAGNOSIS — Z74.09 IMPAIRED FUNCTIONAL MOBILITY, BALANCE, GAIT, AND ENDURANCE: ICD-10-CM

## 2024-01-18 DIAGNOSIS — R29.898 WEAKNESS OF BOTH LOWER EXTREMITIES: ICD-10-CM

## 2024-01-18 DIAGNOSIS — M25.651 DECREASED RANGE OF RIGHT HIP MOVEMENT: Primary | ICD-10-CM

## 2024-01-18 PROCEDURE — 97530 THERAPEUTIC ACTIVITIES: CPT | Mod: PN,CQ

## 2024-01-18 PROCEDURE — 97110 THERAPEUTIC EXERCISES: CPT | Mod: PN,CQ

## 2024-01-18 NOTE — PROGRESS NOTES
[facility]   Physical Therapy Treatment Note      Name: Yuniel SOLOMON [facility] Number: [id number]    Therapy Diagnosis:   Encounter Diagnoses   Name Primary?    Decreased range of right hip movement Yes    Weakness of both lower extremities     Impaired functional mobility, balance, gait, and endurance        Physician: Corrie Ramirez FNP    Visit Date: 1/18/2024    Physician Orders: PT Eval and Treat   Medical Diagnosis from Referral: S72.141D (ICD-10-CM) - Closed displaced intertrochanteric fracture of right femur with routine healing, subsequent encounter   Evaluation Date: 12/22/2023  Authorization Period Expiration: 12/31/2023  Plan of Care Expiration: 02/22/2024  Progress Note Due: 01/22/2024  Visit # / Visits authorized: 5/ 80  FOTO: 1/3     Precautions: Standard and comorbidities listed below, status post ORIF of Right Femur   Weight Bearing As Tolerated      Time In: 1400  Time Out: 1448  Total Appointment Time (timed & untimed codes): 44 minutes    PTA Visit #: 1/5       Subjective     Patient reports: she feels like her hip is doing well overall     She was not compliant with home exercise program.  Response to previous treatment: first visit post initial evaluation   Functional change: non-remarkable     Pain: 4/10  Location: right hip       Objective      Objective Measures updated at progress report unless specified.     Treatment     Yuniel received the treatments listed below:      therapeutic exercises to develop strength, endurance, ROM, flexibility, and posture for 30 minutes including:    Nu-step x 10 minutes for lower extremity strength/endurance   Bridges 3x10   Straight leg raise  2x10 each side   Seated LAQ's 2 pounds 3x10  Supine bent knee fall out 3x10       Not today:   Seated hip adduction squeeze x 3 minutes   Seated hip abduction with red theraband x 3 minutes   Seated marches x 3 minutes   Modified straight leg raises 3 x 10 repetitions     manual therapy  techniques: Joint mobilizations were applied to the:  for 0 minutes, including:      neuromuscular re-education activities to improve: Balance, Coordination, Kinesthetic, Sense, Proprioception, and Posture for 0 minutes. The following activities were included:      therapeutic activities to improve functional performance for 14 minutes, including:    Standing hip abduction 3 x 10 repetitions   Standing hip extension 3 x 10 repetitions   Sit to stands 3 x 8 repetitions without upper extremity support       gait training to improve functional mobility and safety for 0  minutes, including:      Patient Education and Home Exercises       Education provided:   - Home Exercise Program     Written Home Exercises Provided: yes. Exercises were reviewed and Yuniel was able to demonstrate them prior to the end of the session.  Yuniel demonstrated good  understanding of the education provided. See Electronic Medical Record under Patient Instructions for exercises provided during therapy sessions    Assessment     Yuniel arrived reporting she ate lunch prior to treatment session today. Had no episodes of dizziness this date. Struggles to maintain quad set during straight leg raises requiring increased cueing. Responds fairly to cueing with attempts to make improvements but difficulty sustaining. Will progress as able.     Yuniel Is progressing well towards her goals.   Patient prognosis is Good.     Patient will continue to benefit from skilled outpatient physical therapy to address the deficits listed in the problem list box on initial evaluation, provide pt/family education and to maximize pt's level of independence in the home and community environment.     Patient's spiritual, cultural and educational needs considered and pt agreeable to plan of care and goals.     Anticipated barriers to physical therapy: dementia, age    Goals:       Short Term Goals: 4 weeks   Pt will be IND with initial HEP to manage symptoms outside of PT.    Pt will report Right hip pain improved by >/= 50% with household ambulation to demonstrate improved condition.   Pt will improve MMT of lower extremity strength deficits by >/= 1/5 to improve tolerance for progressing rehab.   Pt will improve Right hip flexion ROM by >= 10 degrees to improve tolerance for sitting.  Pt will perform >/= 8 reps on 30s sit to stand to demonstrate improved lower extremity strength/endurance.   Pt will perform TUG in </= 30s using RW to demonstrate improved safety and independence with gait       Long Term Goals: 8 weeks   Pt will improve FOTO score to >/= 62 to demonstrate improved functional mobility.  Pt will be IND with final HEP to maintain/improve strength and mobility gained in PT.   Pt will report Right hip pain improved by >/= 75% with ambulation to demonstrate improved condition.   Pt will improve MMT of lower extremity strength deficits to >/= 4/ 5 to improve tolerance for activities of daily living.   Pt will improve Right hip flexion ROM = to uninvolved side to improve tolerance for sitting.   Pt will perform >/= 10 reps on 30s sit to stand to demonstrate improved lower extremity strength/endurance.   Pt will perform TUG in </= 25s using least restrictive assistive device to demonstrate improved safety and independence with gait    Pt goal: Pt and her  will report confidence in managing her condition upon discharge from PT.    Plan     Progress as per POC.     Jessica Galvan PTA

## 2024-01-23 ENCOUNTER — CLINICAL SUPPORT (OUTPATIENT)
Dept: REHABILITATION | Facility: HOSPITAL | Age: 79
End: 2024-01-23
Payer: MEDICARE

## 2024-01-23 DIAGNOSIS — R29.898 WEAKNESS OF BOTH LOWER EXTREMITIES: ICD-10-CM

## 2024-01-23 DIAGNOSIS — M25.651 DECREASED RANGE OF RIGHT HIP MOVEMENT: Primary | ICD-10-CM

## 2024-01-23 DIAGNOSIS — Z74.09 IMPAIRED FUNCTIONAL MOBILITY, BALANCE, GAIT, AND ENDURANCE: ICD-10-CM

## 2024-01-23 PROCEDURE — 97530 THERAPEUTIC ACTIVITIES: CPT | Mod: PN

## 2024-01-23 PROCEDURE — 97110 THERAPEUTIC EXERCISES: CPT | Mod: PN

## 2024-01-23 NOTE — PROGRESS NOTES
[facility]   Physical Therapy Treatment Note/Discharge Summary      Name: Yuniel SOLOMON [facility] Number: [id number]    Therapy Diagnosis:   Encounter Diagnoses   Name Primary?    Decreased range of right hip movement Yes    Weakness of both lower extremities     Impaired functional mobility, balance, gait, and endurance        Physician: Corrie Ramirez FNP    Visit Date: 2024    Physician Orders: PT Eval and Treat   Medical Diagnosis from Referral: S72.141D (ICD-10-CM) - Closed displaced intertrochanteric fracture of right femur with routine healing, subsequent encounter   Evaluation Date: 2023  Authorization Period Expiration: 2023  Plan of Care Expiration: 2024  Progress Note Due: 2024  Visit # / Visits authorized:   FOTO: 1/3     Precautions: Standard and comorbidities listed below, status post ORIF of Right Femur   Weight Bearing As Tolerated      Time In: 1400  Time Out: 1440  Total Appointment Time (timed & untimed codes): 40 minutes    PTA Visit #: 0/5       Subjective     Patient reports: she feels like her hip is doing well overall     She was not compliant with home exercise program.  Response to previous treatment: first visit post initial evaluation   Functional change: non-remarkable     Pain: 4/10  Location: right hip       Objective      Hip Range of Motion:    Right Passive Left Passive   Flexion 90 90   Extension 0 0   Ext. Rotation 35    45   Int. Rotation 25 35         Lower Extremity Strength  Right LE   Left LE     Quadriceps: 5/5 Quadriceps: 5/5   Hamstrings: 5/5 Hamstrings: 5/5   Iliopsoas: 4/5  Iliopsoas: 5/5   Hip extension:  3-/5 Hip extension: 3-/5   Hip abduction 4-/5 Hip abduction 4/5      Functional Tests:  30s sit to stand: 12 reps   TUs with RW and SBA     Treatment     Yuniel received the treatments listed below:      therapeutic exercises to develop strength, endurance, ROM, flexibility, and posture for 25 minutes  including:    Nu-step x 10 minutes for lower extremity strength/endurance   Assessment as above   Bridges 3x10   Straight leg raise  2x10 each side       Not today:   Seated hip adduction squeeze x 3 minutes   Seated hip abduction with red theraband x 3 minutes   Seated marches x 3 minutes   Modified straight leg raises 3 x 10 repetitions     manual therapy techniques: Joint mobilizations were applied to the:  for 0 minutes, including:      neuromuscular re-education activities to improve: Balance, Coordination, Kinesthetic, Sense, Proprioception, and Posture for 0 minutes. The following activities were included:      therapeutic activities to improve functional performance for 15 minutes, including:    Standing hip abduction 3 x 10 repetitions   Standing hip extension 3 x 10 repetitions   Sit to stands 3 x 10 repetitions without upper extremity support       gait training to improve functional mobility and safety for 0  minutes, including:      Patient Education and Home Exercises       Education provided:   - Home Exercise Program     Written Home Exercises Provided: yes. Exercises were reviewed and Yuniel was able to demonstrate them prior to the end of the session.  Yuniel demonstrated good  understanding of the education provided. See Electronic Medical Record under Patient Instructions for exercises provided during therapy sessions    Assessment     Yuniel has been seen for a total of 6 visits over the past month and demonstrates significant improvements in Right hip Range of Motion, lower extremity strength, and gait speed. PT discussed plan of care with patient and her , we are in agreement that she is appropriate for discharge to Home Exercise Program.     Yuniel Is progressing well towards her goals.   Patient prognosis is Good.     Patient will continue to benefit from skilled outpatient physical therapy to address the deficits listed in the problem list box on initial evaluation, provide pt/family  education and to maximize pt's level of independence in the home and community environment.     Patient's spiritual, cultural and educational needs considered and pt agreeable to plan of care and goals.     Anticipated barriers to physical therapy: dementia, age    Goals:       Short Term Goals: 4 weeks   Pt will be IND with initial HEP to manage symptoms outside of PT. MET  Pt will report Right hip pain improved by >/= 50% with household ambulation to demonstrate improved condition. MET  Pt will improve MMT of lower extremity strength deficits by >/= 1/5 to improve tolerance for progressing rehab. MET  Pt will improve Right hip flexion ROM by >= 10 degrees to improve tolerance for sitting.MET  Pt will perform >/= 8 reps on 30s sit to stand to demonstrate improved lower extremity strength/endurance. MET  Pt will perform TUG in </= 30s using RW to demonstrate improved safety and independence with gait  MET     Long Term Goals: 8 weeks   Pt will improve FOTO score to >/= 62 to demonstrate improved functional mobility. MET  Pt will be IND with final HEP to maintain/improve strength and mobility gained in PT. MET  Pt will report Right hip pain improved by >/= 75% with ambulation to demonstrate improved condition. MET  Pt will improve MMT of lower extremity strength deficits to >/= 4/ 5 to improve tolerance for activities of daily living. Not MET  Pt will improve Right hip flexion ROM = to uninvolved side to improve tolerance for sitting. MET  Pt will perform >/= 10 reps on 30s sit to stand to demonstrate improved lower extremity strength/endurance. MET  Pt will perform TUG in </= 25s using least restrictive assistive device to demonstrate improved safety and independence with gait  MET  Pt goal: Pt and her  will report confidence in managing her condition upon discharge from PT.MET    Plan     Discharge Summary     Date of Last visit: 01/23/2024  Total Visits Received: 6        Assessment    Goals:  MET    Discharge reason: Patient has met all of his/her goals    Plan   This patient is discharged from Physical Therapy and is to cont with their HEP and MD follow up PRN.        Thad Chang, PT, DPT   Board Certified Clinical Specialist in Orthopedic Physical Therapy  Board Certified Clinical Specialist in Sports Physical Therapy

## 2024-01-23 NOTE — PLAN OF CARE
[facility]   Physical Therapy Treatment Note/Discharge Summary      Name: Yuniel SOLOMON [facility] Number: [id number]    Therapy Diagnosis:   Encounter Diagnoses   Name Primary?    Decreased range of right hip movement Yes    Weakness of both lower extremities     Impaired functional mobility, balance, gait, and endurance        Physician: Corrie Ramirez FNP    Visit Date: 2024    Physician Orders: PT Eval and Treat   Medical Diagnosis from Referral: S72.141D (ICD-10-CM) - Closed displaced intertrochanteric fracture of right femur with routine healing, subsequent encounter   Evaluation Date: 2023  Authorization Period Expiration: 2023  Plan of Care Expiration: 2024  Progress Note Due: 2024  Visit # / Visits authorized:   FOTO: 1/3     Precautions: Standard and comorbidities listed below, status post ORIF of Right Femur   Weight Bearing As Tolerated      Time In: 1400  Time Out: 1440  Total Appointment Time (timed & untimed codes): 40 minutes    PTA Visit #: 0/5       Subjective     Patient reports: she feels like her hip is doing well overall     She was not compliant with home exercise program.  Response to previous treatment: first visit post initial evaluation   Functional change: non-remarkable     Pain: 4/10  Location: right hip       Objective      Hip Range of Motion:    Right Passive Left Passive   Flexion 90 90   Extension 0 0   Ext. Rotation 35    45   Int. Rotation 25 35         Lower Extremity Strength  Right LE   Left LE     Quadriceps: 5/5 Quadriceps: 5/5   Hamstrings: 5/5 Hamstrings: 5/5   Iliopsoas: 4/5  Iliopsoas: 5/5   Hip extension:  3-/5 Hip extension: 3-/5   Hip abduction 4-/5 Hip abduction 4/5      Functional Tests:  30s sit to stand: 12 reps   TUs with RW and SBA     Treatment     Yuniel received the treatments listed below:      therapeutic exercises to develop strength, endurance, ROM, flexibility, and posture for 25 minutes  including:    Nu-step x 10 minutes for lower extremity strength/endurance   Assessment as above   Bridges 3x10   Straight leg raise  2x10 each side       Not today:   Seated hip adduction squeeze x 3 minutes   Seated hip abduction with red theraband x 3 minutes   Seated marches x 3 minutes   Modified straight leg raises 3 x 10 repetitions     manual therapy techniques: Joint mobilizations were applied to the:  for 0 minutes, including:      neuromuscular re-education activities to improve: Balance, Coordination, Kinesthetic, Sense, Proprioception, and Posture for 0 minutes. The following activities were included:      therapeutic activities to improve functional performance for 15 minutes, including:    Standing hip abduction 3 x 10 repetitions   Standing hip extension 3 x 10 repetitions   Sit to stands 3 x 10 repetitions without upper extremity support       gait training to improve functional mobility and safety for 0  minutes, including:      Patient Education and Home Exercises       Education provided:   - Home Exercise Program     Written Home Exercises Provided: yes. Exercises were reviewed and Yuniel was able to demonstrate them prior to the end of the session.  Yuniel demonstrated good  understanding of the education provided. See Electronic Medical Record under Patient Instructions for exercises provided during therapy sessions    Assessment     Yuniel has been seen for a total of 6 visits over the past month and demonstrates significant improvements in Right hip Range of Motion, lower extremity strength, and gait speed. PT discussed plan of care with patient and her , we are in agreement that she is appropriate for discharge to Home Exercise Program.     Yuniel Is progressing well towards her goals.   Patient prognosis is Good.     Patient will continue to benefit from skilled outpatient physical therapy to address the deficits listed in the problem list box on initial evaluation, provide pt/family  education and to maximize pt's level of independence in the home and community environment.     Patient's spiritual, cultural and educational needs considered and pt agreeable to plan of care and goals.     Anticipated barriers to physical therapy: dementia, age    Goals:       Short Term Goals: 4 weeks   Pt will be IND with initial HEP to manage symptoms outside of PT. MET  Pt will report Right hip pain improved by >/= 50% with household ambulation to demonstrate improved condition. MET  Pt will improve MMT of lower extremity strength deficits by >/= 1/5 to improve tolerance for progressing rehab. MET  Pt will improve Right hip flexion ROM by >= 10 degrees to improve tolerance for sitting.MET  Pt will perform >/= 8 reps on 30s sit to stand to demonstrate improved lower extremity strength/endurance. MET  Pt will perform TUG in </= 30s using RW to demonstrate improved safety and independence with gait  MET     Long Term Goals: 8 weeks   Pt will improve FOTO score to >/= 62 to demonstrate improved functional mobility. MET  Pt will be IND with final HEP to maintain/improve strength and mobility gained in PT. MET  Pt will report Right hip pain improved by >/= 75% with ambulation to demonstrate improved condition. MET  Pt will improve MMT of lower extremity strength deficits to >/= 4/ 5 to improve tolerance for activities of daily living. Not MET  Pt will improve Right hip flexion ROM = to uninvolved side to improve tolerance for sitting. MET  Pt will perform >/= 10 reps on 30s sit to stand to demonstrate improved lower extremity strength/endurance. MET  Pt will perform TUG in </= 25s using least restrictive assistive device to demonstrate improved safety and independence with gait  MET  Pt goal: Pt and her  will report confidence in managing her condition upon discharge from PT.MET    Plan     Discharge Summary     Date of Last visit: 01/23/2024  Total Visits Received: 6        Assessment    Goals:  MET    Discharge reason: Patient has met all of his/her goals    Plan   This patient is discharged from Physical Therapy and is to cont with their HEP and MD follow up PRN.        Thad Chang, PT, DPT   Board Certified Clinical Specialist in Orthopedic Physical Therapy  Board Certified Clinical Specialist in Sports Physical Therapy

## 2024-01-26 ENCOUNTER — HOSPITAL ENCOUNTER (OUTPATIENT)
Dept: RADIOLOGY | Facility: HOSPITAL | Age: 79
Discharge: HOME | End: 2024-01-26
Attending: INTERNAL MEDICINE
Payer: MEDICARE

## 2024-01-26 DIAGNOSIS — C50.311 MALIGNANT NEOPLASM OF LOWER-INNER QUADRANT OF RIGHT FEMALE BREAST, UNSPECIFIED ESTROGEN RECEPTOR STATUS: ICD-10-CM

## 2024-01-26 DIAGNOSIS — E78.2 MIXED HYPERLIPIDEMIA: ICD-10-CM

## 2024-01-26 DIAGNOSIS — D69.6 THROMBOCYTOPENIA: ICD-10-CM

## 2024-01-26 PROCEDURE — 77066 DX MAMMO INCL CAD BI: CPT | Mod: TC

## 2024-01-26 PROCEDURE — 77062 BREAST TOMOSYNTHESIS BI: CPT | Mod: 26,,, | Performed by: RADIOLOGY

## 2024-01-26 PROCEDURE — 77066 DX MAMMO INCL CAD BI: CPT | Mod: 26,,, | Performed by: RADIOLOGY

## 2024-01-26 RX ORDER — SOLIFENACIN SUCCINATE 5 MG/1
5 TABLET, FILM COATED ORAL
Qty: 90 TABLET | Refills: 3 | Status: SHIPPED | OUTPATIENT
Start: 2024-01-26

## 2024-01-26 NOTE — TELEPHONE ENCOUNTER
Care Due:                  Date            Visit Type   Department     Provider  --------------------------------------------------------------------------------                                EP -                              PRIMARY      [facility]  Last Visit: 07-      CARE ([facility])   MEDICINE       Bhaskar Olivera  Next Visit: None Scheduled  None         None Found                                                            Last  Test          Frequency    Reason                     Performed    Due Date  --------------------------------------------------------------------------------    Office Visit  15 months..  solifenacin..............  07-   10-    [facility] Embedded Care Due Messages. Reference number: [id number].   1/26/2024 9:45:36 AM CST

## 2024-01-26 NOTE — TELEPHONE ENCOUNTER
Please see the attached refill request.    Last visit with you 7/19/22  Next appt none  Pt has see HUSSEIN YANG last year

## 2024-01-27 ENCOUNTER — PATIENT MESSAGE (OUTPATIENT)
Dept: CARDIOLOGY | Facility: CLINIC | Age: 79
End: 2024-01-27
Payer: MEDICARE

## 2024-02-09 ENCOUNTER — OFFICE VISIT (OUTPATIENT)
Dept: CARDIOLOGY | Facility: CLINIC | Age: 79
End: 2024-02-09
Payer: MEDICARE

## 2024-02-09 VITALS
DIASTOLIC BLOOD PRESSURE: 60 MMHG | OXYGEN SATURATION: 100 % | HEIGHT: 64 IN | SYSTOLIC BLOOD PRESSURE: 122 MMHG | BODY MASS INDEX: 24.81 KG/M2 | WEIGHT: 145.31 LBS | HEART RATE: 75 BPM

## 2024-02-09 DIAGNOSIS — E78.2 MIXED HYPERLIPIDEMIA: Primary | ICD-10-CM

## 2024-02-09 DIAGNOSIS — I10 ESSENTIAL (PRIMARY) HYPERTENSION: ICD-10-CM

## 2024-02-09 DIAGNOSIS — I73.00 RAYNAUD'S PHENOMENON WITHOUT GANGRENE: ICD-10-CM

## 2024-02-09 DIAGNOSIS — F03.918 DEMENTIA WITH BEHAVIORAL DISTURBANCE: ICD-10-CM

## 2024-02-09 PROCEDURE — 99999 PR PBB SHADOW E&M-EST. PATIENT-LVL IV: CPT | Mod: PBBFAC,,, | Performed by: INTERNAL MEDICINE

## 2024-02-09 PROCEDURE — 99214 OFFICE O/P EST MOD 30 MIN: CPT | Mod: PBBFAC,PN | Performed by: INTERNAL MEDICINE

## 2024-02-09 PROCEDURE — 99212 OFFICE O/P EST SF 10 MIN: CPT | Mod: S$PBB,,, | Performed by: INTERNAL MEDICINE

## 2024-02-09 RX ORDER — AMLODIPINE BESYLATE 2.5 MG/1
2.5 TABLET ORAL DAILY
Qty: 90 TABLET | Refills: 3 | Status: SHIPPED | OUTPATIENT
Start: 2024-02-09

## 2024-02-09 NOTE — PROGRESS NOTES
"     Patient ID:  Yuniel Gracia is a 78 y.o. female who presents for follow-up of Hypertension and Hyperlipidemia      She has been doing very well denies any chest pains any shortness of breath.  She is taking amlodipine 2.5 mg daily for her Guad[place]ama.  Otherwise she is doing okay.  She has dementia and is on a medication to help her.        Past Medical History:   Diagnosis Date    Anemia     Arthritis     Blood transfusion     Breast cancer     Cancer RIGHT BREAST    Chronic constipation     Clotting disorder     Colon polyp     Diverticulosis     Glaucoma     Hepatitis C 2000    pt states history of hepatits c-"cured by Dr. Lynch"; TREATED 2 YEARS - 2000   OK NOW    Hypothyroid     Insomnia     Malignant neoplasm of breast 4/12/2012    Memory loss     reports some memory loss since chemo    Osteoarthritis     Panic attacks     Raynaud's disease     takes amlodipine for this    Ulcer     Vertigo         Past Surgical History:   Procedure Laterality Date    APPENDECTOMY      BLADDER SURGERY  2011    sling - Dr Giles  - [facility]    BRAIN SURGERY  2007    temporal neuralgia - [place]    BREAST BIOPSY      BREAST LUMPECTOMY  03/2012    malignant - had chemo and radiotherapy    CATARACT EXTRACTION      COLONOSCOPY  01/03/2011    Dr Lynch, in media section, diverticulosis, hemorrhoids, otherwise normal findings; normal findings on random biopsies    COLONOSCOPY N/A 06/01/2017    Procedure: COLONOSCOPY;  Surgeon: Nate Lamb MD;  Location: [facility];  Service: Endoscopy;  Laterality: N/A; hemorrhoids, diverticulosis, 1 colon polyp removed; Repeat colonoscopy in 5 years for surveillance; biopsy: Tubular adenoma, random biopsies unremarkable    ESOPHAGOGASTRODUODENOSCOPY N/A 07/20/2022    Procedure: EGD (ESOPHAGOGASTRODUODENOSCOPY);  Surgeon: Nate Lamb MD;  Location: [facility];  Service: Endoscopy;  Laterality: N/A;    EYE SURGERY      cataracts bilateral    HYSTERECTOMY      INTRAMEDULLARY " "RODDING OF TROCHANTER OF FEMUR Right 12/7/2023    Procedure: INSERTION, INTRAMEDULLARY ZOHREH, FEMUR, TROCHANTER;  Surgeon: Geoff Rios MD;  Location: [facility];  Service: Orthopedics;  Laterality: Right;    JOINT REPLACEMENT  09/25/2017    left Knee [facility]     removal of port a cath      right lymph node removed from breast area      TUNNELED VENOUS PORT PLACEMENT  04/2012    left chest    UPPER GASTROINTESTINAL ENDOSCOPY  prior to 2011    small hiatal hernia          Current Outpatient Medications   Medication Instructions    acetaminophen (TYLENOL) 650 mg, Oral, Every 6 hours PRN    amLODIPine (NORVASC) 2.5 mg, Oral, Daily    bimatoprost (LUMIGAN) 0.01 % Drop 1 drop, Both Eyes, Nightly    CALCIUM CARBONATE/VITAMIN D3 (VITAMIN D-3 ORAL) 1 tablet, Oral, Daily    levothyroxine (SYNTHROID) 112 mcg, Oral, Before breakfast    rivastigmine (EXELON) 4.6 mg/24 hour PT24 1 patch, Transdermal, Daily    solifenacin (VESICARE) 5 mg, Oral        Review of patient's allergies indicates:   Allergen Reactions    Iodinated contrast media Shortness Of Breath     Spontaneous jaw movements    Codeine Nausea Only        Review of Systems   Eyes:  Negative for blurred vision.   Cardiovascular:  Negative for chest pain, dyspnea on exertion and palpitations.   Respiratory:  Negative for cough and shortness of breath.         Objective:     Vitals:    02/09/24 1130   BP: 122/60   BP Location: Left arm   Patient Position: Sitting   BP Method: Medium (Manual)   Pulse: 75   SpO2: 100%   Weight: 65.9 kg (145 lb 4.5 oz)   Height: 5' 4" (1.626 m)       Physical Exam  Vitals and nursing note reviewed.   Constitutional:       Appearance: She is well-developed.   HENT:      Head: Normocephalic and atraumatic.   Eyes:      Conjunctiva/sclera: Conjunctivae normal.   Cardiovascular:      Rate and Rhythm: Normal rate and regular rhythm.      Heart sounds: Normal heart sounds.   Pulmonary:      Effort: Pulmonary effort is normal.     "  Breath sounds: Normal breath sounds.   Abdominal:      General: Bowel sounds are normal.      Palpations: Abdomen is soft.   Musculoskeletal:         General: Normal range of motion.   Skin:     General: Skin is warm and dry.   Neurological:      Mental Status: She is alert and oriented to person, place, and time.   Psychiatric:         Behavior: Behavior normal.         Thought Content: Thought content normal.         Judgment: Judgment normal.       CMP  Sodium   Date Value Ref Range Status   01/26/2024 140 136 - 145 mmol/L Final     Potassium   Date Value Ref Range Status   01/26/2024 5.0 3.5 - 5.1 mmol/L Final     Chloride   Date Value Ref Range Status   01/26/2024 104 95 - 110 mmol/L Final     CO2   Date Value Ref Range Status   01/26/2024 27 23 - 29 mmol/L Final     Glucose   Date Value Ref Range Status   01/26/2024 76 70 - 110 mg/dL Final     BUN   Date Value Ref Range Status   01/26/2024 20 8 - 23 mg/dL Final     Creatinine   Date Value Ref Range Status   01/26/2024 1.0 0.5 - 1.4 mg/dL Final   07/27/2012 0.8 0.2 - 1.4 mg/dl Final     Calcium   Date Value Ref Range Status   01/26/2024 10.0 8.7 - 10.5 mg/dL Final   07/27/2012 8.4 (L) 8.6 - 10.2 mg/dl Final     Total Protein   Date Value Ref Range Status   01/26/2024 7.3 6.0 - 8.4 g/dL Final     Albumin   Date Value Ref Range Status   01/26/2024 3.7 3.5 - 5.2 g/dL Final     Total Bilirubin   Date Value Ref Range Status   01/26/2024 0.7 0.1 - 1.0 mg/dL Final     Comment:     For infants and newborns, interpretation of results should be based  on gestational age, weight and in agreement with clinical  observations.    Premature Infant recommended reference ranges:  Up to 24 hours.............<8.0 mg/dL  Up to 48 hours............<12.0 mg/dL  3-5 days..................<15.0 mg/dL  6-29 days.................<15.0 mg/dL       Alkaline Phosphatase   Date Value Ref Range Status   01/26/2024 109 55 - 135 U/L Final   07/25/2012 64 23 - 119 UNIT/L Final     AST   Date  Value Ref Range Status   01/26/2024 22 10 - 40 U/L Final   07/25/2012 19 10 - 30 UNIT/L Final     ALT   Date Value Ref Range Status   01/26/2024 12 10 - 44 U/L Final     Anion Gap   Date Value Ref Range Status   01/26/2024 9 8 - 16 mmol/L Final   07/27/2012 7 5 - 15 meq/L Final     eGFR if    Date Value Ref Range Status   06/22/2022 >60 >60 mL/min/1.73 m^2 Final     eGFR if non    Date Value Ref Range Status   06/22/2022 >60 >60 mL/min/1.73 m^2 Final     Comment:     Calculation used to obtain the estimated glomerular filtration  rate (eGFR) is the CKD-EPI equation.         BMP  Lab Results   Component Value Date     01/26/2024    K 5.0 01/26/2024     01/26/2024    CO2 27 01/26/2024    BUN 20 01/26/2024    CREATININE 1.0 01/26/2024    CALCIUM 10.0 01/26/2024    ANIONGAP 9 01/26/2024    ESTGFRAFRICA >60 06/22/2022    EGFRNONAA >60 06/22/2022      BNP  @LABRCNTIP(BNP,BNPTRIAGEBLO)@   Lab Results   Component Value Date    CHOL 167 11/11/2020    CHOL 173 03/13/2019    CHOL 173 09/04/2018     Lab Results   Component Value Date    HDL 58 11/11/2020    HDL 52 03/13/2019    HDL 47 09/04/2018     Lab Results   Component Value Date    LDLCALC 94.6 11/11/2020    LDLCALC 105.4 03/13/2019    LDLCALC 103.2 09/04/2018     Lab Results   Component Value Date    TRIG 72 11/11/2020    TRIG 78 03/13/2019    TRIG 114 09/04/2018     Lab Results   Component Value Date    CHOLHDL 34.7 11/11/2020    CHOLHDL 30.1 03/13/2019    CHOLHDL 27.2 09/04/2018      Lab Results   Component Value Date    TSH 0.592 01/06/2023    FREET4 1.04 09/19/2022     Lab Results   Component Value Date    HGBA1C 5.5 03/11/2019     Lab Results   Component Value Date    WBC 3.67 (L) 01/26/2024    HGB 12.4 01/26/2024    HCT 37.9 01/26/2024    MCV 89 01/26/2024     01/26/2024         No results found for this or any previous visit.     No results found for this or any previous visit.         Assessment:       Raynaud's  phenomenon without gangrene  Treated with amlodipine 2.5 mg daily    Dementia with behavioral disturbance  Aware       Plan:       Continue amlodipine 2.5 mg p.o. for her Guad[place]ama otherwise she will be seen in the office in 1 year

## 2024-02-20 ENCOUNTER — OUTPATIENT CASE MANAGEMENT (OUTPATIENT)
Dept: NEUROLOGY | Facility: CLINIC | Age: 79
End: 2024-02-20
Payer: MEDICARE

## 2024-02-20 NOTE — PROGRESS NOTES
"Care Ecosystem Dementia Care Management Plan - Discharge      Assigned Care Team Navigator: Ruthann Dunn LCSW  Referring Provider: Griselda Cuevas  Primary Care: Bhaskar Olivera MD         Patient Demographic Information      Name: Yuniel Gracia  Preferred Name: Yuniel  MRN: [id number]  : 1945  Age: 77 y.o.  Gender: female  Race: White  Ethnicity: Not  or /a  Level of Education: High School graduate, or the equivalent   Occupational Status/History: Housewife     Communication Preferences      Language: English   Please contact primary caregiver by Phone for scheduling purposes.   Please send information and forms via E-mail     Caregiver(s) and Social History      Family Status: 3 adult sons--oldest son lives with pt/CG with his two adult sons.  Another son is moving to [place], but staying with pt/CG, another son lives about 5 miles from pt/CG.  Family is supportive and close.    Current Living Situation: Spouse and see above  Support System: [employer] community, [facility]  and other [place] involvement, couple plays cards with couples group every Thursday.    Patient Hobbies/Activities: Tablet, Facebook, likes to go for rides.   Patient Stressors (e.g., Pain): No  Current Programs/Services: No        Caregiver Name  Role Relationship Main Contact #     Lul Carlson  Primary Spouse [phone]                                          Medical History      Providers   (Relevant to dementia care) Dr. Bhaskar Olivera, Dr. Griselda Cuevas   Types of help wanted   (at baseline) Information about dementia and what to expect in the future, Caregiving strategies for helping Test do as much as he/she still can, Ideas for managing behavior symptoms, and Help with back-up or crisis planning   Concerns and Goals   (from caregiver and PWD) Pt/CG are very stable and "happy" right now.  They are working on getting their older son out of their home and into an apt.     Type of Dementia and Stage  (all that " apply) Dementia Type: Unspecified Dementia  Stage: Mild  MMSE    Date: 12/22       No flowsheet data found.         Medication Review (at baseline)   Medication reviewed with caregiver Yes.   Information is based off patient chart and patient and/or caregiver self-report as of (02/20/2024)  *Make note of any changes to current medication list.*   Care [facility] Medication Review - PharmD             Current Outpatient Medications on File Prior to Visit   Medication Sig     Amlodipine 2.5mg tablet Take 1 tablet by mouth daily    bimatoprost (LUMIGAN) 0.01 % Drop Place 1 drop into both eyes every evening.    CALCIUM CARBONATE/VITAMIN D3 (VITAMIN D-3 ORAL) Take 400 Units by mouth daily as needed.    co-enzyme Q-10 30 mg capsule Take 30 mg by mouth once daily.    levothyroxine (SYNTHROID) 112 MCG tablet Take 1 tablet (112 mcg total) by mouth before breakfast.     rivastigmine (EXELON) 4.6 mg/24 hour PT24    1 patch, Transdermal, Daily       sertraline (ZOLOFT) 50 MG tablet Take 1 tablet (50 mg total) by mouth once daily.    solifenacin (VESICARE) 5 MG tablet TAKE 1 TABLET DAILY                                                                                                Over the counter medications: Multi vitamin for seniors, Chromium picolinate  Vitamins, supplements: See above  Caffeine, alcohol, tobacco, marijuana products: Coffee, diet coke        THE FOLLOWING CONCERNS WERE IDENTIFIED:  Medication management: No  Access/cost: No  Side effects: Yes - Dementia meds cause abdominal cramping and nausea  Recent changes: Yes - rivastigmine added in the last three months with reduction in side effects  Polypharmacy (>9 routine prescription medications?): No  Behaviors/Sleep: No  Other symptoms (falls/incontinence/diarrhea/swelling/itching/weight loss): No        PLAN:  CTN to send medication list to PharmD this week. Review will be addended by PharmD.   CTN will route pharmacist recommendations to Bhaskar Olivera MD  "  CTN will share and discuss pharmacist recommendations with caregiver during next scheduled call.            Advanced Care Planning       Living Will: No:        Copy on chart: no  La[place]: No:      Medical Power of : Yes      Copy on chart: yes   Financial Power of : Yes      Copy on chart: no   Agent's Name: Lul Carlson         Goals of Care       Patient Values: [place] and Engage in Activities     Future Care Plans:   - Long term care goal: Home  - Resources available to pay for care: Public Benefits private pay        Current Concerns - Discharge      Primary Concern(s)  (Immediate needs) Getting older son and his sons an apt.  Pt and CG are stable    Cognitive Concerns  (Include decision making capacity) Short term memory is "bad." She has trouble with word finding.    Primary Behavior Concerns  (Symptoms, triggers, strategies) Things are stable right now.     Functional  (Include PWD daily routine and sensory - vision/hearing - information) Stable currently         LA-GW 2/15/2023   Memory and Recall Moderate to severe memory loss, only highly learned information remembered, new information rapidly forgotten   Orientation Slight difficulty keeping track of time, may forget day or date more frequently than in the past   Decision Making and Problem Solving Abilities Moderate difficulty with handling problems and making decisions, defers many decisions to others, social judgment and behavior may be slightly impaired, loss of insight   Activities Outside the Home Unable to function independently but still may attend and be engaged, appears "normal" to others, notable changes in driving skills, concern about ability to handle emergency situations   Function at Home and Hobby Activities Mild but definite impairment in home and hobby function, more difficult chores or tasks abandoned, more complicated hobbies and interests given up   Toileting and Personal Hygeine Fully capable of " self-care (dressing, grooming, washing, bathing, toileting)   Behavior and Personality Changes Socially appropriate behavior in public and private, no changes in personality   Language and Communication Abilities Consistent mild word finding difficulties, using descriptive terms or takes longer to get point across, mild problems with comprehension, decreased conversation, may affect reading and writing   Mood No changes in mood, interest or motivation level   Attention and Concentration Normal attention, concentration and interaction with his/her environment and surroundings         NPIQ RFS 2/15/2023   WHO IS FILLING OUT FORM? Both   Does this patient have false beliefs, such as thinking that others are stealing from him/her or planning to harm him/her in some way? No   Does this patient have hallucinations such as false visions or voices? Summers she/he seem to hear or see things that are not present? No   Is the patient resistive to help from others at times, or hard to handle? Yes   Agitation Agression Severity 1   Agitation/Agression Distress 1   Does the patient seem sad or say that he/she is depressed? No   Does the patient become upset when  from you? Does he/she have any other signs of nervousness such as shortness of breath, sighing, being unable tor elax, or feeling excessively tense? Yes   Anxiety Severity 1   Anxiety Distress 1   Does the patient appear to feel good or act excessively happy? No   Does this patient seem less interested in his/her usual activities or in the activities and plans of others? No   Does this patient seem to act cumpolsively, for example, talking to strangers as if she/he knows them, or saying things that may hurt people's feelings? No   Is the patient impatient and cranky? Does he/she have difficulty coping with delays or waiting for planned activities? No   Does the patient engage in repetitive activities such as pacing around the house, handling buttons, wrapping  string, or doing other things repeatedly? Yes   Motor Disturbance Severity 1   Motor Disturbance Distress 1   Does this patient awaken you during the night, rise too early in the morning, or take excessive naps during the day? No   Has the patient lost or gained weight, or had a change in the type of food he/she likes? No   NPI Total Severity Score 3   NPI Total Distress Score 3               CTN Plan & Recommendations      CTN provided the following resources/recommendations for: Continue to provide excellent care for pt, and continue to cultivate outside relationships and activities for self-care.

## 2024-02-28 NOTE — PROGRESS NOTES
"Protocol: Care Ecosystem: [facility]  Identifier: [id number]  IRB#: 2018.241  PI: Dr. Kee Garcia, PhD  Pt Study ID: [id number]  Visit Month: 12    Timeline:   Consent: Completed(2/15/2023)  Baseline questionnaires: Completed(2/15/2023)  6-month questionnaires: Completed(8/23/2023)  12-month questionnaires: Completed(2/28/2024)      Care Ecosystem Follow-up Surveys:  CTHOLLEY Oakes completed 12 month surveys with caregiver Lul (Spouse) on (2/28/2024).   Surveys documented in RedCap and Epic Flowsheets.      ClinCard sent/loaded: yes - caregiver still had ClinCard from 6-month survey call, CTN reloaded that card      Patient and caregiver has successfully completed the Care Ecosystem Program protocol and will be taken off study. CTN provided caregiver with their contact information, and caregiver was encouraged to stay in touch and check in with the Care Eco team as needed, the team remains available to provide extra help and support. All questions were answered, caregiver expressed understanding.     Care Ecosystem Protocol closed in all platforms -Epic, Quippi, and WhoJam-. Patient and caregiver will be kept under our files as "Graduated".     "

## 2024-03-07 ENCOUNTER — OFFICE VISIT (OUTPATIENT)
Dept: FAMILY MEDICINE | Facility: CLINIC | Age: 79
End: 2024-03-07
Payer: MEDICARE

## 2024-03-07 VITALS
BODY MASS INDEX: 25.06 KG/M2 | HEIGHT: 64 IN | WEIGHT: 146.81 LBS | HEART RATE: 61 BPM | DIASTOLIC BLOOD PRESSURE: 60 MMHG | OXYGEN SATURATION: 98 % | TEMPERATURE: 98 F | SYSTOLIC BLOOD PRESSURE: 108 MMHG

## 2024-03-07 DIAGNOSIS — Z00.00 ENCOUNTER FOR PREVENTIVE HEALTH EXAMINATION: Primary | ICD-10-CM

## 2024-03-07 DIAGNOSIS — Z74.09 OTHER REDUCED MOBILITY: ICD-10-CM

## 2024-03-07 DIAGNOSIS — I70.0 AORTIC CALCIFICATION: ICD-10-CM

## 2024-03-07 DIAGNOSIS — F03.918 DEMENTIA WITH BEHAVIORAL DISTURBANCE: ICD-10-CM

## 2024-03-07 DIAGNOSIS — M25.512 ACUTE PAIN OF LEFT SHOULDER: ICD-10-CM

## 2024-03-07 DIAGNOSIS — Z99.89 DEPENDENCE ON OTHER ENABLING MACHINES AND DEVICES: ICD-10-CM

## 2024-03-07 PROCEDURE — 99999 PR PBB SHADOW E&M-EST. PATIENT-LVL IV: CPT | Mod: PBBFAC,,, | Performed by: NURSE PRACTITIONER

## 2024-03-07 PROCEDURE — 99214 OFFICE O/P EST MOD 30 MIN: CPT | Mod: PBBFAC,PO | Performed by: NURSE PRACTITIONER

## 2024-03-07 PROCEDURE — G0439 PPPS, SUBSEQ VISIT: HCPCS | Mod: ,,, | Performed by: NURSE PRACTITIONER

## 2024-03-07 NOTE — PATIENT INSTRUCTIONS
Counseling and Referral of Other Preventative  (Italic type indicates deductible and co-insurance are waived)    Patient Name: Yuniel Gracia  Today's Date: 3/7/2024    Health Maintenance       Date Due Completion Date    RSV Vaccine (Age 60+ and Pregnant patients) (1 - 1-dose 60+ series) Never done ---    Hemoglobin A1c (Prediabetes) 03/11/2020 3/11/2019    Lipid Panel 11/11/2021 11/11/2020    Colonoscopy 06/01/2022 6/1/2017    Override on 11/20/2011: Done (Dr Vinh craig 6 years )    Override on 1/3/2011: Done (Dr Lynch )    COVID-19 Vaccine (9 - 2023-24 season) 02/05/2024 12/11/2023    DEXA Scan 08/24/2025 8/24/2022    TETANUS VACCINE 02/15/2030 2/15/2020        Orders Placed This Encounter   Procedures    Ambulatory referral/consult to Orthopedics     The following information is provided to all patients.  This information is to help you find resources for any of the problems found today that may be affecting your health:                  Living healthy guide: www.[facility].[place].gov      Understanding Diabetes: www.diabetes.org      Eating healthy: www.cdc.gov/healthyweight      CDC home safety checklist: www.cdc.gov/steadi/patient.html      Agency on Aging: www.goea.[place].[facility]      Alcoholics anonymous (AA): www.aa.org      Physical Activity: www.fran.nih.gov/fw9fotv      Tobacco use: www.quitwithusla.org

## 2024-03-07 NOTE — PROGRESS NOTES
"  Yuniel Gracia presented for a  Medicare AWV and comprehensive Health Risk Assessment today. The following components were reviewed and updated:    Medical history  Family History  Social history  Allergies and Current Medications  Health Risk Assessment  Health Maintenance  Care Team         ** See Completed Assessments for Annual Wellness Visit within the encounter summary.**         The following assessments were completed:  Living Situation  CAGE  Depression Screening  Timed Get Up and Go  Whisper Test  Cognitive Function Screening  Nutrition Screening  ADL Screening  PAQ Screening    Clock in media   Opioid documentation:      Patient does not have a current opioid prescription.        Vitals:    03/07/24 1416   BP: 108/60   Pulse: 61   Temp: 98.2 °F (36.8 °C)   TempSrc: Oral   SpO2: 98%   Weight: 66.6 kg (146 lb 13.2 oz)   Height: 5' 4" (1.626 m)     Body mass index is 25.2 kg/m².  Physical Exam  Constitutional:       Appearance: She is well-developed.   HENT:      Head: Normocephalic and atraumatic.      Nose: Nose normal.   Eyes:      General: Lids are normal.      Conjunctiva/sclera: Conjunctivae normal.   Cardiovascular:      Rate and Rhythm: Normal rate.   Pulmonary:      Effort: Pulmonary effort is normal.   Neurological:      Mental Status: She is alert and oriented to person, place, and time.   Psychiatric:         Speech: Speech normal.         Behavior: Behavior normal.               Diagnoses and health risks identified today and associated recommendations/orders:    1. Encounter for preventive health examination  Discussed health maintenance guidelines appropriate for age.        2. Acute pain of left shoulder    - Ambulatory referral/consult to Orthopedics; Future    3. Dementia with behavioral disturbance  Stable, continue current medication  Followed by neuropsych     4. Aortic calcification  Stable, continue to monitor   Followed by pcp     5. Dependence on other enabling machines and " devices  Continue use of assistive device     6. Other reduced mobility  Continue use of assistive device       Provided Yuniel with a 5-10 year written screening schedule and personal prevention plan. Recommendations were developed using the USPSTF age appropriate recommendations. Education, counseling, and referrals were provided as needed. After Visit Summary printed and given to patient which includes a list of additional screenings\tests needed.    Follow up for One year for Annual Wellness Visit.    Gay Gaines, NP    I offered to discuss advanced care planning, including how to pick a person who would make decisions for you if you were unable to make them for yourself, called a health care power of , and what kind of decisions you might make such as use of life sustaining treatments such as ventilators and tube feeding when faced with a life limiting illness recorded on a living will that they will need to know. (How you want to be cared for as you near the end of your natural life)     X  Patient has advanced directives on file, which we reviewed, and they do not wish to make changes.

## 2024-03-15 DIAGNOSIS — M25.512 LEFT SHOULDER PAIN, UNSPECIFIED CHRONICITY: Primary | ICD-10-CM

## 2024-03-18 ENCOUNTER — OFFICE VISIT (OUTPATIENT)
Dept: ORTHOPEDICS | Facility: CLINIC | Age: 79
End: 2024-03-18
Payer: MEDICARE

## 2024-03-18 ENCOUNTER — HOSPITAL ENCOUNTER (OUTPATIENT)
Dept: RADIOLOGY | Facility: HOSPITAL | Age: 79
Discharge: HOME | End: 2024-03-18
Attending: ORTHOPAEDIC SURGERY
Payer: MEDICARE

## 2024-03-18 VITALS — HEIGHT: 64 IN | BODY MASS INDEX: 25.06 KG/M2 | WEIGHT: 146.81 LBS

## 2024-03-18 DIAGNOSIS — M25.512 ACUTE PAIN OF LEFT SHOULDER: ICD-10-CM

## 2024-03-18 DIAGNOSIS — M75.82 ROTATOR CUFF TENDINITIS, LEFT: Primary | ICD-10-CM

## 2024-03-18 DIAGNOSIS — M25.512 LEFT SHOULDER PAIN, UNSPECIFIED CHRONICITY: ICD-10-CM

## 2024-03-18 PROCEDURE — 20610 DRAIN/INJ JOINT/BURSA W/O US: CPT | Mod: PBBFAC,PO,LT | Performed by: ORTHOPAEDIC SURGERY

## 2024-03-18 PROCEDURE — 73030 X-RAY EXAM OF SHOULDER: CPT | Mod: 26,LT,, | Performed by: RADIOLOGY

## 2024-03-18 PROCEDURE — 99999PBSHW PR PBB SHADOW TECHNICAL ONLY FILED TO HB: Mod: PBBFAC,,,

## 2024-03-18 PROCEDURE — 99999 PR PBB SHADOW E&M-EST. PATIENT-LVL III: CPT | Mod: PBBFAC,,, | Performed by: ORTHOPAEDIC SURGERY

## 2024-03-18 PROCEDURE — 73030 X-RAY EXAM OF SHOULDER: CPT | Mod: TC,PO,LT

## 2024-03-18 PROCEDURE — 99213 OFFICE O/P EST LOW 20 MIN: CPT | Mod: S$PBB,25,, | Performed by: ORTHOPAEDIC SURGERY

## 2024-03-18 PROCEDURE — 99213 OFFICE O/P EST LOW 20 MIN: CPT | Mod: PBBFAC,25,PO | Performed by: ORTHOPAEDIC SURGERY

## 2024-03-18 RX ORDER — METHYLPREDNISOLONE ACETATE 80 MG/ML
80 INJECTION, SUSPENSION INTRA-ARTICULAR; INTRALESIONAL; INTRAMUSCULAR; SOFT TISSUE
Status: DISCONTINUED | OUTPATIENT
Start: 2024-03-18 | End: 2024-03-18 | Stop reason: HOSPADM

## 2024-03-18 RX ADMIN — METHYLPREDNISOLONE ACETATE 80 MG: 80 INJECTION, SUSPENSION INTRA-ARTICULAR; INTRALESIONAL; INTRAMUSCULAR; SOFT TISSUE at 01:03

## 2024-03-18 NOTE — PROCEDURES
Large Joint Aspiration/Injection: L glenohumeral    Date/Time: 3/18/2024 1:00 PM    Performed by: Jae Ramachandran II, MD  Authorized by: Jae Ramachandran II, MD    Consent Done?:  Yes (Verbal)  Indications:  Pain    Local anesthesia used?: Yes    Local anesthetic:  Topical anesthetic    Details:  Needle Size:  22 G  Approach:  Posterior  Location:  Shoulder  Site:  L glenohumeral  Medications:  80 mg methylPREDNISolone acetate 80 mg/mL  Patient tolerance:  Patient tolerated the procedure well with no immediate complications

## 2024-03-18 NOTE — PROGRESS NOTES
"CC:  78-year-old female presents for evaluation of left shoulder pain.  The patient states she has had pain in the left shoulder for about 6 weeks.  She states that she fell back in December of last year she has not sure if the pain is from the fall up from some other reason.  She currently rates her pain as a 3/10.    Past Medical History:   Diagnosis Date    Anemia     Arthritis     Blood transfusion     Breast cancer     Cancer RIGHT BREAST    Chronic constipation     Clotting disorder     Colon polyp     Diverticulosis     Glaucoma     Hepatitis C 2000    pt states history of hepatits c-"cured by Dr. Lynch"; TREATED 2 YEARS - 2000   OK NOW    Hypothyroid     Insomnia     Malignant neoplasm of breast 4/12/2012    Memory loss     reports some memory loss since chemo    Osteoarthritis     Panic attacks     Raynaud's disease     takes amlodipine for this    Ulcer     Vertigo        Past Surgical History:   Procedure Laterality Date    APPENDECTOMY      BLADDER SURGERY  2011    sling - Dr Giles  - [facility]    BRAIN SURGERY  2007    temporal neuralgia - [place]    BREAST BIOPSY      BREAST LUMPECTOMY  03/2012    malignant - had chemo and radiotherapy    CATARACT EXTRACTION      COLONOSCOPY  01/03/2011    Dr Lynch, in media section, diverticulosis, hemorrhoids, otherwise normal findings; normal findings on random biopsies    COLONOSCOPY N/A 06/01/2017    Procedure: COLONOSCOPY;  Surgeon: Nate Lamb MD;  Location: [facility];  Service: Endoscopy;  Laterality: N/A; hemorrhoids, diverticulosis, 1 colon polyp removed; Repeat colonoscopy in 5 years for surveillance; biopsy: Tubular adenoma, random biopsies unremarkable    ESOPHAGOGASTRODUODENOSCOPY N/A 07/20/2022    Procedure: EGD (ESOPHAGOGASTRODUODENOSCOPY);  Surgeon: Nate Lamb MD;  Location: [facility];  Service: Endoscopy;  Laterality: N/A;    EYE SURGERY      cataracts bilateral    HYSTERECTOMY      INTRAMEDULLARY RODDING OF TROCHANTER OF FEMUR " Right 12/7/2023    Procedure: INSERTION, INTRAMEDULLARY ZOHREH, FEMUR, TROCHANTER;  Surgeon: Geoff Rios MD;  Location: [facility];  Service: Orthopedics;  Laterality: Right;    JOINT REPLACEMENT  09/25/2017    left Knee [facility]     removal of port a cath      right lymph node removed from breast area      TUNNELED VENOUS PORT PLACEMENT  04/2012    left chest    UPPER GASTROINTESTINAL ENDOSCOPY  prior to 2011    small hiatal hernia       Current Outpatient Medications on File Prior to Visit   Medication Sig Dispense Refill    acetaminophen (TYLENOL) 325 MG tablet Take 650 mg by mouth every 6 (six) hours as needed.      amLODIPine (NORVASC) 2.5 MG tablet Take 1 tablet (2.5 mg total) by mouth once daily. 90 tablet 3    bimatoprost (LUMIGAN) 0.01 % Drop Place 1 drop into both eyes every evening. 7.5 mL 3    CALCIUM CARBONATE/VITAMIN D3 (VITAMIN D-3 ORAL) Take 1 tablet by mouth once daily.      levothyroxine (SYNTHROID) 112 MCG tablet Take 1 tablet (112 mcg total) by mouth before breakfast.      rivastigmine (EXELON) 4.6 mg/24 hour PT24 Place 1 patch onto the skin once daily. 90 patch 3    solifenacin (VESICARE) 5 MG tablet TAKE 1 TABLET DAILY 90 tablet 3     No current facility-administered medications on file prior to visit.     ROS:    Constitution: Denies chills, fever, and sweats.  HENT: Denies headaches or blurry vision.  Cardiovascular: Denies chest pain or irregular heart beat.  Respiratory: Denies cough or shortness of breath.  Gastrointestinal: Denies abdominal pain, nausea, or vomiting.  Genitourinary:  Denies urinary incontinence, bladder and kidney issues  Musculoskeletal:  Denies muscle cramps.  Neurological: Denies dizziness or focal weakness.  Psychiatric/Behavioral: Normal mental status.  Hematologic/Lymphatic: Denies bleeding problem or easy bruising/bleeding.  Skin: Denies rash or suspicious lesions.    Physical examination     Gen - No acute distress, well nourished, well groomed    Eyes - Extraoccular motions intact, pupils equally round and reactive to light and accommodation   ENT - normocephalic, atruamtic, oropharynx clear   Neck - Supple, no abnormal masses   Cardiovascular - regular rate and rhythm   Pulmonary - clear to auscultation bilaterally, no wheezes, ronchi, or rales   Abdomen - soft, non-tender, non-distended, positive bowel sounds   Psych - The patient is alert and oriented x3 with normal mood and affect    Left Upper Extremity Examination     Skin is intact throughout   Motor is intact distally radial, median, ulnar, AIN, PIN   +2 radial and ulnar pulses   Sensation to light touch is intact distally radial, median, and ulnar     Examination of the Left shoulder:   ROM:   For - 150 with pain around 90°  Abd - 150 with pain around 90°  Ext - 50   Int - T12     Tenderness to palpation:   Subacromial space - positive  Biceps Tendon - negative  Anterior Glenohumeral Joint - negative  AC joint - negative  Glenohumeral instability - negative  Empty Can test - positive  Speeds test - negative  Carpenter/Neers sign - positive  Cross-arm adduction test - negative    X-ray images were examined and personally interpreted by me.  Three views left shoulder dated 03/18/2024 show the humeral head well reduced in the glenoid with no advanced arthritic changes and no acute fractures    Dx:  Rotator cuff tendinitis left shoulder    Plan:  I did offer the patient an injection and she agreed.  We injected the left shoulder with a mixture of 2, 2, 1.  She tolerated it well.  Follow up p.r.n..

## 2024-03-19 ENCOUNTER — OFFICE VISIT (OUTPATIENT)
Dept: OPTOMETRY | Facility: CLINIC | Age: 79
End: 2024-03-19
Payer: MEDICARE

## 2024-03-19 DIAGNOSIS — H52.7 REFRACTIVE ERROR: ICD-10-CM

## 2024-03-19 DIAGNOSIS — H40.1134 PRIMARY OPEN ANGLE GLAUCOMA (POAG) OF BOTH EYES, INDETERMINATE STAGE: Primary | ICD-10-CM

## 2024-03-19 DIAGNOSIS — Z96.1 PSEUDOPHAKIA: ICD-10-CM

## 2024-03-19 PROCEDURE — 92133 CPTRZD OPH DX IMG PST SGM ON: CPT | Mod: PBBFAC,PO | Performed by: OPTOMETRIST

## 2024-03-19 PROCEDURE — 92014 COMPRE OPH EXAM EST PT 1/>: CPT | Mod: S$PBB,,, | Performed by: OPTOMETRIST

## 2024-03-19 PROCEDURE — 99212 OFFICE O/P EST SF 10 MIN: CPT | Mod: PBBFAC,PO | Performed by: OPTOMETRIST

## 2024-03-19 PROCEDURE — 99999 PR PBB SHADOW E&M-EST. PATIENT-LVL II: CPT | Mod: PBBFAC,,, | Performed by: OPTOMETRIST

## 2024-03-19 RX ORDER — BRIMONIDINE TARTRATE 2 MG/ML
1 SOLUTION/ DROPS OPHTHALMIC 2 TIMES DAILY
Qty: 15 ML | Refills: 0 | Status: SHIPPED | OUTPATIENT
Start: 2024-03-19 | End: 2024-03-19

## 2024-03-19 RX ORDER — BRIMONIDINE TARTRATE 2 MG/ML
1 SOLUTION/ DROPS OPHTHALMIC 2 TIMES DAILY
Qty: 15 ML | Refills: 6 | Status: SHIPPED | OUTPATIENT
Start: 2024-03-19 | End: 2025-03-19

## 2024-03-19 RX ORDER — BIMATOPROST 0.1 MG/ML
1 SOLUTION/ DROPS OPHTHALMIC NIGHTLY
Qty: 7.5 ML | Refills: 3 | Status: SHIPPED | OUTPATIENT
Start: 2024-03-19

## 2024-03-19 NOTE — PROGRESS NOTES
HPI     Annual Exam     Additional comments: LDE: 2022           Comments    79 YO female presents today for an annual eye exam. Patient states that   she is doing well, notes no problems or complaints. Wears OTC +2.50   readers.  notes compliance with drops.     Latanoprost OU QHS           Last edited by Araseli Rosado on 3/19/2024  2:21 PM.            Assessment /Plan     For exam results, see Encounter Report.    Primary open angle glaucoma (POAG) of both eyes, indeterminate stage  -     Discontinue: brimonidine 0.2% (ALPHAGAN) 0.2 % Drop; Place 1 drop into both eyes 2 (two) times daily.  Dispense: 15 mL; Refill: 0  -     bimatoprost (LUMIGAN) 0.01 % Drop; Place 1 drop into both eyes every evening.  Dispense: 7.5 mL; Refill: 3  -     brimonidine 0.2% (ALPHAGAN) 0.2 % Drop; Place 1 drop into both eyes 2 (two) times daily.  Dispense: 15 mL; Refill: 6  -     Posterior Segment OCT Optic Nerve- Both eyes    Pseudophakia    Refractive error      1. Primary open angle glaucoma (POAG) of both eyes, indeterminate stage  IOP higher today than previous  Reports good compliance with gtts    OCT RNFL today -- stable OD, worsening OS  OD ONL TS, G, TI  OS  ONL G // borderline T, NI    Difficulty with HVF in past     Discussed options, add additional meds  Continue Lumigan qPM OU  Add Alphagan: 1 gt bid OU    Recheck iop in 3 months    - bimatoprost (LUMIGAN) 0.01 % Drop; Place 1 drop into both eyes every evening.  Dispense: 7.5 mL; Refill: 3  - brimonidine 0.2% (ALPHAGAN) 0.2 % Drop; Place 1 drop into both eyes 2 (two) times daily.  Dispense: 15 mL; Refill: 6  - Posterior Segment OCT Optic Nerve- Both eyes    2. Pseudophakia  S/p cataract extraction, no PCO    3. Refractive error  Declines refraction, happy with uncorrected monovision

## 2024-04-08 ENCOUNTER — PATIENT MESSAGE (OUTPATIENT)
Dept: FAMILY MEDICINE | Facility: CLINIC | Age: 79
End: 2024-04-08
Payer: MEDICARE

## 2024-04-08 DIAGNOSIS — E02 SUBCLINICAL IODINE-DEFICIENCY HYPOTHYROIDISM: ICD-10-CM

## 2024-04-08 DIAGNOSIS — E78.2 MIXED HYPERLIPIDEMIA: ICD-10-CM

## 2024-04-08 DIAGNOSIS — R73.03 PREDIABETES: ICD-10-CM

## 2024-04-08 DIAGNOSIS — I10 ESSENTIAL (PRIMARY) HYPERTENSION: ICD-10-CM

## 2024-04-08 DIAGNOSIS — Z00.00 ENCOUNTER FOR PREVENTIVE HEALTH EXAMINATION: Primary | ICD-10-CM

## 2024-04-11 ENCOUNTER — TELEPHONE (OUTPATIENT)
Dept: FAMILY MEDICINE | Facility: CLINIC | Age: 79
End: 2024-04-11
Payer: MEDICARE

## 2024-05-14 ENCOUNTER — LAB VISIT (OUTPATIENT)
Dept: LAB | Facility: HOSPITAL | Age: 79
End: 2024-05-14
Attending: STUDENT IN AN ORGANIZED HEALTH CARE EDUCATION/TRAINING PROGRAM
Payer: MEDICARE

## 2024-05-14 DIAGNOSIS — E02 SUBCLINICAL IODINE-DEFICIENCY HYPOTHYROIDISM: ICD-10-CM

## 2024-05-14 DIAGNOSIS — Z00.00 ENCOUNTER FOR PREVENTIVE HEALTH EXAMINATION: ICD-10-CM

## 2024-05-14 DIAGNOSIS — E78.2 MIXED HYPERLIPIDEMIA: ICD-10-CM

## 2024-05-14 DIAGNOSIS — R73.03 PREDIABETES: ICD-10-CM

## 2024-05-14 DIAGNOSIS — I10 ESSENTIAL (PRIMARY) HYPERTENSION: ICD-10-CM

## 2024-05-14 LAB
ALBUMIN SERPL BCP-MCNC: 3.4 G/DL (ref 3.5–5.2)
ALP SERPL-CCNC: 87 U/L (ref 55–135)
ALT SERPL W/O P-5'-P-CCNC: 10 U/L (ref 10–44)
ANION GAP SERPL CALC-SCNC: 9 MMOL/L (ref 8–16)
AST SERPL-CCNC: 21 U/L (ref 10–40)
BASOPHILS # BLD AUTO: 0.05 K/UL (ref 0–0.2)
BASOPHILS NFR BLD: 1.3 % (ref 0–1.9)
BILIRUB SERPL-MCNC: 0.7 MG/DL (ref 0.1–1)
BUN SERPL-MCNC: 12 MG/DL (ref 8–23)
CALCIUM SERPL-MCNC: 9.5 MG/DL (ref 8.7–10.5)
CHLORIDE SERPL-SCNC: 107 MMOL/L (ref 95–110)
CHOLEST SERPL-MCNC: 223 MG/DL (ref 120–199)
CHOLEST/HDLC SERPL: 3.7 {RATIO} (ref 2–5)
CO2 SERPL-SCNC: 26 MMOL/L (ref 23–29)
CREAT SERPL-MCNC: 1 MG/DL (ref 0.5–1.4)
DIFFERENTIAL METHOD BLD: ABNORMAL
EOSINOPHIL # BLD AUTO: 0.5 K/UL (ref 0–0.5)
EOSINOPHIL NFR BLD: 12.2 % (ref 0–8)
ERYTHROCYTE [DISTWIDTH] IN BLOOD BY AUTOMATED COUNT: 15.9 % (ref 11.5–14.5)
EST. GFR  (NO RACE VARIABLE): 57.3 ML/MIN/1.73 M^2
ESTIMATED AVG GLUCOSE: 114 MG/DL (ref 68–131)
GLUCOSE SERPL-MCNC: 92 MG/DL (ref 70–110)
HBA1C MFR BLD: 5.6 % (ref 4–5.6)
HCT VFR BLD AUTO: 40.3 % (ref 37–48.5)
HDLC SERPL-MCNC: 61 MG/DL (ref 40–75)
HDLC SERPL: 27.4 % (ref 20–50)
HGB BLD-MCNC: 13.6 G/DL (ref 12–16)
IMM GRANULOCYTES # BLD AUTO: 0.01 K/UL (ref 0–0.04)
IMM GRANULOCYTES NFR BLD AUTO: 0.3 % (ref 0–0.5)
LDLC SERPL CALC-MCNC: 143.4 MG/DL (ref 63–159)
LYMPHOCYTES # BLD AUTO: 1.2 K/UL (ref 1–4.8)
LYMPHOCYTES NFR BLD: 31.7 % (ref 18–48)
MCH RBC QN AUTO: 30.4 PG (ref 27–31)
MCHC RBC AUTO-ENTMCNC: 33.7 G/DL (ref 32–36)
MCV RBC AUTO: 90 FL (ref 82–98)
MONOCYTES # BLD AUTO: 0.4 K/UL (ref 0.3–1)
MONOCYTES NFR BLD: 9.3 % (ref 4–15)
NEUTROPHILS # BLD AUTO: 1.7 K/UL (ref 1.8–7.7)
NEUTROPHILS NFR BLD: 45.2 % (ref 38–73)
NONHDLC SERPL-MCNC: 162 MG/DL
NRBC BLD-RTO: 0 /100 WBC
PLATELET # BLD AUTO: 156 K/UL (ref 150–450)
PMV BLD AUTO: 10.9 FL (ref 9.2–12.9)
POTASSIUM SERPL-SCNC: 4.1 MMOL/L (ref 3.5–5.1)
PROT SERPL-MCNC: 6.5 G/DL (ref 6–8.4)
RBC # BLD AUTO: 4.48 M/UL (ref 4–5.4)
SODIUM SERPL-SCNC: 142 MMOL/L (ref 136–145)
TRIGL SERPL-MCNC: 93 MG/DL (ref 30–150)
TSH SERPL DL<=0.005 MIU/L-ACNC: 0.69 UIU/ML (ref 0.4–4)
WBC # BLD AUTO: 3.78 K/UL (ref 3.9–12.7)

## 2024-05-14 PROCEDURE — 80061 LIPID PANEL: CPT | Performed by: STUDENT IN AN ORGANIZED HEALTH CARE EDUCATION/TRAINING PROGRAM

## 2024-05-14 PROCEDURE — 36415 COLL VENOUS BLD VENIPUNCTURE: CPT | Mod: PO | Performed by: STUDENT IN AN ORGANIZED HEALTH CARE EDUCATION/TRAINING PROGRAM

## 2024-05-14 PROCEDURE — 83036 HEMOGLOBIN GLYCOSYLATED A1C: CPT | Performed by: STUDENT IN AN ORGANIZED HEALTH CARE EDUCATION/TRAINING PROGRAM

## 2024-05-14 PROCEDURE — 84443 ASSAY THYROID STIM HORMONE: CPT | Performed by: STUDENT IN AN ORGANIZED HEALTH CARE EDUCATION/TRAINING PROGRAM

## 2024-05-14 PROCEDURE — 85025 COMPLETE CBC W/AUTO DIFF WBC: CPT | Performed by: STUDENT IN AN ORGANIZED HEALTH CARE EDUCATION/TRAINING PROGRAM

## 2024-05-14 PROCEDURE — 80053 COMPREHEN METABOLIC PANEL: CPT | Performed by: STUDENT IN AN ORGANIZED HEALTH CARE EDUCATION/TRAINING PROGRAM

## 2024-05-17 ENCOUNTER — OFFICE VISIT (OUTPATIENT)
Dept: FAMILY MEDICINE | Facility: CLINIC | Age: 79
End: 2024-05-17
Payer: MEDICARE

## 2024-05-17 VITALS
OXYGEN SATURATION: 96 % | HEIGHT: 64 IN | RESPIRATION RATE: 16 BRPM | DIASTOLIC BLOOD PRESSURE: 72 MMHG | WEIGHT: 148.56 LBS | HEART RATE: 55 BPM | SYSTOLIC BLOOD PRESSURE: 116 MMHG | BODY MASS INDEX: 25.36 KG/M2

## 2024-05-17 DIAGNOSIS — F03.90 DEMENTIA WITHOUT BEHAVIORAL DISTURBANCE: ICD-10-CM

## 2024-05-17 DIAGNOSIS — N18.31 CHRONIC KIDNEY DISEASE, STAGE 3A: ICD-10-CM

## 2024-05-17 DIAGNOSIS — I10 ESSENTIAL (PRIMARY) HYPERTENSION: ICD-10-CM

## 2024-05-17 DIAGNOSIS — E02 SUBCLINICAL IODINE-DEFICIENCY HYPOTHYROIDISM: Primary | ICD-10-CM

## 2024-05-17 PROCEDURE — 99214 OFFICE O/P EST MOD 30 MIN: CPT | Mod: PBBFAC,PO

## 2024-05-17 PROCEDURE — 99999 PR PBB SHADOW E&M-EST. PATIENT-LVL IV: CPT | Mod: PBBFAC,,,

## 2024-05-17 PROCEDURE — 99214 OFFICE O/P EST MOD 30 MIN: CPT | Mod: S$PBB,,,

## 2024-05-17 RX ORDER — LEVOTHYROXINE SODIUM 100 UG/1
100 TABLET ORAL
Qty: 90 TABLET | Refills: 1 | Status: SHIPPED | OUTPATIENT
Start: 2024-05-17

## 2024-05-17 RX ORDER — RIVASTIGMINE 4.6 MG/24H
1 PATCH, EXTENDED RELEASE TRANSDERMAL DAILY
Qty: 90 PATCH | Refills: 3 | Status: SHIPPED | OUTPATIENT
Start: 2024-05-17

## 2024-05-17 NOTE — PROGRESS NOTES
Subjective:       Patient ID: Yuniel Gracia is a 78 y.o. female.    Chief Complaint: Health Maintenance (Review Labs)    Patient presents to the clinic to review labs.     Patient Active Problem List:     History of breast cancer     Insomnia     Hypothyroidism     Osteopenia of left hip     Hepatitis C virus infection cured after antiviral drug therapy     Thrombocytopenia     Use of aromatase inhibitors     Raynaud's phenomenon without gangrene     Depression with anxiety     Overactive bladder     Mixed hyperlipidemia     Prediabetes     BMI 25.0-25.9,adult     Aortic calcification     Balance disorder     Gait instability     Leg weakness     Oral ulcer     Major depressive disorder, single episode, moderate     Memory loss     Weakness     Acute bilateral low back pain without sciatica     Difficulty walking     Decreased functional activity tolerance     Special educational needs     Closed displaced intertrochanteric fracture of right femur     Essential (primary) hypertension     Dementia with behavioral disturbance     Decreased range of right hip movement     Weakness of both lower extremities     Impaired functional mobility, balance, gait, and endurance    Hypothyroidism-  Taking Synthroid 112 mcg Daily  Lab Results       Component                Value               Date                       TSH                      0.695               05/14/2024              Has no new complaints or concerns today.     Patient educated on plan of care, verbalized understanding.         Review of Systems    Patient Active Problem List   Diagnosis    History of breast cancer    Insomnia    Hypothyroidism    Osteopenia of left hip    Hepatitis C virus infection cured after antiviral drug therapy    Thrombocytopenia    Use of aromatase inhibitors    Raynaud's phenomenon without gangrene    Depression with anxiety    Overactive bladder    Mixed hyperlipidemia    Prediabetes    BMI 25.0-25.9,adult    Aortic  "calcification    Balance disorder    Gait instability    Leg weakness    Oral ulcer    Major depressive disorder, single episode, moderate    Memory loss    Weakness    Acute bilateral low back pain without sciatica    Difficulty walking    Decreased functional activity tolerance    Special educational needs    Closed displaced intertrochanteric fracture of right femur    Essential (primary) hypertension    Dementia with behavioral disturbance    Decreased range of right hip movement    Weakness of both lower extremities    Impaired functional mobility, balance, gait, and endurance       Objective:      Physical Exam    Lab Results   Component Value Date    WBC 3.78 (L) 05/14/2024    HGB 13.6 05/14/2024    HCT 40.3 05/14/2024     05/14/2024    CHOL 223 (H) 05/14/2024    TRIG 93 05/14/2024    HDL 61 05/14/2024    ALT 10 05/14/2024    AST 21 05/14/2024     05/14/2024    K 4.1 05/14/2024     05/14/2024    CREATININE 1.0 05/14/2024    BUN 12 05/14/2024    CO2 26 05/14/2024    TSH 0.695 05/14/2024    INR 1.0 12/07/2023    HGBA1C 5.6 05/14/2024     The 10-year ASCVD risk score (Kody FULLER, et al., 2019) is: 26.6%    Values used to calculate the score:      Age: 78 years      Sex: Female      Is Non- : No      Diabetic: No      Tobacco smoker: No      Systolic Blood Pressure: 116 mmHg      Is BP treated: Yes      HDL Cholesterol: 61 mg/dL      Total Cholesterol: 223 mg/dL  Visit Vitals  /72 (BP Location: Right arm, Patient Position: Sitting, BP Method: Small (Manual))   Pulse (!) 55   Resp 16   Ht 5' 4" (1.626 m)   Wt 67.4 kg (148 lb 9.4 oz)   LMP 03/02/1998   SpO2 96%   BMI 25.51 kg/m²      Assessment:       1. Subclinical iodine-deficiency hypothyroidism    2. Dementia without behavioral disturbance        Plan:       1. Subclinical iodine-deficiency hypothyroidism  -     levothyroxine (SYNTHROID) 100 MCG tablet; Take 1 tablet (100 mcg total) by mouth before breakfast.  " Dispense: 90 tablet; Refill: 1  -     TSH; Future; Expected date: 05/17/2024    2. Dementia without behavioral disturbance  -     rivastigmine (EXELON) 4.6 mg/24 hour PT24; Place 1 patch onto the skin once daily.  Dispense: 90 patch; Refill: 3   - Stable-Continue Exelon   - Follow up with Neurology   - Continue current plan of care    3. Essential (primary) hypertension     - Stable-Continue Norvasc   - Continue current plan of care    4.Chronic kidney disease, stage 3a    - Stable-Avoid Nephrotoxic drugs   - Continue current plan of care    Follow up if symptoms worsen or fail to improve.      Future Appointments       Date Provider Specialty Appt Notes    6/7/2024 Oneyda Blanco, FNP Neurology  Arrive at: Telehealth Follow up    6/27/2024 Omid Wade, MONIQUE Optometry 3 month IOP check    7/17/2024  Lab lab

## 2024-05-20 PROBLEM — N18.31 CHRONIC KIDNEY DISEASE, STAGE 3A: Status: ACTIVE | Noted: 2024-05-20

## 2024-05-20 NOTE — PATIENT INSTRUCTIONS

## 2024-05-31 ENCOUNTER — PATIENT MESSAGE (OUTPATIENT)
Dept: ORTHOPEDICS | Facility: CLINIC | Age: 79
End: 2024-05-31
Payer: MEDICARE

## 2024-06-07 ENCOUNTER — TELEPHONE (OUTPATIENT)
Dept: NEUROLOGY | Facility: CLINIC | Age: 79
End: 2024-06-07
Payer: MEDICARE

## 2024-06-07 ENCOUNTER — OFFICE VISIT (OUTPATIENT)
Dept: NEUROLOGY | Facility: CLINIC | Age: 79
End: 2024-06-07
Payer: MEDICARE

## 2024-06-07 ENCOUNTER — PATIENT MESSAGE (OUTPATIENT)
Dept: NEUROLOGY | Facility: CLINIC | Age: 79
End: 2024-06-07

## 2024-06-07 DIAGNOSIS — F02.80 PRIMARY PROGRESSIVE APHASIA: ICD-10-CM

## 2024-06-07 DIAGNOSIS — G31.01 PRIMARY PROGRESSIVE APHASIA: ICD-10-CM

## 2024-06-07 DIAGNOSIS — F03.90 MAJOR NEUROCOGNITIVE DISORDER: Primary | ICD-10-CM

## 2024-06-07 PROBLEM — R41.3 MEMORY LOSS: Status: RESOLVED | Noted: 2022-06-03 | Resolved: 2024-06-07

## 2024-06-07 PROCEDURE — 99214 OFFICE O/P EST MOD 30 MIN: CPT | Mod: 95,,, | Performed by: NURSE PRACTITIONER

## 2024-06-07 NOTE — ASSESSMENT & PLAN NOTE
Patient is a 79 y/o female that presents for memory f/u.  Onset ~ 2018. She struggles with word finding, repetition and short term memory loss.   There are no reports of hallucinations, depression, behavorial changes, urinary incontinence or sleep disturbances. Memory changes are frustrating to her.   Prev MMSE 15/30 in 2022   - a diagnosis of major neurodegenerative disorder; mild in severity was warranted from Neuropsych testing in 2022; possibility of PPA, logopenic variant   Recent MRI/PET noted with asymmetry with greater atrophy to left temporal   Serologies notes  Repeat NP testing as needed  Discussed role vs expectation of cognitive enhancing drugs at length   - rukhsana Exelon patch well   - unable to tolerate Aricept d/t GI upset  Supervision suggested  Recommend limiting alcohol consumption

## 2024-06-07 NOTE — PROGRESS NOTES
"  NEUROLOGY  Outpatient Follow Up    [facility]  [address] [facility], [place] [address]  [phone] (office) / [phone] (fax)    Patient Name:  Yuniel Gracia  :  1945  MR #:  [id number]  Acct #:  [id number]    Date of Neurology Visit: 2024  Name of Provider: YESIKA Montes    Other Physicians:  Bhaskar Olivera MD (Primary Care Physician); No ref. provider found (Referring)      Chief Complaint: No chief complaint on file.      History of Present Illness (HPI):  Prior HPI  2020 (Dr. Abbott):  "The patient is a 75 y.o. female referred for evaluation of memory loss.  She was seen with her  who supplies additional history. This issue began about 2 years ago.  The primary issue is with word finding. It involves short-term memory more than long-term memory.  She may have some difficulties with executive function.  There are no issues with multiple step processes. She has no problems with ADLs. She does not get lost in familiar areas. There are no hallucinations. There are no personality changes.  She does endorse depression and depression.     Of note, the patient reports that she had been on Zoloft for approximately 20 years.  Recently, she read that Zoloft could produce issues with language.  Her Oncologist instructed her to discontinue it, which she did about a month ago.  She reports some improvement in her word finding since that time, though it is not back to baseline.  She has been having some anxiety since coming off this drug, though."          Interval Hx 6/3/2022:   Patient is here today for memory follow up. She is accompanied by her spouse who supplies information. Overall she feels that her memory is unchanged. Spouse states she has trouble coming up with simple words and reports that she is also repetitive. He believes there has been a decline.      Patient's highest level of education completed was highschool. She was a housewife. The onset " "of memory decline started around 2018. She struggles more with short term memory which has worsened as per spouse. There are no personality or behavioral changes reports. She denies depression. She was previously on Zoloft in 2020 but came of it for thoughts that is caused increased word finding difficulty. She is now back on a very low dose as of 1 year ago. She denies hallucinations. She reports her sleep is good. She denies sleeping during the day. Spouse does endorse difficulty with executive function. Spouse is managing the finances and has done so for the last 20 years. Spouse is also managing her medications for the last 4 years. She denies issues with hygiene and able navid perform ADLs without assistance. She endorses word finding difficulty and spouse beleives it has worsened. She denies urinary incontinence. Her last fall was a couple of days ago stating she tripped but doesn't recall how it happened. She is no longer driving and hasn't done so in the last 5 years. She likes to stay active and work in the yard and play cards weekly.       Interval history 12/7/2022 (Dr. Abbott):  "The patient is a 77 y.o. female seen previously for memory loss.  I saw her in 2020.  She saw Oneyda Blanco earlier this year with interval history to that point as below.  The patient was started on Aricept at this most recent visit, but she had GI side effects.  She then tried oral Exelon, which also had side effects.  There was previously concern for an allergy to adhesives.  Her  indicates that this is not actually correct.  They would like to try the patch version of Exelon."        Interval Hx 6/7/2024:  Patient presents virtually today for follow up. She is accompanied by her spouse who supplies most information. She was last evaluated in 2022. Spouse believes her memory decline has slowed down but she continues to have memory issues. Patient states she feels well. Spouse states she has trouble with word finding and " this frustrates her greatly. She repeats very often and continues to struggle with short term recall. She sleeps well but does suffer bursitis which wakes her up. Spouse is managing her medications and the finances. There are no hallucinations. She is independent with hygiene, tolieting, dressing, bathing and feeding. Her last fall was in December 2023 and she sustained a hip fracture followed by surgery. She denies urinary incontinence. She hasn't driven in over 5 years.   She was diagnosed with PPA in 2022 and it was suggested she have ST. This was never started.   She continues to drink 2 small glasses of vodka per night.           Past Medical, Surgical, Family & Social History:   Reviewed and updated.    Home Medications:     Current Outpatient Medications:     acetaminophen (TYLENOL) 325 MG tablet, Take 650 mg by mouth every 6 (six) hours as needed., Disp: , Rfl:     amLODIPine (NORVASC) 2.5 MG tablet, Take 1 tablet (2.5 mg total) by mouth once daily., Disp: 90 tablet, Rfl: 3    bimatoprost (LUMIGAN) 0.01 % Drop, Place 1 drop into both eyes every evening., Disp: 7.5 mL, Rfl: 3    brimonidine 0.2% (ALPHAGAN) 0.2 % Drop, Place 1 drop into both eyes 2 (two) times daily., Disp: 15 mL, Rfl: 6    CALCIUM CARBONATE/VITAMIN D3 (VITAMIN D-3 ORAL), Take 1 tablet by mouth once daily., Disp: , Rfl:     levothyroxine (SYNTHROID) 100 MCG tablet, Take 1 tablet (100 mcg total) by mouth before breakfast., Disp: 90 tablet, Rfl: 1    rivastigmine (EXELON) 4.6 mg/24 hour PT24, Place 1 patch onto the skin once daily., Disp: 90 patch, Rfl: 3    solifenacin (VESICARE) 5 MG tablet, TAKE 1 TABLET DAILY, Disp: 90 tablet, Rfl: 3    Physical Examination: limited exam due to being virtual   [facility] 03/02/1998       GENERAL:  General appearance: Well, non-toxic appearing.  No apparent distress.  Neck: supple.    MENTAL STATUS:  Alertness, attention span & concentration: normal.  Language: normal.  Orientation to self, place & time:   limited  Memory, recent & remote: limited  Fund of knowledge: normal.  MMSE: 15/30 (12/2022)  18/30 (6/2022)    SPEECH:  Clear and fluent.  Follows complex commands.        PRIOR FACE TO FACE EXAM   CRANIAL NERVES:  Cranial Nerves II-XII were examined.  II - Visual fields: normal.  III, IV, VI: PERRL, EOMI, No ptosis, No nystagmus.  V - Facial sensation: normal.  VII - Face symmetry & mobility: Due to Covid-19 recommended precautions, patient wearing facial mask; mouth and nose not examined.  VIII - Hearing: normal.  IX, X - Palate: Due to Covid-19 recommended precautions, patient wearing facial mask; mouth and nose not examined.  XI - Shoulder shrug: normal.  XII - Tongue protrusion: Due to Covid-19 recommended precautions, patient wearing facial mask; mouth and nose not examined.    GROSS MOTOR:  Gait & station: non focal; slightly stooped posture  Tone: normal.  Abnormal movements: none.  Finger-nose : normal.  Rapid alternating movements: normal.  Pronator drift: normal  Romberg: absent    MUSCLE STRENGTH:   Hand grasp:   - right:5/5   - left:5/5    RIGHT    LEFT   5 Biceps 5   5 Triceps 5   5 Forearm.Pr. 5        5 Iliopsoas flex    5   5 Hip Abduct 5   5 Hip Adduct 5   5 Quads 5   5 Hams 5   5 Dorsiflex 5   5 Plantar Flex 5     REFLEXES:    RIGHT Reflex   LEFT   2 Biceps 2   2 Brachiorad. 2        2 Patellar 2     SENSORY:  Light touch: Normal throughout.             Diagnostic Data Reviewed:   Folate   Date Value Ref Range Status   09/30/2020 16.1 4.0 - 24.0 ng/mL Final     Vitamin B-12   Date Value Ref Range Status   09/30/2020 1330 (H) 210 - 950 pg/mL Final     TSH   Date Value Ref Range Status   05/14/2024 0.695 0.400 - 4.000 uIU/mL Final     RPR   Date Value Ref Range Status   09/30/2020 Non-reactive Non-reactive Final     Thiamine   Date Value Ref Range Status   09/30/2020 57 38 - 122 ug/L Final     Comment:     This test was developed and the performance   characteristics determined by [facility].   It has not been cleared or approved by the FDA.   The laboratory is regulated under CLIA as qualified to   perform high-complexity testing. This test is used for   patient testing purposes. It should not be regarded   as investigational or for research.  Test performed at [facility],  [address] , [place]  [address]     [phone]  Jose Patten MD  - Medical Director       Ammonia   Date Value Ref Range Status   09/30/2020 35 10 - 50 umol/L Final        MRI brain 2020:  FINDINGS:  Intracranial contents:There is no acute intracranial abnormality.  There is no acute hemorrhage.  There are no regions of restricted diffusion to suggest acute infarction.  There is focal encephalomalacia and gliosis in the lateral right cerebellum with overlying postsurgical changes with a similar appearance to the comparison studies.  Otherwise, cerebellar volume appears normal but there is generalized cerebral volume loss with a moderate burden of periventricular and subcortical white matter FLAIR and T2 hyperintense signal.  These findings were present previously and are without change but remain nonspecific and could reflect sequelae of chronic small vessel disease.  Many of the lesions are periventricular in location with a somewhat ovoid configuration which can be seen in the setting of demyelinating disease.  Clinical correlation is needed.  The findings are felt to be stable.  There is no hydrocephalus or midline shift.  There is no abnormal extra-axial fluid collection.  The basilar cisterns are open.  The cerebellar tonsils are normal position.  Flow voids indicating patency are present in the major vessels at the base of the brain.  The basilar artery is slightly small in caliber but there are patent bilateral posterior communicating arteries.  This anatomy may represent fetal origin of the posterior cerebral arteries.     Extracranial contents, calvarium, soft tissues:Baseline marrow  "signal is normal.  There is multilevel degenerative change in the cervical spine without acute cord compression.  The paranasal sinuses and mastoid air cells are clear.     Impression:  1. There is no acute abnormality.  There is no definite or significant change compared to the prior studies.  There is focal encephalomalacia and gliosis in the lateral right cerebellum with overlying surgical changes.  2. There is generalized cerebral volume loss with moderate nonspecific white matter change which is mostly periventricular in location.  The findings are stable and although are nonspecific, likely reflect sequelae of chronic small vessel disease in a patient of this age with other etiologies, including demyelination felt to be less likely.    NP testing 11/2022:  "Ms. Gracia is a 77 y.o. female with history of breast cancer s/p TC chemotherapy (2012) on anastrozole, hepatitis C (2000), mixed hyperlipidemia, aortic calcification, depression, GERD, and prior surgery for trigeminal neuralgia. She is referred for a neuropsychological evaluation in the context of word finding difficulty, memory, and executive functioning. She consumes two servings of vodka every night. Her  assists with instrumental ADLs.      She obtained an MMSE score of 14/30 today, reduced from 18/30 in 6/2022, and 23/30 in 2020. Results are interpreted in the context of low average to average premorbid abilities. She demonstrates impairments in visuospatial abilities, executive functioning, language, and memory. Basic attention is preserved. Learning/acquisition and retrieval are impaired, but she does benefit from recognition format on story and figure memory tests. Naming and verbal fluency are profoundly low, and repetition is poor for sentences. She made phonologic paraphasic errors and occasionally said nonsense words. Object knowledge is spared. Comprehension is variable. Visuospatial ablities/visuoconstruction is also markedly " "impaired. She has signficant difficulty with working memory, mental set shifting, and mental set maintenance, and understanding abstract concepts. She denies emotional distress.      Her clinical presentation and profile are concerning for a neurodegenerative disease process.  Her  reports that he initially noticed word finding difficulties. She exhibits prominent word retrieval deficits with phonologic paraphasias and neologisms in speech with relatively spared object/semantic knowledge and motor speech. Speech/naming deficits are in disproportion to memory deficits, as she benefits modestly from recognition format. 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 Executive dysfunction and visuospatial deficits are common in lvPPA. MRI appears to show asymmetry with possibly greater left temporal atrophy, but this has not been confirmed by neuroradiology. Further imaging studies (FDG PET) could help inform the differential. Cholinesterase inhibitor medication is still worth consideration, but she has not tolerated aricept or rivastigmine. 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 FDG activity throughout remainder of the left cerebral hemisphere and right cerebral hemisphere is within physiologic limits. Symmetric FDG uptake occurs in bilateral basal ganglia.     IMPRESSION:  Diffuse asymmetric hypometabolism throughout the left temporal and parietal lobes.           Assessment and Plan:      Problem List Items Addressed This Visit          Neuro    Major neurocognitive disorder - Primary    Current Assessment & Plan     Patient is a 77 y/o female that presents for memory f/u.  Onset ~ 2018. She struggles with word finding, repetition and short term memory loss.   There are no reports of hallucinations, depression, behavorial changes, urinary incontinence or " sleep disturbances. Memory changes are frustrating to her.   Prev MMSE 15/30 in 2022   - a diagnosis of major neurodegenerative disorder; mild in severity was warranted from Neuropsych testing in 2022; possibility of PPA, logopenic variant   Recent MRI/PET noted with asymmetry with greater atrophy to left temporal   Serologies notes  Repeat NP testing as needed  Discussed role vs expectation of cognitive enhancing drugs at length   - rukhsana Exelon patch well   - unable to tolerate Aricept d/t GI upset  Supervision suggested  Recommend limiting alcohol consumption          Primary progressive aphasia    Current Assessment & Plan     Referral to ST                              Important to note, also  has a past medical history of Anemia, Arthritis, Blood transfusion, Breast cancer, Cancer (RIGHT BREAST), Chronic constipation, Clotting disorder, Colon polyp, Diverticulosis, Glaucoma, Hepatitis C (2000), Hypothyroid, Insomnia, Malignant neoplasm of breast (4/12/2012), Memory loss, Osteoarthritis, Panic attacks, Raynaud's disease, Ulcer, and Vertigo.          The patient will return to clinic in 6 months    All questions were answered and patient is comfortable with the plan.         Thank you very much for the opportunity to assist in this patient's care.    If you have any questions or concerns, please do not hesitate to contact me at any time.      Sincerely,     YESIKA Montes  [facility] [place]    The patient location is: [place], [place]  The chief complaint leading to consultation is: memory f/u    Visit type: audiovisual    Face to Face time with patient: 37 minutes of total time spent on the encounter, which includes face to face time and non-face to face time preparing to see the patient (eg, review of tests), Obtaining and/or reviewing separately obtained history, Documenting clinical information in the electronic or other health record, Independently interpreting results (not  separately reported) and communicating results to the patient/family/caregiver, or Care coordination (not separately reported).         Each patient to whom he or she provides medical services by telemedicine is:  (1) informed of the relationship between the physician and patient and the respective role of any other health care provider with respect to management of the patient; and (2) notified that he or she may decline to receive medical services by telemedicine and may withdraw from such care at any time.    Notes:

## 2024-06-13 ENCOUNTER — OFFICE VISIT (OUTPATIENT)
Dept: ORTHOPEDICS | Facility: CLINIC | Age: 79
End: 2024-06-13
Payer: MEDICARE

## 2024-06-13 VITALS — HEIGHT: 64 IN | WEIGHT: 148.56 LBS | BODY MASS INDEX: 25.36 KG/M2

## 2024-06-13 DIAGNOSIS — G89.29 CHRONIC LEFT SHOULDER PAIN: Primary | ICD-10-CM

## 2024-06-13 DIAGNOSIS — M19.012 PRIMARY OSTEOARTHRITIS, LEFT SHOULDER: ICD-10-CM

## 2024-06-13 DIAGNOSIS — M25.512 CHRONIC LEFT SHOULDER PAIN: Primary | ICD-10-CM

## 2024-06-13 PROCEDURE — 99214 OFFICE O/P EST MOD 30 MIN: CPT | Mod: S$PBB,,, | Performed by: ORTHOPAEDIC SURGERY

## 2024-06-13 PROCEDURE — 99999 PR PBB SHADOW E&M-EST. PATIENT-LVL III: CPT | Mod: PBBFAC,,, | Performed by: ORTHOPAEDIC SURGERY

## 2024-06-13 PROCEDURE — 99213 OFFICE O/P EST LOW 20 MIN: CPT | Mod: PBBFAC,PO | Performed by: ORTHOPAEDIC SURGERY

## 2024-06-13 NOTE — PROGRESS NOTES
"CC:  78-year-old female presents for evaluation of left shoulder pain.  We saw the patient back on 03/18/2024 and she received an injection for rotator cuff tendinitis.  The patient states she got relief only for about a day and then the pain returned.  She has pain in the left shoulder with any type of activity.  She is having pain with overhead activities and pain keeps her up at night.  The pain is constant.  It was not responding to over-the-counter p.o. medications and apparently did not respond to the intra-articular steroid injection.    Past Medical History:   Diagnosis Date    Anemia     Arthritis     Blood transfusion     Breast cancer     Cancer RIGHT BREAST    Chronic constipation     Clotting disorder     Colon polyp     Diverticulosis     Glaucoma     Hepatitis C 2000    pt states history of hepatits c-"cured by Dr. Lynch"; TREATED 2 YEARS - 2000   OK NOW    Hypothyroid     Insomnia     Malignant neoplasm of breast 4/12/2012    Memory loss     reports some memory loss since chemo    Osteoarthritis     Panic attacks     Raynaud's disease     takes amlodipine for this    Ulcer     Vertigo        Past Surgical History:   Procedure Laterality Date    APPENDECTOMY      BLADDER SURGERY  2011    sling - Dr Giles  - [facility]    BRAIN SURGERY  [facility]    temporal neuralgia - [place]    BREAST BIOPSY      BREAST LUMPECTOMY  03/2012    malignant - had chemo and radiotherapy    CATARACT EXTRACTION      COLONOSCOPY  01/03/2011    Dr Lynch, in media section, diverticulosis, hemorrhoids, otherwise normal findings; normal findings on random biopsies    COLONOSCOPY N/A 06/01/2017    Procedure: COLONOSCOPY;  Surgeon: Nate Lamb MD;  Location: [facility];  Service: Endoscopy;  Laterality: N/A; hemorrhoids, diverticulosis, 1 colon polyp removed; Repeat colonoscopy in 5 years for surveillance; biopsy: Tubular adenoma, random biopsies unremarkable    ESOPHAGOGASTRODUODENOSCOPY N/A 07/20/2022    Procedure: EGD " (ESOPHAGOGASTRODUODENOSCOPY);  Surgeon: Nate Lamb MD;  Location: [facility];  Service: Endoscopy;  Laterality: N/A;    EYE SURGERY      cataracts bilateral    HYSTERECTOMY      INTRAMEDULLARY RODDING OF TROCHANTER OF FEMUR Right 12/7/2023    Procedure: INSERTION, INTRAMEDULLARY ZOHREH, FEMUR, TROCHANTER;  Surgeon: Geoff Rios MD;  Location: [facility] OR;  Service: Orthopedics;  Laterality: Right;    JOINT REPLACEMENT  09/25/2017    left Knee [facility]     removal of port a cath      right lymph node removed from breast area      TUNNELED VENOUS PORT PLACEMENT  04/2012    left chest    UPPER GASTROINTESTINAL ENDOSCOPY  prior to 2011    small hiatal hernia       Current Outpatient Medications on File Prior to Visit   Medication Sig Dispense Refill    acetaminophen (TYLENOL) 325 MG tablet Take 650 mg by mouth every 6 (six) hours as needed.      amLODIPine (NORVASC) 2.5 MG tablet Take 1 tablet (2.5 mg total) by mouth once daily. 90 tablet 3    bimatoprost (LUMIGAN) 0.01 % Drop Place 1 drop into both eyes every evening. 7.5 mL 3    brimonidine 0.2% (ALPHAGAN) 0.2 % Drop Place 1 drop into both eyes 2 (two) times daily. 15 mL 6    CALCIUM CARBONATE/VITAMIN D3 (VITAMIN D-3 ORAL) Take 1 tablet by mouth once daily.      levothyroxine (SYNTHROID) 100 MCG tablet Take 1 tablet (100 mcg total) by mouth before breakfast. 90 tablet 1    rivastigmine (EXELON) 4.6 mg/24 hour PT24 Place 1 patch onto the skin once daily. 90 patch 3    solifenacin (VESICARE) 5 MG tablet TAKE 1 TABLET DAILY 90 tablet 3     No current facility-administered medications on file prior to visit.       ROS:    Constitution: Denies chills, fever, and sweats.  HENT: Denies headaches or blurry vision.  Cardiovascular: Denies chest pain or irregular heart beat.  Respiratory: Denies cough or shortness of breath.  Gastrointestinal: Denies abdominal pain, nausea, or vomiting.  Genitourinary:  Denies urinary incontinence, bladder and kidney  issues  Musculoskeletal:  Denies muscle cramps.  Neurological: Denies dizziness or focal weakness.  Psychiatric/Behavioral: Normal mental status.  Hematologic/Lymphatic: Denies bleeding problem or easy bruising/bleeding.  Skin: Denies rash or suspicious lesions.    Physical examination     Gen - No acute distress, well nourished, well groomed   Eyes - Extraoccular motions intact, pupils equally round and reactive to light and accommodation   ENT - normocephalic, atruamtic, oropharynx clear   Neck - Supple, no abnormal masses   Cardiovascular - regular rate and rhythm   Pulmonary - clear to auscultation bilaterally, no wheezes, ronchi, or rales   Abdomen - soft, non-tender, non-distended, positive bowel sounds   Psych - The patient is alert and oriented x3 with normal mood and affect    Left Upper Extremity Examination     Skin is intact throughout   Motor is intact distally radial, median, ulnar, AIN, PIN   +2 radial and ulnar pulses   Sensation to light touch is intact distally radial, median, and ulnar     Examination of the Left shoulder:   ROM:   For - 130 with pain  Abd - 110 with pain  Ext - 30 with pain  Int - T12     Tenderness to palpation:   Subacromial space - positive  Biceps Tendon - positive  Anterior Glenohumeral Joint - positive  AC joint - negative  Glenohumeral instability - negative  Empty Can test - positive  Speeds test - positive  Carpenter/Neers sign - positive  Cross-arm adduction test - positive    X-ray images were examined and personally interpreted by me.  Three views left shoulder dated 03/18/2024 were once again reviewed.  There arthritic changes of the glenohumeral and the AC joint.  Otherwise normal exam with no acute fractures.    Dx:  Mild arthritis of the left shoulder that has failed to respond to p.o. medications and intra-articular injections.    Plan:  We are going to order an MRI of the shoulder to assess the integrity of the rotator cuff and surrounding structures.  Follow up  after the MRI is complete.

## 2024-06-18 ENCOUNTER — HOSPITAL ENCOUNTER (OUTPATIENT)
Dept: RADIOLOGY | Facility: HOSPITAL | Age: 79
Discharge: HOME | End: 2024-06-18
Attending: ORTHOPAEDIC SURGERY
Payer: MEDICARE

## 2024-06-18 DIAGNOSIS — G89.29 CHRONIC LEFT SHOULDER PAIN: ICD-10-CM

## 2024-06-18 DIAGNOSIS — M25.512 CHRONIC LEFT SHOULDER PAIN: ICD-10-CM

## 2024-06-18 PROCEDURE — 73221 MRI JOINT UPR EXTREM W/O DYE: CPT | Mod: 26,LT,, | Performed by: RADIOLOGY

## 2024-06-18 PROCEDURE — 73221 MRI JOINT UPR EXTREM W/O DYE: CPT | Mod: TC,LT

## 2024-06-20 ENCOUNTER — OFFICE VISIT (OUTPATIENT)
Dept: ORTHOPEDICS | Facility: CLINIC | Age: 79
End: 2024-06-20
Payer: MEDICARE

## 2024-06-20 VITALS — BODY MASS INDEX: 25.36 KG/M2 | WEIGHT: 148.56 LBS | HEIGHT: 64 IN

## 2024-06-20 DIAGNOSIS — M75.42 IMPINGEMENT SYNDROME OF LEFT SHOULDER REGION: Primary | ICD-10-CM

## 2024-06-20 DIAGNOSIS — Z01.818 PRE-OP TESTING: ICD-10-CM

## 2024-06-20 PROCEDURE — 99999 PR PBB SHADOW E&M-EST. PATIENT-LVL III: CPT | Mod: PBBFAC,,, | Performed by: ORTHOPAEDIC SURGERY

## 2024-06-20 PROCEDURE — 99213 OFFICE O/P EST LOW 20 MIN: CPT | Mod: PBBFAC,PO | Performed by: ORTHOPAEDIC SURGERY

## 2024-06-20 RX ORDER — MUPIROCIN 20 MG/G
OINTMENT TOPICAL
OUTPATIENT
Start: 2024-06-20

## 2024-06-20 NOTE — H&P (VIEW-ONLY)
"CC:  78-year-old female follows up for MRI results of the left shoulder.  She has had chronic pain in the shoulder for several months now.  We ordered an MRI that is now available for review.  She continues to have pain especially with overhead activities.  She is also having difficulty sleeping on that side at night.  She rates the pain as a 6/10.    Past Medical History:   Diagnosis Date    Anemia     Arthritis     Blood transfusion     Breast cancer     Cancer RIGHT BREAST    Chronic constipation     Clotting disorder     Colon polyp     Diverticulosis     Glaucoma     Hepatitis C 2000    pt states history of hepatits c-"cured by Dr. Lynch"; TREATED 2 YEARS - 2000   OK NOW    Hypothyroid     Insomnia     Malignant neoplasm of breast 4/12/2012    Memory loss     reports some memory loss since chemo    Osteoarthritis     Panic attacks     Raynaud's disease     takes amlodipine for this    Ulcer     Vertigo        Past Surgical History:   Procedure Laterality Date    APPENDECTOMY      BLADDER SURGERY  2011    sling - Dr Giles  - [facility]    BRAIN SURGERY  2007    temporal neuralgia - [place]    BREAST BIOPSY      BREAST LUMPECTOMY  03/2012    malignant - had chemo and radiotherapy    CATARACT EXTRACTION      COLONOSCOPY  01/03/2011    Dr Lynch, in media section, diverticulosis, hemorrhoids, otherwise normal findings; normal findings on random biopsies    COLONOSCOPY N/A 06/01/2017    Procedure: COLONOSCOPY;  Surgeon: Nate Lamb MD;  Location: [facility];  Service: Endoscopy;  Laterality: N/A; hemorrhoids, diverticulosis, 1 colon polyp removed; Repeat colonoscopy in 5 years for surveillance; biopsy: Tubular adenoma, random biopsies unremarkable    ESOPHAGOGASTRODUODENOSCOPY N/A 07/20/2022    Procedure: EGD (ESOPHAGOGASTRODUODENOSCOPY);  Surgeon: Nate Lamb MD;  Location: [facility];  Service: Endoscopy;  Laterality: N/A;    EYE SURGERY      cataracts bilateral    HYSTERECTOMY      " INTRAMEDULLARY RODDING OF TROCHANTER OF FEMUR Right 12/7/2023    Procedure: INSERTION, INTRAMEDULLARY ZOHREH, FEMUR, TROCHANTER;  Surgeon: Geoff Rios MD;  Location: [facility] OR;  Service: Orthopedics;  Laterality: Right;    JOINT REPLACEMENT  09/25/2017    left Knee [facility]     removal of port a cath      right lymph node removed from breast area      TUNNELED VENOUS PORT PLACEMENT  04/2012    left chest    UPPER GASTROINTESTINAL ENDOSCOPY  prior to 2011    small hiatal hernia       Current Outpatient Medications on File Prior to Visit   Medication Sig Dispense Refill    acetaminophen (TYLENOL) 325 MG tablet Take 650 mg by mouth every 6 (six) hours as needed.      amLODIPine (NORVASC) 2.5 MG tablet Take 1 tablet (2.5 mg total) by mouth once daily. 90 tablet 3    bimatoprost (LUMIGAN) 0.01 % Drop Place 1 drop into both eyes every evening. 7.5 mL 3    brimonidine 0.2% (ALPHAGAN) 0.2 % Drop Place 1 drop into both eyes 2 (two) times daily. 15 mL 6    CALCIUM CARBONATE/VITAMIN D3 (VITAMIN D-3 ORAL) Take 1 tablet by mouth once daily.      levothyroxine (SYNTHROID) 100 MCG tablet Take 1 tablet (100 mcg total) by mouth before breakfast. 90 tablet 1    rivastigmine (EXELON) 4.6 mg/24 hour PT24 Place 1 patch onto the skin once daily. 90 patch 3    solifenacin (VESICARE) 5 MG tablet TAKE 1 TABLET DAILY 90 tablet 3     No current facility-administered medications on file prior to visit.       ROS:    Constitution: Denies chills, fever, and sweats.  HENT: Denies headaches or blurry vision.  Cardiovascular: Denies chest pain or irregular heart beat.  Respiratory: Denies cough or shortness of breath.  Gastrointestinal: Denies abdominal pain, nausea, or vomiting.  Genitourinary:  Denies urinary incontinence, bladder and kidney issues  Musculoskeletal:  Denies muscle cramps.  Neurological: Denies dizziness or focal weakness.  Psychiatric/Behavioral: Normal mental status.  Hematologic/Lymphatic: Denies bleeding  problem or easy bruising/bleeding.  Skin: Denies rash or suspicious lesions.    Physical examination     Gen - No acute distress, well nourished, well groomed   Eyes - Extraoccular motions intact, pupils equally round and reactive to light and accommodation   ENT - normocephalic, atruamtic, oropharynx clear   Neck - Supple, no abnormal masses   Cardiovascular - regular rate and rhythm   Pulmonary - clear to auscultation bilaterally, no wheezes, ronchi, or rales   Abdomen - soft, non-tender, non-distended, positive bowel sounds   Psych - The patient is alert and oriented x3 with normal mood and affect    Left Upper Extremity Examination      Skin is intact throughout   Motor is intact distally radial, median, ulnar, AIN, PIN   +2 radial and ulnar pulses   Sensation to light touch is intact distally radial, median, and ulnar      Examination of the Left shoulder:   ROM:   For - 130 with pain  Abd - 110 with pain  Ext - 30 with pain  Int - T12      Tenderness to palpation:   Subacromial space - positive  Biceps Tendon - positive  Anterior Glenohumeral Joint - positive  AC joint - negative  Glenohumeral instability - negative  Empty Can test - positive  Speeds test - positive  Carpenter/Neers sign - positive  Cross-arm adduction test - positive    MRI images were examined and personally interpreted by me.  MRI of the left shoulder dated 06/18/2024 shows arthritis of the AC joint with downgoing spurring causing impingement and some irritation of the supraspinatus tendon.  No rotator cuff tears were noted.  There is some mild arthritic changes of the glenohumeral joint but otherwise normal exam.    Dx:  Impingement syndrome left shoulder    Plan:  Potential morbidity to the left upper extremity with both operative and non operative treatments were discussed.  Recommendation is for left shoulder arthroscopy with extensive debridement, distal clavicle resection, and subacromial decompression.  Risks and benefits of surgery  were explained to the patient.  She verbalized understanding and does wish to proceed.  We will schedule that for the next available date that is convenient for her

## 2024-06-20 NOTE — PROGRESS NOTES
"CC:  78-year-old female follows up for MRI results of the left shoulder.  She has had chronic pain in the shoulder for several months now.  We ordered an MRI that is now available for review.  She continues to have pain especially with overhead activities.  She is also having difficulty sleeping on that side at night.  She rates the pain as a 6/10.    Past Medical History:   Diagnosis Date    Anemia     Arthritis     Blood transfusion     Breast cancer     Cancer RIGHT BREAST    Chronic constipation     Clotting disorder     Colon polyp     Diverticulosis     Glaucoma     Hepatitis C 2000    pt states history of hepatits c-"cured by Dr. Lynch"; TREATED 2 YEARS - 2000   OK NOW    Hypothyroid     Insomnia     Malignant neoplasm of breast 4/12/2012    Memory loss     reports some memory loss since chemo    Osteoarthritis     Panic attacks     Raynaud's disease     takes amlodipine for this    Ulcer     Vertigo        Past Surgical History:   Procedure Laterality Date    APPENDECTOMY      BLADDER SURGERY  2011    sling - Dr Giles  - [facility]    BRAIN SURGERY  2007    temporal neuralgia - [place]    BREAST BIOPSY      BREAST LUMPECTOMY  03/2012    malignant - had chemo and radiotherapy    CATARACT EXTRACTION      COLONOSCOPY  01/03/2011    Dr Lynch, in media section, diverticulosis, hemorrhoids, otherwise normal findings; normal findings on random biopsies    COLONOSCOPY N/A 06/01/2017    Procedure: COLONOSCOPY;  Surgeon: Nate Lamb MD;  Location: [facility];  Service: Endoscopy;  Laterality: N/A; hemorrhoids, diverticulosis, 1 colon polyp removed; Repeat colonoscopy in 5 years for surveillance; biopsy: Tubular adenoma, random biopsies unremarkable    ESOPHAGOGASTRODUODENOSCOPY N/A 07/20/2022    Procedure: EGD (ESOPHAGOGASTRODUODENOSCOPY);  Surgeon: Nate Lamb MD;  Location: [facility];  Service: Endoscopy;  Laterality: N/A;    EYE SURGERY      cataracts bilateral    HYSTERECTOMY      " INTRAMEDULLARY RODDING OF TROCHANTER OF FEMUR Right 12/7/2023    Procedure: INSERTION, INTRAMEDULLARY ZOHREH, FEMUR, TROCHANTER;  Surgeon: Geoff Rios MD;  Location: [facility] OR;  Service: Orthopedics;  Laterality: Right;    JOINT REPLACEMENT  09/25/2017    left Knee [facility]     removal of port a cath      right lymph node removed from breast area      TUNNELED VENOUS PORT PLACEMENT  04/2012    left chest    UPPER GASTROINTESTINAL ENDOSCOPY  prior to 2011    small hiatal hernia       Current Outpatient Medications on File Prior to Visit   Medication Sig Dispense Refill    acetaminophen (TYLENOL) 325 MG tablet Take 650 mg by mouth every 6 (six) hours as needed.      amLODIPine (NORVASC) 2.5 MG tablet Take 1 tablet (2.5 mg total) by mouth once daily. 90 tablet 3    bimatoprost (LUMIGAN) 0.01 % Drop Place 1 drop into both eyes every evening. 7.5 mL 3    brimonidine 0.2% (ALPHAGAN) 0.2 % Drop Place 1 drop into both eyes 2 (two) times daily. 15 mL 6    CALCIUM CARBONATE/VITAMIN D3 (VITAMIN D-3 ORAL) Take 1 tablet by mouth once daily.      levothyroxine (SYNTHROID) 100 MCG tablet Take 1 tablet (100 mcg total) by mouth before breakfast. 90 tablet 1    rivastigmine (EXELON) 4.6 mg/24 hour PT24 Place 1 patch onto the skin once daily. 90 patch 3    solifenacin (VESICARE) 5 MG tablet TAKE 1 TABLET DAILY 90 tablet 3     No current facility-administered medications on file prior to visit.       ROS:    Constitution: Denies chills, fever, and sweats.  HENT: Denies headaches or blurry vision.  Cardiovascular: Denies chest pain or irregular heart beat.  Respiratory: Denies cough or shortness of breath.  Gastrointestinal: Denies abdominal pain, nausea, or vomiting.  Genitourinary:  Denies urinary incontinence, bladder and kidney issues  Musculoskeletal:  Denies muscle cramps.  Neurological: Denies dizziness or focal weakness.  Psychiatric/Behavioral: Normal mental status.  Hematologic/Lymphatic: Denies bleeding  problem or easy bruising/bleeding.  Skin: Denies rash or suspicious lesions.    Physical examination     Gen - No acute distress, well nourished, well groomed   Eyes - Extraoccular motions intact, pupils equally round and reactive to light and accommodation   ENT - normocephalic, atruamtic, oropharynx clear   Neck - Supple, no abnormal masses   Cardiovascular - regular rate and rhythm   Pulmonary - clear to auscultation bilaterally, no wheezes, ronchi, or rales   Abdomen - soft, non-tender, non-distended, positive bowel sounds   Psych - The patient is alert and oriented x3 with normal mood and affect    Left Upper Extremity Examination      Skin is intact throughout   Motor is intact distally radial, median, ulnar, AIN, PIN   +2 radial and ulnar pulses   Sensation to light touch is intact distally radial, median, and ulnar      Examination of the Left shoulder:   ROM:   For - 130 with pain  Abd - 110 with pain  Ext - 30 with pain  Int - T12      Tenderness to palpation:   Subacromial space - positive  Biceps Tendon - positive  Anterior Glenohumeral Joint - positive  AC joint - negative  Glenohumeral instability - negative  Empty Can test - positive  Speeds test - positive  Carpenter/Neers sign - positive  Cross-arm adduction test - positive    MRI images were examined and personally interpreted by me.  MRI of the left shoulder dated 06/18/2024 shows arthritis of the AC joint with downgoing spurring causing impingement and some irritation of the supraspinatus tendon.  No rotator cuff tears were noted.  There is some mild arthritic changes of the glenohumeral joint but otherwise normal exam.    Dx:  Impingement syndrome left shoulder    Plan:  Potential morbidity to the left upper extremity with both operative and non operative treatments were discussed.  Recommendation is for left shoulder arthroscopy with extensive debridement, distal clavicle resection, and subacromial decompression.  Risks and benefits of surgery  were explained to the patient.  She verbalized understanding and does wish to proceed.  We will schedule that for the next available date that is convenient for her

## 2024-06-24 ENCOUNTER — OFFICE VISIT (OUTPATIENT)
Dept: NEUROLOGY | Facility: CLINIC | Age: 79
End: 2024-06-24
Payer: MEDICARE

## 2024-06-24 VITALS
WEIGHT: 152.44 LBS | HEIGHT: 64 IN | SYSTOLIC BLOOD PRESSURE: 126 MMHG | DIASTOLIC BLOOD PRESSURE: 85 MMHG | RESPIRATION RATE: 17 BRPM | HEART RATE: 65 BPM | BODY MASS INDEX: 26.03 KG/M2

## 2024-06-24 DIAGNOSIS — F03.90 MAJOR NEUROCOGNITIVE DISORDER: Primary | ICD-10-CM

## 2024-06-24 DIAGNOSIS — F02.80 PRIMARY PROGRESSIVE APHASIA: ICD-10-CM

## 2024-06-24 DIAGNOSIS — G31.01 PRIMARY PROGRESSIVE APHASIA: ICD-10-CM

## 2024-06-24 PROCEDURE — 99214 OFFICE O/P EST MOD 30 MIN: CPT | Mod: PBBFAC,PO | Performed by: NURSE PRACTITIONER

## 2024-06-24 PROCEDURE — 99214 OFFICE O/P EST MOD 30 MIN: CPT | Mod: S$PBB,,, | Performed by: NURSE PRACTITIONER

## 2024-06-24 PROCEDURE — 99999 PR PBB SHADOW E&M-EST. PATIENT-LVL IV: CPT | Mod: PBBFAC,,, | Performed by: NURSE PRACTITIONER

## 2024-06-24 NOTE — PROGRESS NOTES
"    NEUROLOGY  Outpatient Follow Up    [facility]  [address] [facility], [place] [address]  [phone] (office) / [phone] (fax)    Patient Name:  Yuniel Gracia  :  1945  MR #:  [id number]  Acct #:  [id number]    Date of Neurology Visit: 2024  Name of Provider: YESIKA Montes    Other Physicians:  Bhaskar Olivera MD (Primary Care Physician); No ref. provider found (Referring)      Chief Complaint: Memory Loss      History of Present Illness (HPI):  Prior HPI  2020 (Dr. Abbott):  "The patient is a 75 y.o. female referred for evaluation of memory loss.  She was seen with her  who supplies additional history. This issue began about 2 years ago.  The primary issue is with word finding. It involves short-term memory more than long-term memory.  She may have some difficulties with executive function.  There are no issues with multiple step processes. She has no problems with ADLs. She does not get lost in familiar areas. There are no hallucinations. There are no personality changes.  She does endorse depression and depression.     Of note, the patient reports that she had been on Zoloft for approximately 20 years.  Recently, she read that Zoloft could produce issues with language.  Her Oncologist instructed her to discontinue it, which she did about a month ago.  She reports some improvement in her word finding since that time, though it is not back to baseline.  She has been having some anxiety since coming off this drug, though."          Interval Hx 6/3/2022:   Patient is here today for memory follow up. She is accompanied by her spouse who supplies information. Overall she feels that her memory is unchanged. Spouse states she has trouble coming up with simple words and reports that she is also repetitive. He believes there has been a decline.      Patient's highest level of education completed was highschool. She was a housewife. The onset of memory " "decline started around 2018. She struggles more with short term memory which has worsened as per spouse. There are no personality or behavioral changes reports. She denies depression. She was previously on Zoloft in 2020 but came of it for thoughts that is caused increased word finding difficulty. She is now back on a very low dose as of 1 year ago. She denies hallucinations. She reports her sleep is good. She denies sleeping during the day. Spouse does endorse difficulty with executive function. Spouse is managing the finances and has done so for the last 20 years. Spouse is also managing her medications for the last 4 years. She denies issues with hygiene and able navid perform ADLs without assistance. She endorses word finding difficulty and spouse beleives it has worsened. She denies urinary incontinence. Her last fall was a couple of days ago stating she tripped but doesn't recall how it happened. She is no longer driving and hasn't done so in the last 5 years. She likes to stay active and work in the yard and play cards weekly.       Interval history 12/7/2022 (Dr. Abbott):  "The patient is a 77 y.o. female seen previously for memory loss.  I saw her in 2020.  She saw Oneyda Blanco earlier this year with interval history to that point as below.  The patient was started on Aricept at this most recent visit, but she had GI side effects.  She then tried oral Exelon, which also had side effects.  There was previously concern for an allergy to adhesives.  Her  indicates that this is not actually correct.  They would like to try the patch version of Exelon."        Interval Hx 6/7/2024:  Patient presents virtually today for follow up. She is accompanied by her spouse who supplies most information. She was last evaluated in 2022. Spouse believes her memory decline has slowed down but she continues to have memory issues. Patient states she feels well. Spouse states she has trouble with word finding and this " frustrates her greatly. She repeats very often and continues to struggle with short term recall. She sleeps well but does suffer bursitis which wakes her up. Spouse is managing her medications and the finances. There are no hallucinations. She is independent with hygiene, tolieting, dressing, bathing and feeding. Her last fall was in December 2023 and she sustained a hip fracture followed by surgery. She denies urinary incontinence. She hasn't driven in over 5 years.   She was diagnosed with PPA in 2022 and it was suggested she have ST. This was never started.   She continues to drink 2 small glasses of vodka per night.       Interval Hx 6/24/2024:  Patient presents today for memory visit. There has been a suspicion for memory decline. She has trouble with finding her words. ST was ordered at her last visit and she will have an evaluation this week. She feels well overall but does have short term recall. She sleeps well at night. Shoulder pain does keep her up at night and she will be having surgery in the near future. There are no reports of agitation or increased aggression. There are no hallucinations or depression. Spouse is managing her medications and the finances. She is independent with ADLs but spouse does the cooking and preparation of food. Her last fall was in December. She hasn't driven in over 5 years. She is maintained on Exelon daily and tolerates it well.           Past Medical, Surgical, Family & Social History:   Reviewed and updated.    Home Medications:     Current Outpatient Medications:     acetaminophen (TYLENOL) 325 MG tablet, Take 650 mg by mouth every 6 (six) hours as needed., Disp: , Rfl:     amLODIPine (NORVASC) 2.5 MG tablet, Take 1 tablet (2.5 mg total) by mouth once daily., Disp: 90 tablet, Rfl: 3    bimatoprost (LUMIGAN) 0.01 % Drop, Place 1 drop into both eyes every evening., Disp: 7.5 mL, Rfl: 3    brimonidine 0.2% (ALPHAGAN) 0.2 % Drop, Place 1 drop into both eyes 2 (two) times  "daily., Disp: 15 mL, Rfl: 6    CALCIUM CARBONATE/VITAMIN D3 (VITAMIN D-3 ORAL), Take 1 tablet by mouth once daily., Disp: , Rfl:     levothyroxine (SYNTHROID) 100 MCG tablet, Take 1 tablet (100 mcg total) by mouth before breakfast., Disp: 90 tablet, Rfl: 1    rivastigmine (EXELON) 4.6 mg/24 hour PT24, Place 1 patch onto the skin once daily., Disp: 90 patch, Rfl: 3    solifenacin (VESICARE) 5 MG tablet, TAKE 1 TABLET DAILY, Disp: 90 tablet, Rfl: 3    Physical Examination: limited exam due to being virtual   /85 (BP Location: Left arm, Patient Position: Sitting, BP Method: Small (Automatic))   Pulse 65   Resp 17   Ht 5' 4" (1.626 m)   Wt 69.2 kg (152 lb 7.2 oz)   LMP 03/02/1998   BMI 26.17 kg/m²                 GENERAL:  General appearance: Well, non-toxic appearing.  No apparent distress.  Neck: supple.    MENTAL STATUS:  Alertness, attention span & concentration: normal.  Language: normal.  Orientation to self, place & time:  limited  Memory, recent & remote: limited  Fund of knowledge: normal.  MMSE: 10/30  15/30 (12/2022)  18/30 (6/2022)    SPEECH:  Clear and fluent.  Follows complex commands.    CRANIAL NERVES:  Cranial Nerves II-XII were examined.  II - Visual fields: normal.  III, IV, VI: PERRL, EOMI, No ptosis, No nystagmus.  V - Facial sensation: normal.  VII - Face symmetry & mobility: symmetric  VIII - Hearing: normal.  IX, X - Palate: normal   XI - Shoulder shrug: normal.  XII - Tongue protrusion: midline    GROSS MOTOR:  Gait & station: able to rise from chair with arms crossed over chest; non focal; slightly stooped posture  Tone: normal.  Abnormal movements: none.  Finger-nose : normal.  Rapid alternating movements: normal.  Pronator drift: normal      MUSCLE STRENGTH:   Hand grasp:   - right:5/5   - left:5/5    RIGHT    LEFT   5 Biceps 5   5 Triceps 5   5 Forearm.Pr. 5        5 Iliopsoas flex    5   5 Hip Abduct 5   5 Hip Adduct 5   5 Quads 5   5 Hams 5   5 Dorsiflex 5   5 Plantar Flex 5 "     REFLEXES:    RIGHT Reflex   LEFT   2 Biceps 2   2 Brachiorad. 2        2 Patellar 2     SENSORY:  Light touch: Normal throughout.             Diagnostic Data Reviewed:   Folate   Date Value Ref Range Status   09/30/2020 16.1 4.0 - 24.0 ng/mL Final     Vitamin B-12   Date Value Ref Range Status   09/30/2020 1330 (H) 210 - 950 pg/mL Final     TSH   Date Value Ref Range Status   05/14/2024 0.695 0.400 - 4.000 uIU/mL Final     RPR   Date Value Ref Range Status   09/30/2020 Non-reactive Non-reactive Final     Thiamine   Date Value Ref Range Status   09/30/2020 57 38 - 122 ug/L Final     Comment:     This test was developed and the performance   characteristics determined by [facility].   It has not been cleared or approved by the FDA.   The laboratory is regulated under CLIA as qualified to   perform high-complexity testing. This test is used for   patient testing purposes. It should not be regarded   as investigational or for research.  Test performed at [facility],  [address] , [place]  [address]     [phone]  Jose Patten MD  - Medical Director       Ammonia   Date Value Ref Range Status   09/30/2020 35 10 - 50 umol/L Final        MRI brain 2020:  FINDINGS:  Intracranial contents:There is no acute intracranial abnormality.  There is no acute hemorrhage.  There are no regions of restricted diffusion to suggest acute infarction.  There is focal encephalomalacia and gliosis in the lateral right cerebellum with overlying postsurgical changes with a similar appearance to the comparison studies.  Otherwise, cerebellar volume appears normal but there is generalized cerebral volume loss with a moderate burden of periventricular and subcortical white matter FLAIR and T2 hyperintense signal.  These findings were present previously and are without change but remain nonspecific and could reflect sequelae of chronic small vessel disease.  Many of the lesions are periventricular in  "location with a somewhat ovoid configuration which can be seen in the setting of demyelinating disease.  Clinical correlation is needed.  The findings are felt to be stable.  There is no hydrocephalus or midline shift.  There is no abnormal extra-axial fluid collection.  The basilar cisterns are open.  The cerebellar tonsils are normal position.  Flow voids indicating patency are present in the major vessels at the base of the brain.  The basilar artery is slightly small in caliber but there are patent bilateral posterior communicating arteries.  This anatomy may represent fetal origin of the posterior cerebral arteries.     Extracranial contents, calvarium, soft tissues:Baseline marrow signal is normal.  There is multilevel degenerative change in the cervical spine without acute cord compression.  The paranasal sinuses and mastoid air cells are clear.     Impression:  1. There is no acute abnormality.  There is no definite or significant change compared to the prior studies.  There is focal encephalomalacia and gliosis in the lateral right cerebellum with overlying surgical changes.  2. There is generalized cerebral volume loss with moderate nonspecific white matter change which is mostly periventricular in location.  The findings are stable and although are nonspecific, likely reflect sequelae of chronic small vessel disease in a patient of this age with other etiologies, including demyelination felt to be less likely.    NP testing 11/2022:  "Ms. Gracia is a 77 y.o. female with history of breast cancer s/p TC chemotherapy (2012) on anastrozole, hepatitis C (2000), mixed hyperlipidemia, aortic calcification, depression, GERD, and prior surgery for trigeminal neuralgia. She is referred for a neuropsychological evaluation in the context of word finding difficulty, memory, and executive functioning. She consumes two servings of vodka every night. Her  assists with instrumental ADLs.      She obtained an " "MMSE score of 14/30 today, reduced from 18/30 in 6/2022, and 23/30 in 2020. Results are interpreted in the context of low average to average premorbid abilities. She demonstrates impairments in visuospatial abilities, executive functioning, language, and memory. Basic attention is preserved. Learning/acquisition and retrieval are impaired, but she does benefit from recognition format on story and figure memory tests. Naming and verbal fluency are profoundly low, and repetition is poor for sentences. She made phonologic paraphasic errors and occasionally said nonsense words. Object knowledge is spared. Comprehension is variable. Visuospatial ablities/visuoconstruction is also markedly impaired. She has signficant difficulty with working memory, mental set shifting, and mental set maintenance, and understanding abstract concepts. She denies emotional distress.      Her clinical presentation and profile are concerning for a neurodegenerative disease process.  Her  reports that he initially noticed word finding difficulties. She exhibits prominent word retrieval deficits with phonologic paraphasias and neologisms in speech with relatively spared object/semantic knowledge and motor speech. Speech/naming deficits are in disproportion to memory deficits, as she benefits modestly from recognition format. 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 Executive dysfunction and visuospatial deficits are common in lvPPA. MRI appears to show asymmetry with possibly greater left temporal atrophy, but this has not been confirmed by neuroradiology. Further imaging studies (FDG PET) could help inform the differential. Cholinesterase inhibitor medication is still worth consideration, but she has not tolerated aricept or rivastigmine. 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 " FDG activity throughout remainder of the left cerebral hemisphere and right cerebral hemisphere is within physiologic limits. Symmetric FDG uptake occurs in bilateral basal ganglia.     IMPRESSION:  Diffuse asymmetric hypometabolism throughout the left temporal and parietal lobes.           Assessment and Plan:    Problem List Items Addressed This Visit          Neuro    Major neurocognitive disorder - Primary    Current Assessment & Plan     Patient is a 77 y/o female that presents for memory f/u.  Onset ~ 2018. She continues to struggle with word finding, repetition and short term memory loss.   There are no reports of hallucinations, depression, behavorial changes, urinary incontinence or sleep disturbances. Memory changes are frustrating to her.   MMSE today 10/30; decline from prev. (Pt became anxious during testing)   - a diagnosis of major neurodegenerative disorder; mild in severity was warranted from Neuropsych testing in 2022; possibility of PPA, logopenic variant   Recent MRI/PET noted with asymmetry with greater atrophy to left temporal   Serologies noted  Repeat NP testing as needed  Discussed role vs expectation of cognitive enhancing drugs at length   - rukhsana Exelon patch well   - unable to tolerate Aricept d/t GI upset  Supervision suggested  Recommend limiting alcohol consumption          Primary progressive aphasia    Current Assessment & Plan     Starting ST this week  Encouraged home exercises and more stimulation                           Important to note, also  has a past medical history of Anemia, Arthritis, Blood transfusion, Breast cancer, Cancer (RIGHT BREAST), Chronic constipation, Clotting disorder, Colon polyp, Diverticulosis, Glaucoma, Hepatitis C (2000), Hypothyroid, Insomnia, Malignant neoplasm of breast (4/12/2012), Memory loss, Osteoarthritis, Panic attacks, Raynaud's disease, Ulcer, and Vertigo.          The patient will return to clinic in 6  months    All questions were answered  and patient is comfortable with the plan.         Thank you very much for the opportunity to assist in this patient's care.    If you have any questions or concerns, please do not hesitate to contact me at any time.      Sincerely,     YESIKA Montes  [facility] [place]

## 2024-06-24 NOTE — ASSESSMENT & PLAN NOTE
Patient is a 77 y/o female that presents for memory f/u.  Onset ~ 2018. She continues to struggle with word finding, repetition and short term memory loss.   There are no reports of hallucinations, depression, behavorial changes, urinary incontinence or sleep disturbances. Memory changes are frustrating to her.   MMSE today 10/30; decline from prev. (Pt became anxious during testing)   - a diagnosis of major neurodegenerative disorder; mild in severity was warranted from Neuropsych testing in 2022; possibility of PPA, logopenic variant   Recent MRI/PET noted with asymmetry with greater atrophy to left temporal   Serologies noted  Repeat NP testing as needed  Discussed role vs expectation of cognitive enhancing drugs at length   - rukhsana Exelon patch well   - unable to tolerate Aricept d/t GI upset  Supervision suggested  Recommend limiting alcohol consumption

## 2024-06-25 ENCOUNTER — HOSPITAL ENCOUNTER (OUTPATIENT)
Dept: PREADMISSION TESTING | Facility: HOSPITAL | Age: 79
Discharge: HOME | End: 2024-06-25
Attending: ORTHOPAEDIC SURGERY
Payer: MEDICARE

## 2024-06-25 ENCOUNTER — HOSPITAL ENCOUNTER (OUTPATIENT)
Dept: RADIOLOGY | Facility: HOSPITAL | Age: 79
Discharge: HOME | End: 2024-06-25
Attending: ORTHOPAEDIC SURGERY
Payer: MEDICARE

## 2024-06-25 DIAGNOSIS — M75.42 IMPINGEMENT SYNDROME OF LEFT SHOULDER REGION: ICD-10-CM

## 2024-06-25 DIAGNOSIS — Z01.818 PRE-OP EXAM: ICD-10-CM

## 2024-06-25 DIAGNOSIS — Z01.818 PRE-OP TESTING: ICD-10-CM

## 2024-06-25 LAB
ANION GAP SERPL CALC-SCNC: 11 MMOL/L (ref 8–16)
BASOPHILS # BLD AUTO: 0.04 K/UL (ref 0–0.2)
BASOPHILS NFR BLD: 0.7 % (ref 0–1.9)
BUN SERPL-MCNC: 16 MG/DL (ref 8–23)
CALCIUM SERPL-MCNC: 10.4 MG/DL (ref 8.7–10.5)
CHLORIDE SERPL-SCNC: 104 MMOL/L (ref 95–110)
CO2 SERPL-SCNC: 24 MMOL/L (ref 23–29)
CREAT SERPL-MCNC: 1 MG/DL (ref 0.5–1.4)
DIFFERENTIAL METHOD BLD: ABNORMAL
EOSINOPHIL # BLD AUTO: 0.3 K/UL (ref 0–0.5)
EOSINOPHIL NFR BLD: 5.4 % (ref 0–8)
ERYTHROCYTE [DISTWIDTH] IN BLOOD BY AUTOMATED COUNT: 14.8 % (ref 11.5–14.5)
EST. GFR  (NO RACE VARIABLE): 57 ML/MIN/1.73 M^2
GLUCOSE SERPL-MCNC: 80 MG/DL (ref 70–110)
HCT VFR BLD AUTO: 45.2 % (ref 37–48.5)
HGB BLD-MCNC: 14.9 G/DL (ref 12–16)
IMM GRANULOCYTES # BLD AUTO: 0.02 K/UL (ref 0–0.04)
IMM GRANULOCYTES NFR BLD AUTO: 0.3 % (ref 0–0.5)
LYMPHOCYTES # BLD AUTO: 1.9 K/UL (ref 1–4.8)
LYMPHOCYTES NFR BLD: 32.4 % (ref 18–48)
MCH RBC QN AUTO: 29.8 PG (ref 27–31)
MCHC RBC AUTO-ENTMCNC: 33 G/DL (ref 32–36)
MCV RBC AUTO: 90 FL (ref 82–98)
MONOCYTES # BLD AUTO: 0.4 K/UL (ref 0.3–1)
MONOCYTES NFR BLD: 7.5 % (ref 4–15)
NEUTROPHILS # BLD AUTO: 3.2 K/UL (ref 1.8–7.7)
NEUTROPHILS NFR BLD: 53.7 % (ref 38–73)
NRBC BLD-RTO: 0 /100 WBC
PLATELET # BLD AUTO: 165 K/UL (ref 150–450)
PMV BLD AUTO: 9.9 FL (ref 9.2–12.9)
POTASSIUM SERPL-SCNC: 4.6 MMOL/L (ref 3.5–5.1)
RBC # BLD AUTO: 5 M/UL (ref 4–5.4)
SODIUM SERPL-SCNC: 139 MMOL/L (ref 136–145)
WBC # BLD AUTO: 5.89 K/UL (ref 3.9–12.7)

## 2024-06-25 PROCEDURE — 85025 COMPLETE CBC W/AUTO DIFF WBC: CPT | Performed by: ORTHOPAEDIC SURGERY

## 2024-06-25 PROCEDURE — 71046 X-RAY EXAM CHEST 2 VIEWS: CPT | Mod: 26,,, | Performed by: RADIOLOGY

## 2024-06-25 PROCEDURE — 93005 ELECTROCARDIOGRAM TRACING: CPT

## 2024-06-25 PROCEDURE — 80048 BASIC METABOLIC PNL TOTAL CA: CPT | Performed by: ORTHOPAEDIC SURGERY

## 2024-06-25 PROCEDURE — 93010 ELECTROCARDIOGRAM REPORT: CPT | Mod: ,,, | Performed by: INTERNAL MEDICINE

## 2024-06-25 PROCEDURE — 71046 X-RAY EXAM CHEST 2 VIEWS: CPT | Mod: TC

## 2024-06-25 PROCEDURE — 36415 COLL VENOUS BLD VENIPUNCTURE: CPT | Performed by: ORTHOPAEDIC SURGERY

## 2024-06-25 RX ORDER — IBUPROFEN 200 MG
TABLET ORAL
COMMUNITY

## 2024-06-25 NOTE — DISCHARGE INSTRUCTIONS
To confirm, Your doctor has instructed you that surgery is scheduled for: 7/5/24 with Dr. Ramachandran    Please report to [facility], Registration the morning of surgery. You must check-in and receive a wristband before going to your procedure.  [address] [place] [address] [place] [address]    Pre-Op will call the afternoon prior to surgery between 1:00 and 6:00 PM with the final arrival time.  Phone number: [phone]    PLEASE NOTE:  The surgery schedule has many variables which may affect the time of your surgery case.  Family members should be available if your surgery time changes.  Plan to be here the day of your procedure between 4-6 hours.    MEDICATIONS:  TAKE ONLY THESE MEDICATIONS WITH A SMALL SIP OF WATER THE MORNING OF YOUR PROCEDURE:  See list      DO NOT TAKE THESE MEDICATIONS 5-7 DAYS PRIOR to your procedure or per your surgeon's request:   ASPIRIN, ALEVE, ADVIL, IBUPROFEN, FISH OIL VITAMIN E, HERBALS  (May take Tylenol)    ONLY if you are prescribed any types of blood thinners such as:  Aspirin, Coumadin, Plavix, Pradaxa, Xarelto, Aggrenox, Effient, Eliquis, Savasya, Brilinta, or any other, ask your surgeon whether you should stop taking them and how long before surgery you should stop.  You may also need to verify with the prescribing physician if it is ok to stop your medication.      INSTRUCTIONS IMPORTANT!!  Do not eat or drink anything between midnight and the time of your procedure- this includes gum, mints, and candy.  Do not smoke or drink alcoholic beverages 24 hours prior to your procedure.  Shower the night before AND the morning of your procedure with a Chlorhexidine wash such as Hibiclens or Dial antibacterial soap from the neck down.  Do not get it on your face or in your eyes.  You may use your own shampoo and face wash. This helps your skin to be as bacteria free as possible.    If you wear contact lenses, dentures, hearing aids or glasses, bring a container to put them  in during surgery and give to a family member for safe keeping.  Please leave all jewelry, piercing's and valuables at home. You must remove your false eyelashes prior to surgery.    DO NOT remove hair from the surgery site.  Do not shave the incision site unless you are given specific instructions to do so.    ONLY if you have been diagnosed with sleep apnea please bring your C-PAP machine.  ONLY if you wear home oxygen please bring your portable oxygen tank the day of your procedure.  ONLY if you have a history of OPEN HEART SURGERY you will need a clearance from your Cardiologist per Anesthesia.      ONLY for patients requiring bowel prep, written instructions will be given by your doctor's office.  ONLY if you have a neuro stimulator, please bring the controller with you the morning of surgery  ONLY if a type and screen test is needed before surgery, please return:  If your doctor has scheduled you for an overnight stay, bring a small overnight bag with any personal items you need.  Make arrangements in advance for transportation home by a responsible adult. You can not go home in an uber or a cab per hospital policy.  It is not safe to drive a vehicle during the 24 hours after anesthesia.          All  facilities and properties are tobacco free.  Smoking is NOT allowed.   If you have any questions about these instructions, call Pre-Op Admit  Nursing at [phone] or the Pre-Op Day Surgery Unit at [phone].

## 2024-06-25 NOTE — PROGRESS NOTES
"[facility]  Speech Therapy Evaluation -Neurological Rehabilitation    Date: 6/26/2024     Name: Yuniel Gracia   MRN: [id number]    Therapy Diagnosis:   Encounter Diagnosis   Name Primary?    Primary progressive aphasia     Physician: Oneyda Blanco FNP  Physician Orders: Ambulatory Referral to Speech Therapy   Medical Diagnosis: Primary progressive aphasia [G31.01, F02.80]     Visit # / Visits Authorized:  1 / 1   Date of Evaluation:  6/26/2024   Insurance Authorization Period: 6/7/2024 to 6/7/2025  Plan of Care Certification:    6/26/2024 to 8/7/2024      Time In:1335   Time Out: 1430   Total time: 55    Procedure   Speech Language Evaluation      Precautions: Standard, Fall, Cognition, and Communication  Subjective   Date of Onset: diagnosed with Primary Progressive Aphasia in 2022, worsening word finding symptoms  History of Current Condition:  Yuniel Gracia is a 78 y.o. female who presents to [facility] Out[facility] Speech Therapy for evaluation secondary to word finding deficits. Patient was referred to therapy by Oneyda Blanco FNP , which is the patient's neurologist. Patient reports that she feels she is doing "fine", however, spouse reports that patient gets significantly frustrated with her word finding deficits. He stated, 90% of the time I can figure out what she is telling me, but the other times I can't and she gets frustrated.   Patient was accompanied to the evaluation by Lul, her spouse.   Past Medical History: Yuniel Gracia  has a past medical history of Anemia, Arthritis, Blood transfusion, Breast cancer, Cancer (RIGHT BREAST), Chronic constipation, Clotting disorder, Colon polyp, Diverticulosis, General anesthetics causing adverse effect in therapeutic use, Glaucoma, Hepatitis C (2000), Hypothyroid, Insomnia, Malignant neoplasm of breast (04/12/2012), Memory loss, Osteoarthritis, Panic attacks, Raynaud's disease, Ulcer, and " Vertigo.  Yuniel Gracia  has a past surgical history that includes Hysterectomy; Brain surgery (2007); Tunneled venous port placement (04/2012); right lymph node removed from breast area; Bladder surgery (2011); removal of port a cath; Colonoscopy (01/03/2011); Upper gastrointestinal endoscopy (prior to 2011); Colonoscopy (N/A, 06/01/2017); Breast lumpectomy (03/2012); Eye surgery; Joint replacement (09/25/2017); Breast biopsy; Appendectomy; Esophagogastroduodenoscopy (N/A, 07/20/2022); Cataract extraction; and Intramedullary rodding of trochanter of femur (Right, 12/7/2023).  Medical Hx and Allergies: Yuniel has a current medication list which includes the following prescription(s): acetaminophen, amlodipine, lumigan, brimonidine 0.2%, calcium carbonate/vitamin d3, ibuprofen, levothyroxine, rivastigmine, and solifenacin.   Review of patient's allergies indicates:   Allergen Reactions    Iodinated contrast media Shortness Of Breath     Spontaneous jaw movements    Codeine Nausea Only     Prior Therapy:  No prior Speech Therapy services  Social History:  Patient lives with   in [place], Patient is not currently driving  Occupation: Housewife  Current Level of Function: unsteady gait necessitating use of cane; cognitive communication deficits  Pain Scale: no pain indicated throughout session  Patient's Therapy Goals:  none stated  Objective   Formal Assessment:  Western Aphasia Battery - Revised (WAB-R) was administered to evaluate the patient's receptive and expressive language function.The purpose stated in the manual for the WAB-R is to determine the presence, severity, and type of aphasia; measure the patient's level of performance to provide a baseline for detecting change over time; provide a comprehensive assessment of the patients language strengths and deficits in order to guide treatment and management; infer the location and etiology of the lesion causing the aphasia.  The following results  "were revealed:       Subtest Results:   Information Content: 5 / 10  Fluency, Grammatical Competence, and Paraphasias:  /10  Yes / No Questions: 54 / 60  Auditory Word Recognition: 53 / 60  Sequential Commands: DNT due to time constraints  Repetition: 72 /100  Object Namin /60  Word Fluency: DNT due to time constraints  Sentence Completion: DNT due to time constraints  Responsive Speech: DNT due to time constraints      Treatment   Total Treatment Time Separate from Evaluation: not applicable   No treatment performed secondary to time to complete evaluation.     Education provided:   -role of Speech Therapy, goals/plan of care, scheduling/cancellations, insurance limitations with patient    Patient and family members expressed understanding.     Home Program: Not yet established  Assessment     Yuniel presents to [facility] status post medical diagnosis of Primary progressive aphasia [G31.01, F02.80].     Interpretation of objective assessment: Informal assessment completed in conjunction with subtests of the Western Aphasia Battery (WAB). Unable to complete assessment in its entirety due to time constraints and patient frustration.   She presents with significant word finding deficits including difficulty with confrontational naming and formulating spontaneous responses in conversation as well as picture description tasks. Significantly decreased content words noted for picture description task with patient eventually becoming frustrated and asking to move on to another task. Patient frequently stated, "I know it, I just can't get to the word". For confrontational naming tasks, patient was occasionally stimulable with phonemic cues to improve word finding. During instances of word finding difficulty, patient did very well with circumlocution to "talk around" the words. She was noted to independently describe the function of the objects and provide other descriptors for basic functional " objects during evaluation.     Demonstrates impairments including limitations as described in the problem list.     Positive prognostic factors: Family support  Negative prognostic factors: lack of awareness of deficits, progressive nature of PPA  Barriers to therapy: No barriers to therapy identified.     Patient's spiritual, cultural, and educational needs considered and patient agreeable to plan of care and goals.    Patient will benefit from skilled therapy.    Rehab Potential: fair    Short Term Goals: (4 weeks) Current Progress:   1. Pt will participate in Semantic Feature Analysis for 4-5 nouns per session to address word finding strategies.     Progressing/ Not Met 6/26/2024   Established this date   2. Pt will utilize word finding strategies to name 2-D pictures with 70% accuracy given moderate cues.     Progressing/ Not Met 6/26/2024   Established this date   3. Patient will recall 2-3 memory strategies to utilize at home with minimal cues.     Progressing/ Not Met 6/26/2024   Established this date    4. Patient and family members will participate in discussion regarding ways to facilitate improved communication with  environmental modifications and communication partner training     Progressing/ Not Met 6/26/2024   Established this date        Long Term Goals: (6 weeks) Current Progress:   1. She  will maintain functional cognitive-linguistic based skills and utilize compensatory strategies to communicate wants and needs effectively to different conversational partners, maintain safety, and participate socially in functional living environment.     Established this date         Plan     Recommended Treatment Plan:  Patient will participate in the [facility] program for speech therapy 1 times per week for 6 weeks to address her Communication deficits, to educate patient and their family, and to participate in a home exercise program.      Therapist's Name:   Sapphire Montanez CCC-SLP   6/26/2024

## 2024-06-26 ENCOUNTER — CLINICAL SUPPORT (OUTPATIENT)
Dept: REHABILITATION | Facility: HOSPITAL | Age: 79
End: 2024-06-26
Payer: MEDICARE

## 2024-06-26 DIAGNOSIS — G31.01 PRIMARY PROGRESSIVE APHASIA: ICD-10-CM

## 2024-06-26 DIAGNOSIS — F02.80 PRIMARY PROGRESSIVE APHASIA: ICD-10-CM

## 2024-06-26 LAB
OHS QRS DURATION: 70 MS
OHS QTC CALCULATION: 443 MS

## 2024-06-26 PROCEDURE — 92523 SPEECH SOUND LANG COMPREHEN: CPT | Mod: PO

## 2024-06-27 ENCOUNTER — OFFICE VISIT (OUTPATIENT)
Dept: OPTOMETRY | Facility: CLINIC | Age: 79
End: 2024-06-27
Payer: MEDICARE

## 2024-06-27 DIAGNOSIS — H40.1134 PRIMARY OPEN ANGLE GLAUCOMA (POAG) OF BOTH EYES, INDETERMINATE STAGE: Primary | ICD-10-CM

## 2024-06-27 PROCEDURE — G2211 COMPLEX E/M VISIT ADD ON: HCPCS | Mod: S$PBB,,, | Performed by: OPTOMETRIST

## 2024-06-27 PROCEDURE — 99999 PR PBB SHADOW E&M-EST. PATIENT-LVL III: CPT | Mod: PBBFAC,,, | Performed by: OPTOMETRIST

## 2024-06-27 PROCEDURE — 99213 OFFICE O/P EST LOW 20 MIN: CPT | Mod: PBBFAC,PO | Performed by: OPTOMETRIST

## 2024-06-27 PROCEDURE — 99213 OFFICE O/P EST LOW 20 MIN: CPT | Mod: S$PBB,,, | Performed by: OPTOMETRIST

## 2024-06-27 NOTE — PROGRESS NOTES
HPI     Glaucoma     Additional comments: DLE 3-2024           Comments    77YO female presents today for an IOP check. Patient states that she is   doing well, notes no problems or complaints.     Brimonidine OU BID  Lumigan OU BID            Last edited by Araseli Rosado on 6/27/2024  1:53 PM.            Assessment /Plan     For exam results, see Encounter Report.    Primary open angle glaucoma (POAG) of both eyes, indeterminate stage       / 542    OCT 03/2024  Difficulty with HVF in past     IOP improved with drops  Continue:   Lumigan qPM OU  Alphagan: 1 gt bid OU    Visit today is associated with current or anticipated ongoing medical care related to this patients single serious condition/complex condition (primary open angle glaucoma)     Recheck iop / oct rnfl in 5 months

## 2024-07-03 ENCOUNTER — ANESTHESIA EVENT (OUTPATIENT)
Dept: SURGERY | Facility: HOSPITAL | Age: 79
End: 2024-07-03
Payer: MEDICARE

## 2024-07-05 ENCOUNTER — ANESTHESIA (OUTPATIENT)
Dept: SURGERY | Facility: HOSPITAL | Age: 79
End: 2024-07-05
Payer: MEDICARE

## 2024-07-05 ENCOUNTER — HOSPITAL ENCOUNTER (OUTPATIENT)
Facility: HOSPITAL | Age: 79
Discharge: HOME | End: 2024-07-05
Attending: ORTHOPAEDIC SURGERY | Admitting: ORTHOPAEDIC SURGERY
Payer: MEDICARE

## 2024-07-05 VITALS
RESPIRATION RATE: 20 BRPM | DIASTOLIC BLOOD PRESSURE: 89 MMHG | OXYGEN SATURATION: 100 % | SYSTOLIC BLOOD PRESSURE: 154 MMHG | TEMPERATURE: 98 F | HEART RATE: 64 BPM | WEIGHT: 152 LBS | HEIGHT: 64 IN | BODY MASS INDEX: 25.95 KG/M2

## 2024-07-05 DIAGNOSIS — Z01.818 PRE-OP TESTING: ICD-10-CM

## 2024-07-05 DIAGNOSIS — M75.42 IMPINGEMENT SYNDROME OF LEFT SHOULDER REGION: Primary | ICD-10-CM

## 2024-07-05 PROCEDURE — 25000003 PHARM REV CODE 250: Performed by: ANESTHESIOLOGY

## 2024-07-05 PROCEDURE — 63600175 PHARM REV CODE 636 W HCPCS: Performed by: ANESTHESIOLOGY

## 2024-07-05 PROCEDURE — 27200750 HC INSULATED NEEDLE/ STIMUPLEX: Performed by: ANESTHESIOLOGY

## 2024-07-05 PROCEDURE — 71000039 HC RECOVERY, EACH ADD'L HOUR: Performed by: ORTHOPAEDIC SURGERY

## 2024-07-05 PROCEDURE — 37000009 HC ANESTHESIA EA ADD 15 MINS: Performed by: ORTHOPAEDIC SURGERY

## 2024-07-05 PROCEDURE — 63600175 PHARM REV CODE 636 W HCPCS

## 2024-07-05 PROCEDURE — 25000003 PHARM REV CODE 250: Performed by: ORTHOPAEDIC SURGERY

## 2024-07-05 PROCEDURE — C9290 INJ, BUPIVACAINE LIPOSOME: HCPCS | Performed by: ANESTHESIOLOGY

## 2024-07-05 PROCEDURE — 71000015 HC POSTOP RECOV 1ST HR: Performed by: ORTHOPAEDIC SURGERY

## 2024-07-05 PROCEDURE — 63600175 PHARM REV CODE 636 W HCPCS: Performed by: ORTHOPAEDIC SURGERY

## 2024-07-05 PROCEDURE — 63600175 PHARM REV CODE 636 W HCPCS: Mod: JZ,JG | Performed by: ANESTHESIOLOGY

## 2024-07-05 PROCEDURE — 25000003 PHARM REV CODE 250: Performed by: NURSE ANESTHETIST, CERTIFIED REGISTERED

## 2024-07-05 PROCEDURE — 29823 SHO ARTHRS SRG XTNSV DBRDMT: CPT | Mod: LT,,, | Performed by: ORTHOPAEDIC SURGERY

## 2024-07-05 PROCEDURE — 63600175 PHARM REV CODE 636 W HCPCS: Performed by: NURSE ANESTHETIST, CERTIFIED REGISTERED

## 2024-07-05 PROCEDURE — 36000710: Performed by: ORTHOPAEDIC SURGERY

## 2024-07-05 PROCEDURE — 36000711: Performed by: ORTHOPAEDIC SURGERY

## 2024-07-05 PROCEDURE — 64415 NJX AA&/STRD BRCH PLXS IMG: CPT | Performed by: ANESTHESIOLOGY

## 2024-07-05 PROCEDURE — 37000008 HC ANESTHESIA 1ST 15 MINUTES: Performed by: ORTHOPAEDIC SURGERY

## 2024-07-05 PROCEDURE — 27201423 OPTIME MED/SURG SUP & DEVICES STERILE SUPPLY: Performed by: ORTHOPAEDIC SURGERY

## 2024-07-05 PROCEDURE — 71000033 HC RECOVERY, INTIAL HOUR: Performed by: ORTHOPAEDIC SURGERY

## 2024-07-05 RX ORDER — ONDANSETRON HYDROCHLORIDE 2 MG/ML
4 INJECTION, SOLUTION INTRAVENOUS DAILY PRN
Status: DISCONTINUED | OUTPATIENT
Start: 2024-07-05 | End: 2024-07-05 | Stop reason: HOSPADM

## 2024-07-05 RX ORDER — ONDANSETRON HYDROCHLORIDE 2 MG/ML
INJECTION, SOLUTION INTRAMUSCULAR; INTRAVENOUS
Status: DISCONTINUED | OUTPATIENT
Start: 2024-07-05 | End: 2024-07-05

## 2024-07-05 RX ORDER — EPINEPHRINE 1 MG/ML
INJECTION, SOLUTION, CONCENTRATE INTRAVENOUS
Status: DISCONTINUED | OUTPATIENT
Start: 2024-07-05 | End: 2024-07-05 | Stop reason: HOSPADM

## 2024-07-05 RX ORDER — MIDAZOLAM HYDROCHLORIDE 1 MG/ML
1-2 INJECTION, SOLUTION INTRAMUSCULAR; INTRAVENOUS ONCE
Status: DISCONTINUED | OUTPATIENT
Start: 2024-07-05 | End: 2024-07-05 | Stop reason: HOSPADM

## 2024-07-05 RX ORDER — BUPIVACAINE HYDROCHLORIDE 5 MG/ML
INJECTION, SOLUTION EPIDURAL; INTRACAUDAL
Status: DISCONTINUED | OUTPATIENT
Start: 2024-07-05 | End: 2024-07-05

## 2024-07-05 RX ORDER — FENTANYL CITRATE 50 UG/ML
25-200 INJECTION, SOLUTION INTRAMUSCULAR; INTRAVENOUS
Status: DISCONTINUED | OUTPATIENT
Start: 2024-07-05 | End: 2024-07-05 | Stop reason: HOSPADM

## 2024-07-05 RX ORDER — LIDOCAINE HYDROCHLORIDE 20 MG/ML
INJECTION INTRAVENOUS
Status: DISCONTINUED | OUTPATIENT
Start: 2024-07-05 | End: 2024-07-05

## 2024-07-05 RX ORDER — MUPIROCIN 20 MG/G
OINTMENT TOPICAL
Status: DISCONTINUED | OUTPATIENT
Start: 2024-07-05 | End: 2024-07-05 | Stop reason: HOSPADM

## 2024-07-05 RX ORDER — ROCURONIUM BROMIDE 10 MG/ML
INJECTION, SOLUTION INTRAVENOUS
Status: DISCONTINUED | OUTPATIENT
Start: 2024-07-05 | End: 2024-07-05

## 2024-07-05 RX ORDER — FENTANYL CITRATE 50 UG/ML
INJECTION, SOLUTION INTRAMUSCULAR; INTRAVENOUS
Status: DISCONTINUED | OUTPATIENT
Start: 2024-07-05 | End: 2024-07-05

## 2024-07-05 RX ORDER — PROPOFOL 10 MG/ML
VIAL (ML) INTRAVENOUS
Status: DISCONTINUED | OUTPATIENT
Start: 2024-07-05 | End: 2024-07-05

## 2024-07-05 RX ORDER — PHENYLEPHRINE HYDROCHLORIDE 10 MG/ML
INJECTION INTRAVENOUS
Status: DISCONTINUED | OUTPATIENT
Start: 2024-07-05 | End: 2024-07-05

## 2024-07-05 RX ORDER — SUCCINYLCHOLINE CHLORIDE 20 MG/ML
INJECTION INTRAMUSCULAR; INTRAVENOUS
Status: DISCONTINUED | OUTPATIENT
Start: 2024-07-05 | End: 2024-07-05

## 2024-07-05 RX ORDER — LIDOCAINE HYDROCHLORIDE 10 MG/ML
1 INJECTION, SOLUTION EPIDURAL; INFILTRATION; INTRACAUDAL; PERINEURAL ONCE
Status: DISCONTINUED | OUTPATIENT
Start: 2024-07-05 | End: 2024-07-05 | Stop reason: HOSPADM

## 2024-07-05 RX ORDER — SODIUM CHLORIDE 0.9 % (FLUSH) 0.9 %
3 SYRINGE (ML) INJECTION
Status: DISCONTINUED | OUTPATIENT
Start: 2024-07-05 | End: 2024-07-05 | Stop reason: HOSPADM

## 2024-07-05 RX ORDER — OXYCODONE HYDROCHLORIDE 5 MG/1
5 TABLET ORAL
Status: DISCONTINUED | OUTPATIENT
Start: 2024-07-05 | End: 2024-07-05 | Stop reason: HOSPADM

## 2024-07-05 RX ORDER — FENTANYL CITRATE 50 UG/ML
25 INJECTION, SOLUTION INTRAMUSCULAR; INTRAVENOUS EVERY 5 MIN PRN
Status: DISCONTINUED | OUTPATIENT
Start: 2024-07-05 | End: 2024-07-05 | Stop reason: HOSPADM

## 2024-07-05 RX ORDER — DEXAMETHASONE SODIUM PHOSPHATE 4 MG/ML
INJECTION, SOLUTION INTRA-ARTICULAR; INTRALESIONAL; INTRAMUSCULAR; INTRAVENOUS; SOFT TISSUE
Status: DISCONTINUED | OUTPATIENT
Start: 2024-07-05 | End: 2024-07-05

## 2024-07-05 RX ORDER — KETOROLAC TROMETHAMINE 30 MG/ML
INJECTION, SOLUTION INTRAMUSCULAR; INTRAVENOUS
Status: DISCONTINUED | OUTPATIENT
Start: 2024-07-05 | End: 2024-07-05

## 2024-07-05 RX ADMIN — PHENYLEPHRINE HYDROCHLORIDE 100 MCG: 10 INJECTION INTRAVENOUS at 07:07

## 2024-07-05 RX ADMIN — SUCCINYLCHOLINE CHLORIDE 120 MG: 20 INJECTION, SOLUTION INTRAMUSCULAR; INTRAVENOUS at 07:07

## 2024-07-05 RX ADMIN — MUPIROCIN 1 G: 20 OINTMENT TOPICAL at 06:07

## 2024-07-05 RX ADMIN — SODIUM CHLORIDE, SODIUM GLUCONATE, SODIUM ACETATE, POTASSIUM CHLORIDE AND MAGNESIUM CHLORIDE: 526; 502; 368; 37; 30 INJECTION, SOLUTION INTRAVENOUS at 06:07

## 2024-07-05 RX ADMIN — ONDANSETRON 4 MG: 2 INJECTION INTRAMUSCULAR; INTRAVENOUS at 06:07

## 2024-07-05 RX ADMIN — BUPIVACAINE 10 ML: 13.3 INJECTION, SUSPENSION, LIPOSOMAL INFILTRATION at 06:07

## 2024-07-05 RX ADMIN — FENTANYL CITRATE 100 MCG: 50 INJECTION, SOLUTION INTRAMUSCULAR; INTRAVENOUS at 06:07

## 2024-07-05 RX ADMIN — DEXAMETHASONE SODIUM PHOSPHATE 8 MG: 4 INJECTION, SOLUTION INTRA-ARTICULAR; INTRALESIONAL; INTRAMUSCULAR; INTRAVENOUS; SOFT TISSUE at 07:07

## 2024-07-05 RX ADMIN — FENTANYL CITRATE 25 MCG: 50 INJECTION, SOLUTION INTRAMUSCULAR; INTRAVENOUS at 08:07

## 2024-07-05 RX ADMIN — LIDOCAINE HYDROCHLORIDE 100 MG: 20 INJECTION, SOLUTION INTRAVENOUS at 07:07

## 2024-07-05 RX ADMIN — ROCURONIUM BROMIDE 5 MG: 10 INJECTION, SOLUTION INTRAVENOUS at 07:07

## 2024-07-05 RX ADMIN — OXYCODONE HYDROCHLORIDE 5 MG: 5 TABLET ORAL at 08:07

## 2024-07-05 RX ADMIN — BUPIVACAINE HYDROCHLORIDE 10 ML: 5 INJECTION, SOLUTION EPIDURAL; INTRACAUDAL; PERINEURAL at 10:07

## 2024-07-05 RX ADMIN — PROPOFOL 140 MG: 10 INJECTION, EMULSION INTRAVENOUS at 07:07

## 2024-07-05 RX ADMIN — KETOROLAC TROMETHAMINE 15 MG: 30 INJECTION, SOLUTION INTRAMUSCULAR; INTRAVENOUS at 07:07

## 2024-07-05 RX ADMIN — GLYCOPYRROLATE 0.4 MG: 0.2 INJECTION, SOLUTION INTRAMUSCULAR; INTRAVENOUS at 07:07

## 2024-07-05 RX ADMIN — CEFAZOLIN 2 G: 2 INJECTION, POWDER, FOR SOLUTION INTRAMUSCULAR; INTRAVENOUS at 07:07

## 2024-07-05 NOTE — TRANSFER OF CARE
"Anesthesia Transfer of Care Note    Patient: Yuniel Gracia    Procedure(s) Performed: Procedure(s) (LRB):  DEBRIDEMENT, SHOULDER, ARTHROSCOPIC (Left)    Patient location: PACU    Anesthesia Type: general    Transport from OR: Transported from OR on 2-3 L/min O2 by NC with adequate spontaneous ventilation    Post pain: adequate analgesia    Post assessment: no apparent anesthetic complications and tolerated procedure well    Post vital signs: stable    Level of consciousness: awake, alert and oriented    Nausea/Vomiting: no nausea/vomiting    Complications: none    Transfer of care protocol was followed      Last vitals: Visit Vitals  /67 (BP Location: Left arm, Patient Position: Lying)   Pulse (!) 57   Temp 36.5 °C (97.7 °F) (Temporal)   Resp 18   Ht 5' 4" (1.626 m)   Wt 68.9 kg (152 lb)   LMP 03/02/1998   SpO2 98%   Breastfeeding No   BMI 26.09 kg/m²     "

## 2024-07-05 NOTE — OP NOTE
[facility]  Orthopedic Surgery Department  Operative Note    SUMMARY     Date of Procedure: 7/5/2024     Procedure: Procedure(s) (LRB):  DEBRIDEMENT, SHOULDER, ARTHROSCOPIC (Left)     Surgeons and Role:     * Jae Ramachandran II, MD - Primary    Assisting Surgeon: None    First Assist:  SHAUNNA Wen    Pre-Operative Diagnosis: Impingement syndrome of left shoulder region [M75.42]  Pre-op testing [Z01.818]    Post-Operative Diagnosis: Post-Op Diagnosis Codes:     * Impingement syndrome of left shoulder region [M75.42]     * Pre-op testing [Z01.818]    Anesthesia: Choice    Technical Procedures Used:  Left shoulder arthroscopy with extensive debridement    Description of the Findings of the Procedure:  Dictated    Significant Surgical Tasks Conducted by the Assistant(s), if Applicable:  Positioning and prepping the patient, assistance with instrumentation during arthroscopy, portal site closure and bandage application.    Complications: No    Estimated Blood Loss (EBL): * No values recorded between 7/5/2024  7:30 AM and 7/5/2024  7:59 AM *           Implants: * No implants in log *    Specimens:   Specimen (24h ago, onward)      None                    Condition: Good    Disposition: PACU - hemodynamically stable.    Attestation: I was present for the entire procedure.    Procedure In Detail:  The patient is brought to the operating room and placed on the table in supine position.  The patient underwent anesthesia with the anesthesia service.  She was then sat up in the beach chair position in the left upper extremity was prepped and draped in the normal sterile fashion.  A posterior portal to the shoulder was created and the scope was introduced.  The humeral head, glenoid, labrum, anterior capsule and biceps anchor were all readily identified.  Under direct arthroscopic visualization the anterior portal was created.  There was tearing of the anterior labrum and that was debrided  back to stable rim with a shaver.  There was tearing of the biceps anchor and we debrided that and performed a biceps tenotomy.  The cuff was inspected from underneath and there was tearing of the rotator cuff noted that was partial-thickness that was debrided gently with a shaver.  There was reactive synovitis in the synovium was debrided from inside the joint using a combination of the shaver and a thermal probe.  After completing the debridement from the articular side the scope was brought in the subacromial space in the lateral portal was created.  The subacromial bursa was resected.  There was fraying of the cuff on the bursal side and the cuff was debrided on the bursal side gently with a shaver.  The subacromial bursa was debrided.  After completing the debridement the scope was removed from the shoulder and the portal sites were closed with 3-0 nylon.  A sterile intraoperative dressing was applied and the patient was awakened from anesthesia and taken recovery where she was noted to be stable postoperatively.  Needle and lap counts were correct at the end of the case.

## 2024-07-05 NOTE — ANESTHESIA PROCEDURE NOTES
Intubation    Date/Time: 7/5/2024 7:07 AM    Performed by: Landon Carr Jr., CRNA  Authorized by: Rj Jewell MD    Intubation:     Induction:  Intravenous    Intubated:  Postinduction    Mask Ventilation:  Easy mask    Attempts:  1    Attempted By:  CRNA    Method of Intubation:  Video laryngoscopy    Blade:  Bah 3    Laryngeal View Grade: Grade I - full view of cords      Difficult Airway Encountered?: No      Complications:  None    Airway Device:  Oral endotracheal tube    Airway Device Size:  7.0    Style/Cuff Inflation:  Cuffed (inflated to minimal occlusive pressure)    Inflation Amount (mL):  4    Tube secured:  20    Secured at:  The lips    Placement Verified By:  Capnometry    Complicating Factors:  None    Findings Post-Intubation:  BS equal bilateral and atraumatic/condition of teeth unchanged

## 2024-07-05 NOTE — PLAN OF CARE
Discussed limb alert/surgical extremity with anesthesia for IV access. IV to be placed in foot per anesthesia. Ok per surgeon.     Medication patch to upper R back known to anesthesia. Ok to proceed with patch in place per MD.

## 2024-07-05 NOTE — ANESTHESIA POSTPROCEDURE EVALUATION
Anesthesia Post Evaluation    Patient: Yuniel Gracia    Procedure(s) Performed: Procedure(s) (LRB):  DEBRIDEMENT, SHOULDER, ARTHROSCOPIC (Left)    Final Anesthesia Type: general      Patient location during evaluation: PACU  Patient participation: Yes- Able to Participate  Level of consciousness: awake and alert and oriented  Post-procedure vital signs: reviewed and stable  Pain management: adequate  Airway patency: patent    PONV status at discharge: No PONV  Anesthetic complications: no      Cardiovascular status: blood pressure returned to baseline and stable  Respiratory status: unassisted and spontaneous ventilation  Hydration status: euvolemic  Follow-up not needed.          Vitals Value Taken Time   /89 07/05/24 0910   Temp 36.6 °C (97.9 °F) 07/05/24 0858   Pulse 64 07/05/24 0910   Resp 20 07/05/24 0910   SpO2 100 % 07/05/24 0910         Event Time   Out of Recovery 09:04:00         Pain/Eduar Score: Pain Rating Prior to Med Admin: 3 (7/5/2024  9:00 AM)  Pain Rating Post Med Admin: 0 (7/5/2024  9:04 AM)  Eduar Score: 10 (7/5/2024  9:00 AM)  Modified Eduar Score: 18 (7/5/2024  9:10 AM)

## 2024-07-05 NOTE — ANESTHESIA PROCEDURE NOTES
Left Interscalene    Patient location during procedure: pre-op   Block not for primary anesthetic.  Reason for block: at surgeon's request and post-op pain management   Post-op Pain Location: left shoulder pain   Start time: 7/5/2024 6:50 AM  Timeout: 7/5/2024 6:50 AM   End time: 7/5/2024 6:55 AM    Staffing  Authorizing Provider: Rj Jewell MD  Performing Provider: Rj Jewell MD    Staffing  Performed by: Rj Jewell MD  Authorized by: Rj Jewell MD    Preanesthetic Checklist  Completed: patient identified, IV checked, site marked, risks and benefits discussed, surgical consent, monitors and equipment checked, pre-op evaluation and timeout performed  Peripheral Block  Patient position: sitting  Prep: ChloraPrep  Patient monitoring: heart rate, cardiac monitor, continuous pulse ox, continuous capnometry and frequent blood pressure checks  Block type: interscalene  Laterality: left  Injection technique: single shot  Needle  Needle type: Stimuplex   Needle gauge: 22 G  Needle length: 2 in  Needle localization: anatomical landmarks and ultrasound guidance  Needle insertion depth: 5 cm   -ultrasound image captured on disc.  Assessment  Injection assessment: negative aspiration, negative parasthesia and local visualized surrounding nerve  Paresthesia pain: none  Heart rate change: no  Slow fractionated injection: yes  Pain Tolerance: comfortable throughout block and no complaints  Medications:    Medications: BUPivacaine liposome (PF) 1.3 % (13.3 mg/mL) suspension - Injection   10 mL - 7/5/2024 6:50:00 AM  bupivacaine (pf) (MARCAINE) injection 0.5% - Perineural   10 mL - 7/5/2024 10:34:00 AM    Additional Notes  VSS.  [facility] RN monitoring vitals throughout procedure.  Patient tolerated procedure well.     Exparel 10ml + Marcaine 0.5% 10ml dosed under direct Ultrasound guidance.

## 2024-07-05 NOTE — ANESTHESIA PREPROCEDURE EVALUATION
07/05/2024  Yuniel Gracia is a 78 y.o., female.      Pre-op Assessment    I have reviewed the Patient Summary Reports.     I have reviewed the Nursing Notes. I have reviewed the NPO Status.   I have reviewed the Medications.     Review of Systems  Anesthesia Hx:  No problems with previous Anesthesia                Social:  Non-Smoker       Hematology/Oncology:       -- Anemia:                    --  Cancer in past history:       Breast              Cardiovascular:     Hypertension, well controlled                                        Pulmonary:  Pulmonary Normal                       Renal/:  Chronic Renal Disease                Hepatic/GI:   PUD,   Liver Disease, Hepatitis, C           Musculoskeletal:  Arthritis               Neurological:       Dementia mild                        Endocrine:   Hypothyroidism          Psych:  Psychiatric History  depression              Physical Exam  General: Well nourished, Cooperative, Alert and Oriented    Airway:  Mallampati: II   Mouth Opening: Normal  TM Distance: Normal  Neck ROM: Normal ROM    Anesthesia Plan  Type of Anesthesia, risks & benefits discussed:    Anesthesia Type: Gen ETT, Gen Supraglottic Airway, Gen Natural Airway, MAC  Intra-op Monitoring Plan: Standard ASA Monitors  Post Op Pain Control Plan: multimodal analgesia  Induction:  IV  Airway Plan: Direct, Video and Fiberoptic, Post-Induction  Informed Consent: Informed consent signed with the Patient and all parties understand the risks and agree with anesthesia plan.  All questions answered.   ASA Score: 3    Ready For Surgery From Anesthesia Perspective.   .

## 2024-07-05 NOTE — DISCHARGE INSTRUCTIONS
DR MOFFETT   SHOULDER ARTHROSCOPY   POST OP INSTRUCTIONS   Wound Care   Maintain your operative dressing for ___72 hours.___   It is normal for the shoulder to bleed and swell following surgery- if blood soaks into the bandage, do not become alarmed- reinforce with additional dressing.    Remove surgical dressing on the second postoperative day and apply waterproof Band-Aids over incisions and change daily.   Keep surgical incisions clean and dry.   You may shower after removing the first dressing on the second postoperative day by placing waterproof Band-Aids over incision areas.   Do NOT immerse the operative shoulder in water until 14 days (2 weeks) after surgery   Icing   Icing is very important for the first 5-7 days after surgery.   Use an ice machine continuously or ice packs every 2 hours for 20 minutes daily until your first postoperative visit.    Do not place the ice bag or cooling device directly on the skin. Care must be taken to avoid frostbite on the skin.   Activity   Gentle range of motion of your hang and elbow is encouraged.   While exercise is important, don't overdo it. Common sense is the rule. Increased swelling and/or pain is usually an indication youre overdoing it.    Will decide on physical therapy necessity at post op appt    When sleeping or resting, inclined positions (ie: reclining chair) and a pillow under the forearm for support may provide better comfort STILL IN SLING   Avoid long periods of sitting or long distance traveling for 2 weeks.   NO driving until instructed otherwise by physician, it is illegal to drive in a sling.    Sling   Wear your sling at all times, unless instructed otherwise    May remove your sling to shower, or to do home exercise program, keep arm close to side   Medications   Do not drive a car or operate a heavy machinery while taking narcotics   Do not take Tylenol (acetaminophen)   Ibuprofen may be taken in between narcotic medication.   You should resume  your normal medications for other conditions the day after surgery.    Is important not to drink alcohol while taking narcotic medication   Call Physician for any questions or concerns    Redness or swelling    Drainage (pus) from the incision site   Persistent pain is not relieved by pain medicine.       Make a return appointment if not on discharge paperwork.   For emergencies: Call your physician and/or go to the emergency department

## 2024-07-05 NOTE — PLAN OF CARE
Released from Pacu per Anesthesia when criteria met pain controlled skin w+d No nausea No emesis dsg dry intact aaoto self not able to recall why she was here reoriented her  encouraged deep breaths Pt has all belongings in post op    with spouse has arm L in sling and ice pk on dsg instr to expect some brusing underneath dsg encouraged use of IS at home

## 2024-07-14 DIAGNOSIS — E02 SUBCLINICAL IODINE-DEFICIENCY HYPOTHYROIDISM: ICD-10-CM

## 2024-07-15 ENCOUNTER — OFFICE VISIT (OUTPATIENT)
Dept: ORTHOPEDICS | Facility: CLINIC | Age: 79
End: 2024-07-15
Payer: MEDICARE

## 2024-07-15 ENCOUNTER — LAB VISIT (OUTPATIENT)
Dept: LAB | Facility: HOSPITAL | Age: 79
End: 2024-07-15
Payer: MEDICARE

## 2024-07-15 VITALS — HEIGHT: 64 IN | WEIGHT: 151.88 LBS | BODY MASS INDEX: 25.93 KG/M2

## 2024-07-15 DIAGNOSIS — M75.42 IMPINGEMENT SYNDROME OF LEFT SHOULDER REGION: Primary | ICD-10-CM

## 2024-07-15 DIAGNOSIS — E02 SUBCLINICAL IODINE-DEFICIENCY HYPOTHYROIDISM: ICD-10-CM

## 2024-07-15 LAB — TSH SERPL DL<=0.005 MIU/L-ACNC: 0.81 UIU/ML (ref 0.4–4)

## 2024-07-15 PROCEDURE — 99024 POSTOP FOLLOW-UP VISIT: CPT | Mod: POP,,, | Performed by: ORTHOPAEDIC SURGERY

## 2024-07-15 PROCEDURE — 99999 PR PBB SHADOW E&M-EST. PATIENT-LVL II: CPT | Mod: PBBFAC,,, | Performed by: ORTHOPAEDIC SURGERY

## 2024-07-15 PROCEDURE — 99212 OFFICE O/P EST SF 10 MIN: CPT | Mod: PBBFAC,PO | Performed by: ORTHOPAEDIC SURGERY

## 2024-07-15 PROCEDURE — 36415 COLL VENOUS BLD VENIPUNCTURE: CPT | Mod: PO

## 2024-07-15 PROCEDURE — 84443 ASSAY THYROID STIM HORMONE: CPT

## 2024-07-15 RX ORDER — LEVOTHYROXINE SODIUM 100 UG/1
100 TABLET ORAL
Qty: 90 TABLET | Refills: 0 | Status: SHIPPED | OUTPATIENT
Start: 2024-07-15

## 2024-07-15 NOTE — TELEPHONE ENCOUNTER
Care Due:                  Date            Visit Type   Department     Provider  --------------------------------------------------------------------------------    Last Visit: None Found      None         None Found  Next Visit: None Scheduled  None         None Found                                                            Last  Test          Frequency    Reason                     Performed    Due Date  --------------------------------------------------------------------------------    Office Visit  15 months..  solifenacin..............  Not Found    Overdue    [employer] Embedded Care Due Messages. Reference number: [id number].   7/15/2024 10:53:51 AM CDT

## 2024-07-15 NOTE — TELEPHONE ENCOUNTER
Refill Routing Note   Medication(s) are not appropriate for processing by [facility] for the following reason(s):        No active prescription written by provider  Patient not seen by provider within 15 months    [facility] action(s):  Defer      Medication Therapy Plan: 7/5/24 ed vist reason: Impingement syndrome of left shoulder region      Appointments  past 12m or future 3m with PCP    Date Provider   Last Visit   7/19/2022 Bhaskar Olivera MD   Next Visit   Visit date not found Bhaskar Olivera MD   ED visits in past 90 days: 0        Note composed:10:11 AM 07/15/2024

## 2024-07-15 NOTE — PROGRESS NOTES
CC:  78-year-old female follows up status post left shoulder arthroscopy with extensive debridement.  Date of surgery was 07/05/2024.  She is 10 days postop.  She has no complaints today in her pain is well controlled.    LUE:  Portal sites are clean, dry, and intact.  Healing well no sign of infection  Grossly intact motor and sensory function distally  Plus 2 distal pulses    Sutures removed and Steri-Strips applied  PT referral made  Follow up in 6 weeks for re-evaluation

## 2024-07-18 ENCOUNTER — CLINICAL SUPPORT (OUTPATIENT)
Dept: REHABILITATION | Facility: HOSPITAL | Age: 79
End: 2024-07-18
Attending: ORTHOPAEDIC SURGERY
Payer: MEDICARE

## 2024-07-18 DIAGNOSIS — M25.612 DECREASED ROM OF LEFT SHOULDER: ICD-10-CM

## 2024-07-18 DIAGNOSIS — R29.898 DECREASED STRENGTH OF UPPER EXTREMITY: ICD-10-CM

## 2024-07-18 DIAGNOSIS — M75.42 IMPINGEMENT SYNDROME OF LEFT SHOULDER REGION: Primary | ICD-10-CM

## 2024-07-18 PROCEDURE — 97161 PT EVAL LOW COMPLEX 20 MIN: CPT | Mod: KX,PN | Performed by: PHYSICAL THERAPIST

## 2024-07-18 PROCEDURE — 97530 THERAPEUTIC ACTIVITIES: CPT | Mod: KX,PN | Performed by: PHYSICAL THERAPIST

## 2024-07-19 NOTE — PLAN OF CARE
"[facility]   Physical Therapy Initial Evaluation     Name: Yuniel SOLOMON [facility]  Clinic Number: [id number]    Therapy Diagnosis:   Encounter Diagnoses   Name Primary?    Impingement syndrome of left shoulder region Yes    Decreased ROM of left shoulder     Decreased strength of upper extremity         Physician: Jae Ramachandran II, MD    Physician Orders: PT Eval and Treat   Medical Diagnosis from Referral: Impingement syndrome of left shoulder region   Evaluation Date: 7/18/2024  Authorization Period Expiration: 7/15/2025  Plan of Care Expiration: 9/20/2024  Progress Note Due: 8/18/2024  Visit # / Visits authorized: 1/ 1  (POC: 1/16)   FOTO: 1/3    Precautions: Standard and Dementia per    Insurance: Payor: MEDICARE / Plan: MEDICARE PART A & B / Product Type: Government /     Time In: 1600  Time Out: 1700  Total Appointment Time (timed & untimed codes): 60 minutes      SUBJECTIVE   Date of onset: Date of surgery: 7/8/2024    History of current condition - Yuniel reports: a 6 month history of left shoulder pain. She reports having difficulty with reaching overhead, across her body and behind her back. Her  states he had to assist her with dressing. She received injections without reduction in symptoms. Her  stated she sleeps on her side and wakes up with a stiff shoulder. She underwent arthroscopic debridement on 7/5/2024. She wore a sling following the surgery but presents to outpatient PT without the sling. She reports limitations with the ability to raise her arm forward or out to the side.     Falls: 0    Imaging, X-rays: "Mild degenerative changes of the AC joint and glenohumeral joint. No acute fracture or dislocation. "    Prior Therapy: PT  Social History:  lives with their spouse  Occupation: Retired  Prior Level of Function: Independent  Current Level of Function: Decreased ability to perform activity of daily living with the left upper " "extremity.    Pain:  Current 5/10, worst 7/10, best 5/10   Location: left shoulder    Description: Aching and Sharp  Aggravating Factors: Reaching overhead, behind the back and across the body  Easing Factors: pain medication    Patients goals: Return to previous level of activity     Medical History:   Past Medical History:   Diagnosis Date    Anemia     Arthritis     Blood transfusion     Breast cancer     Cancer RIGHT BREAST    Chronic constipation     Clotting disorder     Colon polyp     Diverticulosis     General anesthetics causing adverse effect in therapeutic use     after surgery 12/23 patient had memory and personality changes for 4 hours.    Glaucoma     Hepatitis C 2000    pt states history of hepatits c-"cured by Dr. Lynch"; TREATED 2 YEARS - 2000   OK NOW    Hypothyroid     Insomnia     Malignant neoplasm of breast 04/12/2012    Memory loss     reports some memory loss since chemo    Osteoarthritis     Panic attacks     Raynaud's disease     takes amlodipine for this    Ulcer     Vertigo        Surgical History:   Yuniel Gracia  has a past surgical history that includes Hysterectomy; Brain surgery (2007); Tunneled venous port placement (04/2012); right lymph node removed from breast area; Bladder surgery (2011); removal of port a cath; Colonoscopy (01/03/2011); Upper gastrointestinal endoscopy (prior to 2011); Colonoscopy (N/A, 06/01/2017); Breast lumpectomy (03/2012); Eye surgery; Joint replacement (09/25/2017); Breast biopsy; Appendectomy; Esophagogastroduodenoscopy (N/A, 07/20/2022); Cataract extraction; Intramedullary rodding of trochanter of femur (Right, 12/7/2023); and Arthroscopic debridement of shoulder (Left, 7/5/2024).    Medications:   Yuniel has a current medication list which includes the following prescription(s): acetaminophen, amlodipine, lumigan, brimonidine 0.2%, calcium carbonate/vitamin d3, ibuprofen, levothyroxine, rivastigmine, and solifenacin.    Allergies:   Review " of patient's allergies indicates:   Allergen Reactions    Iodinated contrast media Shortness Of Breath     Spontaneous jaw movements    Codeine Nausea Only          OBJECTIVE     Posture: Decreased lumbar lordosis and forward head in standing  Palpation: Severe point tenderness noted with palpation of the left shoulder  Sensation: Intact    Range of Motion/Strength:     Shoulder Left Right Pain/Dysfunction with Movement   AROM      flexion  26*  148*    abduction  22*  132*    Internal rotation   Greater trochanter L3    ER at 90° abd  32* 68*        Shoulder Left Right Pain/Dysfunction with Movement   PROM      flexion  122*  152*    abduction  120*  140*    Internal rotation   Greater trochanter  L3    ER at 60° abd  50*  70*        UE MMT Left Right Pain/Dysfunction with Movement   Shoulder Flexion 2-/5. 4/5.    Shoulder Abduction 2-/5. 4/5.    Shoulder IR 3/5. 4/5.    Shoulder ER  @ 0* Abduction 2-/5. 4-/5.        Limitation/Restriction for FOTO  Survey    Therapist reviewed FOTO scores for Yuniel LermaBasil on 7/18/2024.   FOTO documents entered into EPIC - see Media section.    Intake Score: 43%         TREATMENT     Total Treatment time (time-based codes) separate from Evaluation: 30 minutes      Yuniel received the treatments listed below:      THERAPEUTIC ACTIVITIES to improve dynamic and functional performance for 30 minutes including :.     Overhead pulley  flexion 3 min  Overhead pulley  scaption 3 min  T-bar flexion 3 x 10  T-bar abduction 3 x 10  Seated table flexion stretch 3 x 10  Seated table ER stretch 3 x 10   Seated table scapption stretch 3 x 10    PATIENT EDUCATION AND HOME EXERCISES     Education provided:   - Importance of early shoulder movement to decrease chance for tightness    Written Home Exercises Provided: yes. Exercises were reviewed and Yuniel was able to demonstrate them prior to the end of the session.  Yuniel demonstrated good  understanding of the education provided. See EMR  under Patient Instructions for exercises provided during therapy sessions.    ASSESSMENT     Yuniel is a 78 y.o. female referred to outpatient Physical Therapy with a medical diagnosis of Impingement syndrome of left shoulder region . Patient presents with :    Left shoulder pain  Decreased left shoulder Active range of motion   Decreased left shoulder strength  Decreased ability to perform activity of daily living with the left upper extremity     Patient prognosis is Fair.   Patientt will benefit from skilled outpatient Physical Therapy to address the deficits stated above and in the chart below, provide patient /family education, and to maximize patientt's level of independence.     Plan of care discussed with patient: Yes  Patient's spiritual, cultural and educational needs considered and patient is agreeable to the plan of care and goals as stated below:     Anticipated Barriers for therapy: none    Medical Necessity is demonstrated by the following  History  Co-morbidities and personal factors that may impact the plan of care [x] LOW: no personal factors / co-morbidities  [] MODERATE: 1-2 personal factors / co-morbidities  [] HIGH: 3+ personal factors / co-morbidities    Moderate / High Support Documentation:      Examination  Body Structures and Functions, activity limitations and participation restrictions that may impact the plan of care [x] LOW: addressing 1-2 elements  [] MODERATE: 3+ elements  [] HIGH: 4+ elements (please support below)    Moderate / High Support Documentation:      Clinical Presentation [x] LOW: stable  [] MODERATE: Evolving  [] HIGH: Unstable     Decision Making/ Complexity Score: low       Goals:  SHORT TERM GOALS:  4 weeks  Progress Date reviewed Date met   The patient will begin a written HEP [] Met  [] Not Met  [] Progressing      Increase passive flexion and abduction to 160* [] Met  [] Not Met  [] Progressing      Increase passive external rotation to 60* [] Met  [] Not Met  []  Progressing      Decrease pain at worst to 60* [] Met  [] Not Met  [] Progressing         LONG TERM GOALS: 8 weeks  Progress Date reviewed Date met   The patient will be independent with a HEP for maintenace [] Met  [] Not Met  [] Progressing      Decrease soft tissue tenderness to mild [] Met  [] Not Met  [] Progressing      Increase FOTO score to 61% [] Met  [] Not Met  [] Progressing      Increase UE strength to 4/5 [] Met  [] Not Met  [] Progressing      The patient will be able to lift 5 pounds from waist to crown [] Met  [] Not Met  [] Progressing          PLAN   Plan of care Certification: 7/18/2024 to 9/20/2024.    Outpatient Physical Therapy 2 times weekly for 8 weeks to include the following interventions: Manual Therapy, Neuromuscular Re-ed, Patient Education, Therapeutic Activities, and Therapeutic Exercise.     Boby Fuentes, PT      I CERTIFY THE NEED FOR THESE SERVICES FURNISHED UNDER THIS PLAN OF TREATMENT AND WHILE UNDER MY CARE   Physician's comments:     Physician's Signature: ___________________________________________________

## 2024-07-23 ENCOUNTER — CLINICAL SUPPORT (OUTPATIENT)
Dept: REHABILITATION | Facility: HOSPITAL | Age: 79
End: 2024-07-23
Payer: MEDICARE

## 2024-07-23 DIAGNOSIS — M25.612 DECREASED ROM OF LEFT SHOULDER: ICD-10-CM

## 2024-07-23 DIAGNOSIS — M75.42 IMPINGEMENT SYNDROME OF LEFT SHOULDER REGION: Primary | ICD-10-CM

## 2024-07-23 DIAGNOSIS — R29.898 DECREASED STRENGTH OF UPPER EXTREMITY: ICD-10-CM

## 2024-07-23 PROCEDURE — 97112 NEUROMUSCULAR REEDUCATION: CPT | Mod: KX,PN | Performed by: PHYSICAL THERAPIST

## 2024-07-23 NOTE — PROGRESS NOTES
[facility]   Physical Therapy Treatment Note      Name: Yuniel SOLOMON [facility] Number: [id number]    Therapy Diagnosis:   Encounter Diagnoses   Name Primary?    Impingement syndrome of left shoulder region Yes    Decreased ROM of left shoulder     Decreased strength of upper extremity      Physician: Jae Ramachandran II, MD    Visit Date: 7/23/2024    Physician Orders: PT Eval and Treat   Medical Diagnosis from Referral: Impingement syndrome of left shoulder region   Evaluation Date: 7/18/2024  Authorization Period Expiration: 7/15/2025  Plan of Care Expiration: 9/20/2024  Progress Note Due: 8/18/2024  Visit # / Visits authorized: 1/ 30  (POC: 1/16)   FOTO: 1/3    PTA Visit #: 0/5     Time In: 1000  Time Out: 1055  Total Billable Time: 55 minutes    Precautions: Standard and Dementia  Insurance: Payor: MEDICARE / Plan: MEDICARE PART A & B / Product Type: Government /     Subjective     Pt reports: her shoulder feels good sometimes and hurts sometimes.  She was compliant with home exercise program.  Response to previous treatment: no complaints  Functional change: ongoing    Pain: 3/10  Location: left shoulder      Objective      Objective Measures updated at progress report unless specified.     Treatment     Yuniel received the treatments listed below:      neuromuscular re-education activities to improve: Coordination, Kinesthetic, Sense, and Proprioception for 55 minutes. The following activities were included:    Overhead pulley  flexion 3 min  Overhead pulley  scaption 3 min  T-Bar flexion 3 x 10  T-Bar external rotation 3 x 10    Seated table flexion stretch 3 x 10  Seated table ER stretch 3 x 10   Seated table scaption stretch 3 x 10  Shoulder ladder 2 x 10    UBE 3 min forward/ 3 min backward       Patient Education and Home Exercises       Education provided:   - Ice for pain management as needed    Written Home Exercises Provided: Patient instructed to cont prior HEP.  Exercises were reviewed and Yuniel was able to demonstrate them prior to the end of the session.  Yuniel demonstrated good  understanding of the education provided. See EMR under Patient Instructions for exercises provided during therapy sessions    Assessment     Patient requires verbal and tactile cueing for correct technique. Overall low tolerance to pain but was able to progress to T-bar exercises in supine. Patient tolerated treatment well without report of adverse effects.    Yuniel Is progressing well towards her goals.   Pt prognosis is Good.     Pt will continue to benefit from skilled outpatient physical therapy to address the deficits listed in the problem list box on initial evaluation, provide pt/family education and to maximize pt's level of independence in the home and community environment.     Pt's spiritual, cultural and educational needs considered and pt agreeable to plan of care and goals.     Anticipated barriers to physical therapy: none    Goals:   SHORT TERM GOALS:  4 weeks  Progress Date reviewed Date met   The patient will begin a written HEP [] Met  [] Not Met  [x] Progressing       Increase passive flexion and abduction to 160* [] Met  [] Not Met  [x] Progressing       Increase passive external rotation to 60* [] Met  [] Not Met  [x] Progressing       Decrease pain at worst to 60* [] Met  [] Not Met  [x] Progressing          LONG TERM GOALS: 8 weeks  Progress Date reviewed Date met   The patient will be independent with a HEP for maintenace [] Met  [] Not Met  [x] Progressing       Decrease soft tissue tenderness to mild [] Met  [] Not Met  [x] Progressing       Increase FOTO score to 61% [] Met  [] Not Met  [x] Progressing       Increase UE strength to 4/5 [] Met  [] Not Met  [x] Progressing       The patient will be able to lift 5 pounds from waist to crown [] Met  [] Not Met  [x] Progressing         Plan     Continue with physical therapy as per plan of care      Plan of care  Certification: 7/18/2024 to 9/20/2024.     Outpatient Physical Therapy 2 times weekly for 8 weeks to include the following interventions: Manual Therapy, Neuromuscular Re-ed, Patient Education, Therapeutic Activities, and Therapeutic Exercise.     Boby Fuentes, PT

## 2024-07-30 ENCOUNTER — DOCUMENTATION ONLY (OUTPATIENT)
Dept: REHABILITATION | Facility: HOSPITAL | Age: 79
End: 2024-07-30
Payer: MEDICARE

## 2024-07-30 ENCOUNTER — PATIENT OUTREACH (OUTPATIENT)
Dept: NEUROLOGY | Facility: CLINIC | Age: 79
End: 2024-07-30
Payer: MEDICARE

## 2024-07-30 NOTE — PROGRESS NOTES
Dementia Care Management    Recruitment Call      Date of Service: 2024  Care Navigator completing this form: Ruthann May  Referred by: Former Care Ecosystem Participant   PCP: Bhaskar Olivera MD    Patient: Yuniel Gracia  MRN: [id number]  : 1945  Age: 78 y.o.  Gender: female  Race: White  Ethnicity: Not  or /a    Objective:   Patient and caregiver potentially eligible for [facility] dementia care management programs.    CTN/SW completed outreach attempt on 2024 to assess patient/caregiver interest in the program and screen for eligibility.       Outreach Outcome:   Outreach Successful - Pre-Enrollment Call scheduled for   at 11:00 AM       Dyad screened for: CMS GUIDE Model

## 2024-07-30 NOTE — PROGRESS NOTES
Outpatient Therapy Discharge Summary   Discharge Date: 7/30/2024   Name: Yuniel Gracia  Clinic Number: [id number]  Therapy Diagnosis: Primary Progressive Aphasia; Cognitive Communication Deficit  Physician: Oneyda Blanco FNP  Physician Orders: Ambulatory Referral to Speech Therapy  Medical Diagnosis: Primary progressive aphasia [G31.01, F02.80]   Evaluation Date: 6/26/2024    Date of Last visit: Patient only attended initial evaluation  Total Visits Received: 1 (initial evaluation only)      Assessment    Assessment of Current Status: Patient was seen in clinic only for initial evaluation on 6/26/2024. During initial evaluation, patient denied the need for further Speech Therapy services (though spouse suggested trying Speech Therapy to determine if there would be benefit). All scheduled follow up visits were canceled.  Patient is discharged from Speech Therapy services at this time.      Goals:   Short Term Goals: (4 weeks) Current Progress:   1. Pt will participate in Semantic Feature Analysis for 4-5 nouns per session to address word finding strategies.      Progressing/ Not Met 6/26/2024   Not addressed/discontinue   2. Pt will utilize word finding strategies to name 2-D pictures with 70% accuracy given moderate cues.      Progressing/ Not Met 6/26/2024   Not addressed/discontinue   3. Patient will recall 2-3 memory strategies to utilize at home with minimal cues.      Progressing/ Not Met 6/26/2024   Not addressed/discontinue   4. Patient and family members will participate in discussion regarding ways to facilitate improved communication with  environmental modifications and communication partner training      Progressing/ Not Met 6/26/2024   Not addressed/discontinue       Long Term Goals:  Long Term Goals: (6 weeks) Current Progress:   1. She  will maintain functional cognitive-linguistic based skills and utilize compensatory strategies to communicate wants and needs effectively to different  conversational partners, maintain safety, and participate socially in functional living environment.      Not addressed/discontinue        Discharge reason: Other:  Patient has not attended any Speech Therapy visits since initial evaluation on 6/26/2024. Scheduled follow up visits were all canceled.     Plan   This patient is discharged from Speech Therapy    Sapphire Montanez CCC-SLP   7/30/2024

## 2024-08-06 ENCOUNTER — PATIENT OUTREACH (OUTPATIENT)
Dept: NEUROLOGY | Facility: CLINIC | Age: 79
End: 2024-08-06
Payer: MEDICARE

## 2024-09-10 ENCOUNTER — TELEPHONE (OUTPATIENT)
Dept: NEUROLOGY | Facility: CLINIC | Age: 79
End: 2024-09-10
Payer: MEDICARE
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
## 2024-09-11 ENCOUNTER — CLINICAL SUPPORT (OUTPATIENT)
Dept: NEUROLOGY | Facility: CLINIC | Age: 79
End: 2024-09-11
Payer: MEDICARE

## 2024-09-11 VITALS — BODY MASS INDEX: 25.92 KG/M2 | WEIGHT: 151 LBS

## 2024-09-11 DIAGNOSIS — G30.1 MILD LATE ONSET ALZHEIMER'S DEMENTIA WITHOUT BEHAVIORAL DISTURBANCE, PSYCHOTIC DISTURBANCE, MOOD DISTURBANCE, OR ANXIETY: Primary | ICD-10-CM

## 2024-09-11 DIAGNOSIS — F02.A0 MILD LATE ONSET ALZHEIMER'S DEMENTIA WITHOUT BEHAVIORAL DISTURBANCE, PSYCHOTIC DISTURBANCE, MOOD DISTURBANCE, OR ANXIETY: Primary | ICD-10-CM

## 2024-09-11 NOTE — PROGRESS NOTES
Dementia Care Management  Pre-Enrollment Call      Date of Service: 2024    Care Navigator completing this form: Ruthann Dunn  Referring Provider: Former Care Ecosystem Participant  PCP: Bhaskar Olivera MD    Patient: Yuniel Gracia  MRN: [id number]  : 1945  Age: 79 y.o.  Gender: female  Race: White  Ethnicity: Not  or /a  Level of Education: High School graduate, or the equivalent   Patient's Occupation: Housewife    Objective: Dementia Care Management Pre-Enrollment Call.    Yuniel Gracia is a 79 y.o. female who presents for Dementia Care Management Pre-Enrollment Call. Spoke to Caregiver Lul  (Spouse).      Visit Notes:  Pre-Enrollment Call Completed: Pre-enrollment surveys were documented in Epic flowsheets and Initial Visit was scheduled for 24 at 1:00 PM           Psychosocial History   Primary Caregiver: Lul ()  Family Status: Couple has 3 adult children, all live locally and two are involved in [employer] care, and helping around the house. Oldest son not very involved.  Current Living situation: Pt lives in her home with spouse/ CG  Support system: Dyad is involved in Religion and have Religion community for emotional support. CG volunteers at Religion. CG has stopped going to meetings due to caring for pt.  Patient's hobbies/interests: Pt arranges cespedes, she cuts cespedes from yard. Pt likes to watch certain shows on TV          Caregiver Needs and Resource Assessment     Caregiver needs and wellbeing: CG is hoping for solutions to help him help pt to function better with speech, and finding activities to keep pt busy. Pt's short-term memory is severely lacking.    List of resources that are currently available to the dyad (ex. Enrolled in community programs, sitter services, home health, etc.): No        Surveys    Health Related Social Needs - [facility]-HRSN Tool  Instrumental Activities of Daily Living/Activities of Daily Living  Dementia Safety Assessment  Behave  5+ (Neuropsychiatric Symptoms)  Zarit Barron Interview (22 items)  Global Health PROMIS-10  Pre-Visit Form       Dementia Care Program   Care Navigator - Initial Assessment       Date of Service: 2024    Care Navigator completing this form: Ruthann Dunn  Referring Provider: Former Care Ecosystem Participant  PCP: Bhaskar Olivera MD  Care Team: Patient Care Team:  Bhaskar Olivera MD as PCP - General (Family Medicine)  Ilya Landaverde MD (Inactive) as Consulting Physician (Urology)  Sebastián Giles MD as Obstetrician (Obstetrics)  Sherrie Almanza MD as Consulting Physician (Hematology and Oncology)  Riaz Russell MD as Consulting Physician (Family Medicine)  Jcarlos Amato MD as Consulting Physician (Ophthalmology)  Glenys Crocker LPN (Inactive) as Care Coordinator (Family Medicine)  Ruthann Dunn LCSW as    Program Status: Pending  CMS Complexity Tier: Pending  Was Respite Approved? Pending      Patient: Yuniel Gracia  MRN: [id number]  : 1945  Age: 79 y.o.  Gender: female  Race: White  Ethnicity: Not  or /a        Objective: Initial/Annual Assessment for the CMS GUIDE Model dementia care management program.    Yuniel Gracia is a 79 y.o. female who presents for Dementia Care Management initial visit. Spoke to Patient and caregiver Aung Gracia (Spouse).      CTN/SW Action Items:  Discuss ACP and code  (full code)  For PCP--continue closely monitoring BP, and consider lowering dose for Amlodipine   Provide daily activities information    Next call scheduled for: 24 at at 10:00 AM       Medications     Medication list reviewed and updated 2024     Total Active High Risk Medications:   0    This patient does not have an active medication from one of the medication groupers.    Review of patient's allergies indicates:   Allergen Reactions    Iodinated contrast media Shortness Of Breath     Spontaneous  jaw movements    Codeine Nausea Only           Advanced Care Planning     Advance Care Planning   Advance Care Planning       Goals of Care: discussion ongoing      Surveys    The following surveys were documented in Flowsheets:    Monthly Tracking (Falls, UTIs, Hospital encounters)  CDR and/or FAST (assessed by provider)

## 2024-09-11 NOTE — LETTER
September 12, 2024    Yuniel Gracia  [address] [place] [address]             [facility] [address]  [address]  [place] [address]  Phone: [phone]  Fax: [phone]     Dear [facility] is now a participating site for Medicare's new GUIDE Model - a program designed to provide extra help and support for patients living with dementia and their caregivers. For more information on the program, please see attached.     Yuniel Gracia was seen today by our geriatrician, Dr. Lisbet Cardoso, as part of the initial assessment for the GUIDE Model. If approved, your patient and their caregiver will receive a home assessment, an annual Geriatric assessment, and will be assigned a Care Navigator. The Care Navigator will provide education and strategies, supportive counseling, and care coordination. In addition to this, the patient may be eligible for In-Home Respite Care IF Medicare criteria is met.     Below are the recommendations made by Dr. Cardoso, for your consideration.     Continue to monitor blood pressure  Consider lowering Amlodipine, if possible        If you have questions, please do not hesitate to reach out to me or Dr. Cardoso. We look forward to supporting Yuniel Gracia dementia care along with you.      Sincerely,  Ruthann Dunn LCSW  [facility]   Phone: [phone]  Email: [email or web address]  Fax: [phone]     Sincerely,        Ruthann Dunn LCSW

## 2024-09-11 NOTE — PROGRESS NOTES
GUIDE Dementia Care Program   Physician Initial Assessment     The patient location is: [place]  The chief complaint leading to consultation is: Dementia    Visit type: audiovisual    Face to Face time with patient: 20min  60 minutes of total time spent on the encounter, which includes face to face time and non-face to face time preparing to see the patient (eg, review of tests), Obtaining and/or reviewing separately obtained history, Documenting clinical information in the electronic or other health record, Independently interpreting results (not separately reported) and communicating results to the patient/family/caregiver, or Care coordination (not separately reported).       Each patient provided with medical services by telemedicine is:  (1) informed of the relationship between the physician and patient and the respective role of any other health care provider with respect to management of the patient; and (2) notified that he or she may decline to receive medical services by telemedicine and may withdraw from such care at any time.      ASSESSMENT - CARE MANAGEMENT PLAN & RECOMMENDATIONS     There are no diagnoses linked to this encounter.      Major Neurocognitive Disorder  The patient's clinical presentation meets the criteria for Dementia based on:  -Concern regarding decline in cognitive function and impairment in two or more cognitive domains as diagnosed through a neuropsychology testing performed 11/8/22. Refer to note by Griselda Cuevas on date 11/8/22.   -Interference with independence in functional abilities, patient is currently ADL: independent  and IADL dependent    -This cognitive deficits are not explained by delirium or major psychiatric disorder  Current stage   CDR: 1- Mild  FAST: 4 - Requires assistance with IADLs- finances/medications  CMS GUIDE: Low Complexity Dyad - First 6M -        Provider Recommendations:  Mentation  -Discussed joining socially and cognitively engaging activities while  avoiding excessive stimulation, fatigue, or overly complex situations. CTN will reinforce the information.  -Discussed integrating routine and schedule. CTN will reinforce the information.  -See advanced care planning below  -CTN to discuss MIND Diet and other lifestyle modifications that may help maintain brain health   -CTN to provide reading material   -CTN to discuss measures to ensure safety at home  -CTN to provide information on community-based services      What Matters Most  Advance Care Planning   Advance Directives:   Living Will: Yes        Copy on chart: Yes    [place]: No    Do Not Resuscitate Status: No    Medical Power of : No    Agent's Name:  Lul Gracia (Spouse)   Agent's Contact Number:  [phone]    Decision Making:  Family answered questions and Patient answered questions  Goals of Care: The family endorses that what is most important right now is to focus on spending time at home and remaining as independent as possible    Accordingly, we have decided that the best plan to meet the patient's goals includes continuing with treatment            Medications  -Medication reconciliation performed.   -Total Active High Risk Medications: 0  -This patient does not have an active medication from one of the medication groupers.      Mobility  -Falls no, Mobility Device: none  -Discussed the benefits of gradual exercise at home and maintaining as much activity as possible        Nutrition  -BMI: Body mass index is 25.92 kg/m².   -Weight loss: no  -Frailty:no      Multi complexity  Comorbidities:   -------------------------------------    Anemia    Arthritis    Blood transfusion    Breast cancer    Cancer    Chronic constipation    Clotting disorder    Colon polyp    Diverticulosis    General anesthetics causing adverse effect in therapeutic use    after surgery 12/23 patient had memory and personality changes for 4 hours.    Glaucoma    Hepatitis C    pt states history of hepatits  "c-"cured by Dr. Lynch"; TREATED 2 YEARS - 2000   OK NOW    Hypothyroid    Insomnia    Malignant neoplasm of breast    Memory loss    reports some memory loss since chemo    Osteoarthritis    Panic attacks    Raynaud's disease    takes amlodipine for this    Ulcer    Vertigo             Caregiver Assessment  -Caregiver:  Lul  -[place]en [place] Scale Score: 13  -CTN to continue to provide caregiver support       Follow Up with MD  1yr    HISTORY OF PRESENT ILLNESS     Patient is a 79 y.o. year old female presenting with  has a past medical history of Anemia, Arthritis, Blood transfusion, Breast cancer, Cancer, Chronic constipation, Clotting disorder, Colon polyp, Diverticulosis, General anesthetics causing adverse effect in therapeutic use, Glaucoma, Hepatitis C, Hypothyroid, Insomnia, Malignant neoplasm of breast, Memory loss, Osteoarthritis, Panic attacks, Raynaud's disease, Ulcer, and Vertigo.. Here for Dementia Care evaluation.     lul primary caregiver, organizes and admin meds    No appetite change  No safety concerns     Prior care eco sytem patient, diagnosed with dementia 2 yrs ago    Uses cane when she goes out   Fall dec 2023 hip fx s/p repair, no recent falls. Able to walk around the house and do some simple house chores including cleaning (not able to do laundry or cooking). Live in 2 story house, climbs stairs 3x/day.     Independent with dressing and bathing, choosing own clothes   No Incontinence   Not able to use her phone, unable to answer     POA  and adult children   Full Code     What to expect in the future   More activities  Meaningful chores keeping her busy     Low BP, taking amlodipine for Raynaud's, no dizziness     Dementia Staging   CDR: 1- Mild  FAST: 4 - Requires assistance with IADLs- finances/medications      Review of Systems   Review of Systems  Review of Symptoms      Symptom Assessment (ESAS 0-10 Scale)  Pain:  0  Dyspnea:  0  Anxiety:  0  Nausea:  " 0  Depression:  0  Anorexia:  0  Fatigue:  0  Insomnia:  0  Restlessness:  0  Agitation:  0     CAM / Delirium:  Negative  Constipation:  Negative  Diarrhea:  Negative      Functional Assessment Scale (FAST):  4    Living Arrangements:  Lives with spouse    Psychosocial/Cultural:   See Palliative Psychosocial Note: No  Lives with  they have 2 adult children   **Primary  to Follow**  Palliative Care  Consult: Yes    Spiritual:  F - Suad and Belief:  Anglican          GUIDE Self-Report Measures         2024   Dementia BEHAV5+   Does you care recipient get angry or hostile? No   Resist care from others? No   Does your care care recipient see and/ or hear things that no one else can see or hear? No   Does your care recipient act impatiently and cranky? No   Does his or her mood frequently change for no apparent reason? No   Does your care recipient act suspicious without good reason (Example: believes that others are stealing from him or her, or planning to harm him or her in some way)? No   Does your care recipient seem less interested in his or her usual activities or in the activities and plans of others? No   Does your care recipient have trouble sleeping at night? No         2024   Dementia Assessment   HEALTH LITERACY - PATIENT RESPONSE  Not at all Not at all   HEALTH LITERACY - CAREGIVER RESPONSE  Extremely Extremely   AHC-HRSN  4    DSA  1    ZBI-22  13    PROMIS-10 - Global Health Raw 15     PROMIS-10 - Mental Health Raw 18            4Ms for Medical Decision-Making in Older Adults    Last Completed EAWV: 3/7/2024    MOBILITY:  Get Up and Go:      3/7/2024     2:17 PM 3/15/2021     2:09 PM 2019     2:28 PM   Get Up and Go   Trial 1 12 seconds 6 seconds 10 seconds     Activities of Daily Livin/6/2024    11:30 AM   Activities of Daily Living   Ambulation Assistance Required   Ambulation Assistance Cane   Dressing Independent   Transfers  Independent   Toileting Continent of bowel;Continent of bladder   Feeding Independent   Cleaning home/Chores Assistance Required   Telephone use Assistance Required   Paying bills Assistance Required   Taking meds Assistance Required   If required, who assists the patient with ADLs? Ray (spouse), adult children available if assistance is needed.     Whisper Test:      3/7/2024     2:17 PM   Whisper Test   Whisper Test Normal     Disability Status:      9/11/2024     1:52 PM   Disability Status   Are you deaf or do you have serious difficulty hearing? N   Are you blind or do you have serious difficulty seeing, even when wearing glasses? N   Because of a physical, mental, or emotional condition, do you have serious difficulty concentrating, remembering, or making decisions? Y   Do you have serious difficulty walking or climbing stairs? N   Do you have difficulty dressing or bathing? N   Because of a physical, mental, or emotional condition, do you have difficulty doing errands alone such as visiting a doctor's office or shopping? Y     Nutrition Screening:      3/7/2024     2:17 PM   Nutrition Screening   Has food intake declined over the past three months due to loss of appetite, digestive problems, chewing or swallowing difficulties? No decrease in food intake   Involuntary weight loss during the last 3 months? No weight loss   Mobility? Goes out   Has the patient suffered psychological stress or acute disease in the past three months? No   Neuropsychological problems? Mild dementia   Body Mass Index (BMI)?  BMI 23 or greater   Screening Score 13   Interpretation Normal nutritional status    Screening Score: 0-7 Malnourished, 8-11 At Risk, 12-14 Normal  Fall Risk:      7/15/2024     1:45 PM 6/27/2024     2:15 PM 6/24/2024     1:00 PM   Fall Risk Assessment - Outpatient   Mobility Status Ambulatory Ambulatory Ambulatory   Number of falls 0 0 0   Identified as fall risk False False False           MENTATION:    Depression Patient Health Questionnaire:      3/7/2024     2:17 PM   Depression Patient Health Questionnaire   Over the last two weeks how often have you been bothered by little interest or pleasure in doing things Not at all   Over the last two weeks how often have you been bothered by feeling down, depressed or hopeless Not at all   PHQ-2 Total Score 0     Has Dementia Dx: Yes  Has Anxiety Dx: Yes    Cognitive Function Screening:      3/7/2024     2:33 PM   Cognitive Function Screening   Clock Drawing Test 0   Mini-Cog 3 Minute Recall 0   Cognitive Function Screening 0     Cognitive Function Screening Total - Less than 4 = Abnormal,  Greater than or equal to 4 = Normal        MEDICATIONS:  High Risk Medications:  Total Active Medications: 0  This patient does not have an active medication from one of the medication groupers.           Medication Reconciliation       Current Outpatient Medications:     amLODIPine (NORVASC) 2.5 MG tablet, Take 1 tablet (2.5 mg total) by mouth once daily., Disp: 90 tablet, Rfl: 3    bimatoprost (LUMIGAN) 0.01 % Drop, Place 1 drop into both eyes every evening., Disp: 7.5 mL, Rfl: 3    brimonidine 0.2% (ALPHAGAN) 0.2 % Drop, Place 1 drop into both eyes 2 (two) times daily., Disp: 15 mL, Rfl: 6    CALCIUM CARBONATE/VITAMIN D3 (VITAMIN D-3 ORAL), Take 1 tablet by mouth once daily., Disp: , Rfl:     levothyroxine (SYNTHROID) 100 MCG tablet, Take 1 tablet (100 mcg total) by mouth before breakfast., Disp: 90 tablet, Rfl: 0    rivastigmine (EXELON) 4.6 mg/24 hour PT24, Place 1 patch onto the skin once daily., Disp: 90 patch, Rfl: 3    solifenacin (VESICARE) 5 MG tablet, TAKE 1 TABLET DAILY, Disp: 90 tablet, Rfl: 3    acetaminophen (TYLENOL) 325 MG tablet, Take 650 mg by mouth every 6 (six) hours as needed. (Patient not taking: Reported on 9/11/2024), Disp: , Rfl:     ibuprofen (ADVIL,MOTRIN) 200 MG tablet, , Disp: , Rfl:     Review of patient's allergies indicates:   Allergen Reactions     Iodinated contrast media Shortness Of Breath     Spontaneous jaw movements    Codeine Nausea Only         Physical Exam        Physical Exam  Constitutional:       General: She is not in acute distress.  HENT:      Head: Normocephalic and atraumatic.   Pulmonary:      Effort: Pulmonary effort is normal. No respiratory distress.   Musculoskeletal:      Cervical back: Neck supple.   Neurological:      Mental Status: She is alert. Mental status is at baseline.   Psychiatric:         Mood and Affect: Mood and affect normal.             Thank you for allowing us to participate in the care of your patient. Please do not hesitate to contact the GUIDE team with any questions or concerns.     Signature: Lisbet Cardoso MD

## 2024-09-11 NOTE — PROGRESS NOTES
GUIDE Patient Assessment and Alignment Form     Results from (check one):  [x] Initial Assessment  If Initial Assessment, is this patient an existing patient of the practice or a new patient?   [] Existing patient  [x] New patient    If Initial Assessment, provide a patient referral source:  [x] Referred by a health care provider  [] Referred by a community-based organization  [x] Self-referral  [] Re-assessment  If Re-assessment, provide reason:  [] Annual re-assessment  [] Re-assessment due to change in severity of patient's dementia  [] Re-assessment due to change in caregiver status  If Re-assessment due to change in caregiver status, please specify the reason for change:  [] New primary caregiver  [] Loss of caregiver so patient is without a caregiver  [] Patient change in residence  [] Other ___________________    Date of current assessment: 09/11/2024         Patient first name: Yuniel Gracia  Patient middle name (if applicable):   Patient last name: Basil  Patient Address: [address] [place] [address] [address]   [place] [address]   Email: [email or web address]   Phone Number: [phone]      [x] Check if mobile/cellular phone  Patient resides in:   [x] Private residence  [] Assisted living facility  [] Memory care program (excludes nursing home level of care)      [x] Check box to confirm patient is not a long-term nursing home resident.     Patient YOB: 1945  Patient Medicare Beneficiary Identifier: [id number]   Patient Medicaid ID number (if applicable): N/A  Patient dementia stage:   CDR: 1 (add total score from Dr. Lynn's note)   FAST: 4 (add total score from Dr. Lynn's note)   Patient PROMIS Global Health: 33 (Combined Physical and Mental Health scores - review flowsheet to retrieve scores).     Does patient have a primary care provider: [x] Yes  [] No  If Yes, name of the primary care provider: Bhaskar Olivera MD  Phone number of primary care provider: [phone]     Does  patient have caregiver, defined as a relative, or an unpaid nonrelative, who assists the beneficiary with activities of daily living and/or instrumental activities of daily living? Available Caregiver(s): Yes - One    If yes:   Primary caregiver first name: Lul     Primary caregiver last name: Basil  Primary caregiver address: [id number] [place] [address] [address]       [place] [address]         Primary caregiver email: [email or web address]  Primary caregiver phone Number: [phone]   [x] Check if mobile/cellular phone  Primary caregiver YOB: 1944  Primary caregiver gender: Male  Primary caregiver race/ethnicity: White    What is primary caregiver's relationship to patient? Spouse    Does primary caregiver live with patient? Yes    Is primary caregiver a Medicare beneficiary? Yes   If yes, what is the primary caregiver's Medicare Beneficiary Identifier: [id number]     Primary caregiver Zarit Manning Interview: 13  How long has the primary caregiver been in their caregiver role?  (enter length of time in months and years)  months 2 years OR [] Unsure/cannot recall      In my clinical judgment, the assessed patient meets the National [place] on Aging-Alzheimer's Association diagnostic guidelines for dementia and/or the DSM-5 diagnostic guidelines for major neurocognitive disorder, or I have received a written report (electronic or hard-copy) of a documented dementia diagnosis from another Medicare qualified health professional.    [] Yes, the patient meets the National [place] on Aging-Alzheimer's Association diagnostic guidelines for dementia and/or the DSM-5 diagnostic guidelines for major neurocognitive disorder.  [x] Yes, I received a written report of a documented dementia diagnosis  [] No, I cannot attest to either statement        Attesting clinician:   First name: Dr. Frausto  Middle Name: n/a  Last name: Marcus  National Provider Identification (NPI) number for attesting clinician:  [id number]  GUIDE Model Participant Taxpayer Identification Number TIN: [id number]

## 2024-09-17 ENCOUNTER — OFFICE VISIT (OUTPATIENT)
Dept: ORTHOPEDICS | Facility: CLINIC | Age: 79
End: 2024-09-17
Payer: MEDICARE

## 2024-09-17 VITALS — HEIGHT: 64 IN | BODY MASS INDEX: 25.78 KG/M2 | WEIGHT: 151 LBS

## 2024-09-17 DIAGNOSIS — M75.42 IMPINGEMENT SYNDROME OF LEFT SHOULDER REGION: Primary | ICD-10-CM

## 2024-09-17 PROCEDURE — 99999 PR PBB SHADOW E&M-EST. PATIENT-LVL III: CPT | Mod: PBBFAC,,, | Performed by: ORTHOPAEDIC SURGERY

## 2024-09-17 PROCEDURE — 99213 OFFICE O/P EST LOW 20 MIN: CPT | Mod: PBBFAC,PO | Performed by: ORTHOPAEDIC SURGERY

## 2024-09-17 PROCEDURE — 99024 POSTOP FOLLOW-UP VISIT: CPT | Mod: POP,,, | Performed by: ORTHOPAEDIC SURGERY

## 2024-09-17 NOTE — PROGRESS NOTES
CC:  78-year-old female follows up status post left shoulder arthroscopy with extensive debridement by Dr. Ramachandran.  Date of surgery was 07/05/2024.  She is 10 weeks postop.  She has no complaints today in her pain is well controlled.  Pain 1/10.  Did not do any formal PT    LUE:  Portal sites are clean, dry, and intact.  Healed well no sign of infection  Grossly intact motor and sensory function distally  Plus 2 distal pulses  External rotation at the side 30°, internal rotation body, passive forward flexion 130°.  Active forward flexion 60°    Plan:  Continue home exercise program.  Patient declines formal physical therapy.  Went over her intraoperative findings and MRI findings consistent with severe tendinopathy of the left shoulder.  Understands she may require shoulder replacement in the future but at this point her pain is well-controlled and she wants to live with this for now.

## 2024-10-04 ENCOUNTER — PATIENT OUTREACH (OUTPATIENT)
Dept: NEUROLOGY | Facility: CLINIC | Age: 79
End: 2024-10-04
Payer: MEDICARE

## 2024-10-04 DIAGNOSIS — G30.1 MILD LATE ONSET ALZHEIMER'S DEMENTIA WITHOUT BEHAVIORAL DISTURBANCE, PSYCHOTIC DISTURBANCE, MOOD DISTURBANCE, OR ANXIETY: Primary | ICD-10-CM

## 2024-10-04 DIAGNOSIS — F02.A0 MILD LATE ONSET ALZHEIMER'S DEMENTIA WITHOUT BEHAVIORAL DISTURBANCE, PSYCHOTIC DISTURBANCE, MOOD DISTURBANCE, OR ANXIETY: Primary | ICD-10-CM

## 2024-10-04 NOTE — PROGRESS NOTES
Dementia Care Program   Care Navigator - Monthly Follow Up      Date of Service: 10/04/2024  Enrollment date: 24  # Months enrolled in program: 1   Program Status: Active  CMS Complexity Tier: Low Complexity Dyad - First 6M -   Was Respite Approved? No    Care Navigator completing this form: Ruthann Dunn  Referring Provider: Former Care Ecosystem Participant  PCP: Bhaskar Olivera MD  Care Team: Patient Care Team:  Bhaskar Olivera MD as PCP - General (Family Medicine)  Ilya Landaverde MD (Inactive) as Consulting Physician (Urology)  Sebastián Giles MD as Obstetrician (Obstetrics)  Sherrie Almanza MD as Consulting Physician (Hematology and Oncology)  Riaz Russell MD as Consulting Physician (Family Medicine)  Jcarlos Amato MD as Consulting Physician (Ophthalmology)  Glenys Crocker LPN (Inactive) as Care Coordinator (Family Medicine)  Ruthann Dunn LCSW as  (Neuropsychology)  Lisbet Cardoso MD as Consulting Physician (Internal Medicine)     Patient: Yuniel Gracia  MRN: [id number]  : 1945  Age: 79 y.o.  Gender: female  Race: White  Ethnicity: Not  or /a    Objective: Care Navigator/Social Work follow up as part of the CMS GUIDE Model dementia care management program.    Yuniel Gracia is a 79 y.o. female who presents for Dementia Care Management follow up.  Spoke with Caregiver Lul  (Spouse).      The following needs were identified/reported by patient/caregiver:  Pt has back pain which affects her walking.     The following resources were provided:  Discussed fall prevention, provided education    *For more information, refer to Dementia Care Plan [hyperlink at the end of this note]      Next visit scheduled for: 24 at 10:00 AM . Caregiver knows how to reach CTN/SW and is encouraged to do so before the next scheduled call, if needed.          MONTHLY TRACKING

## 2024-10-15 ENCOUNTER — PATIENT MESSAGE (OUTPATIENT)
Dept: FAMILY MEDICINE | Facility: CLINIC | Age: 79
End: 2024-10-15
Payer: MEDICARE

## 2024-10-16 ENCOUNTER — OFFICE VISIT (OUTPATIENT)
Dept: FAMILY MEDICINE | Facility: CLINIC | Age: 79
End: 2024-10-16
Payer: MEDICARE

## 2024-10-16 VITALS
HEIGHT: 64 IN | HEART RATE: 65 BPM | OXYGEN SATURATION: 95 % | DIASTOLIC BLOOD PRESSURE: 76 MMHG | BODY MASS INDEX: 25.7 KG/M2 | TEMPERATURE: 98 F | SYSTOLIC BLOOD PRESSURE: 120 MMHG | WEIGHT: 150.56 LBS | RESPIRATION RATE: 16 BRPM

## 2024-10-16 DIAGNOSIS — F41.8 DEPRESSION WITH ANXIETY: ICD-10-CM

## 2024-10-16 DIAGNOSIS — R82.90 URINE ABNORMALITY: ICD-10-CM

## 2024-10-16 DIAGNOSIS — F32.1 MAJOR DEPRESSIVE DISORDER, SINGLE EPISODE, MODERATE: ICD-10-CM

## 2024-10-16 DIAGNOSIS — I10 ESSENTIAL (PRIMARY) HYPERTENSION: ICD-10-CM

## 2024-10-16 DIAGNOSIS — F03.90 MAJOR NEUROCOGNITIVE DISORDER: Primary | ICD-10-CM

## 2024-10-16 LAB
BILIRUBIN, UA POC OHS: NEGATIVE
BLOOD, UA POC OHS: NEGATIVE
CLARITY, UA POC OHS: CLEAR
COLOR, UA POC OHS: ABNORMAL
GLUCOSE, UA POC OHS: NEGATIVE
KETONES, UA POC OHS: NEGATIVE
LEUKOCYTES, UA POC OHS: ABNORMAL
NITRITE, UA POC OHS: NEGATIVE
PH, UA POC OHS: 6.5
PROTEIN, UA POC OHS: NEGATIVE
SPECIFIC GRAVITY, UA POC OHS: 1.02
UROBILINOGEN, UA POC OHS: 0.2

## 2024-10-16 PROCEDURE — 99999 PR PBB SHADOW E&M-EST. PATIENT-LVL IV: CPT | Mod: PBBFAC,,, | Performed by: PHYSICIAN ASSISTANT

## 2024-10-16 PROCEDURE — 81003 URINALYSIS AUTO W/O SCOPE: CPT | Mod: PBBFAC,PO | Performed by: PHYSICIAN ASSISTANT

## 2024-10-16 PROCEDURE — 87086 URINE CULTURE/COLONY COUNT: CPT | Performed by: PHYSICIAN ASSISTANT

## 2024-10-16 PROCEDURE — 99214 OFFICE O/P EST MOD 30 MIN: CPT | Mod: S$PBB,,, | Performed by: PHYSICIAN ASSISTANT

## 2024-10-16 PROCEDURE — 99999PBSHW POCT URINALYSIS(INSTRUMENT): Mod: PBBFAC,,,

## 2024-10-16 PROCEDURE — 99214 OFFICE O/P EST MOD 30 MIN: CPT | Mod: PBBFAC,PO | Performed by: PHYSICIAN ASSISTANT

## 2024-10-16 NOTE — PROGRESS NOTES
Subjective:       Patient ID: Yuniel Gracia is a 79 y.o. female.    Chief Complaint: Urinary Tract Infection    Patient presents to clinic with . Patient is part of dementia study in which they have to test their urine regularly. Patient did home urine test which revealed evidence of UTI. The patient's  would like this verified through testing in our clinic. The patient has had no fever, mood changes, or urinary symptoms.       Review of patient's allergies indicates:   Allergen Reactions    Iodinated contrast media Shortness Of Breath     Spontaneous jaw movements    Codeine Nausea Only         Current Outpatient Medications:     acetaminophen (TYLENOL) 325 MG tablet, Take 650 mg by mouth every 6 (six) hours as needed., Disp: , Rfl:     amLODIPine (NORVASC) 2.5 MG tablet, Take 1 tablet (2.5 mg total) by mouth once daily., Disp: 90 tablet, Rfl: 3    bimatoprost (LUMIGAN) 0.01 % Drop, Place 1 drop into both eyes every evening., Disp: 7.5 mL, Rfl: 3    brimonidine 0.2% (ALPHAGAN) 0.2 % Drop, Place 1 drop into both eyes 2 (two) times daily., Disp: 15 mL, Rfl: 6    CALCIUM CARBONATE/VITAMIN D3 (VITAMIN D-3 ORAL), Take 1 tablet by mouth once daily., Disp: , Rfl:     ibuprofen (ADVIL,MOTRIN) 200 MG tablet, , Disp: , Rfl:     levothyroxine (SYNTHROID) 100 MCG tablet, Take 1 tablet (100 mcg total) by mouth before breakfast., Disp: 90 tablet, Rfl: 0    rivastigmine (EXELON) 4.6 mg/24 hour PT24, Place 1 patch onto the skin once daily., Disp: 90 patch, Rfl: 3    solifenacin (VESICARE) 5 MG tablet, TAKE 1 TABLET DAILY, Disp: 90 tablet, Rfl: 3    Lab Results   Component Value Date    WBC 5.89 06/25/2024    HGB 14.9 06/25/2024    HCT 45.2 06/25/2024     06/25/2024    CHOL 223 (H) 05/14/2024    TRIG 93 05/14/2024    HDL 61 05/14/2024    ALT 10 05/14/2024    AST 21 05/14/2024     06/25/2024    K 4.6 06/25/2024     06/25/2024    CREATININE 1.0 06/25/2024    BUN 16 06/25/2024    CO2 24  06/25/2024    TSH 0.812 07/15/2024    INR 1.0 12/07/2023    HGBA1C 5.6 05/14/2024       Review of Systems   Constitutional:  Negative for activity change and unexpected weight change.   HENT:  Negative for hearing loss, rhinorrhea and trouble swallowing.    Eyes:  Negative for discharge and visual disturbance.   Respiratory:  Negative for chest tightness and wheezing.    Cardiovascular:  Negative for chest pain and palpitations.   Gastrointestinal:  Negative for blood in stool and diarrhea.   Endocrine: Negative for polydipsia and polyuria.   Genitourinary:  Negative for difficulty urinating, dysuria and menstrual problem.   Musculoskeletal:  Negative for arthralgias, joint swelling and neck pain.   Neurological:  Negative for weakness and headaches.   Psychiatric/Behavioral:  Positive for confusion (chronic, at baseline). Negative for dysphoric mood.        Objective:      Physical Exam  Constitutional:       Appearance: Normal appearance.   HENT:      Head: Normocephalic and atraumatic.   Eyes:      Conjunctiva/sclera: Conjunctivae normal.   Cardiovascular:      Rate and Rhythm: Normal rate and regular rhythm.   Pulmonary:      Effort: Pulmonary effort is normal. No respiratory distress.      Breath sounds: Normal breath sounds. No wheezing.   Abdominal:      General: There is no distension.      Palpations: There is no mass.      Tenderness: There is no abdominal tenderness.   Musculoskeletal:      Right lower leg: No edema.      Left lower leg: No edema.   Lymphadenopathy:      Cervical: No cervical adenopathy.   Skin:     Findings: No erythema.   Neurological:      Mental Status: She is alert and oriented to person, place, and time.   Psychiatric:         Behavior: Behavior normal.         Assessment:       1. Major neurocognitive disorder    2. Essential (primary) hypertension    3. Urine abnormality    4. Major depressive disorder, single episode, moderate    5. Depression with anxiety        Plan:        Yuniel was seen today for urinary tract infection.    Diagnoses and all orders for this visit:    Major neurocognitive disorder  Stable  Follow up neuropsych  Essential (primary) hypertension  Controlled  Low salt diet  Continue current medication  Urine abnormality  -     POCT Urinalysis(Instrument)  -     Urine culture  Only trace leukocytes or UA. Send for culture. Not convincing for UTI  Major depressive disorder, single episode, moderate  stable  Depression with anxiety  stable

## 2024-10-17 LAB — BACTERIA UR CULT: NORMAL

## 2024-10-29 ENCOUNTER — PATIENT MESSAGE (OUTPATIENT)
Dept: FAMILY MEDICINE | Facility: CLINIC | Age: 79
End: 2024-10-29
Payer: MEDICARE

## 2024-10-29 DIAGNOSIS — E02 SUBCLINICAL IODINE-DEFICIENCY HYPOTHYROIDISM: ICD-10-CM

## 2024-10-29 RX ORDER — LEVOTHYROXINE SODIUM 100 UG/1
100 TABLET ORAL
Qty: 90 TABLET | Refills: 1 | Status: SHIPPED | OUTPATIENT
Start: 2024-10-29

## 2024-11-05 ENCOUNTER — PATIENT OUTREACH (OUTPATIENT)
Dept: NEUROLOGY | Facility: CLINIC | Age: 79
End: 2024-11-05
Payer: MEDICARE

## 2024-11-05 DIAGNOSIS — G30.1 MILD LATE ONSET ALZHEIMER'S DEMENTIA WITHOUT BEHAVIORAL DISTURBANCE, PSYCHOTIC DISTURBANCE, MOOD DISTURBANCE, OR ANXIETY: Primary | ICD-10-CM

## 2024-11-05 DIAGNOSIS — F02.A0 MILD LATE ONSET ALZHEIMER'S DEMENTIA WITHOUT BEHAVIORAL DISTURBANCE, PSYCHOTIC DISTURBANCE, MOOD DISTURBANCE, OR ANXIETY: Primary | ICD-10-CM

## 2024-11-05 NOTE — PROGRESS NOTES
Dementia Care Program - GUIDE  Care Navigator - Monthly Follow Up      Virtual Visit - Telehealth  The patient location is: Home  The chief complaint leading to consultation is: GUIDE Model Program  Visit type: Audio Only       Date of Service: 2024  Enrollment date: 24  Program Status: Active  CMS Complexity Tier: Low Complexity Dyad - First 6M -   Was Respite Approved? No    Care Navigator completing this form: Ruthann Dunn  PCP: Bhaskar Olivera MD  Care Team: Patient Care Team:  Bhaskar Olivera MD as PCP - General (Family Medicine)  Ilya Landaverde MD (Inactive) as Consulting Physician (Urology)  Sebastián Giles MD as Obstetrician (Obstetrics)  Sherrie Almanza MD as Consulting Physician (Hematology and Oncology)  Riaz Russell MD as Consulting Physician (Family Medicine)  Jcarlos Amato MD as Consulting Physician (Ophthalmology)  Ruthann Dunn LCSW as  (Neuropsychology)  Lisbet Cardoso MD as Consulting Physician (Internal Medicine)     Patient: Yuniel Gracia  MRN: [id number]  : 1945  Age: 79 y.o.  Gender: female  Race: White  Ethnicity: Not  or /a    Objective: Care Navigator/Social Work follow up as part of the GUIDE dementia care management program.    Spoke with Patient and caregiver Lul  (Spouse).      ASSESSMENT & PLAN     Mild Late Onset Alzheimer's      Identified Needs Resources & Recommendations   --pt had a positive home strip test for bacteria in urine (CG took pt to dr to be tested for UTI) --SW provided UTI prevention education and commended CG for his quick action                 *For more information, refer to Dementia Care Plan [hyperlink at the end of this note]      CTN/SW Action Items: Continue to follow pt and CG. CG has SW direct contact information and was encouraged to reach out with interim needs.         Plan: Monthly follow up for dementia care management as part of the GUIDE Model.    Next  visit scheduled for: 11/6/24 at 11 AM. Caregiver knows how to reach CTN/SW and is encouraged to do so before the next scheduled call, if needed.        MONTHLY TRACKING

## 2024-12-03 ENCOUNTER — OFFICE VISIT (OUTPATIENT)
Dept: OPTOMETRY | Facility: CLINIC | Age: 79
End: 2024-12-03
Payer: MEDICARE

## 2024-12-03 DIAGNOSIS — H04.123 DRY EYE SYNDROME, BILATERAL: ICD-10-CM

## 2024-12-03 DIAGNOSIS — H40.1134 PRIMARY OPEN ANGLE GLAUCOMA (POAG) OF BOTH EYES, INDETERMINATE STAGE: Primary | ICD-10-CM

## 2024-12-03 PROCEDURE — 99999 PR PBB SHADOW E&M-EST. PATIENT-LVL III: CPT | Mod: PBBFAC,,, | Performed by: OPTOMETRIST

## 2024-12-03 PROCEDURE — G2211 COMPLEX E/M VISIT ADD ON: HCPCS | Mod: S$PBB,,, | Performed by: OPTOMETRIST

## 2024-12-03 PROCEDURE — 99213 OFFICE O/P EST LOW 20 MIN: CPT | Mod: PBBFAC,PO | Performed by: OPTOMETRIST

## 2024-12-03 PROCEDURE — 99213 OFFICE O/P EST LOW 20 MIN: CPT | Mod: S$PBB,,, | Performed by: OPTOMETRIST

## 2024-12-03 PROCEDURE — 92133 CPTRZD OPH DX IMG PST SGM ON: CPT | Mod: PBBFAC,PO | Performed by: OPTOMETRIST

## 2024-12-03 NOTE — PROGRESS NOTES
HPI     Glaucoma     Additional comments: DLE 6-2024           Comments    Pt here today for IOP check & oct nerve.    States no visual changes or   complaints since last visit.    Denies any headaches, eye pain or pressure.    Brimonidine OU bid  Lumigan OU qhs          Last edited by Rachel Matthew on 12/3/2024  1:38 PM.            Assessment /Plan     For exam results, see Encounter Report.    Primary open angle glaucoma (POAG) of both eyes, indeterminate stage    Dry eye syndrome, bilateral      1. Primary open angle glaucoma (POAG) of both eyes, indeterminate stage (Primary)   / 542    Difficulty with HVF in past   Updated OCT RNFL today -- stable from previous  OD ONL TS, G, TI  OS ONL G // borderline T    IOP stable with drops  Continue:   Lumigan qPM OU  Alphagan: 1 gt bid OU    Visit today is associated with current or anticipated ongoing medical care related to this patients single serious condition/complex condition (primary open angle glaucoma)     RTC in 5 months for DFE    2. Dry eye syndrome, bilateral  Mild BLANCA OU, overall asymptomatic  OTC ATs 2-4 times daily prn

## 2024-12-06 ENCOUNTER — OFFICE VISIT (OUTPATIENT)
Dept: NEUROLOGY | Facility: CLINIC | Age: 79
End: 2024-12-06
Payer: MEDICARE

## 2024-12-06 ENCOUNTER — TELEPHONE (OUTPATIENT)
Dept: NEUROLOGY | Facility: CLINIC | Age: 79
End: 2024-12-06

## 2024-12-06 ENCOUNTER — PATIENT OUTREACH (OUTPATIENT)
Dept: NEUROLOGY | Facility: CLINIC | Age: 79
End: 2024-12-06
Payer: MEDICARE

## 2024-12-06 DIAGNOSIS — F03.90 MAJOR NEUROCOGNITIVE DISORDER: Primary | ICD-10-CM

## 2024-12-06 NOTE — ASSESSMENT & PLAN NOTE
Patient is a 78 y/o female that presents for memory f/u.  Onset ~ 2018. She continues to struggle with word finding, repetition and short term memory loss. Spouse believes language has worsened.   There are no reports of hallucinations, depression, behavorial changes, urinary incontinence or sleep disturbances. Memory changes are frustrating to her.   Last MMSE 10/30; decline from prev. Will no longer conduct MMSE's due to lang barrier    - a diagnosis of major neurodegenerative disorder; mild in severity was warranted from Neuropsych testing in 2022; possibility of PPA, logopenic variant   Recent MRI/PET noted with asymmetry with greater atrophy to left temporal   Serologies noted  Repeat NP testing as needed but will not likely take place  Discussed role vs expectation of cognitive enhancing drugs at length   - rukhsana Exelon patch well; continue!   - unable to tolerate Aricept d/t GI upset  Supervision suggested  Recommend limiting alcohol consumption   Encouraged hydration and proper diet   following

## 2024-12-06 NOTE — PROGRESS NOTES
"    NEUROLOGY  Outpatient Follow Up    [facility]  [address] [facility], [place] [address]  [phone] (office) / [phone] (fax)    Patient Name:  Yuniel Gracia  :  1945  MR #:  [id number]  Acct #:  [id number]    Date of Neurology Visit: 2024  Name of Provider: YESIKA Montes    Other Physicians:  Bhaskar Olivera MD (Primary Care Physician); No ref. provider found (Referring)      Chief Complaint: Follow-up      History of Present Illness (HPI):  Prior HPI  2020 (Dr. Abbott):  "The patient is a 75 y.o. female referred for evaluation of memory loss.  She was seen with her  who supplies additional history. This issue began about 2 years ago.  The primary issue is with word finding. It involves short-term memory more than long-term memory.  She may have some difficulties with executive function.  There are no issues with multiple step processes. She has no problems with ADLs. She does not get lost in familiar areas. There are no hallucinations. There are no personality changes.  She does endorse depression and depression.     Of note, the patient reports that she had been on Zoloft for approximately 20 years.  Recently, she read that Zoloft could produce issues with language.  Her Oncologist instructed her to discontinue it, which she did about a month ago.  She reports some improvement in her word finding since that time, though it is not back to baseline.  She has been having some anxiety since coming off this drug, though."          Interval Hx 6/3/2022:   Patient is here today for memory follow up. She is accompanied by her spouse who supplies information. Overall she feels that her memory is unchanged. Spouse states she has trouble coming up with simple words and reports that she is also repetitive. He believes there has been a decline.      Patient's highest level of education completed was highschool. She was a housewife. The onset of memory " "decline started around 2018. She struggles more with short term memory which has worsened as per spouse. There are no personality or behavioral changes reports. She denies depression. She was previously on Zoloft in 2020 but came of it for thoughts that is caused increased word finding difficulty. She is now back on a very low dose as of 1 year ago. She denies hallucinations. She reports her sleep is good. She denies sleeping during the day. Spouse does endorse difficulty with executive function. Spouse is managing the finances and has done so for the last 20 years. Spouse is also managing her medications for the last 4 years. She denies issues with hygiene and able navid perform ADLs without assistance. She endorses word finding difficulty and spouse beleives it has worsened. She denies urinary incontinence. Her last fall was a couple of days ago stating she tripped but doesn't recall how it happened. She is no longer driving and hasn't done so in the last 5 years. She likes to stay active and work in the yard and play cards weekly.       Interval history 12/7/2022 (Dr. Abbott):  "The patient is a 77 y.o. female seen previously for memory loss.  I saw her in 2020.  She saw Oneyda Blanco earlier this year with interval history to that point as below.  The patient was started on Aricept at this most recent visit, but she had GI side effects.  She then tried oral Exelon, which also had side effects.  There was previously concern for an allergy to adhesives.  Her  indicates that this is not actually correct.  They would like to try the patch version of Exelon."        Interval Hx 6/7/2024:  Patient presents virtually today for follow up. She is accompanied by her spouse who supplies most information. She was last evaluated in 2022. Spouse believes her memory decline has slowed down but she continues to have memory issues. Patient states she feels well. Spouse states she has trouble with word finding and this " frustrates her greatly. She repeats very often and continues to struggle with short term recall. She sleeps well but does suffer bursitis which wakes her up. Spouse is managing her medications and the finances. There are no hallucinations. She is independent with hygiene, tolieting, dressing, bathing and feeding. Her last fall was in December 2023 and she sustained a hip fracture followed by surgery. She denies urinary incontinence. She hasn't driven in over 5 years.   She was diagnosed with PPA in 2022 and it was suggested she have ST. This was never started.   She continues to drink 2 small glasses of vodka per night.       Interval Hx 6/24/2024:  Patient presents today for memory visit. There has been a suspicion for memory decline. She has trouble with finding her words. ST was ordered at her last visit and she will have an evaluation this week. She feels well overall but does have short term recall. She sleeps well at night. Shoulder pain does keep her up at night and she will be having surgery in the near future. There are no reports of agitation or increased aggression. There are no hallucinations or depression. Spouse is managing her medications and the finances. She is independent with ADLs but spouse does the cooking and preparation of food. Her last fall was in December. She hasn't driven in over 5 years. She is maintained on Exelon daily and tolerates it well. A  also reaches out to the patient once a month.       Interval Hx 12/6/2024:  Patient presents virtually today for memory follow up. She is accompanied by her spouse. He states she has declined a bit. Recently she saw the eye MD and had trouble reading out the letters. She has also stopped reading the newspaper. She did start ST but did not tolerate it well. She did not want to finish. At times she will speak clearly and is able to comprehend well but this fluctuates. When she is asked to do something this is when she gets confused.  Her short term recall continues to be an issue. She sleeps OK for the most part. Spouse will give her melatonin as needed. She eats very little. Small portions. There are no reports of agitation or increased aggression. There are no hallucinations or depression. Spouse is managing her medications and the finances. She is independent with ADLs but spouse does the cooking and preparation of food. She has not had any recent falls. She is not driving. She is maintained on Exelon daily and tolerates it well.             Past Medical, Surgical, Family & Social History:   Reviewed and updated.    Home Medications:     Current Outpatient Medications:     acetaminophen (TYLENOL) 325 MG tablet, Take 650 mg by mouth every 6 (six) hours as needed., Disp: , Rfl:     amLODIPine (NORVASC) 2.5 MG tablet, Take 1 tablet (2.5 mg total) by mouth once daily., Disp: 90 tablet, Rfl: 3    bimatoprost (LUMIGAN) 0.01 % Drop, Place 1 drop into both eyes every evening., Disp: 7.5 mL, Rfl: 3    brimonidine 0.2% (ALPHAGAN) 0.2 % Drop, Place 1 drop into both eyes 2 (two) times daily., Disp: 15 mL, Rfl: 6    CALCIUM CARBONATE/VITAMIN D3 (VITAMIN D-3 ORAL), Take 1 tablet by mouth once daily., Disp: , Rfl:     ibuprofen (ADVIL,MOTRIN) 200 MG tablet, , Disp: , Rfl:     levothyroxine (SYNTHROID) 100 MCG tablet, Take 1 tablet (100 mcg total) by mouth before breakfast., Disp: 90 tablet, Rfl: 1    rivastigmine (EXELON) 4.6 mg/24 hour PT24, Place 1 patch onto the skin once daily., Disp: 90 patch, Rfl: 3    solifenacin (VESICARE) 5 MG tablet, TAKE 1 TABLET DAILY, Disp: 90 tablet, Rfl: 3    Physical Examination: limited exam due to being virtual   LMP  (LMP Unknown)     GENERAL:  General appearance: Well, non-toxic appearing.  No apparent distress.  Neck: supple.    MENTAL STATUS:  Alertness, attention span & concentration: normal.  Language: normal.  Orientation to self, place & time:  limited  Memory, recent & remote: limited  Fund of knowledge:  normal.  MMSE: 10/30  15/30 (12/2022)  18/30 (6/2022)    SPEECH:  Clear and fluent.  Follows complex commands.        PRIOR FACE TO FACE EXAM  CRANIAL NERVES:  Cranial Nerves II-XII were examined.  II - Visual fields: normal.  III, IV, VI: PERRL, EOMI, No ptosis, No nystagmus.  V - Facial sensation: normal.  VII - Face symmetry & mobility: symmetric  VIII - Hearing: normal.  IX, X - Palate: normal   XI - Shoulder shrug: normal.  XII - Tongue protrusion: midline    GROSS MOTOR:  Gait & station: able to rise from chair with arms crossed over chest; non focal; slightly stooped posture  Tone: normal.  Abnormal movements: none.  Finger-nose : normal.  Rapid alternating movements: normal.  Pronator drift: normal      MUSCLE STRENGTH:   Hand grasp:   - right:5/5   - left:5/5    RIGHT    LEFT   5 Biceps 5   5 Triceps 5   5 Forearm.Pr. 5        5 Iliopsoas flex    5   5 Hip Abduct 5   5 Hip Adduct 5   5 Quads 5   5 Hams 5   5 Dorsiflex 5   5 Plantar Flex 5     REFLEXES:    RIGHT Reflex   LEFT   2 Biceps 2   2 Brachiorad. 2        2 Patellar 2     SENSORY:  Light touch: Normal throughout.             Diagnostic Data Reviewed:   Folate   Date Value Ref Range Status   09/30/2020 16.1 4.0 - 24.0 ng/mL Final     Vitamin B-12   Date Value Ref Range Status   09/30/2020 1330 (H) 210 - 950 pg/mL Final     TSH   Date Value Ref Range Status   07/15/2024 0.812 0.400 - 4.000 uIU/mL Final     RPR   Date Value Ref Range Status   09/30/2020 Non-reactive Non-reactive Final     Thiamine   Date Value Ref Range Status   09/30/2020 57 38 - 122 ug/L Final     Comment:     This test was developed and the performance   characteristics determined by [facility].   It has not been cleared or approved by the FDA.   The laboratory is regulated under CLIA as qualified to   perform high-complexity testing. This test is used for   patient testing purposes. It should not be regarded   as investigational or for research.  Test performed at  [facility],  [address], [place]  [address]     [phone]  Jose Patten MD  - Medical Director       Ammonia   Date Value Ref Range Status   09/30/2020 35 10 - 50 umol/L Final        MRI brain 2020:  FINDINGS:  Intracranial contents:There is no acute intracranial abnormality.  There is no acute hemorrhage.  There are no regions of restricted diffusion to suggest acute infarction.  There is focal encephalomalacia and gliosis in the lateral right cerebellum with overlying postsurgical changes with a similar appearance to the comparison studies.  Otherwise, cerebellar volume appears normal but there is generalized cerebral volume loss with a moderate burden of periventricular and subcortical white matter FLAIR and T2 hyperintense signal.  These findings were present previously and are without change but remain nonspecific and could reflect sequelae of chronic small vessel disease.  Many of the lesions are periventricular in location with a somewhat ovoid configuration which can be seen in the setting of demyelinating disease.  Clinical correlation is needed.  The findings are felt to be stable.  There is no hydrocephalus or midline shift.  There is no abnormal extra-axial fluid collection.  The basilar cisterns are open.  The cerebellar tonsils are normal position.  Flow voids indicating patency are present in the major vessels at the base of the brain.  The basilar artery is slightly small in caliber but there are patent bilateral posterior communicating arteries.  This anatomy may represent fetal origin of the posterior cerebral arteries.     Extracranial contents, calvarium, soft tissues:Baseline marrow signal is normal.  There is multilevel degenerative change in the cervical spine without acute cord compression.  The paranasal sinuses and mastoid air cells are clear.     Impression:  1. There is no acute abnormality.  There is no definite or significant change compared to the prior  "studies.  There is focal encephalomalacia and gliosis in the lateral right cerebellum with overlying surgical changes.  2. There is generalized cerebral volume loss with moderate nonspecific white matter change which is mostly periventricular in location.  The findings are stable and although are nonspecific, likely reflect sequelae of chronic small vessel disease in a patient of this age with other etiologies, including demyelination felt to be less likely.    NP testing 11/2022:  "Ms. Gracia is a 77 y.o. female with history of breast cancer s/p TC chemotherapy (2012) on anastrozole, hepatitis C (2000), mixed hyperlipidemia, aortic calcification, depression, GERD, and prior surgery for trigeminal neuralgia. She is referred for a neuropsychological evaluation in the context of word finding difficulty, memory, and executive functioning. She consumes two servings of vodka every night. Her  assists with instrumental ADLs.      She obtained an MMSE score of 14/30 today, reduced from 18/30 in 6/2022, and 23/30 in 2020. Results are interpreted in the context of low average to average premorbid abilities. She demonstrates impairments in visuospatial abilities, executive functioning, language, and memory. Basic attention is preserved. Learning/acquisition and retrieval are impaired, but she does benefit from recognition format on story and figure memory tests. Naming and verbal fluency are profoundly low, and repetition is poor for sentences. She made phonologic paraphasic errors and occasionally said nonsense words. Object knowledge is spared. Comprehension is variable. Visuospatial ablities/visuoconstruction is also markedly impaired. She has signficant difficulty with working memory, mental set shifting, and mental set maintenance, and understanding abstract concepts. She denies emotional distress.      Her clinical presentation and profile are concerning for a neurodegenerative disease process.  Her " " reports that he initially noticed word finding difficulties. She exhibits prominent word retrieval deficits with phonologic paraphasias and neologisms in speech with relatively spared object/semantic knowledge and motor speech. Speech/naming deficits are in disproportion to memory deficits, as she benefits modestly from recognition format. 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 Executive dysfunction and visuospatial deficits are common in lvPPA. MRI appears to show asymmetry with possibly greater left temporal atrophy, but this has not been confirmed by neuroradiology. Further imaging studies (FDG PET) could help inform the differential. Cholinesterase inhibitor medication is still worth consideration, but she has not tolerated aricept or rivastigmine. 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 FDG activity throughout remainder of the left cerebral hemisphere and right cerebral hemisphere is within physiologic limits. Symmetric FDG uptake occurs in bilateral basal ganglia.     IMPRESSION:  Diffuse asymmetric hypometabolism throughout the left temporal and parietal lobes.           Assessment and Plan:  Problem List Items Addressed This Visit          Neuro    Major neurocognitive disorder - Primary    Current Assessment & Plan     Patient is a 78 y/o female that presents for memory f/u.  Onset ~ 2018. She continues to struggle with word finding, repetition and short term memory loss. Spouse believes language has worsened.   There are no reports of hallucinations, depression, behavorial changes, urinary incontinence or sleep disturbances. Memory changes are frustrating to her.   Last MMSE 10/30; decline from prev. Will no longer conduct MMSE's due to lang barrier    - a diagnosis of major neurodegenerative disorder; mild in severity was warranted from " Neuropsych testing in 2022; possibility of PPA, logopenic variant   Recent MRI/PET noted with asymmetry with greater atrophy to left temporal   Serologies noted  Repeat NP testing as needed but will not likely take place  Discussed role vs expectation of cognitive enhancing drugs at length   - rukhsana Exelon patch well; continue!   - unable to tolerate Aricept d/t GI upset  Supervision suggested  Recommend limiting alcohol consumption   Encouraged hydration and proper diet   following              Today's visit is associated with current or anticipated ongoing medical care related to this patients single serious condition/complex condition (memory).                   Important to note, also  has a past medical history of Anemia, Arthritis, Blood transfusion, Breast cancer, Cancer (RIGHT BREAST), Chronic constipation, Clotting disorder, Colon polyp, Diverticulosis, General anesthetics causing adverse effect in therapeutic use, Glaucoma, Hepatitis C (2000), Hypothyroid, Insomnia, Malignant neoplasm of breast (04/12/2012), Memory loss, Osteoarthritis, Panic attacks, Raynaud's disease, Ulcer, and Vertigo.          The patient will return to clinic in 6  months    All questions were answered and patient is comfortable with the plan.         Thank you very much for the opportunity to assist in this patient's care.    If you have any questions or concerns, please do not hesitate to contact me at any time.      Sincerely,     YESIKA Montes  [facility] [place]        The patient location is: [place], [place]  The chief complaint leading to consultation is: memory f/u    Visit type: audiovisual    Face to Face time with patient: 34 minutes of total time spent on the encounter, which includes face to face time and non-face to face time preparing to see the patient (eg, review of tests), Obtaining and/or reviewing separately obtained history, Documenting clinical information in the  electronic or other health record, Independently interpreting results (not separately reported) and communicating results to the patient/family/caregiver, or Care coordination (not separately reported).         Each patient to whom he or she provides medical services by telemedicine is:  (1) informed of the relationship between the physician and patient and the respective role of any other health care provider with respect to management of the patient; and (2) notified that he or she may decline to receive medical services by telemedicine and may withdraw from such care at any time.    Notes:

## 2024-12-09 ENCOUNTER — PATIENT OUTREACH (OUTPATIENT)
Dept: NEUROLOGY | Facility: CLINIC | Age: 79
End: 2024-12-09
Payer: MEDICARE

## 2024-12-09 ENCOUNTER — OUTPATIENT CASE MANAGEMENT (OUTPATIENT)
Dept: NEUROLOGY | Facility: CLINIC | Age: 79
End: 2024-12-09

## 2024-12-09 DIAGNOSIS — F02.A0 MILD LATE ONSET ALZHEIMER'S DEMENTIA WITHOUT BEHAVIORAL DISTURBANCE, PSYCHOTIC DISTURBANCE, MOOD DISTURBANCE, OR ANXIETY: Primary | ICD-10-CM

## 2024-12-09 DIAGNOSIS — G30.1 MILD LATE ONSET ALZHEIMER'S DEMENTIA WITHOUT BEHAVIORAL DISTURBANCE, PSYCHOTIC DISTURBANCE, MOOD DISTURBANCE, OR ANXIETY: Primary | ICD-10-CM

## 2024-12-09 PROCEDURE — G0519 PR MGMT NEW PT-CAREGIVER DYAD, WITH DEMENTIA, LOW COMPLEXITY: HCPCS | Mod: ,,, | Performed by: STUDENT IN AN ORGANIZED HEALTH CARE EDUCATION/TRAINING PROGRAM

## 2024-12-09 NOTE — PROGRESS NOTES
Encounter opened to close Dementia [66] episode of care and remove pending tasks.     Do not bill this encounter to the patient. Documentation only.

## 2024-12-09 NOTE — PROGRESS NOTES
"Dementia Care Program - GUIDE  Care Navigator - Monthly Follow Up      Virtual Visit - Telehealth  The patient location is: Home  The chief complaint leading to consultation is: GUIDE Model Program  Visit type: Audio Only       Date of Service: 2024  Enrollment date: 24  Program Status: Active  CMS Complexity Tier: Low Complexity Dyad - First 6M -   Was Respite Approved? No      Care Navigator completing this form: Ruthann Dunn  PCP: Bhaskar Olivera MD  Care Team: Patient Care Team:  Bhaskar Olivera MD as PCP - General (Family Medicine)  Ilya Landaverde MD (Inactive) as Consulting Physician (Urology)  Sebastián Giles MD as Obstetrician (Obstetrics)  Sherrie Almanza MD as Consulting Physician (Hematology and Oncology)  Riaz Russell MD as Consulting Physician (Family Medicine)  Jcarlos Amato MD as Consulting Physician (Ophthalmology)  Ruthann Dunn LCSW as  (Neuropsychology)  Lisbet Cardoso MD as Consulting Physician (Internal Medicine)     Patient: Yuniel Gracia  MRN: [id number]  : 1945  Age: 79 y.o.  Gender: female  Race: White  Ethnicity: Not  or /a    Objective: Care Navigator/Social Work follow up as part of the GUIDE dementia care management program.    Spoke with Caregiver Lul  (Spouse). CG said pt is doing well with no change in functioning since last month.  Cg said he and pt met with Oneyda Hawkins NP for memory loss.  SETH Hawkins's note included the following:   Of note, the patient reports that she had been on Zoloft for approximately 20 years.  Recently, she read that Zoloft could produce issues with language.  Her Oncologist instructed her to discontinue it, which she did about a month ago.  She reports some improvement in her word finding since that time, though it is not back to baseline.  She has been having some anxiety since coming off this drug, though."     Pt is independent with ADLs and has no " personality changes, according to CG.  No needs expressed at this time.       ASSESSMENT & CARE PLAN     Yuniel was seen today for dementia.    Diagnoses and all orders for this visit:    Mild late onset Alzheimer's dementia without behavioral disturbance, psychotic disturbance, mood disturbance, or anxiety    CTN/SW Action Items: Remain available and continue to follow.       Plan: Monthly follow up for dementia care management as part of the GUIDE Model.    Next visit scheduled for: 1/10/24. Caregiver knows how to reach CTN/SW and is encouraged to do so before the next scheduled call, if needed.        MONTHLY TRACKING

## 2025-01-10 ENCOUNTER — PATIENT MESSAGE (OUTPATIENT)
Dept: HEMATOLOGY/ONCOLOGY | Facility: CLINIC | Age: 80
End: 2025-01-10
Payer: MEDICARE

## 2025-01-10 ENCOUNTER — PATIENT OUTREACH (OUTPATIENT)
Dept: NEUROLOGY | Facility: CLINIC | Age: 80
End: 2025-01-10
Payer: MEDICARE

## 2025-01-16 ENCOUNTER — PATIENT OUTREACH (OUTPATIENT)
Dept: NEUROLOGY | Facility: CLINIC | Age: 80
End: 2025-01-16
Payer: MEDICARE

## 2025-01-16 DIAGNOSIS — F02.A0 MILD LATE ONSET ALZHEIMER'S DEMENTIA WITHOUT BEHAVIORAL DISTURBANCE, PSYCHOTIC DISTURBANCE, MOOD DISTURBANCE, OR ANXIETY: Primary | ICD-10-CM

## 2025-01-16 DIAGNOSIS — G30.1 MILD LATE ONSET ALZHEIMER'S DEMENTIA WITHOUT BEHAVIORAL DISTURBANCE, PSYCHOTIC DISTURBANCE, MOOD DISTURBANCE, OR ANXIETY: Primary | ICD-10-CM

## 2025-01-16 PROCEDURE — G0519 PR MGMT NEW PT-CAREGIVER DYAD, WITH DEMENTIA, LOW COMPLEXITY: HCPCS | Mod: 93,,, | Performed by: STUDENT IN AN ORGANIZED HEALTH CARE EDUCATION/TRAINING PROGRAM

## 2025-01-16 NOTE — PROGRESS NOTES
Dementia Care Program - GUIDE  Care Navigator - Monthly Follow Up      Virtual Visit - Telehealth  The patient location is: Home  The chief complaint leading to consultation is: GUIDE Model Program  Visit type: Audio Only       Date of Service: 2025  Enrollment date: 24  Program Status: Active  CMS Complexity Tier: Low Complexity Dyad - First  -   Was Respite Approved? No      Care Navigator completing this form: Ruthann Dunn  PCP: Bhaskar Olivera MD  Care Team: Patient Care Team:  Bhaskar Olivera MD as PCP - General (Family Medicine)  Ilya Landaverde MD (Inactive) as Consulting Physician (Urology)  Sebastián Giles MD as Obstetrician (Obstetrics)  Sherrie Almanza MD as Consulting Physician (Hematology and Oncology)  Riaz Russell MD as Consulting Physician (Family Medicine)  Jcarlos Amato MD as Consulting Physician (Ophthalmology)  Ruthann Dunn LCSW as  (Neuropsychology)  Lisbet Cardoso MD as Consulting Physician (Internal Medicine)     Patient: Yuniel Gracia  MRN: [id number]  : 1945  Age: 79 y.o.  Gender: female  Race: White  Ethnicity: Not  or /a    Visit: Care Navigator/Social Work follow up as part of the GUIDE dementia care management program.        ASSESSMENT & CARE PLAN     Yuniel was seen today for dementia.    Diagnoses and all orders for this visit:    Mild late onset Alzheimer's dementia without behavioral disturbance, psychotic disturbance, mood disturbance, or anxiety         Spoke with Patient and caregiver Ray  (Spouse)          2025   Care Plan   Behavior Management Appetite/Eating   Caregiver Wellness Supportive Counseling   Prevention & Safety Urinary Tract Infections;Falls;Hospital and ED Utilization           CTN/SW Action Items:       Plan: Monthly follow up for dementia care management as part of the GUIDE Model.      Next visit scheduled for: 25. Caregiver knows how to reach CTN/SW and  "is encouraged to do so before the next scheduled call, if needed.      SUBJECTIVE     CG said he had noticed some decline, specifically in pt's memory and ability to be independent with ADLs.  He said pt is requiring more assistance with dressing, for example.  He noted that pt's needs are still well within what he can manage at home. A discussion followed regarding CG self-care and recognizing the emotional strain these "minor" changes can have on the CG.  CG maintains outside hobbies and next week the couple will go on a 7 day cruise.    CG said pt is eating less.  SW noted that pt should be presented with food at meal times even if she says she is not hungry.  There was a discussion about the importance of monitoring the pt's weight to ensure pt's nutritional status is maintained. CG verbalized understanding.         MONTHLY TRACKING                              "

## 2025-02-03 ENCOUNTER — OFFICE VISIT (OUTPATIENT)
Dept: FAMILY MEDICINE | Facility: CLINIC | Age: 80
End: 2025-02-03
Payer: MEDICARE

## 2025-02-03 VITALS
HEART RATE: 58 BPM | OXYGEN SATURATION: 99 % | BODY MASS INDEX: 24.84 KG/M2 | TEMPERATURE: 98 F | SYSTOLIC BLOOD PRESSURE: 118 MMHG | RESPIRATION RATE: 16 BRPM | WEIGHT: 145.5 LBS | DIASTOLIC BLOOD PRESSURE: 76 MMHG | HEIGHT: 64 IN

## 2025-02-03 DIAGNOSIS — N18.31 CHRONIC KIDNEY DISEASE, STAGE 3A: ICD-10-CM

## 2025-02-03 DIAGNOSIS — R79.9 ABNORMAL FINDING OF BLOOD CHEMISTRY, UNSPECIFIED: ICD-10-CM

## 2025-02-03 DIAGNOSIS — Z00.00 HEALTHCARE MAINTENANCE: Primary | ICD-10-CM

## 2025-02-03 DIAGNOSIS — E03.8 OTHER SPECIFIED HYPOTHYROIDISM: ICD-10-CM

## 2025-02-03 DIAGNOSIS — I73.00 RAYNAUD'S PHENOMENON WITHOUT GANGRENE: ICD-10-CM

## 2025-02-03 DIAGNOSIS — I70.0 AORTIC CALCIFICATION: ICD-10-CM

## 2025-02-03 DIAGNOSIS — F03.918 DEMENTIA WITH BEHAVIORAL DISTURBANCE: ICD-10-CM

## 2025-02-03 DIAGNOSIS — E78.2 MIXED HYPERLIPIDEMIA: ICD-10-CM

## 2025-02-03 PROBLEM — F32.1 MAJOR DEPRESSIVE DISORDER, SINGLE EPISODE, MODERATE: Status: RESOLVED | Noted: 2020-10-07 | Resolved: 2025-02-03

## 2025-02-03 PROCEDURE — 99999 PR PBB SHADOW E&M-EST. PATIENT-LVL IV: CPT | Mod: PBBFAC,,, | Performed by: STUDENT IN AN ORGANIZED HEALTH CARE EDUCATION/TRAINING PROGRAM

## 2025-02-03 PROCEDURE — 99214 OFFICE O/P EST MOD 30 MIN: CPT | Mod: S$PBB,,, | Performed by: STUDENT IN AN ORGANIZED HEALTH CARE EDUCATION/TRAINING PROGRAM

## 2025-02-03 PROCEDURE — G2211 COMPLEX E/M VISIT ADD ON: HCPCS | Mod: S$PBB,,, | Performed by: STUDENT IN AN ORGANIZED HEALTH CARE EDUCATION/TRAINING PROGRAM

## 2025-02-03 PROCEDURE — 99214 OFFICE O/P EST MOD 30 MIN: CPT | Mod: PBBFAC,PO | Performed by: STUDENT IN AN ORGANIZED HEALTH CARE EDUCATION/TRAINING PROGRAM

## 2025-02-03 NOTE — PATIENT INSTRUCTIONS
Saad Felicianoy,     If you are due for any health screening(s) below please notify me so we can arrange them to be ordered and scheduled. Most healthy patients at your age complete them, but you are free to accept or refuse.     If you can't do it, I'll definitely understand. If you can, I'd certainly appreciate it!    All of your core healthy metrics are met.

## 2025-02-03 NOTE — PROGRESS NOTES
[facility]    Subjective:    The HPI and pertinent ROS is included in the Diagnostic Impression Remarks section at the end of the note. Please see below for further details. Chief complaint is at end of note.     Yuniel is a pleasant intelligent patient who is here for evaluation.     Modified Medications    No medications on file       Data reviewed 274}  Previous medical records reviewed and summarized in plan section at end of note.      If you are due for any health screening(s) below please notify me so we can arrange them to be ordered and scheduled. Most healthy patients at your age complete them, but you are free to accept or refuse. If you can't do it, I'll definitely understand. If you can, I'd certainly appreciate it!     All of your core healthy metrics are met.      The following portions of the patient's history were reviewed and updated as appropriate: allergies, current medications, past family history, past medical history, past social history, past surgical history and problem list.    She  has a past medical history of Anemia, Arthritis, Blood transfusion, Breast cancer, Cancer (RIGHT BREAST), Chronic constipation, Clotting disorder, Colon polyp, Diverticulosis, General anesthetics causing adverse effect in therapeutic use, Glaucoma, Hepatitis C (2000), Hypothyroid, Insomnia, Malignant neoplasm of breast (04/12/2012), Memory loss, Osteoarthritis, Panic attacks, Raynaud's disease, Ulcer, and Vertigo.  She  has a past surgical history that includes Hysterectomy; Brain surgery (2007); Tunneled venous port placement (04/2012); right lymph node removed from breast area; Bladder surgery (2011); removal of port a cath; Colonoscopy (01/03/2011); Upper gastrointestinal endoscopy (prior to 2011); Colonoscopy (N/A, 06/01/2017); Breast lumpectomy (03/2012); Eye surgery; Joint replacement (09/25/2017); Breast biopsy; Appendectomy; Esophagogastroduodenoscopy (N/A, 07/20/2022); Cataract  extraction; Intramedullary rodding of trochanter of femur (Right, 12/7/2023); and Arthroscopic debridement of shoulder (Left, 7/5/2024).    She  reports that she has never smoked. She has never been exposed to tobacco smoke. She has never used smokeless tobacco. She reports current alcohol use of about 7.0 - 9.0 standard drinks of alcohol per week. She reports that she does not use drugs.  She family history includes Alcohol abuse in her maternal grandfather; Breast cancer in her mother, sister, sister, and another family member; Diabetes in her father, maternal aunt, paternal aunt, and paternal uncle; Drug abuse in her son; Early death in her maternal grandmother; Heart disease in her father, maternal grandfather, and maternal uncle; Obesity in her son; Tuberculosis in her paternal grandfather and paternal uncle.    Review of patient's allergies indicates:   Allergen Reactions    Iodinated contrast media Shortness Of Breath     Spontaneous jaw movements    Codeine Nausea Only       Tobacco Use: Low Risk  (2/3/2025)    Patient History     Smoking Tobacco Use: Never     Smokeless Tobacco Use: Never     Passive Exposure: Never     Physical Examination  Physical Exam  Vital Signs  Blood pressure is 118/76.     General appearance: alert, cooperative, no distress  Neck: no thyromegaly, no neck stiffness  Lungs: clear to auscultation, no wheezes, rales or rhonchi, symmetric air entry  Heart: normal rate, regular rhythm, normal S1, S2, no murmurs, rubs, clicks or gallops  Abdomen: soft, nontender, nondistended, no rigidity, rebound, or guarding.   Back: no point tenderness over spine  Extremities: peripheral pulses normal, no unilateral leg swelling or calf tenderness   Neurological:alert, oriented, normal speech, no new focal findings or movement disorder noted from baseline      BP Readings from Last 3 Encounters:   02/03/25 118/76   10/16/24 120/76   07/05/24 (!) 154/89     Wt Readings from Last 3 Encounters:   02/03/25  "66 kg (145 lb 8.1 oz)   10/16/24 68.3 kg (150 lb 9.2 oz)   09/17/24 68.5 kg (151 lb 0.2 oz)     /76 (BP Location: Right arm, Patient Position: Sitting)   Pulse (!) 58   Temp 98.1 °F (36.7 °C) (Oral)   Resp 16   Ht 5' 4" (1.626 m)   Wt 66 kg (145 lb 8.1 oz)   LMP  (LMP Unknown)   SpO2 99%   BMI 24.98 kg/m²    274}  Laboratory: I have reviewed old labs below:    274}    Lab Results   Component Value Date    WBC 5.89 06/25/2024    HGB 14.9 06/25/2024    HCT 45.2 06/25/2024    MCV 90 06/25/2024     06/25/2024     06/25/2024    K 4.6 06/25/2024     06/25/2024    CALCIUM 10.4 06/25/2024    PHOS 4.2 04/22/2018    CO2 24 06/25/2024    GLU 80 06/25/2024    BUN 16 06/25/2024    CREATININE 1.0 06/25/2024    EGFRNORACEVR 57 (A) 06/25/2024    ANIONGAP 11 06/25/2024    PROT 6.5 05/14/2024    ALBUMIN 3.4 (L) 05/14/2024    BILITOT 0.7 05/14/2024    ALKPHOS 87 05/14/2024    ALT 10 05/14/2024    AST 21 05/14/2024    INR 1.0 12/07/2023    CHOL 223 (H) 05/14/2024    TRIG 93 05/14/2024    HDL 61 05/14/2024    LDLCALC 143.4 05/14/2024    TSH 0.812 07/15/2024    HGBA1C 5.6 05/14/2024      Lab reviewed by me: Particular labs of significance that I will monitor, workup, or treat to improve are mentioned below in diagnostic impression remarks.    Results  Laboratory Studies  Thyroid levels were normal. Liver enzymes were normal. A1c was normal. Cholesterol was slightly elevated. Kidney function was slightly decreased but stable.       Imaging/EKG: I have reviewed the pertinent results and my findings are noted in remarks.  274}    CC:   Chief Complaint   Patient presents with    Annual Exam    Hypertension        274}    History of Present Illness  The patient is an 79-year-old female who presents for health maintenance, hypothyroidism, chronic kidney disease, and dementia.    She reports satisfactory energy levels. She does not experience any gastrointestinal disturbances such as upset stomach or nausea. She " does not require any medication refills at this time. Her hobbies include watching television. She has plans for a road trip to [place].    She is currently on a regimen of thyroid medication, which she refills every 3 months.    She does not use ibuprofen, Advil, or naproxen.    She had a neurology consultation in December 2024. She wears a patch that needs to be changed daily to manage her condition.    SOCIAL HISTORY  She has 8 grandchildren, with the youngest being 23 years old.    MEDICATIONS  Current: Tylenol, calcium, vitamin D, amlodipine, patch, thyroid medication       Assessment/Plan  Yuniel Gracia is a 79 y.o. female who presents to clinic with:  1. Healthcare maintenance    2. Other specified hypothyroidism    3. Mixed hyperlipidemia    4. Chronic kidney disease, stage 3a    5. Aortic calcification    6. Dementia with behavioral disturbance    7. Raynaud's phenomenon without gangrene    8. Abnormal finding of blood chemistry, unspecified       274}    Assessment & Plan  1. Health maintenance.  Her blood pressure is well-regulated at 118/76. The most recent blood work was conducted approximately 6 months ago, revealing satisfactory thyroid function, normal liver enzymes, and a slightly elevated cholesterol level. Kidney function remains stable compared to the previous year, albeit slightly diminished. She does not have diabetes. The primary risk factors for her kidney condition are likely hypertension and age. A comprehensive blood work panel will be ordered to monitor her cholesterol, kidney function, and thyroid levels.    2. Hypothyroidism.  Her hypothyroidism is currently under control with her existing medication regimen. She will continue her current medications and regular monitoring.    3. Chronic kidney disease.  Her chronic kidney disease is stable, with no significant changes over the past year. Medication may be considered, but a more accurate measurement is needed. Blood work  will be rechecked to ensure it has not worsened. She is advised to hold off on current medication at this time.    4. Dementia.  Her dementia is stable with her current medication regimen. She will continue her current medications and regular monitoring.      Hypothyroidism-stable monitor blood work continue current dose     Calcifications of aorta- stable continue current medications and healthy diet monitor lipid levels       This note was generated with the assistance of ambient listening technology. Verbal consent was obtained by the patient and accompanying visitor(s) for the recording of patient appointment to facilitate this note. I attest to having reviewed and edited the generated note for accuracy, though some syntax or spelling errors may persist. Please contact the author of this note for any clarification.      1. Healthcare maintenance    2. Other specified hypothyroidism  - TSH; Future  - Lipid Panel; Future  - Hemoglobin A1C; Future  - CBC Without Differential; Future  - Comprehensive Metabolic Panel; Future    3. Mixed hyperlipidemia  - TSH; Future  - Lipid Panel; Future  - Hemoglobin A1C; Future  - CBC Without Differential; Future  - Comprehensive Metabolic Panel; Future    4. Chronic kidney disease, stage 3a  - Microalbumin/Creatinine Ratio, Urine; Future  - TSH; Future  - Lipid Panel; Future  - Hemoglobin A1C; Future  - CBC Without Differential; Future  - Comprehensive Metabolic Panel; Future  - CYSTATIN C, SERUM; Future    5. Aortic calcification    6. Dementia with behavioral disturbance    7. Raynaud's phenomenon without gangrene    8. Abnormal finding of blood chemistry, unspecified  - TSH; Future  - Lipid Panel; Future  - Hemoglobin A1C; Future  - CBC Without Differential; Future  - Comprehensive Metabolic Panel; Future  - Homocysteine, Serum; Future  - Methylmalonic Acid, Serum; Future      Bhaskar Olivera MD   274}    If you are due for any health screening(s) below please notify me so we  can arrange them to be ordered and scheduled. Most healthy patients at your age complete them, but you are free to accept or refuse.     If you can't do it, I'll definitely understand. If you can, I'd certainly appreciate it!   All of your core healthy metrics are met.

## 2025-02-12 ENCOUNTER — PATIENT OUTREACH (OUTPATIENT)
Dept: NEUROLOGY | Facility: CLINIC | Age: 80
End: 2025-02-12
Payer: MEDICARE

## 2025-02-12 DIAGNOSIS — G30.1 MILD LATE ONSET ALZHEIMER'S DEMENTIA WITHOUT BEHAVIORAL DISTURBANCE, PSYCHOTIC DISTURBANCE, MOOD DISTURBANCE, OR ANXIETY: Primary | ICD-10-CM

## 2025-02-12 DIAGNOSIS — F02.A0 MILD LATE ONSET ALZHEIMER'S DEMENTIA WITHOUT BEHAVIORAL DISTURBANCE, PSYCHOTIC DISTURBANCE, MOOD DISTURBANCE, OR ANXIETY: Primary | ICD-10-CM

## 2025-02-12 NOTE — PROGRESS NOTES
Dementia Care Program - GUIDE  Care Navigator - Monthly Follow Up      Virtual Visit - Telehealth  The patient location is: Home  The chief complaint leading to consultation is: GUIDE Model Program  Visit type: Audio Only       Date of Service: 2025  Enrollment date: 24  Program Status: Active  CMS Complexity Tier: Low Complexity Dyad - First  -   Was Respite Approved? No    Care Navigator completing this form: Ruthann Dunn  PCP: Bhaskar Olivera MD  Care Team: Patient Care Team:  Bhaskar Olivera MD as PCP - General (Family Medicine)  Ilya Landaverde MD (Inactive) as Consulting Physician (Urology)  Sebastián Giles MD as Obstetrician (Obstetrics)  Sherrie Almanza MD as Consulting Physician (Hematology and Oncology)  Riaz Russell MD as Consulting Physician (Family Medicine)  Jcarlos Amato MD as Consulting Physician (Ophthalmology)  Ruthann Dunn LCSW as  (Neuropsychology)  Lisbet Cardoso MD as Consulting Physician (Internal Medicine)     Patient: Yuniel Gracia  MRN: [id number]  : 1945  Age: 79 y.o.  Gender: female  Race: White  Ethnicity: Not  or /a    Visit: Care Navigator/Social Work follow up as part of the GUIDE dementia care management program.        ASSESSMENT & CARE PLAN     Yuniel was seen today for dementia.    Diagnoses and all orders for this visit:    Mild late onset Alzheimer's dementia without behavioral disturbance, psychotic disturbance, mood disturbance, or anxiety         Spoke with Caregiver Ray  (Spouse)          2025   Care Plan   Caregiver Wellness Supportive Counseling   Prevention & Safety Urinary Tract Infections;Falls;Hospital and ED Utilization           CTN/SW Action Items:       Plan: Monthly follow up for dementia care management as part of the GUIDE Model.      Next visit scheduled for: 3/12/25. Caregiver knows how to reach CTN/SW and is encouraged to do so before the next scheduled  call, if needed.      SUBJECTIVE     Cg said he and pt are feeling well.  They both have upcoming PCP appts where routine blood work and full assessments will be conducted.  CG said pt has lost the ability to read. A discussion followed regarding the emotional toll of things like this on the CG, however Cg said they take things like this as an expected part of pt's dementia diagnosis.  No needs expressed at this time.  SW will remain available.         MONTHLY TRACKING

## 2025-02-17 ENCOUNTER — RESULTS FOLLOW-UP (OUTPATIENT)
Dept: FAMILY MEDICINE | Facility: CLINIC | Age: 80
End: 2025-02-17

## 2025-02-17 ENCOUNTER — LAB VISIT (OUTPATIENT)
Dept: LAB | Facility: HOSPITAL | Age: 80
End: 2025-02-17
Attending: STUDENT IN AN ORGANIZED HEALTH CARE EDUCATION/TRAINING PROGRAM
Payer: MEDICARE

## 2025-02-17 DIAGNOSIS — E03.8 OTHER SPECIFIED HYPOTHYROIDISM: ICD-10-CM

## 2025-02-17 DIAGNOSIS — N18.31 CHRONIC KIDNEY DISEASE, STAGE 3A: ICD-10-CM

## 2025-02-17 DIAGNOSIS — E78.2 MIXED HYPERLIPIDEMIA: ICD-10-CM

## 2025-02-17 DIAGNOSIS — R79.9 ABNORMAL FINDING OF BLOOD CHEMISTRY, UNSPECIFIED: ICD-10-CM

## 2025-02-17 LAB
ALBUMIN SERPL BCP-MCNC: 3.6 G/DL (ref 3.5–5.2)
ALP SERPL-CCNC: 80 U/L (ref 40–150)
ALT SERPL W/O P-5'-P-CCNC: 14 U/L (ref 10–44)
ANION GAP SERPL CALC-SCNC: 7 MMOL/L (ref 8–16)
AST SERPL-CCNC: 35 U/L (ref 10–40)
BILIRUB SERPL-MCNC: 0.7 MG/DL (ref 0.1–1)
BUN SERPL-MCNC: 19 MG/DL (ref 8–23)
CALCIUM SERPL-MCNC: 9.4 MG/DL (ref 8.7–10.5)
CHLORIDE SERPL-SCNC: 108 MMOL/L (ref 95–110)
CHOLEST SERPL-MCNC: 200 MG/DL (ref 120–199)
CHOLEST/HDLC SERPL: 3.8 {RATIO} (ref 2–5)
CO2 SERPL-SCNC: 26 MMOL/L (ref 23–29)
CREAT SERPL-MCNC: 0.9 MG/DL (ref 0.5–1.4)
ERYTHROCYTE [DISTWIDTH] IN BLOOD BY AUTOMATED COUNT: 15.1 % (ref 11.5–14.5)
EST. GFR  (NO RACE VARIABLE): >60 ML/MIN/1.73 M^2
ESTIMATED AVG GLUCOSE: 108 MG/DL (ref 68–131)
GLUCOSE SERPL-MCNC: 95 MG/DL (ref 70–110)
HBA1C MFR BLD: 5.4 % (ref 4–5.6)
HCT VFR BLD AUTO: 43.1 % (ref 37–48.5)
HCYS SERPL-SCNC: 13.8 UMOL/L (ref 4–15.5)
HDLC SERPL-MCNC: 52 MG/DL (ref 40–75)
HDLC SERPL: 26 % (ref 20–50)
HGB BLD-MCNC: 13.7 G/DL (ref 12–16)
LDLC SERPL CALC-MCNC: 129.2 MG/DL (ref 63–159)
MCH RBC QN AUTO: 29.5 PG (ref 27–31)
MCHC RBC AUTO-ENTMCNC: 31.8 G/DL (ref 32–36)
MCV RBC AUTO: 93 FL (ref 82–98)
NONHDLC SERPL-MCNC: 148 MG/DL
PLATELET # BLD AUTO: 152 K/UL (ref 150–450)
PMV BLD AUTO: 10.8 FL (ref 9.2–12.9)
POTASSIUM SERPL-SCNC: 4.3 MMOL/L (ref 3.5–5.1)
PROT SERPL-MCNC: 6.8 G/DL (ref 6–8.4)
RBC # BLD AUTO: 4.65 M/UL (ref 4–5.4)
SODIUM SERPL-SCNC: 141 MMOL/L (ref 136–145)
T4 FREE SERPL-MCNC: 1.36 NG/DL (ref 0.71–1.51)
TRIGL SERPL-MCNC: 94 MG/DL (ref 30–150)
TSH SERPL DL<=0.005 MIU/L-ACNC: 0.2 UIU/ML (ref 0.4–4)
WBC # BLD AUTO: 4.36 K/UL (ref 3.9–12.7)

## 2025-02-17 PROCEDURE — 85027 COMPLETE CBC AUTOMATED: CPT | Performed by: STUDENT IN AN ORGANIZED HEALTH CARE EDUCATION/TRAINING PROGRAM

## 2025-02-17 PROCEDURE — 83090 ASSAY OF HOMOCYSTEINE: CPT | Performed by: STUDENT IN AN ORGANIZED HEALTH CARE EDUCATION/TRAINING PROGRAM

## 2025-02-17 PROCEDURE — 84439 ASSAY OF FREE THYROXINE: CPT | Performed by: STUDENT IN AN ORGANIZED HEALTH CARE EDUCATION/TRAINING PROGRAM

## 2025-02-17 PROCEDURE — 83036 HEMOGLOBIN GLYCOSYLATED A1C: CPT | Performed by: STUDENT IN AN ORGANIZED HEALTH CARE EDUCATION/TRAINING PROGRAM

## 2025-02-17 PROCEDURE — 83921 ORGANIC ACID SINGLE QUANT: CPT | Performed by: STUDENT IN AN ORGANIZED HEALTH CARE EDUCATION/TRAINING PROGRAM

## 2025-02-17 PROCEDURE — 84443 ASSAY THYROID STIM HORMONE: CPT | Performed by: STUDENT IN AN ORGANIZED HEALTH CARE EDUCATION/TRAINING PROGRAM

## 2025-02-17 PROCEDURE — 80061 LIPID PANEL: CPT | Performed by: STUDENT IN AN ORGANIZED HEALTH CARE EDUCATION/TRAINING PROGRAM

## 2025-02-17 PROCEDURE — 80053 COMPREHEN METABOLIC PANEL: CPT | Performed by: STUDENT IN AN ORGANIZED HEALTH CARE EDUCATION/TRAINING PROGRAM

## 2025-02-18 ENCOUNTER — PATIENT MESSAGE (OUTPATIENT)
Dept: FAMILY MEDICINE | Facility: CLINIC | Age: 80
End: 2025-02-18
Payer: MEDICARE

## 2025-02-18 DIAGNOSIS — I10 ESSENTIAL (PRIMARY) HYPERTENSION: ICD-10-CM

## 2025-02-18 DIAGNOSIS — E78.2 MIXED HYPERLIPIDEMIA: ICD-10-CM

## 2025-02-18 DIAGNOSIS — E02 SUBCLINICAL IODINE-DEFICIENCY HYPOTHYROIDISM: ICD-10-CM

## 2025-02-18 RX ORDER — SOLIFENACIN SUCCINATE 5 MG/1
5 TABLET, FILM COATED ORAL
Qty: 90 TABLET | Refills: 3 | Status: SHIPPED | OUTPATIENT
Start: 2025-02-18

## 2025-02-18 RX ORDER — LEVOTHYROXINE SODIUM 100 UG/1
TABLET ORAL
Qty: 90 TABLET | Refills: 1 | Status: SHIPPED | OUTPATIENT
Start: 2025-02-18

## 2025-02-18 RX ORDER — AMLODIPINE BESYLATE 2.5 MG/1
2.5 TABLET ORAL
Qty: 90 TABLET | Refills: 3 | Status: SHIPPED | OUTPATIENT
Start: 2025-02-18

## 2025-02-18 NOTE — TELEPHONE ENCOUNTER
No care due was identified.  [facility] Embedded Care Due Messages. Reference number: [id number].   2/18/2025 8:21:48 AM CST

## 2025-02-19 NOTE — TELEPHONE ENCOUNTER
Refill Decision Note   Yuniel Basil  is requesting a refill authorization.  Brief Assessment and Rationale for Refill:  Approve     Medication Therapy Plan:         Comments:     Note composed:10:02 PM 02/18/2025

## 2025-02-20 LAB
CYSTATIN C SERPL-MCNC: 1.14 MG/L (ref 0.67–1.21)
GFR/BSA.PRED SERPLBLD CYS-BASED-ARV: 56 ML/MIN/BSA

## 2025-02-22 LAB — METHYLMALONATE SERPL-SCNC: 0.39 UMOL/L

## 2025-02-24 DIAGNOSIS — Z00.00 ENCOUNTER FOR MEDICARE ANNUAL WELLNESS EXAM: ICD-10-CM

## 2025-03-10 ENCOUNTER — HOSPITAL ENCOUNTER (OUTPATIENT)
Dept: RADIOLOGY | Facility: HOSPITAL | Age: 80
Discharge: HOME | End: 2025-03-10
Attending: PHYSICAL MEDICINE & REHABILITATION
Payer: MEDICARE

## 2025-03-10 ENCOUNTER — RESULTS FOLLOW-UP (OUTPATIENT)
Dept: SPINE | Facility: CLINIC | Age: 80
End: 2025-03-10

## 2025-03-10 ENCOUNTER — OFFICE VISIT (OUTPATIENT)
Dept: SPINE | Facility: CLINIC | Age: 80
End: 2025-03-10
Payer: MEDICARE

## 2025-03-10 ENCOUNTER — PATIENT MESSAGE (OUTPATIENT)
Dept: SPINE | Facility: CLINIC | Age: 80
End: 2025-03-10

## 2025-03-10 VITALS — HEIGHT: 64 IN | BODY MASS INDEX: 24.84 KG/M2 | WEIGHT: 145.5 LBS

## 2025-03-10 DIAGNOSIS — M54.6 PAIN IN THORACIC SPINE: ICD-10-CM

## 2025-03-10 DIAGNOSIS — M54.50 CHRONIC BILATERAL LOW BACK PAIN WITHOUT SCIATICA: ICD-10-CM

## 2025-03-10 DIAGNOSIS — M54.50 CHRONIC BILATERAL LOW BACK PAIN WITHOUT SCIATICA: Primary | ICD-10-CM

## 2025-03-10 DIAGNOSIS — G89.29 CHRONIC BILATERAL LOW BACK PAIN WITHOUT SCIATICA: Primary | ICD-10-CM

## 2025-03-10 DIAGNOSIS — G89.29 CHRONIC BILATERAL LOW BACK PAIN WITHOUT SCIATICA: ICD-10-CM

## 2025-03-10 PROCEDURE — 99213 OFFICE O/P EST LOW 20 MIN: CPT | Mod: S$PBB,,, | Performed by: PHYSICAL MEDICINE & REHABILITATION

## 2025-03-10 PROCEDURE — 99999 PR PBB SHADOW E&M-EST. PATIENT-LVL III: CPT | Mod: PBBFAC,,, | Performed by: PHYSICAL MEDICINE & REHABILITATION

## 2025-03-10 PROCEDURE — 99213 OFFICE O/P EST LOW 20 MIN: CPT | Mod: PBBFAC,PN | Performed by: PHYSICAL MEDICINE & REHABILITATION

## 2025-03-10 PROCEDURE — 72070 X-RAY EXAM THORAC SPINE 2VWS: CPT | Mod: 26,,, | Performed by: RADIOLOGY

## 2025-03-10 PROCEDURE — 72100 X-RAY EXAM L-S SPINE 2/3 VWS: CPT | Mod: 26,,, | Performed by: RADIOLOGY

## 2025-03-10 PROCEDURE — 72100 X-RAY EXAM L-S SPINE 2/3 VWS: CPT | Mod: TC

## 2025-03-10 PROCEDURE — 72070 X-RAY EXAM THORAC SPINE 2VWS: CPT | Mod: TC

## 2025-03-10 NOTE — PROGRESS NOTES
"  SUBJECTIVE:    Patient ID: Yuniel Gracia is a 79 y.o. female.    Chief Complaint: Follow-up    This is an 79-year-old woman I saw for the 1st and only time on 04/21/2023 with complaints of left-sided low back pain at the lumbosacral junction without radicular symptoms.  Physical therapy was recommended.  I have not seen her since then.  Apparently she did not go to therapy.  She presents today with persistent familiar complaints of low back pain at the lumbosacral junction which is worse with standing erect.  She is also having some pain in the mid to upper thoracic region.  No radicular chest wall or leg symptoms.  Bowel and bladder remained intact.  Current pain level is 2/10 but at times as high as 4/10 and interferes with her quality of life in terms of activities of daily living recreation and social activities.  I personally reviewed a chest x-ray done 06/25/2024 which shows mild expected degenerative changes of the thoracic spine.  I personally reviewed x-rays of the lumbar spine done 04/02/2023 which show scoliosis and multilevel degenerative disc disease.          Past Medical History:   Diagnosis Date    Anemia     Arthritis     Blood transfusion     Breast cancer     Cancer RIGHT BREAST    Chronic constipation     Clotting disorder     Colon polyp     Diverticulosis     General anesthetics causing adverse effect in therapeutic use     after surgery 12/23 patient had memory and personality changes for 4 hours.    Glaucoma     Hepatitis C 2000    pt states history of hepatits c-"cured by Dr. Lynch"; TREATED 2 YEARS - 2000   OK NOW    Hypothyroid     Insomnia     Malignant neoplasm of breast 04/12/2012    Memory loss     reports some memory loss since chemo    Osteoarthritis     Panic attacks     Raynaud's disease     takes amlodipine for this    Ulcer     Vertigo      Social History[1]  Past Surgical History:   Procedure Laterality Date    APPENDECTOMY      ARTHROSCOPIC DEBRIDEMENT OF SHOULDER " Left 7/5/2024    Procedure: DEBRIDEMENT, SHOULDER, ARTHROSCOPIC;  Surgeon: Jae Ramachandran II, MD;  Location: [facility] OR;  Service: Orthopedics;  Laterality: Left;    BLADDER SURGERY  2011    sling - Dr Giles  - [facility]    BRAIN SURGERY  2007    temporal neuralgia - [place]    BREAST BIOPSY      BREAST LUMPECTOMY  03/2012    malignant - had chemo and radiotherapy    CATARACT EXTRACTION      COLONOSCOPY  01/03/2011    Dr Lynch, in media section, diverticulosis, hemorrhoids, otherwise normal findings; normal findings on random biopsies    COLONOSCOPY N/A 06/01/2017    Procedure: COLONOSCOPY;  Surgeon: Nate Lamb MD;  Location: [facility] ENDO;  Service: Endoscopy;  Laterality: N/A; hemorrhoids, diverticulosis, 1 colon polyp removed; Repeat colonoscopy in 5 years for surveillance; biopsy: Tubular adenoma, random biopsies unremarkable    ESOPHAGOGASTRODUODENOSCOPY N/A 07/20/2022    Procedure: EGD (ESOPHAGOGASTRODUODENOSCOPY);  Surgeon: Nate Lamb MD;  Location: [facility] ENDO;  Service: Endoscopy;  Laterality: N/A;    EYE SURGERY      cataracts bilateral    HYSTERECTOMY      INTRAMEDULLARY RODDING OF TROCHANTER OF FEMUR Right 12/7/2023    Procedure: INSERTION, INTRAMEDULLARY ZOHREH, FEMUR, TROCHANTER;  Surgeon: Geoff Rios MD;  Location: [facility];  Service: Orthopedics;  Laterality: Right;    JOINT REPLACEMENT  09/25/2017    left Knee [facility]     removal of port a cath      right lymph node removed from breast area      TUNNELED VENOUS PORT PLACEMENT  04/2012    left chest    UPPER GASTROINTESTINAL ENDOSCOPY  prior to 2011    small hiatal hernia     Family History   Problem Relation Name Age of Onset    Breast cancer Mother      Diabetes Father      Heart disease Father      Breast cancer Sister      Breast cancer Sister      Drug abuse Son      Obesity Son      Diabetes Maternal Aunt      Heart disease Maternal Uncle      Diabetes Paternal Aunt      Tuberculosis Paternal Uncle          x2  "   Diabetes Paternal Uncle      Early death Maternal Grandmother          tonsils    Heart disease Maternal Grandfather      Alcohol abuse Maternal Grandfather      Tuberculosis Paternal Grandfather      Breast cancer Other niece     Ovarian cancer Neg Hx      Colon cancer Neg Hx      Colon polyps Neg Hx      Crohn's disease Neg Hx      Ulcerative colitis Neg Hx      Melanoma Neg Hx      Lupus Neg Hx      Psoriasis Neg Hx      Eczema Neg Hx       Vitals:    03/10/25 1353   Weight: 66 kg (145 lb 8.1 oz)   Height: 5' 4" (1.626 m)       Review of Systems   Constitutional:  Negative for chills, diaphoresis, fatigue, fever and unexpected weight change.   HENT:  Negative for trouble swallowing.    Eyes:  Negative for visual disturbance.   Respiratory:  Negative for shortness of breath.    Cardiovascular:  Negative for chest pain.   Gastrointestinal:  Negative for abdominal pain, constipation, nausea and vomiting.   Genitourinary:  Negative for difficulty urinating.   Musculoskeletal:  Negative for arthralgias, back pain, gait problem, joint swelling, myalgias, neck pain and neck stiffness.   Neurological:  Negative for dizziness, speech difficulty, weakness, light-headedness, numbness and headaches.          Objective:      Physical Exam  Neurological:      Mental Status: She is alert and oriented to person, place, and time.      Comments: She is awake and in no acute distress  No point tenderness or palpable masses about thoracic or lumbar spine  Forward flexion of the lumbar spine is to about 45° before she complains of pain at the lumbosacral junction.  Extension at just past neutral causes pain at the lumbosacral junction  Reflexes- +1-+2 reflexes at the following:   C5-Biceps   C6-Brachioradialis   C7-Triceps   L3/4-Patellar   S1-Achilles   Rima sign is negative bilaterally  Strength testing- 5/5 strength in the following muscle groups:  C5-Elbow flexion  C6-Wrist extension  C7-Elbow extension  C8-Finger " flexion  T1-Finger abduction  L2-Hip flexion  L3-Knee extension  L4-Ankle dorsiflexion  L5-Great toe extension  S1-Ankle plantar flexion                    Assessment:       1. Chronic bilateral low back pain without sciatica    2. Pain in thoracic spine           Plan:     She remains neurologically intact.  I suspect she has thoracic and lumbar pain on basis of degenerative disc disease and facet arthropathy.  Has pain with facet loading.  I still recommend conservative treatment with physical therapy.  We will get some updated x-rays of the thoracic and lumbar spine.  Follow up at the completion of therapy.      Chronic bilateral low back pain without sciatica  -     X-Ray Lumbar Spine AP And Lateral; Future; Expected date: 03/10/2025  -     Ambulatory Referral/Consult to Physical/Occupational Therapy; Future; Expected date: 03/17/2025    Pain in thoracic spine  -     X-Ray Thoracic Spine AP Lateral; Future; Expected date: 03/10/2025  -     Ambulatory Referral/Consult to Physical/Occupational Therapy; Future; Expected date: 03/17/2025               [1]   Social History  Socioeconomic History    Marital status:    Tobacco Use    Smoking status: Never     Passive exposure: Never    Smokeless tobacco: Never   Substance and Sexual Activity    Alcohol use: Yes     Alcohol/week: 7.0 - 9.0 standard drinks of alcohol     Types: 5 - 7 Shots of liquor, 2 Standard drinks or equivalent per week     Comment: occasional    Drug use: No    Sexual activity: Yes     Partners: Male     Birth control/protection: None     Social Drivers of Health     Financial Resource Strain: Low Risk  (3/7/2024)    Overall Financial Resource Strain (CARDIA)     Difficulty of Paying Living Expenses: Not hard at all   Food Insecurity: No Food Insecurity (3/7/2024)    Hunger Vital Sign     Worried About Running Out of Food in the Last Year: Never true     Ran Out of Food in the Last Year: Never true   Transportation Needs: No Transportation  Needs (3/7/2024)    PRAPARE - Transportation     Lack of Transportation (Medical): No     Lack of Transportation (Non-Medical): No   Physical Activity: Inactive (3/7/2024)    Exercise Vital Sign     Days of Exercise per Week: 0 days     Minutes of Exercise per Session: 0 min   Stress: No Stress Concern Present (3/7/2024)    [employer] - Occupational Stress Questionnaire     Feeling of Stress : Not at all   Housing Stability: Low Risk  (3/7/2024)    Housing Stability Vital Sign     Unable to Pay for Housing in the Last Year: No     Number of Places Lived in the Last Year: 1     Unstable Housing in the Last Year: No

## 2025-03-12 ENCOUNTER — PATIENT OUTREACH (OUTPATIENT)
Dept: NEUROLOGY | Facility: CLINIC | Age: 80
End: 2025-03-12
Payer: MEDICARE

## 2025-03-12 DIAGNOSIS — G30.1 MILD LATE ONSET ALZHEIMER'S DEMENTIA WITHOUT BEHAVIORAL DISTURBANCE, PSYCHOTIC DISTURBANCE, MOOD DISTURBANCE, OR ANXIETY: Primary | ICD-10-CM

## 2025-03-12 DIAGNOSIS — F02.A0 MILD LATE ONSET ALZHEIMER'S DEMENTIA WITHOUT BEHAVIORAL DISTURBANCE, PSYCHOTIC DISTURBANCE, MOOD DISTURBANCE, OR ANXIETY: Primary | ICD-10-CM

## 2025-03-12 NOTE — PROGRESS NOTES
Dementia Care Program - GUIDE  Care Navigator - Monthly Follow Up      Virtual Visit - Telehealth  The patient location is: Home  The chief complaint leading to consultation is: GUIDE Model Program  Visit type: Audio Only       Date of Service: 2025  Enrollment date: 2024  Program Status: Active  CMS Complexity Tier: Low Complexity Dyad - After 6M -   Was Respite Approved? No    Care Navigator completing this form: Ruthann Dunn  PCP: Bhaskar Olivera MD  Care Team: Patient Care Team:  Bhaskar Olivera MD as PCP - General (Family Medicine)  Ilya Landaverde MD (Inactive) as Consulting Physician (Urology)  Sebastián Giles MD as Obstetrician (Obstetrics)  Sherrie Almanza MD as Consulting Physician (Hematology and Oncology)  Riaz Russell MD as Consulting Physician (Family Medicine)  Jcarlos Amato MD as Consulting Physician (Ophthalmology)  Ruthann Dunn LCSW as  (Neuropsychology)  Lisbet Cardoso MD as Consulting Physician (Internal Medicine)     Patient: Yuniel Gracia  MRN: [id number]  : 1945  Age: 79 y.o.  Gender: female  Race: White  Ethnicity: Not  or /a    Visit: Care Navigator/Social Work follow up as part of the GUIDE dementia care management program.        ASSESSMENT & CARE PLAN     Yuniel was seen today for dementia.    Diagnoses and all orders for this visit:    Mild late onset Alzheimer's dementia without behavioral disturbance, psychotic disturbance, mood disturbance, or anxiety         Spoke with Caregiver Ray  (Spouse)          3/12/2025   Care Plan   Behavior Management Nighttime Behaviors;Appetite/Eating   Caregiver Wellness Supportive Counseling   Prevention & Safety Urinary Tract Infections;Falls;Hospital and ED Utilization   Other Education, Support, & Resources Technology Devices       Plan: Monthly follow up for dementia care management as part of the GUIDE Model.      Next visit scheduled for: .  "Caregiver knows how to reach CTN/SW and is encouraged to do so before the next scheduled call, if needed.      SUBJECTIVE     CG said he is still noticing decline in pt's functioning.  Pt is now needing supervision to get dressed because she is forgetting to put on certain items.  Cg also noted that pt is having increased difficulty with word finding.  CG reported that pt's mood and behavior are stable.  Cg said pt uses her walker and is "cautious."  CG puts a gate across the top of the stairs at night to prevent pt from falling down the stairs.  He has pathway to restroom well-lit at night.    No other needs expressed at this time.  SW remains available.         MONTHLY TRACKING                            "

## 2025-03-19 NOTE — PROGRESS NOTES
Outpatient Rehab    Physical Therapy Evaluation    Patient Name: Yuniel Gracia  MRN: [id number]  YOB: 1945  Encounter Date: 3/20/2025    Therapy Diagnosis:   Encounter Diagnoses   Name Primary?    Chronic bilateral low back pain without sciatica     Pain in thoracic spine     Chronic bilateral thoracic back pain Yes    Gait difficulty     Decreased functional mobility      Physician: Brian Sevilla MD    Physician Orders: Eval and Treat  Medical Diagnosis: Chronic bilateral low back pain without sciatica  Pain in thoracic spine    Visit # / Visits Authorized:  1 / 1  Date of Evaluation: 3/20/2025  Insurance Authorization Period: 3/10/2025 to 3/10/2026  Plan of Care Certification:  3/20/2025 to 5/15/2025      PT/PTA:     Number of PTA visits since last PT visit:   Time In: 1050   Time Out: 1150  Total Time: 60       Intake Outcome Measure for FOTO Survey    Therapist reviewed FOTO scores for Yuniel Gracia on 3/20/2025.   FOTO report - see Media section or FOTO account episode details.     Intake Score: 18%         Subjective   History of Present Illness  Yuniel is a 79 y.o. female who reports to physical therapy with a chief concern of Chronic LBP and thoracic pain.         Diagnostic tests related to this condition: X-ray.   X-Ray Details: FINDINGS:  No fracture or malalignment.  Mild multilevel thoracic degenerative changes are present.  There is partially imaged lumbar scoliosis.     Impression:     As above        Electronically signed by:Jcarlos Harry MD  Date:                                            03/10/2025  Time:                                           15:02  FINDINGS:  There is marked lumbar dextroscoliosis.  No fracture.  Advanced multilevel facet arthropathy is present.  There is moderate multilevel degenerative disc change.     Impression:     As above        Electronically signed by:Jcarlos Harry MD  Date:                                             03/10/2025  Time:                                           15:01    History of Present Condition/Illness: Patient reports pain for a few years with increasing pain last 6 months. Her pain is across the lower back and spreads in the upper back. Its mainly painful with trying to stand up straight with walking. She feels pain and tightness if she stands up straigh with trying to walk and needs help from her husbands arm to walk.     Activities of Daily Living  Social history was obtained from Patient.               Previously independent with activities of daily living? Yes     Currently independent with activities of daily living? Yes              Pain     Patient reports a current pain level of 4/10. Pain at best is reported as 0/10. Pain at worst is reported as 6/10.   Location: Lumbosacral spine area  Clinical Progression (since onset): Worsening  Pain-Relieving Factors: Lying down, Sitting, Support  Pain-Aggravating Factors: Bending, Walking         Living Arrangements  Living Situation  Housing: Home independently  Living Arrangements: Spouse/significant other          Past Medical History/Physical Systems Review:   Yuniel Gracia  has a past medical history of Anemia, Arthritis, Blood transfusion, Breast cancer, Cancer, Chronic constipation, Clotting disorder, Colon polyp, Diverticulosis, General anesthetics causing adverse effect in therapeutic use, Glaucoma, Hepatitis C, Hypothyroid, Insomnia, Malignant neoplasm of breast, Memory loss, Osteoarthritis, Panic attacks, Raynaud's disease, Ulcer, and Vertigo.    Yuniel Gracia  has a past surgical history that includes Hysterectomy; Brain surgery (2007); Tunneled venous port placement (04/2012); right lymph node removed from breast area; Bladder surgery (2011); removal of port a cath; Colonoscopy (01/03/2011); Upper gastrointestinal endoscopy (prior to 2011); Colonoscopy (N/A, 06/01/2017); Breast lumpectomy (03/2012); Eye surgery; Joint replacement  (09/25/2017); Breast biopsy; Appendectomy; Esophagogastroduodenoscopy (N/A, 07/20/2022); Cataract extraction; Intramedullary rodding of trochanter of femur (Right, 12/7/2023); and Arthroscopic debridement of shoulder (Left, 7/5/2024).    Yuniel has a current medication list which includes the following prescription(s): acetaminophen, amlodipine, lumigan, brimonidine 0.2%, calcium carbonate/vitamin d3, ibuprofen, levothyroxine, rivastigmine, and solifenacin.    Review of patient's allergies indicates:   Allergen Reactions    Iodinated contrast media Shortness Of Breath     Spontaneous jaw movements    Codeine Nausea Only        Objective      Thoracic Range of Motion        Thoracic rotation and sidebend 50%, flexion WNL    Lumbar Range of Motion   Flexion to mid tibia, extension 25%, rotation 75%, sidebend 50%      Hip Range of Motion    Bilateral WNL: passive motion              Hip Strength - Planes of Motion   Right Strength Right Pain Left Strength Left  Pain   Flexion (L2) 4+   4+     Extension           ABduction 4-   4-     ADduction           Internal Rotation           External Rotation               Knee Strength   Right Strength Right Pain Left Strength Left  Pain   Flexion (S2) 5   5     Prone Flexion           Extension (L3) 5   5            Ankle/Foot Strength - Planes of Motion   Right Strength Right Pain Left Strength Left  Pain   Dorsiflexion (L4) 5   5     Plantar Flexion (S1) 5   5     Inversion 5   5     Eversion     5     Great Toe Flexion           Great Toe Extension (L5)           Lesser Toes Flexion           Lesser Toes Extension                  Transfers Assessment  Sit to Stand Assistance: Independent  Chair to Bed Assistance: Independent  Bed to Chair Assistance: Independent            Treatment:  Therapeutic Exercise  TE 1: Supine LTR x 15  TE 2: Pelvic tilts x 15  TE 3: SKC : 3 x 30 sec holds : Bilateral  TE 4: Seated side bending: alterning direction x 10 each side  TE 5: seated: with  swiss ball behind her back: Isometric lumbar extension 10 x 5 sec holds  TE 6: Wall walkups for lumbar extension: 5 x 20 sec holds  TE 7: Hook lying shoulder flexion with bar x 15      Time Entry(in minutes):  PT Evaluation (Moderate) Time Entry: 38  Therapeutic Exercise Time Entry: 25    Assessment & Plan   Assessment  Yuniel presents with a condition of Moderate complexity.   Presentation of Symptoms: Changing       Functional Limitations: Activity tolerance, Ambulating on uneven surfaces, Bed mobility, Carrying objects, Driving, Functional mobility, Gait limitations, Getting off the floor, Increased risk of fall, Maintaining balance, Pain when reaching  Impairments: Abnormal gait, Activity intolerance, Impaired balance  Personal Factors Affecting Prognosis: Pain    Prognosis: Good  Assessment Details: Patient with significant truck flexion with gait. She is able to correct but that does cause her pain and is difficult for her to maintain. She usually uses her husbands arm for support when ambulating . She did well with the exercises today and felt they were beneficial. She was started off slow with both mobility and strengthening exercises to see how irritable her tissues are and I will advance as appropriate on next visit. I fell good about her being able to be able to walk with a more upright posture if she is consistent with her HEP.      Patient's spiritual, cultural, and educational needs considered and patient agreeable to plan of care and goals.           Goals:   Active       Long term goals       Able to ambulate in 25% flexion or less       Start:  03/20/25    Expected End:  05/15/25            decrease pain with ambulation to 2/10 or less       Start:  03/20/25    Expected End:  05/15/25            Independent with long term HEP       Start:  03/20/25    Expected End:  05/15/25               Short Term Goals       Improve thoracolumbar flexion by 25% or more       Start:  03/20/25    Expected End:   04/17/25            increase unassisted ambulation distance to 100 feet       Start:  03/20/25    Expected End:  04/17/25            Consistent with HEP w/o increased pain        Start:  03/20/25    Expected End:  04/17/25                Jonatan Mak, PT    Pain     Patient reports a current pain level of 4/10. Pain at best is reported as 0/10. Pain at worst is reported as 6/10.   Location: Lumbosacral spine area  Clinical Progression (since onset): Worsening  Pain-Relieving Factors: Lying down, Sitting, Support  Pain-Aggravating Factors: Bending, Walking         Living Arrangements  Living Situation  Housing: Home independently  Living Arrangements: Spouse/significant other          Past Medical History/Physical Systems Review:   Yuniel Gracia  has a past medical history of Anemia, Arthritis, Blood transfusion, Breast cancer, Cancer, Chronic constipation, Clotting disorder, Colon polyp, Diverticulosis, General anesthetics causing adverse effect in therapeutic use, Glaucoma, Hepatitis C, Hypothyroid, Insomnia, Malignant neoplasm of breast, Memory loss, Osteoarthritis, Panic attacks, Raynaud's disease, Ulcer, and Vertigo.    Yuniel Gracia  has a past surgical history that includes Hysterectomy; Brain surgery (2007); Tunneled venous port placement (04/2012); right lymph node removed from breast area; Bladder surgery (2011); removal of port a cath; Colonoscopy (01/03/2011); Upper gastrointestinal endoscopy (prior to 2011); Colonoscopy (N/A, 06/01/2017); Breast lumpectomy (03/2012); Eye surgery; Joint replacement (09/25/2017); Breast biopsy; Appendectomy; Esophagogastroduodenoscopy (N/A, 07/20/2022); Cataract extraction; Intramedullary rodding of trochanter of femur (Right, 12/7/2023); and Arthroscopic debridement of shoulder (Left, 7/5/2024).    Yuniel has a current medication list which includes the following prescription(s): acetaminophen, amlodipine, lumigan, brimonidine 0.2%, calcium  carbonate/vitamin d3, ibuprofen, levothyroxine, rivastigmine, and solifenacin.    Review of patient's allergies indicates:   Allergen Reactions    Iodinated contrast media Shortness Of Breath     Spontaneous jaw movements    Codeine Nausea Only        Objective      Thoracic Range of Motion        Thoracic rotation and sidebend 50%, flexion WNL    Lumbar Range of Motion   Flexion to mid tibia, extension 25%, rotation 75%, sidebend 50%      Hip Range of Motion    Bilateral WNL: passive motion              Hip Strength - Planes of Motion   Right Strength Right Pain Left Strength Left  Pain   Flexion (L2) 4+   4+     Extension           ABduction 4-   4-     ADduction           Internal Rotation           External Rotation               Knee Strength   Right Strength Right Pain Left Strength Left  Pain   Flexion (S2) 5   5     Prone Flexion           Extension (L3) 5   5            Ankle/Foot Strength - Planes of Motion   Right Strength Right Pain Left Strength Left  Pain   Dorsiflexion (L4) 5   5     Plantar Flexion (S1) 5   5     Inversion 5   5     Eversion     5     Great Toe Flexion           Great Toe Extension (L5)           Lesser Toes Flexion           Lesser Toes Extension                  Transfers Assessment  Sit to Stand Assistance: Independent  Chair to Bed Assistance: Independent  Bed to Chair Assistance: Independent           Time Entry(in minutes):  PT Evaluation (Moderate) Time Entry: 38  Therapeutic Exercise Time Entry: 25    Assessment & Plan   Assessment  Yuniel presents with a condition of Moderate complexity.   Presentation of Symptoms: Changing       Functional Limitations: Activity tolerance, Ambulating on uneven surfaces, Bed mobility, Carrying objects, Driving, Functional mobility, Gait limitations, Getting off the floor, Increased risk of fall, Maintaining balance, Pain when reaching  Impairments: Abnormal gait, Activity intolerance, Impaired balance  Personal Factors Affecting Prognosis:  Pain    Prognosis: Good  Assessment Details: Patient with significant truck flexion with gait. She is able to correct but that does cause her pain and is difficult for her to maintain. She usually uses her husbands arm for support when ambulating . She did well with the exercises today and felt they were beneficial. She was started off slow with both mobility and strengthening exercises to see how irritable her tissues are and I will advance as appropriate on next visit. I fell good about her being able to be able to walk with a more upright posture if she is consistent with her HEP.      Patient's spiritual, cultural, and educational needs considered and patient agreeable to plan of care and goals.           Goals:   Active       Long term goals       Able to ambulate in 25% flexion or less       Start:  03/20/25    Expected End:  05/15/25            decrease pain with ambulation to 2/10 or less       Start:  03/20/25    Expected End:  05/15/25            Independent with long term HEP       Start:  03/20/25    Expected End:  05/15/25               Short Term Goals       Improve thoracolumbar flexion by 25% or more       Start:  03/20/25    Expected End:  04/17/25            increase unassisted ambulation distance to 100 feet       Start:  03/20/25    Expected End:  04/17/25            Consistent with HEP w/o increased pain        Start:  03/20/25    Expected End:  04/17/25                Jonatan Mak, PT

## 2025-03-20 ENCOUNTER — CLINICAL SUPPORT (OUTPATIENT)
Dept: REHABILITATION | Facility: HOSPITAL | Age: 80
End: 2025-03-20
Payer: MEDICARE

## 2025-03-20 DIAGNOSIS — M54.6 CHRONIC BILATERAL THORACIC BACK PAIN: Primary | ICD-10-CM

## 2025-03-20 DIAGNOSIS — M54.6 PAIN IN THORACIC SPINE: ICD-10-CM

## 2025-03-20 DIAGNOSIS — R26.9 GAIT DIFFICULTY: ICD-10-CM

## 2025-03-20 DIAGNOSIS — R26.89 DECREASED FUNCTIONAL MOBILITY: ICD-10-CM

## 2025-03-20 DIAGNOSIS — G89.29 CHRONIC BILATERAL THORACIC BACK PAIN: Primary | ICD-10-CM

## 2025-03-20 DIAGNOSIS — M54.50 CHRONIC BILATERAL LOW BACK PAIN WITHOUT SCIATICA: ICD-10-CM

## 2025-03-20 DIAGNOSIS — G89.29 CHRONIC BILATERAL LOW BACK PAIN WITHOUT SCIATICA: ICD-10-CM

## 2025-03-20 PROCEDURE — 97110 THERAPEUTIC EXERCISES: CPT | Mod: PN

## 2025-03-20 PROCEDURE — 97162 PT EVAL MOD COMPLEX 30 MIN: CPT | Mod: PN

## 2025-03-25 NOTE — TELEPHONE ENCOUNTER
----- Message from Jessica Arredondo sent at 8/10/2020  8:05 AM CDT -----  Please contact patient with orders for Dexa scan, needed prior to her Prolia appointment on 8/19/20.  The last I could find was from 6/18/18.       Thank you,    Orlando Arredondo  [facility]          Addended by: MARCUS TALBOT on: 3/25/2025 07:07 AM     Modules accepted: Orders

## 2025-03-25 NOTE — PROGRESS NOTES
Outpatient Rehab    Physical Therapy Visit    Patient Name: Yuniel Gracia  MRN: [id number]  YOB: 1945  Encounter Date: 3/26/2025    Therapy Diagnosis:   Encounter Diagnoses   Name Primary?    Weakness Yes    Difficulty walking     Decreased functional mobility      Physician: Brian Sevilla MD    Physician Orders: Eval and Treat  Medical Diagnosis: Chronic bilateral low back pain without sciatica  Pain in thoracic spine    Visit # / Visits Authorized:  1 / 10  Insurance Authorization Period: 3/19/2025 to 12/31/2025  Date of Evaluation: 3/20/25  Plan of Care Certification: 3/20/2025 to 5/15/2025     PT/PTA:     Number of PTA visits since last PT visit:   Time In: 0950   Time Out: 1053  Total Time: 63   Total Billable Time: 59    FOTO:  Intake Score: 18%  Survey Score 1:  %  Survey Score 2:  %         Subjective   Patient reports her back is feeling good and did well after last visit but started having some right wrist pain that she thinks was caused by wall slides. no problems with HEP.  Pain reported as 3/10. R wrist    Objective            Treatment:  Therapeutic Exercise  TE 1: Supine LTR x 15  TE 2: Pelvic tilts x 15  TE 3: SKC : 3 x 30 sec holds : Bilateral  TE 4: Seated side bending: alterning direction x 10 each side  TE 5: seated: with swiss ball behind her back: Isometric lumbar extension 10 x 5 sec holds  TE 6: Wall walkups for lumbar extension: 5 x 20 sec holds  TE 7: supine shoulder flexion with bar x : 2x 10  TE 8: Standing: hip flexion and Hip abd  x 10  TE 9: Sit to stand x 10  Balance/Neuromuscular Re-Education  NMR 1: Seated trunk rotation : PNF into rotation and sidebend  NMR 2: Supine : serratus punches to facilitate serratus ant: contraction x 15  NMR 3: LE PNF: Flex/ext  NMR 4: Glute squeezes to faciltate : glute med/min    Time Entry(in minutes):  Neuromuscular Re-Education Time Entry: 20  Therapeutic Exercise Time Entry: 35    Assessment & Plan   Assessment: Patient  did well with exercises today and was able to tolerate more but was quickly fatigued. She needs consistent queing for upright posture. She does get Hamstring cramps while in supine positions with bridging exercises.  Evaluation/Treatment Tolerance: Patient tolerated treatment well    Patient will continue to benefit from skilled outpatient physical therapy to address the deficits listed in the problem list box on initial evaluation, provide pt/family education and to maximize pt's level of independence in the home and community environment.     Patient's spiritual, cultural, and educational needs considered and patient agreeable to plan of care and goals.           Plan: Outpatient Physical Therapy to include the following interventions: Cervical/Lumbar Traction, Electrical Stimulation re-eval, dry needling, Gait Training, Iontophoresis (with ), Manual Therapy, Moist Heat/ Ice, Neuromuscular Re-ed, Patient Education, Self Care, Therapeutic Activities, and Therapeutic Exercise. Physical therapist and physical therapy assistant(s) will met face to face to discuss patient's treatment plan and progress towards established goals. Pt will be seen by a physical therapist minimally every 6th visit or every 30 days. Potential for KX modifier and additional visits based on subjective report, objective examination, nature of current condition, comorbidities, and current functional status.    Goals:   Active       Long term goals       Able to ambulate in 25% flexion or less (Progressing)       Start:  03/20/25    Expected End:  05/15/25            decrease pain with ambulation to 2/10 or less (Progressing)       Start:  03/20/25    Expected End:  05/15/25            Independent with long term HEP (Progressing)       Start:  03/20/25    Expected End:  05/15/25               Short Term Goals       Improve thoracolumbar flexion by 25% or more (Progressing)       Start:  03/20/25    Expected End:  04/17/25            increase  unassisted ambulation distance to 100 feet (Progressing)       Start:  03/20/25    Expected End:  04/17/25            Consistent with HEP w/o increased pain  (Progressing)       Start:  03/20/25    Expected End:  04/17/25                Jonatan Mak, PT

## 2025-03-26 ENCOUNTER — CLINICAL SUPPORT (OUTPATIENT)
Dept: REHABILITATION | Facility: HOSPITAL | Age: 80
End: 2025-03-26
Payer: MEDICARE

## 2025-03-26 ENCOUNTER — PATIENT OUTREACH (OUTPATIENT)
Dept: NEUROLOGY | Facility: CLINIC | Age: 80
End: 2025-03-26
Payer: MEDICARE

## 2025-03-26 DIAGNOSIS — R26.89 DECREASED FUNCTIONAL MOBILITY: ICD-10-CM

## 2025-03-26 DIAGNOSIS — R26.2 DIFFICULTY WALKING: ICD-10-CM

## 2025-03-26 DIAGNOSIS — R53.1 WEAKNESS: Primary | ICD-10-CM

## 2025-03-26 PROCEDURE — 97112 NEUROMUSCULAR REEDUCATION: CPT | Mod: PN

## 2025-03-26 PROCEDURE — 97110 THERAPEUTIC EXERCISES: CPT | Mod: PN

## 2025-03-27 ENCOUNTER — PATIENT OUTREACH (OUTPATIENT)
Dept: NEUROLOGY | Facility: CLINIC | Age: 80
End: 2025-03-27
Payer: MEDICARE

## 2025-03-27 NOTE — PROGRESS NOTES
CTN provided psychoeducation about the Home Assessment through the GUIDE Model. CTN explained the modality of the assessment, according to the beneficiary complexity tier, as well as the scope of the assessment. Dyad expressed understanding and declined Home Assessment at this time.     Dyad has their adult son and his family living with them so feel it would be an inconvenience to do safety assessment right now. They may be interested in the future.

## 2025-03-28 ENCOUNTER — CLINICAL SUPPORT (OUTPATIENT)
Dept: REHABILITATION | Facility: HOSPITAL | Age: 80
End: 2025-03-28
Payer: MEDICARE

## 2025-03-28 DIAGNOSIS — R53.1 WEAKNESS: Primary | ICD-10-CM

## 2025-03-28 DIAGNOSIS — R26.89 DECREASED FUNCTIONAL MOBILITY: ICD-10-CM

## 2025-03-28 DIAGNOSIS — R26.2 DIFFICULTY WALKING: ICD-10-CM

## 2025-03-28 PROCEDURE — 97110 THERAPEUTIC EXERCISES: CPT | Mod: PN

## 2025-03-28 PROCEDURE — 97112 NEUROMUSCULAR REEDUCATION: CPT | Mod: PN

## 2025-03-28 NOTE — PROGRESS NOTES
Outpatient Rehab    Physical Therapy Visit    Patient Name: Yuniel Gracia  MRN: [id number]  YOB: 1945  Encounter Date: 3/28/2025    Therapy Diagnosis:   Encounter Diagnoses   Name Primary?    Weakness Yes    Difficulty walking     Decreased functional mobility      Physician: Brian Sevilla MD    Physician Orders: Eval and Treat  Medical Diagnosis: Chronic bilateral low back pain without sciatica  Pain in thoracic spine    Visit # / Visits Authorized:  2 / 10  Insurance Authorization Period: 3/19/2025 to 12/31/2025  Date of Evaluation: 3/20/25  Plan of Care Certification: 3/20/2025 to 5/15/2025       PT/PTA:     Number of PTA visits since last PT visit:   Time In: 0955   Time Out: 1055  Total Time: 60   Total Billable Time:      FOTO:  Intake Score: 18%  Survey Score 1:  %  Survey Score 2:  %         Subjective   Patient/spouse reports she has been doing good no pain this morning or after last visit. Still about the same posture wise.  Pain reported as 0/10.      Objective            Treatment:  Therapeutic Exercise  TE 1: Supine LTR x 15  TE 2: Pelvic tilts x 15  TE 3: SKC : 3 x 30 sec holds : Bilateral  TE 4: Seated side bending: alterning direction x 10 each side  TE 5: seated: with swiss ball behind her back: Isometric lumbar extension 10 x 5 sec holds  TE 6: Wall walkups for lumbar extension: 5 x 20 sec holds  TE 7: supine shoulder flexion with bar x : 2x 10, 2lbs  TE 8: Standing: hip flexion, abd and ext Hip  x 10  TE 9: Sit to stand x 10  TE 10: Standing:low  rows: Yellow Theraband x 10  Balance/Neuromuscular Re-Education  NMR 1: Seated trunk rotation : PNF into rotation and sidebend  NMR 2: Supine : serratus punches to facilitate serratus ant: contraction x 15  NMR 3: LE PNF: Flex/ext  NMR 4: Glute squeezes to faciltate : glute med/min  NMR 5: Hip Add ball squeezes to faciltate add firining: 1 x10, 5 sec holds    Time Entry(in minutes):  Neuromuscular Re-Education Time Entry:  25  Therapeutic Exercise Time Entry: 35    Assessment & Plan   Assessment: Patient continues to demonstrate slow improve as I increased the workload with her exercises and we do more standing exercises along with posture work .She did well with her exercises today.  Evaluation/Treatment Tolerance: Patient tolerated treatment well    Patient will continue to benefit from skilled outpatient physical therapy to address the deficits listed in the problem list box on initial evaluation, provide pt/family education and to maximize pt's level of independence in the home and community environment.     Patient's spiritual, cultural, and educational needs considered and patient agreeable to plan of care and goals.           Plan: Outpatient Physical Therapy to include the following interventions: Cervical/Lumbar Traction, Electrical Stimulation re-eval, dry needling, Gait Training, Iontophoresis (with ), Manual Therapy, Moist Heat/ Ice, Neuromuscular Re-ed, Patient Education, Self Care, Therapeutic Activities, and Therapeutic Exercise. Physical therapist and physical therapy assistant(s) will met face to face to discuss patient's treatment plan and progress towards established goals. Pt will be seen by a physical therapist minimally every 6th visit or every 30 days. Potential for KX modifier and additional visits based on subjective report, objective examination, nature of current condition, comorbidities, and current functional status.    Goals:   Active       Long term goals       Able to ambulate in 25% flexion or less (Progressing)       Start:  03/20/25    Expected End:  05/15/25            decrease pain with ambulation to 2/10 or less (Progressing)       Start:  03/20/25    Expected End:  05/15/25            Independent with long term HEP (Progressing)       Start:  03/20/25    Expected End:  05/15/25               Short Term Goals       Improve thoracolumbar flexion by 25% or more (Progressing)       Start:  03/20/25     Expected End:  04/17/25            increase unassisted ambulation distance to 100 feet (Progressing)       Start:  03/20/25    Expected End:  04/17/25            Consistent with HEP w/o increased pain  (Progressing)       Start:  03/20/25    Expected End:  04/17/25                Jonatan Mak, PT

## 2025-04-03 ENCOUNTER — OFFICE VISIT (OUTPATIENT)
Dept: OPTOMETRY | Facility: CLINIC | Age: 80
End: 2025-04-03
Payer: MEDICARE

## 2025-04-03 DIAGNOSIS — H52.7 REFRACTIVE ERROR: ICD-10-CM

## 2025-04-03 DIAGNOSIS — H40.1134 PRIMARY OPEN ANGLE GLAUCOMA (POAG) OF BOTH EYES, INDETERMINATE STAGE: Primary | ICD-10-CM

## 2025-04-03 DIAGNOSIS — H04.123 DRY EYE SYNDROME, BILATERAL: ICD-10-CM

## 2025-04-03 DIAGNOSIS — Z96.1 PSEUDOPHAKIA: ICD-10-CM

## 2025-04-03 PROCEDURE — 99213 OFFICE O/P EST LOW 20 MIN: CPT | Mod: PBBFAC,PO | Performed by: OPTOMETRIST

## 2025-04-03 PROCEDURE — 92133 CPTRZD OPH DX IMG PST SGM ON: CPT | Mod: PBBFAC,PO | Performed by: OPTOMETRIST

## 2025-04-03 PROCEDURE — 99999 PR PBB SHADOW E&M-EST. PATIENT-LVL III: CPT | Mod: PBBFAC,,, | Performed by: OPTOMETRIST

## 2025-04-03 PROCEDURE — G2211 COMPLEX E/M VISIT ADD ON: HCPCS | Mod: S$PBB,,, | Performed by: OPTOMETRIST

## 2025-04-03 PROCEDURE — 99213 OFFICE O/P EST LOW 20 MIN: CPT | Mod: S$PBB,,, | Performed by: OPTOMETRIST

## 2025-04-04 ENCOUNTER — CLINICAL SUPPORT (OUTPATIENT)
Dept: REHABILITATION | Facility: HOSPITAL | Age: 80
End: 2025-04-04
Payer: MEDICARE

## 2025-04-04 DIAGNOSIS — R26.89 DECREASED FUNCTIONAL MOBILITY: ICD-10-CM

## 2025-04-04 DIAGNOSIS — R53.1 WEAKNESS: Primary | ICD-10-CM

## 2025-04-04 DIAGNOSIS — R26.2 DIFFICULTY WALKING: ICD-10-CM

## 2025-04-04 PROCEDURE — 97110 THERAPEUTIC EXERCISES: CPT | Mod: PN

## 2025-04-04 PROCEDURE — 97112 NEUROMUSCULAR REEDUCATION: CPT | Mod: PN

## 2025-04-06 NOTE — PROGRESS NOTES
Outpatient Rehab    Physical Therapy Visit    Patient Name: Yuniel Gracia  MRN: [id number]  YOB: 1945  Encounter Date: 4/4/2025    Therapy Diagnosis:   Encounter Diagnoses   Name Primary?    Weakness Yes    Difficulty walking     Decreased functional mobility      Physician: Brian Sevilla MD    Physician Orders: Eval and Treat  Medical Diagnosis: Chronic bilateral low back pain without sciatica  Pain in thoracic spine    Visit # / Visits Authorized:  3 / 10  Insurance Authorization Period: 3/19/2025 to 12/31/2025  Date of Evaluation: 3/20/25  Plan of Care Certification: 3/20/2025 to 5/15/2025       PT/PTA:     Number of PTA visits since last PT visit:   Time In: 1000   Time Out: 1100  Total Time: 60   Total Billable Time:      FOTO:  Intake Score:  %  Survey Score 1:  %  Survey Score 2:  %         Subjective   Patient reports she had a flare up of her wrist pain but now it is feeling fine. She did fine after last treatment with no increase in pain and was just tired. She still reporting diffculty with walking upright.  Family / care giver present for this visit:   Pain reported as 0/10.             Treatment:  Therapeutic Exercise  TE 1: Supine LTR x 15  TE 2: Pelvic tilts x 15  TE 3: SKC : 2 x 30 sec holds : Bilateral  TE 4: Seated side bending: alterning direction x 10 each side  TE 5: seated: with swiss ball behind her back: Isometric lumbar extension 2 x  10 x 5 sec holds  TE 6: Wall walkups for lumbar extension: 5 x 20 sec holds followed by standing with back against wall trying to pull shoulder back with upright posture: 3 x 30 sec  TE 7: supine shoulder flexion with bar x : 2x 10, 2lbs  TE 8: Standing: hip flexion, abd and ext Hip  x 10  TE 9: Sidelying hip ext stretch : bilateral 4 x 30 sec  TE 10: Standing:low  rows: Yellow Theraband 2 x 10  Balance/Neuromuscular Re-Education  NMR 1: Seated trunk rotation : PNF into rotation and sidebend: 5 min  NMR 2: Supine : serratus punches to  facilitate serratus ant: contraction x 15  NMR 3: LE PNF: Flex/ext: 5 min  NMR 5: Hip Add ball squeezes to faciltate adductors: 1 x10, 5 sec holds    Time Entry(in minutes):  Neuromuscular Re-Education Time Entry: 12  Therapeutic Exercise Time Entry: 45    Assessment & Plan   Assessment: Patient has done well with her exercises and did better today with her exercises within her session and was able to do more. She still having expected pain with trying to lay supine but that is improving more quickly now. It will take time as I discussed with her and her  initially as this has been something that has been developing over a while.  Evaluation/Treatment Tolerance: Patient tolerated treatment well    Patient will continue to benefit from skilled outpatient physical therapy to address the deficits listed in the problem list box on initial evaluation, provide pt/family education and to maximize pt's level of independence in the home and community environment.     Patient's spiritual, cultural, and educational needs considered and patient agreeable to plan of care and goals.           Plan: Outpatient Physical Therapy to include the following interventions: Cervical/Lumbar Traction, Electrical Stimulation re-eval, dry needling, Gait Training, Iontophoresis (with ), Manual Therapy, Moist Heat/ Ice, Neuromuscular Re-ed, Patient Education, Self Care, Therapeutic Activities, and Therapeutic Exercise. Physical therapist and physical therapy assistant(s) will met face to face to discuss patient's treatment plan and progress towards established goals. Pt will be seen by a physical therapist minimally every 6th visit or every 30 days. Potential for KX modifier and additional visits based on subjective report, objective examination, nature of current condition, comorbidities, and current functional status.    Goals:   Active       Long term goals       Able to ambulate in 25% flexion or less (Progressing)       Start:   03/20/25    Expected End:  05/15/25            decrease pain with ambulation to 2/10 or less (Progressing)       Start:  03/20/25    Expected End:  05/15/25            Independent with long term HEP (Progressing)       Start:  03/20/25    Expected End:  05/15/25               Short Term Goals       Improve thoracolumbar flexion by 25% or more (Progressing)       Start:  03/20/25    Expected End:  04/17/25            increase unassisted ambulation distance to 100 feet (Progressing)       Start:  03/20/25    Expected End:  04/17/25            Consistent with HEP w/o increased pain  (Progressing)       Start:  03/20/25    Expected End:  04/17/25                Jonatan Mak, PT

## 2025-04-08 ENCOUNTER — CLINICAL SUPPORT (OUTPATIENT)
Dept: REHABILITATION | Facility: HOSPITAL | Age: 80
End: 2025-04-08
Payer: MEDICARE

## 2025-04-08 DIAGNOSIS — R53.1 WEAKNESS: Primary | ICD-10-CM

## 2025-04-08 DIAGNOSIS — R26.89 DECREASED FUNCTIONAL MOBILITY: ICD-10-CM

## 2025-04-08 DIAGNOSIS — R26.2 DIFFICULTY WALKING: ICD-10-CM

## 2025-04-08 PROCEDURE — 97112 NEUROMUSCULAR REEDUCATION: CPT | Mod: PN

## 2025-04-08 PROCEDURE — 97110 THERAPEUTIC EXERCISES: CPT | Mod: PN

## 2025-04-08 NOTE — PROGRESS NOTES
Outpatient Rehab    Physical Therapy Visit    Patient Name: Yuniel Gracia  MRN: [id number]  YOB: 1945  Encounter Date: 4/8/2025    Therapy Diagnosis:   Encounter Diagnoses   Name Primary?    Weakness Yes    Difficulty walking     Decreased functional mobility      Physician: Brian Sevilla MD    Physician Orders: Eval and Treat  Medical Diagnosis: Chronic bilateral low back pain without sciatica  Pain in thoracic spine    Visit # / Visits Authorized:  4 / 10  Insurance Authorization Period: 3/19/2025 to 12/31/2025  Date of Evaluation: 3/20/25  Plan of Care Certification: 3/20/2025 to 5/15/2025    PT/PTA:     Number of PTA visits since last PT visit:   Time In: 0945   Time Out: 1050  Total Time: 65   Total Billable Time:      FOTO:  Intake Score:  %  Survey Score 1:  %  Survey Score 2:  %         Subjective   Patient reports she is doing good today w./o pain . She was fine after last visit..  Pain reported as 0/10.      Objective            Treatment:  Therapeutic Exercise  TE 1: Supine LTR x 15  TE 2: Pelvic tilts x 15  TE 3: SKC : 2 x 30 sec holds : Bilateral  TE 4: Seated side bending: alternating direction x 10 each side  TE 5: seated: with swiss ball behind her back: Isometric lumbar extension 2 x  10 x 5 sec holds  TE 6: Wall walkups for lumbar extension: 5 x 20 sec holds followed by standing with back against wall trying to pull shoulder back with upright posture: 3 x 30 sec  TE 7: supine shoulder flexion with bar x : 2x 10, 2lbs  TE 8: Standing: hip flexion, abd and ext Hip  x 10  TE 9: Sidelying hip ext stretch : bilateral 4 x 30 sec  TE 10: Standing:low  rows: Yellow Theraband 2 x 10  Balance/Neuromuscular Re-Education  NMR 1: Seated trunk rotation : PNF into rotation and sidebend: 5 min  NMR 2: Supine : serratus punches to facilitate serratus ant: contraction x 15  NMR 3: LE PNF: Flex/ext: 5 min    Time Entry(in minutes):  Neuromuscular Re-Education Time Entry: 10  Therapeutic  Exercise Time Entry: 45    Assessment & Plan   Assessment: Patient is doing well with her in clinic exercises. I will start working in some specfic gait/ posture with gait training next visit.  Evaluation/Treatment Tolerance: Patient tolerated treatment well    Patient will continue to benefit from skilled outpatient physical therapy to address the deficits listed in the problem list box on initial evaluation, provide pt/family education and to maximize pt's level of independence in the home and community environment.     Patient's spiritual, cultural, and educational needs considered and patient agreeable to plan of care and goals.           Plan: Outpatient Physical Therapy to include the following interventions: Cervical/Lumbar Traction, Electrical Stimulation re-eval, dry needling, Gait Training, Iontophoresis (with ), Manual Therapy, Moist Heat/ Ice, Neuromuscular Re-ed, Patient Education, Self Care, Therapeutic Activities, and Therapeutic Exercise. Physical therapist and physical therapy assistant(s) will met face to face to discuss patient's treatment plan and progress towards established goals. Pt will be seen by a physical therapist minimally every 6th visit or every 30 days. Potential for KX modifier and additional visits based on subjective report, objective examination, nature of current condition, comorbidities, and current functional status.    Goals:   Active       Long term goals       Able to ambulate in 25% flexion or less (Progressing)       Start:  03/20/25    Expected End:  05/15/25            decrease pain with ambulation to 2/10 or less (Progressing)       Start:  03/20/25    Expected End:  05/15/25            Independent with long term HEP (Progressing)       Start:  03/20/25    Expected End:  05/15/25               Short Term Goals       Improve thoracolumbar flexion by 25% or more (Progressing)       Start:  03/20/25    Expected End:  04/17/25            increase unassisted ambulation  distance to 100 feet (Progressing)       Start:  03/20/25    Expected End:  04/17/25            Consistent with HEP w/o increased pain  (Progressing)       Start:  03/20/25    Expected End:  04/17/25                Jonatan Mak, PT

## 2025-04-09 ENCOUNTER — PATIENT OUTREACH (OUTPATIENT)
Dept: NEUROLOGY | Facility: CLINIC | Age: 80
End: 2025-04-09
Payer: MEDICARE

## 2025-04-09 NOTE — PROGRESS NOTES
PELON called CG to complete monthly GUIDE check in.  He is unable to talk at this time.  He requested a call at 1230 PM on Friday 4/11/25.  PELON will call then.

## 2025-04-11 ENCOUNTER — PATIENT OUTREACH (OUTPATIENT)
Dept: NEUROLOGY | Facility: CLINIC | Age: 80
End: 2025-04-11
Payer: MEDICARE

## 2025-04-11 DIAGNOSIS — G30.1 MILD LATE ONSET ALZHEIMER'S DEMENTIA WITHOUT BEHAVIORAL DISTURBANCE, PSYCHOTIC DISTURBANCE, MOOD DISTURBANCE, OR ANXIETY: Primary | ICD-10-CM

## 2025-04-11 DIAGNOSIS — F02.A0 MILD LATE ONSET ALZHEIMER'S DEMENTIA WITHOUT BEHAVIORAL DISTURBANCE, PSYCHOTIC DISTURBANCE, MOOD DISTURBANCE, OR ANXIETY: Primary | ICD-10-CM

## 2025-04-11 NOTE — PROGRESS NOTES
Dementia Care Program - GUIDE  Care Navigator - Monthly Follow Up      Virtual Visit - Telehealth  The patient location is: Home  The chief complaint leading to consultation is: GUIDE Model Program  Visit type: Audio Only       Date of Service: 2025  Enrollment date: 24  Program Status: Active  CMS Complexity Tier: Low Complexity Dyad - After  -   Was Respite Approved? No    Care Navigator completing this form: Ruthann Dunn  PCP: Bhaskar Olivera MD  Care Team: Patient Care Team:  Bhaskar Olivera MD as PCP - General (Family Medicine)  Ilya Landaverde MD (Inactive) as Consulting Physician (Urology)  Sebastián Giles MD as Obstetrician (Obstetrics)  Sherrie Almanza MD as Consulting Physician (Hematology and Oncology)  Riaz Russell MD as Consulting Physician (Family Medicine)  Jcarlos Amato MD as Consulting Physician (Ophthalmology)  Ruthann Dunn LCSW as  (Neuropsychology)  Lisbet Cardoso MD as Consulting Physician (Internal Medicine)     Patient: Yuniel Gracia  MRN: [id number]  : 1945  Age: 79 y.o.  Gender: female  Race: White  Ethnicity: Not  or /a    Visit: Care Navigator/Social Work follow up as part of the GUIDE dementia care management program.        ASSESSMENT & CARE PLAN     Yuniel was seen today for dementia.    Diagnoses and all orders for this visit:    Mild late onset Alzheimer's dementia without behavioral disturbance, psychotic disturbance, mood disturbance, or anxiety         Spoke with Caregiver Ray  (Spouse)          2025   Care Plan   Behavior Management Delusions;Hallucinations;Agitation/Aggression;Irritability/Lability;Nighttime Behaviors;Appetite/Eating   Caregiver Wellness Supportive Counseling   Medication Compliance & Optimization   Prevention & Safety Urinary Tract Infections;Falls;Hospital and ED Utilization       Plan: Monthly follow up for dementia care management as part of the GUIDE  Model.      Next visit scheduled for: 5/8/25. Caregiver knows how to reach CTN/SW and is encouraged to do so before the next scheduled call, if needed.      SUBJECTIVE     CG said pt has declined. She didn't recognize their son even after CG explained who he was.  CG said pt is also more agitated, but so far his behavioral strategies have worked to keep pt's mood stable.   Cg said his son and two grandson's were staying with them, however he had to put them out due to egregious violations of rules of the home. A discussion followed about the importance of setting healthy boundaries given the other difficulties CG is managing.  SW provided supportive counseling and will remain available.           MONTHLY TRACKING

## 2025-04-22 ENCOUNTER — LAB VISIT (OUTPATIENT)
Dept: LAB | Facility: HOSPITAL | Age: 80
End: 2025-04-22
Attending: STUDENT IN AN ORGANIZED HEALTH CARE EDUCATION/TRAINING PROGRAM
Payer: MEDICARE

## 2025-04-22 DIAGNOSIS — E02 SUBCLINICAL IODINE-DEFICIENCY HYPOTHYROIDISM: ICD-10-CM

## 2025-04-22 LAB — TSH SERPL-ACNC: 0.95 UIU/ML (ref 0.4–4)

## 2025-04-22 PROCEDURE — 84443 ASSAY THYROID STIM HORMONE: CPT

## 2025-04-22 PROCEDURE — 36415 COLL VENOUS BLD VENIPUNCTURE: CPT | Mod: PO

## 2025-04-23 ENCOUNTER — RESULTS FOLLOW-UP (OUTPATIENT)
Dept: FAMILY MEDICINE | Facility: CLINIC | Age: 80
End: 2025-04-23

## 2025-04-25 ENCOUNTER — LAB VISIT (OUTPATIENT)
Dept: LAB | Facility: HOSPITAL | Age: 80
End: 2025-04-25
Attending: STUDENT IN AN ORGANIZED HEALTH CARE EDUCATION/TRAINING PROGRAM
Payer: MEDICARE

## 2025-04-25 ENCOUNTER — TELEPHONE (OUTPATIENT)
Dept: FAMILY MEDICINE | Facility: CLINIC | Age: 80
End: 2025-04-25

## 2025-04-25 ENCOUNTER — PATIENT MESSAGE (OUTPATIENT)
Dept: FAMILY MEDICINE | Facility: CLINIC | Age: 80
End: 2025-04-25

## 2025-04-25 ENCOUNTER — E-VISIT (OUTPATIENT)
Dept: FAMILY MEDICINE | Facility: CLINIC | Age: 80
End: 2025-04-25
Payer: MEDICARE

## 2025-04-25 DIAGNOSIS — N30.90 CYSTITIS WITHOUT HEMATURIA: ICD-10-CM

## 2025-04-25 DIAGNOSIS — N30.90 CYSTITIS WITHOUT HEMATURIA: Primary | ICD-10-CM

## 2025-04-25 LAB
BACTERIA #/AREA URNS AUTO: ABNORMAL /HPF
BILIRUB UR QL STRIP.AUTO: NEGATIVE
CLARITY UR: ABNORMAL
COLOR UR AUTO: YELLOW
GLUCOSE UR QL STRIP: NEGATIVE
HGB UR QL STRIP: NEGATIVE
HOLD SPECIMEN: NORMAL
HYALINE CASTS UR QL AUTO: 24 /LPF (ref 0–1)
KETONES UR QL STRIP: NEGATIVE
LEUKOCYTE ESTERASE UR QL STRIP: ABNORMAL
MICROSCOPIC COMMENT: ABNORMAL
NITRITE UR QL STRIP: NEGATIVE
PH UR STRIP: 6 [PH]
PROT UR QL STRIP: ABNORMAL
RBC #/AREA URNS AUTO: 2 /HPF (ref 0–4)
SP GR UR STRIP: 1.02
SQUAMOUS #/AREA URNS AUTO: 2 /HPF
UROBILINOGEN UR STRIP-ACNC: NEGATIVE EU/DL
WBC #/AREA URNS AUTO: >100 /HPF (ref 0–5)

## 2025-04-25 PROCEDURE — 81003 URINALYSIS AUTO W/O SCOPE: CPT

## 2025-04-25 PROCEDURE — 87086 URINE CULTURE/COLONY COUNT: CPT

## 2025-04-25 RX ORDER — SULFAMETHOXAZOLE AND TRIMETHOPRIM 800; 160 MG/1; MG/1
1 TABLET ORAL 2 TIMES DAILY
Qty: 14 TABLET | Refills: 0 | Status: SHIPPED | OUTPATIENT
Start: 2025-04-25 | End: 2025-05-02

## 2025-04-25 NOTE — TELEPHONE ENCOUNTER
----- Message from Rosalind sent at 4/25/2025 11:18 AM CDT -----  Type:  Needs Medical AdviceWho Called: Mr Perrysaranya (please be specific): possible utiHow long has patient had these symptoms:  3 daysPharmacy name and phone #:  [facility] [address] [id number] [address] [address]Zfgmi: [phone] Fax: [phone] the patient rather a call back or a response via [facility]? Call [facility] Call Back Number: [phone] Information:       Please call Back to advise. Thanks!

## 2025-04-25 NOTE — PROGRESS NOTES
Patient ID: Yuniel Gracia is a 79 y.o. female.    Chief Complaint: General Illness (Entered automatically based on patient selection in citizenmade.)          274}  The patient initiated a request through citizenmade on 4/25/2025 for evaluation and management with a chief complaint of General Illness (Entered automatically based on patient selection in citizenmade.)     I evaluated the questionnaire submission on 04/25/2025 .    Total Time (in minutes): 12     Ohs Peq Evisit Supergroup-Common Problems    4/25/2025 11:23 AM CDT - Filed by Patient   What do you need help with? Urinary Symptoms   Do you agree to participate in an E-Visit? Yes   If you have any of the following symptoms, please go to the nearest emergency room or call 911: I acknowledge   What is the main issue you would like addressed today? Urine infection   Do you have any of the following symptoms? Passing urine more frequently;  Cloudy urine   When did your concern begin? 4/24/2025   What medications or treatments have you used to help your symptoms? Antibiotics   What does your urine look like? Cloudy   Have you had any of the following symptoms during the past 24 hours?   Urgency (a sudden and uncontrollable urge to urinate) Moderate   Frequency (going to the toilet very often) Moderate   Burning pain when urinating None   Incomplete bladder emptying (still feel like you need to urinate again after urination) (None, Mild, Moderate, Severe) None   Pain in the lower belly when youre not urinating. None   Discomfort from your urinary symptoms. None   Have you had any of the following symptoms during the past 24 hours?   Blood seen in the urine None   Pain in the lower back on one or both sides (flank pain) Moderate   Abnormal Vaginal Discharge (abnormal amount, color and/or odor) None   Discharge from the opening you urinate from (urethra) when not urinating. None   Have your symptoms interfered with your every day activities? Mild   Do you have a  fever? No   Are you a diabetic? No   Are you currently experiencing any of the following while completing this questionnaire? None   Do you have a history of kidney stones? No   Provide any additional information you feel is important. UTI   Please attach any relevant images or files (if you have performed a home test for UTI, please submit a photo of results)    Are you able to take your vital signs? Yes   Systolic Blood Pressure: 70   Diastolic Blood Pressure: 43   Weight: 148   Height: 63   Pulse: 53   Temperature: 98.7   Respiration rate:    Pulse Oxygen: 99          Active Problem List with Overview Notes    Diagnosis Date Noted    Chronic lower back pain 03/20/2025    Chronic thoracic back pain 03/20/2025    Gait difficulty 03/20/2025    Decreased functional mobility 03/20/2025    Decreased ROM of left shoulder 07/18/2024    Decreased strength of upper extremity 07/18/2024    Impingement syndrome of left shoulder region 07/05/2024    Primary progressive aphasia 06/07/2024    Chronic kidney disease, stage 3a 05/20/2024    Decreased range of right hip movement 12/22/2023    Weakness of both lower extremities 12/22/2023    Impaired functional mobility, balance, gait, and endurance 12/22/2023    Dementia with behavioral disturbance 12/10/2023    Essential (primary) hypertension 12/08/2023    Closed displaced intertrochanteric fracture of right femur 12/07/2023    Weakness 05/01/2023    Acute bilateral low back pain without sciatica 05/01/2023    Difficulty walking 05/01/2023    Decreased functional activity tolerance 05/01/2023    Special educational needs 05/01/2023    Oral ulcer 01/29/2020    Balance disorder 12/11/2019    Gait instability 12/11/2019    Leg weakness 12/11/2019    Aortic calcification 12/03/2019    Raynaud's phenomenon without gangrene 03/11/2019    Depression with anxiety 03/11/2019    Overactive bladder 03/11/2019    Mixed hyperlipidemia 03/11/2019    Prediabetes 03/11/2019    BMI  25.0-25.9,adult 03/11/2019    Use of aromatase inhibitors 07/24/2018    Hepatitis C virus infection cured after antiviral drug therapy 12/03/2012    Osteopenia of left hip 10/31/2012    Insomnia 07/23/2012    Hypothyroidism 07/23/2012    History of breast cancer 04/12/2012      Recent Labs Obtained:  Lab Results   Component Value Date    WBC 4.36 02/17/2025    HGB 13.7 02/17/2025    HCT 43.1 02/17/2025    MCV 93 02/17/2025     02/17/2025     02/17/2025    K 4.3 02/17/2025    GLU 95 02/17/2025    CREATININE 0.9 02/17/2025    EGFRNORACEVR >60.0 02/17/2025    HGBA1C 5.4 02/17/2025    TSH 0.946 04/22/2025      Review of patient's allergies indicates:   Allergen Reactions    Iodinated contrast media Shortness Of Breath     Spontaneous jaw movements    Codeine Nausea Only       Encounter Diagnosis   Name Primary?    Cystitis without hematuria Yes        Orders Placed This Encounter   Procedures    Urinalysis, Reflex to Urine Culture     Standing Status:   Future     Expected Date:   4/25/2025     Expiration Date:   6/24/2026     Specimen Source:   Urine      Medications Ordered This Encounter   Medications    sulfamethoxazole-trimethoprim 800-160mg (BACTRIM DS) 800-160 mg Tab     Sig: Take 1 tablet by mouth 2 (two) times daily. for 7 days     Dispense:  14 tablet     Refill:  0        E-Visit Time Tracking:    Day 1 Time (in minutes): 12    Total Time (in minutes): 12      274}

## 2025-04-27 LAB — BACTERIA UR CULT: NORMAL

## 2025-04-28 ENCOUNTER — OFFICE VISIT (OUTPATIENT)
Dept: URGENT CARE | Facility: CLINIC | Age: 80
End: 2025-04-28
Payer: MEDICARE

## 2025-04-28 VITALS
RESPIRATION RATE: 20 BRPM | OXYGEN SATURATION: 98 % | HEART RATE: 74 BPM | SYSTOLIC BLOOD PRESSURE: 116 MMHG | DIASTOLIC BLOOD PRESSURE: 68 MMHG | BODY MASS INDEX: 24.75 KG/M2 | TEMPERATURE: 97 F | WEIGHT: 145 LBS | HEIGHT: 64 IN

## 2025-04-28 DIAGNOSIS — R10.9 ABDOMINAL PAIN, UNSPECIFIED ABDOMINAL LOCATION: ICD-10-CM

## 2025-04-28 DIAGNOSIS — N39.0 URINARY TRACT INFECTION WITHOUT HEMATURIA, SITE UNSPECIFIED: Primary | ICD-10-CM

## 2025-04-28 DIAGNOSIS — R11.0 NAUSEA: ICD-10-CM

## 2025-04-28 LAB
BILIRUBIN, UA POC OHS: NEGATIVE
BLOOD, UA POC OHS: NEGATIVE
CLARITY, UA POC OHS: CLEAR
COLOR, UA POC OHS: YELLOW
GLUCOSE, UA POC OHS: NEGATIVE
KETONES, UA POC OHS: NEGATIVE
LEUKOCYTES, UA POC OHS: ABNORMAL
NITRITE, UA POC OHS: NEGATIVE
PH, UA POC OHS: 6
PROTEIN, UA POC OHS: NEGATIVE
SPECIFIC GRAVITY, UA POC OHS: 1
UROBILINOGEN, UA POC OHS: 0.2

## 2025-04-28 PROCEDURE — 99203 OFFICE O/P NEW LOW 30 MIN: CPT | Mod: S$GLB,,, | Performed by: NURSE PRACTITIONER

## 2025-04-28 PROCEDURE — 87086 URINE CULTURE/COLONY COUNT: CPT | Performed by: NURSE PRACTITIONER

## 2025-04-28 PROCEDURE — 81003 URINALYSIS AUTO W/O SCOPE: CPT | Mod: QW,S$GLB,, | Performed by: NURSE PRACTITIONER

## 2025-04-28 RX ORDER — CEPHALEXIN 500 MG/1
500 CAPSULE ORAL EVERY 12 HOURS
Qty: 14 CAPSULE | Refills: 0 | Status: SHIPPED | OUTPATIENT
Start: 2025-04-28 | End: 2025-05-05

## 2025-04-28 RX ORDER — ONDANSETRON 4 MG/1
4 TABLET, ORALLY DISINTEGRATING ORAL EVERY 12 HOURS PRN
Qty: 14 TABLET | Refills: 0 | Status: SHIPPED | OUTPATIENT
Start: 2025-04-28

## 2025-04-28 NOTE — PROGRESS NOTES
"Subjective:      Patient ID: Yuniel Gracia is a 79 y.o. female.    Vitals:  height is 5' 4" (1.626 m) and weight is 65.8 kg (145 lb). Her oral temperature is 97 °F (36.1 °C). Her blood pressure is 116/68 and her pulse is 74. Her respiration is 20 and oxygen saturation is 98%.     Chief Complaint: ABD pain taking ABX    Pt present with abd pain started Saturday. Tx include nothing at home.spouse states she had UTI  and had antibiotic (sulfa),after taking antibiotics sh started having abdominal pain.     Provider note begins below:  Patient denies fever or chills.  Patient denies any vomiting but is nauseous.  Denies any vaginal bleeding/discharge or CVA tenderness.  Reports normal bowel movements.  Patient started taking Bactrim for UTI on Friday evening and started having abdominal discomfort the next morning.  Patient took the Bactrim on an empty stomach.  Reports that she is still having urinary frequency and urgency.    Abdominal Pain  This is a new problem. The current episode started in the past 7 days. The onset quality is gradual. The problem occurs constantly. The problem has been gradually worsening. The pain is at a severity of 4/10. The pain is moderate. The quality of the pain is aching and cramping. Associated symptoms include frequency. Pertinent negatives include no arthralgias, diarrhea, dysuria, hematuria, nausea or vomiting. Nothing aggravates the pain. The pain is relieved by Nothing.       Constitution: Negative. Negative for chills, sweating and fatigue.   HENT: Negative.  Negative for ear pain, facial swelling, congestion and sore throat.    Neck: Negative for painful lymph nodes.   Cardiovascular: Negative.  Negative for chest trauma, chest pain and sob on exertion.   Eyes: Negative.  Negative for eye itching and eye pain.   Respiratory: Negative.  Negative for chest tightness, cough and asthma.    Gastrointestinal:  Positive for abdominal pain. Negative for nausea, vomiting and " diarrhea.   Endocrine: negative. cold intolerance and excessive thirst.   Genitourinary:  Positive for frequency and urgency. Negative for dysuria and hematuria.   Musculoskeletal:  Negative for pain, trauma and joint pain.   Skin: Negative.  Negative for rash, wound and hives.   Allergic/Immunologic: Negative.  Negative for eczema, asthma, hives and itching.   Neurological: Negative.  Negative for disorientation and altered mental status.   Hematologic/Lymphatic: Negative.  Negative for swollen lymph nodes.   Psychiatric/Behavioral: Negative.  Negative for altered mental status, disorientation and confusion.       Objective:     Physical Exam   Constitutional: She is oriented to person, place, and time. She appears well-developed.  Non-toxic appearance. She does not appear ill. No distress.   HENT:   Head: Normocephalic and atraumatic.   Ears:   Right Ear: External ear normal.   Left Ear: External ear normal.   Nose: Nose normal. No nasal deformity. No epistaxis.   Mouth/Throat: Oropharynx is clear and moist and mucous membranes are normal.   Eyes: Lids are normal.   Neck: Trachea normal and phonation normal. Neck supple.   Cardiovascular: Normal pulses.   Pulmonary/Chest: Effort normal and breath sounds normal. No stridor. No respiratory distress. She has no wheezes. She has no rhonchi. She has no rales. She exhibits no tenderness.   Abdominal: Normal appearance and bowel sounds are normal. She exhibits no distension. Soft. flat abdomen There is abdominal tenderness in the suprapubic area. There is no guarding, no left CVA tenderness and no right CVA tenderness.      Comments: + pressure- mild ttp.  No guarding.     Neurological: no focal deficit. She is alert, oriented to person, place, and time and at baseline.   Skin: Skin is warm, dry, intact and not diaphoretic.   Psychiatric: Her speech is normal and behavior is normal. Mood, judgment and thought content normal.   Nursing note and vitals reviewed.    Results  for orders placed or performed in visit on 04/28/25   POCT Urinalysis(Instrument)    Collection Time: 04/28/25  1:50 PM   Result Value Ref Range    Color, POC UA Yellow Yellow, Straw, Colorless    Clarity, POC UA Clear Clear    Glucose, POC UA Negative Negative    Bilirubin, POC UA Negative Negative    Ketones, POC UA Negative Negative    Spec Grav POC UA 1.000 1.005 - 1.030    Blood, POC UA Negative Negative    pH, POC UA 6.0 5.0 - 8.0    Protein, POC UA Negative Negative    Urobilinogen, POC UA 0.2 <=1.0    Nitrite, POC UA Negative Negative    WBC, POC UA Small (A) Negative     *Note: Due to a large number of results and/or encounters for the requested time period, some results have not been displayed. A complete set of results can be found in Results Review.         Assessment:     1. Urinary tract infection without hematuria, site unspecified    2. Abdominal pain, unspecified abdominal location    3. Nausea        Plan:   Discussed urinalysis results with patient.  Repeated urine culture.  Stopped Bactrim (patient not tolerating) and prescribed Keflex. Patient instructed to increase fluid intake.  PCP and/or specialist follow-up recommended.  ER precautions given.  Patient verbalized understanding and agreed to plan.     FOLLOWUP  Follow up if symptoms worsen or fail to improve, for PLEASE CONTACT PCP OR CONTACT THE EMERGENCY ROOM..     PATIENT INSTRUCTIONS  Patient Instructions   Follow-up with Dr. Olivera.  Go to the emergency room for any worsening symptoms or failure to improve.    INSTRUCTIONS:  - Rest.  - Drink plenty of fluids.  - Take Tylenol and/or Ibuprofen as directed as needed for fever/pain.  Do not take more than the recommended dose.  - follow up with your PCP within the next 1-2 weeks as needed.  - You must understand that you have received an Urgent Care treatment only and that you may be released before all of your medical problems are known or treated.   - You, the patient, will arrange for  follow up care as instructed.   - If your condition worsens or fails to improve we recommend that you receive another evaluation at the ER immediately or contact your PCP to discuss your concerns.   - You can call [phone] or [phone] to help schedule an appointment with the appropriate provider.     -If you smoke cigarettes, it would be beneficial for you to stop.        Thank you for allowing me to participate in your health care,     FNP-C     Urinary tract infection without hematuria, site unspecified  -     Urine Culture High Risk  -     cephALEXin (KEFLEX) 500 MG capsule; Take 1 capsule (500 mg total) by mouth every 12 (twelve) hours. for 7 days  Dispense: 14 capsule; Refill: 0    Abdominal pain, unspecified abdominal location  -     Cancel: POCT Urinalysis(Instrument)  -     POCT Urinalysis(Instrument)    Nausea  -     ondansetron (ZOFRAN-ODT) 4 MG TbDL; Take 1 tablet (4 mg total) by mouth every 12 (twelve) hours as needed (nausea).  Dispense: 14 tablet; Refill: 0

## 2025-04-28 NOTE — PATIENT INSTRUCTIONS
Follow-up with Dr. Olivera.  Go to the emergency room for any worsening symptoms or failure to improve.    INSTRUCTIONS:  - Rest.  - Drink plenty of fluids.  - Take Tylenol and/or Ibuprofen as directed as needed for fever/pain.  Do not take more than the recommended dose.  - follow up with your PCP within the next 1-2 weeks as needed.  - You must understand that you have received an Urgent Care treatment only and that you may be released before all of your medical problems are known or treated.   - You, the patient, will arrange for follow up care as instructed.   - If your condition worsens or fails to improve we recommend that you receive another evaluation at the ER immediately or contact your PCP to discuss your concerns.   - You can call [phone] or [phone] to help schedule an appointment with the appropriate provider.     -If you smoke cigarettes, it would be beneficial for you to stop.

## 2025-04-29 ENCOUNTER — OFFICE VISIT (OUTPATIENT)
Dept: FAMILY MEDICINE | Facility: CLINIC | Age: 80
End: 2025-04-29
Payer: MEDICARE

## 2025-04-29 ENCOUNTER — TELEPHONE (OUTPATIENT)
Dept: PRIMARY CARE CLINIC | Facility: CLINIC | Age: 80
End: 2025-04-29
Payer: MEDICARE

## 2025-04-29 ENCOUNTER — HOSPITAL ENCOUNTER (OUTPATIENT)
Dept: RADIOLOGY | Facility: HOSPITAL | Age: 80
Discharge: HOME | End: 2025-04-29
Payer: MEDICARE

## 2025-04-29 ENCOUNTER — RESULTS FOLLOW-UP (OUTPATIENT)
Dept: FAMILY MEDICINE | Facility: CLINIC | Age: 80
End: 2025-04-29

## 2025-04-29 VITALS
BODY MASS INDEX: 25.33 KG/M2 | TEMPERATURE: 98 F | DIASTOLIC BLOOD PRESSURE: 68 MMHG | RESPIRATION RATE: 12 BRPM | SYSTOLIC BLOOD PRESSURE: 100 MMHG | WEIGHT: 148.38 LBS | HEIGHT: 64 IN | OXYGEN SATURATION: 97 % | HEART RATE: 60 BPM

## 2025-04-29 DIAGNOSIS — F03.918 DEMENTIA WITH BEHAVIORAL DISTURBANCE: ICD-10-CM

## 2025-04-29 DIAGNOSIS — N39.0 URINARY TRACT INFECTION WITHOUT HEMATURIA, SITE UNSPECIFIED: ICD-10-CM

## 2025-04-29 DIAGNOSIS — N39.0 URINARY TRACT INFECTION WITHOUT HEMATURIA, SITE UNSPECIFIED: Primary | ICD-10-CM

## 2025-04-29 DIAGNOSIS — I10 ESSENTIAL (PRIMARY) HYPERTENSION: ICD-10-CM

## 2025-04-29 DIAGNOSIS — N17.9 AKI (ACUTE KIDNEY INJURY): ICD-10-CM

## 2025-04-29 PROCEDURE — 74176 CT ABD & PELVIS W/O CONTRAST: CPT | Mod: 26,,, | Performed by: STUDENT IN AN ORGANIZED HEALTH CARE EDUCATION/TRAINING PROGRAM

## 2025-04-29 PROCEDURE — 99214 OFFICE O/P EST MOD 30 MIN: CPT | Mod: PBBFAC,PO

## 2025-04-29 PROCEDURE — 99214 OFFICE O/P EST MOD 30 MIN: CPT | Mod: S$PBB,,,

## 2025-04-29 PROCEDURE — 99999 PR PBB SHADOW E&M-EST. PATIENT-LVL IV: CPT | Mod: PBBFAC,,,

## 2025-04-29 PROCEDURE — 74176 CT ABD & PELVIS W/O CONTRAST: CPT | Mod: TC

## 2025-04-29 NOTE — PROGRESS NOTES
"Subjective:       Patient ID:  [id number]     Chief Complaint: Abdominal Pain and Nausea      History of Present Illness    Ms. Gracia presents today for abdominal pain and nausea. She was recently seen by Dr. Olivera via E-Visit who prescribed Bactrim. She subsequently visited urgent care where providers attributed her nausea to Bactrim use. She started a new antibiotic regimen yesterday evening and this morning. She is experiencing nausea without vomiting, which is partially controlled with Zofran providing relief for 6-7 hours. She reports feeling run down but denies weakness. She is less steady on feet compared to baseline. She denies diarrhea, fever, chills, body aches, and blood in stool. She has a history of stomach ulcer from 10-15 years ago, dementia managed with a patch, and chronic back pain related to her spine. She reports minimal water intake and consumes a few diet cokes daily.   No other concerns today.         Past Medical History:   Diagnosis Date    Anemia     Arthritis     Blood transfusion     Breast cancer     Cancer RIGHT BREAST    Chronic constipation     Clotting disorder     Colon polyp     Diverticulosis     General anesthetics causing adverse effect in therapeutic use     after surgery 12/23 patient had memory and personality changes for 4 hours.    Glaucoma     Hepatitis C 2000    pt states history of hepatits c-"cured by Dr. Lynch"; TREATED 2 YEARS - 2000   OK NOW    Hypothyroid     Insomnia     Malignant neoplasm of breast 04/12/2012    Memory loss     reports some memory loss since chemo    Osteoarthritis     Panic attacks     Raynaud's disease     takes amlodipine for this    Ulcer     Vertigo       Active Problem List with Overview Notes    Diagnosis Date Noted    Chronic lower back pain 03/20/2025    Chronic thoracic back pain 03/20/2025    Gait difficulty 03/20/2025    Decreased functional mobility 03/20/2025    Decreased ROM of left shoulder 07/18/2024    Decreased strength " of upper extremity 07/18/2024    Impingement syndrome of left shoulder region 07/05/2024    Primary progressive aphasia 06/07/2024    Chronic kidney disease, stage 3a 05/20/2024    Decreased range of right hip movement 12/22/2023    Weakness of both lower extremities 12/22/2023    Impaired functional mobility, balance, gait, and endurance 12/22/2023    Dementia with behavioral disturbance 12/10/2023    Essential (primary) hypertension 12/08/2023    Closed displaced intertrochanteric fracture of right femur 12/07/2023    Weakness 05/01/2023    Acute bilateral low back pain without sciatica 05/01/2023    Difficulty walking 05/01/2023    Decreased functional activity tolerance 05/01/2023    Special educational needs 05/01/2023    Oral ulcer 01/29/2020    Balance disorder 12/11/2019    Gait instability 12/11/2019    Leg weakness 12/11/2019    Aortic calcification 12/03/2019    Raynaud's phenomenon without gangrene 03/11/2019    Depression with anxiety 03/11/2019    Overactive bladder 03/11/2019    Mixed hyperlipidemia 03/11/2019    Prediabetes 03/11/2019    BMI 25.0-25.9,adult 03/11/2019    Use of aromatase inhibitors 07/24/2018    Hepatitis C virus infection cured after antiviral drug therapy 12/03/2012    Osteopenia of left hip 10/31/2012    Insomnia 07/23/2012    Hypothyroidism 07/23/2012    History of breast cancer 04/12/2012      Review of patient's allergies indicates:   Allergen Reactions    Iodinated contrast media Shortness Of Breath     Spontaneous jaw movements    Codeine Nausea Only       Current Medications[1]    Lab Results   Component Value Date    WBC 4.36 02/17/2025    HGB 13.7 02/17/2025    HCT 43.1 02/17/2025     02/17/2025    CHOL 200 (H) 02/17/2025    TRIG 94 02/17/2025    HDL 52 02/17/2025    ALT 14 02/17/2025    AST 35 02/17/2025     02/17/2025    K 4.3 02/17/2025     02/17/2025    CREATININE 0.9 02/17/2025    BUN 19 02/17/2025    CO2 26 02/17/2025    TSH 0.946 04/22/2025    INR  1.0 12/07/2023    HGBA1C 5.4 02/17/2025       Review of Systems   Constitutional:  Positive for fatigue. Negative for fever.   HENT: Negative.  Negative for congestion, sneezing and sore throat.    Eyes: Negative.    Respiratory:  Negative for cough, shortness of breath and wheezing.    Cardiovascular:  Negative for chest pain, palpitations and leg swelling.   Gastrointestinal:  Positive for nausea. Negative for vomiting.   Genitourinary: Negative.    Musculoskeletal: Negative.  Negative for arthralgias.   Skin: Negative.  Negative for rash.   Neurological:  Negative for dizziness, weakness, light-headedness, numbness and headaches.   Hematological: Negative.    Psychiatric/Behavioral: Negative.         Objective:      Physical Exam  Constitutional:       Appearance: Normal appearance.   HENT:      Head: Normocephalic and atraumatic.      Nose: Nose normal.   Eyes:      Extraocular Movements: Extraocular movements intact.   Cardiovascular:      Rate and Rhythm: Normal rate and regular rhythm.   Pulmonary:      Effort: Pulmonary effort is normal.      Breath sounds: Normal breath sounds.   Abdominal:      General: Bowel sounds are normal.      Tenderness: There is no abdominal tenderness. There is right CVA tenderness and left CVA tenderness.   Musculoskeletal:         General: Normal range of motion.      Cervical back: Normal range of motion.   Skin:     General: Skin is warm and dry.   Neurological:      General: No focal deficit present.      Mental Status: She is alert and oriented to person, place, and time.   Psychiatric:         Mood and Affect: Mood normal.         Assessment:       1. Urinary tract infection without hematuria, site unspecified    2. Essential (primary) hypertension    3. Dementia with behavioral disturbance        Plan:       Yuniel was seen today for abdominal pain and nausea.    Diagnoses and all orders for this visit:    Urinary tract infection without hematuria, site unspecified  -      CBC Auto Differential; Future  -     Comprehensive Metabolic Panel; Future  -     CT Abdomen Pelvis  Without Contrast; Future  - Noted urine culture from the 25th showed multiple organisms and elevated white blood cells.  - Awaiting results from yesterday's urine culture.  - continue Keflex  - Ordered CBC to assess WBC count.  - Discussed potential progression to kidney infection and associated symptoms.  - Will follow up after receiving lab results today.    Essential (primary) hypertension  - well controlled   - discussed dash diet, exercise/weight loss, and increased cardiovascular exercise   - monitor BP at home w/ goal <130/80     Dementia with behavioral disturbance  - Ms. Ayoub dementia is slowly progressing.  - No recent exacerbation of symptoms reported.  - Continued transdermal medication therapy to help slow progression.    ED precautions                        Portions of this note were dictated using voice recognition software and may contain dictation related errors in spelling / grammar / syntax not discovered on text review.     Estrellita Cartagena PA-C         [1]   Current Outpatient Medications:     acetaminophen (TYLENOL) 325 MG tablet, Take 650 mg by mouth every 6 (six) hours as needed., Disp: , Rfl:     amLODIPine (NORVASC) 2.5 MG tablet, TAKE 1 TABLET DAILY, Disp: 90 tablet, Rfl: 3    bimatoprost (LUMIGAN) 0.01 % Drop, Place 1 drop into both eyes every evening., Disp: 7.5 mL, Rfl: 3    CALCIUM CARBONATE/VITAMIN D3 (VITAMIN D-3 ORAL), Take 1 tablet by mouth once daily., Disp: , Rfl:     cephALEXin (KEFLEX) 500 MG capsule, Take 1 capsule (500 mg total) by mouth every 12 (twelve) hours. for 7 days, Disp: 14 capsule, Rfl: 0    ibuprofen (ADVIL,MOTRIN) 200 MG tablet, , Disp: , Rfl:     levothyroxine (SYNTHROID) 100 MCG tablet, Take one tablet PO daily for 6 days and take a 1/2 tablet PO on day 7, Disp: 90 tablet, Rfl: 1    naproxen sodium (ANAPROX) 550 MG tablet, Take 1 tablet (550 mg  total) by mouth 2 (two) times daily with meals., Disp: 20 tablet, Rfl: 0    ondansetron (ZOFRAN-ODT) 4 MG TbDL, Take 1 tablet (4 mg total) by mouth every 12 (twelve) hours as needed (nausea)., Disp: 14 tablet, Rfl: 0    rivastigmine (EXELON) 4.6 mg/24 hour PT24, Place 1 patch onto the skin once daily., Disp: 90 patch, Rfl: 3    solifenacin (VESICARE) 5 MG tablet, TAKE 1 TABLET DAILY, Disp: 90 tablet, Rfl: 3    brimonidine 0.2% (ALPHAGAN) 0.2 % Drop, Place 1 drop into both eyes 2 (two) times daily., Disp: 15 mL, Rfl: 6

## 2025-04-29 NOTE — TELEPHONE ENCOUNTER
Harriet pt via phone in regards to appt scheduled incorrectly with Mrs. Ordonez. Pt has confirmed new appt time and date.

## 2025-04-30 LAB — BACTERIA UR CULT: NORMAL

## 2025-05-01 ENCOUNTER — RESULTS FOLLOW-UP (OUTPATIENT)
Dept: URGENT CARE | Facility: CLINIC | Age: 80
End: 2025-05-01

## 2025-05-02 ENCOUNTER — LAB VISIT (OUTPATIENT)
Dept: LAB | Facility: HOSPITAL | Age: 80
End: 2025-05-02
Payer: MEDICARE

## 2025-05-02 DIAGNOSIS — N39.0 URINARY TRACT INFECTION WITHOUT HEMATURIA, SITE UNSPECIFIED: ICD-10-CM

## 2025-05-02 DIAGNOSIS — N17.9 AKI (ACUTE KIDNEY INJURY): ICD-10-CM

## 2025-05-02 LAB
ANION GAP (OHS): 13 MMOL/L (ref 8–16)
BUN SERPL-MCNC: 22 MG/DL (ref 8–23)
CALCIUM SERPL-MCNC: 9 MG/DL (ref 8.7–10.5)
CHLORIDE SERPL-SCNC: 102 MMOL/L (ref 95–110)
CO2 SERPL-SCNC: 23 MMOL/L (ref 23–29)
CREAT SERPL-MCNC: 1.2 MG/DL (ref 0.5–1.4)
GFR SERPLBLD CREATININE-BSD FMLA CKD-EPI: 46 ML/MIN/1.73/M2
GLUCOSE SERPL-MCNC: 98 MG/DL (ref 70–110)
POTASSIUM SERPL-SCNC: 4.3 MMOL/L (ref 3.5–5.1)
SODIUM SERPL-SCNC: 138 MMOL/L (ref 136–145)

## 2025-05-02 PROCEDURE — 36415 COLL VENOUS BLD VENIPUNCTURE: CPT | Mod: PO

## 2025-05-02 PROCEDURE — 80048 BASIC METABOLIC PNL TOTAL CA: CPT

## 2025-05-05 ENCOUNTER — RESULTS FOLLOW-UP (OUTPATIENT)
Dept: FAMILY MEDICINE | Facility: CLINIC | Age: 80
End: 2025-05-05

## 2025-05-08 ENCOUNTER — CLINICAL SUPPORT (OUTPATIENT)
Dept: NEUROLOGY | Facility: CLINIC | Age: 80
End: 2025-05-08
Payer: MEDICARE

## 2025-05-08 ENCOUNTER — PATIENT OUTREACH (OUTPATIENT)
Dept: NEUROLOGY | Facility: CLINIC | Age: 80
End: 2025-05-08
Payer: MEDICARE

## 2025-05-08 DIAGNOSIS — F02.A0 MILD LATE ONSET ALZHEIMER'S DEMENTIA WITHOUT BEHAVIORAL DISTURBANCE, PSYCHOTIC DISTURBANCE, MOOD DISTURBANCE, OR ANXIETY: Primary | ICD-10-CM

## 2025-05-08 DIAGNOSIS — G30.1 MILD LATE ONSET ALZHEIMER'S DEMENTIA WITHOUT BEHAVIORAL DISTURBANCE, PSYCHOTIC DISTURBANCE, MOOD DISTURBANCE, OR ANXIETY: Primary | ICD-10-CM

## 2025-05-08 NOTE — PROGRESS NOTES
Dementia Care Program - GUIDE  Care Navigator - Monthly Follow Up      Virtual Visit - Telehealth  The patient location is: Home  The chief complaint leading to consultation is: GUIDE Model Program  Visit type: Audio Only       Date of Service: 2025  Enrollment date: 24  Program Status: Active  CMS Complexity Tier: Low Complexity Dy[place] - After 6M -   Was Respite Approved? No    Care Navigator completing this form: Ruthann Dunn  PCP: Bhaskar Olivera MD  Care Team: Patient Care Team:  Bhaskar Olivera MD as PCP - General (Family Medicine)  Ilya Landaverde MD (Inactive) as Consulting Physician (Urology)  Sebastián Giles MD as Obstetrician (Obstetrics)  Sherrie Almanza MD as Consulting Physician (Hematology and Oncology)  Riaz Russell MD as Consulting Physician (Family Medicine)  Jcarlos Amato MD as Consulting Physician (Ophthalmology)  Ruthann Dunn LCSW as  (Neuropsychology)  Lisbet Cardoso MD as Consulting Physician (Internal Medicine)     Patient: Yuniel Gracia  MRN: [id number]  : 1945  Age: 79 y.o.  Gender: female  Race: White  Ethnicity: Not  or /a    Visit: Care Navigator/Social Work follow up as part of the GUIDE dementia care management program.        ASSESSMENT & CARE PLAN     Yuniel was seen today for dementia.    Diagnoses and all orders for this visit:    Mild late onset Alzheimer's dementia without behavioral disturbance, psychotic disturbance, mood disturbance, or anxiety         Spoke with Caregiver Ray (Spouse)          2025   Care Plan   Behavior Management Appetite/Eating;Nighttime Behaviors   Caregiver Wellness Supportive Counseling   Medication Compliance & Optimization   Prevention & Safety Urinary Tract Infections;Falls;Hospital and ED Utilization       Plan: Monthly follow up for dementia care management as part of the GUIDE Model.      Next visit scheduled for: 25. Caregiver knows how to reach  CTN/SW and is encouraged to do so before the next scheduled call, if needed.      SUBJECTIVE     Cg said pt is feeling well.    This month she had a significant bout of constipation and a UTI. Pt started on ABX for UTI and had to switch ABX due to nausea. This did not relieve nausea. Scan revealed heavy stool burden. With medication, constipation resolved at  home in about three days.  UTI is also cleared up.   Pt and CG will be travelling to [place] to visit family in the beginning of next month.    No other needs expressed at this time.  SW will remain available.         MONTHLY TRACKING         5/8/2025   DC MONTHLY TRACKING   UTIs 1

## 2025-05-28 ENCOUNTER — E-VISIT (OUTPATIENT)
Dept: FAMILY MEDICINE | Facility: CLINIC | Age: 80
End: 2025-05-28
Payer: MEDICARE

## 2025-05-28 ENCOUNTER — PATIENT MESSAGE (OUTPATIENT)
Dept: FAMILY MEDICINE | Facility: CLINIC | Age: 80
End: 2025-05-28

## 2025-05-28 ENCOUNTER — LAB VISIT (OUTPATIENT)
Dept: LAB | Facility: HOSPITAL | Age: 80
End: 2025-05-28
Payer: MEDICARE

## 2025-05-28 DIAGNOSIS — B96.89 ACUTE BACTERIAL SIMPLE CYSTITIS: Primary | ICD-10-CM

## 2025-05-28 DIAGNOSIS — N30.80 ACUTE BACTERIAL SIMPLE CYSTITIS: ICD-10-CM

## 2025-05-28 DIAGNOSIS — N30.80 ACUTE BACTERIAL SIMPLE CYSTITIS: Primary | ICD-10-CM

## 2025-05-28 DIAGNOSIS — B96.89 ACUTE BACTERIAL SIMPLE CYSTITIS: ICD-10-CM

## 2025-05-28 LAB
BACTERIA #/AREA URNS AUTO: ABNORMAL /HPF
BILIRUB UR QL STRIP.AUTO: NEGATIVE
CLARITY UR: ABNORMAL
COLOR UR AUTO: YELLOW
GLUCOSE UR QL STRIP: NEGATIVE
HGB UR QL STRIP: ABNORMAL
HYALINE CASTS UR QL AUTO: 0 /LPF (ref 0–1)
KETONES UR QL STRIP: NEGATIVE
LEUKOCYTE ESTERASE UR QL STRIP: ABNORMAL
MICROSCOPIC COMMENT: ABNORMAL
NITRITE UR QL STRIP: NEGATIVE
PH UR STRIP: 6 [PH]
PROT UR QL STRIP: ABNORMAL
RBC #/AREA URNS AUTO: 53 /HPF (ref 0–4)
SP GR UR STRIP: 1.02
UROBILINOGEN UR STRIP-ACNC: NEGATIVE EU/DL
WBC #/AREA URNS AUTO: >100 /HPF (ref 0–5)
WBC CLUMPS UR QL AUTO: ABNORMAL

## 2025-05-28 PROCEDURE — 81001 URINALYSIS AUTO W/SCOPE: CPT

## 2025-05-28 PROCEDURE — 87186 SC STD MICRODIL/AGAR DIL: CPT

## 2025-05-28 RX ORDER — SULFAMETHOXAZOLE AND TRIMETHOPRIM 800; 160 MG/1; MG/1
1 TABLET ORAL 2 TIMES DAILY
Qty: 6 TABLET | Refills: 0 | Status: SHIPPED | OUTPATIENT
Start: 2025-05-28 | End: 2025-05-31

## 2025-05-28 NOTE — PROGRESS NOTES
Patient ID: Yuniel Gracia is a 79 y.o. female.        E-Visit Time Tracking:   Day 1 Time (in minutes): 11  Total Time (in minutes): 11      Chief Complaint: General Illness (Entered automatically based on patient selection in [employer].)      The patient initiated a request through [employer] on 5/28/2025 for evaluation and management with a chief complaint of General Illness (Entered automatically based on patient selection in [employer].)     I evaluated the questionnaire submission on 05/28/2025.    [facility] P[facility] Evisit Supergroup-Common Problems    5/28/2025 11:57 AM CDT - Filed by Patient   What do you need help with? Urinary Symptoms   Do you agree to participate in an E-Visit? Yes   If you have any of the following symptoms, please go to the nearest emergency room or call 911: I acknowledge   What is the main issue you would like addressed today? UTI again after only 3 weeks from last UTI   Do you have any of the following symptoms? Passing urine more frequently   When did your concern begin? 5/24/2025   What medications or treatments have you used to help your symptoms? Antibiotics   What does your urine look like? Light yellow   Have you had any of the following symptoms during the past 24 hours?   Urgency (a sudden and uncontrollable urge to urinate) Moderate   Frequency (going to the toilet very often) Moderate   Burning pain when urinating None   Incomplete bladder emptying (still feel like you need to urinate again after urination) (None, Mild, Moderate, Severe) Mild   Pain in the lower belly when youre not urinating. None   Discomfort from your urinary symptoms. None   Have you had any of the following symptoms during the past 24 hours?   Blood seen in the urine None   Pain in the lower back on one or both sides (flank pain) None   Abnormal Vaginal Discharge (abnormal amount, color and/or odor) None   Discharge from the opening you urinate from (urethra) when not urinating. None   Have your symptoms  interfered with your every day activities? Mild   Do you have a fever? Less than 100.4°F   Are you a diabetic? No   Are you currently experiencing any of the following while completing this questionnaire? None   Do you have a history of kidney stones? No   Provide any additional information you feel is important. Last UTI was 4 weeks ago and antibiotics was finished 3 weeks ago   Please attach any relevant images or files (if you have performed a home test for UTI, please submit a photo of results)    Are you able to take your vital signs? Yes   Systolic Blood Pressure: 124   Diastolic Blood Pressure: 67   Weight: 147   Height: 63   Pulse: 51   Temperature: 97.1   Respiration rate:    Pulse Oxygen: 88         Encounter Diagnosis   Name Primary?    Acute bacterial simple cystitis Yes        Orders Placed This Encounter   Procedures    Urine Culture High Risk     Standing Status:   Future     Expected Date:   5/28/2025     Expiration Date:   5/28/2026     Indicated criteria for high risk culture::   Other     Other (specify)::   suspected UTI    Urinalysis     Standing Status:   Future     Expected Date:   5/28/2025     Expiration Date:   7/27/2026     Collection Type:   Urine, Clean Catch      Medications Ordered This Encounter   Medications    sulfamethoxazole-trimethoprim 800-160mg (BACTRIM DS) 800-160 mg Tab     Sig: Take 1 tablet by mouth 2 (two) times daily. Take 1 tablet by mouth 2 (two) times a day for 3 days. for 3 days     Dispense:  6 tablet     Refill:  0        No follow-ups on file.

## 2025-05-31 LAB — BACTERIA UR CULT: ABNORMAL

## 2025-06-05 DIAGNOSIS — F03.90 DEMENTIA WITHOUT BEHAVIORAL DISTURBANCE: ICD-10-CM

## 2025-06-05 RX ORDER — RIVASTIGMINE 4.6 MG/24H
PATCH, EXTENDED RELEASE TRANSDERMAL
Qty: 90 PATCH | Refills: 3 | Status: SHIPPED | OUTPATIENT
Start: 2025-06-05

## 2025-06-06 ENCOUNTER — CLINICAL SUPPORT (OUTPATIENT)
Dept: NEUROLOGY | Facility: CLINIC | Age: 80
End: 2025-06-06
Payer: MEDICARE

## 2025-06-06 ENCOUNTER — PATIENT OUTREACH (OUTPATIENT)
Dept: NEUROLOGY | Facility: CLINIC | Age: 80
End: 2025-06-06
Payer: MEDICARE

## 2025-06-06 DIAGNOSIS — G30.1 MILD LATE ONSET ALZHEIMER'S DEMENTIA WITHOUT BEHAVIORAL DISTURBANCE, PSYCHOTIC DISTURBANCE, MOOD DISTURBANCE, OR ANXIETY: Primary | ICD-10-CM

## 2025-06-06 DIAGNOSIS — F02.A0 MILD LATE ONSET ALZHEIMER'S DEMENTIA WITHOUT BEHAVIORAL DISTURBANCE, PSYCHOTIC DISTURBANCE, MOOD DISTURBANCE, OR ANXIETY: Primary | ICD-10-CM

## 2025-06-17 ENCOUNTER — PATIENT MESSAGE (OUTPATIENT)
Dept: ADMINISTRATIVE | Facility: HOSPITAL | Age: 80
End: 2025-06-17
Payer: MEDICARE

## 2025-06-17 ENCOUNTER — PATIENT OUTREACH (OUTPATIENT)
Dept: ADMINISTRATIVE | Facility: HOSPITAL | Age: 80
End: 2025-06-17
Payer: MEDICARE

## 2025-06-17 DIAGNOSIS — Z78.0 POST-MENOPAUSAL: Primary | ICD-10-CM

## 2025-06-17 NOTE — PROGRESS NOTES
Population Health Chart Review & Patient Outreach Details    Updates Requested / Reviewed:      Updated Care Coordination Note, Care Everywhere, and Immunizations Reconciliation Completed or Queried: [place]         Health Maintenance Topics Overdue:      [facility] Score: 0     Patient is not due for any topics at this time.                       Health Maintenance Topic(s) Outreach Outcomes & Actions Taken:    Osteoporosis Screening - Outreach Outcomes & Actions Taken  : Dexa Order Placed

## 2025-06-27 ENCOUNTER — OFFICE VISIT (OUTPATIENT)
Dept: NEUROLOGY | Facility: CLINIC | Age: 80
End: 2025-06-27
Payer: MEDICARE

## 2025-06-27 DIAGNOSIS — F03.918 DEMENTIA WITH BEHAVIORAL DISTURBANCE: Primary | ICD-10-CM

## 2025-06-27 NOTE — ASSESSMENT & PLAN NOTE
Patient is a 78 y/o female that presents for memory f/u.  Onset ~ 2018. She continues to struggle with word finding, repetition and short term memory loss. Spouse believes language has worsened.   There are no reports of hallucinations, depression, behavorial changes, urinary incontinence or sleep disturbances. Memory changes are frustrating to her.   Last MMSE 10/30; decline from prev. Will no longer conduct MMSE's due to lang barrier    - a diagnosis of major neurodegenerative disorder; mild in severity was warranted from Neuropsych testing in 2022; possibility of PPA, logopenic variant   Recent MRI/PET noted with asymmetry with greater atrophy to left temporal   Serologies noted  Repeat NP testing as needed but will not likely take place  Discussed role vs expectation of cognitive enhancing drugs at length   - rukhsana Exelon patch well; continue!   - unable to tolerate Aricept d/t GI upset  Supervision suggested  Cont dementia care program   Recommend limiting alcohol consumption   Encouraged hydration and proper diet   following

## 2025-06-27 NOTE — PROGRESS NOTES
"    NEUROLOGY  Outpatient Follow Up    [facility]  [address] [facility], [place] [address]  [phone] (office) / [phone] (fax)    Patient Name:  Yuniel Gracia  :  1945  MR #:  [id number]  Acct #:  [id number]    Date of Neurology Visit: 2025  Name of Provider: YESIKA Montes    Other Physicians:  Bhaskar Olivera MD (Primary Care Physician); No ref. provider found (Referring)      Chief Complaint: Follow-up      History of Present Illness (HPI):  Prior HPI  2020 (Dr. Abbott):  "The patient is a 75 y.o. female referred for evaluation of memory loss.  She was seen with her  who supplies additional history. This issue began about 2 years ago.  The primary issue is with word finding. It involves short-term memory more than long-term memory.  She may have some difficulties with executive function.  There are no issues with multiple step processes. She has no problems with ADLs. She does not get lost in familiar areas. There are no hallucinations. There are no personality changes.  She does endorse depression and depression.     Of note, the patient reports that she had been on Zoloft for approximately 20 years.  Recently, she read that Zoloft could produce issues with language.  Her Oncologist instructed her to discontinue it, which she did about a month ago.  She reports some improvement in her word finding since that time, though it is not back to baseline.  She has been having some anxiety since coming off this drug, though."          Interval Hx 6/3/2022:   Patient is here today for memory follow up. She is accompanied by her spouse who supplies information. Overall she feels that her memory is unchanged. Spouse states she has trouble coming up with simple words and reports that she is also repetitive. He believes there has been a decline.      Patient's highest level of education completed was highschool. She was a housewife. The onset of memory " "decline started around 2018. She struggles more with short term memory which has worsened as per spouse. There are no personality or behavioral changes reports. She denies depression. She was previously on Zoloft in 2020 but came of it for thoughts that is caused increased word finding difficulty. She is now back on a very low dose as of 1 year ago. She denies hallucinations. She reports her sleep is good. She denies sleeping during the day. Spouse does endorse difficulty with executive function. Spouse is managing the finances and has done so for the last 20 years. Spouse is also managing her medications for the last 4 years. She denies issues with hygiene and able navid perform ADLs without assistance. She endorses word finding difficulty and spouse beleives it has worsened. She denies urinary incontinence. Her last fall was a couple of days ago stating she tripped but doesn't recall how it happened. She is no longer driving and hasn't done so in the last 5 years. She likes to stay active and work in the yard and play cards weekly.       Interval history 12/7/2022 (Dr. Abbott):  "The patient is a 77 y.o. female seen previously for memory loss.  I saw her in 2020.  She saw Oneyda Blanco earlier this year with interval history to that point as below.  The patient was started on Aricept at this most recent visit, but she had GI side effects.  She then tried oral Exelon, which also had side effects.  There was previously concern for an allergy to adhesives.  Her  indicates that this is not actually correct.  They would like to try the patch version of Exelon."        Interval Hx 6/7/2024:  Patient presents virtually today for follow up. She is accompanied by her spouse who supplies most information. She was last evaluated in 2022. Spouse believes her memory decline has slowed down but she continues to have memory issues. Patient states she feels well. Spouse states she has trouble with word finding and this " frustrates her greatly. She repeats very often and continues to struggle with short term recall. She sleeps well but does suffer bursitis which wakes her up. Spouse is managing her medications and the finances. There are no hallucinations. She is independent with hygiene, tolieting, dressing, bathing and feeding. Her last fall was in December 2023 and she sustained a hip fracture followed by surgery. She denies urinary incontinence. She hasn't driven in over 5 years.   She was diagnosed with PPA in 2022 and it was suggested she have ST. This was never started.   She continues to drink 2 small glasses of vodka per night.       Interval Hx 6/24/2024:  Patient presents today for memory visit. There has been a suspicion for memory decline. She has trouble with finding her words. ST was ordered at her last visit and she will have an evaluation this week. She feels well overall but does have short term recall. She sleeps well at night. Shoulder pain does keep her up at night and she will be having surgery in the near future. There are no reports of agitation or increased aggression. There are no hallucinations or depression. Spouse is managing her medications and the finances. She is independent with ADLs but spouse does the cooking and preparation of food. Her last fall was in December. She hasn't driven in over 5 years. She is maintained on Exelon daily and tolerates it well. A  also reaches out to the patient once a month.       Interval Hx 12/6/2024:  Patient presents virtually today for memory follow up. She is accompanied by her spouse. He states she has declined a bit. Recently she saw the eye MD and had trouble reading out the letters. She has also stopped reading the newspaper. She did start ST but did not tolerate it well. She did not want to finish. At times she will speak clearly and is able to comprehend well but this fluctuates. When she is asked to do something this is when she gets confused.  Her short term recall continues to be an issue. She sleeps OK for the most part. Spouse will give her melatonin as needed. She eats very little. Small portions. There are no reports of agitation or increased aggression. There are no hallucinations or depression. Spouse is managing her medications and the finances. She is independent with ADLs but spouse does the cooking and preparation of food. She has not had any recent falls. She is not driving. She is maintained on Exelon daily and tolerates it well.       Interval Hx 6/27/2025:  Patient presents virtually today for memory follow up. She is accompanied by her spouse.  Patient states she has been doing well. Her expressive speech continues to fluctuate. She will have trouble with finding her words. She is sleeping well. Spouse will have to wake her up at times. She doesn't eat much. There are no reported behavioral changes or agitation. No hallucinations or recent falls. Spouse is managing her medications and the finances. She is independent with ADLs but spouse does the cooking and preparation of food.  She is not driving. She is maintained on Exelon daily and tolerates it well. She is apart of the Dementia care program and receives monthly calls.       Past Medical, Surgical, Family & Social History:   Reviewed and updated.    Home Medications:     Current Outpatient Medications:     acetaminophen (TYLENOL) 325 MG tablet, Take 650 mg by mouth every 6 (six) hours as needed., Disp: , Rfl:     amLODIPine (NORVASC) 2.5 MG tablet, TAKE 1 TABLET DAILY, Disp: 90 tablet, Rfl: 3    bimatoprost (LUMIGAN) 0.01 % Drop, Place 1 drop into both eyes every evening., Disp: 7.5 mL, Rfl: 3    brimonidine 0.2% (ALPHAGAN) 0.2 % Drop, Place 1 drop into both eyes 2 (two) times daily., Disp: 15 mL, Rfl: 6    CALCIUM CARBONATE/VITAMIN D3 (VITAMIN D-3 ORAL), Take 1 tablet by mouth once daily., Disp: , Rfl:     ibuprofen (ADVIL,MOTRIN) 200 MG tablet, , Disp: , Rfl:     levothyroxine  (SYNTHROID) 100 MCG tablet, Take one tablet PO daily for 6 days and take a 1/2 tablet PO on day 7, Disp: 90 tablet, Rfl: 1    naproxen sodium (ANAPROX) 550 MG tablet, Take 1 tablet (550 mg total) by mouth 2 (two) times daily with meals., Disp: 20 tablet, Rfl: 0    ondansetron (ZOFRAN-ODT) 4 MG TbDL, Take 1 tablet (4 mg total) by mouth every 12 (twelve) hours as needed (nausea)., Disp: 14 tablet, Rfl: 0    rivastigmine (EXELON) 4.6 mg/24 hour PT24, PLACE 1 PATCH ON THE SKIN DAILY, Disp: 90 patch, Rfl: 3    solifenacin (VESICARE) 5 MG tablet, TAKE 1 TABLET DAILY, Disp: 90 tablet, Rfl: 3    Physical Examination: limited exam due to being virtual   LMP  (LMP Unknown)     GENERAL:  General appearance: Well, non-toxic appearing.  No apparent distress.  Neck: supple.    MENTAL STATUS:  Alertness, attention span & concentration: normal.  Language: normal.  Orientation to self, place & time:  limited; unable to state month, year or her address  Memory, recent & remote: limited  Fund of knowledge: normal.  MMSE: 10/30 (6/2024)  15/30 (12/2022)  18/30 (6/2022)    SPEECH:  Clear and fluent.  Follows complex commands.        PRIOR FACE TO FACE EXAM  CRANIAL NERVES:  Cranial Nerves II-XII were examined.  II - Visual fields: normal.  III, IV, VI: PERRL, EOMI, No ptosis, No nystagmus.  V - Facial sensation: normal.  VII - Face symmetry & mobility: symmetric  VIII - Hearing: normal.  IX, X - Palate: normal   XI - Shoulder shrug: normal.  XII - Tongue protrusion: midline    GROSS MOTOR:  Gait & station: able to rise from chair with arms crossed over chest; non focal; slightly stooped posture  Tone: normal.  Abnormal movements: none.  Finger-nose : normal.  Rapid alternating movements: normal.  Pronator drift: normal      MUSCLE STRENGTH:   Hand grasp:   - right:5/5   - left:5/5    RIGHT    LEFT   5 Biceps 5   5 Triceps 5   5 Forearm.Pr. 5        5 Iliopsoas flex    5   5 Hip Abduct 5   5 Hip Adduct 5   5 Quads 5   5 Hams 5   5  Dorsiflex 5   5 Plantar Flex 5     REFLEXES:    RIGHT Reflex   LEFT   2 Biceps 2   2 Brachiorad. 2        2 Patellar 2     SENSORY:  Light touch: Normal throughout.             Diagnostic Data Reviewed:   Folate   Date Value Ref Range Status   09/30/2020 16.1 4.0 - 24.0 ng/mL Final     Vitamin B-12   Date Value Ref Range Status   09/30/2020 1330 (H) 210 - 950 pg/mL Final     TSH   Date Value Ref Range Status   04/22/2025 0.946 0.400 - 4.000 uIU/mL Final   02/17/2025 0.200 (L) 0.400 - 4.000 uIU/mL Final     RPR   Date Value Ref Range Status   09/30/2020 Non-reactive Non-reactive Final     Thiamine   Date Value Ref Range Status   09/30/2020 57 38 - 122 ug/L Final     Comment:     This test was developed and the performance   characteristics determined by [facility].   It has not been cleared or approved by the FDA.   The laboratory is regulated under CLIA as qualified to   perform high-complexity testing. This test is used for   patient testing purposes. It should not be regarded   as investigational or for research.  Test performed at [facility],  [address] [employer] [place]  [address]     [phone]  Jose Patten MD  - Medical Director       Ammonia   Date Value Ref Range Status   09/30/2020 35 10 - 50 umol/L Final        MRI brain 2020:  FINDINGS:  Intracranial contents:There is no acute intracranial abnormality.  There is no acute hemorrhage.  There are no regions of restricted diffusion to suggest acute infarction.  There is focal encephalomalacia and gliosis in the lateral right cerebellum with overlying postsurgical changes with a similar appearance to the comparison studies.  Otherwise, cerebellar volume appears normal but there is generalized cerebral volume loss with a moderate burden of periventricular and subcortical white matter FLAIR and T2 hyperintense signal.  These findings were present previously and are without change but remain nonspecific and could reflect  "sequelae of chronic small vessel disease.  Many of the lesions are periventricular in location with a somewhat ovoid configuration which can be seen in the setting of demyelinating disease.  Clinical correlation is needed.  The findings are felt to be stable.  There is no hydrocephalus or midline shift.  There is no abnormal extra-axial fluid collection.  The basilar cisterns are open.  The cerebellar tonsils are normal position.  Flow voids indicating patency are present in the major vessels at the base of the brain.  The basilar artery is slightly small in caliber but there are patent bilateral posterior communicating arteries.  This anatomy may represent fetal origin of the posterior cerebral arteries.     Extracranial contents, calvarium, soft tissues:Baseline marrow signal is normal.  There is multilevel degenerative change in the cervical spine without acute cord compression.  The paranasal sinuses and mastoid air cells are clear.     Impression:  1. There is no acute abnormality.  There is no definite or significant change compared to the prior studies.  There is focal encephalomalacia and gliosis in the lateral right cerebellum with overlying surgical changes.  2. There is generalized cerebral volume loss with moderate nonspecific white matter change which is mostly periventricular in location.  The findings are stable and although are nonspecific, likely reflect sequelae of chronic small vessel disease in a patient of this age with other etiologies, including demyelination felt to be less likely.    NP testing 11/2022:  "Ms. Gracia is a 77 y.o. female with history of breast cancer s/p TC chemotherapy (2012) on anastrozole, hepatitis C (2000), mixed hyperlipidemia, aortic calcification, depression, GERD, and prior surgery for trigeminal neuralgia. She is referred for a neuropsychological evaluation in the context of word finding difficulty, memory, and executive functioning. She consumes two servings " "of vodka every night. Her  assists with instrumental ADLs.      She obtained an MMSE score of 14/30 today, reduced from 18/30 in 6/2022, and 23/30 in 2020. Results are interpreted in the context of low average to average premorbid abilities. She demonstrates impairments in visuospatial abilities, executive functioning, language, and memory. Basic attention is preserved. Learning/acquisition and retrieval are impaired, but she does benefit from recognition format on story and figure memory tests. Naming and verbal fluency are profoundly low, and repetition is poor for sentences. She made phonologic paraphasic errors and occasionally said nonsense words. Object knowledge is spared. Comprehension is variable. Visuospatial ablities/visuoconstruction is also markedly impaired. She has signficant difficulty with working memory, mental set shifting, and mental set maintenance, and understanding abstract concepts. She denies emotional distress.      Her clinical presentation and profile are concerning for a neurodegenerative disease process.  Her  reports that he initially noticed word finding difficulties. She exhibits prominent word retrieval deficits with phonologic paraphasias and neologisms in speech with relatively spared object/semantic knowledge and motor speech. Speech/naming deficits are in disproportion to memory deficits, as she benefits modestly from recognition format. 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 Executive dysfunction and visuospatial deficits are common in lvPPA. MRI appears to show asymmetry with possibly greater left temporal atrophy, but this has not been confirmed by neuroradiology. Further imaging studies (FDG PET) could help inform the differential. Cholinesterase inhibitor medication is still worth consideration, but she has not tolerated aricept or rivastigmine. She meets criteria for major neurocognitive disorder, mild in severity."      PET Brain " 12/2022:  FINDINGS:  Decreased FDG activity occurs in the left parietal and temporal lobes. FDG activity throughout remainder of the left cerebral hemisphere and right cerebral hemisphere is within physiologic limits. Symmetric FDG uptake occurs in bilateral basal ganglia.     IMPRESSION:  Diffuse asymmetric hypometabolism throughout the left temporal and parietal lobes.           Assessment and Plan:  Problem List Items Addressed This Visit          Neuro    Dementia with behavioral disturbance - Primary    Current Assessment & Plan   Patient is a 78 y/o female that presents for memory f/u.  Onset ~ 2018. She continues to struggle with word finding, repetition and short term memory loss. Spouse believes language has worsened.   There are no reports of hallucinations, depression, behavorial changes, urinary incontinence or sleep disturbances. Memory changes are frustrating to her.   Last MMSE 10/30; decline from prev. Will no longer conduct MMSE's due to lang barrier    - a diagnosis of major neurodegenerative disorder; mild in severity was warranted from Neuropsych testing in 2022; possibility of PPA, logopenic variant   Recent MRI/PET noted with asymmetry with greater atrophy to left temporal   Serologies noted  Repeat NP testing as needed but will not likely take place  Discussed role vs expectation of cognitive enhancing drugs at length   - rukhsana Exelon patch well; continue!   - unable to tolerate Aricept d/t GI upset  Supervision suggested  Cont dementia care program   Recommend limiting alcohol consumption   Encouraged hydration and proper diet   following                    Today's visit is associated with current or anticipated ongoing medical care related to this patients single serious condition/complex condition (memory).                   Important to note, also  has a past medical history of Anemia, Arthritis, Blood transfusion, Breast cancer, Cancer (RIGHT BREAST), Chronic constipation,  Clotting disorder, Colon polyp, Diverticulosis, General anesthetics causing adverse effect in therapeutic use, Glaucoma, Hepatitis C (2000), Hypothyroid, Insomnia, Malignant neoplasm of breast (04/12/2012), Memory loss, Osteoarthritis, Panic attacks, Raynaud's disease, Ulcer, and Vertigo.          The patient will return to clinic in 6  months    All questions were answered and patient is comfortable with the plan.         Thank you very much for the opportunity to assist in this patient's care.    If you have any questions or concerns, please do not hesitate to contact me at any time.      Sincerely,     YESIKA Montes  [facility] [place]        The patient location is: [place]  The chief complaint leading to consultation is: follow up    Visit type: audiovisual    Face to Face time with patient: 11  24 minutes of total time spent on the encounter, which includes face to face time and non-face to face time preparing to see the patient (eg, review of tests), Obtaining and/or reviewing separately obtained history, Documenting clinical information in the electronic or other health record, Independently interpreting results (not separately reported) and communicating results to the patient/family/caregiver, or Care coordination (not separately reported).         Each patient to whom he or she provides medical services by telemedicine is:  (1) informed of the relationship between the physician and patient and the respective role of any other health care provider with respect to management of the patient; and (2) notified that he or she may decline to receive medical services by telemedicine and may withdraw from such care at any time.    Notes:

## 2025-07-01 ENCOUNTER — TELEPHONE (OUTPATIENT)
Dept: NEUROLOGY | Facility: CLINIC | Age: 80
End: 2025-07-01
Payer: MEDICARE

## 2025-07-01 NOTE — TELEPHONE ENCOUNTER
----- Message from ALAN Montes sent at 6/27/2025 10:51 AM CDT -----  Please schedule virtual f/u in 6 mos - Dec 19th!

## 2025-07-03 ENCOUNTER — PATIENT OUTREACH (OUTPATIENT)
Dept: NEUROLOGY | Facility: CLINIC | Age: 80
End: 2025-07-03
Payer: MEDICARE

## 2025-07-03 NOTE — PROGRESS NOTES
SW called CG for GUIDE monthly check in. Dyad was in doctor's office and requested call back. SW will call back.

## 2025-07-07 ENCOUNTER — PATIENT OUTREACH (OUTPATIENT)
Dept: NEUROLOGY | Facility: CLINIC | Age: 80
End: 2025-07-07
Payer: MEDICARE

## 2025-07-07 DIAGNOSIS — G30.1 MILD LATE ONSET ALZHEIMER'S DEMENTIA WITHOUT BEHAVIORAL DISTURBANCE, PSYCHOTIC DISTURBANCE, MOOD DISTURBANCE, OR ANXIETY: Primary | ICD-10-CM

## 2025-07-07 DIAGNOSIS — F02.A0 MILD LATE ONSET ALZHEIMER'S DEMENTIA WITHOUT BEHAVIORAL DISTURBANCE, PSYCHOTIC DISTURBANCE, MOOD DISTURBANCE, OR ANXIETY: Primary | ICD-10-CM

## 2025-07-07 NOTE — PROGRESS NOTES
Dementia Care Program - GUIDE  Care Navigator - Monthly Follow Up      Virtual Visit - Telehealth  The patient location is: Home  The chief complaint leading to consultation is: GUIDE Model Program  Visit type: Audio Only       Date of Service: 2025  Enrollment date: 24  Program Status: Active  CMS Complexity Tier: Low Complexity Dyad - After  -   Was Respite Approved? Yes    Care Navigator completing this form: Ruthann Dunn  PCP: Bhaskar Olivera MD  Care Team: Patient Care Team:  Bhaskar Olivera MD as PCP - General (Family Medicine)  Ilya Landaverde MD (Inactive) as Consulting Physician (Urology)  Sebastián Giles MD as Obstetrician (Obstetrics)  Sherrie Almanza MD as Consulting Physician (Hematology and Oncology)  Riaz Russell MD as Consulting Physician (Family Medicine)  Jcarlos Amato MD as Consulting Physician (Ophthalmology)  Ruthann Dunn LCSW as  (Neuropsychology)  Lisbet Cardoso MD as Consulting Physician (Internal Medicine)     Patient: Yuniel Gracia  MRN: [id number]  : 1945  Age: 79 y.o.  Gender: female  Race: White  Ethnicity: Not  or /a    Visit: Care Navigator/Social Work follow up as part of the GUIDE dementia care management program.        ASSESSMENT & CARE PLAN     Yuniel was seen today for dementia.    Diagnoses and all orders for this visit:    Mild late onset Alzheimer's dementia without behavioral disturbance, psychotic disturbance, mood disturbance, or anxiety         Spoke with Caregiver Ray (Spouse)          2025   Care Plan   Behavior Management Delusions;Agitation/Aggression;Irritability/Lability;Psychoeducation and Strategies   Caregiver Wellness Supportive Counseling   Prevention & Safety Urinary Tract Infections;Falls;Hospital and ED Utilization       Plan: Monthly follow up for dementia care management as part of the GUIDE Model.      Next visit scheduled for: 25. Caregiver knows how  "to reach CTN/SW and is encouraged to do so before the next scheduled call, if needed.      SUBJECTIVE     Pt's sister is on hospice and nearing the end of life.   CG said pt seems to be handling this well.  Cg said pt is "appropriately sad."  Dyad has been able to visit pt's sister often during this time.   Pt's son and his two sons were asked to leave the dyad's home due to substance abuse.   Dyad is going to attend a three day wedding in [place] and will staying at a hotel.   SW provided travel tips.   No other needs expressed at this time.  SW will remain available.         MONTHLY TRACKING         7/7/2025 6/6/2025 5/8/2025   DC MONTHLY TRACKING   Falls 0 0    UTIs 0 0 1    ED Visits 0 0    Hospital Encounters 0 0        Data saved with a previous flowsheet row definition             Medication List       Current Outpatient Medications:     acetaminophen (TYLENOL) 325 MG tablet, Take 650 mg by mouth every 6 (six) hours as needed., Disp: , Rfl:     amLODIPine (NORVASC) 2.5 MG tablet, TAKE 1 TABLET DAILY, Disp: 90 tablet, Rfl: 3    bimatoprost (LUMIGAN) 0.01 % Drop, Place 1 drop into both eyes every evening., Disp: 7.5 mL, Rfl: 3    brimonidine 0.2% (ALPHAGAN) 0.2 % Drop, Place 1 drop into both eyes 2 (two) times daily., Disp: 15 mL, Rfl: 6    CALCIUM CARBONATE/VITAMIN D3 (VITAMIN D-3 ORAL), Take 1 tablet by mouth once daily., Disp: , Rfl:     ibuprofen (ADVIL,MOTRIN) 200 MG tablet, , Disp: , Rfl:     levothyroxine (SYNTHROID) 100 MCG tablet, Take one tablet PO daily for 6 days and take a 1/2 tablet PO on day 7, Disp: 90 tablet, Rfl: 1    naproxen sodium (ANAPROX) 550 MG tablet, Take 1 tablet (550 mg total) by mouth 2 (two) times daily with meals., Disp: 20 tablet, Rfl: 0    ondansetron (ZOFRAN-ODT) 4 MG TbDL, Take 1 tablet (4 mg total) by mouth every 12 (twelve) hours as needed (nausea)., Disp: 14 tablet, Rfl: 0    rivastigmine (EXELON) 4.6 mg/24 hour PT24, PLACE 1 PATCH ON THE SKIN DAILY, Disp: 90 patch, " Rfl: 3    solifenacin (VESICARE) 5 MG tablet, TAKE 1 TABLET DAILY, Disp: 90 tablet, Rfl: 3

## 2025-07-08 ENCOUNTER — PATIENT MESSAGE (OUTPATIENT)
Dept: FAMILY MEDICINE | Facility: CLINIC | Age: 80
End: 2025-07-08

## 2025-07-08 ENCOUNTER — LAB VISIT (OUTPATIENT)
Dept: LAB | Facility: HOSPITAL | Age: 80
End: 2025-07-08
Attending: STUDENT IN AN ORGANIZED HEALTH CARE EDUCATION/TRAINING PROGRAM
Payer: MEDICARE

## 2025-07-08 ENCOUNTER — E-VISIT (OUTPATIENT)
Dept: FAMILY MEDICINE | Facility: CLINIC | Age: 80
End: 2025-07-08
Payer: MEDICARE

## 2025-07-08 DIAGNOSIS — N30.80 ACUTE BACTERIAL SIMPLE CYSTITIS: ICD-10-CM

## 2025-07-08 DIAGNOSIS — B96.89 ACUTE BACTERIAL SIMPLE CYSTITIS: Primary | ICD-10-CM

## 2025-07-08 DIAGNOSIS — N30.80 ACUTE BACTERIAL SIMPLE CYSTITIS: Primary | ICD-10-CM

## 2025-07-08 DIAGNOSIS — B96.89 ACUTE BACTERIAL SIMPLE CYSTITIS: ICD-10-CM

## 2025-07-08 PROCEDURE — 87086 URINE CULTURE/COLONY COUNT: CPT

## 2025-07-08 RX ORDER — SULFAMETHOXAZOLE AND TRIMETHOPRIM 800; 160 MG/1; MG/1
1 TABLET ORAL 2 TIMES DAILY
Qty: 6 TABLET | Refills: 0 | Status: SHIPPED | OUTPATIENT
Start: 2025-07-08 | End: 2025-07-11

## 2025-07-08 NOTE — PROGRESS NOTES
Patient ID: Yuniel Gracia is a 79 y.o. female.    Chief Complaint: General Illness (Entered automatically based on patient selection in [employer].)          274}  The patient initiated a request through [employer] on 7/8/2025 for evaluation and management with a chief complaint of General Illness (Entered automatically based on patient selection in [employer].)     I evaluated the questionnaire submission on 07/08/2025 .    Total Time (in minutes): 11     Ohs Peq Evisit Supergroup-Common Problems    7/8/2025 12:24 PM CDT - Filed by Patient   What do you need help with? Urinary Symptoms   Do you agree to participate in an E-Visit? Yes   If you have any of the following symptoms, please go to the nearest emergency room or call 911: I acknowledge   What is the main issue you would like addressed today? UTI   Do you have any of the following symptoms? ( Discomfort or pain passing urine, Passing urine more frequently, Cloudy urine, Discharge in urine, Other, None) Passing urine more frequently;  Cloudy urine   When did your concern begin? 7/7/2025   What medications or treatments have you used to help your symptoms? (Antibiotics, Cranberry products, Drinking more fluids, Painkillers, Other, None) Antibiotics   What does your urine look like? (Clear, Cloudy, Dark yellow, Light yellow, Pink or red (bloody), Foamy, Not sure) Dark yellow   Have you had any of the following symptoms during the past 24 hours?   Urgency (a sudden and uncontrollable urge to urinate) (None, Mild, Moderate, Severe) Mild   Frequency (going to the toilet very often) (None, Mild, Moderate, Severe) Mild   Burning pain when urinating (None, Mild, Moderate, Severe) Moderate   Incomplete bladder emptying (still feel like you need to urinate again after urination) (None, Mild, Moderate, Severe) None   Pain in the lower belly when youre not urinating. (None, Mild, Moderate, Severe) Mild   Please rate how much discomfort these symptoms have caused in the  last 24 hours. (None, Mild, Moderate, Severe) Mild   Have you had any of the following symptoms during the past 24 hours?   Blood seen in the urine (None, Mild, Moderate, Severe) None   Pain in the lower back on one or both sides (flank pain) (None, Mild, Moderate, Severe) None   Abnormal Vaginal Discharge (abnormal amount, color and/or odor) (None, Mild, Moderate, Severe) None   Discharge from the opening you urinate from (urethra) when not urinating. (None, Mild, Moderate, Severe) None   Tell us how the symptoms have interfered with your every day activities/work in the past 24 hours. (None, Mild, Moderate, Severe) None   Do you have a fever? (Less than 100.4°F, 100.4°F or greater, No, Not sure) Not sure   Are you a diabetic? No   If you are female, please tell us if you have any of the following right now (as youre completing the questionnaire): (Menstrual period (menses), PMS (Premenstrual syndrome),Signs of menopause such as hot flashes, Pregnancy, None) None   Do you have a history of kidney stones? No   Provide any information you feel is important to your history not asked above Having a UTI Every month for the last 3 months   Please attach any relevant images or files (if you have performed a home test for UTI, please submit a photo of results)    Are you able to take your vitals? No          Active Problem List with Overview Notes    Diagnosis Date Noted    Chronic lower back pain 03/20/2025    Chronic thoracic back pain 03/20/2025    Gait difficulty 03/20/2025    Decreased functional mobility 03/20/2025    Decreased ROM of left shoulder 07/18/2024    Decreased strength of upper extremity 07/18/2024    Impingement syndrome of left shoulder region 07/05/2024    Primary progressive aphasia 06/07/2024    Chronic kidney disease, stage 3a 05/20/2024    Decreased range of right hip movement 12/22/2023    Weakness of both lower extremities 12/22/2023    Impaired functional mobility, balance, gait, and endurance  12/22/2023    Dementia with behavioral disturbance 12/10/2023    Essential (primary) hypertension 12/08/2023    Closed displaced intertrochanteric fracture of right femur 12/07/2023    Weakness 05/01/2023    Acute bilateral low back pain without sciatica 05/01/2023    Difficulty walking 05/01/2023    Decreased functional activity tolerance 05/01/2023    Special educational needs 05/01/2023    Oral ulcer 01/29/2020    Balance disorder 12/11/2019    Gait instability 12/11/2019    Leg weakness 12/11/2019    Aortic calcification 12/03/2019    Raynaud's phenomenon without gangrene 03/11/2019    Depression with anxiety 03/11/2019    Overactive bladder 03/11/2019    Mixed hyperlipidemia 03/11/2019    Prediabetes 03/11/2019    BMI 25.0-25.9,adult 03/11/2019    Use of aromatase inhibitors 07/24/2018    Hepatitis C virus infection cured after antiviral drug therapy 12/03/2012    Osteopenia of left hip 10/31/2012    Insomnia 07/23/2012    Hypothyroidism 07/23/2012    History of breast cancer 04/12/2012      Recent Labs Obtained:  Lab Results   Component Value Date    WBC 5.21 04/29/2025    HGB 13.9 04/29/2025    HCT 42.4 04/29/2025    MCV 91 04/29/2025     04/29/2025     05/02/2025    K 4.3 05/02/2025    GLU 98 05/02/2025    CREATININE 1.2 05/02/2025    EGFRNORACEVR 46 (L) 05/02/2025    HGBA1C 5.4 02/17/2025    TSH 0.946 04/22/2025      Review of patient's allergies indicates:   Allergen Reactions    Iodinated contrast media Shortness Of Breath     Spontaneous jaw movements    Codeine Nausea Only       Encounter Diagnosis   Name Primary?    Acute bacterial simple cystitis Yes        Orders Placed This Encounter   Procedures    Urine Culture High Risk ($$)     Standing Status:   Future     Expected Date:   7/8/2025     Expiration Date:   10/6/2026     Send normal result to authorizing provider's In Basket if patient is active on MyChart::   Yes      Medications Ordered This Encounter   Medications     sulfamethoxazole-trimethoprim 800-160mg (BACTRIM DS) 800-160 mg Tab     Sig: Take 1 tablet by mouth 2 (two) times daily. Take 1 tablet by mouth 2 (two) times a day for 3 days. for 3 days     Dispense:  6 tablet     Refill:  0        E-Visit Time Tracking:    Day 1 Time (in minutes): 11    Total Time (in minutes): 11      274}

## 2025-07-10 LAB — BACTERIA UR CULT: NORMAL

## 2025-08-07 ENCOUNTER — PATIENT OUTREACH (OUTPATIENT)
Dept: NEUROLOGY | Facility: CLINIC | Age: 80
End: 2025-08-07
Payer: MEDICARE

## 2025-08-07 DIAGNOSIS — G30.1 MILD LATE ONSET ALZHEIMER'S DEMENTIA WITHOUT BEHAVIORAL DISTURBANCE, PSYCHOTIC DISTURBANCE, MOOD DISTURBANCE, OR ANXIETY: Primary | ICD-10-CM

## 2025-08-07 DIAGNOSIS — F02.A0 MILD LATE ONSET ALZHEIMER'S DEMENTIA WITHOUT BEHAVIORAL DISTURBANCE, PSYCHOTIC DISTURBANCE, MOOD DISTURBANCE, OR ANXIETY: Primary | ICD-10-CM

## 2025-08-07 NOTE — PROGRESS NOTES
Dementia Care Program - GUIDE  Care Navigator - Monthly Follow Up      Virtual Visit - Telehealth  The patient location is: Home  The chief complaint leading to consultation is: GUIDE Model Program  Visit type: Audio Only       Date of Service: 2025  Enrollment date: 24  Program Status: Active  CMS Complexity Tier: Low Complexity Dyad - After  -   Was Respite Approved? Yes    Care Navigator completing this form: Ruthann Dunn  PCP: Bhaskar Olivera MD  Care Team: Patient Care Team:  Bhaskar Olivera MD as PCP - General (Family Medicine)  Ilya Landaverde MD (Inactive) as Consulting Physician (Urology)  Sebastián Giles MD as Obstetrician (Obstetrics)  Sherrie Almanza MD as Consulting Physician (Hematology and Oncology)  Riaz Russell MD as Consulting Physician (Family Medicine)  Jcarlos Amato MD as Consulting Physician (Ophthalmology)  Ruthann Dunn LCSW as  (Neuropsychology)  Lisbet Cardoso MD as Consulting Physician (Internal Medicine)     Patient: Yuniel Gracia  MRN: [id number]  : 1945  Age: 80 y.o.  Gender: female  Race: White  Ethnicity: Not  or /a    Visit: Care Navigator/Social Work follow up as part of the GUIDE dementia care management program.        ASSESSMENT & CARE PLAN     Yuniel was seen today for dementia.    Diagnoses and all orders for this visit:    Mild late onset Alzheimer's dementia without behavioral disturbance, psychotic disturbance, mood disturbance, or anxiety         Spoke with Caregiver Ray (Spouse)          2025   Care Plan   Behavior Management Agitation/Aggression;Irritability/Lability;Appetite/Eating;Motor Disturbance;Anxiety;Psychoeducation and Strategies   Caregiver Wellness Supportive Counseling   Medication Compliance & Optimization   Prevention & Safety Urinary Tract Infections;Falls;Hospital and ED Utilization;Psychoeducation and Strategies   Advanced Care Planning [place]POST;Goals of  Care;Healthcare Power of ;Psychoeducation and Strategies       Plan: Monthly follow up for dementia care management as part of the GUIDE Model.      Next visit scheduled for: 9/3/25. Caregiver knows how to reach CTN/SW and is encouraged to do so before the next scheduled call, if needed.      SUBJECTIVE     CG and pt attended a 3 day wedding in [place].  CG said pt did well and CG really enjoyed it. Dyad will be travelling for leisure two weeks in September as well.  CG said he is using behavioral strategies with success.  He said pt gets agitated and he is able to redirect her.  Discussed potential pharmacological intervention should behaviors become unmanageable.  SW discussed advanced care planning with CG, provided information and education on the benefits of the LaPOST document and sent a blank copy of the document to CG via email.    CG will review. SW will prompt ongoing advanced care planning discussion and assist CG with completing LaPOST should he decide to do so.    Pt had UTI this past month.  SW provided education on UTI prevention, and provided tips on keeping pt hydrated.      GUIDE re-assessment questionnaires were scheduled for 9/3/25 and reassessment clinic appt scheduled for 9/9/25.  No other needs expressed at this time.  SW will remain available.        MONTHLY TRACKING         8/7/2025 7/7/2025 6/6/2025 5/8/2025   DC MONTHLY TRACKING   Falls 0 0 0    UTIs 1 0 0 1    ED Visits 0 0 0    Hospital Encounters 0 0 0        Data saved with a previous flowsheet row definition             Medication List       Current Outpatient Medications:     acetaminophen (TYLENOL) 325 MG tablet, Take 650 mg by mouth every 6 (six) hours as needed., Disp: , Rfl:     amLODIPine (NORVASC) 2.5 MG tablet, TAKE 1 TABLET DAILY, Disp: 90 tablet, Rfl: 3    bimatoprost (LUMIGAN) 0.01 % Drop, Place 1 drop into both eyes every evening., Disp: 7.5 mL, Rfl: 3    brimonidine 0.2% (ALPHAGAN) 0.2 % Drop, Place 1 drop  into both eyes 2 (two) times daily., Disp: 15 mL, Rfl: 6    CALCIUM CARBONATE/VITAMIN D3 (VITAMIN D-3 ORAL), Take 1 tablet by mouth once daily., Disp: , Rfl:     ibuprofen (ADVIL,MOTRIN) 200 MG tablet, , Disp: , Rfl:     levothyroxine (SYNTHROID) 100 MCG tablet, Take one tablet PO daily for 6 days and take a 1/2 tablet PO on day 7, Disp: 90 tablet, Rfl: 1    naproxen sodium (ANAPROX) 550 MG tablet, Take 1 tablet (550 mg total) by mouth 2 (two) times daily with meals., Disp: 20 tablet, Rfl: 0    ondansetron (ZOFRAN-ODT) 4 MG TbDL, Take 1 tablet (4 mg total) by mouth every 12 (twelve) hours as needed (nausea)., Disp: 14 tablet, Rfl: 0    rivastigmine (EXELON) 4.6 mg/24 hour PT24, PLACE 1 PATCH ON THE SKIN DAILY, Disp: 90 patch, Rfl: 3    solifenacin (VESICARE) 5 MG tablet, TAKE 1 TABLET DAILY, Disp: 90 tablet, Rfl: 3

## 2025-08-25 ENCOUNTER — HOSPITAL ENCOUNTER (OUTPATIENT)
Dept: RADIOLOGY | Facility: CLINIC | Age: 80
Discharge: HOME | End: 2025-08-25
Attending: STUDENT IN AN ORGANIZED HEALTH CARE EDUCATION/TRAINING PROGRAM
Payer: MEDICARE

## 2025-08-25 DIAGNOSIS — Z78.0 POST-MENOPAUSAL: ICD-10-CM

## 2025-08-25 PROCEDURE — 77080 DXA BONE DENSITY AXIAL: CPT | Mod: 26,,, | Performed by: RADIOLOGY

## 2025-08-25 PROCEDURE — 77080 DXA BONE DENSITY AXIAL: CPT | Mod: TC,PO

## 2025-08-28 ENCOUNTER — OFFICE VISIT (OUTPATIENT)
Dept: FAMILY MEDICINE | Facility: CLINIC | Age: 80
End: 2025-08-28
Payer: MEDICARE

## 2025-08-28 DIAGNOSIS — I10 ESSENTIAL (PRIMARY) HYPERTENSION: ICD-10-CM

## 2025-08-28 DIAGNOSIS — R79.9 ABNORMAL FINDING OF BLOOD CHEMISTRY, UNSPECIFIED: ICD-10-CM

## 2025-08-28 DIAGNOSIS — M81.0 OSTEOPOROSIS, UNSPECIFIED OSTEOPOROSIS TYPE, UNSPECIFIED PATHOLOGICAL FRACTURE PRESENCE: Primary | ICD-10-CM

## 2025-08-28 RX ORDER — IBANDRONATE SODIUM 150 MG/1
150 TABLET, FILM COATED ORAL
Qty: 3 TABLET | Refills: 3 | Status: SHIPPED | OUTPATIENT
Start: 2025-08-28 | End: 2026-08-28

## 2025-08-29 ENCOUNTER — LAB VISIT (OUTPATIENT)
Dept: LAB | Facility: HOSPITAL | Age: 80
End: 2025-08-29
Attending: STUDENT IN AN ORGANIZED HEALTH CARE EDUCATION/TRAINING PROGRAM
Payer: MEDICARE

## 2025-08-29 ENCOUNTER — TELEPHONE (OUTPATIENT)
Dept: FAMILY MEDICINE | Facility: CLINIC | Age: 80
End: 2025-08-29
Payer: MEDICARE

## 2025-08-29 DIAGNOSIS — M81.0 OSTEOPOROSIS, UNSPECIFIED OSTEOPOROSIS TYPE, UNSPECIFIED PATHOLOGICAL FRACTURE PRESENCE: ICD-10-CM

## 2025-08-29 DIAGNOSIS — R79.9 ABNORMAL FINDING OF BLOOD CHEMISTRY, UNSPECIFIED: ICD-10-CM

## 2025-08-29 DIAGNOSIS — I10 ESSENTIAL (PRIMARY) HYPERTENSION: ICD-10-CM

## 2025-08-29 LAB
25(OH)D3+25(OH)D2 SERPL-MCNC: 39 NG/ML (ref 30–96)
ALBUMIN SERPL BCP-MCNC: 3.7 G/DL (ref 3.5–5.2)
ALP SERPL-CCNC: 80 UNIT/L (ref 40–150)
ALT SERPL W/O P-5'-P-CCNC: 12 UNIT/L (ref 0–55)
ANION GAP (OHS): 12 MMOL/L (ref 8–16)
AST SERPL-CCNC: 30 UNIT/L (ref 0–50)
BILIRUB SERPL-MCNC: 1 MG/DL (ref 0.1–1)
BUN SERPL-MCNC: 20 MG/DL (ref 8–23)
CALCIUM SERPL-MCNC: 9.9 MG/DL (ref 8.7–10.5)
CHLORIDE SERPL-SCNC: 104 MMOL/L (ref 95–110)
CO2 SERPL-SCNC: 24 MMOL/L (ref 23–29)
CREAT SERPL-MCNC: 1.1 MG/DL (ref 0.5–1.4)
GFR SERPLBLD CREATININE-BSD FMLA CKD-EPI: 51 ML/MIN/1.73/M2
GLUCOSE SERPL-MCNC: 94 MG/DL (ref 70–110)
POTASSIUM SERPL-SCNC: 4.4 MMOL/L (ref 3.5–5.1)
PROT SERPL-MCNC: 6.9 GM/DL (ref 6–8.4)
PTH-INTACT SERPL-MCNC: 29.4 PG/ML (ref 9–77)
SODIUM SERPL-SCNC: 140 MMOL/L (ref 136–145)

## 2025-08-29 PROCEDURE — 82040 ASSAY OF SERUM ALBUMIN: CPT

## 2025-08-29 PROCEDURE — 36415 COLL VENOUS BLD VENIPUNCTURE: CPT | Mod: PN

## 2025-08-29 PROCEDURE — 83970 ASSAY OF PARATHORMONE: CPT

## 2025-08-29 PROCEDURE — 83735 ASSAY OF MAGNESIUM: CPT

## 2025-08-29 PROCEDURE — 82306 VITAMIN D 25 HYDROXY: CPT

## 2025-09-03 ENCOUNTER — PATIENT OUTREACH (OUTPATIENT)
Dept: NEUROLOGY | Facility: CLINIC | Age: 80
End: 2025-09-03
Payer: MEDICARE

## 2025-09-05 LAB — W MAGNESIUM RBC: 4.7 MG/DL

## (undated) DEVICE — COVER BACK TABLE 72X21

## (undated) DEVICE — KIT IRR SUCTION HND PIECE

## (undated) DEVICE — BURR OVAL 8 FLUTE 4MMX13CM

## (undated) DEVICE — ELECTRODE REM PLYHSV RETURN 9

## (undated) DEVICE — SOL NACL 0.9% INJ 500ML BG

## (undated) DEVICE — SEE MEDLINE ITEM 152680

## (undated) DEVICE — STAPLER SKIN PROXIMATE WIDE

## (undated) DEVICE — SLEEVE SCD EXPRESS KNEE MEDIUM

## (undated) DEVICE — ALCOHOL 70% ANTISEPTIC ISO 4OZ

## (undated) DEVICE — HOOD T-5 TEAR AWAY STERILE

## (undated) DEVICE — TUBING PUMP ARTHROSCOPY STRL

## (undated) DEVICE — SEE MEDLINE ITEM 152530

## (undated) DEVICE — SEE MEDLINE ITEM 146298

## (undated) DEVICE — TROCAR KII FIOS 12MM X 100MM

## (undated) DEVICE — BLADE SAG DUAL 18MMX1.27MMX90M

## (undated) DEVICE — SOL IRR NACL .9% 3000ML

## (undated) DEVICE — BANDAGE ADHESIVE

## (undated) DEVICE — DRAPE STERI INSTRUMENT 1018

## (undated) DEVICE — PILLOW FACE ADLT FOAM W/VELCRO

## (undated) DEVICE — PAD ABD 8X10 STERILE

## (undated) DEVICE — PACK CUSTOM ENDO CHOLO SLI

## (undated) DEVICE — SEALER BIPOLAR TISSUE 6.0

## (undated) DEVICE — UNDERGLOVES BIOGEL PI SIZE 8.5

## (undated) DEVICE — SOL 9P NACL IRR PIC IL

## (undated) DEVICE — SUT VICRYL+ 1 CTX 18IN VIOL

## (undated) DEVICE — PACK SIRUS BASIC V SURG STRL

## (undated) DEVICE — STRAP OR TABLE 5IN X 72IN

## (undated) DEVICE — SCISSOR 5MMX35CM DIRECT DRIVE

## (undated) DEVICE — APPLICATOR CHLORAPREP ORN 26ML

## (undated) DEVICE — DRAPE STERI U-SHAPED 47X51IN

## (undated) DEVICE — GLOVE SURG ULTRA TOUCH 8.5

## (undated) DEVICE — CLOSURE SKIN STERI STRIP 1/2X4

## (undated) DEVICE — COVER SURG LIGHT HANDLE

## (undated) DEVICE — DRESSING AQUACEL AG 3.5X10IN

## (undated) DEVICE — PAD COLD THERAPY KNEE WRAP ON

## (undated) DEVICE — SYR 30CC LUER LOCK

## (undated) DEVICE — GOWN POLY REINF BRTH SLV 2XL

## (undated) DEVICE — GAUZE SPONGE 4'X4 12 PLY

## (undated) DEVICE — PRESSURIZER BN CEMENT NOZZLE

## (undated) DEVICE — LINER SUCTION 3000CC

## (undated) DEVICE — GAUZE SPONGE 4X4 12PLY

## (undated) DEVICE — DISSECTOR 4MM X 13CM DISP

## (undated) DEVICE — DRAPE INCISE IOBAN 2 23X17IN

## (undated) DEVICE — TUBING SUC UNIV W/CONN 12FT

## (undated) DEVICE — PAD CAST SPECIALIST STRL 6

## (undated) DEVICE — WRAP SHLDR HIP ACCU THRM PACK

## (undated) DEVICE — KIT PROCEDURE STER INLET CLOSU

## (undated) DEVICE — SUT VICRYL PLUS 2-0 CT1 18

## (undated) DEVICE — SPONGE IV DRAIN 4X4 STERILE

## (undated) DEVICE — Device

## (undated) DEVICE — SCISSOR CURVED ENDOPATH 5MM

## (undated) DEVICE — UNDERGLOVE BIOGEL PI SZ 6.5 LF

## (undated) DEVICE — UNDERGLOVES BIOGEL PI SZ 7 LF

## (undated) DEVICE — PAD ABDOMINAL STERILE 8X10IN

## (undated) DEVICE — SUT ETHILON 3-0 FS-1 30

## (undated) DEVICE — TRAY FOLEY 16FR INFECTION CONT

## (undated) DEVICE — SOL NACL IRR 1000ML BTL

## (undated) DEVICE — SEE MEDLINE ITEM 152523

## (undated) DEVICE — KIT TOTAL HIP OPTIVAC

## (undated) DEVICE — PADDING CAST SPECIALIST 6X4YD

## (undated) DEVICE — KIT EVACUATOR 3-SPRING 1/8 DRN

## (undated) DEVICE — TOURNIQUET SB QC SP 34X4IN

## (undated) DEVICE — SOL CLEARIFY VISUALIZATION LAP

## (undated) DEVICE — WRAP KNEE ACCU THERM GEL PACK

## (undated) DEVICE — SUT MONOCRYL 4-0 SH UND MON

## (undated) DEVICE — NDL HYPO SAFETY 22 X 1 1/2

## (undated) DEVICE — SUT 0 VICRYL / UR6 (J603)

## (undated) DEVICE — DRESSING XEROFORM 1X8IN

## (undated) DEVICE — SUT STRATAFIX PDS 1 CTX 18IN

## (undated) DEVICE — NDL SPINAL 18GX3.5 SPINOCAN

## (undated) DEVICE — TROCAR KII FIOS 5MM X 100MM

## (undated) DEVICE — BANDAGE ACE ELASTIC 6"

## (undated) DEVICE — MANIFOLD 4 PORT

## (undated) DEVICE — TAPE MEDIPORE 3 X 10YD

## (undated) DEVICE — SYR SLIP TIP 10ML SHIELD

## (undated) DEVICE — SLEEVE SCD EXPRESS CALF MEDIUM

## (undated) DEVICE — GLOVE SENSICARE PI ALOE 8

## (undated) DEVICE — DRESSING XEROFORM FOIL PK 1X8

## (undated) DEVICE — GLOVE BIOGEL SKINSENSE PI 7.0

## (undated) DEVICE — SOL NACL IRR 3000ML

## (undated) DEVICE — GLOVE SENSICARE PI ALOE 7

## (undated) DEVICE — GOWN POLY REINF X-LONG 2XL

## (undated) DEVICE — DRESSING XEROFORM NONADH 1X8IN

## (undated) DEVICE — SEE MEDLINE ITEM 152622

## (undated) DEVICE — TUBING PNEUMO

## (undated) DEVICE — BAG TISS RETRV MONARCH 10MM

## (undated) DEVICE — SEE MEDLINE ITEM 153151

## (undated) DEVICE — GLOVE BIOGEL SKINSENSE PI 7.5

## (undated) DEVICE — PACK SET UP 190 OMC-NS

## (undated) DEVICE — SEE MEDLINE ITEM 146231

## (undated) DEVICE — BLADE SURG CARBON STEEL SZ11

## (undated) DEVICE — CONTAINER SPECIMEN STRL 4OZ

## (undated) DEVICE — GLOVE BIOGEL SKINSENSE PI 6.5

## (undated) DEVICE — SHEARS HARMONIC 36CM HD 1000I

## (undated) DEVICE — DRAPE INVISISHIELD TOWEL SMALL

## (undated) DEVICE — NDL SAFETY 22G X 1.5 ECLIPSE

## (undated) DEVICE — GOWN POLY REINF BRTH SLV XL

## (undated) DEVICE — DRAPE THREE-QTR REINF 53X77IN

## (undated) DEVICE — DRAPE U SPLIT SHEET 54X76IN

## (undated) DEVICE — GLOVE BIOGEL SKINSENSE PI 8.5

## (undated) DEVICE — GLOVE SURG ULTRA TOUCH 7.5

## (undated) DEVICE — GLOVE SENSICARE PI GRN 8.5

## (undated) DEVICE — GOWN POLY REINF X-LONG XL

## (undated) DEVICE — PROBE MULTI PORT RF 90 DEGREE

## (undated) DEVICE — TOWEL OR DISP STRL BLUE 4/PK

## (undated) DEVICE — SUT ENDOLOOP PDSII 18 LIGA

## (undated) DEVICE — DRAPE SHOULDER 160X102IN 10/CA

## (undated) DEVICE — KIT TRIMANO

## (undated) DEVICE — SPONGE BULKEE II ABSRB 6X6.75